# Patient Record
Sex: MALE | Race: WHITE | Employment: UNEMPLOYED | URBAN - METROPOLITAN AREA
[De-identification: names, ages, dates, MRNs, and addresses within clinical notes are randomized per-mention and may not be internally consistent; named-entity substitution may affect disease eponyms.]

---

## 2017-03-16 ENCOUNTER — HOSPITAL ENCOUNTER (EMERGENCY)
Age: 27
Discharge: PSYCHIATRIC OR SUBSTANCE USE FACILITY | DRG: 812 | End: 2017-03-16
Attending: EMERGENCY MEDICINE | Admitting: EMERGENCY MEDICINE
Payer: COMMERCIAL

## 2017-03-16 ENCOUNTER — HOSPITAL ENCOUNTER (INPATIENT)
Age: 27
LOS: 1 days | Discharge: PSYCHIATRIC OR SUBSTANCE USE FACILITY | DRG: 750 | End: 2017-03-17
Attending: INTERNAL MEDICINE | Admitting: INTERNAL MEDICINE
Payer: COMMERCIAL

## 2017-03-16 VITALS
DIASTOLIC BLOOD PRESSURE: 69 MMHG | BODY MASS INDEX: 23.03 KG/M2 | TEMPERATURE: 98.4 F | RESPIRATION RATE: 16 BRPM | HEIGHT: 72 IN | OXYGEN SATURATION: 100 % | WEIGHT: 170 LBS | SYSTOLIC BLOOD PRESSURE: 110 MMHG | HEART RATE: 74 BPM

## 2017-03-16 DIAGNOSIS — T50.902A DRUG OVERDOSE, INTENTIONAL SELF-HARM, INITIAL ENCOUNTER (HCC): Primary | ICD-10-CM

## 2017-03-16 LAB
ABSOLUTE EOS #: 0.2 K/UL (ref 0–0.4)
ABSOLUTE LYMPH #: 2.8 K/UL (ref 1–4.8)
ABSOLUTE MONO #: 0.7 K/UL (ref 0.2–0.8)
ACETAMINOPHEN LEVEL: <10 UG/ML (ref 10–30)
ALBUMIN SERPL-MCNC: 4.2 G/DL (ref 3.5–5.2)
ALBUMIN SERPL-MCNC: 4.7 G/DL (ref 3.5–5.2)
ALBUMIN/GLOBULIN RATIO: ABNORMAL (ref 1–2.5)
ALBUMIN/GLOBULIN RATIO: NORMAL (ref 1–2.5)
ALP BLD-CCNC: 37 U/L (ref 40–129)
ALP BLD-CCNC: 44 U/L (ref 40–129)
ALT SERPL-CCNC: 10 U/L (ref 5–41)
ALT SERPL-CCNC: 12 U/L (ref 5–41)
AMPHETAMINE SCREEN URINE: NEGATIVE
ANION GAP SERPL CALCULATED.3IONS-SCNC: 10 MMOL/L (ref 9–17)
ANION GAP SERPL CALCULATED.3IONS-SCNC: 15 MMOL/L (ref 9–17)
APPEARANCE: CLEAR
AST SERPL-CCNC: 17 U/L
AST SERPL-CCNC: 20 U/L
BARBITURATE SCREEN URINE: NEGATIVE
BASOPHILS # BLD: 1 % (ref 0–2)
BASOPHILS ABSOLUTE: 0.1 K/UL (ref 0–0.2)
BENZODIAZEPINE SCREEN, URINE: NEGATIVE
BILIRUB SERPL-MCNC: 0.62 MG/DL (ref 0.3–1.2)
BILIRUB SERPL-MCNC: 0.67 MG/DL (ref 0.3–1.2)
BILIRUBIN DIRECT: 0.14 MG/DL
BILIRUBIN, INDIRECT: 0.53 MG/DL (ref 0–1)
BILIRUBIN, POC: NORMAL
BLOOD URINE, POC: NORMAL
BUN BLDV-MCNC: 12 MG/DL (ref 6–20)
BUN BLDV-MCNC: 18 MG/DL (ref 6–20)
BUN/CREAT BLD: 22 (ref 9–20)
BUN/CREAT BLD: ABNORMAL (ref 9–20)
BUPRENORPHINE URINE: NORMAL
CALCIUM SERPL-MCNC: 8.5 MG/DL (ref 8.6–10.4)
CALCIUM SERPL-MCNC: 9 MG/DL (ref 8.6–10.4)
CANNABINOID SCREEN URINE: NEGATIVE
CHLORIDE BLD-SCNC: 103 MMOL/L (ref 98–107)
CHLORIDE BLD-SCNC: 97 MMOL/L (ref 98–107)
CHP ED QC CHECK: YES
CLARITY, POC: CLEAR
CO2: 25 MMOL/L (ref 20–31)
CO2: 27 MMOL/L (ref 20–31)
COCAINE METABOLITE, URINE: NEGATIVE
COLOR, POC: YELLOW
CREAT SERPL-MCNC: 0.82 MG/DL (ref 0.7–1.2)
CREAT SERPL-MCNC: 0.83 MG/DL (ref 0.7–1.2)
DIFFERENTIAL TYPE: ABNORMAL
EKG ATRIAL RATE: 73 BPM
EKG P AXIS: 71 DEGREES
EKG P-R INTERVAL: 140 MS
EKG Q-T INTERVAL: 392 MS
EKG QRS DURATION: 92 MS
EKG QTC CALCULATION (BAZETT): 431 MS
EKG R AXIS: 89 DEGREES
EKG T AXIS: 54 DEGREES
EKG VENTRICULAR RATE: 73 BPM
EOSINOPHILS RELATIVE PERCENT: 2 % (ref 1–4)
ETHANOL PERCENT: <0.01 %
ETHANOL: <10 MG/DL
GFR AFRICAN AMERICAN: >60 ML/MIN
GFR AFRICAN AMERICAN: >60 ML/MIN
GFR NON-AFRICAN AMERICAN: >60 ML/MIN
GFR NON-AFRICAN AMERICAN: >60 ML/MIN
GFR SERPL CREATININE-BSD FRML MDRD: ABNORMAL ML/MIN/{1.73_M2}
GLOBULIN: NORMAL G/DL (ref 1.5–3.8)
GLUCOSE BLD-MCNC: 86 MG/DL (ref 70–99)
GLUCOSE BLD-MCNC: 99 MG/DL (ref 70–99)
GLUCOSE URINE, POC: NORMAL
HCT VFR BLD CALC: 43.5 % (ref 41–53)
HEMOGLOBIN: 14.7 G/DL (ref 13.5–17.5)
KETONES, POC: NORMAL
LEUKOCYTE EST, POC: NORMAL
LITHIUM DATE LAST DOSE: ABNORMAL
LITHIUM DOSE AMOUNT: ABNORMAL
LITHIUM DOSE TIME: ABNORMAL
LITHIUM LEVEL: <0.1 MMOL/L (ref 0.6–1.2)
LYMPHOCYTES # BLD: 32 % (ref 24–44)
MCH RBC QN AUTO: 31.7 PG (ref 26–34)
MCHC RBC AUTO-ENTMCNC: 33.7 G/DL (ref 31–37)
MCV RBC AUTO: 94 FL (ref 80–100)
MDMA URINE: NORMAL
METHADONE SCREEN, URINE: NEGATIVE
METHAMPHETAMINE, URINE: NORMAL
MONOCYTES # BLD: 8 % (ref 1–7)
MYOGLOBIN: 39 NG/ML (ref 28–72)
NITRITE, POC: NORMAL
OPIATES, URINE: NEGATIVE
OXYCODONE SCREEN URINE: NEGATIVE
PDW BLD-RTO: 13.5 % (ref 11.5–14.5)
PH, POC: 6
PHENCYCLIDINE, URINE: NEGATIVE
PLATELET # BLD: 242 K/UL (ref 130–400)
PLATELET ESTIMATE: ABNORMAL
PMV BLD AUTO: ABNORMAL FL (ref 6–12)
POTASSIUM SERPL-SCNC: 3.9 MMOL/L (ref 3.7–5.3)
POTASSIUM SERPL-SCNC: 4 MMOL/L (ref 3.7–5.3)
PROPOXYPHENE, URINE: NORMAL
PROTEIN, POC: NORMAL
RBC # BLD: 4.63 M/UL (ref 4.5–5.9)
RBC # BLD: ABNORMAL 10*6/UL
SALICYLATE LEVEL: <1 MG/DL (ref 3–10)
SEG NEUTROPHILS: 57 % (ref 36–66)
SEGMENTED NEUTROPHILS ABSOLUTE COUNT: 5.2 K/UL (ref 1.8–7.7)
SODIUM BLD-SCNC: 137 MMOL/L (ref 135–144)
SODIUM BLD-SCNC: 140 MMOL/L (ref 135–144)
SPECIFIC GRAVITY, POC: 1.02
TEST INFORMATION: NORMAL
TOTAL PROTEIN: 6.3 G/DL (ref 6.4–8.3)
TOTAL PROTEIN: 7.5 G/DL (ref 6.4–8.3)
TOXIC TRICYCLIC SC,BLOOD: NEGATIVE
TRICYCLIC ANTIDEPRESSANTS, UR: NORMAL
TROPONIN INTERP: NORMAL
TROPONIN T: <0.03 NG/ML
UROBILINOGEN, POC: 1
VALPROIC ACID LEVEL: 27 UG/ML (ref 50–100)
VALPROIC ACID LEVEL: 58 UG/ML (ref 50–100)
VALPROIC DATE LAST DOSE: ABNORMAL
VALPROIC DATE LAST DOSE: NORMAL
VALPROIC DOSE AMOUNT: ABNORMAL
VALPROIC DOSE AMOUNT: NORMAL
VALPROIC TIME LAST DOSE: ABNORMAL
VALPROIC TIME LAST DOSE: NORMAL
WBC # BLD: 9 K/UL (ref 3.5–11)
WBC # BLD: ABNORMAL 10*3/UL

## 2017-03-16 PROCEDURE — 80048 BASIC METABOLIC PNL TOTAL CA: CPT

## 2017-03-16 PROCEDURE — 80178 ASSAY OF LITHIUM: CPT

## 2017-03-16 PROCEDURE — 80307 DRUG TEST PRSMV CHEM ANLYZR: CPT

## 2017-03-16 PROCEDURE — 96360 HYDRATION IV INFUSION INIT: CPT

## 2017-03-16 PROCEDURE — 99235 HOSP IP/OBS SAME DATE MOD 70: CPT | Performed by: FAMILY MEDICINE

## 2017-03-16 PROCEDURE — 93005 ELECTROCARDIOGRAM TRACING: CPT

## 2017-03-16 PROCEDURE — 83874 ASSAY OF MYOGLOBIN: CPT

## 2017-03-16 PROCEDURE — G0378 HOSPITAL OBSERVATION PER HR: HCPCS

## 2017-03-16 PROCEDURE — 85025 COMPLETE CBC W/AUTO DIFF WBC: CPT

## 2017-03-16 PROCEDURE — 1200000000 HC SEMI PRIVATE

## 2017-03-16 PROCEDURE — 80053 COMPREHEN METABOLIC PANEL: CPT

## 2017-03-16 PROCEDURE — 6370000000 HC RX 637 (ALT 250 FOR IP): Performed by: EMERGENCY MEDICINE

## 2017-03-16 PROCEDURE — 6360000002 HC RX W HCPCS: Performed by: PREVENTIVE MEDICINE

## 2017-03-16 PROCEDURE — G0480 DRUG TEST DEF 1-7 CLASSES: HCPCS

## 2017-03-16 PROCEDURE — 36415 COLL VENOUS BLD VENIPUNCTURE: CPT

## 2017-03-16 PROCEDURE — 96372 THER/PROPH/DIAG INJ SC/IM: CPT

## 2017-03-16 PROCEDURE — 99285 EMERGENCY DEPT VISIT HI MDM: CPT

## 2017-03-16 PROCEDURE — 80164 ASSAY DIPROPYLACETIC ACD TOT: CPT

## 2017-03-16 PROCEDURE — 80076 HEPATIC FUNCTION PANEL: CPT

## 2017-03-16 PROCEDURE — 84484 ASSAY OF TROPONIN QUANT: CPT

## 2017-03-16 PROCEDURE — 2580000003 HC RX 258: Performed by: PREVENTIVE MEDICINE

## 2017-03-16 PROCEDURE — 2580000003 HC RX 258: Performed by: EMERGENCY MEDICINE

## 2017-03-16 PROCEDURE — 96361 HYDRATE IV INFUSION ADD-ON: CPT

## 2017-03-16 PROCEDURE — 99220 PR INITIAL OBSERVATION CARE/DAY 70 MINUTES: CPT | Performed by: INTERNAL MEDICINE

## 2017-03-16 RX ORDER — FLUOXETINE HYDROCHLORIDE 20 MG/1
20 CAPSULE ORAL DAILY
Status: ON HOLD | COMMUNITY
End: 2017-03-22 | Stop reason: HOSPADM

## 2017-03-16 RX ORDER — DIVALPROEX SODIUM 500 MG/1
400 TABLET, DELAYED RELEASE ORAL NIGHTLY
Status: ON HOLD | COMMUNITY
End: 2017-03-22 | Stop reason: HOSPADM

## 2017-03-16 RX ORDER — SODIUM CHLORIDE 0.9 % (FLUSH) 0.9 %
10 SYRINGE (ML) INJECTION PRN
Status: DISCONTINUED | OUTPATIENT
Start: 2017-03-16 | End: 2017-03-17 | Stop reason: HOSPADM

## 2017-03-16 RX ORDER — ACTIVATED CHARCOAL 208 MG/ML
50 SUSPENSION ORAL ONCE
Status: COMPLETED | OUTPATIENT
Start: 2017-03-16 | End: 2017-03-16

## 2017-03-16 RX ORDER — SODIUM CHLORIDE 9 MG/ML
INJECTION, SOLUTION INTRAVENOUS CONTINUOUS
Status: DISCONTINUED | OUTPATIENT
Start: 2017-03-16 | End: 2017-03-16 | Stop reason: HOSPADM

## 2017-03-16 RX ORDER — SODIUM CHLORIDE 9 MG/ML
INJECTION, SOLUTION INTRAVENOUS CONTINUOUS
Status: CANCELLED | OUTPATIENT
Start: 2017-03-16

## 2017-03-16 RX ORDER — SODIUM CHLORIDE 9 MG/ML
INJECTION, SOLUTION INTRAVENOUS CONTINUOUS
Status: DISCONTINUED | OUTPATIENT
Start: 2017-03-16 | End: 2017-03-17 | Stop reason: HOSPADM

## 2017-03-16 RX ORDER — SODIUM CHLORIDE 0.9 % (FLUSH) 0.9 %
10 SYRINGE (ML) INJECTION PRN
Status: CANCELLED | OUTPATIENT
Start: 2017-03-16

## 2017-03-16 RX ORDER — 0.9 % SODIUM CHLORIDE 0.9 %
1000 INTRAVENOUS SOLUTION INTRAVENOUS ONCE
Status: COMPLETED | OUTPATIENT
Start: 2017-03-16 | End: 2017-03-16

## 2017-03-16 RX ORDER — SODIUM CHLORIDE 0.9 % (FLUSH) 0.9 %
10 SYRINGE (ML) INJECTION EVERY 12 HOURS SCHEDULED
Status: DISCONTINUED | OUTPATIENT
Start: 2017-03-16 | End: 2017-03-17 | Stop reason: HOSPADM

## 2017-03-16 RX ORDER — SODIUM CHLORIDE 0.9 % (FLUSH) 0.9 %
10 SYRINGE (ML) INJECTION EVERY 12 HOURS SCHEDULED
Status: CANCELLED | OUTPATIENT
Start: 2017-03-16

## 2017-03-16 RX ORDER — ONDANSETRON 2 MG/ML
4 INJECTION INTRAMUSCULAR; INTRAVENOUS EVERY 6 HOURS PRN
Status: DISCONTINUED | OUTPATIENT
Start: 2017-03-16 | End: 2017-03-17 | Stop reason: HOSPADM

## 2017-03-16 RX ORDER — ARIPIPRAZOLE 5 MG/1
5 TABLET ORAL DAILY
Status: ON HOLD | COMMUNITY
End: 2017-03-22 | Stop reason: HOSPADM

## 2017-03-16 RX ADMIN — SODIUM CHLORIDE: 9 INJECTION, SOLUTION INTRAVENOUS at 01:00

## 2017-03-16 RX ADMIN — SODIUM CHLORIDE 1000 ML: 9 INJECTION, SOLUTION INTRAVENOUS at 01:25

## 2017-03-16 RX ADMIN — ACTIVATED CHARCOAL 50 G: 208 SUSPENSION ORAL at 01:23

## 2017-03-16 RX ADMIN — SODIUM CHLORIDE: 9 INJECTION, SOLUTION INTRAVENOUS at 14:11

## 2017-03-16 RX ADMIN — ENOXAPARIN SODIUM 40 MG: 40 INJECTION SUBCUTANEOUS at 14:29

## 2017-03-16 ASSESSMENT — ENCOUNTER SYMPTOMS
BLOOD IN STOOL: 0
ABDOMINAL PAIN: 0
CONSTIPATION: 0
SPUTUM PRODUCTION: 0
COUGH: 0
VOMITING: 0
DIARRHEA: 1
SHORTNESS OF BREATH: 0

## 2017-03-16 ASSESSMENT — PAIN SCALES - GENERAL: PAINLEVEL_OUTOF10: 0

## 2017-03-17 ENCOUNTER — HOSPITAL ENCOUNTER (INPATIENT)
Age: 27
LOS: 6 days | Discharge: HOME | DRG: 750 | End: 2017-03-23
Attending: PSYCHIATRY & NEUROLOGY | Admitting: PSYCHIATRY & NEUROLOGY
Payer: COMMERCIAL

## 2017-03-17 VITALS
SYSTOLIC BLOOD PRESSURE: 129 MMHG | BODY MASS INDEX: 21.44 KG/M2 | DIASTOLIC BLOOD PRESSURE: 77 MMHG | HEIGHT: 72 IN | WEIGHT: 158.29 LBS | TEMPERATURE: 97.3 F | RESPIRATION RATE: 16 BRPM | OXYGEN SATURATION: 99 % | HEART RATE: 65 BPM

## 2017-03-17 PROBLEM — F31.9 BIPOLAR 1 DISORDER (HCC): Status: ACTIVE | Noted: 2017-03-17

## 2017-03-17 LAB
ABSOLUTE EOS #: 0.1 K/UL (ref 0–0.4)
ABSOLUTE LYMPH #: 2 K/UL (ref 1–4.8)
ABSOLUTE MONO #: 0.3 K/UL (ref 0.1–1.3)
BASOPHILS # BLD: 1 % (ref 0–2)
BASOPHILS ABSOLUTE: 0 K/UL (ref 0–0.2)
DIFFERENTIAL TYPE: ABNORMAL
EOSINOPHILS RELATIVE PERCENT: 3 % (ref 0–4)
HCT VFR BLD CALC: 40 % (ref 41–53)
HEMOGLOBIN: 12.8 G/DL (ref 13.5–17.5)
LYMPHOCYTES # BLD: 44 % (ref 24–44)
MCH RBC QN AUTO: 30.7 PG (ref 26–34)
MCHC RBC AUTO-ENTMCNC: 32.1 G/DL (ref 31–37)
MCV RBC AUTO: 95.8 FL (ref 80–100)
MONOCYTES # BLD: 7 % (ref 1–7)
PDW BLD-RTO: 13.9 % (ref 11.5–14.9)
PLATELET # BLD: 156 K/UL (ref 150–450)
PLATELET ESTIMATE: ABNORMAL
PMV BLD AUTO: 9.1 FL (ref 6–12)
RBC # BLD: 4.18 M/UL (ref 4.5–5.9)
RBC # BLD: ABNORMAL 10*6/UL
SEG NEUTROPHILS: 45 % (ref 36–66)
SEGMENTED NEUTROPHILS ABSOLUTE COUNT: 2 K/UL (ref 1.3–9.1)
WBC # BLD: 4.5 K/UL (ref 3.5–11)
WBC # BLD: ABNORMAL 10*3/UL

## 2017-03-17 PROCEDURE — 6370000000 HC RX 637 (ALT 250 FOR IP): Performed by: PSYCHIATRY & NEUROLOGY

## 2017-03-17 PROCEDURE — 85025 COMPLETE CBC W/AUTO DIFF WBC: CPT

## 2017-03-17 PROCEDURE — 6360000002 HC RX W HCPCS: Performed by: PREVENTIVE MEDICINE

## 2017-03-17 PROCEDURE — 36415 COLL VENOUS BLD VENIPUNCTURE: CPT

## 2017-03-17 PROCEDURE — 96372 THER/PROPH/DIAG INJ SC/IM: CPT

## 2017-03-17 PROCEDURE — 1240000000 HC EMOTIONAL WELLNESS R&B

## 2017-03-17 PROCEDURE — G0378 HOSPITAL OBSERVATION PER HR: HCPCS

## 2017-03-17 PROCEDURE — 2580000003 HC RX 258: Performed by: PREVENTIVE MEDICINE

## 2017-03-17 PROCEDURE — 99226 PR SBSQ OBSERVATION CARE/DAY 35 MINUTES: CPT | Performed by: INTERNAL MEDICINE

## 2017-03-17 RX ORDER — BENZTROPINE MESYLATE 0.5 MG/1
0.5 TABLET ORAL 2 TIMES DAILY
Status: DISCONTINUED | OUTPATIENT
Start: 2017-03-17 | End: 2017-03-23 | Stop reason: HOSPADM

## 2017-03-17 RX ORDER — MAGNESIUM HYDROXIDE/ALUMINUM HYDROXICE/SIMETHICONE 120; 1200; 1200 MG/30ML; MG/30ML; MG/30ML
20 SUSPENSION ORAL 3 TIMES DAILY PRN
Status: DISCONTINUED | OUTPATIENT
Start: 2017-03-17 | End: 2017-03-23 | Stop reason: HOSPADM

## 2017-03-17 RX ORDER — ARIPIPRAZOLE 10 MG/1
10 TABLET ORAL DAILY
Status: DISCONTINUED | OUTPATIENT
Start: 2017-03-18 | End: 2017-03-23 | Stop reason: HOSPADM

## 2017-03-17 RX ORDER — DIVALPROEX SODIUM 500 MG/1
500 TABLET, DELAYED RELEASE ORAL NIGHTLY
Status: ON HOLD | COMMUNITY
End: 2017-03-22 | Stop reason: HOSPADM

## 2017-03-17 RX ORDER — TRAZODONE HYDROCHLORIDE 50 MG/1
50 TABLET ORAL NIGHTLY PRN
Status: DISCONTINUED | OUTPATIENT
Start: 2017-03-17 | End: 2017-03-23 | Stop reason: HOSPADM

## 2017-03-17 RX ORDER — ACETAMINOPHEN 325 MG/1
650 TABLET ORAL EVERY 4 HOURS PRN
Status: DISCONTINUED | OUTPATIENT
Start: 2017-03-17 | End: 2017-03-23 | Stop reason: HOSPADM

## 2017-03-17 RX ORDER — FLUOXETINE HYDROCHLORIDE 20 MG/1
20 CAPSULE ORAL DAILY
Status: DISCONTINUED | OUTPATIENT
Start: 2017-03-18 | End: 2017-03-23 | Stop reason: HOSPADM

## 2017-03-17 RX ORDER — HYDROXYZINE HYDROCHLORIDE 25 MG/1
25 TABLET, FILM COATED ORAL 3 TIMES DAILY PRN
Status: DISCONTINUED | OUTPATIENT
Start: 2017-03-17 | End: 2017-03-23 | Stop reason: HOSPADM

## 2017-03-17 RX ORDER — BENZTROPINE MESYLATE 1 MG/ML
2 INJECTION INTRAMUSCULAR; INTRAVENOUS DAILY PRN
Status: DISCONTINUED | OUTPATIENT
Start: 2017-03-17 | End: 2017-03-23 | Stop reason: HOSPADM

## 2017-03-17 RX ADMIN — HYDROXYZINE HYDROCHLORIDE 25 MG: 25 TABLET, FILM COATED ORAL at 23:00

## 2017-03-17 RX ADMIN — SODIUM CHLORIDE: 9 INJECTION, SOLUTION INTRAVENOUS at 09:22

## 2017-03-17 RX ADMIN — SODIUM CHLORIDE: 9 INJECTION, SOLUTION INTRAVENOUS at 00:04

## 2017-03-17 RX ADMIN — BENZTROPINE MESYLATE 0.5 MG: 0.5 TABLET ORAL at 23:00

## 2017-03-17 RX ADMIN — ENOXAPARIN SODIUM 40 MG: 40 INJECTION SUBCUTANEOUS at 09:23

## 2017-03-17 ASSESSMENT — SLEEP AND FATIGUE QUESTIONNAIRES
DIFFICULTY STAYING ASLEEP: NO
DIFFICULTY FALLING ASLEEP: NO
AVERAGE NUMBER OF SLEEP HOURS: 7
DO YOU HAVE DIFFICULTY SLEEPING: NO
RESTFUL SLEEP: YES
DIFFICULTY ARISING: NO
DO YOU USE A SLEEP AID: YES
SLEEP PATTERN: NORMAL

## 2017-03-17 ASSESSMENT — ENCOUNTER SYMPTOMS
ABDOMINAL PAIN: 0
CHEST TIGHTNESS: 0
BLOOD IN STOOL: 0
SHORTNESS OF BREATH: 0
COUGH: 0
TROUBLE SWALLOWING: 0

## 2017-03-17 ASSESSMENT — LIFESTYLE VARIABLES: HISTORY_ALCOHOL_USE: NO

## 2017-03-17 ASSESSMENT — PAIN SCALES - GENERAL
PAINLEVEL_OUTOF10: 0
PAINLEVEL_OUTOF10: 0

## 2017-03-18 PROCEDURE — 1240000000 HC EMOTIONAL WELLNESS R&B

## 2017-03-18 PROCEDURE — 6370000000 HC RX 637 (ALT 250 FOR IP): Performed by: PSYCHIATRY & NEUROLOGY

## 2017-03-18 RX ORDER — DIVALPROEX SODIUM 500 MG/1
500 TABLET, EXTENDED RELEASE ORAL 2 TIMES DAILY
Status: DISCONTINUED | OUTPATIENT
Start: 2017-03-18 | End: 2017-03-23 | Stop reason: HOSPADM

## 2017-03-18 RX ORDER — NICOTINE 21 MG/24HR
1 PATCH, TRANSDERMAL 24 HOURS TRANSDERMAL DAILY
Status: DISCONTINUED | OUTPATIENT
Start: 2017-03-18 | End: 2017-03-23 | Stop reason: HOSPADM

## 2017-03-18 RX ADMIN — HYDROXYZINE HYDROCHLORIDE 25 MG: 25 TABLET, FILM COATED ORAL at 21:57

## 2017-03-18 RX ADMIN — ARIPIPRAZOLE 10 MG: 10 TABLET ORAL at 08:47

## 2017-03-18 RX ADMIN — BENZTROPINE MESYLATE 0.5 MG: 0.5 TABLET ORAL at 21:57

## 2017-03-18 RX ADMIN — BENZTROPINE MESYLATE 0.5 MG: 0.5 TABLET ORAL at 08:47

## 2017-03-18 RX ADMIN — DIVALPROEX SODIUM 500 MG: 500 TABLET, EXTENDED RELEASE ORAL at 21:57

## 2017-03-18 RX ADMIN — FLUOXETINE 20 MG: 20 CAPSULE ORAL at 08:47

## 2017-03-18 ASSESSMENT — SLEEP AND FATIGUE QUESTIONNAIRES
DO YOU USE A SLEEP AID: YES
DIFFICULTY FALLING ASLEEP: NO
RESTFUL SLEEP: YES
DIFFICULTY ARISING: NO
AVERAGE NUMBER OF SLEEP HOURS: 7
DIFFICULTY STAYING ASLEEP: NO
DO YOU HAVE DIFFICULTY SLEEPING: NO
SLEEP PATTERN: NORMAL

## 2017-03-18 ASSESSMENT — LIFESTYLE VARIABLES: HISTORY_ALCOHOL_USE: NO

## 2017-03-19 PROCEDURE — 1240000000 HC EMOTIONAL WELLNESS R&B

## 2017-03-19 PROCEDURE — 6370000000 HC RX 637 (ALT 250 FOR IP): Performed by: PSYCHIATRY & NEUROLOGY

## 2017-03-19 RX ADMIN — BENZTROPINE MESYLATE 0.5 MG: 0.5 TABLET ORAL at 08:42

## 2017-03-19 RX ADMIN — DIVALPROEX SODIUM 500 MG: 500 TABLET, EXTENDED RELEASE ORAL at 08:43

## 2017-03-19 RX ADMIN — BENZTROPINE MESYLATE 0.5 MG: 0.5 TABLET ORAL at 21:28

## 2017-03-19 RX ADMIN — ARIPIPRAZOLE 10 MG: 10 TABLET ORAL at 08:42

## 2017-03-19 RX ADMIN — FLUOXETINE 20 MG: 20 CAPSULE ORAL at 08:43

## 2017-03-19 RX ADMIN — HYDROXYZINE HYDROCHLORIDE 25 MG: 25 TABLET, FILM COATED ORAL at 21:28

## 2017-03-19 RX ADMIN — DIVALPROEX SODIUM 500 MG: 500 TABLET, EXTENDED RELEASE ORAL at 21:28

## 2017-03-20 PROCEDURE — 6370000000 HC RX 637 (ALT 250 FOR IP): Performed by: PSYCHIATRY & NEUROLOGY

## 2017-03-20 PROCEDURE — 1240000000 HC EMOTIONAL WELLNESS R&B

## 2017-03-20 RX ADMIN — TRAZODONE HYDROCHLORIDE 50 MG: 50 TABLET ORAL at 21:14

## 2017-03-20 RX ADMIN — DIVALPROEX SODIUM 500 MG: 500 TABLET, EXTENDED RELEASE ORAL at 21:14

## 2017-03-20 RX ADMIN — DIVALPROEX SODIUM 500 MG: 500 TABLET, EXTENDED RELEASE ORAL at 09:29

## 2017-03-20 RX ADMIN — BENZTROPINE MESYLATE 0.5 MG: 0.5 TABLET ORAL at 21:14

## 2017-03-20 RX ADMIN — FLUOXETINE 20 MG: 20 CAPSULE ORAL at 09:29

## 2017-03-20 RX ADMIN — ARIPIPRAZOLE 10 MG: 10 TABLET ORAL at 09:29

## 2017-03-20 RX ADMIN — BENZTROPINE MESYLATE 0.5 MG: 0.5 TABLET ORAL at 09:29

## 2017-03-20 RX ADMIN — HYDROXYZINE HYDROCHLORIDE 25 MG: 25 TABLET, FILM COATED ORAL at 21:14

## 2017-03-21 PROCEDURE — 1240000000 HC EMOTIONAL WELLNESS R&B

## 2017-03-21 PROCEDURE — 6370000000 HC RX 637 (ALT 250 FOR IP): Performed by: PSYCHIATRY & NEUROLOGY

## 2017-03-21 RX ADMIN — FLUOXETINE 20 MG: 20 CAPSULE ORAL at 08:19

## 2017-03-21 RX ADMIN — ARIPIPRAZOLE 10 MG: 10 TABLET ORAL at 08:19

## 2017-03-21 RX ADMIN — BENZTROPINE MESYLATE 0.5 MG: 0.5 TABLET ORAL at 08:19

## 2017-03-21 RX ADMIN — DIVALPROEX SODIUM 500 MG: 500 TABLET, EXTENDED RELEASE ORAL at 21:31

## 2017-03-21 RX ADMIN — DIVALPROEX SODIUM 500 MG: 500 TABLET, EXTENDED RELEASE ORAL at 08:19

## 2017-03-21 RX ADMIN — BENZTROPINE MESYLATE 0.5 MG: 0.5 TABLET ORAL at 21:31

## 2017-03-21 RX ADMIN — TRAZODONE HYDROCHLORIDE 50 MG: 50 TABLET ORAL at 21:31

## 2017-03-22 PROCEDURE — 1240000000 HC EMOTIONAL WELLNESS R&B

## 2017-03-22 PROCEDURE — 6370000000 HC RX 637 (ALT 250 FOR IP): Performed by: PSYCHIATRY & NEUROLOGY

## 2017-03-22 RX ORDER — FLUOXETINE HYDROCHLORIDE 20 MG/1
20 CAPSULE ORAL DAILY
Qty: 30 CAPSULE | Refills: 0 | Status: ON HOLD | OUTPATIENT
Start: 2017-03-22 | End: 2017-03-31 | Stop reason: HOSPADM

## 2017-03-22 RX ORDER — DIVALPROEX SODIUM 500 MG/1
500 TABLET, EXTENDED RELEASE ORAL 2 TIMES DAILY
Qty: 30 TABLET | Refills: 0 | Status: ON HOLD | OUTPATIENT
Start: 2017-03-22 | End: 2017-03-31 | Stop reason: HOSPADM

## 2017-03-22 RX ORDER — BENZTROPINE MESYLATE 0.5 MG/1
0.5 TABLET ORAL 2 TIMES DAILY
Qty: 60 TABLET | Refills: 0 | Status: ON HOLD | OUTPATIENT
Start: 2017-03-22 | End: 2017-03-31 | Stop reason: HOSPADM

## 2017-03-22 RX ORDER — ARIPIPRAZOLE 10 MG/1
10 TABLET ORAL DAILY
Qty: 30 TABLET | Refills: 0 | Status: ON HOLD | OUTPATIENT
Start: 2017-03-22 | End: 2017-03-31 | Stop reason: HOSPADM

## 2017-03-22 RX ORDER — NICOTINE 21 MG/24HR
1 PATCH, TRANSDERMAL 24 HOURS TRANSDERMAL DAILY
Qty: 30 PATCH | Refills: 0 | Status: ON HOLD
Start: 2017-03-22 | End: 2017-03-31 | Stop reason: HOSPADM

## 2017-03-22 RX ORDER — TRAZODONE HYDROCHLORIDE 50 MG/1
50 TABLET ORAL NIGHTLY PRN
Qty: 30 TABLET | Refills: 0 | Status: ON HOLD | OUTPATIENT
Start: 2017-03-22 | End: 2017-03-31 | Stop reason: HOSPADM

## 2017-03-22 RX ADMIN — FLUOXETINE 20 MG: 20 CAPSULE ORAL at 09:16

## 2017-03-22 RX ADMIN — HYDROXYZINE HYDROCHLORIDE 25 MG: 25 TABLET, FILM COATED ORAL at 22:50

## 2017-03-22 RX ADMIN — DIVALPROEX SODIUM 500 MG: 500 TABLET, EXTENDED RELEASE ORAL at 09:16

## 2017-03-22 RX ADMIN — BENZTROPINE MESYLATE 0.5 MG: 0.5 TABLET ORAL at 22:50

## 2017-03-22 RX ADMIN — ARIPIPRAZOLE 10 MG: 10 TABLET ORAL at 09:15

## 2017-03-22 RX ADMIN — BENZTROPINE MESYLATE 0.5 MG: 0.5 TABLET ORAL at 09:16

## 2017-03-22 RX ADMIN — TRAZODONE HYDROCHLORIDE 50 MG: 50 TABLET ORAL at 22:50

## 2017-03-22 RX ADMIN — DIVALPROEX SODIUM 500 MG: 500 TABLET, EXTENDED RELEASE ORAL at 22:49

## 2017-03-23 VITALS
DIASTOLIC BLOOD PRESSURE: 56 MMHG | RESPIRATION RATE: 14 BRPM | TEMPERATURE: 97.3 F | HEIGHT: 72 IN | SYSTOLIC BLOOD PRESSURE: 104 MMHG | WEIGHT: 160 LBS | HEART RATE: 74 BPM | BODY MASS INDEX: 21.67 KG/M2

## 2017-03-23 PROCEDURE — 5130000000 HC BRIDGE APPOINTMENT

## 2017-03-23 PROCEDURE — 6370000000 HC RX 637 (ALT 250 FOR IP): Performed by: PSYCHIATRY & NEUROLOGY

## 2017-03-23 RX ADMIN — DIVALPROEX SODIUM 500 MG: 500 TABLET, EXTENDED RELEASE ORAL at 08:47

## 2017-03-23 RX ADMIN — FLUOXETINE 20 MG: 20 CAPSULE ORAL at 08:47

## 2017-03-23 RX ADMIN — BENZTROPINE MESYLATE 0.5 MG: 0.5 TABLET ORAL at 08:47

## 2017-03-23 RX ADMIN — ARIPIPRAZOLE 10 MG: 10 TABLET ORAL at 08:47

## 2017-03-26 ENCOUNTER — HOSPITAL ENCOUNTER (INPATIENT)
Age: 27
LOS: 5 days | Discharge: HOME | DRG: 750 | End: 2017-03-31
Attending: PSYCHIATRY & NEUROLOGY | Admitting: PSYCHIATRY & NEUROLOGY
Payer: COMMERCIAL

## 2017-03-26 ENCOUNTER — HOSPITAL ENCOUNTER (EMERGENCY)
Age: 27
Discharge: PSYCHIATRIC OR SUBSTANCE USE FACILITY | End: 2017-03-26
Attending: EMERGENCY MEDICINE
Payer: COMMERCIAL

## 2017-03-26 VITALS
HEART RATE: 76 BPM | WEIGHT: 160 LBS | OXYGEN SATURATION: 99 % | TEMPERATURE: 97 F | DIASTOLIC BLOOD PRESSURE: 45 MMHG | HEIGHT: 72 IN | RESPIRATION RATE: 16 BRPM | SYSTOLIC BLOOD PRESSURE: 94 MMHG | BODY MASS INDEX: 21.67 KG/M2

## 2017-03-26 DIAGNOSIS — T50.902A DRUG OVERDOSE, INTENTIONAL SELF-HARM, INITIAL ENCOUNTER (HCC): Primary | ICD-10-CM

## 2017-03-26 DIAGNOSIS — T14.91XA SUICIDE ATTEMPT (HCC): ICD-10-CM

## 2017-03-26 LAB
ACETAMINOPHEN LEVEL: <10 UG/ML (ref 10–30)
ALBUMIN SERPL-MCNC: 4.4 G/DL (ref 3.5–5.2)
ALBUMIN/GLOBULIN RATIO: 1.8 (ref 1–2.5)
ALP BLD-CCNC: 39 U/L (ref 40–129)
ALT SERPL-CCNC: 8 U/L (ref 5–41)
AMMONIA: 53 UMOL/L (ref 16–60)
ANION GAP SERPL CALCULATED.3IONS-SCNC: 11 MMOL/L (ref 9–17)
AST SERPL-CCNC: 15 U/L
BILIRUB SERPL-MCNC: 0.68 MG/DL (ref 0.3–1.2)
BILIRUBIN DIRECT: 0.16 MG/DL
BILIRUBIN, INDIRECT: 0.52 MG/DL (ref 0–1)
BUN BLDV-MCNC: 21 MG/DL (ref 6–20)
BUN/CREAT BLD: ABNORMAL (ref 9–20)
CALCIUM SERPL-MCNC: 8.9 MG/DL (ref 8.6–10.4)
CHLORIDE BLD-SCNC: 102 MMOL/L (ref 98–107)
CO2: 28 MMOL/L (ref 20–31)
CREAT SERPL-MCNC: 0.89 MG/DL (ref 0.7–1.2)
ETHANOL PERCENT: <0.01 %
ETHANOL: <10 MG/DL
GFR AFRICAN AMERICAN: >60 ML/MIN
GFR NON-AFRICAN AMERICAN: >60 ML/MIN
GFR SERPL CREATININE-BSD FRML MDRD: ABNORMAL ML/MIN/{1.73_M2}
GFR SERPL CREATININE-BSD FRML MDRD: ABNORMAL ML/MIN/{1.73_M2}
GLOBULIN: ABNORMAL G/DL (ref 1.5–3.8)
GLUCOSE BLD-MCNC: 92 MG/DL (ref 70–99)
HCT VFR BLD CALC: 37.7 % (ref 41–53)
HEMOGLOBIN: 13 G/DL (ref 13.5–17.5)
MCH RBC QN AUTO: 32.2 PG (ref 26–34)
MCHC RBC AUTO-ENTMCNC: 34.5 G/DL (ref 31–37)
MCV RBC AUTO: 93.4 FL (ref 80–100)
PDW BLD-RTO: 14 % (ref 12.5–15.4)
PLATELET # BLD: 174 K/UL (ref 140–450)
PMV BLD AUTO: 9.1 FL (ref 6–12)
POTASSIUM SERPL-SCNC: 3.6 MMOL/L (ref 3.7–5.3)
RBC # BLD: 4.04 M/UL (ref 4.5–5.9)
SALICYLATE LEVEL: <1 MG/DL (ref 3–10)
SODIUM BLD-SCNC: 141 MMOL/L (ref 135–144)
TOTAL PROTEIN: 6.9 G/DL (ref 6.4–8.3)
TOXIC TRICYCLIC SC,BLOOD: NEGATIVE
VALPROIC ACID LEVEL: 113 UG/ML (ref 50–100)
VALPROIC ACID LEVEL: 118 UG/ML (ref 50–100)
VALPROIC DATE LAST DOSE: ABNORMAL
VALPROIC DATE LAST DOSE: ABNORMAL
VALPROIC DOSE AMOUNT: ABNORMAL
VALPROIC DOSE AMOUNT: ABNORMAL
VALPROIC TIME LAST DOSE: ABNORMAL
VALPROIC TIME LAST DOSE: ABNORMAL
WBC # BLD: 4.9 K/UL (ref 3.5–11)

## 2017-03-26 PROCEDURE — 80164 ASSAY DIPROPYLACETIC ACD TOT: CPT

## 2017-03-26 PROCEDURE — 1240000000 HC EMOTIONAL WELLNESS R&B

## 2017-03-26 PROCEDURE — 80048 BASIC METABOLIC PNL TOTAL CA: CPT

## 2017-03-26 PROCEDURE — 80307 DRUG TEST PRSMV CHEM ANLYZR: CPT

## 2017-03-26 PROCEDURE — G0480 DRUG TEST DEF 1-7 CLASSES: HCPCS

## 2017-03-26 PROCEDURE — 82140 ASSAY OF AMMONIA: CPT

## 2017-03-26 PROCEDURE — 99285 EMERGENCY DEPT VISIT HI MDM: CPT

## 2017-03-26 PROCEDURE — 80076 HEPATIC FUNCTION PANEL: CPT

## 2017-03-26 PROCEDURE — 85027 COMPLETE CBC AUTOMATED: CPT

## 2017-03-26 PROCEDURE — 6370000000 HC RX 637 (ALT 250 FOR IP): Performed by: PSYCHIATRY & NEUROLOGY

## 2017-03-26 PROCEDURE — 93005 ELECTROCARDIOGRAM TRACING: CPT

## 2017-03-26 RX ORDER — FLUOXETINE HYDROCHLORIDE 20 MG/1
40 CAPSULE ORAL DAILY
Status: DISCONTINUED | OUTPATIENT
Start: 2017-03-27 | End: 2017-03-31 | Stop reason: HOSPADM

## 2017-03-26 RX ORDER — FLUOXETINE HYDROCHLORIDE 20 MG/1
20 CAPSULE ORAL DAILY
Status: DISCONTINUED | OUTPATIENT
Start: 2017-03-26 | End: 2017-03-26

## 2017-03-26 RX ORDER — LORAZEPAM 2 MG/ML
2 INJECTION INTRAMUSCULAR
Status: ACTIVE | OUTPATIENT
Start: 2017-03-26 | End: 2017-03-26

## 2017-03-26 RX ORDER — DIVALPROEX SODIUM 500 MG/1
500 TABLET, EXTENDED RELEASE ORAL 2 TIMES DAILY
Status: DISCONTINUED | OUTPATIENT
Start: 2017-03-26 | End: 2017-03-29

## 2017-03-26 RX ORDER — TRAZODONE HYDROCHLORIDE 50 MG/1
50 TABLET ORAL NIGHTLY PRN
Status: DISCONTINUED | OUTPATIENT
Start: 2017-03-26 | End: 2017-03-31 | Stop reason: HOSPADM

## 2017-03-26 RX ORDER — HYDROXYZINE HYDROCHLORIDE 25 MG/1
50 TABLET, FILM COATED ORAL 3 TIMES DAILY PRN
Status: ACTIVE | OUTPATIENT
Start: 2017-03-26 | End: 2017-03-29

## 2017-03-26 RX ORDER — HALOPERIDOL 5 MG/ML
10 INJECTION INTRAMUSCULAR
Status: ACTIVE | OUTPATIENT
Start: 2017-03-26 | End: 2017-03-26

## 2017-03-26 RX ORDER — ACETAMINOPHEN 325 MG/1
650 TABLET ORAL EVERY 6 HOURS PRN
Status: DISCONTINUED | OUTPATIENT
Start: 2017-03-26 | End: 2017-03-31 | Stop reason: HOSPADM

## 2017-03-26 RX ORDER — MAGNESIUM HYDROXIDE/ALUMINUM HYDROXICE/SIMETHICONE 120; 1200; 1200 MG/30ML; MG/30ML; MG/30ML
30 SUSPENSION ORAL 2 TIMES DAILY PRN
Status: DISCONTINUED | OUTPATIENT
Start: 2017-03-26 | End: 2017-03-31 | Stop reason: HOSPADM

## 2017-03-26 RX ORDER — ARIPIPRAZOLE 10 MG/1
10 TABLET ORAL DAILY
Status: DISCONTINUED | OUTPATIENT
Start: 2017-03-26 | End: 2017-03-31 | Stop reason: HOSPADM

## 2017-03-26 RX ORDER — NICOTINE 21 MG/24HR
1 PATCH, TRANSDERMAL 24 HOURS TRANSDERMAL DAILY
Status: DISCONTINUED | OUTPATIENT
Start: 2017-03-26 | End: 2017-03-27

## 2017-03-26 RX ORDER — BENZTROPINE MESYLATE 0.5 MG/1
0.5 TABLET ORAL 2 TIMES DAILY
Status: DISCONTINUED | OUTPATIENT
Start: 2017-03-26 | End: 2017-03-31 | Stop reason: HOSPADM

## 2017-03-26 RX ORDER — BENZTROPINE MESYLATE 1 MG/ML
2 INJECTION INTRAMUSCULAR; INTRAVENOUS DAILY PRN
Status: DISCONTINUED | OUTPATIENT
Start: 2017-03-26 | End: 2017-03-31 | Stop reason: HOSPADM

## 2017-03-26 RX ADMIN — BENZTROPINE MESYLATE 0.5 MG: 0.5 TABLET ORAL at 22:58

## 2017-03-26 RX ADMIN — DIVALPROEX SODIUM 500 MG: 500 TABLET, FILM COATED, EXTENDED RELEASE ORAL at 08:27

## 2017-03-26 RX ADMIN — FLUOXETINE 20 MG: 20 CAPSULE ORAL at 08:27

## 2017-03-26 RX ADMIN — ARIPIPRAZOLE 10 MG: 10 TABLET ORAL at 08:27

## 2017-03-26 RX ADMIN — BENZTROPINE MESYLATE 0.5 MG: 0.5 TABLET ORAL at 08:27

## 2017-03-26 RX ADMIN — DIVALPROEX SODIUM 500 MG: 500 TABLET, FILM COATED, EXTENDED RELEASE ORAL at 22:57

## 2017-03-26 ASSESSMENT — ENCOUNTER SYMPTOMS
NAUSEA: 0
ABDOMINAL PAIN: 0
WHEEZING: 0
SHORTNESS OF BREATH: 0
DIARRHEA: 0
VOMITING: 0
CONSTIPATION: 0

## 2017-03-26 ASSESSMENT — SLEEP AND FATIGUE QUESTIONNAIRES
SLEEP PATTERN: NORMAL
DO YOU USE A SLEEP AID: YES
DO YOU HAVE DIFFICULTY SLEEPING: NO
DIFFICULTY STAYING ASLEEP: NO
RESTFUL SLEEP: YES
DIFFICULTY ARISING: NO
DIFFICULTY ARISING: NO
SLEEP PATTERN: NORMAL
DO YOU USE A SLEEP AID: YES
DIFFICULTY FALLING ASLEEP: NO
RESTFUL SLEEP: YES
AVERAGE NUMBER OF SLEEP HOURS: 9
DIFFICULTY STAYING ASLEEP: NO
AVERAGE NUMBER OF SLEEP HOURS: 9
DIFFICULTY FALLING ASLEEP: NO
DO YOU HAVE DIFFICULTY SLEEPING: NO

## 2017-03-26 ASSESSMENT — LIFESTYLE VARIABLES
HISTORY_ALCOHOL_USE: NO
HISTORY_ALCOHOL_USE: NO

## 2017-03-26 ASSESSMENT — PATIENT HEALTH QUESTIONNAIRE - PHQ9: SUM OF ALL RESPONSES TO PHQ QUESTIONS 1-9: 8

## 2017-03-27 PROCEDURE — 6370000000 HC RX 637 (ALT 250 FOR IP): Performed by: PSYCHIATRY & NEUROLOGY

## 2017-03-27 PROCEDURE — 1240000000 HC EMOTIONAL WELLNESS R&B

## 2017-03-27 RX ORDER — NICOTINE 21 MG/24HR
1 PATCH, TRANSDERMAL 24 HOURS TRANSDERMAL DAILY
Status: DISCONTINUED | OUTPATIENT
Start: 2017-03-27 | End: 2017-03-31 | Stop reason: HOSPADM

## 2017-03-27 RX ADMIN — BENZTROPINE MESYLATE 0.5 MG: 0.5 TABLET ORAL at 08:06

## 2017-03-27 RX ADMIN — DIVALPROEX SODIUM 500 MG: 500 TABLET, FILM COATED, EXTENDED RELEASE ORAL at 08:06

## 2017-03-27 RX ADMIN — BENZTROPINE MESYLATE 0.5 MG: 0.5 TABLET ORAL at 21:04

## 2017-03-27 RX ADMIN — DIVALPROEX SODIUM 500 MG: 500 TABLET, FILM COATED, EXTENDED RELEASE ORAL at 21:05

## 2017-03-27 RX ADMIN — ARIPIPRAZOLE 10 MG: 10 TABLET ORAL at 08:06

## 2017-03-27 RX ADMIN — FLUOXETINE 40 MG: 20 CAPSULE ORAL at 08:06

## 2017-03-27 ASSESSMENT — LIFESTYLE VARIABLES: HISTORY_ALCOHOL_USE: NO

## 2017-03-27 ASSESSMENT — SLEEP AND FATIGUE QUESTIONNAIRES
RESTFUL SLEEP: YES
DIFFICULTY ARISING: NO
DIFFICULTY STAYING ASLEEP: NO
AVERAGE NUMBER OF SLEEP HOURS: 9
DO YOU HAVE DIFFICULTY SLEEPING: NO
SLEEP PATTERN: NORMAL
DIFFICULTY FALLING ASLEEP: NO
DO YOU USE A SLEEP AID: YES

## 2017-03-27 ASSESSMENT — PATIENT HEALTH QUESTIONNAIRE - PHQ9: SUM OF ALL RESPONSES TO PHQ QUESTIONS 1-9: 8

## 2017-03-28 PROCEDURE — 1240000000 HC EMOTIONAL WELLNESS R&B

## 2017-03-28 PROCEDURE — 6370000000 HC RX 637 (ALT 250 FOR IP): Performed by: PSYCHIATRY & NEUROLOGY

## 2017-03-28 RX ADMIN — ARIPIPRAZOLE 10 MG: 10 TABLET ORAL at 08:35

## 2017-03-28 RX ADMIN — FLUOXETINE 40 MG: 20 CAPSULE ORAL at 08:35

## 2017-03-28 RX ADMIN — DIVALPROEX SODIUM 500 MG: 500 TABLET, FILM COATED, EXTENDED RELEASE ORAL at 22:24

## 2017-03-28 RX ADMIN — DIVALPROEX SODIUM 500 MG: 500 TABLET, FILM COATED, EXTENDED RELEASE ORAL at 08:35

## 2017-03-28 RX ADMIN — BENZTROPINE MESYLATE 0.5 MG: 0.5 TABLET ORAL at 22:24

## 2017-03-28 RX ADMIN — BENZTROPINE MESYLATE 0.5 MG: 0.5 TABLET ORAL at 08:35

## 2017-03-29 PROCEDURE — 1240000000 HC EMOTIONAL WELLNESS R&B

## 2017-03-29 PROCEDURE — 6370000000 HC RX 637 (ALT 250 FOR IP): Performed by: PSYCHIATRY & NEUROLOGY

## 2017-03-29 RX ORDER — VALPROIC ACID 250 MG/1
500 CAPSULE, LIQUID FILLED ORAL 2 TIMES DAILY
Status: DISCONTINUED | OUTPATIENT
Start: 2017-03-29 | End: 2017-03-31 | Stop reason: HOSPADM

## 2017-03-29 RX ADMIN — FLUOXETINE 40 MG: 20 CAPSULE ORAL at 08:40

## 2017-03-29 RX ADMIN — VALPROIC ACID 500 MG: 250 CAPSULE, LIQUID FILLED ORAL at 11:48

## 2017-03-29 RX ADMIN — DIVALPROEX SODIUM 500 MG: 500 TABLET, FILM COATED, EXTENDED RELEASE ORAL at 08:40

## 2017-03-29 RX ADMIN — BENZTROPINE MESYLATE 0.5 MG: 0.5 TABLET ORAL at 08:40

## 2017-03-29 RX ADMIN — BENZTROPINE MESYLATE 0.5 MG: 0.5 TABLET ORAL at 22:20

## 2017-03-29 RX ADMIN — VALPROIC ACID 500 MG: 250 CAPSULE, LIQUID FILLED ORAL at 22:20

## 2017-03-29 RX ADMIN — ARIPIPRAZOLE 10 MG: 10 TABLET ORAL at 08:40

## 2017-03-30 LAB
EKG ATRIAL RATE: 76 BPM
EKG P AXIS: 81 DEGREES
EKG P-R INTERVAL: 150 MS
EKG Q-T INTERVAL: 384 MS
EKG QRS DURATION: 86 MS
EKG QTC CALCULATION (BAZETT): 432 MS
EKG R AXIS: 87 DEGREES
EKG T AXIS: 61 DEGREES
EKG VENTRICULAR RATE: 76 BPM
VALPROIC ACID LEVEL: 89 UG/ML (ref 50–100)
VALPROIC DATE LAST DOSE: NORMAL
VALPROIC DOSE AMOUNT: 500
VALPROIC TIME LAST DOSE: 830

## 2017-03-30 PROCEDURE — 6370000000 HC RX 637 (ALT 250 FOR IP): Performed by: PSYCHIATRY & NEUROLOGY

## 2017-03-30 PROCEDURE — 1240000000 HC EMOTIONAL WELLNESS R&B

## 2017-03-30 PROCEDURE — 80164 ASSAY DIPROPYLACETIC ACD TOT: CPT

## 2017-03-30 PROCEDURE — 36415 COLL VENOUS BLD VENIPUNCTURE: CPT

## 2017-03-30 RX ADMIN — VALPROIC ACID 500 MG: 250 CAPSULE, LIQUID FILLED ORAL at 22:07

## 2017-03-30 RX ADMIN — BENZTROPINE MESYLATE 0.5 MG: 0.5 TABLET ORAL at 08:30

## 2017-03-30 RX ADMIN — BENZTROPINE MESYLATE 0.5 MG: 0.5 TABLET ORAL at 22:08

## 2017-03-30 RX ADMIN — VALPROIC ACID 500 MG: 250 CAPSULE, LIQUID FILLED ORAL at 08:30

## 2017-03-30 RX ADMIN — FLUOXETINE 40 MG: 20 CAPSULE ORAL at 08:30

## 2017-03-30 RX ADMIN — ARIPIPRAZOLE 10 MG: 10 TABLET ORAL at 08:30

## 2017-03-31 VITALS
HEIGHT: 72 IN | WEIGHT: 159 LBS | HEART RATE: 75 BPM | SYSTOLIC BLOOD PRESSURE: 104 MMHG | DIASTOLIC BLOOD PRESSURE: 60 MMHG | TEMPERATURE: 97.7 F | OXYGEN SATURATION: 99 % | BODY MASS INDEX: 21.54 KG/M2 | RESPIRATION RATE: 14 BRPM

## 2017-03-31 PROCEDURE — 6370000000 HC RX 637 (ALT 250 FOR IP): Performed by: PSYCHIATRY & NEUROLOGY

## 2017-03-31 RX ORDER — FLUOXETINE HYDROCHLORIDE 40 MG/1
40 CAPSULE ORAL DAILY
Qty: 14 CAPSULE | Refills: 0 | Status: SHIPPED | OUTPATIENT
Start: 2017-03-31 | End: 2017-11-05 | Stop reason: ALTCHOICE

## 2017-03-31 RX ORDER — VALPROIC ACID 250 MG/1
500 CAPSULE, LIQUID FILLED ORAL 2 TIMES DAILY
Qty: 56 CAPSULE | Refills: 0 | Status: ON HOLD | OUTPATIENT
Start: 2017-03-31 | End: 2017-11-11 | Stop reason: HOSPADM

## 2017-03-31 RX ORDER — BENZTROPINE MESYLATE 0.5 MG/1
0.5 TABLET ORAL 2 TIMES DAILY
Qty: 28 TABLET | Refills: 0 | Status: SHIPPED | OUTPATIENT
Start: 2017-03-31 | End: 2017-07-04

## 2017-03-31 RX ORDER — TRAZODONE HYDROCHLORIDE 50 MG/1
50 TABLET ORAL NIGHTLY PRN
Qty: 14 TABLET | Refills: 0 | Status: ON HOLD | OUTPATIENT
Start: 2017-03-31 | End: 2017-11-11 | Stop reason: HOSPADM

## 2017-03-31 RX ORDER — ARIPIPRAZOLE 10 MG/1
10 TABLET ORAL DAILY
Qty: 14 TABLET | Refills: 0 | Status: ON HOLD | OUTPATIENT
Start: 2017-03-31 | End: 2017-11-11 | Stop reason: HOSPADM

## 2017-03-31 RX ADMIN — VALPROIC ACID 500 MG: 250 CAPSULE, LIQUID FILLED ORAL at 08:10

## 2017-03-31 RX ADMIN — ARIPIPRAZOLE 10 MG: 10 TABLET ORAL at 08:10

## 2017-03-31 RX ADMIN — BENZTROPINE MESYLATE 0.5 MG: 0.5 TABLET ORAL at 08:11

## 2017-03-31 RX ADMIN — FLUOXETINE 40 MG: 20 CAPSULE ORAL at 08:10

## 2017-04-04 ENCOUNTER — HOSPITAL ENCOUNTER (OUTPATIENT)
Age: 27
Setting detail: SPECIMEN
Discharge: HOME | End: 2017-04-04
Payer: COMMERCIAL

## 2017-04-04 LAB
ABSOLUTE EOS #: 0.1 K/UL (ref 0–0.4)
ABSOLUTE LYMPH #: 1.8 K/UL (ref 1–4.8)
ABSOLUTE MONO #: 0.4 K/UL (ref 0.1–1.2)
ALBUMIN SERPL-MCNC: 3.9 G/DL (ref 3.5–5.2)
ALBUMIN/GLOBULIN RATIO: 1.7 (ref 1–2.5)
ALP BLD-CCNC: 39 U/L (ref 40–129)
ALT SERPL-CCNC: 9 U/L (ref 5–41)
ANION GAP SERPL CALCULATED.3IONS-SCNC: 10 MMOL/L (ref 9–17)
AST SERPL-CCNC: 12 U/L
BASOPHILS # BLD: 1 % (ref 0–2)
BASOPHILS ABSOLUTE: 0 K/UL (ref 0–0.2)
BILIRUB SERPL-MCNC: 0.22 MG/DL (ref 0.3–1.2)
BUN BLDV-MCNC: 18 MG/DL (ref 6–20)
BUN/CREAT BLD: ABNORMAL (ref 9–20)
CALCIUM SERPL-MCNC: 8.6 MG/DL (ref 8.6–10.4)
CHLORIDE BLD-SCNC: 105 MMOL/L (ref 98–107)
CO2: 29 MMOL/L (ref 20–31)
CREAT SERPL-MCNC: 1 MG/DL (ref 0.7–1.2)
DIFFERENTIAL TYPE: ABNORMAL
EOSINOPHILS RELATIVE PERCENT: 3 % (ref 1–4)
GFR AFRICAN AMERICAN: >60 ML/MIN
GFR NON-AFRICAN AMERICAN: >60 ML/MIN
GFR SERPL CREATININE-BSD FRML MDRD: ABNORMAL ML/MIN/{1.73_M2}
GFR SERPL CREATININE-BSD FRML MDRD: ABNORMAL ML/MIN/{1.73_M2}
GLUCOSE BLD-MCNC: 99 MG/DL (ref 70–99)
HCT VFR BLD CALC: 40 % (ref 41–53)
HEMOGLOBIN: 13.8 G/DL (ref 13.5–17.5)
LYMPHOCYTES # BLD: 36 % (ref 24–44)
MCH RBC QN AUTO: 32 PG (ref 26–34)
MCHC RBC AUTO-ENTMCNC: 34.5 G/DL (ref 31–37)
MCV RBC AUTO: 93 FL (ref 80–100)
MONOCYTES # BLD: 8 % (ref 2–11)
PDW BLD-RTO: 14 % (ref 12.5–15.4)
PLATELET # BLD: 131 K/UL (ref 140–450)
PLATELET ESTIMATE: ABNORMAL
PMV BLD AUTO: 9.5 FL (ref 6–12)
POTASSIUM SERPL-SCNC: 4.7 MMOL/L (ref 3.7–5.3)
RBC # BLD: 4.3 M/UL (ref 4.5–5.9)
RBC # BLD: ABNORMAL 10*6/UL
SEG NEUTROPHILS: 52 % (ref 36–66)
SEGMENTED NEUTROPHILS ABSOLUTE COUNT: 2.6 K/UL (ref 1.8–7.7)
SODIUM BLD-SCNC: 144 MMOL/L (ref 135–144)
THYROXINE UPTAKE: 35.54 % (ref 28–41)
THYROXINE, FREE: 0.89 NG/DL (ref 0.93–1.7)
TOTAL PROTEIN: 6.2 G/DL (ref 6.4–8.3)
TSH SERPL DL<=0.05 MIU/L-ACNC: 1.15 MIU/L (ref 0.3–5)
VALPROIC ACID LEVEL: 88 UG/ML (ref 50–100)
VALPROIC DATE LAST DOSE: NORMAL
VALPROIC DOSE AMOUNT: NORMAL
VALPROIC TIME LAST DOSE: NORMAL
WBC # BLD: 4.9 K/UL (ref 3.5–11)
WBC # BLD: ABNORMAL 10*3/UL

## 2017-04-09 ENCOUNTER — HOSPITAL ENCOUNTER (EMERGENCY)
Age: 27
Discharge: HOME | End: 2017-04-09
Attending: EMERGENCY MEDICINE
Payer: COMMERCIAL

## 2017-04-09 DIAGNOSIS — T65.91XA INGESTION OF NONTOXIC SUBSTANCE: Primary | ICD-10-CM

## 2017-04-09 LAB
ABSOLUTE EOS #: 0.2 K/UL (ref 0–0.4)
ABSOLUTE LYMPH #: 2.1 K/UL (ref 1–4.8)
ABSOLUTE MONO #: 0.4 K/UL (ref 0.2–0.8)
ACETAMINOPHEN LEVEL: <10 UG/ML (ref 10–30)
ANION GAP SERPL CALCULATED.3IONS-SCNC: 15 MMOL/L (ref 9–17)
BASOPHILS # BLD: 1 % (ref 0–2)
BASOPHILS ABSOLUTE: 0 K/UL (ref 0–0.2)
BUN BLDV-MCNC: 17 MG/DL (ref 6–20)
BUN/CREAT BLD: 24 (ref 9–20)
CALCIUM SERPL-MCNC: 8.2 MG/DL (ref 8.6–10.4)
CHLORIDE BLD-SCNC: 104 MMOL/L (ref 98–107)
CO2: 26 MMOL/L (ref 20–31)
CREAT SERPL-MCNC: 0.72 MG/DL (ref 0.7–1.2)
DIFFERENTIAL TYPE: ABNORMAL
EOSINOPHILS RELATIVE PERCENT: 3 % (ref 1–4)
ETHANOL PERCENT: 0.06 %
ETHANOL: 55 MG/DL
GFR AFRICAN AMERICAN: >60 ML/MIN
GFR NON-AFRICAN AMERICAN: >60 ML/MIN
GFR SERPL CREATININE-BSD FRML MDRD: ABNORMAL ML/MIN/{1.73_M2}
GFR SERPL CREATININE-BSD FRML MDRD: ABNORMAL ML/MIN/{1.73_M2}
GLUCOSE BLD-MCNC: 104 MG/DL (ref 70–99)
HCT VFR BLD CALC: 37 % (ref 41–53)
HEMOGLOBIN: 12.7 G/DL (ref 13.5–17.5)
LYMPHOCYTES # BLD: 35 % (ref 24–44)
MCH RBC QN AUTO: 32.3 PG (ref 26–34)
MCHC RBC AUTO-ENTMCNC: 34.4 G/DL (ref 31–37)
MCV RBC AUTO: 94.1 FL (ref 80–100)
MONOCYTES # BLD: 7 % (ref 1–7)
PDW BLD-RTO: 13.6 % (ref 11.5–14.5)
PLATELET # BLD: 159 K/UL (ref 130–400)
PLATELET ESTIMATE: ABNORMAL
PMV BLD AUTO: 8.7 FL (ref 6–12)
POTASSIUM SERPL-SCNC: 3.9 MMOL/L (ref 3.7–5.3)
RBC # BLD: 3.93 M/UL (ref 4.5–5.9)
RBC # BLD: ABNORMAL 10*6/UL
SALICYLATE LEVEL: <1 MG/DL (ref 3–10)
SEG NEUTROPHILS: 54 % (ref 36–66)
SEGMENTED NEUTROPHILS ABSOLUTE COUNT: 3.4 K/UL (ref 1.8–7.7)
SODIUM BLD-SCNC: 145 MMOL/L (ref 135–144)
VALPROIC ACID LEVEL: 6 UG/ML (ref 50–100)
VALPROIC DATE LAST DOSE: ABNORMAL
VALPROIC DOSE AMOUNT: ABNORMAL
VALPROIC TIME LAST DOSE: ABNORMAL
WBC # BLD: 6.2 K/UL (ref 3.5–11)
WBC # BLD: ABNORMAL 10*3/UL

## 2017-04-09 PROCEDURE — 80307 DRUG TEST PRSMV CHEM ANLYZR: CPT

## 2017-04-09 PROCEDURE — 99284 EMERGENCY DEPT VISIT MOD MDM: CPT

## 2017-04-09 PROCEDURE — 93005 ELECTROCARDIOGRAM TRACING: CPT

## 2017-04-09 PROCEDURE — 80164 ASSAY DIPROPYLACETIC ACD TOT: CPT

## 2017-04-09 PROCEDURE — G0480 DRUG TEST DEF 1-7 CLASSES: HCPCS

## 2017-04-09 PROCEDURE — 85025 COMPLETE CBC W/AUTO DIFF WBC: CPT

## 2017-04-09 PROCEDURE — 80048 BASIC METABOLIC PNL TOTAL CA: CPT

## 2017-04-09 ASSESSMENT — ENCOUNTER SYMPTOMS
SHORTNESS OF BREATH: 0
CONSTIPATION: 0
VOMITING: 0
ABDOMINAL PAIN: 0
DIARRHEA: 0
COLOR CHANGE: 0
COUGH: 0
EYE REDNESS: 0
FACIAL SWELLING: 0
EYE DISCHARGE: 0

## 2017-04-10 LAB
EKG ATRIAL RATE: 78 BPM
EKG P AXIS: 81 DEGREES
EKG P-R INTERVAL: 144 MS
EKG Q-T INTERVAL: 378 MS
EKG QRS DURATION: 86 MS
EKG QTC CALCULATION (BAZETT): 430 MS
EKG R AXIS: 86 DEGREES
EKG T AXIS: 69 DEGREES
EKG VENTRICULAR RATE: 78 BPM

## 2017-04-21 ENCOUNTER — HOSPITAL ENCOUNTER (EMERGENCY)
Age: 27
Discharge: HOME | End: 2017-04-22
Attending: EMERGENCY MEDICINE
Payer: COMMERCIAL

## 2017-04-21 VITALS
SYSTOLIC BLOOD PRESSURE: 91 MMHG | WEIGHT: 170 LBS | OXYGEN SATURATION: 98 % | DIASTOLIC BLOOD PRESSURE: 67 MMHG | BODY MASS INDEX: 23.03 KG/M2 | HEART RATE: 80 BPM | RESPIRATION RATE: 16 BRPM | TEMPERATURE: 98.7 F | HEIGHT: 72 IN

## 2017-04-21 DIAGNOSIS — F10.90 ACUTE ALCOHOL USE: Primary | ICD-10-CM

## 2017-04-21 LAB
ETHANOL PERCENT: 0.09 %
ETHANOL: 88 MG/DL

## 2017-04-21 PROCEDURE — G0480 DRUG TEST DEF 1-7 CLASSES: HCPCS

## 2017-04-21 PROCEDURE — 99284 EMERGENCY DEPT VISIT MOD MDM: CPT

## 2017-04-21 ASSESSMENT — ENCOUNTER SYMPTOMS
SHORTNESS OF BREATH: 0
EYE REDNESS: 0
ABDOMINAL PAIN: 0
DIARRHEA: 0
WHEEZING: 0

## 2017-06-24 ENCOUNTER — HOSPITAL ENCOUNTER (EMERGENCY)
Age: 27
Discharge: HOME | End: 2017-06-25
Attending: EMERGENCY MEDICINE
Payer: COMMERCIAL

## 2017-06-24 VITALS
BODY MASS INDEX: 20.53 KG/M2 | RESPIRATION RATE: 16 BRPM | SYSTOLIC BLOOD PRESSURE: 110 MMHG | WEIGHT: 160 LBS | DIASTOLIC BLOOD PRESSURE: 81 MMHG | HEIGHT: 74 IN | OXYGEN SATURATION: 95 % | HEART RATE: 90 BPM | TEMPERATURE: 98.6 F

## 2017-06-24 DIAGNOSIS — F10.920 ACUTE ALCOHOLIC INTOXICATION, UNCOMPLICATED (HCC): Primary | ICD-10-CM

## 2017-06-24 LAB
ETHANOL PERCENT: 0.28 %
ETHANOL: 282 MG/DL

## 2017-06-24 PROCEDURE — G0480 DRUG TEST DEF 1-7 CLASSES: HCPCS

## 2017-06-24 PROCEDURE — 99284 EMERGENCY DEPT VISIT MOD MDM: CPT

## 2017-06-24 ASSESSMENT — ENCOUNTER SYMPTOMS
COUGH: 0
BACK PAIN: 0
SHORTNESS OF BREATH: 0
WHEEZING: 0
STRIDOR: 0

## 2017-07-01 ENCOUNTER — HOSPITAL ENCOUNTER (EMERGENCY)
Age: 27
Discharge: HOME | End: 2017-07-02
Attending: EMERGENCY MEDICINE
Payer: COMMERCIAL

## 2017-07-01 ENCOUNTER — APPOINTMENT (OUTPATIENT)
Dept: CT IMAGING | Age: 27
End: 2017-07-01
Payer: COMMERCIAL

## 2017-07-01 VITALS
HEART RATE: 93 BPM | RESPIRATION RATE: 18 BRPM | BODY MASS INDEX: 20.53 KG/M2 | TEMPERATURE: 98.5 F | HEIGHT: 74 IN | SYSTOLIC BLOOD PRESSURE: 105 MMHG | WEIGHT: 160 LBS | DIASTOLIC BLOOD PRESSURE: 66 MMHG | OXYGEN SATURATION: 99 %

## 2017-07-01 DIAGNOSIS — F10.920 ACUTE ALCOHOLIC INTOXICATION, UNCOMPLICATED (HCC): ICD-10-CM

## 2017-07-01 DIAGNOSIS — S02.40FA: Primary | ICD-10-CM

## 2017-07-01 DIAGNOSIS — S01.112A LACERATION OF LEFT EYEBROW, INITIAL ENCOUNTER: ICD-10-CM

## 2017-07-01 LAB
ETHANOL PERCENT: 0.25 %
ETHANOL: 250 MG/DL

## 2017-07-01 PROCEDURE — 99284 EMERGENCY DEPT VISIT MOD MDM: CPT

## 2017-07-01 PROCEDURE — 90471 IMMUNIZATION ADMIN: CPT | Performed by: EMERGENCY MEDICINE

## 2017-07-01 PROCEDURE — G0480 DRUG TEST DEF 1-7 CLASSES: HCPCS

## 2017-07-01 PROCEDURE — 72125 CT NECK SPINE W/O DYE: CPT

## 2017-07-01 PROCEDURE — 6360000002 HC RX W HCPCS: Performed by: EMERGENCY MEDICINE

## 2017-07-01 PROCEDURE — 12013 RPR F/E/E/N/L/M 2.6-5.0 CM: CPT

## 2017-07-01 PROCEDURE — 2500000003 HC RX 250 WO HCPCS: Performed by: EMERGENCY MEDICINE

## 2017-07-01 PROCEDURE — 70450 CT HEAD/BRAIN W/O DYE: CPT

## 2017-07-01 PROCEDURE — 90715 TDAP VACCINE 7 YRS/> IM: CPT | Performed by: EMERGENCY MEDICINE

## 2017-07-01 PROCEDURE — 2580000003 HC RX 258: Performed by: EMERGENCY MEDICINE

## 2017-07-01 RX ORDER — 0.9 % SODIUM CHLORIDE 0.9 %
1000 INTRAVENOUS SOLUTION INTRAVENOUS ONCE
Status: DISCONTINUED | OUTPATIENT
Start: 2017-07-01 | End: 2017-07-01

## 2017-07-01 RX ORDER — LIDOCAINE HYDROCHLORIDE 10 MG/ML
20 INJECTION, SOLUTION INFILTRATION; PERINEURAL ONCE
Status: COMPLETED | OUTPATIENT
Start: 2017-07-01 | End: 2017-07-01

## 2017-07-01 RX ORDER — 0.9 % SODIUM CHLORIDE 0.9 %
500 INTRAVENOUS SOLUTION INTRAVENOUS ONCE
Status: COMPLETED | OUTPATIENT
Start: 2017-07-01 | End: 2017-07-02

## 2017-07-01 RX ADMIN — Medication 20 ML: at 21:38

## 2017-07-01 RX ADMIN — SODIUM CHLORIDE 1000 ML: 0.9 INJECTION, SOLUTION INTRAVENOUS at 21:41

## 2017-07-01 RX ADMIN — TETANUS TOXOID, REDUCED DIPHTHERIA TOXOID AND ACELLULAR PERTUSSIS VACCINE, ADSORBED 0.5 ML: 5; 2.5; 8; 8; 2.5 SUSPENSION INTRAMUSCULAR at 21:40

## 2017-07-01 ASSESSMENT — ENCOUNTER SYMPTOMS
BACK PAIN: 0
NAUSEA: 0
TROUBLE SWALLOWING: 0
VOMITING: 0
CONSTIPATION: 0
ABDOMINAL PAIN: 0
SHORTNESS OF BREATH: 0
DIARRHEA: 0
RHINORRHEA: 0

## 2017-07-04 ENCOUNTER — HOSPITAL ENCOUNTER (EMERGENCY)
Age: 27
Discharge: HOME | End: 2017-07-04
Attending: EMERGENCY MEDICINE
Payer: COMMERCIAL

## 2017-07-04 VITALS
WEIGHT: 180 LBS | HEIGHT: 72 IN | OXYGEN SATURATION: 98 % | RESPIRATION RATE: 18 BRPM | SYSTOLIC BLOOD PRESSURE: 109 MMHG | HEART RATE: 100 BPM | TEMPERATURE: 98.3 F | DIASTOLIC BLOOD PRESSURE: 84 MMHG | BODY MASS INDEX: 24.38 KG/M2

## 2017-07-04 DIAGNOSIS — F41.1 ANXIETY STATE: Primary | ICD-10-CM

## 2017-07-04 DIAGNOSIS — F10.920 ACUTE ALCOHOLIC INTOXICATION, UNCOMPLICATED (HCC): ICD-10-CM

## 2017-07-04 LAB
ETHANOL PERCENT: 0.21 %
ETHANOL: 208 MG/DL

## 2017-07-04 PROCEDURE — G0480 DRUG TEST DEF 1-7 CLASSES: HCPCS

## 2017-07-04 PROCEDURE — 99285 EMERGENCY DEPT VISIT HI MDM: CPT

## 2017-07-04 PROCEDURE — 36415 COLL VENOUS BLD VENIPUNCTURE: CPT

## 2017-07-04 RX ORDER — QUETIAPINE 400 MG/1
400 TABLET, FILM COATED, EXTENDED RELEASE ORAL NIGHTLY
COMMUNITY
End: 2017-11-05 | Stop reason: ALTCHOICE

## 2017-07-04 RX ORDER — DIPHENHYDRAMINE HCL 25 MG
25 TABLET ORAL 2 TIMES DAILY
COMMUNITY
End: 2017-11-05 | Stop reason: ALTCHOICE

## 2017-07-04 RX ORDER — ACETAMINOPHEN 500 MG
1000 TABLET ORAL ONCE
Status: DISCONTINUED | OUTPATIENT
Start: 2017-07-04 | End: 2017-07-04 | Stop reason: HOSPADM

## 2017-07-04 ASSESSMENT — SLEEP AND FATIGUE QUESTIONNAIRES
SLEEP PATTERN: DIFFICULTY FALLING ASLEEP;DISTURBED/INTERRUPTED SLEEP;INSOMNIA
RESTFUL SLEEP: NO
DIFFICULTY ARISING: NO
DO YOU HAVE DIFFICULTY SLEEPING: YES
DIFFICULTY STAYING ASLEEP: YES
DO YOU USE A SLEEP AID: YES
DIFFICULTY FALLING ASLEEP: YES
AVERAGE NUMBER OF SLEEP HOURS: 5

## 2017-07-04 ASSESSMENT — ENCOUNTER SYMPTOMS
SORE THROAT: 0
RHINORRHEA: 0
EYE REDNESS: 0
COLOR CHANGE: 0
COUGH: 0
VOMITING: 0
EYE DISCHARGE: 0
DIARRHEA: 0
NAUSEA: 0
SHORTNESS OF BREATH: 0

## 2017-07-04 ASSESSMENT — LIFESTYLE VARIABLES: HISTORY_ALCOHOL_USE: YES

## 2017-08-30 ENCOUNTER — OFFICE VISIT (OUTPATIENT)
Dept: DERMATOLOGY | Age: 27
End: 2017-08-30
Payer: COMMERCIAL

## 2017-08-30 ENCOUNTER — HOSPITAL ENCOUNTER (OUTPATIENT)
Age: 27
Setting detail: SPECIMEN
Discharge: HOME | End: 2017-08-30
Payer: COMMERCIAL

## 2017-08-30 VITALS
SYSTOLIC BLOOD PRESSURE: 121 MMHG | HEART RATE: 75 BPM | DIASTOLIC BLOOD PRESSURE: 79 MMHG | BODY MASS INDEX: 25.79 KG/M2 | HEIGHT: 72 IN | WEIGHT: 190.4 LBS | OXYGEN SATURATION: 97 %

## 2017-08-30 DIAGNOSIS — D22.9 IRRITATED NEVUS: Primary | ICD-10-CM

## 2017-08-30 PROCEDURE — 11310 SHAVE SKIN LESION 0.5 CM/<: CPT | Performed by: DERMATOLOGY

## 2017-08-30 RX ORDER — LIDOCAINE HYDROCHLORIDE 10 MG/ML
0.5 INJECTION, SOLUTION INFILTRATION; PERINEURAL ONCE
Status: COMPLETED | OUTPATIENT
Start: 2017-08-30 | End: 2017-08-30

## 2017-08-30 RX ORDER — OLANZAPINE 10 MG/1
10 TABLET ORAL NIGHTLY
COMMUNITY
End: 2017-11-05 | Stop reason: ALTCHOICE

## 2017-08-30 RX ADMIN — LIDOCAINE HYDROCHLORIDE 0.5 ML: 10 INJECTION, SOLUTION INFILTRATION; PERINEURAL at 09:55

## 2017-09-05 LAB — DERMATOLOGY PATHOLOGY REPORT: NORMAL

## 2017-09-21 ENCOUNTER — HOSPITAL ENCOUNTER (OUTPATIENT)
Age: 27
Setting detail: SPECIMEN
Discharge: HOME | End: 2017-09-21
Payer: COMMERCIAL

## 2017-09-21 LAB
VALPROIC ACID LEVEL: 47 UG/ML (ref 50–100)
VALPROIC DATE LAST DOSE: ABNORMAL
VALPROIC DOSE AMOUNT: ABNORMAL
VALPROIC TIME LAST DOSE: ABNORMAL

## 2017-11-05 ENCOUNTER — HOSPITAL ENCOUNTER (EMERGENCY)
Age: 27
Discharge: HOME | End: 2017-11-05
Attending: EMERGENCY MEDICINE
Payer: COMMERCIAL

## 2017-11-05 ENCOUNTER — HOSPITAL ENCOUNTER (INPATIENT)
Age: 27
LOS: 6 days | Discharge: HOME | DRG: 753 | End: 2017-11-11
Attending: PSYCHIATRY & NEUROLOGY | Admitting: PSYCHIATRY & NEUROLOGY
Payer: COMMERCIAL

## 2017-11-05 ENCOUNTER — APPOINTMENT (OUTPATIENT)
Dept: GENERAL RADIOLOGY | Age: 27
End: 2017-11-05
Payer: COMMERCIAL

## 2017-11-05 VITALS
TEMPERATURE: 99.1 F | RESPIRATION RATE: 16 BRPM | HEART RATE: 82 BPM | SYSTOLIC BLOOD PRESSURE: 99 MMHG | OXYGEN SATURATION: 98 % | DIASTOLIC BLOOD PRESSURE: 62 MMHG

## 2017-11-05 DIAGNOSIS — F10.929 ACUTE ALCOHOLIC INTOXICATION WITH COMPLICATION (HCC): Primary | ICD-10-CM

## 2017-11-05 DIAGNOSIS — R07.81 RIB PAIN ON LEFT SIDE: ICD-10-CM

## 2017-11-05 PROBLEM — F31.30 BIPOLAR I DISORDER, MOST RECENT EPISODE DEPRESSED (HCC): Status: ACTIVE | Noted: 2017-11-05

## 2017-11-05 PROBLEM — F31.30 BIPOLAR I DISORDER, MOST RECENT EPISODE DEPRESSED (HCC): Chronic | Status: ACTIVE | Noted: 2017-11-05

## 2017-11-05 PROBLEM — F10.20 UNCOMPLICATED ALCOHOL DEPENDENCE (HCC): Status: ACTIVE | Noted: 2017-11-05

## 2017-11-05 LAB
ABSOLUTE EOS #: 0.1 K/UL (ref 0–0.4)
ABSOLUTE IMMATURE GRANULOCYTE: NORMAL K/UL (ref 0–0.3)
ABSOLUTE LYMPH #: 1.4 K/UL (ref 1–4.8)
ABSOLUTE MONO #: 0.7 K/UL (ref 0.1–1.3)
ALBUMIN SERPL-MCNC: 4.5 G/DL (ref 3.5–5.2)
ALBUMIN/GLOBULIN RATIO: ABNORMAL (ref 1–2.5)
ALP BLD-CCNC: 52 U/L (ref 40–129)
ALT SERPL-CCNC: 17 U/L (ref 5–41)
ANION GAP SERPL CALCULATED.3IONS-SCNC: 15 MMOL/L (ref 9–17)
AST SERPL-CCNC: 29 U/L
BASOPHILS # BLD: 1 %
BASOPHILS ABSOLUTE: 0 K/UL (ref 0–0.2)
BILIRUB SERPL-MCNC: 0.48 MG/DL (ref 0.3–1.2)
BUN BLDV-MCNC: 19 MG/DL (ref 6–20)
BUN/CREAT BLD: ABNORMAL (ref 9–20)
CALCIUM SERPL-MCNC: 9.1 MG/DL (ref 8.6–10.4)
CHLORIDE BLD-SCNC: 102 MMOL/L (ref 98–107)
CHOLESTEROL/HDL RATIO: 2.2
CHOLESTEROL: 145 MG/DL
CO2: 26 MMOL/L (ref 20–31)
CREAT SERPL-MCNC: 1.07 MG/DL (ref 0.7–1.2)
DIFFERENTIAL TYPE: NORMAL
EOSINOPHILS RELATIVE PERCENT: 3 %
ETHANOL PERCENT: 0.16 %
ETHANOL: 156 MG/DL
GFR AFRICAN AMERICAN: >60 ML/MIN
GFR NON-AFRICAN AMERICAN: >60 ML/MIN
GFR SERPL CREATININE-BSD FRML MDRD: ABNORMAL ML/MIN/{1.73_M2}
GFR SERPL CREATININE-BSD FRML MDRD: ABNORMAL ML/MIN/{1.73_M2}
GLUCOSE BLD-MCNC: 140 MG/DL (ref 70–99)
HCT VFR BLD CALC: 44.2 % (ref 41–53)
HDLC SERPL-MCNC: 67 MG/DL
HEMOGLOBIN: 15.5 G/DL (ref 13.5–17.5)
IMMATURE GRANULOCYTES: NORMAL %
LDL CHOLESTEROL: 57 MG/DL (ref 0–130)
LYMPHOCYTES # BLD: 27 %
MCH RBC QN AUTO: 34 PG (ref 26–34)
MCHC RBC AUTO-ENTMCNC: 35.1 G/DL (ref 31–37)
MCV RBC AUTO: 97 FL (ref 80–100)
MONOCYTES # BLD: 14 %
PDW BLD-RTO: 13.3 % (ref 11.5–14.9)
PLATELET # BLD: 269 K/UL (ref 150–450)
PLATELET ESTIMATE: NORMAL
PMV BLD AUTO: 8.4 FL (ref 6–12)
POTASSIUM SERPL-SCNC: 4 MMOL/L (ref 3.7–5.3)
RBC # BLD: 4.56 M/UL (ref 4.5–5.9)
RBC # BLD: NORMAL 10*6/UL
SEG NEUTROPHILS: 55 %
SEGMENTED NEUTROPHILS ABSOLUTE COUNT: 2.9 K/UL (ref 1.3–9.1)
SODIUM BLD-SCNC: 143 MMOL/L (ref 135–144)
T3 FREE: 3.31 PG/ML (ref 2.02–4.43)
T4 TOTAL: 6.2 UG/DL (ref 4.5–12)
TOTAL PROTEIN: 7.7 G/DL (ref 6.4–8.3)
TRIGL SERPL-MCNC: 105 MG/DL
TSH SERPL DL<=0.05 MIU/L-ACNC: 1.13 MIU/L (ref 0.3–5)
VLDLC SERPL CALC-MCNC: NORMAL MG/DL (ref 1–30)
WBC # BLD: 5.2 K/UL (ref 3.5–11)
WBC # BLD: NORMAL 10*3/UL

## 2017-11-05 PROCEDURE — 85025 COMPLETE CBC W/AUTO DIFF WBC: CPT

## 2017-11-05 PROCEDURE — 6370000000 HC RX 637 (ALT 250 FOR IP): Performed by: EMERGENCY MEDICINE

## 2017-11-05 PROCEDURE — 84481 FREE ASSAY (FT-3): CPT

## 2017-11-05 PROCEDURE — 1240000000 HC EMOTIONAL WELLNESS R&B

## 2017-11-05 PROCEDURE — 84436 ASSAY OF TOTAL THYROXINE: CPT

## 2017-11-05 PROCEDURE — 80061 LIPID PANEL: CPT

## 2017-11-05 PROCEDURE — 36415 COLL VENOUS BLD VENIPUNCTURE: CPT

## 2017-11-05 PROCEDURE — 83036 HEMOGLOBIN GLYCOSYLATED A1C: CPT

## 2017-11-05 PROCEDURE — 99284 EMERGENCY DEPT VISIT MOD MDM: CPT

## 2017-11-05 PROCEDURE — 71020 XR CHEST STANDARD TWO VW: CPT

## 2017-11-05 PROCEDURE — 84443 ASSAY THYROID STIM HORMONE: CPT

## 2017-11-05 PROCEDURE — 80053 COMPREHEN METABOLIC PANEL: CPT

## 2017-11-05 PROCEDURE — G0480 DRUG TEST DEF 1-7 CLASSES: HCPCS

## 2017-11-05 RX ORDER — LOPERAMIDE HYDROCHLORIDE 2 MG/1
2 CAPSULE ORAL 2 TIMES DAILY PRN
Status: DISCONTINUED | OUTPATIENT
Start: 2017-11-05 | End: 2017-11-11 | Stop reason: HOSPADM

## 2017-11-05 RX ORDER — MAGNESIUM HYDROXIDE/ALUMINUM HYDROXICE/SIMETHICONE 120; 1200; 1200 MG/30ML; MG/30ML; MG/30ML
30 SUSPENSION ORAL EVERY 6 HOURS PRN
Status: DISCONTINUED | OUTPATIENT
Start: 2017-11-05 | End: 2017-11-11 | Stop reason: HOSPADM

## 2017-11-05 RX ORDER — BENZONATATE 100 MG/1
200 CAPSULE ORAL ONCE
Status: COMPLETED | OUTPATIENT
Start: 2017-11-05 | End: 2017-11-05

## 2017-11-05 RX ORDER — ACETAMINOPHEN 325 MG/1
650 TABLET ORAL EVERY 4 HOURS PRN
Status: DISCONTINUED | OUTPATIENT
Start: 2017-11-05 | End: 2017-11-11 | Stop reason: HOSPADM

## 2017-11-05 RX ORDER — VALPROIC ACID 250 MG/1
500 CAPSULE, LIQUID FILLED ORAL 2 TIMES DAILY
Status: DISCONTINUED | OUTPATIENT
Start: 2017-11-05 | End: 2017-11-06

## 2017-11-05 RX ORDER — ARIPIPRAZOLE 15 MG/1
15 TABLET ORAL DAILY
Status: DISCONTINUED | OUTPATIENT
Start: 2017-11-05 | End: 2017-11-11 | Stop reason: HOSPADM

## 2017-11-05 RX ORDER — TRAZODONE HYDROCHLORIDE 100 MG/1
100 TABLET ORAL NIGHTLY PRN
Status: DISCONTINUED | OUTPATIENT
Start: 2017-11-05 | End: 2017-11-11 | Stop reason: HOSPADM

## 2017-11-05 RX ORDER — GUAIFENESIN DEXTROMETHORPHAN HYDROBROMIDE ORAL SOLUTION 10; 100 MG/5ML; MG/5ML
10 SOLUTION ORAL ONCE
Status: COMPLETED | OUTPATIENT
Start: 2017-11-05 | End: 2017-11-05

## 2017-11-05 RX ORDER — HYDROXYZINE HYDROCHLORIDE 25 MG/1
25 TABLET, FILM COATED ORAL 3 TIMES DAILY PRN
Status: DISCONTINUED | OUTPATIENT
Start: 2017-11-05 | End: 2017-11-06 | Stop reason: SDUPTHER

## 2017-11-05 RX ORDER — BENZONATATE 100 MG/1
CAPSULE ORAL
Status: DISCONTINUED
Start: 2017-11-05 | End: 2017-11-05 | Stop reason: HOSPADM

## 2017-11-05 RX ADMIN — BENZONATATE 200 MG: 100 CAPSULE ORAL at 04:27

## 2017-11-05 RX ADMIN — Medication 10 ML: at 04:35

## 2017-11-05 ASSESSMENT — SLEEP AND FATIGUE QUESTIONNAIRES
AVERAGE NUMBER OF SLEEP HOURS: 7
DIFFICULTY FALLING ASLEEP: YES
DIFFICULTY ARISING: NO
DO YOU HAVE DIFFICULTY SLEEPING: YES
SLEEP PATTERN: DIFFICULTY FALLING ASLEEP;RESTLESSNESS;NIGHTMARES/TERRORS
DIFFICULTY STAYING ASLEEP: YES
RESTFUL SLEEP: NO
DO YOU USE A SLEEP AID: YES

## 2017-11-05 ASSESSMENT — LIFESTYLE VARIABLES: HISTORY_ALCOHOL_USE: YES

## 2017-11-05 ASSESSMENT — ENCOUNTER SYMPTOMS
ABDOMINAL PAIN: 0
RHINORRHEA: 0
SHORTNESS OF BREATH: 0

## 2017-11-05 ASSESSMENT — PATIENT HEALTH QUESTIONNAIRE - PHQ9: SUM OF ALL RESPONSES TO PHQ QUESTIONS 1-9: 15
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
## 2017-11-05 NOTE — ED NOTES
Pt resting in bed. Eyes closed. NAD at this time. Even non labored RR.       Gene Neri RN  11/05/17 0508
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
## 2017-11-05 NOTE — ED NOTES
Pt difficult to arouse. Falling asleep when writer was talking to pt.  [facility] [id number] [facility]  11/05/17 8836
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
## 2017-11-05 NOTE — ED NOTES
Pt resting in cot-respirations even and non-labored. Awaiting re-evaluation at 0800. Will cont to monitor.       Kristina Triana RN  11/05/17 7463

## 2017-11-05 NOTE — ED NOTES
Pt resting in bed. NAD at this time. Even non labored RR. NO additional pt needs at this time. Will continue to monitor.         Rome Harris RN  11/05/17 1793

## 2017-11-05 NOTE — ED NOTES
Received report from [employer]. Pt resting in bed, eyes closed. NAD at this time. Even non labored RR. No additional pt needs at this time.         Mamta Major RN  11/05/17 0134

## 2017-11-06 LAB
EKG ATRIAL RATE: 66 BPM
EKG P AXIS: 77 DEGREES
EKG P-R INTERVAL: 144 MS
EKG Q-T INTERVAL: 384 MS
EKG QRS DURATION: 86 MS
EKG QTC CALCULATION (BAZETT): 402 MS
EKG R AXIS: 83 DEGREES
EKG T AXIS: 40 DEGREES
EKG VENTRICULAR RATE: 66 BPM
ESTIMATED AVERAGE GLUCOSE: 94 MG/DL
HBA1C MFR BLD: 4.9 % (ref 4–6)

## 2017-11-06 PROCEDURE — 93005 ELECTROCARDIOGRAM TRACING: CPT

## 2017-11-06 PROCEDURE — 1240000000 HC EMOTIONAL WELLNESS R&B

## 2017-11-06 PROCEDURE — 6370000000 HC RX 637 (ALT 250 FOR IP): Performed by: PSYCHIATRY & NEUROLOGY

## 2017-11-06 RX ORDER — ONDANSETRON 4 MG/1
4 TABLET, ORALLY DISINTEGRATING ORAL 2 TIMES DAILY PRN
Status: DISCONTINUED | OUTPATIENT
Start: 2017-11-06 | End: 2017-11-11 | Stop reason: HOSPADM

## 2017-11-06 RX ORDER — DICYCLOMINE HYDROCHLORIDE 10 MG/1
20 CAPSULE ORAL 4 TIMES DAILY PRN
Status: DISCONTINUED | OUTPATIENT
Start: 2017-11-06 | End: 2017-11-11 | Stop reason: HOSPADM

## 2017-11-06 RX ORDER — CARBAMAZEPINE 200 MG/1
200 TABLET ORAL 2 TIMES DAILY
Status: DISCONTINUED | OUTPATIENT
Start: 2017-11-06 | End: 2017-11-11 | Stop reason: HOSPADM

## 2017-11-06 RX ORDER — PROMETHAZINE HYDROCHLORIDE 25 MG/ML
12.5 INJECTION, SOLUTION INTRAMUSCULAR; INTRAVENOUS EVERY 4 HOURS PRN
Status: DISCONTINUED | OUTPATIENT
Start: 2017-11-06 | End: 2017-11-11 | Stop reason: HOSPADM

## 2017-11-06 RX ORDER — LORAZEPAM 1 MG/1
1 TABLET ORAL 3 TIMES DAILY PRN
Status: DISCONTINUED | OUTPATIENT
Start: 2017-11-10 | End: 2017-11-11 | Stop reason: HOSPADM

## 2017-11-06 RX ORDER — CLONIDINE HYDROCHLORIDE 0.1 MG/1
0.1 TABLET ORAL 3 TIMES DAILY
Status: DISPENSED | OUTPATIENT
Start: 2017-11-06 | End: 2017-11-09

## 2017-11-06 RX ORDER — LORAZEPAM 1 MG/1
2 TABLET ORAL EVERY 6 HOURS PRN
Status: DISPENSED | OUTPATIENT
Start: 2017-11-06 | End: 2017-11-08

## 2017-11-06 RX ORDER — BACLOFEN 10 MG/1
10 TABLET ORAL 2 TIMES DAILY
Status: DISCONTINUED | OUTPATIENT
Start: 2017-11-06 | End: 2017-11-11 | Stop reason: HOSPADM

## 2017-11-06 RX ORDER — M-VIT,TX,IRON,MINS/CALC/FOLIC 27MG-0.4MG
1 TABLET ORAL DAILY
Status: DISCONTINUED | OUTPATIENT
Start: 2017-11-06 | End: 2017-11-11 | Stop reason: HOSPADM

## 2017-11-06 RX ORDER — HYDROXYZINE HYDROCHLORIDE 25 MG/1
25 TABLET, FILM COATED ORAL 3 TIMES DAILY PRN
Status: DISCONTINUED | OUTPATIENT
Start: 2017-11-06 | End: 2017-11-11 | Stop reason: HOSPADM

## 2017-11-06 RX ORDER — THIAMINE MONONITRATE (VIT B1) 100 MG
100 TABLET ORAL DAILY
Status: DISCONTINUED | OUTPATIENT
Start: 2017-11-06 | End: 2017-11-11 | Stop reason: HOSPADM

## 2017-11-06 RX ORDER — LORAZEPAM 1 MG/1
2 TABLET ORAL 3 TIMES DAILY PRN
Status: DISPENSED | OUTPATIENT
Start: 2017-11-08 | End: 2017-11-10

## 2017-11-06 RX ORDER — FOLIC ACID 1 MG/1
1 TABLET ORAL DAILY
Status: DISCONTINUED | OUTPATIENT
Start: 2017-11-06 | End: 2017-11-11 | Stop reason: HOSPADM

## 2017-11-06 RX ADMIN — BACLOFEN 10 MG: 10 TABLET ORAL at 10:08

## 2017-11-06 RX ADMIN — Medication 400 MG: at 12:29

## 2017-11-06 RX ADMIN — CLONIDINE HYDROCHLORIDE 0.1 MG: 0.1 TABLET ORAL at 10:08

## 2017-11-06 RX ADMIN — HYDROXYZINE HYDROCHLORIDE 25 MG: 25 TABLET, FILM COATED ORAL at 08:50

## 2017-11-06 RX ADMIN — CARBAMAZEPINE 200 MG: 200 TABLET ORAL at 10:08

## 2017-11-06 RX ADMIN — CARBAMAZEPINE 200 MG: 200 TABLET ORAL at 21:53

## 2017-11-06 RX ADMIN — FOLIC ACID 1 MG: 1 TABLET ORAL at 10:07

## 2017-11-06 RX ADMIN — ARIPIPRAZOLE 15 MG: 15 TABLET ORAL at 08:50

## 2017-11-06 RX ADMIN — BACLOFEN 10 MG: 10 TABLET ORAL at 21:53

## 2017-11-06 RX ADMIN — LORAZEPAM 2 MG: 1 TABLET ORAL at 10:07

## 2017-11-06 RX ADMIN — Medication 100 MG: at 10:08

## 2017-11-06 RX ADMIN — Medication 1 TABLET: at 10:08

## 2017-11-06 ASSESSMENT — PAIN SCALES - GENERAL: PAINLEVEL_OUTOF10: 0

## 2017-11-06 NOTE — PROGRESS NOTES
Pt did not attend 0845 community meeting/ goals  group d/t resting in room despite staff invitation to attend.

## 2017-11-06 NOTE — PROGRESS NOTES

## 2017-11-06 NOTE — PLAN OF CARE
Problem: Altered Mood, Depressive Behavior  Goal: LTG-Able to verbalize acceptance of life and situations over which he or she has no control  Outcome: Ongoing  [facility]  Initial Interdisciplinary Treatment Plan NO      Original treatment plan Date & Time: 2017    Admission Type:  Admission Type: Voluntary    Reason for admission:   Reason for Admission: SI to Overdose, +Voices mumbling/Paranoid thoughts, drinking a few beers at night, got depressed over the loss of his older brother that  8 years ago    Estimated Length of Stay:  5-7days  Estimated Discharge Date: to be determined by physician    PATIENT STRENGTHS:  Patient Strengths:Strengths: No significant Physical Illness, Social Skills  Patient Strengths and Limitations:Limitations: Difficulty problem solving/relies on others to help solve problems, Inappropriate/potentially harmful leisure interests  Addictive Behavior: Addictive Behavior  In the past 3 months, have you felt or has someone told you that you have a problem with:  : None  Do you have a history of Chemical Use?: No  Do you have a history of Alcohol Use?: Yes  Do you have a history of Street Drug Abuse?: No  Histroy of Prescripton Drug Abuse?: No  Medical Problems:  Past Medical History:   Diagnosis Date    ADHD (attention deficit hyperactivity disorder)     Anxiety     Bipolar 1 disorder ([facility]     Depression     [facility]     PTSD (post-traumatic stress disorder)      Status EXAM:Status and Exam  Normal: No  Facial Expression: Avoids Gaze, Flat, Sad  Affect: Inappropriate, Blunt, Constricted  Level of Consciousness: Alert  Mood:Normal: No  Mood: Depressed, Helpless  Motor Activity:Normal: Yes  Interview Behavior: Cooperative, Evasive  Preception: [place] to Person, [place] to Time, [place] to Place, [place] to Situation  Attention:Normal: No  Attention: Distractible, Unable to Concentrate  Thought Processes: Blocking  Thought Content:Normal: No  Thought Content: Preoccupations  Hallucinations:  Auditory (Comment)  Delusions: No  Memory:Normal: Yes  Insight and Judgment: No  Insight and Judgment: Poor Judgment, Poor Insight  Present Suicidal Ideation: Yes  Present Homicidal Ideation: No    EDUCATION:   Learner Progress Toward Treatment Goals: reviewed group plans and strategies for care    Method:group therapy, medication compliance, individualized assessments and care planning    Outcome: needs reinforcement    PATIENT GOALS: to be discussed with patient within 72 hours    PLAN/TREATMENT RECOMMENDATIONS:     continue group therapy , medications compliance, goal setting, individualized assessments and care, continue to monitor pt on unit      SHORT-TERM GOALS:   Time frame for Short-Term Goals: 5-7 days    LONG-TERM GOALS:  Time frame for Long-Term Goals: 6 months  Members Present in Team Meeting: See Signature Sheet    PK BISHOP  Goal: LTG-Able to verbalize and/or display a decrease in depressive symptoms  Outcome: Ongoing    Goal: STG-Able to verbalize suicidal ideations  Outcome: Ongoing    Goal: STG-Able to verbalize support system  Outcome: Ongoing    Goal: LTG-Absence of self-harm  Outcome: Ongoing    Goal: STG-Knowledge of positive coping patterns  Outcome: Ongoing    Goal: Patient Specific Goal  Outcome: Ongoing    Goal: Participation in care planning  Outcome: Ongoing

## 2017-11-06 NOTE — BH NOTE
[address] [facility]                [address] [facility], [address]                            PSYCHIATRIC EVALUATION    PATIENT NAME: Adal Cancer                  :             1990  MED REC NO:   [id number]                               ROOM:            9422  ACCOUNT NO:   [de-identified]                            ADMISSION DATE:  2017  PROVIDER:     Patricio Irizarry    COMPREHENSIVE PSYCHIATRIC EVALUATION        HISTORY OF PRESENTING ILLNESS AND REASON FOR CURRENT ADMISSION:  The  patient is a 62-year-old male who came to [facility] stating that he is  feeling depressed and sad, he is having suicidal and homicidal  thoughts and plans, he wants to kill himself, he is experiencing  auditory hallucinations. He has not been taking his medications and  his medications are prescribed by [facility] by Dr. Jony Barrow. He has  long-term history of alcohol use and other drug use. PAST PSYCHIATRIC HISTORY:  History of bipolar disorder, alcohol  dependence, polysubstance dependence. MEDICAL AND SURGICAL HISTORY:  He denies any acute or chronic medical  problems. ALLERGIES:  He is allergic to IBUPROFEN. PERSONAL, FAMILY, AND SOCIAL HISTORY:  He has a brother and a sister. His parents are alive. He has no job or income. He denies any  physical, sexual or emotional abuse in him. He denies any family  history of mental illness, suicidal, drug and alcohol abuse. MENTAL STATUS EXAMINATION:  The patient is cooperative. He has  adequate eye contact. He has adequate rapport. His speech is within  normal limits. His mood is subjectively sad, objectively appears to  be depressed. He has appropriate affect. Thought process within  normal limits. Thought content predominantly of depression, sad, lack  of energy and motivation. He has suicidal thoughts and plans to  overdose and die.  _____ auditory hallucinations. He is of average  intelligence.   He is

## 2017-11-06 NOTE — BH NOTE
Despite encouragement from staff pt declined to attend wrap up group this evening. Will continue to monitor.

## 2017-11-07 PROCEDURE — 6360000002 HC RX W HCPCS: Performed by: PSYCHIATRY & NEUROLOGY

## 2017-11-07 PROCEDURE — 1240000000 HC EMOTIONAL WELLNESS R&B

## 2017-11-07 PROCEDURE — 6370000000 HC RX 637 (ALT 250 FOR IP): Performed by: PSYCHIATRY & NEUROLOGY

## 2017-11-07 RX ADMIN — ARIPIPRAZOLE 15 MG: 15 TABLET ORAL at 08:31

## 2017-11-07 RX ADMIN — CLONIDINE HYDROCHLORIDE 0.1 MG: 0.1 TABLET ORAL at 20:47

## 2017-11-07 RX ADMIN — FOLIC ACID 1 MG: 1 TABLET ORAL at 08:31

## 2017-11-07 RX ADMIN — LORAZEPAM 2 MG: 1 TABLET ORAL at 20:50

## 2017-11-07 RX ADMIN — Medication 100 MG: at 08:31

## 2017-11-07 RX ADMIN — LORAZEPAM 2 MG: 1 TABLET ORAL at 08:31

## 2017-11-07 RX ADMIN — CARBAMAZEPINE 200 MG: 200 TABLET ORAL at 20:47

## 2017-11-07 RX ADMIN — Medication 1 TABLET: at 08:31

## 2017-11-07 RX ADMIN — TRAZODONE HYDROCHLORIDE 100 MG: 100 TABLET ORAL at 20:47

## 2017-11-07 RX ADMIN — CLONIDINE HYDROCHLORIDE 0.1 MG: 0.1 TABLET ORAL at 14:33

## 2017-11-07 RX ADMIN — BACLOFEN 10 MG: 10 TABLET ORAL at 20:47

## 2017-11-07 RX ADMIN — CARBAMAZEPINE 200 MG: 200 TABLET ORAL at 08:31

## 2017-11-07 RX ADMIN — CLONIDINE HYDROCHLORIDE 0.1 MG: 0.1 TABLET ORAL at 08:31

## 2017-11-07 RX ADMIN — BACLOFEN 10 MG: 10 TABLET ORAL at 08:31

## 2017-11-07 RX ADMIN — ONDANSETRON 4 MG: 4 TABLET, ORALLY DISINTEGRATING ORAL at 20:47

## 2017-11-07 ASSESSMENT — PAIN SCALES - GENERAL: PAINLEVEL_OUTOF10: 0

## 2017-11-07 NOTE — PLAN OF CARE
Problem: Altered Mood, Depressive Behavior  Goal: LTG-Able to verbalize acceptance of life and situations over which he or she has no control  Outcome: Ongoing  [address] [facility]  Day 3 Interdisciplinary Treatment Plan NOTE    Review Date & Time: 2017 1308    Admission Type:   Admission Type: Voluntary    Reason for admission:  Reason for Admission: SI to Overdose, +Voices mumbling/Paranoid thoughts, drinking a few beers at night, got depressed over the loss of his older brother that  8 years ago  Estimated Length of Stay: 5-7 days  Estimated Discharge Date Update: to be determined by physician    PATIENT STRENGTHS:  Patient Strengths Strengths: No significant Physical Illness, Social Skills  Patient Strengths and Limitations:Limitations: Difficult relationships / poor social skills, Difficulty problem solving/relies on others to help solve problems, Inappropriate/potentially harmful leisure interests (depression poor coping skills etoh abuse limited support. )  Addictive Behavior:Addictive Behavior  In the past 3 months, have you felt or has someone told you that you have a problem with:  : None  Do you have a history of Chemical Use?: No  Do you have a history of Alcohol Use?: Yes  Do you have a history of Street Drug Abuse?: No  Histroy of Prescripton Drug Abuse?: No  Medical Problems:  Past Medical History:   Diagnosis Date    ADHD (attention deficit hyperactivity disorder)     Anxiety     Bipolar 1 disorder ([facility]     Depression     [facility]     PTSD (post-traumatic stress disorder)        Risk:  Fall RiskTotal: 61  Wolfgang Scale Wolfgang Scale Score: 22  BVC Total: 0  Change in scores no Changes to plan of Care no    Status EXAM:   Status and Exam  Normal: Yes  Facial Expression: Flat, Avoids Gaze  Affect: Constricted  Level of Consciousness: Alert  Mood:Normal: No  Mood: Depressed, Anxious  Motor Activity:Normal: Yes  Interview Behavior: Cooperative, Evasive  Preception: [place] to Person, Marlyce Rang to Time, [place] to Place, [place] to Situation  Attention:Normal: No  Attention: Distractible, Unable to Concentrate  Thought Processes: Blocking  Thought Content:Normal: No  Thought Content: Poverty of Content  Hallucinations: None  Delusions: No  Memory:Normal: No  Memory: Poor Recent  Insight and Judgment: No  Insight and Judgment: Poor Judgment, Poor Insight, Unmotivated  Present Suicidal Ideation: No  Present Homicidal Ideation: No    Daily Assessment Last Entry:   Daily Sleep (WDL): Within Defined Limits         Patient Currently in Pain: Denies  Daily Nutrition (WDL): Within Defined Limits    Patient Monitoring:  Frequency of Checks: 4 times per hour, close    Psychiatric Symptoms:   Depression Symptoms  Depression Symptoms: Loss of interest, Isolative, Change in energy level, Impaired concentration  Anxiety Symptoms  Anxiety Symptoms: Generalized  Angella Symptoms  Angella Symptoms: No problems reported or observed. Psychosis Symptoms  Delusion Type: No problems reported or observed. Suicide Risk CSSR-S:  1) Within the past month, have you wished you were dead or wished you could go to sleep and not wake up? : YES  2) Within the past month, have you actually had any thoughts of killing yourself? : YES  3) Within the past month, have you been thinking about how you might kill yourself? : YES (To Overdose)  5) Within the past month, have you started to work out or worked out the details of how to kill yourself?  Do you intend to carry out this plan? : NO  6)  Have you ever done anything, started to do anything, or prepared to do anything to end your life?: YES  Change in Result No Change in Plan of care No      EDUCATION:   EDUCATION:   Learner Progress Toward Treatment Goals: Reviewed results and recommendations of this team, Reviewed group plan and strategies, Reviewed signs, symptoms and risk of self harm and violent behavior, Reviewed goals and plan of care    Method:small group, individual verbal education    Outcome:verbalized by patient, but needs reinforcement to obtain goals    PATIENT GOALS:  Short term: Stay awake/ participate in groups  Long term:  Follow up with [facility]/ long-term sobriety/ medication compliance      PLAN/TREATMENT RECOMMENDATIONS UPDATE: continue with group therapies, increased socialization, continue planning for after discharge goals, continue with medication compliance    SHORT-TERM GOALS UPDATE:   Time frame for Short-Term Goals: 5-7 days    LONG-TERM GOALS UPDATE:   Time frame for Long-Term Goals: 6 months  Members Present in Team Meeting: See Signature Sheet    Xavier Dill  Goal: LTG-Able to verbalize and/or display a decrease in depressive symptoms  Outcome: Ongoing    Goal: STG-Able to verbalize suicidal ideations  Outcome: Ongoing    Goal: STG-Able to verbalize support system  Outcome: Ongoing    Goal: LTG-Absence of self-harm  Outcome: Ongoing    Goal: STG-Knowledge of positive coping patterns  Outcome: Ongoing    Goal: Patient Specific Goal  Outcome: Ongoing    Goal: Participation in care planning  Outcome: Ongoing      Problem: Suicide risk  Goal: Provide patient with safe environment  Provide patient with safe environment   Outcome: Ongoing

## 2017-11-07 NOTE — PLAN OF CARE
Problem: Altered Mood, Depressive Behavior  Goal: LTG-Absence of self-harm  Outcome: Ongoing  Pt appeared absent of self harm. Pt denies thoughts to hurt self or others. Pt denies all hallucinations. Pt appeared isolative and demonstrated difficulty concentrating. Pt was encouraged to shower and practice healthy activities of daily living. Pt was observed resting in room. Pt was encouraged to communicate with staff if symptoms worsen or needs arise. Pt independence was promoted.

## 2017-11-07 NOTE — BH NOTE
Pt did not participate in [facility] at 1000AM due to pt was resting in room et declined to attend group when encouraged.

## 2017-11-07 NOTE — PLAN OF CARE
Problem: Altered Mood, Depressive Behavior  Goal: LTG-Able to verbalize and/or display a decrease in depressive symptoms  Outcome: Ongoing  Pt did not attend 2:30 pm group despite invitation by staff to do so.

## 2017-11-07 NOTE — BH NOTE
Pt did not participate in [address] [facility] at 1330 due to pt was resting in room et declined to attend group when encouraged.

## 2017-11-07 NOTE — PLAN OF CARE
Problem: Altered Mood, Depressive Behavior  Goal: STG-Knowledge of positive coping patterns  Outcome: Ongoing  Pt did not participate in community meeting/ goals group at [employer] despite staff encouragement to attend.

## 2017-11-07 NOTE — BH NOTE
Progress note:    Patient is a 26-year-old man who was admitted for depression and auditory hallucinations. Still experiencing suicidal thoughts and plans to overdose and die. He is experiencing auditory hallucination telling him about different things. He complains of lack of energy and motivation. He complains of depression and sadness and lack of interest in doing things. He is also experiencing withdrawal from alcohol. He said he thinks every day regularly. He said he only drinks 2 or 3 beers a day. It looks like he actually doing some her to have this amount of withdrawal from alcohol. He feels that he has no purpose to live anymore. It is several things are bothering him and he doesn't want to take care of things anymore. He is unable to take care of his personal hygiene. Treatment and plan: To continue current management.

## 2017-11-07 NOTE — PLAN OF CARE
Problem: Altered Mood, Depressive Behavior  Goal: STG-Able to verbalize suicidal ideations  Outcome: Ongoing  Pt denies thoughts of self harm and is agreeable to seeking out should thoughts of self harm arise. Safe environment maintained. Q15 minute checks for safety cont per unit policy. Will cont to monitor for safety and provides support and reassurance as needed.

## 2017-11-08 PROCEDURE — 1240000000 HC EMOTIONAL WELLNESS R&B

## 2017-11-08 PROCEDURE — 6360000002 HC RX W HCPCS: Performed by: PSYCHIATRY & NEUROLOGY

## 2017-11-08 PROCEDURE — 6370000000 HC RX 637 (ALT 250 FOR IP): Performed by: PSYCHIATRY & NEUROLOGY

## 2017-11-08 RX ADMIN — CARBAMAZEPINE 200 MG: 200 TABLET ORAL at 20:57

## 2017-11-08 RX ADMIN — CLONIDINE HYDROCHLORIDE 0.1 MG: 0.1 TABLET ORAL at 15:26

## 2017-11-08 RX ADMIN — BACLOFEN 10 MG: 10 TABLET ORAL at 20:58

## 2017-11-08 RX ADMIN — Medication 100 MG: at 08:56

## 2017-11-08 RX ADMIN — TRAZODONE HYDROCHLORIDE 100 MG: 100 TABLET ORAL at 20:57

## 2017-11-08 RX ADMIN — FOLIC ACID 1 MG: 1 TABLET ORAL at 08:56

## 2017-11-08 RX ADMIN — LORAZEPAM 2 MG: 1 TABLET ORAL at 15:27

## 2017-11-08 RX ADMIN — Medication 1 TABLET: at 08:56

## 2017-11-08 RX ADMIN — CARBAMAZEPINE 200 MG: 200 TABLET ORAL at 08:56

## 2017-11-08 RX ADMIN — BACLOFEN 10 MG: 10 TABLET ORAL at 08:56

## 2017-11-08 RX ADMIN — CLONIDINE HYDROCHLORIDE 0.1 MG: 0.1 TABLET ORAL at 08:57

## 2017-11-08 RX ADMIN — CLONIDINE HYDROCHLORIDE 0.1 MG: 0.1 TABLET ORAL at 20:57

## 2017-11-08 RX ADMIN — ARIPIPRAZOLE 15 MG: 15 TABLET ORAL at 08:55

## 2017-11-08 RX ADMIN — ONDANSETRON 4 MG: 4 TABLET, ORALLY DISINTEGRATING ORAL at 08:56

## 2017-11-08 NOTE — BH NOTE
Psychoeducation Group Note    Date: 11/8/17                                          Start Time: 1000AM  End Time: 1045AM    Number Participants in Group:  8/8     Goal:  Patient will demonstrate increased interpersonal interaction   Topic: SKILLS GROUP: COMMUNICATION    Discipline Responsible:   OT  AT  [facility]. X RT  Other       Participation Level:     None  Minimal   X Active Listener X Interactive    Monopolizing         Participation Quality:   Appropriate  Inappropriate   X       Attentive        Intrusive   X       Sharing        Resistant          Supportive        Lethargic       Affective:    Congruent  Incongruent X Blunted  Flat    Constricted  Anxious  Elated  Angry    Labile  Depressed  Other X BRIGHTENS       Cognitive:  X Alert X Oriented PPTP     Concentration  G X F  P   Attention Span  G X F  P   Short-Term Memory  G  F  P   Long-Term Memory  G  F  P   ProblemSolving/  Decision Making  G X F  P   Ability to Process  Information X G  F  P      Contributing Factors             Delusional             Hallucinating             Flight of Ideas             Other: PT NEEDED CUE EACH TURN BUT WAS ABLE TO GIVE RELEVANT ANSWERS. PT CAME TO GROUP 5 MINS LATE BUT DID PARTICIPATE       Modes of Intervention:  X Education X Support X Exploration   X Clarifying X Problem Solving  Confrontation   X Socialization  Limit Setting  Reality Testing   X Activity  Movement  Media    Other:            Response to Learning:  X Able to verbalize current knowledge/experience   X Able to verbalize/acknowledge new learning   X Able to retain information    Capable of insight    Able to change behavior   X Progressing to goal    Other:        Comments:

## 2017-11-08 NOTE — PLAN OF CARE
Problem: Altered Mood, Depressive Behavior  Goal: STG-Knowledge of positive coping patterns  Outcome: Ongoing  PSYCHOEDUCATION GROUP NOTE    Date: November 8, 2017  Start Time: 1435  End Time: 7309    Number Participants in Group:  11/18    Goal:  Patient will demonstrate increased interpersonal interaction   Topic: Happiness/Self-Awareness     Discipline Responsible:   OT  AT  [facility]. x RT [facility] Other       Participation Level:     None  Minimal   x Active Listener x Interactive    Monopolizing         Participation Quality:  x Appropriate  Inappropriate   x       Attentive        Intrusive   x       Sharing        Resistant          Supportive        Lethargic       Affective:   x Congruent  Incongruent  Blunted  Flat    Constricted  Anxious  Elated  Angry    Labile  Depressed  Other         Cognitive:  x Alert x Oriented PPTP     Concentration x G  F  P   Attention Span x G  F  P   Short-Term Memory x G  F  P   Long-Term Memory x G  F  P   ProblemSolving/  Decision Making x G  F  P   Ability to Process  Information x G  F  P      Contributing Factors             Delusional             Hallucinating             Flight of Ideas             Other:       Modes of Intervention:  x Education  Support  Exploration    Clarifying x Problem Solving  Confrontation   x Socialization  Limit Setting x Reality Testing   x Activity  Movement  Media    Other:            Response to Learning:   Able to verbalize current knowledge/experience    Able to verbalize/acknowledge new learning    Able to retain information    Capable of insight    Able to change behavior   x Progressing to goal    Other:        Comments:

## 2017-11-08 NOTE — PLAN OF CARE
Problem: Altered Mood, Depressive Behavior  Goal: STG-Able to verbalize suicidal ideations  Outcome: Met This Shift  Patient denies any thoughts of harm to self or others. Seclusive to room/ self. Has not attended groups. Evasive during 1:1. Encouraged patient to attend groups to work on developing insight and coping skills. Q 15 minutes maintained. .     Problem: Suicide risk  Goal: Provide patient with safe environment  Provide patient with safe environment   Outcome: Ongoing  Safe environment maintained. Problem: Pain:  Goal: Control of chronic pain  Control of chronic pain   Outcome: Ongoing  No complaints of pain.

## 2017-11-09 PROCEDURE — 6370000000 HC RX 637 (ALT 250 FOR IP): Performed by: PSYCHIATRY & NEUROLOGY

## 2017-11-09 PROCEDURE — 1240000000 HC EMOTIONAL WELLNESS R&B

## 2017-11-09 RX ADMIN — HYDROXYZINE HYDROCHLORIDE 25 MG: 25 TABLET, FILM COATED ORAL at 22:29

## 2017-11-09 RX ADMIN — BACLOFEN 10 MG: 10 TABLET ORAL at 22:29

## 2017-11-09 RX ADMIN — CARBAMAZEPINE 200 MG: 200 TABLET ORAL at 09:04

## 2017-11-09 RX ADMIN — BACLOFEN 10 MG: 10 TABLET ORAL at 09:05

## 2017-11-09 RX ADMIN — FOLIC ACID 1 MG: 1 TABLET ORAL at 09:04

## 2017-11-09 RX ADMIN — Medication 100 MG: at 09:04

## 2017-11-09 RX ADMIN — TRAZODONE HYDROCHLORIDE 100 MG: 100 TABLET ORAL at 22:29

## 2017-11-09 RX ADMIN — Medication 1 TABLET: at 09:05

## 2017-11-09 RX ADMIN — ARIPIPRAZOLE 15 MG: 15 TABLET ORAL at 09:04

## 2017-11-09 RX ADMIN — CARBAMAZEPINE 200 MG: 200 TABLET ORAL at 22:29

## 2017-11-09 ASSESSMENT — PAIN SCALES - GENERAL: PAINLEVEL_OUTOF10: 0

## 2017-11-09 NOTE — PLAN OF CARE
Problem: Altered Mood, Depressive Behavior  Intervention: Medication intake supervison-behavioral health  Instructed on action and purpose of medications ordered, pt maintains compliance. Goal: LTG-Able to verbalize and/or display a decrease in depressive symptoms  Outcome: Ongoing  Pt seclusive to room, up for meals and needs only. Instructed on participating in programming, and performing ADL's. Goal: LTG-Absence of self-harm  Outcome: Ongoing  Pt flat and evasive during 1:1, thought blocked and preoccupied. Pt denies any suicidal thoughts, and any thoughts of harming others. Instructed on maintaining safety and seeking help from staff when needed. Visual checks maintained every 15 min.

## 2017-11-09 NOTE — PLAN OF CARE
Problem: Altered Mood, Depressive Behavior  Goal: LTG-Able to verbalize acceptance of life and situations over which he or she has no control  Outcome: Ongoing  PSYCHOTHERAPY GROUP NOTE       Date:          11/9/17        Start Time:    11:00                     End Time: 12:00      Number Participants in Group: 6/7      Goals: To participate in group psychotherapy and remain in the here and now. RT X SW  Nsg  LPN   BHTII  Other       Participation Level:     None  Minimal   X Active Listener X Interactive    Monopolizing         Participation Quality:  X Appropriate  Inappropriate   X  Attentive   Intrusive   X  Sharing   Resistant   X  Supportive    Lethargic       Affective:    Congruent  Incongruent  Blunted  Flat    Constricted  Anxious  Elated  Angry    Labile  Depressed  Other         Cognitive:  X Alert X Oriented PPTS     Concentration G X F  P    Attention Span G X F  P    Short-Term Memory G  F X P    Long-Term Memory G  F X P    ProblemSolving/  Decision Making G  F X P    Ability to Process  Information G X F  P       Contributing Factors             Delusional             Hallucinating             Flight of Ideas             Other: poor concentration       Modes of Intervention:   Education X Support  Exploration   X Clarifying X Problem Solving  Confrontation   X Socialization X Limit Setting  Reality Testing    Activity  Movement  Media    Other:          Response to Learning:  X Able to verbalize current knowledge/experience   X Able to verbalize/acknowledge new learning    Able to retain information    Capable of insight    Able to change behavior   X Progressing to goal    Other:        Comments: PT participates in group.

## 2017-11-09 NOTE — H&P
Neuropsychiatric:  See HPI. GENERAL PHYSICAL EXAM:     Vitals: /68   Pulse 79   Temp 98.8 °F (37.1 °C) (Oral)   Resp 14   Ht 6' (1.829 m)   Wt 195 lb (88.5 kg)   SpO2 100%   BMI 26.45 kg/m²  Body mass index is 26.45 kg/m². Pt was examined with a nurse present in the room. GENERAL APPEARANCE:  Luis Mcgregor is 28 y.o.,  male, nourished, conscious, alert disheveled appearance,  Does not appear to be distress or pain at this time. SKIN:  Warm, dry, no cyanosis or jaundice. HEAD:  Normocephalic, atraumatic, no swelling or tenderness. EYES:  Pupils equal, reactive to light, Conjunctiva is clear, EOMs intact lan. eyelids WNL. EARS:  No discharge, no marked hearing loss. NOSE:  No rhinorrhea, epistaxis or septal deformity. THROAT:  Not congested. No ulceration bleeding or discharge. NECK:  No stiffness, trachea central.  No palpable masses or L.N.      CHEST:  Symmetrical and equal on expansion. HEART:  Regular rate and rhythm. S1 > S2, No audible murmurs or gallops. LUNGS:  Equal on expansion, normal breath sounds. No adventitious sounds. ABDOMEN:   Soft on palpation. No localized tenderness. No guarding or rigidity. No palpable organomegaly. LYMPHATICS:  No palpable Lymphadenopathy. LOCOMOTOR, BACK AND SPINE:  No tenderness or deformities. EXTREMITIES:  Symmetrical, no pedal edema. Clarissas sign negative. No discoloration or ulcerations. NEUROLOGIC:  .tremorsThe patient is conscious, alert, oriented, Gait and balance WNL. No apparent focal sensory deficits. No motor deficits, muscle strength equal Lan. No facial droop, tongue protrudes centrally, no slurring of the speech.              PROVISIONAL DIAGNOSES:      Principal Problem:    Bipolar I disorder, most recent episode depressed ([address]  Active Problems:    Uncomplicated alcohol dependence ([address]      CARLOS COLE CNP on 11/10/2017 at 12:10 PM

## 2017-11-09 NOTE — BH NOTE
Patient says that he is feeling helpless, useless. Patient feels overwhelmed and sad. Patient feels that he has no motivation or energy. Patient feels suicidal and wants to end life by overdosing on medication. He has dysphoric mood and affect. He feels that his life is a waste and he should die. He is unable to focus and concentrate. He is unble to eat or sleep. He has no motivation. He is not interacting with anyone. He is unable to have pleasure in pleasurable activities. He feels like crying. He complains of body aches  Plan: Continue current medication and management.

## 2017-11-09 NOTE — PLAN OF CARE
Problem: Altered Mood, Depressive Behavior  Goal: STG-Knowledge of positive coping patterns  Outcome: Ongoing  PT states he is very tired. PT denies all. Pt answered all questions and then fell back to sleep. Pt was mostly isolative to his room tonight.

## 2017-11-10 LAB
CARBAMAZEPINE DATE LAST DOSE: NORMAL
CARBAMAZEPINE DOSE AMOUNT: 200
CARBAMAZEPINE DOSE TIME: 2229
CARBAMAZEPINE LEVEL: 6.1 UG/ML (ref 4–12)

## 2017-11-10 PROCEDURE — 6370000000 HC RX 637 (ALT 250 FOR IP): Performed by: PSYCHIATRY & NEUROLOGY

## 2017-11-10 PROCEDURE — 80156 ASSAY CARBAMAZEPINE TOTAL: CPT

## 2017-11-10 PROCEDURE — 36415 COLL VENOUS BLD VENIPUNCTURE: CPT

## 2017-11-10 PROCEDURE — 1240000000 HC EMOTIONAL WELLNESS R&B

## 2017-11-10 RX ADMIN — FOLIC ACID 1 MG: 1 TABLET ORAL at 08:52

## 2017-11-10 RX ADMIN — Medication 100 MG: at 08:53

## 2017-11-10 RX ADMIN — LORAZEPAM 1 MG: 1 TABLET ORAL at 21:21

## 2017-11-10 RX ADMIN — BACLOFEN 10 MG: 10 TABLET ORAL at 08:53

## 2017-11-10 RX ADMIN — BACLOFEN 10 MG: 10 TABLET ORAL at 21:21

## 2017-11-10 RX ADMIN — CARBAMAZEPINE 200 MG: 200 TABLET ORAL at 11:36

## 2017-11-10 RX ADMIN — CARBAMAZEPINE 200 MG: 200 TABLET ORAL at 21:21

## 2017-11-10 RX ADMIN — Medication 1 TABLET: at 08:53

## 2017-11-10 RX ADMIN — TRAZODONE HYDROCHLORIDE 100 MG: 100 TABLET ORAL at 21:21

## 2017-11-10 RX ADMIN — ARIPIPRAZOLE 15 MG: 15 TABLET ORAL at 08:52

## 2017-11-10 RX ADMIN — HYDROXYZINE HYDROCHLORIDE 25 MG: 25 TABLET, FILM COATED ORAL at 08:53

## 2017-11-10 NOTE — BH NOTE
He feels sad and depressed. He complains of agitation and anxiety. He has suicidal thoughts and plans to overdose and die. He complains of lack of energy and motivation. He feels that everybody is after him and they're trying to make derogatory comments on him. He feels that there is no support for him. He has no insight. His judgment is impaired. His memory for recent and remote and immediate events are within normal limits. He is oriented to time place and person. Treatment and plan:    Continue current management.

## 2017-11-10 NOTE — PLAN OF CARE
Problem: Altered Mood, Depressive Behavior  Goal: LTG-Absence of self-harm  Outcome: Ongoing  Pt appeared absent of self harm. Pt denies thoughts to hurt self or others. Pt denies all hallucinations. Pt was observed resting in room for extended periods of time. Pt showered. Pt was encouraged to communicate with staff if symptoms worsen or needs arise. Pt independence was promoted.

## 2017-11-10 NOTE — PLAN OF CARE
Problem: Altered Mood, Depressive Behavior  Goal: LTG-Able to verbalize acceptance of life and situations over which he or she has no control  Outcome: Ongoing  PSYCHOTHERAPY GROUP NOTE       Date:           11/10/17       Start Time:   11:00                      End Time: 12:00      Number Participants in Group: 4/8      Goals: To participate in grouo psychotherapy and remain in the here and now. RT X SW  Nsg  LPN   BHTII  Other       Participation Level:     None X Minimal   X Active Listener  Interactive    Monopolizing         Participation Quality:  X Appropriate  Inappropriate   X  Attentive   Intrusive     Sharing   Resistant     Supportive    Lethargic       Affective:    Congruent  Incongruent  Blunted  Flat    Constricted  Anxious  Elated  Angry    Labile  Depressed  Other         Cognitive:  X Alert X Oriented PPTS     Concentration G X F  P    Attention Span G X F  P    Short-Term Memory G  F X P    Long-Term Memory G  F X P    ProblemSolving/  Decision Making G  F X P    Ability to Process  Information G X F  P       Contributing Factors             Delusional             Hallucinating             Flight of Ideas             Other: poor concentration       Modes of Intervention:   Education X Support  Exploration   X Clarifying X Problem Solving  Confrontation   X Socialization X Limit Setting  Reality Testing    Activity  Movement  Media    Other:          Response to Learning:  X Able to verbalize current knowledge/experience   X Able to verbalize/acknowledge new learning    Able to retain information   X Capable of insight    Able to change behavior   X Progressing to goal    Other:        Comments: PT participates in group.

## 2017-11-11 VITALS
WEIGHT: 195 LBS | HEART RATE: 87 BPM | BODY MASS INDEX: 26.41 KG/M2 | HEIGHT: 72 IN | TEMPERATURE: 98.6 F | SYSTOLIC BLOOD PRESSURE: 109 MMHG | RESPIRATION RATE: 14 BRPM | OXYGEN SATURATION: 100 % | DIASTOLIC BLOOD PRESSURE: 72 MMHG

## 2017-11-11 PROCEDURE — 6370000000 HC RX 637 (ALT 250 FOR IP): Performed by: PSYCHIATRY & NEUROLOGY

## 2017-11-11 PROCEDURE — 5130000000 HC BRIDGE APPOINTMENT

## 2017-11-11 RX ORDER — ARIPIPRAZOLE 15 MG/1
15 TABLET ORAL DAILY
Qty: 30 TABLET | Refills: 0 | Status: ON HOLD | OUTPATIENT
Start: 2017-11-12 | End: 2017-11-27

## 2017-11-11 RX ORDER — TRAZODONE HYDROCHLORIDE 100 MG/1
100 TABLET ORAL NIGHTLY PRN
Qty: 30 TABLET | Refills: 0 | Status: ON HOLD | OUTPATIENT
Start: 2017-11-11 | End: 2017-11-27

## 2017-11-11 RX ORDER — CARBAMAZEPINE 200 MG/1
200 TABLET ORAL 2 TIMES DAILY
Qty: 60 TABLET | Refills: 0 | Status: ON HOLD | OUTPATIENT
Start: 2017-11-11 | End: 2017-11-27

## 2017-11-11 RX ADMIN — BACLOFEN 10 MG: 10 TABLET ORAL at 08:50

## 2017-11-11 RX ADMIN — ARIPIPRAZOLE 15 MG: 15 TABLET ORAL at 08:49

## 2017-11-11 RX ADMIN — CARBAMAZEPINE 200 MG: 200 TABLET ORAL at 08:50

## 2017-11-11 RX ADMIN — FOLIC ACID 1 MG: 1 TABLET ORAL at 08:50

## 2017-11-11 RX ADMIN — Medication 100 MG: at 08:50

## 2017-11-11 RX ADMIN — HYDROXYZINE HYDROCHLORIDE 25 MG: 25 TABLET, FILM COATED ORAL at 08:50

## 2017-11-11 RX ADMIN — Medication 1 TABLET: at 08:50

## 2017-11-11 NOTE — PLAN OF CARE
Problem: Altered Mood, Depressive Behavior  Goal: LTG-Able to verbalize and/or display a decrease in depressive symptoms  Outcome: Ongoing  Pt. denies any feelings of anxiety or depression and has been isolative to his room for long periods. Pt. Is calm and cooperative upon assessment and states that he is planning on spending time with his family upon release. Q15min checks continued for safety. Problem: Depressive Behavior with or without Suicide precautions  Goal: STG-Absence of Self Harm  Outcome: Ongoing  Pt. Remains free from self harm and is safe on the unit. Pt. Denies any suicidal or homicidal ideations and reports no auditory/visual hallucinations. Pt. Identifies his family and the [facility] as support upon release. Pt. remains isolative to himself. Q15min checks continued for safety.

## 2017-11-11 NOTE — BH NOTE
Pt did not participate in COMMUNITY MEETING/GOALS GROUP at 845AM due to pt was resting in room et declined to attend group when encouraged.

## 2017-11-11 NOTE — CARE COORDINATION
- Writer attempts again to complete assessment and collect more information regarding PT and discharge planning. PT declines.
Bridge Appointment completed: Reviewed Discharge Instructions with patient. Patient verbalizes understanding and agreement with the discharge plan using the teachback method. Discharge Arrangements: [facility], [facility], [facility].   Monday, Nov. 13th at 8:45am    Guardian notified: PT is own guardian  Discharge destination/address: home address at [address] [place] [address]  Transported by:  [employer]
Pt declined to attend psychotherapy at 1100 am despite encouragement.  PT was offered 1:1.
Pt declined to attend psychotherapy at 1100 am despite encouragement.  PT was offered a 1:1.
Writer meets with PT regarding discharge planning and to check on suicidal ideation as stated during bio-psychosocial assessment at admission. PT is future focused and is planning to get home and relax. PT states looking forward to \"trying to stay clean and go to Zepf appointment. \"  PT states has set short and long term goals and making steps while here at the hospital.  PT denies current thoughts or plans of suicide, no feelings of hopelessness, and plans to attend follow-up appointment and stay on medications.
group/individual therapies, family meetings, psycho -education, treatment team meetings to assist with stabilization    Initial Discharge Plan:  Writer unclear where PT will live; will be linked with [address] [place] [address] Summary:  PT diagnosed with Bipolar I Disorder, most recent episode depressed;  Alcohol Use Disorder; history of Opioid and Benzo abuse

## 2017-11-11 NOTE — BH NOTE
PSYCHOEDUCATION GROUP NOTE    Date: 11/10/17       Start Time: 1600      End Time: 9629    Number Participants in Group: 8     Name of group: Wellness Group     Discipline Responsible:   OT  AT  [facility].  x [facility] Other       Participation Level:     None x Minimal    Active Listener  Interactive    Monopolizing         Participation Quality:  x Appropriate  Inappropriate          Attentive        Intrusive          Sharing        Resistant          Supportive x       Lethargic       Affective:   x Congruent  Incongruent  Blunted  Flat    Constricted  Anxious  Elated  Angry    Labile  Depressed  Other         Cognitive:  x Alert x Oriented PPTP     Concentration  G  F x P   Attention Span  G  F x P   Short-Term Memory  G  F x P   Long-Term Memory  G  F x P   ProblemSolving/  Decision Making  G  F x P   Ability to Process  Information  G  F x P      Contributing Factors             Delusional             Hallucinating             Flight of Ideas             Other:        Modes of Intervention:  x Education  Support x Exploration   x Clarifying x Problem Solving  Confrontation   x Socialization  Limit Setting  Reality Testing    Activity  Movement x Media    Other:            Response to Learning:   Able to verbalize current knowledge/experience    Able to verbalize/acknowledge new learning    Able to retain information    Capable of insight    Able to change behavior    Progressing to goal    Other:        Comments: left early

## 2017-11-19 ENCOUNTER — HOSPITAL ENCOUNTER (INPATIENT)
Age: 27
LOS: 8 days | Discharge: HOME | DRG: 751 | End: 2017-11-27
Attending: PSYCHIATRY & NEUROLOGY | Admitting: PSYCHIATRY & NEUROLOGY
Payer: COMMERCIAL

## 2017-11-19 ENCOUNTER — HOSPITAL ENCOUNTER (EMERGENCY)
Age: 27
Discharge: PSYCHIATRIC OR SUBSTANCE USE FACILITY | End: 2017-11-19
Attending: EMERGENCY MEDICINE
Payer: COMMERCIAL

## 2017-11-19 VITALS
TEMPERATURE: 98.1 F | HEIGHT: 72 IN | WEIGHT: 195 LBS | RESPIRATION RATE: 18 BRPM | OXYGEN SATURATION: 98 % | BODY MASS INDEX: 26.41 KG/M2 | DIASTOLIC BLOOD PRESSURE: 65 MMHG | HEART RATE: 85 BPM | SYSTOLIC BLOOD PRESSURE: 103 MMHG

## 2017-11-19 DIAGNOSIS — T14.91XA SUICIDE ATTEMPT (HCC): Primary | ICD-10-CM

## 2017-11-19 PROBLEM — F33.9 RECURRENT DEPRESSION (HCC): Status: ACTIVE | Noted: 2017-11-19

## 2017-11-19 LAB
ABSOLUTE EOS #: 0.08 K/UL (ref 0–0.44)
ABSOLUTE IMMATURE GRANULOCYTE: <0.03 K/UL (ref 0–0.3)
ABSOLUTE LYMPH #: 1.33 K/UL (ref 1.1–3.7)
ABSOLUTE MONO #: 0.54 K/UL (ref 0.1–1.2)
ACETAMINOPHEN LEVEL: <10 UG/ML (ref 10–30)
ALBUMIN SERPL-MCNC: 4.7 G/DL (ref 3.5–5.2)
ALBUMIN/GLOBULIN RATIO: 1.6 (ref 1–2.5)
ALLEN TEST: ABNORMAL
ALP BLD-CCNC: 44 U/L (ref 40–129)
ALT SERPL-CCNC: 17 U/L (ref 5–41)
AMPHETAMINE SCREEN URINE: NEGATIVE
ANION GAP SERPL CALCULATED.3IONS-SCNC: 13 MMOL/L (ref 9–17)
ANION GAP: 10 MMOL/L (ref 7–16)
AST SERPL-CCNC: 22 U/L
BARBITURATE SCREEN URINE: NEGATIVE
BASOPHILS # BLD: 1 % (ref 0–2)
BASOPHILS ABSOLUTE: 0.04 K/UL (ref 0–0.2)
BENZODIAZEPINE SCREEN, URINE: NEGATIVE
BILIRUB SERPL-MCNC: 0.35 MG/DL (ref 0.3–1.2)
BUN BLDV-MCNC: 15 MG/DL (ref 6–20)
BUN/CREAT BLD: NORMAL (ref 9–20)
BUPRENORPHINE URINE: NORMAL
CALCIUM SERPL-MCNC: 8.9 MG/DL (ref 8.6–10.4)
CANNABINOID SCREEN URINE: NEGATIVE
CHLORIDE BLD-SCNC: 106 MMOL/L (ref 98–107)
CO2: 25 MMOL/L (ref 20–31)
COCAINE METABOLITE, URINE: NEGATIVE
CREAT SERPL-MCNC: 0.88 MG/DL (ref 0.7–1.2)
DIFFERENTIAL TYPE: ABNORMAL
EKG ATRIAL RATE: 86 BPM
EKG P AXIS: 77 DEGREES
EKG P-R INTERVAL: 144 MS
EKG Q-T INTERVAL: 356 MS
EKG QRS DURATION: 84 MS
EKG QTC CALCULATION (BAZETT): 426 MS
EKG R AXIS: 85 DEGREES
EKG T AXIS: 52 DEGREES
EKG VENTRICULAR RATE: 86 BPM
EOSINOPHILS RELATIVE PERCENT: 1 % (ref 1–4)
ETHANOL PERCENT: <0.01 %
ETHANOL: <10 MG/DL
FIO2: ABNORMAL
GFR AFRICAN AMERICAN: >60 ML/MIN
GFR NON-AFRICAN AMERICAN: >60 ML/MIN
GFR NON-AFRICAN AMERICAN: >60 ML/MIN
GFR SERPL CREATININE-BSD FRML MDRD: >60 ML/MIN
GFR SERPL CREATININE-BSD FRML MDRD: NORMAL ML/MIN/{1.73_M2}
GLUCOSE BLD-MCNC: 89 MG/DL (ref 70–99)
GLUCOSE BLD-MCNC: 89 MG/DL (ref 74–100)
HCO3 VENOUS: 24.6 MMOL/L (ref 22–29)
HCT VFR BLD CALC: 45.2 % (ref 40.7–50.3)
HEMOGLOBIN: 14.7 G/DL (ref 13–17)
IMMATURE GRANULOCYTES: 0 %
LYMPHOCYTES # BLD: 23 % (ref 24–43)
MCH RBC QN AUTO: 31.5 PG (ref 25.2–33.5)
MCHC RBC AUTO-ENTMCNC: 32.5 G/DL (ref 28.4–34.8)
MCV RBC AUTO: 96.8 FL (ref 82.6–102.9)
MDMA URINE: NORMAL
METHADONE SCREEN, URINE: NEGATIVE
METHAMPHETAMINE, URINE: NORMAL
MODE: ABNORMAL
MONOCYTES # BLD: 9 % (ref 3–12)
NEGATIVE BASE EXCESS, VEN: ABNORMAL (ref 0–2)
O2 DEVICE/FLOW/%: ABNORMAL
O2 SAT, VEN: 93 % (ref 60–85)
OPIATES, URINE: NEGATIVE
OXYCODONE SCREEN URINE: NEGATIVE
PATIENT TEMP: ABNORMAL
PCO2, VEN: 38.5 MM HG (ref 41–51)
PDW BLD-RTO: 12 % (ref 11.8–14.4)
PH VENOUS: 7.41 (ref 7.32–7.43)
PHENCYCLIDINE, URINE: NEGATIVE
PLATELET # BLD: 264 K/UL (ref 138–453)
PLATELET ESTIMATE: ABNORMAL
PMV BLD AUTO: 10.5 FL (ref 8.1–13.5)
PO2, VEN: 66.5 MM HG (ref 30–50)
POC CHLORIDE: 108 MMOL/L (ref 98–107)
POC CREATININE: 0.95 MG/DL (ref 0.51–1.19)
POC HEMATOCRIT: 43 % (ref 41–53)
POC HEMOGLOBIN: 14.5 G/DL (ref 13.5–17.5)
POC IONIZED CALCIUM: 1.12 MMOL/L (ref 1.15–1.33)
POC LACTIC ACID: 1.49 MMOL/L (ref 0.56–1.39)
POC PCO2 TEMP: ABNORMAL MM HG
POC PH TEMP: ABNORMAL
POC PO2 TEMP: ABNORMAL MM HG
POC POTASSIUM: 4.1 MMOL/L (ref 3.5–4.5)
POC SODIUM: 143 MMOL/L (ref 138–146)
POC TROPONIN I: 0 NG/ML (ref 0–0.1)
POC TROPONIN INTERP: NORMAL
POSITIVE BASE EXCESS, VEN: 0 (ref 0–3)
POTASSIUM SERPL-SCNC: 4.5 MMOL/L (ref 3.7–5.3)
PROPOXYPHENE, URINE: NORMAL
RBC # BLD: 4.67 M/UL (ref 4.21–5.77)
RBC # BLD: ABNORMAL 10*6/UL
SALICYLATE LEVEL: <1 MG/DL (ref 3–10)
SAMPLE SITE: ABNORMAL
SEG NEUTROPHILS: 66 % (ref 36–65)
SEGMENTED NEUTROPHILS ABSOLUTE COUNT: 3.88 K/UL (ref 1.5–8.1)
SODIUM BLD-SCNC: 144 MMOL/L (ref 135–144)
TEST INFORMATION: NORMAL
TOTAL CO2, VENOUS: 26 MMOL/L (ref 23–30)
TOTAL PROTEIN: 7.6 G/DL (ref 6.4–8.3)
TOXIC TRICYCLIC SC,BLOOD: NEGATIVE
TRICYCLIC ANTIDEPRESSANTS, UR: NORMAL
WBC # BLD: 5.9 K/UL (ref 3.5–11.3)
WBC # BLD: ABNORMAL 10*3/UL

## 2017-11-19 PROCEDURE — 82330 ASSAY OF CALCIUM: CPT

## 2017-11-19 PROCEDURE — 84132 ASSAY OF SERUM POTASSIUM: CPT

## 2017-11-19 PROCEDURE — 82435 ASSAY OF BLOOD CHLORIDE: CPT

## 2017-11-19 PROCEDURE — 82803 BLOOD GASES ANY COMBINATION: CPT

## 2017-11-19 PROCEDURE — 83605 ASSAY OF LACTIC ACID: CPT

## 2017-11-19 PROCEDURE — 85014 HEMATOCRIT: CPT

## 2017-11-19 PROCEDURE — G0480 DRUG TEST DEF 1-7 CLASSES: HCPCS

## 2017-11-19 PROCEDURE — 84295 ASSAY OF SERUM SODIUM: CPT

## 2017-11-19 PROCEDURE — 82947 ASSAY GLUCOSE BLOOD QUANT: CPT

## 2017-11-19 PROCEDURE — 80053 COMPREHEN METABOLIC PANEL: CPT

## 2017-11-19 PROCEDURE — 82565 ASSAY OF CREATININE: CPT

## 2017-11-19 PROCEDURE — 99285 EMERGENCY DEPT VISIT HI MDM: CPT

## 2017-11-19 PROCEDURE — 1240000000 HC EMOTIONAL WELLNESS R&B

## 2017-11-19 PROCEDURE — 84484 ASSAY OF TROPONIN QUANT: CPT

## 2017-11-19 PROCEDURE — 80307 DRUG TEST PRSMV CHEM ANLYZR: CPT

## 2017-11-19 PROCEDURE — 85025 COMPLETE CBC W/AUTO DIFF WBC: CPT

## 2017-11-19 PROCEDURE — 93005 ELECTROCARDIOGRAM TRACING: CPT

## 2017-11-19 RX ORDER — CARBAMAZEPINE 200 MG/1
200 TABLET ORAL 2 TIMES DAILY
Status: DISCONTINUED | OUTPATIENT
Start: 2017-11-20 | End: 2017-11-20

## 2017-11-19 RX ORDER — ARIPIPRAZOLE 15 MG/1
15 TABLET ORAL DAILY
Status: DISCONTINUED | OUTPATIENT
Start: 2017-11-20 | End: 2017-11-27 | Stop reason: HOSPADM

## 2017-11-19 RX ORDER — TRAZODONE HYDROCHLORIDE 100 MG/1
100 TABLET ORAL NIGHTLY PRN
Status: DISCONTINUED | OUTPATIENT
Start: 2017-11-19 | End: 2017-11-27 | Stop reason: HOSPADM

## 2017-11-19 RX ORDER — BENZTROPINE MESYLATE 1 MG/ML
2 INJECTION INTRAMUSCULAR; INTRAVENOUS 2 TIMES DAILY PRN
Status: DISCONTINUED | OUTPATIENT
Start: 2017-11-19 | End: 2017-11-27 | Stop reason: HOSPADM

## 2017-11-19 RX ORDER — ACETAMINOPHEN 325 MG/1
650 TABLET ORAL EVERY 4 HOURS PRN
Status: DISCONTINUED | OUTPATIENT
Start: 2017-11-19 | End: 2017-11-27 | Stop reason: HOSPADM

## 2017-11-19 ASSESSMENT — ENCOUNTER SYMPTOMS
WHEEZING: 0
VOMITING: 0
COUGH: 0
COLOR CHANGE: 0
NAUSEA: 0

## 2017-11-19 ASSESSMENT — SLEEP AND FATIGUE QUESTIONNAIRES
AVERAGE NUMBER OF SLEEP HOURS: 8
DIFFICULTY STAYING ASLEEP: NO
SLEEP PATTERN: DIFFICULTY FALLING ASLEEP
DIFFICULTY ARISING: NO
DIFFICULTY FALLING ASLEEP: YES
DO YOU HAVE DIFFICULTY SLEEPING: YES
RESTFUL SLEEP: NO
DO YOU USE A SLEEP AID: YES

## 2017-11-19 ASSESSMENT — PATIENT HEALTH QUESTIONNAIRE - PHQ9: SUM OF ALL RESPONSES TO PHQ QUESTIONS 1-9: 8

## 2017-11-19 ASSESSMENT — LIFESTYLE VARIABLES: HISTORY_ALCOHOL_USE: NO

## 2017-11-19 NOTE — ED NOTES
Pt finished eating warm meal tray. Awaiting transport to [facility]. Per Antolin Morfin, [place] [id number] around 2130.       Lynne Munguia RN  11/19/17 6514

## 2017-11-19 NOTE — ED PROVIDER NOTES
[facility] ED  Emergency Department Encounter  Emergency Medicine Resident     Pt Name: Carissa Gomez  MRN: [id number]  [place] 1990  Date of evaluation: 11/19/17  PCP:  No primary care provider on file. CHIEF COMPLAINT       Chief Complaint   Patient presents with    Suicide Attempt     Pt arrives per EMS with suicide attempt by taking 12-Unisom sleeping pills approx 20 minutes pta. HISTORY OF PRESENT ILLNESS  (Location/Symptom, Timing/Onset, Context/Setting, Quality, Duration, Modifying Factors, Severity.)      Carissa Gomez is a 28 y.o. male who presents With suicide attempts. States he took a handful of Unisom tablets at unsure of the exact amount. Occurred about 20 minutes prior to arrival.  States he was frustrated. Reports been off of his psychiatric medications for about a week because he thinks he needs medication adjustments by his psychiatrist.  [facility]. He denies any symptoms at this time including any chest pain, shortness of breath, nausea or abdominal pain. PAST MEDICAL / SURGICAL / SOCIAL / FAMILY HISTORY      has a past medical history of ADHD (attention deficit hyperactivity disorder); Anxiety; Bipolar 1 disorder ([facility] [address] and PTSD (post-traumatic stress disorder). No pertinent past surgical history    Social History     Social History    Marital status: Single     Spouse name: N/A    Number of children: N/A    Years of education: N/A     Occupational History    Not on file.      Social History Main Topics    Smoking status: Former Smoker     Packs/day: 0.50     Types: Cigarettes    Smokeless tobacco: Never Used    Alcohol use 4.2 oz/week     7 Cans of beer per week      Comment: Daily; bottle of volka daily    Drug use: No      Comment: Heroin in past     Sexual activity: No     Other Topics Concern    Not on file     Social History Narrative    No narrative on file         Family History   Problem Relation Age of Onset    Liver Cancer Brother     Alcohol Abuse Brother     No Known Problems Mother     No Known Problems Father          Allergies:  Advil [ibuprofen]    Home Medications:  Prior to Admission medications    Medication Sig Start Date End Date Taking? Authorizing Provider   carBAMazepine (TEGRETOL) 200 MG tablet Take 1 tablet by mouth 2 times daily 11/11/17   Chase Noriega MD   traZODone (DESYREL) 100 MG tablet Take 1 tablet by mouth nightly as needed for Sleep 11/11/17   Chase Noriega MD   ARIPiprazole (ABILIFY) 15 MG tablet Take 1 tablet by mouth daily 11/12/17   Chase Noriega MD       REVIEW OF SYSTEMS    (2-9 systems for level 4, 10 or more for level 5)      Review of Systems   Constitutional: Negative for chills and fever. Respiratory: Negative for cough and wheezing. Cardiovascular: Negative for chest pain and leg swelling. Gastrointestinal: Negative for nausea and vomiting. Endocrine: Negative for polydipsia and polyuria. Genitourinary: Negative for difficulty urinating and dysuria. Musculoskeletal: Negative for arthralgias and joint swelling. Skin: Negative for color change and rash. Neurological: Negative for weakness and numbness. Psychiatric/Behavioral: Positive for self-injury and suicidal ideas. PHYSICAL EXAM   (up to 7 for level 4, 8 or more for level 5)      INITIAL VITALS:   /65   Pulse 85   Temp 98.1 °F (36.7 °C) (Oral)   Resp 18   Ht 6' (1.829 m)   Wt 195 lb (88.5 kg)   SpO2 98%   BMI 26.45 kg/m²     Physical Exam   Constitutional: He is oriented to person, place, and time. He appears well-developed and well-nourished. HENT:   Head: Normocephalic and atraumatic. Eyes: Conjunctivae are normal. Right eye exhibits no discharge. Left eye exhibits no discharge. Neck: No tracheal deviation present. Cardiovascular: Normal rate and regular rhythm. Pulmonary/Chest: Effort normal and breath sounds normal. No stridor.  No hours.  States that patient is not tachycardic and/or hypertensive after that amount of time then he did not truly ingest a handful of Unisom. [DC]   8747 Vital signs reassessed, still with normal blood pressure and heart rate. [facility]ically [facility]  [DC]   8076 Of note, when  evaluated the patient he claimed he did not ingest anything and was not suicidal.  Patient pink slipped  [DC]      ED Course User Index  [DC] Pdama MorenoDO     59-year-old male presents after suicide attempt. Discussion with poison control as above. Ultimately patient was medically cleared and required pink slip as decided to deny his suicide attempt. He'll go to the [id number] [facility]. PROCEDURES:  None     CONSULTS:  IP CONSULT TO SOCIAL WORK  If the patient was admitted, some of the above consults may have been placed by the admitting team(s) and were auto populated above when I refreshed my note prior to signing it. If there is any question, please check for the responsible provider for individual orders in the EHR system. CRITICAL CARE:  None     FINAL IMPRESSION      1. Suicide attempt             DISPOSITION / PLAN     DISPOSITION Decision to Transfer     If discharged, the patient was instructed to return to the emergency department with any worsening symptoms, if new symptoms arise, or if they have any other concerns. Patient was instructed not to drive home if discharged today and received pain medications or other mind-altering medications while here. Pre-hypertension/Hypertension: In the case that there was an elevated blood pressure reading for this patient today in the emergency department, the patient was informed that he or she may have pre-hypertension or hypertension.  It was recommended to the patient to call the primary care provider listed in the discharge instructions or a physician of the patient's choice this week to arrange

## 2017-11-19 NOTE — ED NOTES
Pt continues resting in cot-NAD. Pt denies any needs at this time. Awaiting transport to [facility]. Will cont to monitor.       Benny Oh RN  11/19/17 9331

## 2017-11-19 NOTE — ED NOTES
Pt resting comfortably in cot. Warm meal tray ordered. Awaiting urine sample. Will cont to monitor.       Mulugeta Olmos RN  11/19/17 0272

## 2017-11-19 NOTE — ED NOTES
[] [place]    [] [facility]    [x]  One Stefani McDowell ASSESSMENT      Y  N     [] [x] In the past two weeks have you had thoughts of hurting yourself in any way? [] [x] In the past two weeks have you had thoughts that you would be better off dead? [x] [] Have you made a suicide attempt in the past two months? [x] [] Do you have a plan for hurting yourself or suicide? [] [x] Presence of hallucinations/voices related to hurting himself or herself or someone else. SUICIDE/SECURITY WATCH PRECAUTION CHECKLIST     Orders    [x]  Suicide/Security Watch Precautions initiated as checked below:   11/19/17 8:33 AM 26/26    [x] Notified physician:  Joseluis Garcia MD  11/19/17 8:33 AM    [x] Orders obtained as appropriate:     [x] 1:1 Observer     [] Psych Consult     [] Psych Consult    Name:  Date:  Time:    [x] 1:1 Observer, Notified by:  Leigh Ann Santo Nurse Supervisor    [x] Remove all personal clothes from room and place in snap/paper gown/pants. Slipper only    [x] Remove all personal belongings from room and secured away from patient. Documentation    [x] Initiate Suicide/Security Watch Precaution Flow Sheet    [x] Initiate individualized Care Plan/Problem    [x] Document why precautions initiated on flow sheet (Initiate Nursing Care Plan/Problem)    [x] 1:1 Observer in place; instructions provided. Suicide precautions require observer be within arms length. [x] Nurse-Observer Communication Hand-off initiated by RN, reviewed with Observer. Subsequently used as Hand Off between Observers. [x] Initiate every 15 minute observations per observer as delegated by the RN.     [x] Initiate RN assessment and documentation    Environmental Scan  Search Criteria and Process: OPTIONAL, see Search Policy    [x] Reason for search: SI with plan    [x] Nursing in presence of second person to search patient    [x] Patient notified of reason for body assessment and belongings search:     Persons present during search:   Results of search and disposition:       Searchers Name: security    These items or items similar should be removed from the room:   [] Chairs   [] Telephone   [] Trash cans and liners   [] Plastic utensils (order Patient Safety tray)   [] Empty or remove Sharps containers   [] All personal clothing/belongings removed   [] All unnecessary lead wires, electrical cords, draw cords, etc.   [] Flowers and plants   [] Double check for lighters, matches, razors, any glass items etc that can be used as weapons. Person completing Checklist: Tammie Calderon       GENERAL INFORMATION     Y  N     [x] [] Has the patient been informed that they are on a watch and what that means? [x] [] Can the patient get out of Bed without nursing assistance? [x] [] Can the patient use the restroom without nursing assistance? [x] [] Can the patient walk the halls to [place] their legs? \"   [] [x] Does the patient have metal utensils? [x] [] Have the patient's belongings been placed out of control of the patient? [x] [] Have the patient and his/her belongings been checked for contraband? [] [x] Is the patient under any visitor restrictions? If Yes, explain:   [] [x] Is the patient under an alias? [facility] Name:   Authorized visitors (no more than two are to be on the list)   Name/Relationship:   Name/Relationship:    Name of Staff member that you  Received this information from?: writer    General Description:    Shiva Hunger 28/32 male 28 y.o. Admission weight: 195 lb (88.5 kg) Height: 6' (182.9 cm)  Race: [x]  [] Black  []   []   [] [place] [] Other  Facial Hair:  [x] Yes  [] No  If yes, please describe: Identifying Marks (i.e. Visible tattoos, scars, etc... ):     NURSING CARE PLAN    Nursing Diagnosis: Risk of Self Directed Harm  [x] Actual  [] Potential  Date Started: 11/19/17      Etiological Factors: (related to)  [] Expressed or implied suicidal ideation/behavior  [x] Depression  [x] Suicide attempt      [] Low self-esteem  [] Hallucinations      [] Feeling of Hopelessness  [] Substance abuse or withdrawal    [] Dysfunctional family  [] Major traumatic event, eg., divorce, etc   [] Excessive stress/anxiety    11/19/17    Expected Outcomes    Patient will:   [x] Patient will remain safe for the duration of their stay   [x] Patient's environment will be safe, eg. Free of potential suicide weapons   [] Verbalize Recovery from suicidal episode and improvement in self-worth   [x] Discuss feeling that precipitated suicide attempt/thoughts/behavior   [] Will describe available resources for crisis prevention and management   [] Will verbalize positive coping skills     Nursing Intervention   [x] Assessment and Observations hourly   [x] Suicide Precautions implemented with patient, should be 1:1 observation   [x] Document observation [id number] and RN assessment hourly   [] Consult physician for:    [] Psychiatric consult    [] Pharmacological therapy    [] Other:    [x] Patient search completed by security   [x] Initiated appropriate safety protocols by removing from the patient's environment anything that could be used to inflict self injury, eg. Order safe tray, snap gown, etc   [x] Maintain open, warm, caring, non-judgmental attitude/manner towards patient   [] Discuss advantages and disadvantages of existing coping methods/skills   [x] Assist and educate patient with identifying present strengths and coping skills   [x] Keep patient informed regarding plan of care and provide clear concise explanations. Provide the patient/family education information as well as telephone numbers and other information about crisis centers, hot lines, and counselors.     Discharge Planning:   [] Referral  [] Groups [] Health agencies  [] Other:          Derrick Davenport RN  11/19/17 [address], RN  11/19/17 0956

## 2017-11-19 NOTE — ED PROVIDER NOTES
Care assumed from Dr. Ghislaine Murrieta    Pt presented after suicide attempt with unisom - took ten pills    Pt now medically clear, awaiting [facility] transfer       Feliciano Jauregui MD  11/19/17 4040

## 2017-11-19 NOTE — ED NOTES
Writer coordinated with staff at [facility], patient will be going to room 227, bed 2.  Transport arranged, ETA 9:30pm.     SRIDEVI Da Silva/MSW intern     DEBORA Sepulveda, WILLIAMW  11/19/17 8199

## 2017-11-19 NOTE — ED NOTES
Writer contacted psychiatrist regarding patient's symptoms. Psychiatrist is agreeable to admission. Will notify physician to facilitate transfer. Note, pink slip, and facesheet faxed to [facility].      SRIDEVI Michele/DEBORA intern     DEBORA Sierra, [facility]  11/19/17 2521

## 2017-11-19 NOTE — ED NOTES
Pt continues resting in cot-respirations even and non-labored. Continue awaiting urine specimen. Will cont to monitor.      Derrick Davenport RN  11/19/17 9091

## 2017-11-19 NOTE — ED NOTES
Pt resting comfortably in cot-reading book. Pt given soda. Pt aware of need for urine sample. Will cont to monitor.       Chaz Diaz RN  11/19/17 2251

## 2017-11-20 LAB
ABSOLUTE EOS #: 0.1 K/UL (ref 0–0.4)
ABSOLUTE IMMATURE GRANULOCYTE: NORMAL K/UL (ref 0–0.3)
ABSOLUTE LYMPH #: 1.5 K/UL (ref 1–4.8)
ABSOLUTE MONO #: 0.5 K/UL (ref 0.1–1.3)
ALBUMIN SERPL-MCNC: 4.4 G/DL (ref 3.5–5.2)
ALBUMIN/GLOBULIN RATIO: NORMAL (ref 1–2.5)
ALP BLD-CCNC: 41 U/L (ref 40–129)
ALT SERPL-CCNC: 14 U/L (ref 5–41)
ANION GAP SERPL CALCULATED.3IONS-SCNC: 10 MMOL/L (ref 9–17)
AST SERPL-CCNC: 16 U/L
BASOPHILS # BLD: 1 %
BASOPHILS ABSOLUTE: 0 K/UL (ref 0–0.2)
BILIRUB SERPL-MCNC: 0.63 MG/DL (ref 0.3–1.2)
BUN BLDV-MCNC: 19 MG/DL (ref 6–20)
BUN/CREAT BLD: NORMAL (ref 9–20)
CALCIUM SERPL-MCNC: 9 MG/DL (ref 8.6–10.4)
CARBAMAZEPINE DATE LAST DOSE: ABNORMAL
CARBAMAZEPINE DOSE AMOUNT: ABNORMAL
CARBAMAZEPINE DOSE TIME: ABNORMAL
CARBAMAZEPINE LEVEL: <2.5 UG/ML (ref 4–12)
CHLORIDE BLD-SCNC: 105 MMOL/L (ref 98–107)
CO2: 29 MMOL/L (ref 20–31)
CREAT SERPL-MCNC: 0.9 MG/DL (ref 0.7–1.2)
DIFFERENTIAL TYPE: NORMAL
EOSINOPHILS RELATIVE PERCENT: 3 %
GFR AFRICAN AMERICAN: >60 ML/MIN
GFR NON-AFRICAN AMERICAN: >60 ML/MIN
GFR SERPL CREATININE-BSD FRML MDRD: NORMAL ML/MIN/{1.73_M2}
GFR SERPL CREATININE-BSD FRML MDRD: NORMAL ML/MIN/{1.73_M2}
GLUCOSE BLD-MCNC: 99 MG/DL (ref 70–99)
HCT VFR BLD CALC: 44.4 % (ref 41–53)
HEMOGLOBIN: 15.6 G/DL (ref 13.5–17.5)
IMMATURE GRANULOCYTES: NORMAL %
LYMPHOCYTES # BLD: 31 %
MCH RBC QN AUTO: 33.4 PG (ref 26–34)
MCHC RBC AUTO-ENTMCNC: 35.1 G/DL (ref 31–37)
MCV RBC AUTO: 95.4 FL (ref 80–100)
MONOCYTES # BLD: 10 %
PDW BLD-RTO: 12.5 % (ref 11.5–14.9)
PLATELET # BLD: 242 K/UL (ref 150–450)
PLATELET ESTIMATE: NORMAL
PMV BLD AUTO: 8.3 FL (ref 6–12)
POTASSIUM SERPL-SCNC: 4.3 MMOL/L (ref 3.7–5.3)
RBC # BLD: 4.65 M/UL (ref 4.5–5.9)
RBC # BLD: NORMAL 10*6/UL
SEG NEUTROPHILS: 55 %
SEGMENTED NEUTROPHILS ABSOLUTE COUNT: 2.7 K/UL (ref 1.3–9.1)
SODIUM BLD-SCNC: 144 MMOL/L (ref 135–144)
THYROXINE, FREE: 1.05 NG/DL (ref 0.93–1.7)
TOTAL PROTEIN: 6.9 G/DL (ref 6.4–8.3)
TSH SERPL DL<=0.05 MIU/L-ACNC: 0.78 MIU/L (ref 0.3–5)
WBC # BLD: 4.8 K/UL (ref 3.5–11)
WBC # BLD: NORMAL 10*3/UL

## 2017-11-20 PROCEDURE — 80156 ASSAY CARBAMAZEPINE TOTAL: CPT

## 2017-11-20 PROCEDURE — 80053 COMPREHEN METABOLIC PANEL: CPT

## 2017-11-20 PROCEDURE — 85025 COMPLETE CBC W/AUTO DIFF WBC: CPT

## 2017-11-20 PROCEDURE — 84439 ASSAY OF FREE THYROXINE: CPT

## 2017-11-20 PROCEDURE — 84443 ASSAY THYROID STIM HORMONE: CPT

## 2017-11-20 PROCEDURE — 1240000000 HC EMOTIONAL WELLNESS R&B

## 2017-11-20 PROCEDURE — 36415 COLL VENOUS BLD VENIPUNCTURE: CPT

## 2017-11-20 RX ORDER — CARBAMAZEPINE 200 MG/1
200 TABLET ORAL 2 TIMES DAILY
Status: DISCONTINUED | OUTPATIENT
Start: 2017-11-21 | End: 2017-11-27 | Stop reason: HOSPADM

## 2017-11-20 ASSESSMENT — LIFESTYLE VARIABLES: HISTORY_ALCOHOL_USE: YES

## 2017-11-20 NOTE — PROGRESS NOTES
Pt did not attend 1430 creative expression/ leisure skills group d/t resting in room despite staff invitation to attend.

## 2017-11-20 NOTE — BH NOTE
Pt did not participate in Skills Group at 1100am due to pt was resting in room et declined to attend group when encouraged.

## 2017-11-20 NOTE — ED NOTES
Pt in [facility], no distress noted, one on one watch in effect, rr even and unlabored.  Awaiting transport to [facility]>.     Lloyd Montgomery RN  11/19/17 2050

## 2017-11-20 NOTE — PLAN OF CARE
Problem: Altered Mood, Depressive Behavior  Goal: LTG-Absence of self-harm  Outcome: Ongoing      Problem: Depressive Behavior with or without Suicide precautions  Goal: LTG-Able to verbalize and/or display a decrease in depressive symptoms  [facility]  Initial Interdisciplinary Treatment Plan NO      Original treatment plan Date & Time: 11/20/2017 0727    Admission Type:  Admission Type: Involuntary    Reason for admission:   Reason for Admission:  (Pt OD on OTC sleeping meds. Pt states this was not an attempt to hurt himself.  PT denies SI/HI/A/V.)    Estimated Length of Stay:  5-7days  Estimated Discharge Date: to be determined by physician    PATIENT STRENGTHS:  Patient Strengths:Strengths: No significant Physical Illness, Social Skills  Patient Strengths and Limitations:Limitations: Hopeless about future, External locus of control, Tendency to isolate self  Addictive Behavior: Addictive Behavior  In the past 3 months, have you felt or has someone told you that you have a problem with:  : None  Do you have a history of Chemical Use?: No  Do you have a history of Alcohol Use?: No  Do you have a history of Street Drug Abuse?: No  Histroy of Prescripton Drug Abuse?: No  Medical Problems:  Past Medical History:   Diagnosis Date    ADHD (attention deficit hyperactivity disorder)     Anxiety     Bipolar 1 disorder ([facility] [address]     PTSD (post-traumatic stress disorder)      Status EXAM:Status and Exam  Normal: No  Facial Expression: Avoids Gaze, Flat, Sad  Affect: Appropriate  Level of Consciousness: Alert  Mood:Normal: No  Mood: Depressed, Sad  Motor Activity:Normal: No  Motor Activity: Decreased  Interview Behavior: Cooperative  Preception: [place] to Person, [place] to Time, [place] to Place, [place] to Situation  Attention:Normal: No  Attention: Distractible  Thought Processes: Circumstantial, Blocking  Thought Content:Normal: No  Thought Content: Preoccupations, Poverty of Content  Hallucinations: None  Delusions: No  Memory:Normal: No  Memory: Poor Recent  Insight and Judgment: No  Insight and Judgment: Unmotivated, Poor Insight  Present Suicidal Ideation: No  Present Homicidal Ideation: No    EDUCATION:   Learner Progress Toward Treatment Goals: reviewed group plans and strategies for care    Method:group therapy, medication compliance, individualized assessments and care planning    Outcome: needs reinforcement    PATIENT GOALS: to be discussed with patient within 72 hours    PLAN/TREATMENT RECOMMENDATIONS:     continue group therapy , medications compliance, goal setting, individualized assessments and care, continue to monitor pt on unit      SHORT-TERM GOALS:   Time frame for Short-Term Goals: 5-7 days    LONG-TERM GOALS:  Time frame for Long-Term Goals: 6 months  Members Present in Team Meeting: See Signature Sheet    PK BISHOP  Goal: STG-Able to verbalize support system  Outcome: Ongoing

## 2017-11-20 NOTE — ED NOTES
Rec'd report from [address] [facility] [address]. Pt resting on cot, no acute distress noted, awaiting transfer to [facility] around 8pm. Will cont to monitor.       Seth Tomas RN  11/19/17 1410

## 2017-11-21 PROCEDURE — 6370000000 HC RX 637 (ALT 250 FOR IP): Performed by: PSYCHIATRY & NEUROLOGY

## 2017-11-21 PROCEDURE — 1240000000 HC EMOTIONAL WELLNESS R&B

## 2017-11-21 RX ORDER — QUETIAPINE FUMARATE 300 MG/1
300 TABLET, FILM COATED ORAL NIGHTLY
Status: DISCONTINUED | OUTPATIENT
Start: 2017-11-21 | End: 2017-11-27 | Stop reason: HOSPADM

## 2017-11-21 RX ADMIN — CARBAMAZEPINE 200 MG: 200 TABLET ORAL at 09:28

## 2017-11-21 RX ADMIN — CARBAMAZEPINE 200 MG: 200 TABLET ORAL at 22:00

## 2017-11-21 RX ADMIN — QUETIAPINE FUMARATE 300 MG: 300 TABLET ORAL at 22:00

## 2017-11-21 RX ADMIN — TRAZODONE HYDROCHLORIDE 100 MG: 100 TABLET ORAL at 22:00

## 2017-11-21 ASSESSMENT — PAIN SCALES - GENERAL: PAINLEVEL_OUTOF10: 0

## 2017-11-21 NOTE — PLAN OF CARE
Problem: Altered Mood, Depressive Behavior  Goal: LTG-Able to verbalize acceptance of life and situations over which he or she has no control  Outcome: Ongoing  [facility]  Day 3 Interdisciplinary Treatment Plan NOTE    Review Date & Time: 11/21/2017 1325    Admission Type:   Admission Type: Involuntary    Reason for admission:  Reason for Admission:  (Pt OD on OTC sleeping meds. Pt states this was not an attempt to hurt himself.  PT denies SI/HI/A/V.)  Estimated Length of Stay: 5-7 days  Estimated Discharge Date Update: to be determined by physician    PATIENT STRENGTHS:  Patient Strengths Strengths: No significant Physical Illness  Patient Strengths and Limitations:Limitations: Difficult relationships / poor social skills, Tendency to isolate self, Inappropriate/potentially harmful leisure interests, Apathetic / unmotivated, Hopeless about future  Addictive Behavior:Addictive Behavior  In the past 3 months, have you felt or has someone told you that you have a problem with:  : None  Do you have a history of Chemical Use?: No  Do you have a history of Alcohol Use?: Yes  Do you have a history of Street Drug Abuse?: No  Histroy of Prescripton Drug Abuse?: No  Medical Problems:  Past Medical History:   Diagnosis Date    ADHD (attention deficit hyperactivity disorder)     Anxiety     Bipolar 1 disorder ([facility] [address]     PTSD (post-traumatic stress disorder)        Risk:  Fall RiskTotal: 65  Wolfgang Scale Wolfgang Scale Score: 22  BVC Total: 0  Change in scores no Changes to plan of Care no    Status EXAM:   Status and Exam  Normal: No  Facial Expression: Flat  Affect: Blunt  Level of Consciousness: Alert  Mood:Normal: No  Mood: Depressed, Anxious  Motor Activity:Normal: No  Motor Activity: Decreased  Interview Behavior: Cooperative, Evasive  Preception: [place] to Person, [place] to Time, [place] to Place, [place] to Situation  Attention:Normal: No  Attention: Distractible  Thought Processes: Blocking  Thought Content:Normal: No  Thought Content: Preoccupations, Poverty of Content  Hallucinations: None  Delusions: No  Memory:Normal: No  Memory: Poor Recent  Insight and Judgment: No  Insight and Judgment: Poor Judgment, Poor Insight, Unmotivated  Present Suicidal Ideation: No  Present Homicidal Ideation: No    Daily Assessment Last Entry:   Daily Sleep (WDL): Within Defined Limits         Patient Currently in Pain: Denies  Daily Nutrition (WDL): Within Defined Limits    Patient Monitoring:  Frequency of Checks: 4 times per hour, close    Psychiatric Symptoms:   Depression Symptoms  Depression Symptoms: Feelings of helplessness, Feelings of hopelessess, Isolative, Loss of interest, Change in energy level  Anxiety Symptoms  Anxiety Symptoms: Generalized  Angella Symptoms  Angella Symptoms: Poor judgment     Psychosis Symptoms  Delusion Type: No problems reported or observed. Suicide Risk CSSR-S:  1) Within the past month, have you wished you were dead or wished you could go to sleep and not wake up? : NO (PT denied.)  2) Within the past month, have you actually had any thoughts of killing yourself? : NO  3) Within the past month, have you been thinking about how you might kill yourself? : NO  5) Within the past month, have you started to work out or worked out the details of how to kill yourself?  Do you intend to carry out this plan? : NO  6)  Have you ever done anything, started to do anything, or prepared to do anything to end your life?: YES  Change in Result no Change in Plan of care no      EDUCATION:   EDUCATION:   Learner Progress Toward Treatment Goals: Reviewed results and recommendations of this team, Reviewed group plan and strategies, Reviewed signs, symptoms and risk of self harm and violent behavior, Reviewed goals and plan of care    Method:small group, individual verbal education    Outcome:verbalized by patient, but needs reinforcement to obtain goals    PATIENT GOALS:  Short term: talk to dr about meds / changing them   Long term: stay linked with [facility]/ go to apts    PLAN/TREATMENT RECOMMENDATIONS UPDATE: continue with group therapies, increased socialization, continue planning for after discharge goals, continue with medication compliance    SHORT-TERM GOALS UPDATE:   Time frame for Short-Term Goals: 5-7 days    LONG-TERM GOALS UPDATE:   Time frame for Long-Term Goals: 6 months  Members Present in Team Meeting: See Signature Sheet    PK BISHOP  Goal: STG-Able to verbalize suicidal ideations  Outcome: Ongoing    Goal: STG-Knowledge of positive coping patterns  Outcome: Ongoing    Goal: Patient Specific Goal  Outcome: Ongoing    Goal: Participation in care planning  Outcome: Ongoing    Goal: LTG-Able to verbalize and/or display a decrease in depressive symptoms  Outcome: Ongoing    Goal: STG-Able to verbalize support system  Outcome: Ongoing      Problem: Depressive Behavior with or without Suicide precautions  Goal: LTG-Able to verbalize and/or display a decrease in depressive symptoms  Outcome: Ongoing    Goal: STG-Able to verbalize support system  Outcome: Ongoing

## 2017-11-21 NOTE — PROGRESS NOTES
Psychiatric Admission and evaluation Note      Patient was admitted through the ER with complaints of increasing depression and suicidal ideation. Chief Complaints: Depressed, suicidal and overdosed on sleep medication  HPI: Patient is a 28 y.o. male who complains of excessive worry, agitation, fearfulness, labile mood, insomnia and depression. He also complains of anhedonia, difficulty concentrating, fatigue, feelings of worthlessness/guilt, hopelessness, insomnia, psychomotor agitation and suicidal attempt. Onset was approximately a few weeks ago, rapidly worsening since that time. Intensity of depression rated at a 7/10 and denies any mitigating factors. Review of Systems   Constitutional: Negative for chills, fever and weight loss. Neurological: Negative for focal weakness, seizures and loss of consciousness. Psychiatric/Behavioral: Positive for depression, hallucinations, substance abuse and suicidal ideas. All other systems reviewed and are negative. Past Medical and Psychiatric History:        Diagnosis Date    ADHD (attention deficit hyperactivity disorder)     Anxiety     Bipolar 1 disorder (HCC)     PTSD (post-traumatic stress disorder)       Psychiatric hospitalizations, Depression, Anhedonia and Psychosis, Voices, Thought D/O    Social History:    Education Status: high school diploma/ged    Employment History: Unemployed, not seeking work    Relationships: Single Children: No     Substance use: Alcohol:  Desire to cut down or control use    Family History:    No Family History  Family is not active in patient's care.         Vitals:    11/20/17 2004   BP: (!) 95/52   Pulse: 64   Resp: 14   Temp: 97.6 °F (36.4 °C)     Results for orders placed or performed during the hospital encounter of 11/19/17   Comprehensive metabolic panel   Result Value Ref Range    Glucose 99 70 - 99 mg/dL    BUN 19 6 - 20 mg/dL    CREATININE 0.90 0.70 - 1.20 mg/dL carBAMazepine (TEGRETOL) tablet 200 mg  200 mg Oral BID MD Su Burleson [address] [START ON 11/21/2017] influenza quadrivalent split vaccine (FLUZONE;FLUARIX;FLULAVAL;AFLURIA) injection 0.5 mL  0.5 mL Intramuscular Once Berta Young MD        ARIPiprazole (ABILIFY) tablet 15 mg  15 mg Oral Daily Berta Young MD        acetaminophen (TYLENOL) tablet 650 mg  650 mg Oral Q4H PRN Berta Young MD        traZODone (DESYREL) tablet 100 mg  100 mg Oral Nightly PRN Berta Young MD        benztropine mesylate (COGENTIN) injection 2 mg  2 mg Intramuscular BID PRN Berta Young MD        magnesium hydroxide (MILK OF MAGNESIA) 400 MG/5ML suspension 30 mL  30 mL Oral Daily PRN Berta Young MD         Mental Status Examination:    Gait and station: normal  Appearance: grooming: dishevelled  appropriately dressed  Eye contact good  Motor Activity:psychomotor activity: normal  Muscle tone and strength normal  Speech:rate/volume: normal  Attitude: cooperative with interview  Affect: labile  Mood: dysphoric  Thought Processes: linear goal directed  Associations: Logical  Thought Content: ideas of helplessness and hopelessness  Suicidal Ideation: Ambivalent  Homicidal Ideation: patient denies  Perceptions: Has auditory hallucinations  Insight/Judgment: impaired  Cognition: Alert, oriented to person, place, time, and situation; immediate recall 3/3 and delayed recall 2/3; attention, concentration mildly impaired; it is evident during interview that patient's fund of knowledge is average; no language deficit noted.  Of average intelligence  Diagnosis: Recurrent depression  Tx plan: Safe therapeutic milieu  Medications as listed  H&P  Labs  Encourage to attend groups  Supportive therapy  ELOS: 5-7 days

## 2017-11-21 NOTE — BH NOTE
Pt did not participate in Skills Group at 1100 due to pt was resting in room et declined to attend group when encouraged.

## 2017-11-21 NOTE — PLAN OF CARE
Problem: Altered Mood, Depressive Behavior  Goal: LTG-Able to verbalize acceptance of life and situations over which he or she has no control  Outcome: Ongoing  Patient observed with sad/depressed mood and flat affect. Withdrawn and isolates to self in room. 1:1 interaction provided. Patient hesitant and guarded. Reassurance provided. Denies any SI, HI, and hallucinations. Patient disheveled and unkempt. ADL's encouraged. Discussed the benefits of inpatient treatment and programming expectations. Encouraged to attend groups and socialize with peers, but patient declines at this time. Patient is med focused. Notified that Dr. Loyda Jarvis is going to start Seroquel therapy. Patient agreed and did SIGN IN. Hub and charge nurse notified. Writer educated patient on positive coping skills. Patient is compliant with select meds. Poor appetite. Offered snacks, favorable foods, and fluids. Remains in behavior control. Remains free from harm and safety maintained. Will continue to monitor and intervene as needed.      Goal: LTG-Able to verbalize and/or display a decrease in depressive symptoms  Outcome: Ongoing    Goal: STG-Absence of Self Harm  Outcome: Ongoing

## 2017-11-21 NOTE — PLAN OF CARE
Problem: Altered Mood, Depressive Behavior  Goal: STG-Able to verbalize suicidal ideations  Outcome: Ongoing  Denies SI/HI at this time, agrees to notify staff with thoughts of harm.

## 2017-11-21 NOTE — PROGRESS NOTES
Pt did not participate in problem solving Skills Group at 1430pm due to pt was resting in room et declined to attend group when encouraged.

## 2017-11-22 LAB
EKG ATRIAL RATE: 71 BPM
EKG P AXIS: 72 DEGREES
EKG P-R INTERVAL: 160 MS
EKG Q-T INTERVAL: 378 MS
EKG QRS DURATION: 86 MS
EKG QTC CALCULATION (BAZETT): 410 MS
EKG R AXIS: 85 DEGREES
EKG T AXIS: 71 DEGREES
EKG VENTRICULAR RATE: 71 BPM

## 2017-11-22 PROCEDURE — 93005 ELECTROCARDIOGRAM TRACING: CPT

## 2017-11-22 PROCEDURE — 1240000000 HC EMOTIONAL WELLNESS R&B

## 2017-11-22 PROCEDURE — 6370000000 HC RX 637 (ALT 250 FOR IP): Performed by: PSYCHIATRY & NEUROLOGY

## 2017-11-22 RX ADMIN — QUETIAPINE FUMARATE 300 MG: 300 TABLET ORAL at 20:36

## 2017-11-22 RX ADMIN — CARBAMAZEPINE 200 MG: 200 TABLET ORAL at 20:37

## 2017-11-22 RX ADMIN — TRAZODONE HYDROCHLORIDE 100 MG: 100 TABLET ORAL at 20:36

## 2017-11-22 NOTE — PROGRESS NOTES
CC: Depressed and suicidal  Interval history: Patient continues to be symptomatic. Sleeping OK and appetite good. No side effects from medications reported. Patient complains of depressed mood. He complains that it is associated with anhedonia, difficulty concentrating, fatigue, feelings of worthlessness/guilt, hopelessness and insomnia. Intensity unchanged since admission. Staff say that patient remains in bed mostly and is seclusive. Patient has been taking care of AsDL. Occasional spells of behavioral dyscontrol reported. ROS: no dizziness, muscle spasms or fever  Psychological ROS: positive for - anxiety, concentration difficulties and depression  negative for - disorientation  Vitals:    11/22/17 0754   BP: (!) 93/59   Pulse: 59   Resp: 14   Temp: 97.9 °F (36.6 °C)     MSE shows that patient's psychomotor activity is normal. Gait and station are normal. Dressed appropriately. Occasional spells of behavioral dyscontrol seen. Good eye contact. Mood is dysphoric and affect is labile. Speech is normal.  Thought process is linear but contents continue to reflect some paranoia and ideas of helplessness and hopelessness. Associations are mostly logical. Continues to experience auditory hallucinations. Oriented X 3. Insight and judgement remain limited  Scheduled Meds:   influenza quadrivalent split vaccine  0.5 mL Intramuscular Once    QUEtiapine  300 mg Oral Nightly    carBAMazepine  200 mg Oral BID    ARIPiprazole  15 mg Oral Daily     Continuous Infusions:   PRN Meds:.acetaminophen, traZODone, benztropine mesylate, magnesium hydroxide  Patient is making slow but steady progress, psychotic features persist, tolerating medications. Given that the patient is unable to contract for safety and having some spells of behavioral dyscontrol continued hospitalization is recommended.   Patient was provided with supportive and insight oriented therapy

## 2017-11-22 NOTE — PROGRESS NOTES
CC: Depressed, suicidal and paranoid  Interval History: Chart reviewed Patient seen. Remains symptomatic. Patient complains of depressed mood. He complains that it is accompanied by anhedonia, difficulty concentrating, fatigue, feelings of worthlessness/guilt, hopelessness and insomnia. he describes the depression as a sense of sadness. unchanged since admission. Attending groups. Denies any side effects from medications. Still unable to contract for safety  Patient has been taking care of  AsDL and socializing adequately according to staff  ROS: no dizziness, muscle spasms or fever  No nausea or vomiting  Psychological ROS: positive for - anxiety, concentration difficulties and depression  negative for - disorientation  Vitals:    11/21/17 2001   BP: 109/60   Pulse: 58   Resp: 14   Temp: 98.2 °F (36.8 °C)     Gait and station normal. Appearance shows that patient looks his age and is dressed appropriately. Patient makes reasonable eye contact. Psychomotor activity somewhat decreased. Speech low toned and shows reduced spontaneity. Mood is dysphoric and affect labile. Thought process linear albeit with decreased production. Associations are logical. Contents reflect ideas of helplessness and hopelessness. Denies hallucinations. Oriented X 3. Insight and judgement remain limited. Scheduled Meds:   carBAMazepine  200 mg Oral BID    influenza virus vaccine  0.5 mL Intramuscular Once    ARIPiprazole  15 mg Oral Daily     Continuous Infusions:   PRN Meds:.acetaminophen, traZODone, benztropine mesylate, magnesium hydroxide  He is making slow but steady progress, tolerating medications. Given that he is unable to contract for safety and having some spells of behavioral dyscontrol patient needs continued hospitalization.   He was provided with supportive and insight oriented therapy

## 2017-11-22 NOTE — PLAN OF CARE
Problem: Altered Mood, Depressive Behavior  Goal: LTG-Able to verbalize acceptance of life and situations over which he or she has no control  Outcome: Ongoing  Pt reports lack of acceptance of life situations and encouraged to attend unit programming to help with acceptance. Goal: LTG-Able to verbalize and/or display a decrease in depressive symptoms  Outcome: Ongoing  Pt reports that his dep continues to be high (10/10). Goal: STG-Absence of Self Harm  Outcome: Ongoing  Safe environment maintained & pt remains free from self-harm. Agreeable to seeking staff should thoughts to harm self or others arise. Q15min checks for safety. Problem: Suicide risk  Goal: Provide patient with safe environment  Provide patient with safe environment   Outcome: Ongoing  Remains free from self-harm and is safe on the unit. Q15min checks for safety.

## 2017-11-22 NOTE — PLAN OF CARE
Problem: Altered Mood, Depressive Behavior  Goal: LTG-Able to verbalize acceptance of life and situations over which he or she has no control  Outcome: Ongoing  Patient observed with sad/depressed mood and flat affect. Withdrawn and isolates to self in room. 1:1 interaction provided. Denies any SI, HI, and hallucinations. ADL's encouraged. Discussed the benefits of inpatient treatment and programming expectations. Encouraged to attend groups and socialize with peers, but patient declines at this time. Writer educated patient on positive coping skills and healthy lifestyle choices. Med ed provided. Patient is med and meal compliant. Remains in behavior control. Remains free from harm and safety maintained. Will continue to monitor and intervene as needed.      Goal: LTG-Able to verbalize and/or display a decrease in depressive symptoms  Outcome: Ongoing    Goal: STG-Absence of Self Harm  Outcome: Ongoing

## 2017-11-23 PROCEDURE — 1240000000 HC EMOTIONAL WELLNESS R&B

## 2017-11-23 PROCEDURE — 6370000000 HC RX 637 (ALT 250 FOR IP): Performed by: PSYCHIATRY & NEUROLOGY

## 2017-11-23 RX ADMIN — QUETIAPINE FUMARATE 300 MG: 300 TABLET ORAL at 21:03

## 2017-11-23 RX ADMIN — CARBAMAZEPINE 200 MG: 200 TABLET ORAL at 08:47

## 2017-11-23 RX ADMIN — CARBAMAZEPINE 200 MG: 200 TABLET ORAL at 21:03

## 2017-11-23 RX ADMIN — TRAZODONE HYDROCHLORIDE 100 MG: 100 TABLET ORAL at 21:03

## 2017-11-23 ASSESSMENT — PAIN SCALES - GENERAL: PAINLEVEL_OUTOF10: 0

## 2017-11-23 NOTE — PLAN OF CARE
Problem: Altered Mood, Depressive Behavior  Goal: LTG-Able to verbalize acceptance of life and situations over which he or she has no control  Outcome: Ongoing  Pt denies thoughts of self harm and is agreeable to seeking out should thoughts of self harm arise. Safe environment maintained. Q15 minute checks for safety cont per unit policy. Will cont to monitor for safety and provides support and reassurance as needed. Goal: LTG-Able to verbalize and/or display a decrease in depressive symptoms  Outcome: Ongoing  Pt reports feeling depressed and anxious. Goal: STG-Absence of Self Harm  Outcome: Ongoing  Pt denied thoughts of SI/HI/self-harm and agreed to seek out staff should thoughts of SI/HI/self-harm arise. Safe environment maintained. Q15 minute checks for safety continued per unit policy. Will continue to monitor for safety and provide support and reassurance as needed.

## 2017-11-23 NOTE — PLAN OF CARE
Problem: Altered Mood, Depressive Behavior  Goal: LTG-Able to verbalize and/or display a decrease in depressive symptoms  Outcome: Ongoing  Pt did not participate in community meeting/ goals group at [address] [place] [address] despite staff encouragement to attend.

## 2017-11-23 NOTE — PLAN OF CARE
Problem: Altered Mood, Depressive Behavior  Goal: LTG-Able to verbalize acceptance of life and situations over which he or she has no control  Outcome: Ongoing  Pt able to verbalize acceptance over life situations which he has no control. Continue to monitor and encourage patient to seek out staff if necessary.

## 2017-11-24 PROCEDURE — 1240000000 HC EMOTIONAL WELLNESS R&B

## 2017-11-24 PROCEDURE — 6370000000 HC RX 637 (ALT 250 FOR IP): Performed by: PSYCHIATRY & NEUROLOGY

## 2017-11-24 RX ADMIN — TRAZODONE HYDROCHLORIDE 100 MG: 100 TABLET ORAL at 21:21

## 2017-11-24 RX ADMIN — QUETIAPINE FUMARATE 300 MG: 300 TABLET ORAL at 21:21

## 2017-11-24 RX ADMIN — CARBAMAZEPINE 200 MG: 200 TABLET ORAL at 21:21

## 2017-11-24 RX ADMIN — CARBAMAZEPINE 200 MG: 200 TABLET ORAL at 09:04

## 2017-11-24 NOTE — H&P
HISTORY and [address]       NAME:  Kamilah Connor  MRN: [id number]   YOB: 1990   Date: 11/24/2017   Age: 28 y.o. Gender: male     COMPLAINT AND PRESENT HISTORY:      Kamilah Connor is 28 y.o.,  male, admitted because of depression/ Bipolar Disorder. Pt has suicidal ideation, Pt overdosedOn Unisom tablets. Patient states it is trying to go to sleep and was working. Patient was in [employer] 2 years ago which worked for him. Patient is disheveled, poor general hygiene's with strong body odor. Pt denies any homicidal ideation. Pt has a history of previous suicide attempts by OD. Pt feels hopeless, helpless, worthless, lack of interest, loss of energy. Poor insight. Pt has poor sleep, fair appetite, poor concentration and memory. No current auditory, visual or tactile hallucinations. Patient denies any current alcohol or substance abuse. Patient lives alone, is on disability. Pt has been non compliant with the psychiatric medications 1 week.       DIAGNOSTIC RESULTS   Labs:  U/A:  Lab Results   Component Value Date    NITRITE neg 03/16/2017    COLORU yellow 03/16/2017    COLORU YELLOW 06/13/2015    WBCUA 0 TO 2 06/12/2015    RBCUA None 06/12/2015    MUCUS NOT REPORTED 06/12/2015    BACTERIA NOT REPORTED 06/12/2015    CLARITYU clear 03/16/2017    SPECGRAV 1.025 03/16/2017    SPECGRAV 1.013 06/13/2015    LEUKOCYTESUR neg 03/16/2017    LEUKOCYTESUR NEGATIVE 06/13/2015    BLOODU neg 03/16/2017    GLUCOSEU neg 03/16/2017    GLUCOSEU NEGATIVE 06/13/2015    AMORPHOUS NOT REPORTED 06/12/2015       PAST MEDICAL HISTORY     Past Medical History:   Diagnosis Date    ADHD (attention deficit hyperactivity disorder)     Anxiety     Bipolar 1 disorder (HCC)     PTSD (post-traumatic stress disorder)        Pt denies any history of Diabetes mellitus type 2, hypertension, stroke, heart disease, COPD, Asthma, GERD, HLD, Cancer, Seizures,Thyroid disease, Kidney of urine. Locomotor:  No bone or joint pains. No swelling or deformities. Neuropsychiatric:  See HPI. GENERAL PHYSICAL EXAM:     Vitals: BP (!) 94/54   Pulse 67   Temp 98.4 °F (36.9 °C) (Oral)   Resp 14   Ht 6' (1.829 m)   Wt 195 lb (88.5 kg)   SpO2 98%   BMI 26.45 kg/m²  Body mass index is 26.45 kg/m². Pt was examined with a nurse present in the room. GENERAL APPEARANCE:  Almita  is 28 y.o.,  male, not obese, nourished, conscious, alert. Patient is disheveled, poor general hygiene's with strong body odor. Does not appear to be distress or pain at this time. SKIN:  Warm, dry, no cyanosis or jaundice. HEAD:  Normocephalic, atraumatic, no swelling or tenderness. EYES:  Pupils equal, reactive to light, Conjunctiva is clear, EOMs intact lan. eyelids WNL. EARS:  No discharge, no marked hearing loss. NOSE:  No rhinorrhea, epistaxis or septal deformity. THROAT:  Not congested. No ulceration bleeding or discharge. NECK:  No stiffness, trachea central.  No palpable masses or L.N.      CHEST:  Symmetrical and equal on expansion. HEART:  Regular rate and rhythm. S1 > S2, No audible murmurs or gallops. LUNGS:  Equal on expansion, normal breath sounds. No adventitious sounds. ABDOMEN: Soft on palpation. No localized tenderness. No guarding or rigidity. No palpable organomegaly. LYMPHATICS:  No palpable Lymphadenopathy. LOCOMOTOR, BACK AND SPINE:  No tenderness or deformities. EXTREMITIES:  Symmetrical, no pedal edema. Clarissas sign negative. No discoloration or ulcerations. NEUROLOGIC:  The patient is conscious, alert, oriented, Gait and balance WNL. No apparent focal sensory deficits. No motor deficits, muscle strength equal Lan. No facial droop, tongue protrudes centrally, no slurring of the speech.             PROVISIONAL DIAGNOSES:      Active Problems:    Recurrent depression ([address]      MAGDI HOWELL PA-C on 11/24/2017 at 5:24 PM

## 2017-11-24 NOTE — BH NOTE
Psychoeducation Group Note    Date: 11/23  Start Time: 8:15  End Time: 9:00    Number Participants in Group:  11    Goal:  HS group  Topic:     Discipline Responsible:   OT  AT  [facility].  RT BHT2 Other       Participation Level:     None  Minimal   x Active Listener x Interactive    Monopolizing         Participation Quality:  x Appropriate  Inappropriate   x       Attentive        Intrusive   x       Sharing        Resistant   x       Supportive        Lethargic       Affective:   x Congruent  Incongruent  Blunted  Flat    Constricted  Anxious  Elated  Angry    Labile  Depressed  Other         Cognitive:  x Alert  Oriented PPTP     Concentration x G  F  P   Attention Span x G  F  P   Short-Term Memory x G  F  P   Long-Term Memory x G  F  P   ProblemSolving/  Decision Making x G  F  P   Ability to Process  Information x G  F  P      Contributing Factors             Delusional             Hallucinating             Flight of Ideas             Other:       Modes of Intervention:   Education  Support  Exploration    Clarifying  Problem Solving  Confrontation   x Socialization  Limit Setting  Reality Testing   x Activity  Movement  Media    Other:            Response to Learning:  x Able to verbalize current knowledge/experience   x Able to verbalize/acknowledge new learning   x Able to retain information   x Capable of insight   x Able to change behavior   x Progressing to goal    Other:        Comments:   PT states his meds were changed yesterday and is waiting for them to kick in.

## 2017-11-25 PROCEDURE — 1240000000 HC EMOTIONAL WELLNESS R&B

## 2017-11-25 PROCEDURE — 6370000000 HC RX 637 (ALT 250 FOR IP): Performed by: PSYCHIATRY & NEUROLOGY

## 2017-11-25 RX ADMIN — CARBAMAZEPINE 200 MG: 200 TABLET ORAL at 20:33

## 2017-11-25 RX ADMIN — TRAZODONE HYDROCHLORIDE 100 MG: 100 TABLET ORAL at 20:32

## 2017-11-25 RX ADMIN — QUETIAPINE FUMARATE 300 MG: 300 TABLET ORAL at 20:33

## 2017-11-25 NOTE — PLAN OF CARE
Problem: Altered Mood, Depressive Behavior  Goal: LTG-Able to verbalize and/or display a decrease in depressive symptoms  Outcome: Ongoing  Pt participated in leisure group at 1330.

## 2017-11-25 NOTE — BH NOTE
Psychoeducation Group Note    Date: 11/25/17 Start Time: 2 p.m. End Time: 3 p.m. Number Participants in Group:  10    Goal:  Patient will demonstrate increased interpersonal interaction   Topic: Leisure and relaxation.     Discipline Responsible:   OT  AT  [facility].  RT x Other       Participation Level:     None  Minimal    Active Listener x Interactive    Monopolizing         Participation Quality:  x Appropriate  Inappropriate          Attentive        Intrusive          Sharing        Resistant          Supportive        Lethargic       Affective:    Congruent  Incongruent  Blunted  Flat    Constricted  Anxious  Elated  Angry    Labile  Depressed  Other         Cognitive:  x Alert  Oriented PPTP     Concentration x G  F  P   Attention Span x G  F  P   Short-Term Memory x G  F  P   Long-Term Memory x G  F  P   ProblemSolving/  Decision Making x G  F  P   Ability to Process  Information x G  F  P      Contributing Factors             Delusional             Hallucinating             Flight of Ideas             Other:       Modes of Intervention:   Education  Support  Exploration    Clarifying  Problem Solving  Confrontation   x Socialization  Limit Setting  Reality Testing   x Activity  Movement  Media    Other:            Response to Learning:  x Able to verbalize current knowledge/experience    Able to verbalize/acknowledge new learning    Able to retain information    Capable of insight    Able to change behavior    Progressing to goal    Other:        Comments: Pt interacted well and socialized appropriately,

## 2017-11-25 NOTE — PLAN OF CARE
Problem: Altered Mood, Depressive Behavior  Goal: LTG-Able to verbalize acceptance of life and situations over which he or she has no control  Outcome: Ongoing  Pt encouraged to attend groups to learn coping skills

## 2017-11-25 NOTE — PLAN OF CARE
Problem: Altered Mood, Depressive Behavior  Goal: STG-Absence of Self Harm  Outcome: Ongoing  Pt remains free from self harm

## 2017-11-26 PROCEDURE — 1240000000 HC EMOTIONAL WELLNESS R&B

## 2017-11-26 PROCEDURE — 6370000000 HC RX 637 (ALT 250 FOR IP): Performed by: PSYCHIATRY & NEUROLOGY

## 2017-11-26 RX ADMIN — CARBAMAZEPINE 200 MG: 200 TABLET ORAL at 21:09

## 2017-11-26 RX ADMIN — CARBAMAZEPINE 200 MG: 200 TABLET ORAL at 08:52

## 2017-11-26 RX ADMIN — QUETIAPINE FUMARATE 300 MG: 300 TABLET ORAL at 21:09

## 2017-11-26 RX ADMIN — TRAZODONE HYDROCHLORIDE 100 MG: 100 TABLET ORAL at 21:09

## 2017-11-26 NOTE — PLAN OF CARE
Problem: Altered Mood, Depressive Behavior  Goal: LTG-Able to verbalize and/or display a decrease in depressive symptoms  Outcome: Ongoing  Pt did not participate in cognitive skills/ leisure group at 1330 despite staff encouragement to attend.

## 2017-11-26 NOTE — PLAN OF CARE
Problem: Altered Mood, Depressive Behavior  Goal: LTG-Able to verbalize and/or display a decrease in depressive symptoms  Outcome: Ongoing  Pt did not participate in community meeting/ goals group at [address] [place] [address] despite staff encouragement to attend.

## 2017-11-26 NOTE — PROGRESS NOTES
CC: Depressed, suicidal  Interval history: Patient continues to be symptomatic. Sleeping OK and appetite good. No side effects from medications reported. Patient complains of depressed mood. He complains that it is associated with anhedonia, difficulty concentrating, fatigue, feelings of worthlessness/guilt, hopelessness and insomnia. Intensity unchanged since admission. Staff say that patient remains in bed mostly and is seclusive. Patient has been taking care of AsDL. Occasional spells of behavioral dyscontrol reported. ROS: no dizziness, muscle spasms or fever  Psychological ROS: positive for - anxiety, concentration difficulties and depression  negative for - disorientation  Vitals:    11/25/17 2004   BP: 124/77   Pulse: 74   Resp: 14   Temp:    SpO2:      MSE shows that patient's psychomotor activity is normal. Gait and station are normal. Dressed appropriately. Occasional spells of behavioral dyscontrol seen. Good eye contact. Mood is dysphoric and affect is labile. Speech is normal.  Thought process is linear but contents continue to reflect some paranoia and ideas of helplessness and hopelessness. Associations are mostly logical. Continues to experience auditory hallucinations. Oriented X 3. Insight and judgement remain limited  Scheduled Meds:   QUEtiapine  300 mg Oral Nightly    carBAMazepine  200 mg Oral BID    ARIPiprazole  15 mg Oral Daily     Continuous Infusions:   PRN Meds:.acetaminophen, traZODone, benztropine mesylate, magnesium hydroxide  Patient is making slow but steady progress, psychotic features persist, tolerating medications. Given that the patient is unable to contract for safety and having some spells of behavioral dyscontrol continued hospitalization is recommended.   Patient was provided with supportive and insight oriented therapy

## 2017-11-26 NOTE — BH NOTE
Despite encouragement from staff pt declined to attend evening wrap up group. Will continue to encourage attendance of groups.

## 2017-11-27 VITALS
SYSTOLIC BLOOD PRESSURE: 112 MMHG | HEIGHT: 72 IN | WEIGHT: 195 LBS | OXYGEN SATURATION: 98 % | DIASTOLIC BLOOD PRESSURE: 63 MMHG | BODY MASS INDEX: 26.41 KG/M2 | RESPIRATION RATE: 14 BRPM | TEMPERATURE: 98.1 F | HEART RATE: 83 BPM

## 2017-11-27 PROCEDURE — 5130000000 HC BRIDGE APPOINTMENT

## 2017-11-27 PROCEDURE — 6370000000 HC RX 637 (ALT 250 FOR IP): Performed by: PSYCHIATRY & NEUROLOGY

## 2017-11-27 RX ORDER — QUETIAPINE FUMARATE 300 MG/1
300 TABLET, FILM COATED ORAL NIGHTLY
Qty: 30 TABLET | Refills: 0 | Status: ON HOLD | OUTPATIENT
Start: 2017-11-27 | End: 2018-02-26 | Stop reason: HOSPADM

## 2017-11-27 RX ORDER — CARBAMAZEPINE 200 MG/1
200 TABLET ORAL 2 TIMES DAILY
Qty: 60 TABLET | Refills: 0 | Status: ON HOLD | OUTPATIENT
Start: 2017-11-27 | End: 2018-02-26 | Stop reason: HOSPADM

## 2017-11-27 RX ORDER — ARIPIPRAZOLE 15 MG/1
15 TABLET ORAL DAILY
Qty: 30 TABLET | Refills: 0 | Status: ON HOLD | OUTPATIENT
Start: 2017-11-27 | End: 2018-02-26 | Stop reason: HOSPADM

## 2017-11-27 RX ORDER — TRAZODONE HYDROCHLORIDE 100 MG/1
100 TABLET ORAL NIGHTLY PRN
Qty: 30 TABLET | Refills: 0 | Status: ON HOLD | OUTPATIENT
Start: 2017-11-27 | End: 2018-02-26 | Stop reason: HOSPADM

## 2017-11-27 RX ADMIN — CARBAMAZEPINE 200 MG: 200 TABLET ORAL at 09:18

## 2017-11-27 NOTE — PLAN OF CARE
Problem: Altered Mood, Depressive Behavior  Goal: STG-Absence of Self Harm  Outcome: Ongoing  Pt appeared absent of self harm. Pt denies thoughts to hurt self or others. Pt denies all hallucinations. Pt continues to demonstrate a poverty of content and appears evasive. Pt was observed resting in room and out of room for needs only. Pt was encouraged to communicate with staff if symptoms worsen or needs arise. Pt independence was promoted.

## 2017-11-27 NOTE — BH NOTE
[facility]  Discharge Note    Pt discharged with followings belongings: all belongings in safe and locker     All Valuables sent home with patient. Patient went home by cab  Medications: sent to pharmacy   Denied suicidal thoughts or thoughts of harming others at time of D/C. Discharged to home    Patient education on aftercare instructions, states understanding and signed discharge AVS, copy given to patient.          Status EXAM upon discharge:  Status and Exam  Normal: No  Facial Expression: Expressionless  Affect: Stable  Level of Consciousness: Alert  Mood:Normal: No  Mood: Empty  Motor Activity:Normal: No  Motor Activity: Decreased  Interview Behavior: Cooperative, Evasive  Preception: Wayside to Person, Roberto Needles to Time, Wayside to Place, Wayside to Situation  Attention:Normal: No  Attention: Distractible  Thought Processes: Blocking  Thought Content:Normal: No  Thought Content: Poverty of Content  Hallucinations: None  Delusions: No  Delusions: Persecution  Memory:Normal: Yes  Memory: Poor Recent  Insight and Judgment: No  Insight and Judgment: Poor Judgment, Poor Insight, Unmotivated  Present Suicidal Ideation: No  Present Homicidal Ideation: No    Bo Mathews RN

## 2017-11-27 NOTE — PROGRESS NOTES
CC: Depressed, suicidal  Interval history: Patient continues to be symptomatic. Sleeping OK and appetite good. No side effects from medications reported. Patient complains of depressed mood. He complains that it is associated with anhedonia, difficulty concentrating, fatigue, feelings of worthlessness/guilt, hopelessness and insomnia. Intensity unchanged since admission. Staff say that patient remains in bed mostly and is seclusive. Patient has been taking care of AsDL. Occasional spells of behavioral dyscontrol reported. ROS: no dizziness, muscle spasms or fever  Psychological ROS: positive for - anxiety, concentration difficulties and depression  negative for - disorientation  Vitals:    11/26/17 1925   BP: 110/62   Pulse: 71   Resp: 14   Temp:    SpO2:      MSE shows that patient's psychomotor activity is normal. Gait and station are normal. Dressed appropriately. Occasional spells of behavioral dyscontrol seen. Good eye contact. Mood is dysphoric and affect is labile. Speech is normal.  Thought process is linear but contents continue to reflect some paranoia and ideas of helplessness and hopelessness. Associations are mostly logical. Continues to experience auditory hallucinations. Oriented X 3. Insight and judgement remain limited  Scheduled Meds:   QUEtiapine  300 mg Oral Nightly    carBAMazepine  200 mg Oral BID    ARIPiprazole  15 mg Oral Daily     Continuous Infusions:   PRN Meds:.acetaminophen, traZODone, benztropine mesylate, magnesium hydroxide  Patient is making slow but steady progress, psychotic features persist, tolerating medications. Given that the patient is unable to contract for safety and having some spells of behavioral dyscontrol continued hospitalization is recommended.   Patient was provided with supportive and insight oriented therapy

## 2017-11-28 NOTE — CARE COORDINATION
Bridge Appointment completed: Reviewed Discharge Instructions with patient. Patient verbalizes understanding and agreement with the discharge plan using the teachback method. Discharge Arrangements:  [place] [address] [address]. [place], [place]. [address] [facility]  fax [phone]  you have a walk-in appointment at [facility] on Monday, December 4th at 11:15 am with Dr. Deo Gann. Guardian notified: Pt is his own guardian  Discharge destination/address: [address] [place] [address].   [place] [address]  Transported by:[employer]
Name: Luis Mcgregor    : 1990    Discharge Date: 2017    Primary Auth/Cert #: [id number]    Discharge Medications:      Medication List      START taking these medications    QUEtiapine 300 MG tablet  Commonly known as:  SEROQUEL  Take 1 tablet by mouth nightly  Notes to patient: To help clear thoughts/stabilize mood         CONTINUE taking these medications    ARIPiprazole 15 MG tablet  Commonly known as:  ABILIFY  Take 1 tablet by mouth daily  Notes to patient: To help clear thoughts/stabilize mood      carBAMazepine 200 MG tablet  Commonly known as:  TEGRETOL  Take 1 tablet by mouth 2 times daily  Notes to patient:  Mood stabilizer      traZODone 100 MG tablet  Commonly known as:  DESYREL  Take 1 tablet by mouth nightly as needed for Sleep  Notes to patient:  For sleep            Where to Get Your Medications      These medications were sent to [facility] [address] [place] [address]    Phone:  [phone]   · ARIPiprazole 15 MG tablet  · carBAMazepine 200 MG tablet  · QUEtiapine 300 MG tablet  · traZODone 100 MG tablet         Follow Up Appointment: [address]. [place], [place]. [address]  fax [phone]  On 2017  Aniyah Appiah, you have a walk-in appointment at [facility] on  at 11:15 am with Dr. Saúl Paz. Discharge to home address:  [address] [place] [address]. [place] [address]  Go on 2017  Please send home by Medicaid taxi.
Pt declined to attend psychotherapy at 1000 am despite encouragement.   Pt offered 1:1.
Pt declined to attend psychotherapy at 1000 am despite encouragement. Pt offered 1:1 and PT declined.
Pt declined to attend psychotherapy at 1000 am despite encouragement. Pt offered 1:1 and states \"I'm ready to go home and don't want to hurt myself. \"
Pt declined to attend psychotherapy at 1000 am despite encouragement. Pt offered 1:1 to discuss discharge planning but he declined.
day.

## 2018-02-16 ENCOUNTER — HOSPITAL ENCOUNTER (EMERGENCY)
Age: 28
Discharge: PSYCHIATRIC OR SUBSTANCE USE FACILITY | End: 2018-02-17
Attending: EMERGENCY MEDICINE
Payer: COMMERCIAL

## 2018-02-16 DIAGNOSIS — T14.91XA SUICIDE ATTEMPT (HCC): Primary | ICD-10-CM

## 2018-02-16 DIAGNOSIS — T50.902A INTENTIONAL DRUG OVERDOSE, INITIAL ENCOUNTER (HCC): ICD-10-CM

## 2018-02-16 LAB
ABSOLUTE EOS #: 0.03 K/UL (ref 0–0.44)
ABSOLUTE IMMATURE GRANULOCYTE: <0.03 K/UL (ref 0–0.3)
ABSOLUTE LYMPH #: 1.43 K/UL (ref 1.1–3.7)
ABSOLUTE MONO #: 0.52 K/UL (ref 0.1–1.2)
ALBUMIN SERPL-MCNC: 4.4 G/DL (ref 3.5–5.2)
ALBUMIN/GLOBULIN RATIO: 1.8 (ref 1–2.5)
ALLEN TEST: ABNORMAL
ALP BLD-CCNC: 44 U/L (ref 40–129)
ALT SERPL-CCNC: 18 U/L (ref 5–41)
ANION GAP SERPL CALCULATED.3IONS-SCNC: 15 MMOL/L (ref 9–17)
ANION GAP: 11 MMOL/L (ref 7–16)
AST SERPL-CCNC: 37 U/L
BASOPHILS # BLD: 1 % (ref 0–2)
BASOPHILS ABSOLUTE: 0.04 K/UL (ref 0–0.2)
BILIRUB SERPL-MCNC: 1.14 MG/DL (ref 0.3–1.2)
BILIRUBIN DIRECT: 0.26 MG/DL
BILIRUBIN, INDIRECT: 0.88 MG/DL (ref 0–1)
BUN BLDV-MCNC: 16 MG/DL (ref 6–20)
BUN/CREAT BLD: ABNORMAL (ref 9–20)
CALCIUM SERPL-MCNC: 8.8 MG/DL (ref 8.6–10.4)
CHLORIDE BLD-SCNC: 95 MMOL/L (ref 98–107)
CO2: 25 MMOL/L (ref 20–31)
CREAT SERPL-MCNC: 0.81 MG/DL (ref 0.7–1.2)
DIFFERENTIAL TYPE: NORMAL
EOSINOPHILS RELATIVE PERCENT: 1 % (ref 1–4)
FIO2: ABNORMAL
GFR AFRICAN AMERICAN: >60 ML/MIN
GFR NON-AFRICAN AMERICAN: >60 ML/MIN
GFR NON-AFRICAN AMERICAN: >60 ML/MIN
GFR SERPL CREATININE-BSD FRML MDRD: >60 ML/MIN
GFR SERPL CREATININE-BSD FRML MDRD: ABNORMAL ML/MIN/{1.73_M2}
GFR SERPL CREATININE-BSD FRML MDRD: ABNORMAL ML/MIN/{1.73_M2}
GFR SERPL CREATININE-BSD FRML MDRD: NORMAL ML/MIN/{1.73_M2}
GLOBULIN: NORMAL G/DL (ref 1.5–3.8)
GLUCOSE BLD-MCNC: 244 MG/DL (ref 70–99)
GLUCOSE BLD-MCNC: 266 MG/DL (ref 74–100)
HCO3 VENOUS: 25.3 MMOL/L (ref 22–29)
HCT VFR BLD CALC: 40.8 % (ref 40.7–50.3)
HEMOGLOBIN: 13.9 G/DL (ref 13–17)
IMMATURE GRANULOCYTES: 0 %
LYMPHOCYTES # BLD: 25 % (ref 24–43)
MCH RBC QN AUTO: 31.2 PG (ref 25.2–33.5)
MCHC RBC AUTO-ENTMCNC: 34.1 G/DL (ref 28.4–34.8)
MCV RBC AUTO: 91.7 FL (ref 82.6–102.9)
MODE: ABNORMAL
MONOCYTES # BLD: 9 % (ref 3–12)
NEGATIVE BASE EXCESS, VEN: ABNORMAL (ref 0–2)
NRBC AUTOMATED: 0 PER 100 WBC
O2 DEVICE/FLOW/%: ABNORMAL
O2 SAT, VEN: 97 % (ref 60–85)
PATIENT TEMP: ABNORMAL
PCO2, VEN: 33.5 MM HG (ref 41–51)
PDW BLD-RTO: 13.6 % (ref 11.8–14.4)
PH VENOUS: 7.49 (ref 7.32–7.43)
PLATELET # BLD: 185 K/UL (ref 138–453)
PLATELET ESTIMATE: NORMAL
PMV BLD AUTO: 10 FL (ref 8.1–13.5)
PO2, VEN: 82.9 MM HG (ref 30–50)
POC CHLORIDE: 102 MMOL/L (ref 98–107)
POC CREATININE: 0.87 MG/DL (ref 0.51–1.19)
POC HEMATOCRIT: 43 % (ref 41–53)
POC HEMOGLOBIN: 14.8 G/DL (ref 13.5–17.5)
POC IONIZED CALCIUM: 1.04 MMOL/L (ref 1.15–1.33)
POC LACTIC ACID: 1.99 MMOL/L (ref 0.56–1.39)
POC PCO2 TEMP: ABNORMAL MM HG
POC PH TEMP: ABNORMAL
POC PO2 TEMP: ABNORMAL MM HG
POC POTASSIUM: 3.1 MMOL/L (ref 3.5–4.5)
POC SODIUM: 138 MMOL/L (ref 138–146)
POSITIVE BASE EXCESS, VEN: 2 (ref 0–3)
POTASSIUM SERPL-SCNC: 3.3 MMOL/L (ref 3.7–5.3)
RBC # BLD: 4.45 M/UL (ref 4.21–5.77)
RBC # BLD: NORMAL 10*6/UL
SAMPLE SITE: ABNORMAL
SEG NEUTROPHILS: 64 % (ref 36–65)
SEGMENTED NEUTROPHILS ABSOLUTE COUNT: 3.64 K/UL (ref 1.5–8.1)
SODIUM BLD-SCNC: 135 MMOL/L (ref 135–144)
TOTAL CO2, VENOUS: 26 MMOL/L (ref 23–30)
TOTAL PROTEIN: 6.9 G/DL (ref 6.4–8.3)
WBC # BLD: 5.7 K/UL (ref 3.5–11.3)
WBC # BLD: NORMAL 10*3/UL

## 2018-02-16 PROCEDURE — 84295 ASSAY OF SERUM SODIUM: CPT

## 2018-02-16 PROCEDURE — 2580000003 HC RX 258: Performed by: STUDENT IN AN ORGANIZED HEALTH CARE EDUCATION/TRAINING PROGRAM

## 2018-02-16 PROCEDURE — 93005 ELECTROCARDIOGRAM TRACING: CPT

## 2018-02-16 PROCEDURE — 85025 COMPLETE CBC W/AUTO DIFF WBC: CPT

## 2018-02-16 PROCEDURE — 99285 EMERGENCY DEPT VISIT HI MDM: CPT

## 2018-02-16 PROCEDURE — 83605 ASSAY OF LACTIC ACID: CPT

## 2018-02-16 PROCEDURE — 80156 ASSAY CARBAMAZEPINE TOTAL: CPT

## 2018-02-16 PROCEDURE — G0480 DRUG TEST DEF 1-7 CLASSES: HCPCS

## 2018-02-16 PROCEDURE — 80307 DRUG TEST PRSMV CHEM ANLYZR: CPT

## 2018-02-16 PROCEDURE — 83735 ASSAY OF MAGNESIUM: CPT

## 2018-02-16 PROCEDURE — 85014 HEMATOCRIT: CPT

## 2018-02-16 PROCEDURE — 80048 BASIC METABOLIC PNL TOTAL CA: CPT

## 2018-02-16 PROCEDURE — 82947 ASSAY GLUCOSE BLOOD QUANT: CPT

## 2018-02-16 PROCEDURE — 82330 ASSAY OF CALCIUM: CPT

## 2018-02-16 PROCEDURE — 82435 ASSAY OF BLOOD CHLORIDE: CPT

## 2018-02-16 PROCEDURE — 84132 ASSAY OF SERUM POTASSIUM: CPT

## 2018-02-16 PROCEDURE — 82803 BLOOD GASES ANY COMBINATION: CPT

## 2018-02-16 PROCEDURE — 80076 HEPATIC FUNCTION PANEL: CPT

## 2018-02-16 PROCEDURE — 82565 ASSAY OF CREATININE: CPT

## 2018-02-16 RX ORDER — 0.9 % SODIUM CHLORIDE 0.9 %
1000 INTRAVENOUS SOLUTION INTRAVENOUS ONCE
Status: COMPLETED | OUTPATIENT
Start: 2018-02-16 | End: 2018-02-17

## 2018-02-16 RX ADMIN — SODIUM CHLORIDE 1000 ML: 9 INJECTION, SOLUTION INTRAVENOUS at 23:18

## 2018-02-16 ASSESSMENT — ENCOUNTER SYMPTOMS
WHEEZING: 0
SHORTNESS OF BREATH: 0
PHOTOPHOBIA: 0
NAUSEA: 0
RHINORRHEA: 0
BLOOD IN STOOL: 0
COUGH: 0
VOMITING: 0
BACK PAIN: 0
SORE THROAT: 0
ABDOMINAL PAIN: 0
ABDOMINAL DISTENTION: 0

## 2018-02-17 ENCOUNTER — HOSPITAL ENCOUNTER (INPATIENT)
Age: 28
LOS: 10 days | Discharge: HOME | DRG: 750 | End: 2018-02-27
Attending: PSYCHIATRY & NEUROLOGY | Admitting: PSYCHIATRY & NEUROLOGY
Payer: COMMERCIAL

## 2018-02-17 VITALS
TEMPERATURE: 98.8 F | SYSTOLIC BLOOD PRESSURE: 105 MMHG | RESPIRATION RATE: 16 BRPM | OXYGEN SATURATION: 100 % | DIASTOLIC BLOOD PRESSURE: 63 MMHG | HEART RATE: 100 BPM

## 2018-02-17 LAB
ACETAMINOPHEN LEVEL: <10 UG/ML (ref 10–30)
CARBAMAZEPINE DATE LAST DOSE: ABNORMAL
CARBAMAZEPINE DOSE AMOUNT: ABNORMAL
CARBAMAZEPINE DOSE TIME: ABNORMAL
CARBAMAZEPINE LEVEL: <2.5 UG/ML (ref 4–12)
EKG ATRIAL RATE: 108 BPM
EKG ATRIAL RATE: 70 BPM
EKG P AXIS: 69 DEGREES
EKG P AXIS: 70 DEGREES
EKG P-R INTERVAL: 140 MS
EKG P-R INTERVAL: 146 MS
EKG Q-T INTERVAL: 346 MS
EKG Q-T INTERVAL: 410 MS
EKG QRS DURATION: 84 MS
EKG QRS DURATION: 94 MS
EKG QTC CALCULATION (BAZETT): 442 MS
EKG QTC CALCULATION (BAZETT): 463 MS
EKG R AXIS: 79 DEGREES
EKG R AXIS: 83 DEGREES
EKG T AXIS: 56 DEGREES
EKG T AXIS: 57 DEGREES
EKG VENTRICULAR RATE: 108 BPM
EKG VENTRICULAR RATE: 70 BPM
ETHANOL PERCENT: <0.01 %
ETHANOL: <10 MG/DL
LACTIC ACID, WHOLE BLOOD: 0.8 MMOL/L (ref 0.7–2.1)
MAGNESIUM: 1.6 MG/DL (ref 1.6–2.6)
SALICYLATE LEVEL: <1 MG/DL (ref 3–10)
TOXIC TRICYCLIC SC,BLOOD: NEGATIVE

## 2018-02-17 PROCEDURE — 83605 ASSAY OF LACTIC ACID: CPT

## 2018-02-17 PROCEDURE — 93005 ELECTROCARDIOGRAM TRACING: CPT

## 2018-02-17 PROCEDURE — 6370000000 HC RX 637 (ALT 250 FOR IP): Performed by: STUDENT IN AN ORGANIZED HEALTH CARE EDUCATION/TRAINING PROGRAM

## 2018-02-17 PROCEDURE — 1240000000 HC EMOTIONAL WELLNESS R&B

## 2018-02-17 RX ORDER — POTASSIUM CHLORIDE 1.5 G/1.77G
40 POWDER, FOR SOLUTION ORAL ONCE
Status: DISCONTINUED | OUTPATIENT
Start: 2018-02-17 | End: 2018-02-17

## 2018-02-17 RX ORDER — LORAZEPAM 0.5 MG/1
1 TABLET ORAL ONCE
Status: COMPLETED | OUTPATIENT
Start: 2018-02-17 | End: 2018-02-17

## 2018-02-17 RX ORDER — POTASSIUM CHLORIDE 20 MEQ/1
20 TABLET, EXTENDED RELEASE ORAL ONCE
Status: COMPLETED | OUTPATIENT
Start: 2018-02-17 | End: 2018-02-17

## 2018-02-17 RX ADMIN — POTASSIUM CHLORIDE 20 MEQ: 1500 TABLET, EXTENDED RELEASE ORAL at 00:52

## 2018-02-17 RX ADMIN — LORAZEPAM 1 MG: 0.5 TABLET ORAL at 00:52

## 2018-02-17 ASSESSMENT — SLEEP AND FATIGUE QUESTIONNAIRES
DO YOU USE A SLEEP AID: YES
RESTFUL SLEEP: NO
DIFFICULTY FALLING ASLEEP: YES
DIFFICULTY ARISING: NO
DO YOU HAVE DIFFICULTY SLEEPING: YES
SLEEP PATTERN: RESTLESSNESS
AVERAGE NUMBER OF SLEEP HOURS: 6
DIFFICULTY STAYING ASLEEP: YES

## 2018-02-17 ASSESSMENT — LIFESTYLE VARIABLES: HISTORY_ALCOHOL_USE: NO

## 2018-02-17 ASSESSMENT — PATIENT HEALTH QUESTIONNAIRE - PHQ9: SUM OF ALL RESPONSES TO PHQ QUESTIONS 1-9: 13

## 2018-02-17 NOTE — ED PROVIDER NOTES
 No narrative on file       Family History   Problem Relation Age of Onset    Liver Cancer Brother     Alcohol Abuse Brother     No Known Problems Mother     No Known Problems Father        Allergies:  Advil [ibuprofen]    Home Medications:  Prior to Admission medications    Medication Sig Start Date End Date Taking? Authorizing Provider   QUEtiapine (SEROQUEL) 300 MG tablet Take 1 tablet by mouth nightly 11/27/17  Yes Amalia Eddy MD   carBAMazepine (TEGRETOL) 200 MG tablet Take 1 tablet by mouth 2 times daily 11/27/17   Amalia Eddy MD   traZODone (DESYREL) 100 MG tablet Take 1 tablet by mouth nightly as needed for Sleep 11/27/17   Amalia Eddy MD   ARIPiprazole (ABILIFY) 15 MG tablet Take 1 tablet by mouth daily 11/27/17   Amalia Eddy MD       REVIEW OF SYSTEMS    (2-9 systems for level 4, 10 or more for level 5)      Review of Systems   Constitutional: Negative for appetite change, chills, diaphoresis, fatigue, fever and unexpected weight change. HENT: Negative for congestion, rhinorrhea and sore throat. Eyes: Negative for photophobia. Respiratory: Negative for cough, shortness of breath and wheezing. Cardiovascular: Negative for chest pain, palpitations and leg swelling. Gastrointestinal: Negative for abdominal distention, abdominal pain, blood in stool, nausea and vomiting. Genitourinary: Negative for difficulty urinating, dysuria, flank pain and hematuria. Musculoskeletal: Negative for arthralgias, back pain and myalgias. Neurological: Negative for dizziness, syncope, weakness, numbness and headaches. Psychiatric/Behavioral: Positive for self-injury and suicidal ideas. Negative for confusion. PHYSICAL EXAM   (up to 7 for level 4, 8 or more for level 5)      INITIAL VITALS:   /63   Pulse 100   Temp 98.8 °F (37.1 °C) (Oral)   Resp 16   SpO2 100%     Physical Exam   Constitutional: He is oriented to person, place, and time.  He appears well-developed and packet 40 mEq    potassium chloride (KLOR-CON M) extended release tablet 20 mEq       DDX: Overdose, suicide attempt, schizophrenia, auditory hallucinations, visual hallucinations    DIAGNOSTIC RESULTS / EMERGENCY DEPARTMENT COURSE / MDM     LABS:  Results for orders placed or performed during the hospital encounter of 02/16/18   CBC WITH AUTO DIFFERENTIAL   Result Value Ref Range    WBC 5.7 3.5 - 11.3 k/uL    RBC 4.45 4.21 - 5.77 m/uL    Hemoglobin 13.9 13.0 - 17.0 g/dL    Hematocrit 40.8 40.7 - 50.3 %    MCV 91.7 82.6 - 102.9 fL    MCH 31.2 25.2 - 33.5 pg    MCHC 34.1 28.4 - 34.8 g/dL    RDW 13.6 11.8 - 14.4 %    Platelets 137 125 - 251 k/uL    MPV 10.0 8.1 - 13.5 fL    NRBC Automated 0.0 0.0 per 100 WBC    Differential Type NOT REPORTED     Seg Neutrophils 64 36 - 65 %    Lymphocytes 25 24 - 43 %    Monocytes 9 3 - 12 %    Eosinophils % 1 1 - 4 %    Basophils 1 0 - 2 %    Immature Granulocytes 0 0 %    Segs Absolute 3.64 1.50 - 8.10 k/uL    Absolute Lymph # 1.43 1.10 - 3.70 k/uL    Absolute Mono # 0.52 0.10 - 1.20 k/uL    Absolute Eos # 0.03 0.00 - 0.44 k/uL    Basophils # 0.04 0.00 - 0.20 k/uL    Absolute Immature Granulocyte <0.03 0.00 - 0.30 k/uL    WBC Morphology NOT REPORTED     RBC Morphology NOT REPORTED     Platelet Estimate NOT REPORTED    Basic Metabolic Panel   Result Value Ref Range    Glucose 244 (H) 70 - 99 mg/dL    BUN 16 6 - 20 mg/dL    CREATININE 0.81 0.70 - 1.20 mg/dL    Bun/Cre Ratio NOT REPORTED 9 - 20    Calcium 8.8 8.6 - 10.4 mg/dL    Sodium 135 135 - 144 mmol/L    Potassium 3.3 (L) 3.7 - 5.3 mmol/L    Chloride 95 (L) 98 - 107 mmol/L    CO2 25 20 - 31 mmol/L    Anion Gap 15 9 - 17 mmol/L    GFR Non-African American >60 >60 mL/min    GFR African American >60 >60 mL/min    GFR Comment          GFR Staging NOT REPORTED    HEPATIC FUNCTION PANEL   Result Value Ref Range    Alb 4.4 3.5 - 5.2 g/dL    Alkaline Phosphatase 44 40 - 129 U/L    ALT 18 5 - 41 U/L    AST 37 <40 U/L    Total Bilirubin Creatinine W/GFR Point of Care   Result Value Ref Range    POC Creatinine 0.87 0.51 - 1.19 mg/dL    GFR Comment >60 >60 mL/min    GFR Non-African American >60 >60 mL/min    GFR Comment         Lactic Acid, POC   Result Value Ref Range    POC Lactic Acid 1.99 (H) 0.56 - 1.39 mmol/L   POCT Glucose   Result Value Ref Range    POC Glucose 266 (H) 74 - 100 mg/dL   Anion Gap (Calc) POC   Result Value Ref Range    Anion Gap 11 7 - 16 mmol/L       IMPRESSION: Suicide attempt, overdose      EKG  EKG Interpretation    Interpreted by emergency department physician    Rhythm: sinus tachycardia  Rate: tachycardia  Axis: normal  Ectopy: none  Conduction: normal  ST Segments: no acute change  T Waves: no acute change  Q Waves: none    Clinical Impression: sinus tachycardia    Magaly Jordan      EMERGENCY DEPARTMENT COURSE:  74-year-old male presents with suicide attempt by overdosing on 12 Unisom tabs. He is alert and oriented and in no acute distress. He does not currently have any symptoms. We'll do toxicology workup and observe her period of 6 hours and notify poison control. 12:17 AM discussed with poison control, watch out for urianry retention and anticholinergic symptoms. If he is asymptomatic he can be discharged after 6 hours of observation in the ER. They recommend replacing K    4:54 AM repeat EKG QTc is 442, he is alert and oriented and has been medically cleared for psychiatric evaluation. Patient has been accepted at the [facility]. CONSULTS:  IP CONSULT TO SOCIAL WORK      FINAL IMPRESSION      1. Suicide attempt    2. Intentional drug overdose, initial encounter [facility])          DISPOSITION / PLAN     DISPOSITION Decision To Transfer 02/17/2018 04:53:41 AM      PATIENT REFERRED TO:  No follow-up provider specified.     DISCHARGE MEDICATIONS:  New Prescriptions    No medications on file       Magaly Jordan DO  Emergency Medicine Resident    (Please note that portions of this note were completed with a voice

## 2018-02-17 NOTE — ED NOTES
Pt given a boxed lunch. NAD noted. Awaiting transport to [facility] approx time 0730.      Jaison Berger RN  02/17/18 [id number]

## 2018-02-17 NOTE — FLOWSHEET NOTE
Visit:  Patient sitting up in bed and observing others in unit. Assessment:  Patient welcomed 's visit, but distracted throughout visit. Patient stated \"I'm glad I'm here because I feel better already. \" During visit patient shared, \"my meds are all off, they've got to get that right. \" Patient stated that he had called for the ambulance and that he had no family that cared about him anymore. Intervention:   provided a listening presence.  explored possible support for patient and found he has a sister and a mother he no longer sees. Discussion of tatoos showed that Patient lost his brother, Billy Beck, whom he was close to, to cancer when he was 14. Patient stated they were very close, his brother (who was 25) did everything for him, \"he watched out for me. \"  Elvie Arias discussed with patient what that meant. Patient went to [place] [id number] where Anxa.  knew some of his former teachers, patient only completed 10th grade before dropping out.  offered prayer and patient was grateful. Plan:  Chaplains will remain available to offer spiritual and emotional support as needed. 02/17/18 0206   Encounter Summary   Services provided to: Patient   Referral/Consult From: [facility]   Support System Unknown   Continue Visiting (2/16/18)   Complexity of Encounter High   Length of Encounter 15 minutes   Spiritual Assessment Completed Yes   Routine   Type Initial   Spiritual/Latter day   Type Spiritual support   Assessment Calm; Approachable   Intervention Active listening;Explored feelings, thoughts, concerns;Explored coping resources;Prayer;Sustaining presence/ Ministry of presence; Discussed relationship with God   Outcome Expressed gratitude;Engaged in conversation;Receptive

## 2018-02-17 NOTE — ED NOTES
Psychiatry Returned page. The patient will be accepted to the [facility] under the care of Dr Madina Hinson.

## 2018-02-17 NOTE — ED NOTES
Report taken from [facility]. Pt currently resting on cot with eyes closed in [facility]. NAD noted. Awaiting re eval time.       Jere Maurer RN  02/17/18 [id number]

## 2018-02-18 PROBLEM — F33.9 RECURRENT DEPRESSION (HCC): Status: RESOLVED | Noted: 2017-11-19 | Resolved: 2018-02-18

## 2018-02-18 PROBLEM — F31.30 BIPOLAR I DISORDER, MOST RECENT EPISODE DEPRESSED (HCC): Chronic | Status: RESOLVED | Noted: 2017-11-05 | Resolved: 2018-02-18

## 2018-02-18 PROBLEM — F31.9 BIPOLAR 1 DISORDER (HCC): Status: RESOLVED | Noted: 2017-03-17 | Resolved: 2018-02-18

## 2018-02-18 LAB
ABSOLUTE EOS #: 0 K/UL (ref 0–0.4)
ABSOLUTE IMMATURE GRANULOCYTE: ABNORMAL K/UL (ref 0–0.3)
ABSOLUTE LYMPH #: 1.7 K/UL (ref 1–4.8)
ABSOLUTE MONO #: 0.7 K/UL (ref 0.1–1.3)
BASOPHILS # BLD: 1 % (ref 0–2)
BASOPHILS ABSOLUTE: 0 K/UL (ref 0–0.2)
DIFFERENTIAL TYPE: ABNORMAL
EOSINOPHILS RELATIVE PERCENT: 1 % (ref 0–4)
HCT VFR BLD CALC: 43.6 % (ref 41–53)
HEMOGLOBIN: 15.2 G/DL (ref 13.5–17.5)
IMMATURE GRANULOCYTES: ABNORMAL %
LYMPHOCYTES # BLD: 30 % (ref 24–44)
MCH RBC QN AUTO: 32.7 PG (ref 26–34)
MCHC RBC AUTO-ENTMCNC: 34.9 G/DL (ref 31–37)
MCV RBC AUTO: 93.7 FL (ref 80–100)
MONOCYTES # BLD: 12 % (ref 1–7)
NRBC AUTOMATED: ABNORMAL PER 100 WBC
PDW BLD-RTO: 14.2 % (ref 11.5–14.9)
PLATELET # BLD: 235 K/UL (ref 150–450)
PLATELET ESTIMATE: ABNORMAL
PMV BLD AUTO: 8.5 FL (ref 6–12)
RBC # BLD: 4.65 M/UL (ref 4.5–5.9)
RBC # BLD: ABNORMAL 10*6/UL
SEG NEUTROPHILS: 56 % (ref 36–66)
SEGMENTED NEUTROPHILS ABSOLUTE COUNT: 3.3 K/UL (ref 1.3–9.1)
WBC # BLD: 5.7 K/UL (ref 3.5–11)
WBC # BLD: ABNORMAL 10*3/UL

## 2018-02-18 PROCEDURE — 36415 COLL VENOUS BLD VENIPUNCTURE: CPT

## 2018-02-18 PROCEDURE — 85025 COMPLETE CBC W/AUTO DIFF WBC: CPT

## 2018-02-18 PROCEDURE — 6370000000 HC RX 637 (ALT 250 FOR IP): Performed by: PSYCHIATRY & NEUROLOGY

## 2018-02-18 PROCEDURE — 1240000000 HC EMOTIONAL WELLNESS R&B

## 2018-02-18 RX ORDER — MAGNESIUM HYDROXIDE/ALUMINUM HYDROXICE/SIMETHICONE 120; 1200; 1200 MG/30ML; MG/30ML; MG/30ML
20 SUSPENSION ORAL EVERY 8 HOURS PRN
Status: DISCONTINUED | OUTPATIENT
Start: 2018-02-18 | End: 2018-02-27 | Stop reason: HOSPADM

## 2018-02-18 RX ORDER — TRAZODONE HYDROCHLORIDE 100 MG/1
100 TABLET ORAL NIGHTLY PRN
Status: DISCONTINUED | OUTPATIENT
Start: 2018-02-18 | End: 2018-02-27 | Stop reason: HOSPADM

## 2018-02-18 RX ORDER — BENZTROPINE MESYLATE 1 MG/ML
2 INJECTION INTRAMUSCULAR; INTRAVENOUS DAILY PRN
Status: DISCONTINUED | OUTPATIENT
Start: 2018-02-18 | End: 2018-02-27 | Stop reason: HOSPADM

## 2018-02-18 RX ORDER — MIRTAZAPINE 15 MG/1
15 TABLET, FILM COATED ORAL NIGHTLY
Status: DISCONTINUED | OUTPATIENT
Start: 2018-02-18 | End: 2018-02-27 | Stop reason: HOSPADM

## 2018-02-18 RX ORDER — ACETAMINOPHEN 325 MG/1
650 TABLET ORAL EVERY 4 HOURS PRN
Status: DISCONTINUED | OUTPATIENT
Start: 2018-02-18 | End: 2018-02-27 | Stop reason: HOSPADM

## 2018-02-18 RX ORDER — HYDROXYZINE HYDROCHLORIDE 25 MG/1
25 TABLET, FILM COATED ORAL 3 TIMES DAILY PRN
Status: DISCONTINUED | OUTPATIENT
Start: 2018-02-18 | End: 2018-02-27 | Stop reason: HOSPADM

## 2018-02-18 RX ORDER — QUETIAPINE FUMARATE 300 MG/1
300 TABLET, FILM COATED ORAL NIGHTLY
Status: DISCONTINUED | OUTPATIENT
Start: 2018-02-18 | End: 2018-02-27 | Stop reason: HOSPADM

## 2018-02-18 RX ADMIN — QUETIAPINE FUMARATE 300 MG: 300 TABLET ORAL at 20:50

## 2018-02-18 RX ADMIN — TRAZODONE HYDROCHLORIDE 100 MG: 100 TABLET ORAL at 22:11

## 2018-02-18 NOTE — PROGRESS NOTES
Seclusion       No contraindication     Restraints      No contraindications    Psychiatric evaluation: This is a 62-year-old white male patient who was admitted on 17 February 2018 from emergency room where he was brought as he has been extremely explosive agitated and expressing suicidal intentions as he states that he has been feeling very hopeless and  depressed. He is not able to provide any reliable information as he Is very agitated, demanding and threatening when approached. He has history of multiple psychiatric hospitalizations and poor compliance with treatment. He has history of polysubstance dependence also. He is not able to provide any medical or personal history at this time. On mental status examination He is of average height and built, uncooperative, agitated and threatening when approached. His thought processes disorganized showing looseness of association and tangentiality. He is quite paranoid, guarded and psychotic as well as explosive. He is alert but his orientation memory or intellectual functions cannot be evaluated because of severity of psychosis and agitation. He has poor judgment and no insight. Diagnostic impression  : Schizoaffective disorder    Treatment plan: Medication management, supportive therapy and hospital milieu. Side effects of medications reviewed and medication education provided.     ELOS:5-7 days

## 2018-02-18 NOTE — PROGRESS NOTES

## 2018-02-19 LAB
EKG ATRIAL RATE: 75 BPM
EKG P AXIS: 60 DEGREES
EKG P-R INTERVAL: 146 MS
EKG Q-T INTERVAL: 396 MS
EKG QRS DURATION: 88 MS
EKG QTC CALCULATION (BAZETT): 442 MS
EKG R AXIS: 57 DEGREES
EKG T AXIS: 36 DEGREES
EKG VENTRICULAR RATE: 75 BPM

## 2018-02-19 PROCEDURE — 1240000000 HC EMOTIONAL WELLNESS R&B

## 2018-02-19 PROCEDURE — 6370000000 HC RX 637 (ALT 250 FOR IP): Performed by: PSYCHIATRY & NEUROLOGY

## 2018-02-19 RX ADMIN — QUETIAPINE FUMARATE 300 MG: 300 TABLET ORAL at 20:59

## 2018-02-19 NOTE — CARE COORDINATION
Pt declined to attend psychotherapy at 1000 am despite encouragement. PT was offered 1:1 and declined on this date.

## 2018-02-19 NOTE — PLAN OF CARE
Problem: Altered Mood, Depressive Behavior:  Goal: Able to verbalize and/or display a decrease in depressive symptoms  Able to verbalize and/or display a decrease in depressive symptoms   Outcome: Ongoing  Pt did not attend mental and physical health education group at 1100 despite staff invitation to attend.

## 2018-02-19 NOTE — PROGRESS NOTES
He continues to be quite labile, kendrick, overwhelmed,responding to internal stimuli and agitated. Hard to redirect and has poor impulse control. Affect -  Superficial  Mood -    Labile  Thought process -  Disorganized,flight of ideas,looseness of association  Cognition -  Impaired  Memory- Recent-Adequate                 Remote-OK  Insight-Poor  Judgement-Poor                                                    Attempted to develop insight. Medications reviewed. .Side effects of medications reviewed and medication education provided    No side effects including akathisia,tremers,dystonia or orthostasis reported or observed. Plan: Stabilization with antipsychotic and mood stabilization medications,group therapy and develop coping skills,discharge to community. Encouraged to interact with peers  and participate in activities. Jammie Riedel

## 2018-02-19 NOTE — PLAN OF CARE
Problem: Altered Mood, Depressive Behavior:  Goal: Able to verbalize acceptance of life and situations over which he or she has no control  Able to verbalize acceptance of life and situations over which he or she has no control   Outcome: Ongoing  5 [facility]  Day 3 Interdisciplinary Treatment Plan NOTE    Review Date & Time: 2/19/18 1:25pm    Admission Type:   Admission Type: Voluntary    Reason for admission:  Reason for Admission: Patient took overdose of Unisom,he was out of [facility] audio/visual hallucinations,currently living at [facility] [employer] of Stay:  5-7 days  Estimated Discharge Date Update:   to be determined by physician    PATIENT STRENGTHS:  Patient Strengths:Strengths: Communication, Connection to output provider, Positive Support  Patient Strengths and Limitations:Limitations: Apathetic / unmotivated, Inappropriate/potentially harmful leisure interests, Difficulty problem solving/relies on others to help solve problems  Addictive Behavior:Addictive Behavior  In the past 3 months, have you felt or has someone told you that you have a problem with:  : None  Do you have a history of Chemical Use?: No  Do you have a history of Alcohol Use?: No  Do you have a history of Street Drug Abuse?: No  Histroy of Prescripton Drug Abuse?: No  Medical Problems:  Past Medical History:   Diagnosis Date    ADHD (attention deficit hyperactivity disorder)     Anxiety     Bipolar 1 disorder ([facility] [address]     PTSD (post-traumatic stress disorder)        Risk:  Fall RiskTotal: 55  Wolfgang Scale Wolfgang Scale Score: 22  BVC Total: 0  Change in scores:  No Changes to plan of Care:  No    Status EXAM:   Status and Exam  Normal: No  Facial Expression: Avoids Gaze, Flat, Sad, Worried  Affect: Blunt  Level of Consciousness: Alert  Mood:Normal: No  Mood: Anxious, Depressed  Motor Activity:Normal: Yes  Interview Behavior: Cooperative, Evasive  Preception: [place] to Person, [place] to Time, [place] to Place, [place] to Situation  Attention:Normal: No  Attention: Distractible  Thought Processes: Blocking  Thought Content:Normal: No  Thought Content: Preoccupations  Hallucinations: Auditory (Comment)  Delusions: No  Memory:Normal: No  Memory: Poor Recent  Insight and Judgment: No  Insight and Judgment: Poor Judgment, Poor Insight  Present Suicidal Ideation: No  Present Homicidal Ideation: No    Daily Assessment Last Entry:   Daily Sleep (WDL): Within Defined Limits         Patient Currently in Pain: No  Daily Nutrition (WDL): Within Defined Limits    Patient Monitoring:  Frequency of Checks: 4 times per hour, close    Psychiatric Symptoms:   Depression Symptoms  Depression Symptoms: Isolative, Loss of interest  Anxiety Symptoms  Anxiety Symptoms: Generalized  Angella Symptoms  Angella Symptoms: No problems reported or observed. Psychosis Symptoms  Delusion Type: No problems reported or observed. Suicide Risk CSSR-S:  1) Within the past month, have you wished you were dead or wished you could go to sleep and not wake up? : YES  2) Within the past month, have you actually had any thoughts of killing yourself? : YES  3) Within the past month, have you been thinking about how you might kill yourself? : YES  5) Within the past month, have you started to work out or worked out the details of how to kill yourself?  Do you intend to carry out this plan? : YES  6)  Have you ever done anything, started to do anything, or prepared to do anything to end your life?: YES  Change in Result:  none Change in Plan of care:  none      EDUCATION:   Learner Progress Toward Treatment Goals:   Reviewed results and recommendations of this team, Reviewed group plan and strategies, Reviewed signs, symptoms and risk of self harm and violent behavior, Reviewed goals and plan of care    Method:  small group, individual verbal education    Outcome:   Verbalized by patient but needs reinforcement to obtain goals    PATIENT GOALS:  Short

## 2018-02-20 PROCEDURE — 6370000000 HC RX 637 (ALT 250 FOR IP): Performed by: PSYCHIATRY & NEUROLOGY

## 2018-02-20 PROCEDURE — 1240000000 HC EMOTIONAL WELLNESS R&B

## 2018-02-20 RX ADMIN — QUETIAPINE FUMARATE 300 MG: 300 TABLET ORAL at 21:29

## 2018-02-20 NOTE — H&P
Clarissas sign negative. Good range of motion upper and lower extremities. NEUROLOGIC:  The patient is very sleepy but awakens for H&P and is a vague historian, He is oriented x3 with clear speech and no focal sensory or motor deficits. Cranial nerves 2-12 intact.     PROVISIONAL DIAGNOSES:      Principal Problem:    Schizoaffective disorder ([facility] [address]  Resolved Problems:    Depression    Recurrent Depression,  Homeless  LIBBY Purdy on 2/20/2018 at 11:12 AM

## 2018-02-20 NOTE — PLAN OF CARE
Problem: Altered Mood, Depressive Behavior:  Goal: Able to verbalize and/or display a decrease in depressive symptoms  Able to verbalize and/or display a decrease in depressive symptoms   Outcome: Ongoing  Pt did not attend communication skills group at 1100 despite staff invitation to attend.

## 2018-02-21 PROCEDURE — 1240000000 HC EMOTIONAL WELLNESS R&B

## 2018-02-21 PROCEDURE — 6370000000 HC RX 637 (ALT 250 FOR IP): Performed by: PSYCHIATRY & NEUROLOGY

## 2018-02-21 RX ADMIN — QUETIAPINE FUMARATE 300 MG: 300 TABLET ORAL at 21:01

## 2018-02-21 NOTE — PLAN OF CARE
Problem: Altered Mood, Depressive Behavior:  Goal: Able to verbalize acceptance of life and situations over which he or she has no control  Able to verbalize acceptance of life and situations over which he or she has no control   Outcome: Ongoing  Patient denies suicidal ideation. Patient has remained free from harm this shift. Goal: Able to verbalize and/or display a decrease in depressive symptoms  Able to verbalize and/or display a decrease in depressive symptoms   Outcome: Ongoing  Patient has been seclusive to room and does not eat meals despite encouragement from staff. Goal: Absence of self-harm  Absence of self-harm   Outcome: Ongoing  Patient has remained free from harm this shift.

## 2018-02-21 NOTE — PLAN OF CARE
Problem: Altered Mood, Depressive Behavior:  Goal: Able to verbalize acceptance of life and situations over which he or she has no control  Able to verbalize acceptance of life and situations over which he or she has no control   Outcome: Ongoing  Pt declined to attend psychotherapy at 1000 am despite encouragement. Pt offered 1:1 and refused.

## 2018-02-22 PROCEDURE — 1240000000 HC EMOTIONAL WELLNESS R&B

## 2018-02-22 PROCEDURE — 6370000000 HC RX 637 (ALT 250 FOR IP): Performed by: PSYCHIATRY & NEUROLOGY

## 2018-02-22 RX ADMIN — QUETIAPINE FUMARATE 300 MG: 300 TABLET ORAL at 21:35

## 2018-02-22 RX ADMIN — MIRTAZAPINE 15 MG: 15 TABLET, FILM COATED ORAL at 21:35

## 2018-02-22 NOTE — PLAN OF CARE
Problem: Altered Mood, Depressive Behavior:  Goal: Able to verbalize and/or display a decrease in depressive symptoms  Able to verbalize and/or display a decrease in depressive symptoms   Outcome: Ongoing  Pt did not attend socialization and coping skills group at 1000 despite staff invitation to attend.

## 2018-02-22 NOTE — PLAN OF CARE
Problem: Altered Mood, Depressive Behavior:  Goal: Absence of self-harm  Absence of self-harm   Outcome: Ongoing  Patient remains free from self harm.

## 2018-02-22 NOTE — PROGRESS NOTES
He is gradually and slowly improving, less hostile and belligerent. He is still quite withdrawn, seclusive, and reality testing is impaired. Still delusional and hallucinating. Affect-Superficial  Mood -  labile  Thought process -  Disorganized showing looseness of association and circumstantiality. Reality testing-Impaired  Cognition -  Improving  Memory- Recent-Intact                Remote-Intact  Orientation-OK                                            Insight-Poor  Judgement-Poor   Attempted to challenge his delusions. Attempted to develop insight. Medications reviewed. Side effects of medications reviewed and medication education provided  . No side effects including akathisia,tremers,dystonia or orthostasis reported or observed. Plan: Stabilization with antipsychotic and mood stabilization medications,group therapy and develop coping skills,discharge to community. Encouraged to interact with peers  and participate in activities.

## 2018-02-22 NOTE — PLAN OF CARE
Problem: Altered Mood, Depressive Behavior:  Goal: Able to verbalize and/or display a decrease in depressive symptoms  Able to verbalize and/or display a decrease in depressive symptoms   Outcome: Ongoing  Pt did not attend decision making and support group at 1435 despite staff invitation to attend.

## 2018-02-23 PROCEDURE — 1240000000 HC EMOTIONAL WELLNESS R&B

## 2018-02-23 PROCEDURE — 6370000000 HC RX 637 (ALT 250 FOR IP): Performed by: PSYCHIATRY & NEUROLOGY

## 2018-02-23 RX ADMIN — QUETIAPINE FUMARATE 300 MG: 300 TABLET ORAL at 22:36

## 2018-02-23 NOTE — PLAN OF CARE
Problem: Altered Mood, Depressive Behavior:  Goal: Able to verbalize acceptance of life and situations over which he or she has no control  Able to verbalize acceptance of life and situations over which he or she has no control   Patient did not attend skills group which focused on decreasing symptoms developed by seasonal depression from [id number] despite staff encouragement and prompts.

## 2018-02-23 NOTE — PLAN OF CARE
Problem: Altered Mood, Depressive Behavior:  Goal: Able to verbalize acceptance of life and situations over which he or she has no control  Able to verbalize acceptance of life and situations over which he or she has no control   Patient did not attend communication skills group from [id number] despite staff encouragement and prompts.

## 2018-02-24 PROCEDURE — 6370000000 HC RX 637 (ALT 250 FOR IP): Performed by: PSYCHIATRY & NEUROLOGY

## 2018-02-24 PROCEDURE — 1240000000 HC EMOTIONAL WELLNESS R&B

## 2018-02-24 RX ADMIN — QUETIAPINE FUMARATE 300 MG: 300 TABLET ORAL at 21:15

## 2018-02-24 RX ADMIN — MIRTAZAPINE 15 MG: 15 TABLET, FILM COATED ORAL at 21:15

## 2018-02-24 NOTE — PLAN OF CARE
Problem: Altered Mood, Depressive Behavior:  Goal: Able to verbalize acceptance of life and situations over which he or she has no control  Able to verbalize acceptance of life and situations over which he or she has no control   Outcome: Ongoing  Pt refuses to elaborate on mood. Pt is flat, remains non-interactive and isolative to self. Goal: Able to verbalize and/or display a decrease in depressive symptoms  Able to verbalize and/or display a decrease in depressive symptoms   Outcome: Ongoing  Pt did not attend any groups. Pt appears dissheveled with poor ADL's.  Pt has been refusing meals, encouraged to eat. Goal: Absence of self-harm  Absence of self-harm   Outcome: Ongoing  Pt has remained free from self harm. Pt denies thoughts of self harm and is agreeable to seeking out should thoughts of self harm arise. Safe environment maintained. Q15 minute checks for safety cont per unit policy. Will cont to monitor for safety and provide support and reassurance as needed.

## 2018-02-24 NOTE — PROGRESS NOTES
Pt did not attend [phone] communication skills group d/t resting in room despite staff invitation to attend.

## 2018-02-25 PROCEDURE — 6370000000 HC RX 637 (ALT 250 FOR IP): Performed by: PSYCHIATRY & NEUROLOGY

## 2018-02-25 PROCEDURE — 1240000000 HC EMOTIONAL WELLNESS R&B

## 2018-02-25 RX ADMIN — QUETIAPINE FUMARATE 300 MG: 300 TABLET ORAL at 21:12

## 2018-02-25 NOTE — PLAN OF CARE
Problem: Altered Mood, Depressive Behavior:  Goal: Able to verbalize acceptance of life and situations over which he or she has no control  Able to verbalize acceptance of life and situations over which he or she has no control   Outcome: Ongoing  Pt reports lack of acceptance of life situations and encouraged to attend unit programming to help with acceptance. Goal: Able to verbalize and/or display a decrease in depressive symptoms  Able to verbalize and/or display a decrease in depressive symptoms   Outcome: Ongoing  Pt admits to dep (2/10). Goal: Absence of self-harm  Absence of self-harm   Outcome: Ongoing  Safe environment maintained & pt remains free from self-harm. Agreeable to seeking staff should thoughts to harm self or others arise. Q15min checks for safety.

## 2018-02-25 NOTE — PLAN OF CARE
Problem: Altered Mood, Depressive Behavior:  Goal: Able to verbalize acceptance of life and situations over which he or she has no control  Able to verbalize acceptance of life and situations over which he or she has no control   Outcome: Ongoing  [facility] Interdisciplinary Treatment Plan Note     Review Date & Time: 2/25/2018 1145    Admission Type:   Admission Type: Voluntary    Reason for admission:  Reason for Admission: Patient took overdose of Unisom,he was out of [facility] audio/visual hallucinations,currently living at [facility] Group of Stay Update:  Est 3-7 days, to be determined by physician  Estimated Discharge Date Update: to be determined by physician    PATIENT STRENGTHS:  Patient Strengths:Strengths: Communication, Connection to output provider, Positive Support  Patient Strengths and Limitations:Limitations: Apathetic / unmotivated, Inappropriate/potentially harmful leisure interests, Difficulty problem solving/relies on others to help solve problems  Addictive Behavior:Addictive Behavior  In the past 3 months, have you felt or has someone told you that you have a problem with:  : None  Do you have a history of Chemical Use?: No  Do you have a history of Alcohol Use?: No  Do you have a history of Street Drug Abuse?: No  Histroy of Prescripton Drug Abuse?: No  Medical Problems:   Past Medical History:   Diagnosis Date    ADHD (attention deficit hyperactivity disorder)     Anxiety     Bipolar 1 disorder ([facility] [address]     PTSD (post-traumatic stress disorder)        Risk:  Fall RiskTotal: 55  Wolfgang Scale Wolfgang Scale Score: 22  BVC Total: 0  Change in scoresNO.  Changes to plan of Care NO    Status EXAM:   Status and Exam  Normal: No  Facial Expression: Avoids Gaze, Flat  Affect: Blunt  Level of Consciousness: Alert  Mood:Normal: No  Mood: Depressed, Anxious, Helpless  Motor Activity:Normal: Yes  Motor Activity: Increased  Interview Behavior: Cooperative, understanding    PATIENT GOALS: pt stated his short term goal is to stay on his medication. Pt stated his long term goal is to stay clean and sober.     PLAN/TREATMENT RECOMMENDATIONS UPDATE:   COPING SKILLS CONTINUE WITH GROUP THERAPIES POSITIVE INTERACTIONS, GOAL SETTING    SHORT-TERM GOALS UPDATE:  Time frame for Short-Term Goals: 1-2 WEEKS     LONG-TERM GOALS UPDATE:  Time frame for Long-Term Goals: 6 MONTHS  Members Present in Team Meeting: See Signature Sheet    Aliya Merritt

## 2018-02-26 PROCEDURE — 1240000000 HC EMOTIONAL WELLNESS R&B

## 2018-02-26 PROCEDURE — 6370000000 HC RX 637 (ALT 250 FOR IP): Performed by: PSYCHIATRY & NEUROLOGY

## 2018-02-26 RX ORDER — TRAZODONE HYDROCHLORIDE 100 MG/1
100 TABLET ORAL NIGHTLY PRN
Qty: 30 TABLET | Refills: 0 | Status: ON HOLD | OUTPATIENT
Start: 2018-02-26 | End: 2018-03-11 | Stop reason: HOSPADM

## 2018-02-26 RX ORDER — MIRTAZAPINE 15 MG/1
15 TABLET, FILM COATED ORAL NIGHTLY
Qty: 30 TABLET | Refills: 0 | Status: ON HOLD | OUTPATIENT
Start: 2018-02-26 | End: 2018-03-11 | Stop reason: HOSPADM

## 2018-02-26 RX ORDER — QUETIAPINE FUMARATE 300 MG/1
300 TABLET, FILM COATED ORAL NIGHTLY
Qty: 30 TABLET | Refills: 0 | Status: ON HOLD | OUTPATIENT
Start: 2018-02-26 | End: 2018-03-02

## 2018-02-26 RX ADMIN — MIRTAZAPINE 15 MG: 15 TABLET, FILM COATED ORAL at 20:42

## 2018-02-26 RX ADMIN — QUETIAPINE FUMARATE 300 MG: 300 TABLET ORAL at 20:41

## 2018-02-26 NOTE — PLAN OF CARE
Problem: Altered Mood, Depressive Behavior:  Goal: Able to verbalize acceptance of life and situations over which he or she has no control  Able to verbalize acceptance of life and situations over which he or she has no control   Patient did not attend community meeting and goal setting from [id number] despite staff encouragement and prompts.

## 2018-02-27 VITALS
HEIGHT: 72 IN | WEIGHT: 190 LBS | SYSTOLIC BLOOD PRESSURE: 94 MMHG | RESPIRATION RATE: 12 BRPM | DIASTOLIC BLOOD PRESSURE: 67 MMHG | BODY MASS INDEX: 25.73 KG/M2 | TEMPERATURE: 98.4 F | HEART RATE: 92 BPM

## 2018-02-27 PROCEDURE — 5130000000 HC BRIDGE APPOINTMENT

## 2018-02-27 NOTE — BH NOTE
[facility]  Discharge Note    Pt discharged with followings belongings:   Dentures: None  Vision - Corrective Lenses: Contacts  Hearing Aid: None (in 1 eye)  Jewelry: Necklace  Body Piercings Removed: N/A  Clothing: Footwear  Were All Patient Medications Collected?: Yes  Other Valuables: Cell phone, Money (Comment)   Valuables sent home with patient. Valuables retrieved from safe, Security envelope number:  [id number],  and returned to patient. Patient left department with Departure Mode: By self, In cab via Mobility at Departure: Ambulatory, discharged to Discharged to: Private Residence. Patient education on aftercare instructions: Given. Information faxed to [facility]  by staff. Patient verbalize understanding of AVS:  Yes.     Status EXAM upon discharge:  Status and Exam  Normal: No  Facial Expression: Flat  Affect: Blunt  Level of Consciousness: Alert  Mood:Normal: No  Mood: Depressed, Suspicious  Motor Activity:Normal: Yes  Motor Activity: Increased  Interview Behavior: Cooperative  Preception: Hospers to Person, Prentiss Ruff to Time, Hospers to Place, Hospers to Situation  Attention:Normal: No  Attention: Distractible, Unable to Concentrate  Thought Processes: Blocking  Thought Content:Normal: No  Thought Content: Poverty of Content, Paranoia  Hallucinations: None  Delusions: No  Memory:Normal:  (unable to assess)  Memory: Confabulation  Insight and Judgment: No  Insight and Judgment: Poor Judgment, Poor Insight, Unmotivated, Unrealistic  Present Suicidal Ideation: No  Present Homicidal Ideation: No    Jayla Hamm RN

## 2018-02-27 NOTE — PLAN OF CARE
Problem: Altered Mood, Depressive Behavior:  Goal: Able to verbalize and/or display a decrease in depressive symptoms  Able to verbalize and/or display a decrease in depressive symptoms   Patient did not attend skills group which focused on learning how to budget from [id number] despite staff encouragement and prompts.

## 2018-02-28 ENCOUNTER — HOSPITAL ENCOUNTER (INPATIENT)
Age: 28
LOS: 2 days | Discharge: PSYCHIATRIC OR SUBSTANCE USE FACILITY | DRG: 817 | End: 2018-03-02
Attending: EMERGENCY MEDICINE | Admitting: INTERNAL MEDICINE
Payer: COMMERCIAL

## 2018-02-28 DIAGNOSIS — R44.3 HALLUCINATION: Primary | ICD-10-CM

## 2018-02-28 DIAGNOSIS — T50.902A INTENTIONAL DRUG OVERDOSE, INITIAL ENCOUNTER (HCC): ICD-10-CM

## 2018-02-28 DIAGNOSIS — F10.920 ACUTE ALCOHOLIC INTOXICATION WITHOUT COMPLICATION (HCC): ICD-10-CM

## 2018-02-28 PROBLEM — F10.10 ALCOHOL ABUSE: Status: ACTIVE | Noted: 2018-02-28

## 2018-02-28 PROBLEM — T50.901A OVERDOSE DRUG, INITIAL ENCOUNTER: Status: ACTIVE | Noted: 2018-02-28

## 2018-02-28 LAB
ACETAMINOPHEN LEVEL: <10 UG/ML (ref 10–30)
AMPHETAMINE SCREEN URINE: NEGATIVE
AMPHETAMINE SCREEN URINE: NEGATIVE
ANION GAP SERPL CALCULATED.3IONS-SCNC: 19 MMOL/L (ref 9–17)
BARBITURATE SCREEN URINE: NEGATIVE
BARBITURATE SCREEN URINE: NEGATIVE
BENZODIAZEPINE SCREEN, URINE: NEGATIVE
BENZODIAZEPINE SCREEN, URINE: NEGATIVE
BUN BLDV-MCNC: 19 MG/DL (ref 6–20)
BUN/CREAT BLD: ABNORMAL (ref 9–20)
BUPRENORPHINE URINE: NORMAL
BUPRENORPHINE URINE: NORMAL
CALCIUM SERPL-MCNC: 8.4 MG/DL (ref 8.6–10.4)
CANNABINOID SCREEN URINE: NEGATIVE
CANNABINOID SCREEN URINE: NEGATIVE
CHLORIDE BLD-SCNC: 99 MMOL/L (ref 98–107)
CO2: 19 MMOL/L (ref 20–31)
COCAINE METABOLITE, URINE: NEGATIVE
COCAINE METABOLITE, URINE: NEGATIVE
CREAT SERPL-MCNC: 0.87 MG/DL (ref 0.7–1.2)
ETHANOL PERCENT: 0.24 %
ETHANOL: 243 MG/DL
GFR AFRICAN AMERICAN: >60 ML/MIN
GFR NON-AFRICAN AMERICAN: >60 ML/MIN
GFR SERPL CREATININE-BSD FRML MDRD: ABNORMAL ML/MIN/{1.73_M2}
GFR SERPL CREATININE-BSD FRML MDRD: ABNORMAL ML/MIN/{1.73_M2}
GLUCOSE BLD-MCNC: 87 MG/DL (ref 70–99)
HCT VFR BLD CALC: 41.9 % (ref 40.7–50.3)
HEMOGLOBIN: 14.2 G/DL (ref 13–17)
MCH RBC QN AUTO: 32.4 PG (ref 25.2–33.5)
MCHC RBC AUTO-ENTMCNC: 33.9 G/DL (ref 28.4–34.8)
MCV RBC AUTO: 95.7 FL (ref 82.6–102.9)
MDMA URINE: NORMAL
MDMA URINE: NORMAL
METHADONE SCREEN, URINE: NEGATIVE
METHADONE SCREEN, URINE: NEGATIVE
METHAMPHETAMINE, URINE: NORMAL
METHAMPHETAMINE, URINE: NORMAL
NRBC AUTOMATED: 0 PER 100 WBC
OPIATES, URINE: NEGATIVE
OPIATES, URINE: NEGATIVE
OXYCODONE SCREEN URINE: NEGATIVE
OXYCODONE SCREEN URINE: NEGATIVE
PDW BLD-RTO: 13.2 % (ref 11.8–14.4)
PHENCYCLIDINE, URINE: NEGATIVE
PHENCYCLIDINE, URINE: NEGATIVE
PLATELET # BLD: 240 K/UL (ref 138–453)
PMV BLD AUTO: 11.1 FL (ref 8.1–13.5)
POTASSIUM SERPL-SCNC: 3.3 MMOL/L (ref 3.7–5.3)
PROPOXYPHENE, URINE: NORMAL
PROPOXYPHENE, URINE: NORMAL
RBC # BLD: 4.38 M/UL (ref 4.21–5.77)
SALICYLATE LEVEL: <1 MG/DL (ref 3–10)
SODIUM BLD-SCNC: 137 MMOL/L (ref 135–144)
TEST INFORMATION: NORMAL
TEST INFORMATION: NORMAL
TOXIC TRICYCLIC SC,BLOOD: NEGATIVE
TRICYCLIC ANTIDEPRESSANTS, UR: NORMAL
TRICYCLIC ANTIDEPRESSANTS, UR: NORMAL
WBC # BLD: 4.5 K/UL (ref 3.5–11.3)

## 2018-02-28 PROCEDURE — 2060000000 HC ICU INTERMEDIATE R&B

## 2018-02-28 PROCEDURE — 2580000003 HC RX 258: Performed by: INTERNAL MEDICINE

## 2018-02-28 PROCEDURE — 80307 DRUG TEST PRSMV CHEM ANLYZR: CPT

## 2018-02-28 PROCEDURE — 6370000000 HC RX 637 (ALT 250 FOR IP): Performed by: INTERNAL MEDICINE

## 2018-02-28 PROCEDURE — 99285 EMERGENCY DEPT VISIT HI MDM: CPT

## 2018-02-28 PROCEDURE — 99222 1ST HOSP IP/OBS MODERATE 55: CPT | Performed by: INTERNAL MEDICINE

## 2018-02-28 PROCEDURE — 85027 COMPLETE CBC AUTOMATED: CPT

## 2018-02-28 PROCEDURE — 80048 BASIC METABOLIC PNL TOTAL CA: CPT

## 2018-02-28 PROCEDURE — 93005 ELECTROCARDIOGRAM TRACING: CPT

## 2018-02-28 PROCEDURE — G0480 DRUG TEST DEF 1-7 CLASSES: HCPCS

## 2018-02-28 PROCEDURE — 6370000000 HC RX 637 (ALT 250 FOR IP): Performed by: NURSE PRACTITIONER

## 2018-02-28 PROCEDURE — 2580000003 HC RX 258: Performed by: EMERGENCY MEDICINE

## 2018-02-28 RX ORDER — 0.9 % SODIUM CHLORIDE 0.9 %
1000 INTRAVENOUS SOLUTION INTRAVENOUS ONCE
Status: COMPLETED | OUTPATIENT
Start: 2018-02-28 | End: 2018-02-28

## 2018-02-28 RX ORDER — SODIUM CHLORIDE 0.9 % (FLUSH) 0.9 %
10 SYRINGE (ML) INJECTION PRN
Status: DISCONTINUED | OUTPATIENT
Start: 2018-02-28 | End: 2018-03-02 | Stop reason: HOSPADM

## 2018-02-28 RX ORDER — THIAMINE MONONITRATE (VIT B1) 100 MG
100 TABLET ORAL DAILY
Status: DISCONTINUED | OUTPATIENT
Start: 2018-02-28 | End: 2018-03-02 | Stop reason: HOSPADM

## 2018-02-28 RX ORDER — POTASSIUM CHLORIDE 20MEQ/15ML
40 LIQUID (ML) ORAL PRN
Status: DISCONTINUED | OUTPATIENT
Start: 2018-02-28 | End: 2018-03-02 | Stop reason: HOSPADM

## 2018-02-28 RX ORDER — POTASSIUM CHLORIDE 7.45 MG/ML
10 INJECTION INTRAVENOUS PRN
Status: DISCONTINUED | OUTPATIENT
Start: 2018-02-28 | End: 2018-03-02 | Stop reason: HOSPADM

## 2018-02-28 RX ORDER — SODIUM CHLORIDE 0.9 % (FLUSH) 0.9 %
10 SYRINGE (ML) INJECTION EVERY 12 HOURS SCHEDULED
Status: DISCONTINUED | OUTPATIENT
Start: 2018-02-28 | End: 2018-03-02 | Stop reason: HOSPADM

## 2018-02-28 RX ORDER — NICOTINE 21 MG/24HR
1 PATCH, TRANSDERMAL 24 HOURS TRANSDERMAL DAILY PRN
Status: DISCONTINUED | OUTPATIENT
Start: 2018-02-28 | End: 2018-03-02 | Stop reason: HOSPADM

## 2018-02-28 RX ORDER — SODIUM CHLORIDE 9 MG/ML
INJECTION, SOLUTION INTRAVENOUS CONTINUOUS
Status: DISCONTINUED | OUTPATIENT
Start: 2018-02-28 | End: 2018-03-02 | Stop reason: HOSPADM

## 2018-02-28 RX ORDER — POTASSIUM CHLORIDE 20 MEQ/1
40 TABLET, EXTENDED RELEASE ORAL PRN
Status: DISCONTINUED | OUTPATIENT
Start: 2018-02-28 | End: 2018-03-02 | Stop reason: HOSPADM

## 2018-02-28 RX ORDER — ONDANSETRON 2 MG/ML
4 INJECTION INTRAMUSCULAR; INTRAVENOUS EVERY 6 HOURS PRN
Status: DISCONTINUED | OUTPATIENT
Start: 2018-02-28 | End: 2018-03-02 | Stop reason: HOSPADM

## 2018-02-28 RX ORDER — LORAZEPAM 2 MG/ML
1 INJECTION INTRAMUSCULAR EVERY 4 HOURS PRN
Status: DISCONTINUED | OUTPATIENT
Start: 2018-02-28 | End: 2018-03-02 | Stop reason: HOSPADM

## 2018-02-28 RX ADMIN — POTASSIUM CHLORIDE 40 MEQ: 20 TABLET, EXTENDED RELEASE ORAL at 20:05

## 2018-02-28 RX ADMIN — Medication 100 MG: at 20:04

## 2018-02-28 RX ADMIN — SODIUM CHLORIDE 1000 ML: 9 INJECTION, SOLUTION INTRAVENOUS at 15:08

## 2018-02-28 RX ADMIN — SODIUM CHLORIDE: 9 INJECTION, SOLUTION INTRAVENOUS at 20:02

## 2018-02-28 ASSESSMENT — ENCOUNTER SYMPTOMS
SHORTNESS OF BREATH: 0
CONSTIPATION: 0
NAUSEA: 0
WHEEZING: 0
BLURRED VISION: 0
ABDOMINAL PAIN: 0
VOMITING: 0
RHINORRHEA: 0
DOUBLE VISION: 0
COUGH: 0
SORE THROAT: 0
HEMOPTYSIS: 0
DIARRHEA: 0
ABDOMINAL DISTENTION: 0

## 2018-02-28 NOTE — ED PROVIDER NOTES
[facility] ED  Emergency Department        Pt Name: Chrissy Rivera  MRN: [id number]  [place] 1990  Date of evaluation: 2/28/18    CHIEF COMPLAINT       Chief Complaint   Patient presents with    Hallucinations     auditory    Suicidal    Homicidal       HISTORY OF PRESENT ILLNESS  (Location/Symptom, Timing/Onset, Context/Setting, Quality, Duration, Modifying Factors, Severity.)      Chrissy Rivera is a 28 y.o. male who presents with Hallucinations, telling him to slit his wrist.  Patient states that he bot and night. Patient recently inpatient at [facility], was discharged yesterday. Patient does state that he had some changes made to his medication. He says that he is supposed to be on Tegretol but that the physician took him off of it. Denies any drug use, no alcohol use. PAST MEDICAL / SURGICAL / SOCIAL / FAMILY HISTORY      has a past medical history of ADHD (attention deficit hyperactivity disorder); Anxiety; Bipolar 1 disorder ([address] and PTSD (post-traumatic stress disorder). has no past surgical history on file. Social History     Social History    Marital status: Single     Spouse name: N/A    Number of children: N/A    Years of education: N/A     Occupational History    Not on file.      Social History Main Topics    Smoking status: Former Smoker     Packs/day: 0.50     Types: Cigarettes    Smokeless tobacco: Never Used    Alcohol use 4.2 oz/week     7 Cans of beer per week      Comment: Daily; bottle of volka daily    Drug use: No      Comment: Heroin in past     Sexual activity: No     Other Topics Concern    Not on file     Social History Narrative    No narrative on file       Family History   Problem Relation Age of Onset    Liver Cancer Brother     Alcohol Abuse Brother     No Known Problems Mother     No Known Problems Father        Allergies:  Advil [ibuprofen]    Home Medications:  Prior to Admission medications    Medication Sig Start Date End Date Taking? Authorizing Provider   traZODone (DESYREL) 100 MG tablet Take 1 tablet by mouth nightly as needed for Sleep 2/26/18   Naveed Brown MD   QUEtiapine (SEROQUEL) 300 MG tablet Take 1 tablet by mouth nightly 2/26/18   Naveed Brown MD   mirtazapine (REMERON) 15 MG tablet Take 1 tablet by mouth nightly 2/26/18   Naveed Brown MD       REVIEW OF SYSTEMS    (2-9 systems for level 4, 10 or more for level 5)      Review of Systems   Constitutional: Negative for activity change, appetite change, fatigue and fever. HENT: Negative for congestion, rhinorrhea and sore throat. Respiratory: Negative for cough, shortness of breath and wheezing. Cardiovascular: Negative for chest pain, palpitations and leg swelling. Gastrointestinal: Negative for abdominal distention, constipation, diarrhea, nausea and vomiting. Genitourinary: Negative for decreased urine volume and dysuria. Skin: Negative for rash. Neurological: Negative for dizziness, weakness, light-headedness, numbness and headaches. Psychiatric/Behavioral: Positive for hallucinations and suicidal ideas. PHYSICAL EXAM   (up to 7 for level 4, 8 or more for level 5)      INITIAL VITALS:   /76   Pulse 89   Temp 96.8 °F (36 °C) (Oral)   Resp (S) 10   SpO2 98%     Physical Exam   Constitutional: He is oriented to person, place, and time. Patient is awake, speaking in full sentences in no distress, planes of hallucinations. But is oriented to person place and time   HENT:   Head: Normocephalic and atraumatic. Eyes: Conjunctivae are normal. Right eye exhibits no discharge. Left eye exhibits no discharge. Cardiovascular: Normal rate, regular rhythm and normal heart sounds. Exam reveals no gallop and no friction rub. No murmur heard. Pulmonary/Chest: Effort normal and breath sounds normal. No respiratory distress. He has no wheezes. He has no rales. He exhibits no tenderness. Abdominal: Soft.  He exhibits no

## 2018-02-28 NOTE — ED PROVIDER NOTES
[facility]  ED  Emergency Department  Emergency Medicine Resident Sign-out     Care of Jony Argueta was assumed from Dr. Katie Cr and is being seen for Hallucinations (auditory); Suicidal; and Homicidal  .  The patient's initial evaluation and plan have been discussed with the prior provider who initially evaluated the patient. EMERGENCY DEPARTMENT COURSE / MEDICAL DECISION MAKING:       MEDICATIONS GIVEN:  Orders Placed This Encounter   Medications    0.9 % sodium chloride bolus       LABS / RADIOLOGY:     Labs Reviewed   BASIC METABOLIC PANEL - Abnormal; Notable for the following:        Result Value    Calcium 8.4 (*)     Potassium 3.3 (*)     CO2 19 (*)     Anion Gap 19 (*)     All other components within normal limits   TOX SCR, BLD, ED - Abnormal; Notable for the following:     Ethanol 774 (*)     Salicylate Lvl <1 (*)     Acetaminophen Level <10 (*)     All other components within normal limits   CBC   URINE DRUG SCREEN       No results found. RECENT VITALS:     Temp: 96.8 °F (36 °C),  Pulse: 89, Resp: (S) 10, BP: 101/76, SpO2: 98 %    This patient is a 28 y.o. Male with initially presented with hallucinations, telling him to slit his wrists. Patient took handful of 15 Seroquel tablets, pleasant control called. Patient more somnolent and has had hypotension, has received 2 L of fluids. OUTSTANDING TASKS / RECOMMENDATIONS:    1. Awaiting admission per [facility]     FINAL IMPRESSION:     1. Hallucination    2. Intentional drug overdose, initial encounter ([facility] [address]    3. Acute alcoholic intoxication without complication ([facility] [address]        DISPOSITION:         DISPOSITION:  []  Discharge   []  Transfer -    [x]  Admission -     []  Against Medical Advice   []  Eloped   FOLLOW-UP: No follow-up provider specified.    DISCHARGE MEDICATIONS: New Prescriptions    No medications on file          Alfred Frost DO  Emergency Medicine Resident  [facility]

## 2018-02-28 NOTE — ED NOTES
First contact with pt on arrival pt admits to having a knife he states it to hurt staff in ED and then kill himself. Pt pulled knife from pocket. Writer able to talk pt down and pt hands knife over to writer. Writer left SULEIMAN to remove knife from situation. Security called. After writer left room, pt reported to walk into restroom and now admits to ingesting unknown number of Seroquel pills. Pt has become slower to arouse however still answering questions at this time. Pt placed on cardiac monitor, ekg performed and labs obtained.        Frankey Splinter, RN  02/28/18 4225
Pt moved from [facility] to room 25, reports received from [facility]. Pt remains on full cardiac monitor.      Delfino Allen RN  02/28/18 8902
and belongings search:     Persons present during search:   Results of search and disposition:       Searchers Name: security     These items or items similar should be removed from the room:   [] Chairs   [] Telephone   [] Trash cans and liners   [x] Plastic utensils (order Patient Safety tray)   [x] Empty or remove Sharps containers   [x] All personal clothing/belongings removed   [x] All unnecessary lead wires, electrical cords, draw cords, etc.   [x] Flowers and plants   [x] Double check for lighters, matches, razors, any glass items etc that can be used as weapons. Person completing Checklist: Ryland Astudillo       GENERAL INFORMATION     Y  N     [x] [] Has the patient been informed that they are on a watch and what that means? [x] [] Can the patient get out of Bed without nursing assistance? [x] [] Can the patient use the restroom without nursing assistance? [x] [] Can the patient walk the halls to [place] their legs? \"   [] [x] Does the patient have metal utensils? [x] [] Have the patient's belongings been placed out of control of the patient? [x] [] Have the patient and his/her belongings been checked for contraband? [] [x] Is the patient under any visitor restrictions? If Yes, explain:   [] [x] Is the patient under an alias? [place] [id number] Name:   Authorized visitors (no more than two are to be on the list)   Name/Relationship:   Name/Relationship:    Name of Staff member that you  Received this information from?: security    General Description:    Karlo Marie BH31/[id number] male 28 y.o. Admission weight:      Race: []  [] Black  []   []   [] [place] [] Other  Facial Hair:  [] Yes  [] No  If yes, please describe: Identifying Marks (i.e. Visible tattoos, scars, etc... ):     NURSING CARE PLAN    Nursing Diagnosis: Risk of Self Directed Harm  [x] Actual  [] Potential  Date Started: 2/28/18      Etiological Factors: (related to)  [x] Expressed or implied

## 2018-03-01 LAB
ALBUMIN SERPL-MCNC: 3.9 G/DL (ref 3.5–5.2)
ALBUMIN/GLOBULIN RATIO: 1.8 (ref 1–2.5)
ALP BLD-CCNC: 36 U/L (ref 40–129)
ALT SERPL-CCNC: 9 U/L (ref 5–41)
ANION GAP SERPL CALCULATED.3IONS-SCNC: 10 MMOL/L (ref 9–17)
AST SERPL-CCNC: 15 U/L
BILIRUB SERPL-MCNC: 0.48 MG/DL (ref 0.3–1.2)
BUN BLDV-MCNC: 16 MG/DL (ref 6–20)
BUN/CREAT BLD: ABNORMAL (ref 9–20)
CALCIUM SERPL-MCNC: 8.8 MG/DL (ref 8.6–10.4)
CHLORIDE BLD-SCNC: 107 MMOL/L (ref 98–107)
CO2: 25 MMOL/L (ref 20–31)
CREAT SERPL-MCNC: 0.96 MG/DL (ref 0.7–1.2)
EKG ATRIAL RATE: 83 BPM
EKG P AXIS: 80 DEGREES
EKG P-R INTERVAL: 154 MS
EKG Q-T INTERVAL: 384 MS
EKG QRS DURATION: 90 MS
EKG QTC CALCULATION (BAZETT): 451 MS
EKG R AXIS: 79 DEGREES
EKG T AXIS: 57 DEGREES
EKG VENTRICULAR RATE: 83 BPM
GFR AFRICAN AMERICAN: >60 ML/MIN
GFR NON-AFRICAN AMERICAN: >60 ML/MIN
GFR SERPL CREATININE-BSD FRML MDRD: ABNORMAL ML/MIN/{1.73_M2}
GFR SERPL CREATININE-BSD FRML MDRD: ABNORMAL ML/MIN/{1.73_M2}
GLUCOSE BLD-MCNC: 85 MG/DL (ref 70–99)
HCT VFR BLD CALC: 38.9 % (ref 40.7–50.3)
HEMOGLOBIN: 12.8 G/DL (ref 13–17)
MAGNESIUM: 2 MG/DL (ref 1.6–2.6)
MCH RBC QN AUTO: 31.7 PG (ref 25.2–33.5)
MCHC RBC AUTO-ENTMCNC: 32.9 G/DL (ref 28.4–34.8)
MCV RBC AUTO: 96.3 FL (ref 82.6–102.9)
NRBC AUTOMATED: 0 PER 100 WBC
PDW BLD-RTO: 13.7 % (ref 11.8–14.4)
PLATELET # BLD: 213 K/UL (ref 138–453)
PMV BLD AUTO: 11.1 FL (ref 8.1–13.5)
POTASSIUM SERPL-SCNC: 4.5 MMOL/L (ref 3.7–5.3)
RBC # BLD: 4.04 M/UL (ref 4.21–5.77)
SODIUM BLD-SCNC: 142 MMOL/L (ref 135–144)
TOTAL PROTEIN: 6.1 G/DL (ref 6.4–8.3)
WBC # BLD: 3.9 K/UL (ref 3.5–11.3)

## 2018-03-01 PROCEDURE — 83735 ASSAY OF MAGNESIUM: CPT

## 2018-03-01 PROCEDURE — 80053 COMPREHEN METABOLIC PANEL: CPT

## 2018-03-01 PROCEDURE — 94762 N-INVAS EAR/PLS OXIMTRY CONT: CPT

## 2018-03-01 PROCEDURE — 99232 SBSQ HOSP IP/OBS MODERATE 35: CPT | Performed by: INTERNAL MEDICINE

## 2018-03-01 PROCEDURE — 2060000000 HC ICU INTERMEDIATE R&B

## 2018-03-01 PROCEDURE — 36415 COLL VENOUS BLD VENIPUNCTURE: CPT

## 2018-03-01 PROCEDURE — 85027 COMPLETE CBC AUTOMATED: CPT

## 2018-03-01 ASSESSMENT — ENCOUNTER SYMPTOMS
NAUSEA: 0
VOMITING: 0
SHORTNESS OF BREATH: 0
COUGH: 0
SPUTUM PRODUCTION: 0
HEMOPTYSIS: 0

## 2018-03-01 ASSESSMENT — PAIN SCALES - GENERAL
PAINLEVEL_OUTOF10: 0

## 2018-03-01 NOTE — CARE COORDINATION
Case Management Initial Discharge Plan  Nasima Hernandez,         Readmission Risk              Readmission Risk:        15.5       Age 72 or Greater:  0    Admitted from SNF or Requires Paid or Family Care:  0    Currently has CHF,COPD,ARF,CRI,or is on dialysis:  0    Takes more than 5 Prescription Medications:  0    Takes Digoxin,Insulin,Anticoagulants,Narcotics or ASA/Plavix:  [facility] in Past 12 Months:  10    On Disability:  3    Patient Considers own Health:  2.5            Met with:patient to discuss discharge plans. Information verified: address, contacts, phone number, , insurance Yes, lives with mom, not sure of address  PCP: No primary care provider on file. Date of last visit: doesn't want me to make appt. Insurance Provider: [facility]    Discharge Planning  Current Residence:  Private Residence  Living Arrangements:  Family Members   Home has one stories/no stairs to climb  Support Systems:  Family Members  Current Services PTA:  None Supplier: n/a  Patient able to perform ADL's:Independent  DME used to aid ambulation prior to admission: none/during admission,none    Potential Assistance Needed:  N/A    [facility]   Potential Assistance Purchasing Medications:  No  Does patient want to participate in local refill/ meds to beds program?  No    Patient agreeable to home care: No  [place] of choice provided:  n/a      Type of Home Care Services:  None  Patient expects to be discharged to:  probable psych admit     Prior SNF/Rehab Placement and Facility: n/a  Agreeable to SNF/Rehab: No  [place] of choice provided: n/a   Evaluation: no    Expected Discharge date:  18  Follow Up Appointment: Best Day/ Time:      Transportation provider: family   Transportation arrangements needed for discharge: No    Discharge Plan: Pt currently guarded with suicide attempt, await psych eval, just left [facility] few days ago, probable re admit to them.   Pt states his meds need to be adjusted.          Electronically signed by Randell Patricia RN on 3/1/18 at 12:10 PM

## 2018-03-01 NOTE — PLAN OF CARE
Problem: Falls - Risk of:  Goal: Will remain free from falls  Will remain free from falls  Outcome: Ongoing  Pt remained free of falls this shift.

## 2018-03-01 NOTE — PROGRESS NOTES
Poison control called to receive an update on patient and to see if tachycardia and hypotension resolved.

## 2018-03-01 NOTE — PROGRESS NOTES
Blaise Young [facility]    Progress Note    3/1/2018    1:36 PM    Name:   Hilario Mcelroy  MRN:     [id number]     Acct:      [de-identified]   Room:   [address] [place] [address] Day:  1  Admit Date:  2/28/2018 11:23 AM    PCP:     Code Status:  Full Code    Subjective:     C/C:   Chief Complaint   Patient presents with    Hallucinations     auditory    Suicidal    Homicidal     Interval History Status:  improved    Looks more comfortable  No distress  Denies hallucinations  Slept fairly well    Brief History:     The patient is a 28 y.o. male who presents with:   Hallucinations (auditory); Suicidal; and Homicidal      Patient was admitted thru ER with:  \"Girish Watson is a 28 y. o. male who presents with Hallucinations, telling him to slit his wrist.  Patient states that he bot and night.  Patient recently inpatient at [facility], was discharged yesterday. Poppy Ba does state that he had some changes made to his medication. Dennys Griggs says that he is supposed to be on Tegretol but that the physician took him off of it.  Denies any drug use, no alcohol use. \"     48-year-old male readmitted to the hospital with suicidal ideation  He was recently discharged from the [facility] for the same  He acknowledges that he has been hearing voices  They are telling him to kill himself  He felt like he might slit his wrists  He has been drinking alcohol   While in the emergency room he apparently took 50 Seroquels  In addition to cutting his wrists he has also had intentional overdoses    Past Medical History:   has a past medical history of ADHD (attention deficit hyperactivity disorder); Anxiety; Bipolar 1 disorder ([address] and PTSD (post-traumatic stress disorder). Social History:   reports that he has quit smoking. His smoking use included Cigarettes. He smoked 0.50 packs per day. He has never used smokeless tobacco. He reports that he drinks about 4.2 oz of alcohol per week .  He Abdominal: Soft. Bowel sounds are normal. He exhibits no distension. There is no tenderness. Neurological: He is alert. He exhibits normal muscle tone. Skin: Skin is warm and dry. He is not diaphoretic. Tattoos noted   Psychiatric:   Affect is still flat  Having some difficulty with historical data         Data:     I/O (24Hr): Intake/Output Summary (Last 24 hours) at 03/01/18 1336  Last data filed at 03/01/18 0612   Gross per 24 hour   Intake             1300 ml   Output              625 ml   Net              675 ml       Labs:    Hematology:  Recent Labs      02/28/18   1308  03/01/18   0644   WBC  4.5  3.9   HGB  14.2  12.8*   HCT  41.9  38.9*   PLT  240  213     Chemistry:  Recent Labs      02/28/18   1308  03/01/18   0644   NA  137  142   K  3.3*  4.5   CL  99  107   CO2  19*  25   GLUCOSE  87  85   BUN  19  16   CREATININE  0.87  0.96   MG   --   2.0   CALCIUM  8.4*  8.8     Recent Labs      03/01/18   0644   PROT  6.1*   LABALBU  3.9   AST  15   ALT  9   ALKPHOS  36*   BILITOT  0.48       Lab Results   Component Value Date/Time    SPECIAL NOT REPORTED 03/15/2013 11:45 AM     Lab Results   Component Value Date/Time    CULTURE (A) 03/15/2013 11:45 AM     Several types of bacteria were identified in this specimen. Further ID and    CULTURE (A) 03/15/2013 11:45 AM      susceptibility testing is generally not helpful in this circumstance and has    CULTURE (A) 03/15/2013 11:45 AM      not been performed. Consider recollection if clinically indicated. Please    CULTURE (A) 03/15/2013 11:45 AM      contact Dr. Gracie Stone ([phone]) or the Micro lab ([phone]) if you feel    CULTURE (A) 03/15/2013 11:45 AM      the management of this particular situation would be helped by further testing.     CULTURE  03/15/2013 11:45 AM     Performed at [address] [place] [address] 3 [phone]       Lab Results   Component Value Date    FIO2 NOT REPORTED 02/16/2018 Radiology:      Assessment:        Primary Problem  Principal Problem:    Drug overdose, intentional ([facility] [address]  Active Problems:    Drug overdose    Schizoaffective disorder ([facility] [address]    Uncomplicated alcohol dependence ([facility] [address]    PTSD (post-traumatic stress disorder)    Alcohol abuse    Hallucination    Acute alcoholic intoxication without complication ([facility] [address]  Resolved Problems:    * No resolved hospital problems.  *      Plan:        Detox  Psych reevaluation  Thiamine  Ativan  DVT prophylaxis  [facility] for further evaluation and treatment    IP CONSULT TO SOCIAL WORK  IP CONSULT TO CRITICAL CARE  IP CONSULT TO HOSPITALIST  IP CONSULT TO [address] [place] [address]  3/1/2018  1:36 PM

## 2018-03-01 NOTE — PROGRESS NOTES
Smoking Cessation - topics covered   []  Health Risks  []  Benefits of Quitting   []  Smoking Cessation  []  Patient has no history of tobacco use  [x]  Patient is former smoker. [x]  No need for tobacco cessation education. []  Booklet given  []  Patient verbalizes understanding. []  Patient denies need for tobacco cessation education.   Federico Camara  7:19 AM

## 2018-03-01 NOTE — H&P
severity of signs and symptoms as outlined, requirement for current medical therapies and most importantly because of direct risk to patient if care not provided in a hospital setting. Jose A Cazares DO  2/28/2018  7:17 PM    Copy sent to Dr. Erick Linn primary care provider on file.

## 2018-03-02 ENCOUNTER — HOSPITAL ENCOUNTER (INPATIENT)
Age: 28
LOS: 10 days | Discharge: HOME | DRG: 753 | End: 2018-03-12
Attending: PSYCHIATRY & NEUROLOGY | Admitting: PSYCHIATRY & NEUROLOGY
Payer: COMMERCIAL

## 2018-03-02 VITALS
RESPIRATION RATE: 16 BRPM | HEART RATE: 54 BPM | OXYGEN SATURATION: 98 % | TEMPERATURE: 98 F | SYSTOLIC BLOOD PRESSURE: 106 MMHG | DIASTOLIC BLOOD PRESSURE: 77 MMHG | BODY MASS INDEX: 25.74 KG/M2 | WEIGHT: 190.04 LBS | HEIGHT: 72 IN

## 2018-03-02 PROBLEM — F31.9 BIPOLAR 1 DISORDER (HCC): Status: ACTIVE | Noted: 2018-03-02

## 2018-03-02 PROBLEM — F31.32 BIPOLAR AFFECTIVE DISORDER, CURRENTLY DEPRESSED, MODERATE (HCC): Status: ACTIVE | Noted: 2018-03-02

## 2018-03-02 PROCEDURE — 99238 HOSP IP/OBS DSCHRG MGMT 30/<: CPT | Performed by: INTERNAL MEDICINE

## 2018-03-02 PROCEDURE — 94762 N-INVAS EAR/PLS OXIMTRY CONT: CPT

## 2018-03-02 PROCEDURE — 2580000003 HC RX 258: Performed by: INTERNAL MEDICINE

## 2018-03-02 PROCEDURE — 6370000000 HC RX 637 (ALT 250 FOR IP): Performed by: PSYCHIATRY & NEUROLOGY

## 2018-03-02 PROCEDURE — 6370000000 HC RX 637 (ALT 250 FOR IP): Performed by: INTERNAL MEDICINE

## 2018-03-02 PROCEDURE — 1240000000 HC EMOTIONAL WELLNESS R&B

## 2018-03-02 RX ORDER — HALOPERIDOL 5 MG/ML
5 INJECTION INTRAMUSCULAR 3 TIMES DAILY PRN
Status: ACTIVE | OUTPATIENT
Start: 2018-03-02 | End: 2018-03-05

## 2018-03-02 RX ORDER — LORAZEPAM 1 MG/1
1 TABLET ORAL 2 TIMES DAILY PRN
Status: ACTIVE | OUTPATIENT
Start: 2018-03-02 | End: 2018-03-04

## 2018-03-02 RX ORDER — ACETAMINOPHEN 325 MG/1
650 TABLET ORAL EVERY 6 HOURS PRN
Status: DISCONTINUED | OUTPATIENT
Start: 2018-03-02 | End: 2018-03-12 | Stop reason: HOSPADM

## 2018-03-02 RX ORDER — MIRTAZAPINE 15 MG/1
15 TABLET, FILM COATED ORAL NIGHTLY
Status: DISCONTINUED | OUTPATIENT
Start: 2018-03-02 | End: 2018-03-12 | Stop reason: HOSPADM

## 2018-03-02 RX ORDER — TRAZODONE HYDROCHLORIDE 100 MG/1
100 TABLET ORAL NIGHTLY PRN
Status: DISCONTINUED | OUTPATIENT
Start: 2018-03-02 | End: 2018-03-12 | Stop reason: HOSPADM

## 2018-03-02 RX ORDER — MAGNESIUM HYDROXIDE/ALUMINUM HYDROXICE/SIMETHICONE 120; 1200; 1200 MG/30ML; MG/30ML; MG/30ML
30 SUSPENSION ORAL 2 TIMES DAILY PRN
Status: DISCONTINUED | OUTPATIENT
Start: 2018-03-02 | End: 2018-03-12 | Stop reason: HOSPADM

## 2018-03-02 RX ORDER — BENZTROPINE MESYLATE 1 MG/ML
2 INJECTION INTRAMUSCULAR; INTRAVENOUS DAILY PRN
Status: DISCONTINUED | OUTPATIENT
Start: 2018-03-02 | End: 2018-03-12 | Stop reason: HOSPADM

## 2018-03-02 RX ADMIN — Medication 100 MG: at 08:15

## 2018-03-02 RX ADMIN — SODIUM CHLORIDE: 9 INJECTION, SOLUTION INTRAVENOUS at 11:29

## 2018-03-02 RX ADMIN — MIRTAZAPINE 15 MG: 15 TABLET, FILM COATED ORAL at 21:40

## 2018-03-02 RX ADMIN — TRAZODONE HYDROCHLORIDE 100 MG: 100 TABLET ORAL at 21:40

## 2018-03-02 ASSESSMENT — ENCOUNTER SYMPTOMS
SPUTUM PRODUCTION: 0
VOMITING: 0
SHORTNESS OF BREATH: 0
NAUSEA: 0
COUGH: 0
HEMOPTYSIS: 0

## 2018-03-02 ASSESSMENT — SLEEP AND FATIGUE QUESTIONNAIRES
DO YOU HAVE DIFFICULTY SLEEPING: NO
DIFFICULTY ARISING: NO
RESTFUL SLEEP: NO
DO YOU USE A SLEEP AID: YES
DIFFICULTY STAYING ASLEEP: YES
DIFFICULTY FALLING ASLEEP: YES
SLEEP PATTERN: RESTLESSNESS
AVERAGE NUMBER OF SLEEP HOURS: 6

## 2018-03-02 ASSESSMENT — PAIN SCALES - GENERAL: PAINLEVEL_OUTOF10: 0

## 2018-03-02 ASSESSMENT — PATIENT HEALTH QUESTIONNAIRE - PHQ9: SUM OF ALL RESPONSES TO PHQ QUESTIONS 1-9: 13

## 2018-03-02 ASSESSMENT — LIFESTYLE VARIABLES: HISTORY_ALCOHOL_USE: NO

## 2018-03-02 NOTE — PROGRESS NOTES
Pt had no belongings brought with him from [facility]. Izaiah's. Phone called placed to [address] [facility] [address]. They will call back shortly.

## 2018-03-02 NOTE — DISCHARGE SUMMARY
[facility]  Discharge Summary     Patient ID: Onur Dominique  :  1990   MRN: [id number]     ACCOUNT:  [de-identified]   Patient's PCP: No primary care provider on file. Admit Date: 2018   Discharge Date: 3/2/2018    Discharge Physician: Martin Zimmerman DO     The patient was seen and examined on day of discharge and this discharge summary is in conjunction with any daily progress note from day of discharge. Active Discharge Diagnoses:     Primary Problem  Drug overdose, intentional [facility]      [place] Problems    Diagnosis Date Noted    Bipolar affective disorder, currently depressed, moderate ([facility] [address] [F31.32] 2018    Alcohol abuse [F10.10] 2018    PTSD (post-traumatic stress disorder) [F43.10]     Hallucination [R44.3]     Acute alcoholic intoxication without complication ([address] [J04.673]     Uncomplicated alcohol dependence ([address] [F10.20] 2017    Schizoaffective disorder ([facility] [address] [F25.9]     Drug overdose [T50.901A]     Drug overdose, intentional ([address] Lisa Manish 2015         Hospital Course:     Brief History:  The patient is a 28 y. o. male who presents with:   Hallucinations (auditory); Suicidal     Patient was admitted thru ER with:  \"Girish Watson is a 28 y. o. male who presents with Hallucinations, telling him to slit his wrist.  Patient states that he bot and night.  Patient recently inpatient at [facility], was discharged yesterday. Fernando Ayala does state that he had some changes made to his medication. Dallas Johnson says that he is supposed to be on Tegretol but that the physician took him off of it.  Denies any drug use, no alcohol use. \"     23-year-old male readmitted to the hospital with suicidal ideation  He was recently discharged from the [facility] for the same  He acknowledges that he has been hearing voices  They are telling him to kill himself  He felt like he might slit his wrists  He has been drinking alcohol   While in the

## 2018-03-02 NOTE — PROGRESS NOTES
He reports that he drinks about 4.2 oz of alcohol per week . He reports that he does not use drugs. Family History:   Family History   Problem Relation Age of Onset    Liver Cancer Brother     Alcohol Abuse Brother     No Known Problems Mother     No Known Problems Father        Medications: Allergies: Allergies   Allergen Reactions    Advil [Ibuprofen] Hives       Current Meds:   Scheduled Meds:    sodium chloride flush  10 mL Intravenous 2 times per day    enoxaparin  40 mg Subcutaneous Daily    thiamine  100 mg Oral Daily     Continuous Infusions:    sodium chloride 100 mL/hr at 18 1129     PRN Meds: sodium chloride flush, magnesium hydroxide, ondansetron, nicotine, LORazepam, potassium chloride **OR** potassium chloride **OR** potassium chloride, potassium chloride      Review of Systems:     Review of Systems   Constitutional: Negative for chills and fever. Respiratory: Negative for cough, hemoptysis, sputum production and shortness of breath. Cardiovascular: Negative for chest pain and palpitations. Gastrointestinal: Negative for nausea and vomiting. Genitourinary: Negative for flank pain and hematuria. Musculoskeletal: Negative for joint pain and myalgias. Psychiatric/Behavioral:        Denies hallucinations  Reports that he still feeling somewhat depressed         Physical Examination:        Vitals:  /77   Pulse 54   Temp 98 °F (36.7 °C) (Oral)   Resp 16   Ht 6' 0.05\" (1.83 m)   Wt 190 lb 0.6 oz (86.2 kg)   SpO2 98%   BMI 25.74 kg/m²   Temp (24hrs), Av.1 °F (36.7 °C), Min:97.9 °F (36.6 °C), Max:98.3 °F (36.8 °C)    No results for input(s): POCGLU in the last 72 hours. Physical Exam   Constitutional: He appears distressed. HENT:   Head: Normocephalic and atraumatic. Eyes: Conjunctivae are normal. No scleral icterus. Neck: Neck supple. No JVD present. Cardiovascular: Normal rate and regular rhythm.     Pulmonary/Chest: Effort normal and breath

## 2018-03-02 NOTE — CARE COORDINATION
Received word that pt is medically cleared for transfer to [facility], they have bed available. Pt signed voluntary admission form, faxed to [address] [place] [address] to transport.

## 2018-03-02 NOTE — CONSULTS
Psychiatry Consult  Note          Reason for consult/chief complaint:   Suicidal attempt by overdose of seroquel with BAL [de-identified]24    68-year-old male readmitted to the hospital with suicidal ideation  He was recently discharged from the [facility] for the same  He acknowledges that he has been hearing voices  They are telling him to kill himself  He felt like he might slit his wrists  He has been drinking alcohol   While in the emergency room he apparently took 50 Seroquels  In addition to cutting his wrists he has also had intentional overdoses      Dominga Garcia is a 28 y.o. male. Current symptoms include depressed mood, hopelessness and suicidal attempt,increased use of alcohol or drugs racing thoughts, no hallucinations and no evidence of delusions    Past Medical History:   Diagnosis Date    ADHD (attention deficit hyperactivity disorder)     Anxiety     Bipolar 1 disorder ([facility]     PTSD (post-traumatic stress disorder)           History of Substance Abuse     Patient did  present with substance use, being positive for alcohol and cocaine . Family History of Psychiatric Illness No    History of Psychiatric Symptoms/Review of Systems:   Angella: YES   Panic attacks:  No   Phobias: No   Obsessions and/or Compulsions: No   Involuntary body movements/tics: No   Hallucinations: YES   Delusions: No   Trauma/PTSD:  No        MENTAL STATUS EXAMINATION  Behavior and Cooperation: Cooperative  Appearance: stated age     Eye Contact: good  Motor Signs: Agitation: no,  Speech:  fluent and spontaneous without dysarthric features  Mood:  anxious depressed, insomnia  Thought process Organized  Suicide Ideations: admits   to  Homicide Ideations:denies  Orientation: person place time   Intelligence:  Memory: recent and remote memory intact,       DIAGNOSTIC IMPRESSION      Bipolar I disorder; depressed episode and severe  Substance disorders:  Polysubstance abuse        Treatment Plan    1.  Admit to inpatient psychiatric

## 2018-03-02 NOTE — FLOWSHEET NOTE
Stopped to see pt while rounding on CAR 3.  also noted that pt has previously been hospitalized in the last 30 days. Pt was laying in bed, w/ HOB up when  entered room. A sitter sat in a back corner of the room. Pt was open to 's visit. He said that he is here because of an overdose on drugs.  tried to learn if this were intentional or not w/out getting a definite answer. Pt talked positively about going to psych unit after he is medically stable. He thanked  for stopping & there offer of support.  made sure to mention that @ [facility], chaplains are also available. Chaplains will remain available to offer spiritual and emotional support as needed. Rev.  Heidi Maldonado, [address] [place] [address] Road

## 2018-03-03 PROCEDURE — 6370000000 HC RX 637 (ALT 250 FOR IP): Performed by: PSYCHIATRY & NEUROLOGY

## 2018-03-03 PROCEDURE — 1240000000 HC EMOTIONAL WELLNESS R&B

## 2018-03-03 RX ORDER — QUETIAPINE FUMARATE 200 MG/1
200 TABLET, FILM COATED ORAL NIGHTLY
Status: DISCONTINUED | OUTPATIENT
Start: 2018-03-03 | End: 2018-03-12 | Stop reason: HOSPADM

## 2018-03-03 RX ADMIN — MIRTAZAPINE 15 MG: 15 TABLET, FILM COATED ORAL at 20:33

## 2018-03-03 RX ADMIN — QUETIAPINE FUMARATE 200 MG: 200 TABLET ORAL at 20:33

## 2018-03-03 ASSESSMENT — LIFESTYLE VARIABLES: HISTORY_ALCOHOL_USE: NO

## 2018-03-03 NOTE — PROGRESS NOTES
Psychiatric Admission Note            Referred by ED       Chief Complaint; Hearing voices to kill himself by cutting his wrists    HX of present illness[de-identified]      50-year-old male readmitted to the hospital with suicidal ideation  He was recently discharged from the [facility]   He acknowledges that he has been hearing voices  They are telling him to kill himself  He felt like he might slit his wrists  He has been drinking alcohol   While in the emergency room he apparently took 50 Seroquels  In addition to cutting his wrists he has also had intentional overdoses      Alejandro Zheng is a 28 y.o. male who presented with specific plan to harm self: as above . [place] The patient also presents with feelings of being down, depressed or hopelessness. , loss of interest in usual activities. , sleep disturbance difficulty getting to sleep and psychomotor changes  agitation. racing thoughts and mood swings, and auditory hallucinations were present. Allergies:  Advil [ibuprofen]       History of Substance Abuse:    Patient did  present with substance use, being positive for  alcohol . Past Psychiatric History:     Patient admits to past psychiatric treatment. Family History of psychiatric disorders:    Family history: negative mood disorders. Medical History:     Past Medical History:   Diagnosis Date    ADHD (attention deficit hyperactivity disorder)     Anxiety     Bipolar 1 disorder (HCC)     PTSD (post-traumatic stress disorder)         History of Psychiatric Symptoms/Review of Systems:   Angella: yes   Panic attacks:  No   Phobias: No   Obsessions and/or Compulsions: No   Involuntary body movements/tics: No   Hallucinations: No   Suicidal admits to  Homicidal denies  Delusions: No   Trauma/PTSD:  No       Mental Status  Pt. Is alert, cooperative, guarded and withdrawn, fair cooperation,Appropriate dress, affect is Appropriate, mood is anxious and depressed . Thought process is goal directed.

## 2018-03-04 PROCEDURE — 6370000000 HC RX 637 (ALT 250 FOR IP): Performed by: PSYCHIATRY & NEUROLOGY

## 2018-03-04 PROCEDURE — 1240000000 HC EMOTIONAL WELLNESS R&B

## 2018-03-04 RX ADMIN — MIRTAZAPINE 15 MG: 15 TABLET, FILM COATED ORAL at 21:06

## 2018-03-04 RX ADMIN — QUETIAPINE FUMARATE 200 MG: 200 TABLET ORAL at 21:06

## 2018-03-05 PROCEDURE — 1240000000 HC EMOTIONAL WELLNESS R&B

## 2018-03-05 PROCEDURE — 6370000000 HC RX 637 (ALT 250 FOR IP): Performed by: PSYCHIATRY & NEUROLOGY

## 2018-03-05 RX ORDER — ARIPIPRAZOLE 5 MG/1
5 TABLET ORAL DAILY
Status: DISCONTINUED | OUTPATIENT
Start: 2018-03-05 | End: 2018-03-12 | Stop reason: HOSPADM

## 2018-03-05 RX ORDER — CARBAMAZEPINE 200 MG/1
200 TABLET ORAL 2 TIMES DAILY
Status: DISCONTINUED | OUTPATIENT
Start: 2018-03-05 | End: 2018-03-12 | Stop reason: HOSPADM

## 2018-03-05 RX ADMIN — CARBAMAZEPINE 200 MG: 200 TABLET ORAL at 10:43

## 2018-03-05 RX ADMIN — QUETIAPINE FUMARATE 200 MG: 200 TABLET ORAL at 21:06

## 2018-03-05 RX ADMIN — CARBAMAZEPINE 200 MG: 200 TABLET ORAL at 21:06

## 2018-03-05 RX ADMIN — ARIPIPRAZOLE 5 MG: 5 TABLET ORAL at 10:43

## 2018-03-05 RX ADMIN — MIRTAZAPINE 15 MG: 15 TABLET, FILM COATED ORAL at 21:06

## 2018-03-06 PROCEDURE — 1240000000 HC EMOTIONAL WELLNESS R&B

## 2018-03-06 PROCEDURE — 6370000000 HC RX 637 (ALT 250 FOR IP): Performed by: PSYCHIATRY & NEUROLOGY

## 2018-03-06 RX ADMIN — CARBAMAZEPINE 200 MG: 200 TABLET ORAL at 21:47

## 2018-03-06 RX ADMIN — MIRTAZAPINE 15 MG: 15 TABLET, FILM COATED ORAL at 21:47

## 2018-03-06 RX ADMIN — ARIPIPRAZOLE 5 MG: 5 TABLET ORAL at 08:31

## 2018-03-06 RX ADMIN — QUETIAPINE FUMARATE 200 MG: 200 TABLET ORAL at 21:47

## 2018-03-06 RX ADMIN — CARBAMAZEPINE 200 MG: 200 TABLET ORAL at 08:31

## 2018-03-07 PROCEDURE — 1240000000 HC EMOTIONAL WELLNESS R&B

## 2018-03-07 PROCEDURE — 6370000000 HC RX 637 (ALT 250 FOR IP): Performed by: PSYCHIATRY & NEUROLOGY

## 2018-03-07 RX ADMIN — MIRTAZAPINE 15 MG: 15 TABLET, FILM COATED ORAL at 21:40

## 2018-03-07 RX ADMIN — CARBAMAZEPINE 200 MG: 200 TABLET ORAL at 21:40

## 2018-03-07 RX ADMIN — CARBAMAZEPINE 200 MG: 200 TABLET ORAL at 08:15

## 2018-03-07 RX ADMIN — QUETIAPINE FUMARATE 200 MG: 200 TABLET ORAL at 21:40

## 2018-03-07 ASSESSMENT — PAIN SCALES - GENERAL: PAINLEVEL_OUTOF10: 0

## 2018-03-08 PROCEDURE — 6370000000 HC RX 637 (ALT 250 FOR IP): Performed by: PSYCHIATRY & NEUROLOGY

## 2018-03-08 PROCEDURE — 1240000000 HC EMOTIONAL WELLNESS R&B

## 2018-03-08 RX ADMIN — ARIPIPRAZOLE 5 MG: 5 TABLET ORAL at 08:43

## 2018-03-08 RX ADMIN — CARBAMAZEPINE 200 MG: 200 TABLET ORAL at 08:44

## 2018-03-08 RX ADMIN — CARBAMAZEPINE 200 MG: 200 TABLET ORAL at 20:57

## 2018-03-08 RX ADMIN — MIRTAZAPINE 15 MG: 15 TABLET, FILM COATED ORAL at 20:57

## 2018-03-08 RX ADMIN — QUETIAPINE FUMARATE 200 MG: 200 TABLET ORAL at 20:57

## 2018-03-08 ASSESSMENT — PAIN SCALES - GENERAL: PAINLEVEL_OUTOF10: 0

## 2018-03-08 NOTE — PROGRESS NOTES
Psychiatry Progress  Note     CHIEF  COMPLAINT     [facility]        SUBJECT ALEJA AND OBJECT ALEJA:    Lu Chang is presenting with persistant depressed moods and limited participation in groups moods fluctuating. with no hallucinations and thoughts are goal directed. The symptoms still   present are marked in severity    . ASSESSMENT AND PLAN    Symptoms of illness and medications  discussed with pt and support provided. Progress of treatment discussed with staff     Encouraged to participate in activies and groups. His   Bipolar 1 disorder ([facility]  is gradually improving.        ARIPiprazole  5 mg Oral Daily    carBAMazepine  200 mg Oral BID    QUEtiapine  200 mg Oral Nightly    mirtazapine  15 mg Oral Nightly       traZODone, acetaminophen, benztropine mesylate, magnesium hydroxide, aluminum & magnesium hydroxide-simethicone       Savannah Asencio MD  Psychiatry

## 2018-03-09 PROCEDURE — 1240000000 HC EMOTIONAL WELLNESS R&B

## 2018-03-09 PROCEDURE — 6370000000 HC RX 637 (ALT 250 FOR IP): Performed by: PSYCHIATRY & NEUROLOGY

## 2018-03-09 RX ADMIN — MIRTAZAPINE 15 MG: 15 TABLET, FILM COATED ORAL at 21:15

## 2018-03-09 RX ADMIN — QUETIAPINE FUMARATE 200 MG: 200 TABLET ORAL at 21:15

## 2018-03-09 RX ADMIN — CARBAMAZEPINE 200 MG: 200 TABLET ORAL at 08:54

## 2018-03-09 RX ADMIN — CARBAMAZEPINE 200 MG: 200 TABLET ORAL at 21:15

## 2018-03-10 PROCEDURE — 1240000000 HC EMOTIONAL WELLNESS R&B

## 2018-03-10 PROCEDURE — 6370000000 HC RX 637 (ALT 250 FOR IP): Performed by: PSYCHIATRY & NEUROLOGY

## 2018-03-10 RX ADMIN — QUETIAPINE FUMARATE 200 MG: 200 TABLET ORAL at 20:56

## 2018-03-10 RX ADMIN — CARBAMAZEPINE 200 MG: 200 TABLET ORAL at 20:56

## 2018-03-10 RX ADMIN — CARBAMAZEPINE 200 MG: 200 TABLET ORAL at 08:23

## 2018-03-10 NOTE — BH NOTE
Refusal of Medications      Pt. Refused Abilify 5mg, Pt. Accepted other AM medications, Pt.  Education/teaching provided

## 2018-03-11 PROCEDURE — 1240000000 HC EMOTIONAL WELLNESS R&B

## 2018-03-11 PROCEDURE — 6370000000 HC RX 637 (ALT 250 FOR IP): Performed by: PSYCHIATRY & NEUROLOGY

## 2018-03-11 RX ORDER — MIRTAZAPINE 15 MG/1
15 TABLET, FILM COATED ORAL NIGHTLY
Qty: 14 TABLET | Refills: 0 | Status: ON HOLD | OUTPATIENT
Start: 2018-03-11 | End: 2018-07-14 | Stop reason: HOSPADM

## 2018-03-11 RX ORDER — ARIPIPRAZOLE 5 MG/1
5 TABLET ORAL DAILY
Qty: 14 TABLET | Refills: 0 | Status: ON HOLD | OUTPATIENT
Start: 2018-03-12 | End: 2018-07-14 | Stop reason: HOSPADM

## 2018-03-11 RX ORDER — QUETIAPINE FUMARATE 200 MG/1
200 TABLET, FILM COATED ORAL NIGHTLY
Qty: 14 TABLET | Refills: 0 | Status: ON HOLD | OUTPATIENT
Start: 2018-03-11 | End: 2018-07-14 | Stop reason: HOSPADM

## 2018-03-11 RX ORDER — CARBAMAZEPINE 200 MG/1
200 TABLET ORAL 2 TIMES DAILY
Qty: 28 TABLET | Refills: 0 | Status: ON HOLD | OUTPATIENT
Start: 2018-03-11 | End: 2018-07-14 | Stop reason: HOSPADM

## 2018-03-11 RX ORDER — TRAZODONE HYDROCHLORIDE 100 MG/1
100 TABLET ORAL NIGHTLY PRN
Qty: 14 TABLET | Refills: 0 | Status: ON HOLD | OUTPATIENT
Start: 2018-03-11 | End: 2018-07-25 | Stop reason: HOSPADM

## 2018-03-11 RX ADMIN — QUETIAPINE FUMARATE 200 MG: 200 TABLET ORAL at 21:04

## 2018-03-11 RX ADMIN — CARBAMAZEPINE 200 MG: 200 TABLET ORAL at 21:04

## 2018-03-11 RX ADMIN — CARBAMAZEPINE 200 MG: 200 TABLET ORAL at 08:39

## 2018-03-11 NOTE — CARE COORDINATION
Pt declined to attend psycho education at 1100 am despite encouragement. Pt offered 1:1 and refused.
Pt declined to attend psychotherapy at 1:30pm despite encouragement. Pt offered 1:1 and refused.
and attempts, denied all together S/H ideations. When question by writer about report stating pt ingested 50 Seroquel pt states \"I was trying to sleep, but I didn't really care what happened. \" Pt denied it as suicide attempt. Pt endorsed hx of attempts \"years ago\" as OD and cutting wrist; both requiring medical attention per his report (Intubation from OD and 12 stitches from cutting wrist). Pt reports stable income and housing. Pt denied legal. Pt denied AOD. When writer questioned pt of alcohol reports in previous assessments pt stated he \"never drank a 12 pk every day, maybe a few beers\". Pt denied concerns. Pt denied current use. Pt denied natural supports.

## 2018-03-12 VITALS
HEART RATE: 80 BPM | TEMPERATURE: 97.7 F | RESPIRATION RATE: 14 BRPM | BODY MASS INDEX: 23.54 KG/M2 | WEIGHT: 173.8 LBS | OXYGEN SATURATION: 98 % | HEIGHT: 72 IN | DIASTOLIC BLOOD PRESSURE: 65 MMHG | SYSTOLIC BLOOD PRESSURE: 98 MMHG

## 2018-03-12 PROCEDURE — 5130000000 HC BRIDGE APPOINTMENT

## 2018-03-12 PROCEDURE — 6370000000 HC RX 637 (ALT 250 FOR IP): Performed by: PSYCHIATRY & NEUROLOGY

## 2018-03-12 RX ADMIN — CARBAMAZEPINE 200 MG: 200 TABLET ORAL at 08:25

## 2018-03-12 NOTE — PLAN OF CARE
Problem: Altered Mood, Depressive Behavior:  Goal: Able to verbalize acceptance of life and situations over which he or she has no control  Able to verbalize acceptance of life and situations over which he or she has no control   Outcome: Ongoing  1:1 with pt x ten minutes. Pt encouraged to attend unit programming and interact with peers and staff. Pt also encouraged to tend to hygiene and ADLs. Pt encouraged to discuss feelings with staff and feedback and reassurance provided. Pt denies thoughts of self harm and is agreeable to seeking out should thoughts of self harm arise. Safe environment maintained. Q15 minute checks for safety cont per unit policy. Will cont to monitor for safety and provides support and reassurance as needed. Pt is seclusive to room. Pt refuses meals but then asked for snacks constantly. Little insight into illness or need for medications. Refuses groups. Aloof et asocial of peers.
Problem: Altered Mood, Depressive Behavior:  Goal: Able to verbalize acceptance of life and situations over which he or she has no control  Able to verbalize acceptance of life and situations over which he or she has no control   Outcome: Ongoing  Pt declined to attend psychotherapy at 10:00am despite encouragement from staff. Pt offered 1:1. Pt refused 1:1 at this time.
Problem: Altered Mood, Depressive Behavior:  Goal: Able to verbalize acceptance of life and situations over which he or she has no control  Able to verbalize acceptance of life and situations over which he or she has no control   Outcome: Ongoing  Pt declined to attend psychotherapy at 11:00am despite encouragement from staff. Pt offered 1:1. Pt refused 1:1 at this time.
Problem: Altered Mood, Depressive Behavior:  Goal: Able to verbalize acceptance of life and situations over which he or she has no control  Able to verbalize acceptance of life and situations over which he or she has no control   Outcome: Ongoing  Pt did not attend Community Meeting at 0900 d/t resting in room despite staff invitation to attend.
Problem: Altered Mood, Depressive Behavior:  Goal: Able to verbalize acceptance of life and situations over which he or she has no control  Able to verbalize acceptance of life and situations over which he or she has no control   Outcome: Ongoing  Pt did not attend Community Meeting at [address] [place] [address] resting in room despite staff invitation to attend.
Problem: Altered Mood, Depressive Behavior:  Goal: Able to verbalize acceptance of life and situations over which he or she has no control  Able to verbalize acceptance of life and situations over which he or she has no control   Outcome: Ongoing  Pt did not participate in Stress Management and Coping Skills Group at 1330. Pt was meeting with .
Problem: Altered Mood, Depressive Behavior:  Goal: Able to verbalize acceptance of life and situations over which he or she has no control  Able to verbalize acceptance of life and situations over which he or she has no control   Outcome: Ongoing  Pt remains flat and guarded non relating into issues. Pt is isolative to self on the unit. Pt remains disheveled is encouraged to shower pt politely declines. Pt rates depression a 5 out of 10. Pt states he is still having a hard time focusing and racing thoughts make it difficult to sleep. Pt is taking ordered medications. PT maintained on 15 min safety checks and rounds at irregular intervals.
Problem: Altered Mood, Depressive Behavior:  Goal: Able to verbalize acceptance of life and situations over which he or she has no control  Able to verbalize acceptance of life and situations over which he or she has no control   Outcome: Ongoing  Pt states he feels the medications are helping he is feeling an improvement in mood and anxiety. PT remains disheveled but states he showered this morning. PT did shave this evening. PT declines unit programming and is aloof to self on unit. Pt states he is thinking clearer and the racing thoughts aren't as bad. PT maintained on 15 min safety checks and rounds at irregular intervals.
Problem: Altered Mood, Depressive Behavior:  Goal: Able to verbalize acceptance of life and situations over which he or she has no control  Able to verbalize acceptance of life and situations over which he or she has no control   Pt. Denies SI, continues to refuses Alibly, pt.  Flat, evasive with 1:1, withdrawn to self   Goal: Able to verbalize and/or display a decrease in depressive symptoms  Able to verbalize and/or display a decrease in depressive symptoms   Outcome: Ongoing
Problem: Altered Mood, Depressive Behavior:  Goal: Able to verbalize and/or display a decrease in depressive symptoms  Able to verbalize and/or display a decrease in depressive symptoms   Outcome: Ongoing  Patient currently denies all, flat, evasive did not attend wrap up group, isolates in room, group rooms watchingTV at times. Comments: Patient currently denies all, flat, evasive did not attend wrap up group, isolates in room, group rooms watchingTV at times.
Problem: Altered Mood, Depressive Behavior:  Goal: Able to verbalize and/or display a decrease in depressive symptoms  Able to verbalize and/or display a decrease in depressive symptoms   Outcome: Ongoing  Patient denies SI is flat and isolative to self and room. Patient is calm and cooperative with staff. Patient is out for needs only this shift. Patient not invested in his treatment program takes selected medications.
Problem: Altered Mood, Depressive Behavior:  Goal: Able to verbalize and/or display a decrease in depressive symptoms  Able to verbalize and/or display a decrease in depressive symptoms   Outcome: Ongoing  Psychoeducation Group Note    Date: 3/3/2018  Start Time: 1330  End Time: 1415    Number Participants in Group:  8    Goal:  Patient will demonstrate increased interpersonal interaction. Topic:  Positive Coping Skill / Leisure Awareness / Creative Expression    Discipline Responsible:   OT  AT  [facility]. X RT  Other       Participation Level:    x None  Minimal   x Active Listener  Interactive    Monopolizing         Participation Quality:  x Appropriate  Inappropriate   x       Attentive        Intrusive          Sharing        Resistant          Supportive        Lethargic       Affective:    Congruent  Incongruent  Blunted x Flat    Constricted  Anxious  Elated  Angry    Labile  Depressed  Other  Bright       Cognitive:  x Alert x Oriented PPTP     Concentration  G x F  P   Attention Span  G x F  P   Short-Term Memory  G  F  P   Long-Term Memory  G  F  P   ProblemSolving/  Decision Making  G  F  P   Ability to Process  Information  G  F  P      Contributing Factors             Delusional             Hallucinating             Flight of Ideas             Other:       Modes of Intervention:   Education  Support x Exploration   x Clarifying x Problem Solving x Confrontation   x Socialization x Limit Setting x Reality Testing   x Activity  Movement  Media    Other:            Response to Learning:   Able to verbalize current knowledge/experience    Able to verbalize/acknowledge new learning    Able to retain information    Capable of insight    Able to change behavior   x Progressing to goal    Other:        Comments: Patient was present in group room but did not actively participate in task at hand. Patient sat and observed.
Problem: Altered Mood, Depressive Behavior:  Goal: Able to verbalize and/or display a decrease in depressive symptoms  Able to verbalize and/or display a decrease in depressive symptoms   Outcome: Ongoing  Pt denies SI/HI at this time. Pt waved his hand in a \"sort of\" motion when asked if he hears voices. Pt did not elaborate further. Pt is med compliant. Pt eating appropriately. Pt sleeping appropriately. Pt appears disheveled. Encouraged ADLs. q15 minute safety checks maintained.
Problem: Altered Mood, Depressive Behavior:  Goal: Able to verbalize and/or display a decrease in depressive symptoms  Able to verbalize and/or display a decrease in depressive symptoms   Outcome: Ongoing  Pt did not attend Therapeutic Recreation at 1440 d/t resting in room despite staff invitation to attend.
Problem: Altered Mood, Depressive Behavior:  Goal: Able to verbalize and/or display a decrease in depressive symptoms  Able to verbalize and/or display a decrease in depressive symptoms   Outcome: Ongoing  Pt did not attend music therapy group at 1330 despite staff invitation to attend.
Problem: Altered Mood, Depressive Behavior:  Goal: Able to verbalize and/or display a decrease in depressive symptoms  Able to verbalize and/or display a decrease in depressive symptoms   Outcome: Ongoing  Pt did not attend social and communication skills group at  despite staff invitation to attend.
Problem: Altered Mood, Depressive Behavior:  Goal: Able to verbalize and/or display a decrease in depressive symptoms  Able to verbalize and/or display a decrease in depressive symptoms   Outcome: Ongoing  Pt did not participate in Goal Setting and Community Meeting at [employer] despite staff encouragement.
Problem: Altered Mood, Depressive Behavior:  Goal: Able to verbalize and/or display a decrease in depressive symptoms  Able to verbalize and/or display a decrease in depressive symptoms   Outcome: Ongoing  Pt did not participate in Leisure Skills and Awareness / Critical Thinking / Social Skills group at 1000 despite staff encouragement.
Problem: Altered Mood, Depressive Behavior:  Goal: Able to verbalize and/or display a decrease in depressive symptoms  Able to verbalize and/or display a decrease in depressive symptoms   Outcome: Ongoing  Pt remains flat and guarded on unit. PT states he is not really feeling better and that he talked with his doctor about trying new medications. PT rates his depression a 5 and anxiety a 6. PT denies hallucinations but appears preoccupied. PT remains disheveled refusing staff encouragement to take a shower or wash his clothing. Pt does take ordered medications is aloof to self on unit. PT maintained on 15 min safety checks and rounds at irregular intervals.
and plan of care    Method:  small group, individual verbal education    Outcome:   Verbalized by patient but needs reinforcement to obtain goals    PATIENT GOALS:  Short term:  Medication stabilization. Long term: Follow up with [facility].     PLAN/TREATMENT RECOMMENDATIONS UPDATE:  continue with group therapies, increased socialization, continue planning for after discharge goals, continue with medication compliance    SHORT-TERM GOALS UPDATE:   Time frame for Short-Term Goals:  5-7 days    LONG-TERM GOALS UPDATE:   Time frame for Long-Term Goals:  6 months    Members Present in Team Meeting:   See signature sheet  PK Pate  Goal: Able to verbalize and/or display a decrease in depressive symptoms  Able to verbalize and/or display a decrease in depressive symptoms   Outcome: Ongoing

## 2018-03-12 NOTE — BH NOTE
[facility]  Discharge Note    Pt discharged with followings belongings:   Dentures: None  Vision - Corrective Lenses: None  Hearing Aid: None  Jewelry: None  Body Piercings Removed: N/A  Clothing: None  Were All Patient Medications Collected?: Not Applicable   Valuables sent home with patient. Valuables retrieved from safe, Security envelope number:  [id number] and returned to patient. Patient left department with   via  , discharged to  . Patient education on aftercare instructions: completed. Information faxed to [facility] by RN Patient verbalize understanding of AVS:  yes. Status EXAM upon discharge: The patient stated he has a safe environment to discharge to. The patient stated he feels he will abstain from self harm upon discharge.        Status and Exam  Normal: No  Facial Expression: Flat, Brightened (brightens during 1:1 )  Affect: Appropriate  Level of Consciousness: Alert  Mood:Normal: Yes  Mood: Anxious  Motor Activity:Normal: Yes  Motor Activity: Decreased  Interview Behavior: Cooperative  Preception: Bed[place] to Person, Leane Orlando to Time, Bedias to Place, Bedias to Situation  Attention:Normal: Yes  Attention: Unable to Concentrate  Thought Processes: Circumstantial  Thought Content:Normal: Yes  Thought Content: Poverty of Content, Preoccupations  Hallucinations: None  Delusions: No  Memory:Normal: Yes  Memory: Poor Recent  Insight and Judgment: Yes (voices he needs to stay on meds to maintain mental health )  Insight and Judgment: Unmotivated, Poor Insight  Present Suicidal Ideation: No  Present Homicidal Ideation: No    Paty Huerta RN

## 2018-03-12 NOTE — BH NOTE
Pt did not participate  In pm wellness group at 20:30 due to resting in room after much encouragement from staff.

## 2018-03-15 NOTE — DISCHARGE SUMMARY
This is a 26-year-old white male patient who was admitted on 17 February 2018 from emergency room where he was brought as he has been extremely explosive agitated and expressing suicidal intentions as he states that he has been feeling very hopeless and  depressed. He is not able to provide any reliable information as he Is very agitated, demanding and threatening when approached. He has history of multiple psychiatric hospitalizations and poor compliance with treatment. He has history of polysubstance dependence also. He is not able to provide any medical or personal history at this time. On mental status examination He is of average height and built, uncooperative, agitated and threatening when approached. His thought processes disorganized showing looseness of association and tangentiality. He is quite paranoid, guarded and psychotic as well as explosive. He is alert but his orientation memory or intellectual functions cannot be evaluated because of severity of psychosis and agitation. He has poor judgment and no insight.       Course of hospitalization:His lab workup including CBC, differential, urinalysis and blood chemistry were mostly within normal limits. He was started on antipsychotic and mood stabilizing medications and was involved in individual supportive therapy and hospital milieu. He responded well to this treatment regimen and psychomotor activity improved significantly. Thought process was better organized and developed insight into compliance with treatment. Side effects of medications reviewed and medication education provided. Smoking cessation information was provided. He was making rational and realistic plans, not in any distress so discharged as improved. Final Diagnosis : Schizoaffective disorder    Discharge medications: Please see discharge medication list.      Follow up was scheduled  at [facility].
nightly  Notes to patient:  Sleep and depression        CONTINUE taking these medications    traZODone 100 MG tablet  Commonly known as:  DESYREL  Take 1 tablet by mouth nightly as needed for Sleep  Notes to patient:  sleep        STOP taking these medications    ARIPiprazole 15 MG tablet  Commonly known as:  ABILIFY     carBAMazepine 200 MG tablet  Commonly known as:  TEGRETOL     QUEtiapine 300 MG tablet  Commonly known as:  SEROQUEL           Where to Get Your Medications      You can get these medications from any pharmacy    Bring a paper prescription for each of these medications  · mirtazapine 15 MG tablet  · traZODone 100 MG tablet           [place] [facility]     Disposition:Home
nightly  Notes to patient:  Sleep and depression          CONTINUE taking these medications    traZODone 100 MG tablet  Commonly known as:  DESYREL  Take 1 tablet by mouth nightly as needed for Sleep  Notes to patient:  sleep          STOP taking these medications    ARIPiprazole 15 MG tablet  Commonly known as:  ABILIFY      carBAMazepine 200 MG tablet  Commonly known as:  TEGRETOL      QUEtiapine 300 MG tablet  Commonly known as:  SEROQUEL              Where to Get Your Medications       You can get these medications from any pharmacy    Bring a paper prescription for each of these medications  · mirtazapine 15 MG tablet  · traZODone 100 MG tablet              Follow up was scheduled  at [facility].     Disposition:Home

## 2018-07-04 ENCOUNTER — HOSPITAL ENCOUNTER (EMERGENCY)
Age: 28
Discharge: PSYCHIATRIC OR SUBSTANCE USE FACILITY | End: 2018-07-04
Attending: EMERGENCY MEDICINE
Payer: COMMERCIAL

## 2018-07-04 ENCOUNTER — HOSPITAL ENCOUNTER (INPATIENT)
Age: 28
LOS: 10 days | Discharge: HOME | DRG: 751 | End: 2018-07-14
Attending: PSYCHIATRY & NEUROLOGY | Admitting: PSYCHIATRY & NEUROLOGY
Payer: COMMERCIAL

## 2018-07-04 ENCOUNTER — APPOINTMENT (OUTPATIENT)
Dept: GENERAL RADIOLOGY | Age: 28
End: 2018-07-04
Payer: COMMERCIAL

## 2018-07-04 VITALS
WEIGHT: 180 LBS | HEART RATE: 91 BPM | OXYGEN SATURATION: 100 % | DIASTOLIC BLOOD PRESSURE: 62 MMHG | TEMPERATURE: 98.8 F | HEIGHT: 72 IN | BODY MASS INDEX: 24.38 KG/M2 | SYSTOLIC BLOOD PRESSURE: 104 MMHG | RESPIRATION RATE: 18 BRPM

## 2018-07-04 DIAGNOSIS — R45.851 SUICIDAL IDEATION: Primary | ICD-10-CM

## 2018-07-04 DIAGNOSIS — Z76.0 ENCOUNTER FOR MEDICATION REFILL: ICD-10-CM

## 2018-07-04 PROBLEM — F32.2 SEVERE MAJOR DEPRESSION (HCC): Status: ACTIVE | Noted: 2018-07-04

## 2018-07-04 LAB
ETHANOL PERCENT: 0.11 %
ETHANOL: 114 MG/DL

## 2018-07-04 PROCEDURE — 71046 X-RAY EXAM CHEST 2 VIEWS: CPT

## 2018-07-04 PROCEDURE — 1240000000 HC EMOTIONAL WELLNESS R&B

## 2018-07-04 PROCEDURE — 6370000000 HC RX 637 (ALT 250 FOR IP): Performed by: PHYSICIAN ASSISTANT

## 2018-07-04 PROCEDURE — 6370000000 HC RX 637 (ALT 250 FOR IP): Performed by: REGISTERED NURSE

## 2018-07-04 PROCEDURE — 99285 EMERGENCY DEPT VISIT HI MDM: CPT

## 2018-07-04 PROCEDURE — G0480 DRUG TEST DEF 1-7 CLASSES: HCPCS

## 2018-07-04 RX ORDER — MAGNESIUM HYDROXIDE/ALUMINUM HYDROXICE/SIMETHICONE 120; 1200; 1200 MG/30ML; MG/30ML; MG/30ML
30 SUSPENSION ORAL EVERY 6 HOURS PRN
Status: DISCONTINUED | OUTPATIENT
Start: 2018-07-04 | End: 2018-07-14 | Stop reason: HOSPADM

## 2018-07-04 RX ORDER — HALOPERIDOL 5 MG/ML
5 INJECTION INTRAMUSCULAR EVERY 4 HOURS PRN
Status: DISCONTINUED | OUTPATIENT
Start: 2018-07-04 | End: 2018-07-14 | Stop reason: HOSPADM

## 2018-07-04 RX ORDER — TRAZODONE HYDROCHLORIDE 50 MG/1
50 TABLET ORAL NIGHTLY PRN
Status: DISCONTINUED | OUTPATIENT
Start: 2018-07-04 | End: 2018-07-14 | Stop reason: HOSPADM

## 2018-07-04 RX ORDER — ACETAMINOPHEN 500 MG
1000 TABLET ORAL ONCE
Status: COMPLETED | OUTPATIENT
Start: 2018-07-04 | End: 2018-07-04

## 2018-07-04 RX ORDER — HYDROXYZINE HYDROCHLORIDE 25 MG/1
50 TABLET, FILM COATED ORAL 3 TIMES DAILY PRN
Status: DISCONTINUED | OUTPATIENT
Start: 2018-07-04 | End: 2018-07-12

## 2018-07-04 RX ORDER — THIAMINE MONONITRATE (VIT B1) 100 MG
100 TABLET ORAL DAILY
Status: DISCONTINUED | OUTPATIENT
Start: 2018-07-04 | End: 2018-07-14 | Stop reason: HOSPADM

## 2018-07-04 RX ORDER — QUETIAPINE FUMARATE 100 MG/1
100 TABLET, FILM COATED ORAL ONCE
Status: COMPLETED | OUTPATIENT
Start: 2018-07-04 | End: 2018-07-04

## 2018-07-04 RX ORDER — FOLIC ACID 1 MG/1
1 TABLET ORAL DAILY
Status: DISCONTINUED | OUTPATIENT
Start: 2018-07-04 | End: 2018-07-14 | Stop reason: HOSPADM

## 2018-07-04 RX ORDER — M-VIT,TX,IRON,MINS/CALC/FOLIC 27MG-0.4MG
1 TABLET ORAL DAILY
Status: DISCONTINUED | OUTPATIENT
Start: 2018-07-04 | End: 2018-07-14 | Stop reason: HOSPADM

## 2018-07-04 RX ORDER — CHLORDIAZEPOXIDE HYDROCHLORIDE 25 MG/1
25 CAPSULE, GELATIN COATED ORAL 4 TIMES DAILY PRN
Status: DISCONTINUED | OUTPATIENT
Start: 2018-07-04 | End: 2018-07-14 | Stop reason: HOSPADM

## 2018-07-04 RX ORDER — ACETAMINOPHEN 325 MG/1
650 TABLET ORAL EVERY 4 HOURS PRN
Status: DISCONTINUED | OUTPATIENT
Start: 2018-07-04 | End: 2018-07-14 | Stop reason: HOSPADM

## 2018-07-04 RX ORDER — BENZTROPINE MESYLATE 1 MG/ML
2 INJECTION INTRAMUSCULAR; INTRAVENOUS 2 TIMES DAILY PRN
Status: DISCONTINUED | OUTPATIENT
Start: 2018-07-04 | End: 2018-07-14 | Stop reason: HOSPADM

## 2018-07-04 RX ORDER — QUETIAPINE FUMARATE 25 MG/1
50 TABLET, FILM COATED ORAL DAILY
Status: ON HOLD | COMMUNITY
End: 2018-07-14 | Stop reason: HOSPADM

## 2018-07-04 RX ORDER — MULTIVIT-MIN/FERROUS GLUCONATE 9 MG/15 ML
15 LIQUID (ML) ORAL DAILY
Status: DISCONTINUED | OUTPATIENT
Start: 2018-07-04 | End: 2018-07-04 | Stop reason: CLARIF

## 2018-07-04 RX ADMIN — CHLORDIAZEPOXIDE HYDROCHLORIDE 25 MG: 25 CAPSULE ORAL at 20:54

## 2018-07-04 RX ADMIN — QUETIAPINE 100 MG: 100 TABLET, FILM COATED ORAL at 11:15

## 2018-07-04 RX ADMIN — MULTIPLE VITAMINS W/ MINERALS TAB 1 TABLET: TAB at 20:54

## 2018-07-04 RX ADMIN — ACETAMINOPHEN 1000 MG: 500 TABLET, FILM COATED ORAL at 12:07

## 2018-07-04 RX ADMIN — FOLIC ACID 1 MG: 1 TABLET ORAL at 20:54

## 2018-07-04 RX ADMIN — TRAZODONE HYDROCHLORIDE 50 MG: 50 TABLET ORAL at 20:54

## 2018-07-04 RX ADMIN — HYDROXYZINE HYDROCHLORIDE 50 MG: 25 TABLET, FILM COATED ORAL at 20:54

## 2018-07-04 RX ADMIN — THIAMINE HCL TAB 100 MG 100 MG: 100 TAB at 20:54

## 2018-07-04 ASSESSMENT — ENCOUNTER SYMPTOMS
WHEEZING: 0
EYE PAIN: 0
BACK PAIN: 0
SORE THROAT: 0
NAUSEA: 0
SHORTNESS OF BREATH: 0
EYE ITCHING: 0
VOMITING: 0
COUGH: 0
EYE DISCHARGE: 0
RHINORRHEA: 0

## 2018-07-04 ASSESSMENT — PAIN SCALES - GENERAL: PAINLEVEL_OUTOF10: 6

## 2018-07-04 NOTE — ED NOTES
[] Fidel Martinez    [] [facility]    [x]  [address] PRECAUTION CHECKLIST    Orders    [x]  Suicide/Security Watch Precautions initiated as checked below:   7/4/18 8:44 AM 27/27    [x] Notified physician:  Olayinka Camara DO  7/4/18 8:44 AM    [x] Orders obtained as appropriate:     [x] 1:1 Observer     [] Psych Consult     [] Psych Consult    Name:  Date:  Time:    [x] 1:1 Observer, Notifed by:  Estefani Radford Nurse Supervisor    [x] Remove all personal clothes from room and place in snap/paper gown/pants. Slipper only    [x] Remove all personal belongings from room and secured away from patient. Documentation    [x] Initiate Suicide/Security Watch Precaution Flow Sheet    [x] Initiate individualized Care Plan/Problem    [x] Document why precautions initiated on flow sheet (Initiate Nursing Care Plan/Problem)    [x] 1:1 Observer in place; instructions provided. Suicide precautions require observer be within arms length. [x] Nurse-Observer Communication Hand-off initiated by RN, reviewed with Observer. Subsequently used as Hand Off between Observers. [x] Initiate every 15 minute observations per observer as delegated by the RN.     [x] Initiate RN assessment and documentation    Environmental Scan  Search Criteria and Process: OPTIONAL, see Search Policy    [x] Reason for search:suicidal    [x] Nursing in presence of second person to search patient    [x] Patient notified of reason for body assessment and belongings search:     Persons present during search:   Results of search and disposition:       Searchers Name: security    These items or items similar should be removed from the room:   [x] Chairs   [x] Telephone   [x] Trash cans and liners   [x] Plastic utensils (order Patient Safety tray)   [x] Empty or remove Sharps containers   [x] All personal clothing/belongings removed   [x] All unnecessary lead wires, electrical cords, draw cords, etc.   [x] Flowers and plants   [x] Double check for lighters, matches, razors, any glass items etc that can be used as weapons. Person completing Checklist: Macarena Weathers II       GENERAL INFORMATION     Y  N     [x] [] Has the patient been informed that they are on a watch and what that means? [x] [] Can the patient get out of Bed without nursing assistance? [x] [] Can the patient use the restroom without nursing assistance? [x] [] Can the patient walk the halls to [place] their legs? \"   [] [x] Does the patient have metal utensils? [x] [] Have the patient's belongings been placed out of control of the patient? [x] [] Have the patient and his/her belongings been checked for contraband? [] [x] Is the patient under any visitor restrictions? If Yes, explain:   [] [x] Is the patient under an alias? [facility] Name:   Authorized visitors (no more than two are to be on the list)   Name/Relationship:   Name/Relationship:    Name of Staff member that you  Received this information from?: security    General Description:    Neha Walsh 27/27 male 33 y.o. Admission weight: 180 lb (81.6 kg) Height: 6' (182.9 cm)  Race: []  [] Black  []   []   [] [place] [] Other  Facial Hair:  [] Yes  [] No  If yes, please describe:   Identifying Marks (i.e. Visible tattoos, scars, etc...):      KATY Saldivar RN  07/04/18 3168

## 2018-07-04 NOTE — ED TRIAGE NOTES
Patient c/o thoughts of suicide and hearing voices along with being out of his Seroquel. . Patient also c/o of alcohol problem wants to detox. Patient denies any pain.

## 2018-07-04 NOTE — ED PROVIDER NOTES
status: Single     Spouse name: N/A    Number of children: N/A    Years of education: N/A     Occupational History    Not on file. Social History Main Topics    Smoking status: Former Smoker     Packs/day: 0.50     Types: Cigarettes    Smokeless tobacco: Never Used    Alcohol use 4.2 oz/week     7 Cans of beer per week      Comment: Daily; bottle of volka daily    Drug use: No      Comment: Heroin in past     Sexual activity: No     Other Topics Concern    Not on file     Social History Narrative    No narrative on file       Family History   Problem Relation Age of Onset    Liver Cancer Brother     Alcohol Abuse Brother     No Known Problems Mother     No Known Problems Father        Allergies:  Advil [ibuprofen]    Home Medications:  Prior to Admission medications    Medication Sig Start Date End Date Taking? Authorizing Provider   QUEtiapine (SEROQUEL) 25 MG tablet Take 50 mg by mouth daily   Yes Historical Provider, MD   traZODone (DESYREL) 100 MG tablet Take 1 tablet by mouth nightly as needed for Sleep 3/11/18  Yes Abbie Akins MD   QUEtiapine (SEROQUEL) 200 MG tablet Take 1 tablet by mouth nightly for 14 days  Patient taking differently: Take 400 mg by mouth nightly  3/11/18 7/4/18 Yes Abbie Akins MD   mirtazapine (REMERON) 15 MG tablet Take 1 tablet by mouth nightly for 14 days 3/11/18 3/25/18  Abbie Akins MD   ARIPiprazole (ABILIFY) 5 MG tablet Take 1 tablet by mouth daily for 14 days 3/12/18 3/26/18  Abbie Akins MD   carBAMazepine (TEGRETOL) 200 MG tablet Take 1 tablet by mouth 2 times daily for 14 days 3/11/18 3/25/18  Abbie Akins MD       patient's medication list has been reviewed as entered by the nursing staff. REVIEW OF SYSTEMS    (2-9 systems for level 4, 10 or more for level 5)      Review of Systems   Constitutional: Negative for chills and fever. HENT: Negative for ear pain, rhinorrhea and sore throat.     Eyes: Negative for pain, suicidal plans. DIFFERENTIAL  DIAGNOSIS       Medication refill, alcohol use, mental health issue    PLAN (LABS / IMAGING / EKG):  Orders Placed This Encounter   Procedures    ETHANOL    Vital signs    Suicide precautions       MEDICATIONS ORDERED:  Orders Placed This Encounter   Medications    QUEtiapine (SEROQUEL) tablet 100 mg       Controlled Substances Monitoring:      DIAGNOSTIC RESULTS / [facility]   Patient reports suicidal ideation with plan of overdosing. Patient initially with an elevated temperature. Patient willing to sign himself in to a mental health facility. He is medically cleared for psychiatric care      RADIOLOGY:   I directly visualized (with the attending physician) the following  images and reviewed the radiologist interpretations:  No results found. No orders to display       LABS:  Results for orders placed or performed during the hospital encounter of 07/04/18   ETHANOL   Result Value Ref Range    Ethanol 114 (H) <10 mg/dL    Ethanol percent 0.114 %           FINAL IMPRESSION      1. Suicidal ideation    2. Encounter for medication refill          DISPOSITION / PLAN     DISPOSITION Decision To Transfer    PATIENT REFERRED TO:  No follow-up provider specified.     DISCHARGE MEDICATIONS:  New Prescriptions    No medications on file       Josephine Easton PA-C   Emergency Medicine Physician Assistant    (Please note that portions of this note were completed with a voice recognition program.  Efforts were made to edit the dictations but occasionally words are mis-transcribed.)       Josephine Easton PA-C  07/04/18 2123

## 2018-07-04 NOTE — ED NOTES
Nessa received return call from Dr Kim Adler reporting that patient will be admitted to the [facility] when @ 0.8 ETOH. Dr Kim Adler asked for redraw ETOH and then return call when @ 0.8. NESSA made Stewart CARIAS aware.

## 2018-07-04 NOTE — ED NOTES
Nessa received call from Sanjay Reyes @ [facility],  Patient going to bed 134. [facility] called ETA is 15 min.

## 2018-07-04 NOTE — ED PROVIDER NOTES
[facility] [facility]     Emergency Department     Faculty Attestation    I performed a history and physical examination of the patient and discussed management with the resident. I have reviewed and agree with the residents findings including all diagnostic interpretations, and treatment plans as written. Any areas of disagreement are noted on the chart. I was personally present for the key portions of any procedures. I have documented in the chart those procedures where I was not present during the key portions. I have reviewed the emergency nurses triage note. I agree with the chief complaint, past medical history, past surgical history, allergies, medications, social and family history as documented unless otherwise noted below. Documentation of the HPI, Physical Exam and Medical Decision Making performed by jenniffer is based on my personal performance of the HPI, PE and MDM. For Physician Assistant/ Nurse Practitioner cases/documentation I have personally evaluated this patient and have completed at least one if not all key elements of the E/M (history, physical exam, and MDM). Additional findings are as noted. Primary Care Physician: No primary care provider on file. History: This is a 33 y.o. male who presents to the Emergency Department with complaint of feeling like he is detoxing. He also report hearing voices, and suicidal ideation. Last etoh was yesterday, he is a daily drinker. Has history of psychiatric illnesses, off his seroquel for many days. No clear plan but thinks maybe will will overdose. No homicidal ideation or plan. He denies cough, congestion, runny nose, no abdominal pain,no CP  Or sob, denies any other drug use    Physical:   height is 6' (1.829 m) and weight is 180 lb (81.6 kg). His oral temperature is 100.2 °F (37.9 °C). His blood pressure is 115/67 and his pulse is 104. His respiration is 18 and oxygen saturation is 96%.     Patient is

## 2018-07-05 PROBLEM — F32.3 SEVERE MAJOR DEPRESSION WITH PSYCHOTIC FEATURES (HCC): Status: ACTIVE | Noted: 2018-07-04

## 2018-07-05 LAB
ABSOLUTE EOS #: 0.6 K/UL (ref 0–0.4)
ABSOLUTE IMMATURE GRANULOCYTE: ABNORMAL K/UL (ref 0–0.3)
ABSOLUTE LYMPH #: 1 K/UL (ref 1–4.8)
ABSOLUTE MONO #: 1 K/UL (ref 0.1–1.3)
ALBUMIN SERPL-MCNC: 3.8 G/DL (ref 3.5–5.2)
ALBUMIN/GLOBULIN RATIO: ABNORMAL (ref 1–2.5)
ALP BLD-CCNC: 50 U/L (ref 40–129)
ALT SERPL-CCNC: 15 U/L (ref 5–41)
ANION GAP SERPL CALCULATED.3IONS-SCNC: 12 MMOL/L (ref 9–17)
AST SERPL-CCNC: 21 U/L
BASOPHILS # BLD: 0 % (ref 0–2)
BASOPHILS ABSOLUTE: 0 K/UL (ref 0–0.2)
BILIRUB SERPL-MCNC: 0.56 MG/DL (ref 0.3–1.2)
BUN BLDV-MCNC: 12 MG/DL (ref 6–20)
BUN/CREAT BLD: ABNORMAL (ref 9–20)
CALCIUM SERPL-MCNC: 9 MG/DL (ref 8.6–10.4)
CHLORIDE BLD-SCNC: 100 MMOL/L (ref 98–107)
CHOLESTEROL/HDL RATIO: 1.8
CHOLESTEROL: 129 MG/DL
CO2: 27 MMOL/L (ref 20–31)
CREAT SERPL-MCNC: 0.82 MG/DL (ref 0.7–1.2)
DIFFERENTIAL TYPE: ABNORMAL
EOSINOPHILS RELATIVE PERCENT: 6 % (ref 0–4)
ESTIMATED AVERAGE GLUCOSE: 94 MG/DL
GFR AFRICAN AMERICAN: >60 ML/MIN
GFR NON-AFRICAN AMERICAN: >60 ML/MIN
GFR SERPL CREATININE-BSD FRML MDRD: ABNORMAL ML/MIN/{1.73_M2}
GFR SERPL CREATININE-BSD FRML MDRD: ABNORMAL ML/MIN/{1.73_M2}
GLUCOSE BLD-MCNC: 112 MG/DL (ref 70–99)
HBA1C MFR BLD: 4.9 % (ref 4–6)
HCT VFR BLD CALC: 43.6 % (ref 41–53)
HDLC SERPL-MCNC: 73 MG/DL
HEMOGLOBIN: 14.7 G/DL (ref 13.5–17.5)
IMMATURE GRANULOCYTES: ABNORMAL %
LDL CHOLESTEROL: 35 MG/DL (ref 0–130)
LYMPHOCYTES # BLD: 11 % (ref 24–44)
MCH RBC QN AUTO: 31.5 PG (ref 26–34)
MCHC RBC AUTO-ENTMCNC: 33.8 G/DL (ref 31–37)
MCV RBC AUTO: 93.2 FL (ref 80–100)
MONOCYTES # BLD: 11 % (ref 1–7)
NRBC AUTOMATED: ABNORMAL PER 100 WBC
PDW BLD-RTO: 13 % (ref 11.5–14.9)
PLATELET # BLD: 167 K/UL (ref 150–450)
PLATELET ESTIMATE: ABNORMAL
PMV BLD AUTO: 8.5 FL (ref 6–12)
POTASSIUM SERPL-SCNC: 4.3 MMOL/L (ref 3.7–5.3)
RBC # BLD: 4.67 M/UL (ref 4.5–5.9)
RBC # BLD: ABNORMAL 10*6/UL
SEG NEUTROPHILS: 72 % (ref 36–66)
SEGMENTED NEUTROPHILS ABSOLUTE COUNT: 6.6 K/UL (ref 1.3–9.1)
SODIUM BLD-SCNC: 139 MMOL/L (ref 135–144)
THYROXINE, FREE: 0.81 NG/DL (ref 0.93–1.7)
TOTAL PROTEIN: 7.1 G/DL (ref 6.4–8.3)
TRIGL SERPL-MCNC: 104 MG/DL
TSH SERPL DL<=0.05 MIU/L-ACNC: 0.97 MIU/L (ref 0.3–5)
VLDLC SERPL CALC-MCNC: NORMAL MG/DL (ref 1–30)
WBC # BLD: 9.2 K/UL (ref 3.5–11)
WBC # BLD: ABNORMAL 10*3/UL

## 2018-07-05 PROCEDURE — 90792 PSYCH DIAG EVAL W/MED SRVCS: CPT | Performed by: REGISTERED NURSE

## 2018-07-05 PROCEDURE — 80061 LIPID PANEL: CPT

## 2018-07-05 PROCEDURE — 1240000000 HC EMOTIONAL WELLNESS R&B

## 2018-07-05 PROCEDURE — 84439 ASSAY OF FREE THYROXINE: CPT

## 2018-07-05 PROCEDURE — 36415 COLL VENOUS BLD VENIPUNCTURE: CPT

## 2018-07-05 PROCEDURE — 85025 COMPLETE CBC W/AUTO DIFF WBC: CPT

## 2018-07-05 PROCEDURE — 80053 COMPREHEN METABOLIC PANEL: CPT

## 2018-07-05 PROCEDURE — 6370000000 HC RX 637 (ALT 250 FOR IP): Performed by: REGISTERED NURSE

## 2018-07-05 PROCEDURE — 83036 HEMOGLOBIN GLYCOSYLATED A1C: CPT

## 2018-07-05 PROCEDURE — 84443 ASSAY THYROID STIM HORMONE: CPT

## 2018-07-05 RX ORDER — MIRTAZAPINE 15 MG/1
15 TABLET, FILM COATED ORAL NIGHTLY
Status: DISCONTINUED | OUTPATIENT
Start: 2018-07-05 | End: 2018-07-14 | Stop reason: HOSPADM

## 2018-07-05 RX ORDER — QUETIAPINE FUMARATE 200 MG/1
200 TABLET, FILM COATED ORAL NIGHTLY
Status: DISCONTINUED | OUTPATIENT
Start: 2018-07-05 | End: 2018-07-14 | Stop reason: HOSPADM

## 2018-07-05 RX ORDER — QUETIAPINE FUMARATE 50 MG/1
50 TABLET, FILM COATED ORAL DAILY
Status: DISCONTINUED | OUTPATIENT
Start: 2018-07-05 | End: 2018-07-12

## 2018-07-05 RX ORDER — CARBAMAZEPINE 200 MG/1
200 TABLET ORAL 2 TIMES DAILY
Status: DISCONTINUED | OUTPATIENT
Start: 2018-07-05 | End: 2018-07-12

## 2018-07-05 RX ADMIN — HYDROXYZINE HYDROCHLORIDE 50 MG: 25 TABLET, FILM COATED ORAL at 15:59

## 2018-07-05 RX ADMIN — CHLORDIAZEPOXIDE HYDROCHLORIDE 25 MG: 25 CAPSULE ORAL at 15:59

## 2018-07-05 RX ADMIN — FOLIC ACID 1 MG: 1 TABLET ORAL at 08:51

## 2018-07-05 RX ADMIN — HYDROXYZINE HYDROCHLORIDE 50 MG: 25 TABLET, FILM COATED ORAL at 08:52

## 2018-07-05 RX ADMIN — QUETIAPINE FUMARATE 50 MG: 50 TABLET ORAL at 15:59

## 2018-07-05 RX ADMIN — CARBAMAZEPINE 200 MG: 200 TABLET ORAL at 20:58

## 2018-07-05 RX ADMIN — QUETIAPINE FUMARATE 200 MG: 200 TABLET ORAL at 20:58

## 2018-07-05 RX ADMIN — MULTIPLE VITAMINS W/ MINERALS TAB 1 TABLET: TAB at 08:51

## 2018-07-05 RX ADMIN — MIRTAZAPINE 15 MG: 15 TABLET, FILM COATED ORAL at 20:58

## 2018-07-05 RX ADMIN — TRAZODONE HYDROCHLORIDE 50 MG: 50 TABLET ORAL at 20:58

## 2018-07-05 RX ADMIN — CHLORDIAZEPOXIDE HYDROCHLORIDE 25 MG: 25 CAPSULE ORAL at 08:51

## 2018-07-05 RX ADMIN — CHLORDIAZEPOXIDE HYDROCHLORIDE 25 MG: 25 CAPSULE ORAL at 20:58

## 2018-07-05 RX ADMIN — HYDROXYZINE HYDROCHLORIDE 50 MG: 25 TABLET, FILM COATED ORAL at 20:58

## 2018-07-05 RX ADMIN — THIAMINE HCL TAB 100 MG 100 MG: 100 TAB at 08:51

## 2018-07-05 ASSESSMENT — SLEEP AND FATIGUE QUESTIONNAIRES
DIFFICULTY FALLING ASLEEP: YES
DIFFICULTY STAYING ASLEEP: YES
SLEEP PATTERN: DIFFICULTY FALLING ASLEEP;DIFFICULTY ARISING;DISTURBED/INTERRUPTED SLEEP;INSOMNIA;RESTLESSNESS
AVERAGE NUMBER OF SLEEP HOURS: 4
DO YOU USE A SLEEP AID: NO
DIFFICULTY ARISING: YES
RESTFUL SLEEP: NO
DO YOU HAVE DIFFICULTY SLEEPING: YES

## 2018-07-05 ASSESSMENT — LIFESTYLE VARIABLES: HISTORY_ALCOHOL_USE: YES

## 2018-07-05 ASSESSMENT — PATIENT HEALTH QUESTIONNAIRE - PHQ9: SUM OF ALL RESPONSES TO PHQ QUESTIONS 1-9: 18

## 2018-07-06 PROCEDURE — 1240000000 HC EMOTIONAL WELLNESS R&B

## 2018-07-06 PROCEDURE — 6370000000 HC RX 637 (ALT 250 FOR IP): Performed by: REGISTERED NURSE

## 2018-07-06 PROCEDURE — 99232 SBSQ HOSP IP/OBS MODERATE 35: CPT | Performed by: NURSE PRACTITIONER

## 2018-07-06 RX ADMIN — CARBAMAZEPINE 200 MG: 200 TABLET ORAL at 21:09

## 2018-07-06 RX ADMIN — TRAZODONE HYDROCHLORIDE 50 MG: 50 TABLET ORAL at 21:09

## 2018-07-06 RX ADMIN — CARBAMAZEPINE 200 MG: 200 TABLET ORAL at 08:31

## 2018-07-06 RX ADMIN — QUETIAPINE FUMARATE 200 MG: 200 TABLET ORAL at 21:09

## 2018-07-06 RX ADMIN — HYDROXYZINE HYDROCHLORIDE 50 MG: 25 TABLET, FILM COATED ORAL at 21:09

## 2018-07-06 RX ADMIN — CHLORDIAZEPOXIDE HYDROCHLORIDE 25 MG: 25 CAPSULE ORAL at 21:09

## 2018-07-06 RX ADMIN — HYDROXYZINE HYDROCHLORIDE 50 MG: 25 TABLET, FILM COATED ORAL at 08:31

## 2018-07-06 RX ADMIN — THIAMINE HCL TAB 100 MG 100 MG: 100 TAB at 08:31

## 2018-07-06 RX ADMIN — FOLIC ACID 1 MG: 1 TABLET ORAL at 08:31

## 2018-07-06 RX ADMIN — QUETIAPINE FUMARATE 50 MG: 50 TABLET ORAL at 08:31

## 2018-07-06 RX ADMIN — CHLORDIAZEPOXIDE HYDROCHLORIDE 25 MG: 25 CAPSULE ORAL at 08:31

## 2018-07-06 RX ADMIN — MIRTAZAPINE 15 MG: 15 TABLET, FILM COATED ORAL at 21:09

## 2018-07-06 RX ADMIN — MULTIPLE VITAMINS W/ MINERALS TAB 1 TABLET: TAB at 08:31

## 2018-07-07 PROCEDURE — 99232 SBSQ HOSP IP/OBS MODERATE 35: CPT | Performed by: PSYCHIATRY & NEUROLOGY

## 2018-07-07 PROCEDURE — 1240000000 HC EMOTIONAL WELLNESS R&B

## 2018-07-07 PROCEDURE — 6370000000 HC RX 637 (ALT 250 FOR IP): Performed by: REGISTERED NURSE

## 2018-07-07 RX ADMIN — HYDROXYZINE HYDROCHLORIDE 50 MG: 25 TABLET, FILM COATED ORAL at 08:41

## 2018-07-07 RX ADMIN — CHLORDIAZEPOXIDE HYDROCHLORIDE 25 MG: 25 CAPSULE ORAL at 08:41

## 2018-07-07 RX ADMIN — CARBAMAZEPINE 200 MG: 200 TABLET ORAL at 08:41

## 2018-07-07 RX ADMIN — QUETIAPINE FUMARATE 50 MG: 50 TABLET ORAL at 08:41

## 2018-07-07 RX ADMIN — MIRTAZAPINE 15 MG: 15 TABLET, FILM COATED ORAL at 21:02

## 2018-07-07 RX ADMIN — CARBAMAZEPINE 200 MG: 200 TABLET ORAL at 21:02

## 2018-07-07 RX ADMIN — THIAMINE HCL TAB 100 MG 100 MG: 100 TAB at 08:41

## 2018-07-07 RX ADMIN — QUETIAPINE FUMARATE 200 MG: 200 TABLET ORAL at 21:02

## 2018-07-07 RX ADMIN — MULTIPLE VITAMINS W/ MINERALS TAB 1 TABLET: TAB at 08:41

## 2018-07-07 RX ADMIN — FOLIC ACID 1 MG: 1 TABLET ORAL at 08:41

## 2018-07-07 RX ADMIN — HYDROXYZINE HYDROCHLORIDE 50 MG: 25 TABLET, FILM COATED ORAL at 21:02

## 2018-07-08 PROCEDURE — 6370000000 HC RX 637 (ALT 250 FOR IP): Performed by: REGISTERED NURSE

## 2018-07-08 PROCEDURE — 1240000000 HC EMOTIONAL WELLNESS R&B

## 2018-07-08 RX ADMIN — QUETIAPINE FUMARATE 50 MG: 50 TABLET ORAL at 09:02

## 2018-07-08 RX ADMIN — FOLIC ACID 1 MG: 1 TABLET ORAL at 09:01

## 2018-07-08 RX ADMIN — CARBAMAZEPINE 200 MG: 200 TABLET ORAL at 09:02

## 2018-07-08 RX ADMIN — CARBAMAZEPINE 200 MG: 200 TABLET ORAL at 21:33

## 2018-07-08 RX ADMIN — MIRTAZAPINE 15 MG: 15 TABLET, FILM COATED ORAL at 21:33

## 2018-07-08 RX ADMIN — TRAZODONE HYDROCHLORIDE 50 MG: 50 TABLET ORAL at 21:33

## 2018-07-08 RX ADMIN — HYDROXYZINE HYDROCHLORIDE 50 MG: 25 TABLET, FILM COATED ORAL at 21:33

## 2018-07-08 RX ADMIN — MULTIPLE VITAMINS W/ MINERALS TAB 1 TABLET: TAB at 09:02

## 2018-07-08 RX ADMIN — THIAMINE HCL TAB 100 MG 100 MG: 100 TAB at 09:02

## 2018-07-08 RX ADMIN — QUETIAPINE FUMARATE 200 MG: 200 TABLET ORAL at 21:33

## 2018-07-09 PROCEDURE — 6370000000 HC RX 637 (ALT 250 FOR IP): Performed by: REGISTERED NURSE

## 2018-07-09 PROCEDURE — 1240000000 HC EMOTIONAL WELLNESS R&B

## 2018-07-09 RX ADMIN — QUETIAPINE FUMARATE 200 MG: 200 TABLET ORAL at 21:35

## 2018-07-09 RX ADMIN — TRAZODONE HYDROCHLORIDE 50 MG: 50 TABLET ORAL at 21:35

## 2018-07-09 RX ADMIN — QUETIAPINE FUMARATE 50 MG: 50 TABLET ORAL at 08:46

## 2018-07-09 RX ADMIN — CHLORDIAZEPOXIDE HYDROCHLORIDE 25 MG: 25 CAPSULE ORAL at 08:50

## 2018-07-09 RX ADMIN — CARBAMAZEPINE 200 MG: 200 TABLET ORAL at 21:35

## 2018-07-09 RX ADMIN — THIAMINE HCL TAB 100 MG 100 MG: 100 TAB at 08:46

## 2018-07-09 RX ADMIN — MIRTAZAPINE 15 MG: 15 TABLET, FILM COATED ORAL at 21:35

## 2018-07-09 RX ADMIN — MULTIPLE VITAMINS W/ MINERALS TAB 1 TABLET: TAB at 08:46

## 2018-07-09 RX ADMIN — FOLIC ACID 1 MG: 1 TABLET ORAL at 08:46

## 2018-07-09 RX ADMIN — CARBAMAZEPINE 200 MG: 200 TABLET ORAL at 08:46

## 2018-07-10 PROCEDURE — 99232 SBSQ HOSP IP/OBS MODERATE 35: CPT | Performed by: PSYCHIATRY & NEUROLOGY

## 2018-07-10 PROCEDURE — 6370000000 HC RX 637 (ALT 250 FOR IP): Performed by: REGISTERED NURSE

## 2018-07-10 PROCEDURE — 1240000000 HC EMOTIONAL WELLNESS R&B

## 2018-07-10 RX ORDER — NALTREXONE HYDROCHLORIDE 50 MG/1
25 TABLET, FILM COATED ORAL
Status: DISCONTINUED | OUTPATIENT
Start: 2018-07-11 | End: 2018-07-12

## 2018-07-10 RX ADMIN — MULTIPLE VITAMINS W/ MINERALS TAB 1 TABLET: TAB at 09:07

## 2018-07-10 RX ADMIN — FOLIC ACID 1 MG: 1 TABLET ORAL at 09:07

## 2018-07-10 RX ADMIN — QUETIAPINE FUMARATE 200 MG: 200 TABLET ORAL at 21:40

## 2018-07-10 RX ADMIN — MIRTAZAPINE 15 MG: 15 TABLET, FILM COATED ORAL at 21:39

## 2018-07-10 RX ADMIN — TRAZODONE HYDROCHLORIDE 50 MG: 50 TABLET ORAL at 21:39

## 2018-07-10 RX ADMIN — THIAMINE HCL TAB 100 MG 100 MG: 100 TAB at 09:07

## 2018-07-10 RX ADMIN — QUETIAPINE FUMARATE 50 MG: 50 TABLET ORAL at 09:08

## 2018-07-10 RX ADMIN — CARBAMAZEPINE 200 MG: 200 TABLET ORAL at 21:40

## 2018-07-10 RX ADMIN — CARBAMAZEPINE 200 MG: 200 TABLET ORAL at 09:07

## 2018-07-11 PROCEDURE — 6370000000 HC RX 637 (ALT 250 FOR IP): Performed by: REGISTERED NURSE

## 2018-07-11 PROCEDURE — 6370000000 HC RX 637 (ALT 250 FOR IP): Performed by: PSYCHIATRY & NEUROLOGY

## 2018-07-11 PROCEDURE — 1240000000 HC EMOTIONAL WELLNESS R&B

## 2018-07-11 PROCEDURE — 99232 SBSQ HOSP IP/OBS MODERATE 35: CPT | Performed by: PSYCHIATRY & NEUROLOGY

## 2018-07-11 RX ADMIN — QUETIAPINE FUMARATE 50 MG: 50 TABLET ORAL at 08:32

## 2018-07-11 RX ADMIN — HYDROXYZINE HYDROCHLORIDE 50 MG: 25 TABLET, FILM COATED ORAL at 21:37

## 2018-07-11 RX ADMIN — MULTIPLE VITAMINS W/ MINERALS TAB 1 TABLET: TAB at 08:32

## 2018-07-11 RX ADMIN — QUETIAPINE FUMARATE 200 MG: 200 TABLET ORAL at 21:38

## 2018-07-11 RX ADMIN — CARBAMAZEPINE 200 MG: 200 TABLET ORAL at 08:32

## 2018-07-11 RX ADMIN — MIRTAZAPINE 15 MG: 15 TABLET, FILM COATED ORAL at 21:37

## 2018-07-11 RX ADMIN — CARBAMAZEPINE 200 MG: 200 TABLET ORAL at 21:37

## 2018-07-11 RX ADMIN — TRAZODONE HYDROCHLORIDE 50 MG: 50 TABLET ORAL at 21:37

## 2018-07-11 RX ADMIN — THIAMINE HCL TAB 100 MG 100 MG: 100 TAB at 08:32

## 2018-07-11 RX ADMIN — NALTREXONE HYDROCHLORIDE 25 MG: 50 TABLET, FILM COATED ORAL at 08:31

## 2018-07-11 RX ADMIN — FOLIC ACID 1 MG: 1 TABLET ORAL at 08:31

## 2018-07-12 LAB
ABSOLUTE EOS #: 0.6 K/UL (ref 0–0.4)
ABSOLUTE IMMATURE GRANULOCYTE: ABNORMAL K/UL (ref 0–0.3)
ABSOLUTE LYMPH #: 1.9 K/UL (ref 1–4.8)
ABSOLUTE MONO #: 0.9 K/UL (ref 0.1–1.3)
ALBUMIN SERPL-MCNC: 3.9 G/DL (ref 3.5–5.2)
ALBUMIN/GLOBULIN RATIO: ABNORMAL (ref 1–2.5)
ALP BLD-CCNC: 55 U/L (ref 40–129)
ALT SERPL-CCNC: 38 U/L (ref 5–41)
ANION GAP SERPL CALCULATED.3IONS-SCNC: 10 MMOL/L (ref 9–17)
AST SERPL-CCNC: 25 U/L
BASOPHILS # BLD: 1 % (ref 0–2)
BASOPHILS ABSOLUTE: 0.1 K/UL (ref 0–0.2)
BILIRUB SERPL-MCNC: 0.21 MG/DL (ref 0.3–1.2)
BUN BLDV-MCNC: 17 MG/DL (ref 6–20)
BUN/CREAT BLD: ABNORMAL (ref 9–20)
CALCIUM SERPL-MCNC: 9.5 MG/DL (ref 8.6–10.4)
CARBAMAZEPINE DATE LAST DOSE: NORMAL
CARBAMAZEPINE DOSE AMOUNT: NORMAL
CARBAMAZEPINE DOSE TIME: 2137
CARBAMAZEPINE LEVEL: 4.7 UG/ML (ref 4–12)
CHLORIDE BLD-SCNC: 103 MMOL/L (ref 98–107)
CO2: 29 MMOL/L (ref 20–31)
CREAT SERPL-MCNC: 0.94 MG/DL (ref 0.7–1.2)
DIFFERENTIAL TYPE: ABNORMAL
EOSINOPHILS RELATIVE PERCENT: 8 % (ref 0–4)
GFR AFRICAN AMERICAN: >60 ML/MIN
GFR NON-AFRICAN AMERICAN: >60 ML/MIN
GFR SERPL CREATININE-BSD FRML MDRD: ABNORMAL ML/MIN/{1.73_M2}
GFR SERPL CREATININE-BSD FRML MDRD: ABNORMAL ML/MIN/{1.73_M2}
GLUCOSE BLD-MCNC: 99 MG/DL (ref 70–99)
HCT VFR BLD CALC: 45.4 % (ref 41–53)
HEMOGLOBIN: 15.4 G/DL (ref 13.5–17.5)
IMMATURE GRANULOCYTES: ABNORMAL %
LYMPHOCYTES # BLD: 24 % (ref 24–44)
MCH RBC QN AUTO: 31.6 PG (ref 26–34)
MCHC RBC AUTO-ENTMCNC: 33.9 G/DL (ref 31–37)
MCV RBC AUTO: 93.4 FL (ref 80–100)
MONOCYTES # BLD: 11 % (ref 1–7)
NRBC AUTOMATED: ABNORMAL PER 100 WBC
PDW BLD-RTO: 12.9 % (ref 11.5–14.9)
PLATELET # BLD: 322 K/UL (ref 150–450)
PLATELET ESTIMATE: ABNORMAL
PMV BLD AUTO: 7.8 FL (ref 6–12)
POTASSIUM SERPL-SCNC: 4.3 MMOL/L (ref 3.7–5.3)
RBC # BLD: 4.86 M/UL (ref 4.5–5.9)
RBC # BLD: ABNORMAL 10*6/UL
SEG NEUTROPHILS: 56 % (ref 36–66)
SEGMENTED NEUTROPHILS ABSOLUTE COUNT: 4.3 K/UL (ref 1.3–9.1)
SODIUM BLD-SCNC: 142 MMOL/L (ref 135–144)
TOTAL PROTEIN: 8 G/DL (ref 6.4–8.3)
WBC # BLD: 7.8 K/UL (ref 3.5–11)
WBC # BLD: ABNORMAL 10*3/UL

## 2018-07-12 PROCEDURE — 99232 SBSQ HOSP IP/OBS MODERATE 35: CPT | Performed by: PSYCHIATRY & NEUROLOGY

## 2018-07-12 PROCEDURE — 6370000000 HC RX 637 (ALT 250 FOR IP): Performed by: REGISTERED NURSE

## 2018-07-12 PROCEDURE — 80053 COMPREHEN METABOLIC PANEL: CPT

## 2018-07-12 PROCEDURE — 85025 COMPLETE CBC W/AUTO DIFF WBC: CPT

## 2018-07-12 PROCEDURE — 1240000000 HC EMOTIONAL WELLNESS R&B

## 2018-07-12 PROCEDURE — 36415 COLL VENOUS BLD VENIPUNCTURE: CPT

## 2018-07-12 PROCEDURE — 80156 ASSAY CARBAMAZEPINE TOTAL: CPT

## 2018-07-12 PROCEDURE — 6370000000 HC RX 637 (ALT 250 FOR IP): Performed by: PSYCHIATRY & NEUROLOGY

## 2018-07-12 RX ORDER — CARBAMAZEPINE 200 MG/1
300 TABLET ORAL 2 TIMES DAILY
Status: DISCONTINUED | OUTPATIENT
Start: 2018-07-12 | End: 2018-07-14 | Stop reason: HOSPADM

## 2018-07-12 RX ORDER — QUETIAPINE FUMARATE 50 MG/1
50 TABLET, FILM COATED ORAL 2 TIMES DAILY
Status: DISCONTINUED | OUTPATIENT
Start: 2018-07-12 | End: 2018-07-13

## 2018-07-12 RX ORDER — HYDROXYZINE HYDROCHLORIDE 25 MG/1
50 TABLET, FILM COATED ORAL 3 TIMES DAILY PRN
Status: DISCONTINUED | OUTPATIENT
Start: 2018-07-12 | End: 2018-07-14 | Stop reason: HOSPADM

## 2018-07-12 RX ORDER — NALTREXONE HYDROCHLORIDE 50 MG/1
50 TABLET, FILM COATED ORAL
Status: DISCONTINUED | OUTPATIENT
Start: 2018-07-13 | End: 2018-07-14 | Stop reason: HOSPADM

## 2018-07-12 RX ADMIN — THIAMINE HCL TAB 100 MG 100 MG: 100 TAB at 08:37

## 2018-07-12 RX ADMIN — CARBAMAZEPINE 300 MG: 200 TABLET ORAL at 20:28

## 2018-07-12 RX ADMIN — NALTREXONE HYDROCHLORIDE 25 MG: 50 TABLET, FILM COATED ORAL at 08:37

## 2018-07-12 RX ADMIN — CHLORDIAZEPOXIDE HYDROCHLORIDE 25 MG: 25 CAPSULE ORAL at 08:37

## 2018-07-12 RX ADMIN — MIRTAZAPINE 15 MG: 15 TABLET, FILM COATED ORAL at 20:28

## 2018-07-12 RX ADMIN — MULTIPLE VITAMINS W/ MINERALS TAB 1 TABLET: TAB at 08:37

## 2018-07-12 RX ADMIN — FOLIC ACID 1 MG: 1 TABLET ORAL at 08:37

## 2018-07-12 RX ADMIN — TRAZODONE HYDROCHLORIDE 50 MG: 50 TABLET ORAL at 20:29

## 2018-07-12 RX ADMIN — QUETIAPINE FUMARATE 50 MG: 50 TABLET ORAL at 08:37

## 2018-07-12 RX ADMIN — QUETIAPINE FUMARATE 200 MG: 200 TABLET ORAL at 22:03

## 2018-07-12 RX ADMIN — QUETIAPINE FUMARATE 50 MG: 50 TABLET ORAL at 13:30

## 2018-07-12 RX ADMIN — HYDROXYZINE HYDROCHLORIDE 50 MG: 25 TABLET, FILM COATED ORAL at 20:28

## 2018-07-12 RX ADMIN — CARBAMAZEPINE 200 MG: 200 TABLET ORAL at 08:37

## 2018-07-12 RX ADMIN — CHLORDIAZEPOXIDE HYDROCHLORIDE 25 MG: 25 CAPSULE ORAL at 20:29

## 2018-07-13 PROCEDURE — 99232 SBSQ HOSP IP/OBS MODERATE 35: CPT | Performed by: PSYCHIATRY & NEUROLOGY

## 2018-07-13 PROCEDURE — 1240000000 HC EMOTIONAL WELLNESS R&B

## 2018-07-13 PROCEDURE — 6370000000 HC RX 637 (ALT 250 FOR IP): Performed by: PSYCHIATRY & NEUROLOGY

## 2018-07-13 PROCEDURE — 6370000000 HC RX 637 (ALT 250 FOR IP): Performed by: REGISTERED NURSE

## 2018-07-13 RX ORDER — QUETIAPINE FUMARATE 100 MG/1
100 TABLET, FILM COATED ORAL 2 TIMES DAILY
Status: DISCONTINUED | OUTPATIENT
Start: 2018-07-14 | End: 2018-07-14 | Stop reason: HOSPADM

## 2018-07-13 RX ADMIN — QUETIAPINE FUMARATE 200 MG: 200 TABLET ORAL at 21:08

## 2018-07-13 RX ADMIN — QUETIAPINE FUMARATE 50 MG: 50 TABLET ORAL at 14:52

## 2018-07-13 RX ADMIN — CARBAMAZEPINE 300 MG: 200 TABLET ORAL at 09:06

## 2018-07-13 RX ADMIN — MULTIPLE VITAMINS W/ MINERALS TAB 1 TABLET: TAB at 09:05

## 2018-07-13 RX ADMIN — QUETIAPINE FUMARATE 50 MG: 50 TABLET ORAL at 09:05

## 2018-07-13 RX ADMIN — TRAZODONE HYDROCHLORIDE 50 MG: 50 TABLET ORAL at 21:08

## 2018-07-13 RX ADMIN — FOLIC ACID 1 MG: 1 TABLET ORAL at 09:05

## 2018-07-13 RX ADMIN — HYDROXYZINE HYDROCHLORIDE 50 MG: 25 TABLET, FILM COATED ORAL at 21:08

## 2018-07-13 RX ADMIN — CHLORDIAZEPOXIDE HYDROCHLORIDE 25 MG: 25 CAPSULE ORAL at 21:08

## 2018-07-13 RX ADMIN — CHLORDIAZEPOXIDE HYDROCHLORIDE 25 MG: 25 CAPSULE ORAL at 09:06

## 2018-07-13 RX ADMIN — THIAMINE HCL TAB 100 MG 100 MG: 100 TAB at 09:05

## 2018-07-13 RX ADMIN — MIRTAZAPINE 15 MG: 15 TABLET, FILM COATED ORAL at 21:08

## 2018-07-13 RX ADMIN — HYDROXYZINE HYDROCHLORIDE 50 MG: 25 TABLET, FILM COATED ORAL at 13:02

## 2018-07-13 RX ADMIN — NALTREXONE HYDROCHLORIDE 50 MG: 50 TABLET, FILM COATED ORAL at 09:05

## 2018-07-14 VITALS
WEIGHT: 179.9 LBS | HEART RATE: 82 BPM | SYSTOLIC BLOOD PRESSURE: 104 MMHG | RESPIRATION RATE: 14 BRPM | BODY MASS INDEX: 24.37 KG/M2 | DIASTOLIC BLOOD PRESSURE: 71 MMHG | HEIGHT: 72 IN | TEMPERATURE: 98.4 F

## 2018-07-14 PROBLEM — T50.901A OVERDOSE DRUG, INITIAL ENCOUNTER: Status: RESOLVED | Noted: 2018-02-28 | Resolved: 2018-07-14

## 2018-07-14 PROBLEM — F31.9 BIPOLAR 1 DISORDER (HCC): Status: RESOLVED | Noted: 2018-03-02 | Resolved: 2018-07-14

## 2018-07-14 PROBLEM — F31.32 BIPOLAR AFFECTIVE DISORDER, CURRENTLY DEPRESSED, MODERATE (HCC): Status: RESOLVED | Noted: 2018-03-02 | Resolved: 2018-07-14

## 2018-07-14 PROBLEM — F10.20 UNCOMPLICATED ALCOHOL DEPENDENCE (HCC): Status: RESOLVED | Noted: 2017-11-05 | Resolved: 2018-07-14

## 2018-07-14 PROBLEM — F33.40 RECURRENT MAJOR DEPRESSION IN REMISSION (HCC): Status: ACTIVE | Noted: 2018-07-04

## 2018-07-14 PROCEDURE — 5130000000 HC BRIDGE APPOINTMENT: Performed by: COUNSELOR

## 2018-07-14 PROCEDURE — 99239 HOSP IP/OBS DSCHRG MGMT >30: CPT | Performed by: PSYCHIATRY & NEUROLOGY

## 2018-07-14 PROCEDURE — 1800000000 HC LEAVE OF ABSENCE

## 2018-07-14 PROCEDURE — 6370000000 HC RX 637 (ALT 250 FOR IP): Performed by: PSYCHIATRY & NEUROLOGY

## 2018-07-14 PROCEDURE — 6370000000 HC RX 637 (ALT 250 FOR IP): Performed by: REGISTERED NURSE

## 2018-07-14 RX ORDER — CARBAMAZEPINE 200 MG/1
300 TABLET ORAL 2 TIMES DAILY
Qty: 90 TABLET | Refills: 0 | Status: ON HOLD | OUTPATIENT
Start: 2018-07-14 | End: 2018-07-25 | Stop reason: HOSPADM

## 2018-07-14 RX ORDER — QUETIAPINE FUMARATE 200 MG/1
200 TABLET, FILM COATED ORAL NIGHTLY
Qty: 30 TABLET | Refills: 0 | Status: ON HOLD | OUTPATIENT
Start: 2018-07-14 | End: 2018-07-25 | Stop reason: HOSPADM

## 2018-07-14 RX ORDER — NALTREXONE HYDROCHLORIDE 50 MG/1
50 TABLET, FILM COATED ORAL
Qty: 15 TABLET | Refills: 0 | Status: ON HOLD | OUTPATIENT
Start: 2018-07-15 | End: 2018-07-25 | Stop reason: HOSPADM

## 2018-07-14 RX ORDER — FOLIC ACID 1 MG/1
1 TABLET ORAL DAILY
Qty: 30 TABLET | Refills: 3 | Status: ON HOLD | OUTPATIENT
Start: 2018-07-15 | End: 2018-07-25 | Stop reason: HOSPADM

## 2018-07-14 RX ORDER — QUETIAPINE FUMARATE 100 MG/1
100 TABLET, FILM COATED ORAL 2 TIMES DAILY
Qty: 60 TABLET | Refills: 0 | Status: ON HOLD | OUTPATIENT
Start: 2018-07-14 | End: 2018-07-25 | Stop reason: HOSPADM

## 2018-07-14 RX ORDER — MIRTAZAPINE 15 MG/1
15 TABLET, FILM COATED ORAL NIGHTLY
Qty: 30 TABLET | Refills: 0 | Status: ON HOLD | OUTPATIENT
Start: 2018-07-14 | End: 2018-07-25 | Stop reason: HOSPADM

## 2018-07-14 RX ORDER — LANOLIN ALCOHOL/MO/W.PET/CERES
100 CREAM (GRAM) TOPICAL DAILY
Qty: 30 TABLET | Refills: 3 | Status: ON HOLD | OUTPATIENT
Start: 2018-07-15 | End: 2018-07-25 | Stop reason: HOSPADM

## 2018-07-14 RX ADMIN — HYDROXYZINE HYDROCHLORIDE 50 MG: 25 TABLET, FILM COATED ORAL at 13:22

## 2018-07-14 RX ADMIN — CARBAMAZEPINE 300 MG: 200 TABLET ORAL at 09:06

## 2018-07-14 RX ADMIN — THIAMINE HCL TAB 100 MG 100 MG: 100 TAB at 09:06

## 2018-07-14 RX ADMIN — FOLIC ACID 1 MG: 1 TABLET ORAL at 09:07

## 2018-07-14 RX ADMIN — MULTIPLE VITAMINS W/ MINERALS TAB 1 TABLET: TAB at 09:06

## 2018-07-14 RX ADMIN — NALTREXONE HYDROCHLORIDE 50 MG: 50 TABLET, FILM COATED ORAL at 09:06

## 2018-07-14 RX ADMIN — QUETIAPINE FUMARATE 100 MG: 100 TABLET ORAL at 09:06

## 2018-07-15 ENCOUNTER — HOSPITAL ENCOUNTER (INPATIENT)
Age: 28
LOS: 11 days | Discharge: HOME | DRG: 751 | End: 2018-07-26
Attending: PSYCHIATRY & NEUROLOGY | Admitting: PSYCHIATRY & NEUROLOGY
Payer: COMMERCIAL

## 2018-07-15 ENCOUNTER — HOSPITAL ENCOUNTER (EMERGENCY)
Age: 28
Discharge: PSYCHIATRIC OR SUBSTANCE USE FACILITY | End: 2018-07-15
Attending: EMERGENCY MEDICINE
Payer: COMMERCIAL

## 2018-07-15 VITALS
RESPIRATION RATE: 14 BRPM | DIASTOLIC BLOOD PRESSURE: 72 MMHG | HEIGHT: 72 IN | SYSTOLIC BLOOD PRESSURE: 110 MMHG | HEART RATE: 79 BPM | TEMPERATURE: 97.7 F | OXYGEN SATURATION: 96 % | BODY MASS INDEX: 23.03 KG/M2 | WEIGHT: 170 LBS

## 2018-07-15 DIAGNOSIS — R45.851 SUICIDAL IDEATION: Primary | ICD-10-CM

## 2018-07-15 PROCEDURE — 99285 EMERGENCY DEPT VISIT HI MDM: CPT

## 2018-07-15 PROCEDURE — 6370000000 HC RX 637 (ALT 250 FOR IP): Performed by: EMERGENCY MEDICINE

## 2018-07-15 PROCEDURE — 1240000000 HC EMOTIONAL WELLNESS R&B

## 2018-07-15 RX ORDER — QUETIAPINE FUMARATE 200 MG/1
200 TABLET, FILM COATED ORAL ONCE
Status: COMPLETED | OUTPATIENT
Start: 2018-07-15 | End: 2018-07-15

## 2018-07-15 RX ORDER — QUETIAPINE FUMARATE 100 MG/1
100 TABLET, FILM COATED ORAL 2 TIMES DAILY
Status: DISCONTINUED | OUTPATIENT
Start: 2018-07-15 | End: 2018-07-15

## 2018-07-15 RX ADMIN — QUETIAPINE FUMARATE 100 MG: 100 TABLET ORAL at 13:43

## 2018-07-15 RX ADMIN — QUETIAPINE 200 MG: 200 TABLET, FILM COATED ORAL at 22:09

## 2018-07-15 ASSESSMENT — ENCOUNTER SYMPTOMS
SHORTNESS OF BREATH: 0
VOMITING: 0
ABDOMINAL PAIN: 0
NAUSEA: 0

## 2018-07-15 NOTE — ED PROVIDER NOTES
[facility]  Emergency Department  Emergency Medicine Resident Sign-out     Care of Lisa Escobar was assumed from Dr. Loida Shaw and is being seen for Withdrawal and Depression  . The patient's initial evaluation and plan have been discussed with the prior provider who initially evaluated the patient. EMERGENCY DEPARTMENT COURSE / MEDICAL DECISION MAKING:       MEDICATIONS GIVEN:  Orders Placed This Encounter   Medications    QUEtiapine (SEROQUEL) tablet 100 mg       LABS / RADIOLOGY:     Labs Reviewed - No data to display    Xr Chest Standard (2 Vw)    Result Date: 7/4/2018  EXAMINATION: TWO VIEWS OF THE CHEST 7/4/2018 11:21 am COMPARISON: November 5, 2017 HISTORY: ORDERING SYSTEM PROVIDED HISTORY: elevated temp, no cough TECHNOLOGIST PROVIDED HISTORY: Reason for exam:->elevated temp, no cough FINDINGS: The cardiomediastinal silhouette is unchanged in appearance. There is no consolidation, pneumothorax, or evidence of edema. No effusion is appreciated. The osseous structures are unchanged in appearance. Unchanged appearance of the chest without acute airspace disease identified. RECENT VITALS:     Temp: 98.1 °F (36.7 °C),  Pulse: 91, Resp: 12, BP: 115/68, SpO2: 97 %    This patient is a 33 y.o. Male with SI and wants to go through detox. Has been w/o ETOH for several days besides yesterday. Has been accepted to [facility]. OUTSTANDING TASKS / RECOMMENDATIONS:    1. Awaiting bed     FINAL IMPRESSION:     1. Suicidal ideation        DISPOSITION:         DISPOSITION:  []  Discharge   [x]  Transfer - [facility]   []  Admission -     []  Against Medical Advice   []  Eloped   FOLLOW-UP: No follow-up provider specified.    DISCHARGE MEDICATIONS: New Prescriptions    No medications on file          Linda Naylor MD  Emergency Medicine Resident, PGY-2  Padma Villanueva MD  Resident  07/15/18 0119

## 2018-07-15 NOTE — ED PROVIDER NOTES
[facility]  Emergency Department Encounter  Emergency Medicine Resident     Pt Name: Ev Boucher  MRN: [id number]  Denzelgfnavid 1990   Date of evaluation: 7/15/18  PCP:  No primary care provider on file. CHIEF COMPLAINT       Chief Complaint   Patient presents with    Withdrawal    Depression       HISTORY OF PRESENT ILLNESS  (Location/Symptom, Timing/Onset, Context/Setting, Quality, Duration, Modifying Factors, Severity.)      Ev Boucher is a 33 y.o. male who presents with Complaints of alcohol withdrawal and suicidal ideation. Patient says he was recently discharged from [facility] yesterday and when out binge drinking and currently feels depressed. He says that he has no plan. He says that he would like to be readmitted for rehab and increase in his Seroquel. He says that he has not had a chance to follow up to receive his Seroquel dose. He denies any chest pain, shortness of breath, nausea, vomiting, or abdominal pain. He denies any hallucinations or homicidal ideation. PAST MEDICAL / SURGICAL / SOCIAL / FAMILY HISTORY      has a past medical history of ADHD (attention deficit hyperactivity disorder); Anxiety; Bipolar 1 disorder ([facility] [address] and PTSD (post-traumatic stress disorder). has no past surgical history on file. Social History     Social History    Marital status: Single     Spouse name: N/A    Number of children: N/A    Years of education: N/A     Occupational History    Not on file.      Social History Main Topics    Smoking status: Former Smoker     Packs/day: 0.50     Types: Cigarettes    Smokeless tobacco: Never Used    Alcohol use 4.2 oz/week     7 Cans of beer per week      Comment: Daily; bottle of volka daily    Drug use: Yes     Types: Cocaine, Opiates       Comment: Heroin in past     Sexual activity: No     Other Topics Concern    Not on file     Social History Narrative    No narrative on file       Family History Problem Relation Age of Onset    Liver Cancer Brother     Alcohol Abuse Brother     No Known Problems Mother     No Known Problems Father        Allergies:  Advil [ibuprofen]    Home Medications:  Prior to Admission medications    Medication Sig Start Date End Date Taking? Authorizing Provider   QUEtiapine (SEROQUEL) 200 MG tablet Take 1 tablet by mouth nightly 7/14/18  Yes Kat Cheung MD   QUEtiapine (SEROQUEL) 100 MG tablet Take 1 tablet by mouth 2 times daily at 0800 and 1400 7/14/18  Yes Kat Cheung MD   traZODone (DESYREL) 100 MG tablet Take 1 tablet by mouth nightly as needed for Sleep 3/11/18  Yes Luis F Centeno MD   carBAMazepine (TEGRETOL) 200 MG tablet Take 1.5 tablets by mouth 2 times daily 7/14/18   Kat Cheung MD   mirtazapine (REMERON) 15 MG tablet Take 1 tablet by mouth nightly 7/14/18   Kat Cheung MD   naltrexone (DEPADE) 50 MG tablet Take 1 tablet by mouth daily (with breakfast) 7/15/18   Kat Cheung MD   folic acid (FOLVITE) 1 MG tablet Take 1 tablet by mouth daily 7/15/18   Kat Cheung MD   vitamin B-1 100 MG tablet Take 1 tablet by mouth daily 7/15/18   Kat Cheung MD       REVIEW OF SYSTEMS    (2-9 systems for level 4, 10 or more for level 5)      Review of Systems   Constitutional: Negative for fever. Respiratory: Negative for shortness of breath. Cardiovascular: Negative for chest pain. Gastrointestinal: Negative for abdominal pain, nausea and vomiting. Psychiatric/Behavioral: Positive for dysphoric mood and suicidal ideas. Negative for confusion. PHYSICAL EXAM   (up to 7 for level 4, 8 or more for level 5)      INITIAL VITALS:   /79   Pulse 95   Temp 97.5 °F (36.4 °C) (Oral)   Ht 6' (1.829 m)   Wt 170 lb (77.1 kg)   SpO2 95%   BMI 23.06 kg/m²     Physical Exam   Constitutional: He is oriented to person, place, and time. He appears well-developed and well-nourished. No distress. Patient is nontoxic appearing.    HENT:   Head: Normocephalic edit the dictations but occasionally words are mis-transcribed.)       Evan Asencio MD  Resident  07/15/18 4653

## 2018-07-15 NOTE — ED NOTES
Pt to ED c/o feeling anxious and depressed. Pt also states an interest in detoxing from alcohol and cocaine. Pt states that he drank [place] pint of liquor and 2 beers & used cocaine this morning\" Pt states that he has been using since he was 11years old. Pt states that he became very depressed last night and did have thoughts of hurting himself but had no plan or action. Pt states that he does not feel like hurting himself or anyone else at this time. Pt denies any hallucinations/delusions. Pt states that he is out of Seroquel & that is what usually keeps him calm. Pt is A&Ox3, will continue to monitor.       Charlie Salcedo RN  07/15/18 8941

## 2018-07-15 NOTE — ED NOTES
.Provisional Diagnosis:    Major Depression    Psychosocial and Contextual Factors:   Problems with addiction. Problems with social environment. Problems with finances. C-SSRS Summary:     Patient: *X  Family:   Agency: [employer]    Substance Abuse Cocaine and Alcohol    Present Suicidal Behavior:      Verbal: Yes     Attempt: No    Past Suicidal Behavior:     Verbal: Yes    Attempt: Yes patient reported past overdose and stomach had to be pumped. Self-Injurious/Self-Mutilation: None    Trauma Identified:   None      Protective Factors:    Patient has Social Security benefits. Patient linked to [facility]. Risk Factors:    Patient has past and present wishes to be dead. Patient has past inpatient admission as he was discharged from the [facility] on July 14, 2018. Patient used cocaine and alcohol yesterday. Patient need addiction treatment. Patient has past attempts at taking his life. Clinical Summary:    Patient is a 33 Y. O.,  male that presented to the Ed with suicidal ideation and no plan. Patient denied any AH/VH. Patient reported he is feeling down and depressed. Patient reported he should have went to treatment upon discharge from [facility] but instead decided to use and began ''freaking out.'' Patient reported his appetite is normal and he  sleeps about 7 hours at night. Patient denies being a victim of physical or sexual abuse. Patient reported minimal supports and stated she should have stayed longer in the [facility] to work on stabilization. Patient is alert and oriented to person, place and time. Patient presents with depressed and sad mood. Patient wiling to sign in voluntary if admitted. Level of Care Disposition:    11:41 SW paged on call psych doctor, patient presented for LOC. Per Dr. Silvia Mills patient will be admitted.

## 2018-07-15 NOTE — ED NOTES
[] [place]    [] [facility]    [x]  [facility] ASSESSMENT      Y  N     [x] [] In the past two weeks have you had thoughts of hurting yourself in any way? [x] [] In the past two weeks have you had thoughts that you would be better off dead? [] [x] Have you made a suicide attempt in the past two months? [] [x] Do you have a plan for hurting yourself or suicide? [] [x] Presence of hallucinations/voices related to hurting himself or herself or someone else. SUICIDE/SECURITY WATCH PRECAUTION CHECKLIST     Orders    [x]  Suicide/Security Watch Precautions initiated as checked below:   7/15/18 12:25 PM BH31/BH31A    [x] Notified physician:  Stiven Glynn MD  7/15/18 12:25 PM    [x] Orders obtained as appropriate:     [x] 1:1 Observer     [] Psych Consult     [] Psych Consult    Name:  Date:  Time:    [x] 1:1 Observer, Notified by:  Trisha Dorantes Nurse Supervisor    [x] Remove all personal clothes from room and place in snap/paper gown/pants. Slipper only    [x] Remove all personal belongings from room and secured away from patient. Documentation    [x] Initiate Suicide/Security Watch Precaution Flow Sheet    [x] Initiate individualized Care Plan/Problem    [x] Document why precautions initiated on flow sheet (Initiate Nursing Care Plan/Problem)    [x] 1:1 Observer in place; instructions provided. Suicide precautions require observer be within arms length. [x] Nurse-Observer Communication Hand-off initiated by RN, reviewed with Observer. Subsequently used as Hand Off between Observers. [x] Initiate every 15 minute observations per observer as delegated by the RN.     [x] Initiate RN assessment and documentation    Environmental Scan  Search Criteria and Process: OPTIONAL, see Search Policy    [] Reason for search:    [] Nursing in presence of second person to search patient    [] Patient notified of reason for body assessment and belongings

## 2018-07-15 NOTE — ED PROVIDER NOTES
[facility]     Emergency Department     Faculty Attestation    I performed a history and physical examination of the patient and discussed management with the resident. I reviewed the residents note and agree with the documented findings and plan of care. Any areas of disagreement are noted on the chart. I was personally present for the key portions of any procedures. I have documented in the chart those procedures where I was not present during the key portions. I have reviewed the emergency nurses triage note. I agree with the chief complaint, past medical history, past surgical history, allergies, medications, social and family history as documented unless otherwise noted below. For Physician Assistant/ Nurse Practitioner cases/documentation I have personally evaluated this patient and have completed at least one if not all key elements of the E/M (history, physical exam, and MDM). Additional findings are as noted. Awake alert and oriented, not clinically intoxicated, skin warm and dry, no ataxia or nystagmus, no muscle rigidity, cranial nerves intact, cerebellar testing normal, good airway, no meningeal signs.       Yonis Ha MD  Attending Emergency  Physician              Gerber Dhaliwal MD  07/15/18 4278

## 2018-07-16 PROBLEM — F25.0 SCHIZOAFFECTIVE DISORDER, BIPOLAR TYPE (HCC): Status: ACTIVE | Noted: 2018-07-16

## 2018-07-16 PROCEDURE — 90792 PSYCH DIAG EVAL W/MED SRVCS: CPT | Performed by: REGISTERED NURSE

## 2018-07-16 PROCEDURE — 1240000000 HC EMOTIONAL WELLNESS R&B

## 2018-07-16 PROCEDURE — 6370000000 HC RX 637 (ALT 250 FOR IP): Performed by: REGISTERED NURSE

## 2018-07-16 RX ORDER — THIAMINE MONONITRATE (VIT B1) 100 MG
100 TABLET ORAL DAILY
Status: DISCONTINUED | OUTPATIENT
Start: 2018-07-16 | End: 2018-07-26 | Stop reason: HOSPADM

## 2018-07-16 RX ORDER — MIRTAZAPINE 15 MG/1
15 TABLET, FILM COATED ORAL NIGHTLY
Status: DISCONTINUED | OUTPATIENT
Start: 2018-07-16 | End: 2018-07-25

## 2018-07-16 RX ORDER — FOLIC ACID 1 MG/1
1 TABLET ORAL DAILY
Status: DISCONTINUED | OUTPATIENT
Start: 2018-07-16 | End: 2018-07-26 | Stop reason: HOSPADM

## 2018-07-16 RX ORDER — NICOTINE 21 MG/24HR
1 PATCH, TRANSDERMAL 24 HOURS TRANSDERMAL DAILY
Status: DISCONTINUED | OUTPATIENT
Start: 2018-07-16 | End: 2018-07-16

## 2018-07-16 RX ORDER — TRAZODONE HYDROCHLORIDE 50 MG/1
50 TABLET ORAL NIGHTLY PRN
Status: DISCONTINUED | OUTPATIENT
Start: 2018-07-16 | End: 2018-07-26 | Stop reason: HOSPADM

## 2018-07-16 RX ORDER — OLANZAPINE 5 MG/1
5 TABLET ORAL
Status: ACTIVE | OUTPATIENT
Start: 2018-07-16 | End: 2018-07-16

## 2018-07-16 RX ORDER — CARBAMAZEPINE 200 MG/1
300 TABLET ORAL 2 TIMES DAILY
Status: DISCONTINUED | OUTPATIENT
Start: 2018-07-16 | End: 2018-07-26 | Stop reason: HOSPADM

## 2018-07-16 RX ORDER — QUETIAPINE FUMARATE 100 MG/1
100 TABLET, FILM COATED ORAL 2 TIMES DAILY
Status: DISCONTINUED | OUTPATIENT
Start: 2018-07-16 | End: 2018-07-21

## 2018-07-16 RX ORDER — BENZTROPINE MESYLATE 1 MG/ML
2 INJECTION INTRAMUSCULAR; INTRAVENOUS 2 TIMES DAILY PRN
Status: DISCONTINUED | OUTPATIENT
Start: 2018-07-16 | End: 2018-07-26 | Stop reason: HOSPADM

## 2018-07-16 RX ORDER — ACETAMINOPHEN 325 MG/1
650 TABLET ORAL EVERY 4 HOURS PRN
Status: DISCONTINUED | OUTPATIENT
Start: 2018-07-16 | End: 2018-07-26 | Stop reason: HOSPADM

## 2018-07-16 RX ORDER — QUETIAPINE FUMARATE 200 MG/1
200 TABLET, FILM COATED ORAL NIGHTLY
Status: DISCONTINUED | OUTPATIENT
Start: 2018-07-16 | End: 2018-07-21

## 2018-07-16 RX ORDER — MAGNESIUM HYDROXIDE/ALUMINUM HYDROXICE/SIMETHICONE 120; 1200; 1200 MG/30ML; MG/30ML; MG/30ML
30 SUSPENSION ORAL EVERY 6 HOURS PRN
Status: DISCONTINUED | OUTPATIENT
Start: 2018-07-16 | End: 2018-07-26 | Stop reason: HOSPADM

## 2018-07-16 RX ORDER — HYDROXYZINE HYDROCHLORIDE 25 MG/1
50 TABLET, FILM COATED ORAL 3 TIMES DAILY PRN
Status: DISCONTINUED | OUTPATIENT
Start: 2018-07-16 | End: 2018-07-26 | Stop reason: HOSPADM

## 2018-07-16 RX ADMIN — QUETIAPINE FUMARATE 200 MG: 200 TABLET ORAL at 20:54

## 2018-07-16 RX ADMIN — CARBAMAZEPINE 300 MG: 200 TABLET ORAL at 13:47

## 2018-07-16 RX ADMIN — MIRTAZAPINE 15 MG: 15 TABLET, FILM COATED ORAL at 20:54

## 2018-07-16 RX ADMIN — CARBAMAZEPINE 300 MG: 200 TABLET ORAL at 20:53

## 2018-07-16 RX ADMIN — FOLIC ACID 1 MG: 1 TABLET ORAL at 13:47

## 2018-07-16 RX ADMIN — THIAMINE HCL TAB 100 MG 100 MG: 100 TAB at 13:47

## 2018-07-16 ASSESSMENT — LIFESTYLE VARIABLES
HISTORY_ALCOHOL_USE: YES
HISTORY_ALCOHOL_USE: YES

## 2018-07-16 ASSESSMENT — SLEEP AND FATIGUE QUESTIONNAIRES
DIFFICULTY FALLING ASLEEP: YES
RESTFUL SLEEP: NO
DO YOU HAVE DIFFICULTY SLEEPING: YES
DO YOU USE A SLEEP AID: YES
DO YOU HAVE DIFFICULTY SLEEPING: YES
RESTFUL SLEEP: NO
DIFFICULTY ARISING: NO
AVERAGE NUMBER OF SLEEP HOURS: 6
DIFFICULTY STAYING ASLEEP: NO
DIFFICULTY ARISING: NO
SLEEP PATTERN: INSOMNIA
DIFFICULTY FALLING ASLEEP: YES
DIFFICULTY STAYING ASLEEP: NO
DO YOU USE A SLEEP AID: NO
SLEEP PATTERN: INSOMNIA

## 2018-07-16 ASSESSMENT — PAIN SCALES - GENERAL
PAINLEVEL_OUTOF10: 0
PAINLEVEL_OUTOF10: 0

## 2018-07-16 ASSESSMENT — PATIENT HEALTH QUESTIONNAIRE - PHQ9
SUM OF ALL RESPONSES TO PHQ QUESTIONS 1-9: 10
SUM OF ALL RESPONSES TO PHQ QUESTIONS 1-9: 0

## 2018-07-16 NOTE — ED NOTES
SW spoke with Syeda Ortez at [facility] they will accept [facility] in the [facility] tonight SW given rm #[id number]  SW phoned access center to set up transportation      [facility]  07/15/18 1895

## 2018-07-16 NOTE — ED NOTES
Pt resting on stretcher. Guard at bedside. No distress noted. RR even and non labored.      Anish Trivedi RN  07/15/18 1182

## 2018-07-17 PROCEDURE — 99232 SBSQ HOSP IP/OBS MODERATE 35: CPT | Performed by: PSYCHIATRY & NEUROLOGY

## 2018-07-17 PROCEDURE — 6370000000 HC RX 637 (ALT 250 FOR IP): Performed by: NURSE PRACTITIONER

## 2018-07-17 PROCEDURE — 1240000000 HC EMOTIONAL WELLNESS R&B

## 2018-07-17 PROCEDURE — 6370000000 HC RX 637 (ALT 250 FOR IP): Performed by: REGISTERED NURSE

## 2018-07-17 RX ADMIN — FOLIC ACID 1 MG: 1 TABLET ORAL at 08:03

## 2018-07-17 RX ADMIN — THIAMINE HCL TAB 100 MG 100 MG: 100 TAB at 08:03

## 2018-07-17 RX ADMIN — MIRTAZAPINE 15 MG: 15 TABLET, FILM COATED ORAL at 20:58

## 2018-07-17 RX ADMIN — CARBAMAZEPINE 300 MG: 200 TABLET ORAL at 08:02

## 2018-07-17 RX ADMIN — QUETIAPINE FUMARATE 200 MG: 200 TABLET ORAL at 20:58

## 2018-07-17 RX ADMIN — HYDROXYZINE HYDROCHLORIDE 50 MG: 25 TABLET, FILM COATED ORAL at 08:43

## 2018-07-17 RX ADMIN — HYDROXYZINE HYDROCHLORIDE 50 MG: 25 TABLET, FILM COATED ORAL at 20:58

## 2018-07-17 RX ADMIN — QUETIAPINE FUMARATE 100 MG: 100 TABLET ORAL at 08:02

## 2018-07-17 RX ADMIN — CARBAMAZEPINE 300 MG: 200 TABLET ORAL at 20:58

## 2018-07-18 PROCEDURE — 1240000000 HC EMOTIONAL WELLNESS R&B

## 2018-07-18 PROCEDURE — 99232 SBSQ HOSP IP/OBS MODERATE 35: CPT | Performed by: PSYCHIATRY & NEUROLOGY

## 2018-07-18 PROCEDURE — 6370000000 HC RX 637 (ALT 250 FOR IP): Performed by: REGISTERED NURSE

## 2018-07-18 RX ADMIN — CARBAMAZEPINE 300 MG: 200 TABLET ORAL at 10:27

## 2018-07-18 RX ADMIN — MIRTAZAPINE 15 MG: 15 TABLET, FILM COATED ORAL at 21:23

## 2018-07-18 RX ADMIN — CARBAMAZEPINE 300 MG: 200 TABLET ORAL at 21:23

## 2018-07-18 RX ADMIN — FOLIC ACID 1 MG: 1 TABLET ORAL at 10:27

## 2018-07-18 RX ADMIN — THIAMINE HCL TAB 100 MG 100 MG: 100 TAB at 10:27

## 2018-07-19 PROCEDURE — 6370000000 HC RX 637 (ALT 250 FOR IP): Performed by: REGISTERED NURSE

## 2018-07-19 PROCEDURE — 1240000000 HC EMOTIONAL WELLNESS R&B

## 2018-07-19 PROCEDURE — 99232 SBSQ HOSP IP/OBS MODERATE 35: CPT | Performed by: PSYCHIATRY & NEUROLOGY

## 2018-07-19 RX ADMIN — THIAMINE HCL TAB 100 MG 100 MG: 100 TAB at 08:45

## 2018-07-19 RX ADMIN — FOLIC ACID 1 MG: 1 TABLET ORAL at 08:45

## 2018-07-19 RX ADMIN — CARBAMAZEPINE 300 MG: 200 TABLET ORAL at 08:45

## 2018-07-20 PROCEDURE — 6370000000 HC RX 637 (ALT 250 FOR IP): Performed by: NURSE PRACTITIONER

## 2018-07-20 PROCEDURE — 6370000000 HC RX 637 (ALT 250 FOR IP): Performed by: REGISTERED NURSE

## 2018-07-20 PROCEDURE — 99232 SBSQ HOSP IP/OBS MODERATE 35: CPT | Performed by: NURSE PRACTITIONER

## 2018-07-20 PROCEDURE — 1240000000 HC EMOTIONAL WELLNESS R&B

## 2018-07-20 RX ADMIN — THIAMINE HCL TAB 100 MG 100 MG: 100 TAB at 08:59

## 2018-07-20 RX ADMIN — HYDROXYZINE HYDROCHLORIDE 50 MG: 25 TABLET, FILM COATED ORAL at 21:22

## 2018-07-20 RX ADMIN — MIRTAZAPINE 15 MG: 15 TABLET, FILM COATED ORAL at 21:22

## 2018-07-20 RX ADMIN — FOLIC ACID 1 MG: 1 TABLET ORAL at 08:58

## 2018-07-20 RX ADMIN — CARBAMAZEPINE 300 MG: 200 TABLET ORAL at 08:58

## 2018-07-20 RX ADMIN — CARBAMAZEPINE 300 MG: 200 TABLET ORAL at 21:21

## 2018-07-21 PROCEDURE — 99232 SBSQ HOSP IP/OBS MODERATE 35: CPT | Performed by: PSYCHIATRY & NEUROLOGY

## 2018-07-21 PROCEDURE — 6370000000 HC RX 637 (ALT 250 FOR IP): Performed by: REGISTERED NURSE

## 2018-07-21 PROCEDURE — 1240000000 HC EMOTIONAL WELLNESS R&B

## 2018-07-21 PROCEDURE — 6370000000 HC RX 637 (ALT 250 FOR IP): Performed by: NURSE PRACTITIONER

## 2018-07-21 RX ORDER — NALTREXONE HYDROCHLORIDE 50 MG/1
50 TABLET, FILM COATED ORAL
Status: DISCONTINUED | OUTPATIENT
Start: 2018-07-22 | End: 2018-07-26 | Stop reason: HOSPADM

## 2018-07-21 RX ADMIN — HYDROXYZINE HYDROCHLORIDE 50 MG: 25 TABLET, FILM COATED ORAL at 12:24

## 2018-07-21 RX ADMIN — THIAMINE HCL TAB 100 MG 100 MG: 100 TAB at 09:12

## 2018-07-21 RX ADMIN — MIRTAZAPINE 15 MG: 15 TABLET, FILM COATED ORAL at 21:03

## 2018-07-21 RX ADMIN — FOLIC ACID 1 MG: 1 TABLET ORAL at 09:12

## 2018-07-21 RX ADMIN — CARBAMAZEPINE 300 MG: 200 TABLET ORAL at 21:03

## 2018-07-21 RX ADMIN — CARBAMAZEPINE 300 MG: 200 TABLET ORAL at 09:10

## 2018-07-21 RX ADMIN — HYDROXYZINE HYDROCHLORIDE 50 MG: 25 TABLET, FILM COATED ORAL at 21:03

## 2018-07-22 PROCEDURE — 6370000000 HC RX 637 (ALT 250 FOR IP): Performed by: REGISTERED NURSE

## 2018-07-22 PROCEDURE — 1240000000 HC EMOTIONAL WELLNESS R&B

## 2018-07-22 PROCEDURE — 6370000000 HC RX 637 (ALT 250 FOR IP): Performed by: NURSE PRACTITIONER

## 2018-07-22 PROCEDURE — 6370000000 HC RX 637 (ALT 250 FOR IP): Performed by: PSYCHIATRY & NEUROLOGY

## 2018-07-22 RX ADMIN — CARBAMAZEPINE 300 MG: 200 TABLET ORAL at 20:15

## 2018-07-22 RX ADMIN — NALTREXONE HYDROCHLORIDE 50 MG: 50 TABLET, FILM COATED ORAL at 08:41

## 2018-07-22 RX ADMIN — THIAMINE HCL TAB 100 MG 100 MG: 100 TAB at 08:41

## 2018-07-22 RX ADMIN — CARBAMAZEPINE 300 MG: 200 TABLET ORAL at 08:41

## 2018-07-22 RX ADMIN — FOLIC ACID 1 MG: 1 TABLET ORAL at 08:41

## 2018-07-22 RX ADMIN — HYDROXYZINE HYDROCHLORIDE 50 MG: 25 TABLET, FILM COATED ORAL at 14:20

## 2018-07-22 RX ADMIN — MIRTAZAPINE 15 MG: 15 TABLET, FILM COATED ORAL at 20:15

## 2018-07-23 PROCEDURE — 6370000000 HC RX 637 (ALT 250 FOR IP): Performed by: PSYCHIATRY & NEUROLOGY

## 2018-07-23 PROCEDURE — 99232 SBSQ HOSP IP/OBS MODERATE 35: CPT | Performed by: PSYCHIATRY & NEUROLOGY

## 2018-07-23 PROCEDURE — 6370000000 HC RX 637 (ALT 250 FOR IP): Performed by: NURSE PRACTITIONER

## 2018-07-23 PROCEDURE — 1240000000 HC EMOTIONAL WELLNESS R&B

## 2018-07-23 PROCEDURE — 6370000000 HC RX 637 (ALT 250 FOR IP): Performed by: REGISTERED NURSE

## 2018-07-23 RX ORDER — QUETIAPINE FUMARATE 100 MG/1
100 TABLET, FILM COATED ORAL NIGHTLY
Status: DISCONTINUED | OUTPATIENT
Start: 2018-07-23 | End: 2018-07-26 | Stop reason: HOSPADM

## 2018-07-23 RX ADMIN — THIAMINE HCL TAB 100 MG 100 MG: 100 TAB at 08:43

## 2018-07-23 RX ADMIN — HYDROXYZINE HYDROCHLORIDE 50 MG: 25 TABLET, FILM COATED ORAL at 21:17

## 2018-07-23 RX ADMIN — FOLIC ACID 1 MG: 1 TABLET ORAL at 08:43

## 2018-07-23 RX ADMIN — CARBAMAZEPINE 300 MG: 200 TABLET ORAL at 21:16

## 2018-07-23 RX ADMIN — NALTREXONE HYDROCHLORIDE 50 MG: 50 TABLET, FILM COATED ORAL at 08:43

## 2018-07-23 RX ADMIN — CARBAMAZEPINE 300 MG: 200 TABLET ORAL at 08:43

## 2018-07-23 RX ADMIN — MIRTAZAPINE 15 MG: 15 TABLET, FILM COATED ORAL at 21:17

## 2018-07-24 PROCEDURE — 99232 SBSQ HOSP IP/OBS MODERATE 35: CPT | Performed by: PSYCHIATRY & NEUROLOGY

## 2018-07-24 PROCEDURE — 6370000000 HC RX 637 (ALT 250 FOR IP): Performed by: REGISTERED NURSE

## 2018-07-24 PROCEDURE — 6370000000 HC RX 637 (ALT 250 FOR IP): Performed by: PSYCHIATRY & NEUROLOGY

## 2018-07-24 PROCEDURE — 1240000000 HC EMOTIONAL WELLNESS R&B

## 2018-07-24 PROCEDURE — 6370000000 HC RX 637 (ALT 250 FOR IP): Performed by: NURSE PRACTITIONER

## 2018-07-24 RX ADMIN — NALTREXONE HYDROCHLORIDE 50 MG: 50 TABLET, FILM COATED ORAL at 08:48

## 2018-07-24 RX ADMIN — CARBAMAZEPINE 300 MG: 200 TABLET ORAL at 08:48

## 2018-07-24 RX ADMIN — HYDROXYZINE HYDROCHLORIDE 50 MG: 25 TABLET, FILM COATED ORAL at 22:48

## 2018-07-24 RX ADMIN — TRAZODONE HYDROCHLORIDE 50 MG: 50 TABLET ORAL at 22:48

## 2018-07-24 RX ADMIN — MIRTAZAPINE 15 MG: 15 TABLET, FILM COATED ORAL at 22:48

## 2018-07-24 RX ADMIN — THIAMINE HCL TAB 100 MG 100 MG: 100 TAB at 08:48

## 2018-07-24 RX ADMIN — CARBAMAZEPINE 300 MG: 200 TABLET ORAL at 22:48

## 2018-07-24 RX ADMIN — FOLIC ACID 1 MG: 1 TABLET ORAL at 08:48

## 2018-07-24 RX ADMIN — HYDROXYZINE HYDROCHLORIDE 50 MG: 25 TABLET, FILM COATED ORAL at 10:36

## 2018-07-25 PROCEDURE — 1240000000 HC EMOTIONAL WELLNESS R&B

## 2018-07-25 PROCEDURE — 99239 HOSP IP/OBS DSCHRG MGMT >30: CPT | Performed by: PSYCHIATRY & NEUROLOGY

## 2018-07-25 PROCEDURE — 6370000000 HC RX 637 (ALT 250 FOR IP): Performed by: REGISTERED NURSE

## 2018-07-25 PROCEDURE — 6370000000 HC RX 637 (ALT 250 FOR IP): Performed by: PSYCHIATRY & NEUROLOGY

## 2018-07-25 RX ORDER — MIRTAZAPINE 30 MG/1
30 TABLET, FILM COATED ORAL NIGHTLY
Qty: 30 TABLET | Refills: 0 | Status: SHIPPED | OUTPATIENT
Start: 2018-07-25 | End: 2018-07-26

## 2018-07-25 RX ORDER — TRAZODONE HYDROCHLORIDE 50 MG/1
50 TABLET ORAL NIGHTLY PRN
Qty: 30 TABLET | Refills: 0 | Status: SHIPPED | OUTPATIENT
Start: 2018-07-25 | End: 2018-07-26

## 2018-07-25 RX ORDER — NALTREXONE HYDROCHLORIDE 50 MG/1
50 TABLET, FILM COATED ORAL
Qty: 30 TABLET | Refills: 0 | Status: SHIPPED | OUTPATIENT
Start: 2018-07-26 | End: 2018-07-26

## 2018-07-25 RX ORDER — CARBAMAZEPINE 200 MG/1
300 TABLET ORAL 2 TIMES DAILY
Qty: 90 TABLET | Refills: 0 | Status: SHIPPED | OUTPATIENT
Start: 2018-07-25 | End: 2018-07-26

## 2018-07-25 RX ORDER — HYDROXYZINE 50 MG/1
50 TABLET, FILM COATED ORAL 3 TIMES DAILY PRN
Qty: 30 TABLET | Refills: 0 | Status: SHIPPED | OUTPATIENT
Start: 2018-07-25 | End: 2018-07-26

## 2018-07-25 RX ORDER — MIRTAZAPINE 30 MG/1
30 TABLET, FILM COATED ORAL NIGHTLY
Status: DISCONTINUED | OUTPATIENT
Start: 2018-07-25 | End: 2018-07-26 | Stop reason: HOSPADM

## 2018-07-25 RX ADMIN — MIRTAZAPINE 30 MG: 30 TABLET, FILM COATED ORAL at 20:49

## 2018-07-25 RX ADMIN — FOLIC ACID 1 MG: 1 TABLET ORAL at 08:42

## 2018-07-25 RX ADMIN — THIAMINE HCL TAB 100 MG 100 MG: 100 TAB at 08:42

## 2018-07-25 RX ADMIN — CARBAMAZEPINE 300 MG: 200 TABLET ORAL at 20:49

## 2018-07-25 RX ADMIN — NALTREXONE HYDROCHLORIDE 50 MG: 50 TABLET, FILM COATED ORAL at 08:42

## 2018-07-25 RX ADMIN — CARBAMAZEPINE 300 MG: 200 TABLET ORAL at 08:42

## 2018-07-26 VITALS
WEIGHT: 170 LBS | RESPIRATION RATE: 14 BRPM | DIASTOLIC BLOOD PRESSURE: 60 MMHG | HEART RATE: 61 BPM | BODY MASS INDEX: 23.03 KG/M2 | OXYGEN SATURATION: 97 % | SYSTOLIC BLOOD PRESSURE: 98 MMHG | TEMPERATURE: 98.5 F | HEIGHT: 72 IN

## 2018-07-26 PROCEDURE — 6370000000 HC RX 637 (ALT 250 FOR IP): Performed by: PSYCHIATRY & NEUROLOGY

## 2018-07-26 PROCEDURE — 6370000000 HC RX 637 (ALT 250 FOR IP): Performed by: REGISTERED NURSE

## 2018-07-26 PROCEDURE — 5130000000 HC BRIDGE APPOINTMENT: Performed by: COUNSELOR

## 2018-07-26 RX ORDER — CARBAMAZEPINE 200 MG/1
300 TABLET ORAL 2 TIMES DAILY
Qty: 90 TABLET | Refills: 0 | Status: ON HOLD | OUTPATIENT
Start: 2018-07-26 | End: 2018-09-06 | Stop reason: HOSPADM

## 2018-07-26 RX ORDER — HYDROXYZINE 50 MG/1
50 TABLET, FILM COATED ORAL 3 TIMES DAILY PRN
Qty: 30 TABLET | Refills: 0 | Status: SHIPPED | OUTPATIENT
Start: 2018-07-26 | End: 2018-08-05

## 2018-07-26 RX ORDER — NALTREXONE HYDROCHLORIDE 50 MG/1
50 TABLET, FILM COATED ORAL
Qty: 30 TABLET | Refills: 0 | Status: ON HOLD | OUTPATIENT
Start: 2018-07-26 | End: 2018-09-06 | Stop reason: HOSPADM

## 2018-07-26 RX ORDER — TRAZODONE HYDROCHLORIDE 50 MG/1
50 TABLET ORAL NIGHTLY PRN
Qty: 30 TABLET | Refills: 0 | Status: ON HOLD | OUTPATIENT
Start: 2018-07-26 | End: 2018-09-06 | Stop reason: HOSPADM

## 2018-07-26 RX ORDER — MIRTAZAPINE 30 MG/1
30 TABLET, FILM COATED ORAL NIGHTLY
Qty: 30 TABLET | Refills: 0 | Status: ON HOLD | OUTPATIENT
Start: 2018-07-26 | End: 2018-09-06 | Stop reason: HOSPADM

## 2018-07-26 RX ADMIN — FOLIC ACID 1 MG: 1 TABLET ORAL at 09:02

## 2018-07-26 RX ADMIN — THIAMINE HCL TAB 100 MG 100 MG: 100 TAB at 09:02

## 2018-07-26 RX ADMIN — CARBAMAZEPINE 300 MG: 200 TABLET ORAL at 09:02

## 2018-07-26 RX ADMIN — NALTREXONE HYDROCHLORIDE 50 MG: 50 TABLET, FILM COATED ORAL at 09:02

## 2018-08-18 ENCOUNTER — HOSPITAL ENCOUNTER (EMERGENCY)
Age: 28
Discharge: HOME | End: 2018-08-18
Attending: EMERGENCY MEDICINE
Payer: COMMERCIAL

## 2018-08-18 ENCOUNTER — HOSPITAL ENCOUNTER (EMERGENCY)
Age: 28
Discharge: HOME | End: 2018-08-18
Payer: COMMERCIAL

## 2018-08-18 VITALS
OXYGEN SATURATION: 99 % | TEMPERATURE: 98.4 F | HEART RATE: 60 BPM | HEIGHT: 72 IN | DIASTOLIC BLOOD PRESSURE: 77 MMHG | BODY MASS INDEX: 24.38 KG/M2 | RESPIRATION RATE: 16 BRPM | SYSTOLIC BLOOD PRESSURE: 132 MMHG | WEIGHT: 180 LBS

## 2018-08-18 VITALS
SYSTOLIC BLOOD PRESSURE: 119 MMHG | OXYGEN SATURATION: 96 % | BODY MASS INDEX: 25.09 KG/M2 | DIASTOLIC BLOOD PRESSURE: 76 MMHG | TEMPERATURE: 98.6 F | HEART RATE: 101 BPM | WEIGHT: 185 LBS | RESPIRATION RATE: 16 BRPM

## 2018-08-18 DIAGNOSIS — F32.A DEPRESSION, UNSPECIFIED DEPRESSION TYPE: Primary | ICD-10-CM

## 2018-08-18 DIAGNOSIS — F41.0 PANIC ATTACK: Primary | ICD-10-CM

## 2018-08-18 PROCEDURE — 99283 EMERGENCY DEPT VISIT LOW MDM: CPT

## 2018-08-18 RX ORDER — QUETIAPINE 150 MG/1
150 TABLET, FILM COATED, EXTENDED RELEASE ORAL DAILY
Status: ON HOLD | COMMUNITY
End: 2018-09-06 | Stop reason: HOSPADM

## 2018-08-18 RX ORDER — HYDROXYZINE PAMOATE 50 MG/1
50 CAPSULE ORAL 3 TIMES DAILY PRN
Status: ON HOLD | COMMUNITY
End: 2018-09-06 | Stop reason: HOSPADM

## 2018-08-18 RX ORDER — LORAZEPAM 0.5 MG/1
0.5 TABLET ORAL 2 TIMES DAILY PRN
Qty: 5 TABLET | Refills: 0 | Status: ON HOLD | OUTPATIENT
Start: 2018-08-18 | End: 2018-09-06 | Stop reason: HOSPADM

## 2018-08-18 RX ORDER — DIPHENHYDRAMINE HCL 50 MG
50 CAPSULE ORAL EVERY 6 HOURS PRN
Status: ON HOLD | COMMUNITY
End: 2018-09-06 | Stop reason: HOSPADM

## 2018-08-18 ASSESSMENT — ENCOUNTER SYMPTOMS
VOMITING: 0
SHORTNESS OF BREATH: 0
WHEEZING: 0
RHINORRHEA: 0
COLOR CHANGE: 0
COUGH: 0
DIARRHEA: 0
SHORTNESS OF BREATH: 0
CONSTIPATION: 0
CONSTIPATION: 0
DIARRHEA: 0
NAUSEA: 0
COUGH: 0
ABDOMINAL PAIN: 0
SORE THROAT: 0
ABDOMINAL PAIN: 0
WHEEZING: 0
SINUS PRESSURE: 0
RHINORRHEA: 0
NAUSEA: 0
VOMITING: 0
SORE THROAT: 0

## 2018-08-18 ASSESSMENT — PAIN SCALES - GENERAL: PAINLEVEL_OUTOF10: 0

## 2018-08-18 NOTE — ED TRIAGE NOTES
Pt to ED c/o panic attack. Pt AOx3, unable to convey the h/o of this panic attack. Pt seen at [facility] this morning, d/c to [facility] but they don't intake on the weekend. Pt denies SI/HI. States he's actually feeling less anxious. Poor eye contact and flight of ideas. Patent airway, no dyspnea or cyanosis noted.

## 2018-08-19 ENCOUNTER — HOSPITAL ENCOUNTER (EMERGENCY)
Age: 28
Discharge: HOME | End: 2018-08-19
Attending: EMERGENCY MEDICINE
Payer: COMMERCIAL

## 2018-08-19 VITALS
RESPIRATION RATE: 18 BRPM | OXYGEN SATURATION: 100 % | DIASTOLIC BLOOD PRESSURE: 77 MMHG | HEART RATE: 74 BPM | TEMPERATURE: 98.4 F | SYSTOLIC BLOOD PRESSURE: 103 MMHG

## 2018-08-19 VITALS
SYSTOLIC BLOOD PRESSURE: 121 MMHG | HEART RATE: 79 BPM | TEMPERATURE: 97.5 F | OXYGEN SATURATION: 97 % | RESPIRATION RATE: 18 BRPM | DIASTOLIC BLOOD PRESSURE: 87 MMHG

## 2018-08-19 DIAGNOSIS — F32.A DEPRESSION, UNSPECIFIED DEPRESSION TYPE: Primary | ICD-10-CM

## 2018-08-19 DIAGNOSIS — F25.9 SCHIZOAFFECTIVE DISORDER, UNSPECIFIED TYPE (HCC): Primary | ICD-10-CM

## 2018-08-19 LAB
ABSOLUTE EOS #: 0.4 K/UL (ref 0–0.4)
ABSOLUTE IMMATURE GRANULOCYTE: ABNORMAL K/UL (ref 0–0.3)
ABSOLUTE LYMPH #: 2.2 K/UL (ref 1–4.8)
ABSOLUTE MONO #: 0.5 K/UL (ref 0.1–1.3)
ACETAMINOPHEN LEVEL: <5 UG/ML (ref 10–30)
ALBUMIN SERPL-MCNC: 4.7 G/DL (ref 3.5–5.2)
ALBUMIN/GLOBULIN RATIO: ABNORMAL (ref 1–2.5)
ALP BLD-CCNC: 43 U/L (ref 40–129)
ALT SERPL-CCNC: 12 U/L (ref 5–41)
ANION GAP SERPL CALCULATED.3IONS-SCNC: 16 MMOL/L (ref 9–17)
AST SERPL-CCNC: 14 U/L
BASOPHILS # BLD: 1 % (ref 0–2)
BASOPHILS ABSOLUTE: 0.1 K/UL (ref 0–0.2)
BILIRUB SERPL-MCNC: 0.66 MG/DL (ref 0.3–1.2)
BUN BLDV-MCNC: 23 MG/DL (ref 6–20)
BUN/CREAT BLD: ABNORMAL (ref 9–20)
CALCIUM SERPL-MCNC: 9.3 MG/DL (ref 8.6–10.4)
CHLORIDE BLD-SCNC: 98 MMOL/L (ref 98–107)
CO2: 24 MMOL/L (ref 20–31)
CREAT SERPL-MCNC: 1.02 MG/DL (ref 0.7–1.2)
DIFFERENTIAL TYPE: ABNORMAL
EKG ATRIAL RATE: 51 BPM
EKG P AXIS: 70 DEGREES
EKG P-R INTERVAL: 160 MS
EKG Q-T INTERVAL: 472 MS
EKG QRS DURATION: 90 MS
EKG QTC CALCULATION (BAZETT): 435 MS
EKG R AXIS: 85 DEGREES
EKG T AXIS: 56 DEGREES
EKG VENTRICULAR RATE: 51 BPM
EOSINOPHILS RELATIVE PERCENT: 6 % (ref 0–4)
ETHANOL PERCENT: <0.01 %
ETHANOL: <10 MG/DL
GFR AFRICAN AMERICAN: >60 ML/MIN
GFR NON-AFRICAN AMERICAN: >60 ML/MIN
GFR SERPL CREATININE-BSD FRML MDRD: ABNORMAL ML/MIN/{1.73_M2}
GFR SERPL CREATININE-BSD FRML MDRD: ABNORMAL ML/MIN/{1.73_M2}
GLUCOSE BLD-MCNC: 73 MG/DL (ref 70–99)
HCT VFR BLD CALC: 39.8 % (ref 41–53)
HEMOGLOBIN: 13.8 G/DL (ref 13.5–17.5)
IMMATURE GRANULOCYTES: ABNORMAL %
LYMPHOCYTES # BLD: 36 % (ref 24–44)
MCH RBC QN AUTO: 31.1 PG (ref 26–34)
MCHC RBC AUTO-ENTMCNC: 34.7 G/DL (ref 31–37)
MCV RBC AUTO: 89.6 FL (ref 80–100)
MONOCYTES # BLD: 8 % (ref 1–7)
NRBC AUTOMATED: ABNORMAL PER 100 WBC
PDW BLD-RTO: 12.8 % (ref 11.5–14.9)
PLATELET # BLD: 225 K/UL (ref 150–450)
PLATELET ESTIMATE: ABNORMAL
PMV BLD AUTO: 8.4 FL (ref 6–12)
POTASSIUM SERPL-SCNC: 3.7 MMOL/L (ref 3.7–5.3)
RBC # BLD: 4.44 M/UL (ref 4.5–5.9)
RBC # BLD: ABNORMAL 10*6/UL
SALICYLATE LEVEL: <1 MG/DL (ref 3–10)
SEG NEUTROPHILS: 49 % (ref 36–66)
SEGMENTED NEUTROPHILS ABSOLUTE COUNT: 3.1 K/UL (ref 1.3–9.1)
SODIUM BLD-SCNC: 138 MMOL/L (ref 135–144)
TOTAL PROTEIN: 7.8 G/DL (ref 6.4–8.3)
WBC # BLD: 6.2 K/UL (ref 3.5–11)
WBC # BLD: ABNORMAL 10*3/UL

## 2018-08-19 PROCEDURE — 99283 EMERGENCY DEPT VISIT LOW MDM: CPT

## 2018-08-19 PROCEDURE — 80307 DRUG TEST PRSMV CHEM ANLYZR: CPT

## 2018-08-19 PROCEDURE — 80053 COMPREHEN METABOLIC PANEL: CPT

## 2018-08-19 PROCEDURE — 99285 EMERGENCY DEPT VISIT HI MDM: CPT

## 2018-08-19 PROCEDURE — 36415 COLL VENOUS BLD VENIPUNCTURE: CPT

## 2018-08-19 PROCEDURE — 93005 ELECTROCARDIOGRAM TRACING: CPT

## 2018-08-19 PROCEDURE — 85025 COMPLETE CBC W/AUTO DIFF WBC: CPT

## 2018-08-19 PROCEDURE — G0480 DRUG TEST DEF 1-7 CLASSES: HCPCS

## 2018-08-19 ASSESSMENT — ENCOUNTER SYMPTOMS
VOMITING: 0
ABDOMINAL PAIN: 0
NAUSEA: 0
SHORTNESS OF BREATH: 0

## 2018-08-19 NOTE — ED PROVIDER NOTES
[facility] ED     Emergency Department     Faculty Attestation        I performed a history and physical examination of the patient and discussed management with the resident. I reviewed the residents note and agree with the documented findings and plan of care. Any areas of disagreement are noted on the chart. I was personally present for the key portions of any procedures. I have documented in the chart those procedures where I was not present during the key portions. I have reviewed the emergency nurses triage note. I agree with the chief complaint, past medical history, past surgical history, allergies, medications, social and family history as documented unless otherwise noted below. For Physician Assistant/ Nurse Practitioner cases/documentation I have personally evaluated this patient and have completed at least one if not all key elements of the E/M (history, physical exam, and MDM). Additional findings are as noted. Vital Signs: BP: 121/87  Pulse: 79  Resp: 18  Temp: 97.5 °F (36.4 °C) SpO2: 97 %  PCP:  No primary care provider on file. Pertinent Comments:         Critical Care  None      (Please note that portions of this note were completed with a voice recognition program. Efforts were made to edit the dictations but occasionally words are mis-transcribed.  Whenever words are used in this note in any gender, they shall be construed as though they were used in the gender appropriate to the circumstances; and whenever words are used in this note in the singular or plural form, they shall be construed as though they were used in the form appropriate to the circumstances.)    MD Gabino Hidalgo  Attending Emergency Medicine Physician              Berta Yu MD  08/19/18 9136

## 2018-08-19 NOTE — ED TRIAGE NOTES
Pt reports that he took a hand full of an over the counter sleep medication. Pt reports that this was an attempt to harm himself.

## 2018-08-19 NOTE — ED PROVIDER NOTES
[facility]  ED  Emergency Department Encounter  Emergency Medicine Resident     Pt Name: Rupal Grider  MRN: [id number]  [place] 1990  Date of evaluation: 8/19/18  PCP:  No primary care provider on file. CHIEF COMPLAINT       Chief Complaint   Patient presents with    Depression       HISTORY OF PRESENT ILLNESS  (Location/Symptom, Timing/Onset, Context/Setting, Quality, Duration, Modifying Factors, Severity.)      Rupal Grider is a 33 y.o. male who     Patient reports to be having acute depression and was brought into [address] [place] [address] for further monitoring. Patient denies any homicidal ideations or wanting to hurt pets. Patient was seen at multiple facilities and does to want inpatient help. Patient denies any auditory or visual hallucinations. Patient is vague about  prior attempts in the past but chart review has him overdosing and have hospitalized for psychiatric problems. Tegretol Trazdone Seroquel as needed add Remereron For on psychiatric medications. No for family history of suicide attempts. No for access firearms, weapons. Patient denies any traumas or falls and denies any somatic complaints such as headaches, neck stiffness, nuchal rigidity, chest pain, shortness of breath, abdominal pain, change in stools or urine, rashes. . Denied alcohol abuse and drug abuse. [facility] sees him. PAST MEDICAL / SURGICAL / SOCIAL / FAMILY HISTORY      has a past medical history of ADHD (attention deficit hyperactivity disorder); Anxiety; Bipolar 1 disorder ([address] and PTSD (post-traumatic stress disorder). has no past surgical history on file. Social History     Social History    Marital status: Single     Spouse name: N/A    Number of children: N/A    Years of education: N/A     Occupational History    Not on file.      Social History Main Topics    Smoking status: Former Smoker     Packs/day: 0.50     Types: Cigarettes    Smokeless tobacco: Never Used    Alcohol use No      Comment: Daily; bottle of volka daily- pt reports sober for a couple months    Drug use: Yes     Types: Cocaine, Opiates       Comment: Heroin in past- pt reports that he has been clean for awhile    Sexual activity: No     Other Topics Concern    Not on file     Social History Narrative    No narrative on file       Family History   Problem Relation Age of Onset    Liver Cancer Brother     Alcohol Abuse Brother     No Known Problems Mother     No Known Problems Father        Allergies:  Advil [ibuprofen]    Home Medications:  Prior to Admission medications    Medication Sig Start Date End Date Taking? Authorizing Provider   QUEtiapine (SEROQUEL XR) 150 MG TB24 extended release tablet Take 150 mg by mouth daily    Historical Provider, MD   hydrOXYzine (VISTARIL) 50 MG capsule Take 50 mg by mouth 3 times daily as needed for Itching    Historical Provider, MD   diphenhydrAMINE (BENADRYL) 50 MG capsule Take 50 mg by mouth every 6 hours as needed for Itching    Historical Provider, MD   LORazepam (ATIVAN) 0.5 MG tablet Take 1 tablet by mouth 2 times daily as needed for Anxiety for up to 30 days. . 8/18/18 9/17/18  LIBBY Marie - CNP   carBAMazepine (TEGRETOL) 200 MG tablet Take 1.5 tablets by mouth 2 times daily 7/26/18   Chepe Fuller MD   mirtazapine (REMERON) 30 MG tablet Take 1 tablet by mouth nightly 7/26/18   Chepe Fuller MD   traZODone (DESYREL) 50 MG tablet Take 1 tablet by mouth nightly as needed for Sleep 7/26/18   Chepe Fuller MD   naltrexone (DEPADE) 50 MG tablet Take 1 tablet by mouth daily (with breakfast) 7/26/18   Chepe Fuller MD       REVIEW OF SYSTEMS    (2-9 systems for level 4, 10 or more for level 5)      ROS:  Constitutional: Denied fever, weight loss  Eyes: Denied change in vision, eye pain  ENT: Denied sinus problems, congestion/obstruction  Respiratory: Denies shortness of breath, chest pain upon inspiration  CV: Denied edema, 12:21 PM          Edna Mederos MD  Emergency Medicine Resident    (Please note that portions of this note were completed with a voice recognition program.  Efforts were made to edit the dictations but occasionally words are mis-transcribed.)       Edna Mederos MD  Resident  08/19/18 7778

## 2018-08-19 NOTE — ED PROVIDER NOTES
[address] [place] [address] ED  eMERGENCY dEPARTMENT eNCOUnter      Pt Name: Jason Vallejo  MRN: [id number]  [place] 1990  Date of evaluation: 8/18/2018  Provider: [address] [place] [address] NP, LIBBY Borges [id number]       Chief Complaint   Patient presents with    Panic Attack     pt seen at [facility] ED today / [facility] to [facility]. .pt states \"I left there\"(pt states has been non compliant taking prescribed medication)         HISTORY OF PRESENT ILLNESS  (Location/Symptom, Timing/Onset, Context/Setting, Quality, Duration, Modifying Factors, Severity.)   Jason Vallejo is a 33 y.o. male who presents to the emergency department Today by private vehicle for evaluation of a panic attack and the patient states that he has a history of anxiety and depression with schizophrenia. He takes medications. He states that he felt a little is having a panic attack. He feels like he was short of breath and his heart was racing. He has had a panic attack in the past.  He was seen at [facility] and they [facility] to [facility]. He states that rescue did not have outpatient services today so he left and came here. Chest pain seems to have slightly improved. He is going to call in the mid to see Ronit on Monday      Nursing Notes were reviewed.     ALLERGIES     Advil [ibuprofen]    CURRENT MEDICATIONS       Discharge Medication List as of 8/18/2018  5:30 PM      CONTINUE these medications which have NOT CHANGED    Details   QUEtiapine (SEROQUEL XR) 150 MG TB24 extended release tablet Take 150 mg by mouth dailyHistorical Med      hydrOXYzine (VISTARIL) 50 MG capsule Take 50 mg by mouth 3 times daily as needed for ItchingHistorical Med      diphenhydrAMINE (BENADRYL) 50 MG capsule Take 50 mg by mouth every 6 hours as needed for ItchingHistorical Med      carBAMazepine (TEGRETOL) 200 MG tablet Take 1.5 tablets by mouth 2 times daily, Disp-90 tablet, R-0Print      mirtazapine (REMERON) 30 MG tablet Take 1 tablet by mouth nightly, Disp-30 tablet, R-0Print      traZODone (DESYREL) 50 MG tablet Take 1 tablet by mouth nightly as needed for Sleep, Disp-30 tablet, R-0Print      naltrexone (DEPADE) 50 MG tablet Take 1 tablet by mouth daily (with breakfast), Disp-30 tablet, R-0Print             PAST MEDICAL HISTORY         Diagnosis Date    ADHD (attention deficit hyperactivity disorder)     Anxiety     Bipolar 1 disorder (HCC)     PTSD (post-traumatic stress disorder)        SURGICAL HISTORY     History reviewed. No pertinent surgical history. FAMILY HISTORY       Family History   Problem Relation Age of Onset    Liver Cancer Brother     Alcohol Abuse Brother     No Known Problems Mother     No Known Problems Father      Family Status   Relation Status    Brother (Not Specified)    Mother (Not Specified)    Father (Not Specified)        SOCIAL HISTORY      reports that he has quit smoking. His smoking use included Cigarettes. He smoked 0.50 packs per day. He has never used smokeless tobacco. He reports that he drinks about 4.2 oz of alcohol per week . He reports that he uses drugs, including Cocaine and Opiates . REVIEW OF SYSTEMS    (2-9 systems for level 4, 10 or more for level 5)     Review of Systems   Constitutional: Negative for chills, fever and unexpected weight change. HENT: Negative for congestion, rhinorrhea, sinus pressure and sore throat. Respiratory: Negative for cough, shortness of breath and wheezing. Cardiovascular: Negative for chest pain and palpitations. Gastrointestinal: Negative for abdominal pain, constipation, diarrhea, nausea and vomiting. Genitourinary: Negative for dysuria and hematuria. Musculoskeletal: Negative for arthralgias and myalgias. Skin: Negative for color change and rash. Neurological: Negative for dizziness, weakness and headaches. Hematological: Negative for adenopathy. Psychiatric/Behavioral: The patient is nervous/anxious.          Except as noted above the remainder of the [employer] [address] [facility] [address] [phone]  Call in 1 day      [facility] ED  [address] [facility]  [phone]    If symptoms worsen      DISCHARGE MEDICATIONS:     Discharge Medication List as of 8/18/2018  5:30 PM      START taking these medications    Details   LORazepam (ATIVAN) 0.5 MG tablet Take 1 tablet by mouth 2 times daily as needed for Anxiety for up to 30 days. ., Disp-5 tablet, R-0Print                 (Please note that portions of this note were completed with a voice recognition program.  Efforts were made to edit the dictations but occasionally words are mis-transcribed.)    [facility] JOHN, LIBBY - CNP  Certified Nurse Practitioner            LIBBY Benton CNP  08/18/18 3849

## 2018-08-24 ENCOUNTER — HOSPITAL ENCOUNTER (EMERGENCY)
Age: 28
Discharge: HOME | End: 2018-08-25
Attending: EMERGENCY MEDICINE
Payer: COMMERCIAL

## 2018-08-24 VITALS
HEIGHT: 72 IN | DIASTOLIC BLOOD PRESSURE: 55 MMHG | RESPIRATION RATE: 16 BRPM | SYSTOLIC BLOOD PRESSURE: 124 MMHG | HEART RATE: 73 BPM | BODY MASS INDEX: 24.38 KG/M2 | WEIGHT: 180 LBS | OXYGEN SATURATION: 99 %

## 2018-08-24 DIAGNOSIS — F10.10 ALCOHOL ABUSE: Primary | ICD-10-CM

## 2018-08-24 LAB
ABSOLUTE EOS #: 0.2 K/UL (ref 0–0.4)
ABSOLUTE IMMATURE GRANULOCYTE: ABNORMAL K/UL (ref 0–0.3)
ABSOLUTE LYMPH #: 1.8 K/UL (ref 1–4.8)
ABSOLUTE MONO #: 0.7 K/UL (ref 0.1–1.3)
ANION GAP SERPL CALCULATED.3IONS-SCNC: 12 MMOL/L (ref 9–17)
BASOPHILS # BLD: 1 % (ref 0–2)
BASOPHILS ABSOLUTE: 0 K/UL (ref 0–0.2)
BUN BLDV-MCNC: 16 MG/DL (ref 6–20)
BUN/CREAT BLD: NORMAL (ref 9–20)
CALCIUM SERPL-MCNC: 9.5 MG/DL (ref 8.6–10.4)
CHLORIDE BLD-SCNC: 102 MMOL/L (ref 98–107)
CO2: 27 MMOL/L (ref 20–31)
CREAT SERPL-MCNC: 1.1 MG/DL (ref 0.7–1.2)
DIFFERENTIAL TYPE: ABNORMAL
EOSINOPHILS RELATIVE PERCENT: 4 % (ref 0–4)
GFR AFRICAN AMERICAN: >60 ML/MIN
GFR NON-AFRICAN AMERICAN: >60 ML/MIN
GFR SERPL CREATININE-BSD FRML MDRD: NORMAL ML/MIN/{1.73_M2}
GFR SERPL CREATININE-BSD FRML MDRD: NORMAL ML/MIN/{1.73_M2}
GLUCOSE BLD-MCNC: 99 MG/DL (ref 70–99)
HCT VFR BLD CALC: 40.2 % (ref 41–53)
HEMOGLOBIN: 14.1 G/DL (ref 13.5–17.5)
IMMATURE GRANULOCYTES: ABNORMAL %
LYMPHOCYTES # BLD: 33 % (ref 24–44)
MCH RBC QN AUTO: 31.5 PG (ref 26–34)
MCHC RBC AUTO-ENTMCNC: 35.1 G/DL (ref 31–37)
MCV RBC AUTO: 89.8 FL (ref 80–100)
MONOCYTES # BLD: 12 % (ref 1–7)
NRBC AUTOMATED: ABNORMAL PER 100 WBC
PDW BLD-RTO: 12.8 % (ref 11.5–14.9)
PLATELET # BLD: 215 K/UL (ref 150–450)
PLATELET ESTIMATE: ABNORMAL
PMV BLD AUTO: 9.8 FL (ref 6–12)
POTASSIUM SERPL-SCNC: 3.9 MMOL/L (ref 3.7–5.3)
RBC # BLD: 4.47 M/UL (ref 4.5–5.9)
RBC # BLD: ABNORMAL 10*6/UL
SEG NEUTROPHILS: 50 % (ref 36–66)
SEGMENTED NEUTROPHILS ABSOLUTE COUNT: 2.7 K/UL (ref 1.3–9.1)
SODIUM BLD-SCNC: 141 MMOL/L (ref 135–144)
WBC # BLD: 5.5 K/UL (ref 3.5–11)
WBC # BLD: ABNORMAL 10*3/UL

## 2018-08-24 PROCEDURE — 85025 COMPLETE CBC W/AUTO DIFF WBC: CPT

## 2018-08-24 PROCEDURE — 36415 COLL VENOUS BLD VENIPUNCTURE: CPT

## 2018-08-24 PROCEDURE — 80048 BASIC METABOLIC PNL TOTAL CA: CPT

## 2018-08-24 PROCEDURE — 99285 EMERGENCY DEPT VISIT HI MDM: CPT

## 2018-08-24 ASSESSMENT — ENCOUNTER SYMPTOMS
ABDOMINAL PAIN: 0
SHORTNESS OF BREATH: 0
COUGH: 0

## 2018-08-25 NOTE — ED NOTES
[facility] contacted  for more information on pt's drinking habits. Pt states he drinks about '5 tall boys' a day, and will go through about '3 bottles of vodka' a week. Pt states he does cocaine about '2 times a week'. Pt states he took 'a couple sips of beer' this morning around 8am, when he woke up, to stop himself from being sick. Pt states he has been trying to 'ween himself off' of alcohol. [facility] contacted with this information.

## 2018-08-25 NOTE — ED PROVIDER NOTES
[address] ED  eMERGENCY dEPARTMENT eNCOUnter    Pt Name: Lloyd Guillen  MRN: [id number]  [place] 1990  Date of evaluation: 8/24/18  CHIEF COMPLAINT       Chief Complaint   Patient presents with    Suicidal     HISTORY OF PRESENT ILLNESS   HPI  65-year-old male presenting with the desire to be well. He states that he has recently been abusing his medications which include Tegretol and Seroquel, as well as drinking alcohol (last drink this morning) and states that he wants to get things started with his life again. He denies being suicidal or homicidal. He denies any hallucinations. States that he has gone through alcohol withdrawal in the past, and drinks daily at this time. He denies any fevers, chills, shakes, seizures, or syncope. REVIEW OF SYSTEMS     Review of Systems   Constitutional: Negative for chills and fever. Respiratory: Negative for cough and shortness of breath. Cardiovascular: Negative for chest pain. Gastrointestinal: Negative for abdominal pain. Genitourinary: Negative for difficulty urinating. Skin: Negative for wound. Neurological: Negative for dizziness and syncope. PAST MEDICAL HISTORY     Past Medical History:   Diagnosis Date    ADHD (attention deficit hyperactivity disorder)     Anxiety     Bipolar 1 disorder (HCC)     PTSD (post-traumatic stress disorder)      SURGICAL HISTORY     History reviewed. No pertinent surgical history. CURRENT MEDICATIONS       Previous Medications    CARBAMAZEPINE (TEGRETOL) 200 MG TABLET    Take 1.5 tablets by mouth 2 times daily    DIPHENHYDRAMINE (BENADRYL) 50 MG CAPSULE    Take 50 mg by mouth every 6 hours as needed for Itching    HYDROXYZINE (VISTARIL) 50 MG CAPSULE    Take 50 mg by mouth 3 times daily as needed for Itching    LORAZEPAM (ATIVAN) 0.5 MG TABLET    Take 1 tablet by mouth 2 times daily as needed for Anxiety for up to 30 days. Bianca Martin     MIRTAZAPINE (REMERON) 30 MG TABLET    Take 1 tablet by mouth nightly

## 2018-08-31 ENCOUNTER — HOSPITAL ENCOUNTER (INPATIENT)
Age: 28
LOS: 6 days | Discharge: HOME | DRG: 753 | End: 2018-09-06
Attending: EMERGENCY MEDICINE | Admitting: PSYCHIATRY & NEUROLOGY
Payer: COMMERCIAL

## 2018-08-31 DIAGNOSIS — R45.851 SUICIDAL IDEATION: Primary | ICD-10-CM

## 2018-08-31 PROBLEM — F31.9 BIPOLAR 1 DISORDER (HCC): Status: ACTIVE | Noted: 2018-08-31

## 2018-08-31 PROCEDURE — 1240000000 HC EMOTIONAL WELLNESS R&B

## 2018-08-31 PROCEDURE — 80053 COMPREHEN METABOLIC PANEL: CPT

## 2018-08-31 PROCEDURE — 85025 COMPLETE CBC W/AUTO DIFF WBC: CPT

## 2018-08-31 PROCEDURE — 99285 EMERGENCY DEPT VISIT HI MDM: CPT

## 2018-08-31 RX ORDER — MAGNESIUM HYDROXIDE/ALUMINUM HYDROXICE/SIMETHICONE 120; 1200; 1200 MG/30ML; MG/30ML; MG/30ML
20 SUSPENSION ORAL 3 TIMES DAILY PRN
Status: DISCONTINUED | OUTPATIENT
Start: 2018-08-31 | End: 2018-09-06 | Stop reason: HOSPADM

## 2018-08-31 RX ORDER — ACETAMINOPHEN 160 MG/5ML
650 SOLUTION ORAL EVERY 4 HOURS PRN
Status: DISCONTINUED | OUTPATIENT
Start: 2018-08-31 | End: 2018-08-31

## 2018-08-31 RX ORDER — TRAZODONE HYDROCHLORIDE 50 MG/1
50 TABLET ORAL NIGHTLY PRN
Status: DISCONTINUED | OUTPATIENT
Start: 2018-09-01 | End: 2018-09-06 | Stop reason: HOSPADM

## 2018-08-31 RX ORDER — BENZTROPINE MESYLATE 1 MG/ML
2 INJECTION INTRAMUSCULAR; INTRAVENOUS DAILY PRN
Status: DISCONTINUED | OUTPATIENT
Start: 2018-08-31 | End: 2018-09-06 | Stop reason: HOSPADM

## 2018-08-31 RX ORDER — ACETAMINOPHEN 325 MG/1
650 TABLET ORAL EVERY 4 HOURS PRN
Status: DISCONTINUED | OUTPATIENT
Start: 2018-08-31 | End: 2018-09-06 | Stop reason: HOSPADM

## 2018-08-31 RX ORDER — HYDROXYZINE HYDROCHLORIDE 25 MG/1
25 TABLET, FILM COATED ORAL 3 TIMES DAILY PRN
Status: DISCONTINUED | OUTPATIENT
Start: 2018-08-31 | End: 2018-09-06 | Stop reason: HOSPADM

## 2018-08-31 ASSESSMENT — SLEEP AND FATIGUE QUESTIONNAIRES
DO YOU HAVE DIFFICULTY SLEEPING: NO
AVERAGE NUMBER OF SLEEP HOURS: 7
AVERAGE NUMBER OF SLEEP HOURS: 7
DO YOU USE A SLEEP AID: NO
DO YOU HAVE DIFFICULTY SLEEPING: NO
DO YOU USE A SLEEP AID: NO

## 2018-08-31 ASSESSMENT — LIFESTYLE VARIABLES: HISTORY_ALCOHOL_USE: YES

## 2018-08-31 ASSESSMENT — ENCOUNTER SYMPTOMS
SHORTNESS OF BREATH: 0
COUGH: 0
ABDOMINAL PAIN: 0

## 2018-08-31 ASSESSMENT — PAIN SCALES - GENERAL: PAINLEVEL_OUTOF10: 0

## 2018-08-31 ASSESSMENT — PATIENT HEALTH QUESTIONNAIRE - PHQ9: SUM OF ALL RESPONSES TO PHQ QUESTIONS 1-9: 13

## 2018-08-31 NOTE — ED PROVIDER NOTES
(ATIVAN) 0.5 MG tablet Take 1 tablet by mouth 2 times daily as needed for Anxiety for up to 30 days. Nikita Sy: 5 tablet, Refills: 0    Associated Diagnoses: Panic attack      carBAMazepine (TEGRETOL) 200 MG tablet Take 1.5 tablets by mouth 2 times daily  Qty: 90 tablet, Refills: 0      mirtazapine (REMERON) 30 MG tablet Take 1 tablet by mouth nightly  Qty: 30 tablet, Refills: 0      traZODone (DESYREL) 50 MG tablet Take 1 tablet by mouth nightly as needed for Sleep  Qty: 30 tablet, Refills: 0      naltrexone (DEPADE) 50 MG tablet Take 1 tablet by mouth daily (with breakfast)  Qty: 30 tablet, Refills: 0           ALLERGIES     is allergic to advil [ibuprofen]. FAMILY HISTORY     indicated that the status of his mother is unknown. He indicated that the status of his father is unknown. He indicated that the status of his brother is unknown. SOCIAL HISTORY      reports that he has quit smoking. His smoking use included Cigarettes. He smoked 0.50 packs per day. He has never used smokeless tobacco. He reports that he drinks about 4.2 oz of alcohol per week . He reports that he does not use drugs. PHYSICAL EXAM     INITIAL VITALS: BP (!) 88/54   Pulse 56   Temp 97.4 °F (36.3 °C) (Oral)   Resp 14   Ht 6' (1.829 m)   Wt 166 lb (75.3 kg)   SpO2 98%   BMI 22.51 kg/m²    Physical Exam   Constitutional: He appears well-developed and well-nourished. No distress. HENT:   Head: Normocephalic and atraumatic. Eyes: Pupils are equal, round, and reactive to light. Cardiovascular: Normal rate and regular rhythm. Pulmonary/Chest: Effort normal and breath sounds normal.   Neurological: He is alert. Skin: Skin is warm and dry. No rash noted. He is not diaphoretic. Nursing note and vitals reviewed. MEDICAL DECISION MAKIN y.o. male with a history of substance abuse presenting with suicidal thoughts. Patient denies any active plan of suicide.  Patient medically cleared, will disposition in collaboration with document.                    Mikael Pickett MD  09/01/18 8772

## 2018-09-01 PROCEDURE — 6370000000 HC RX 637 (ALT 250 FOR IP): Performed by: PSYCHIATRY & NEUROLOGY

## 2018-09-01 PROCEDURE — 1240000000 HC EMOTIONAL WELLNESS R&B

## 2018-09-01 RX ORDER — SERTRALINE HYDROCHLORIDE 100 MG/1
100 TABLET, FILM COATED ORAL DAILY
Status: DISCONTINUED | OUTPATIENT
Start: 2018-09-01 | End: 2018-09-06 | Stop reason: HOSPADM

## 2018-09-01 RX ORDER — RISPERIDONE 2 MG/1
2 TABLET, FILM COATED ORAL 2 TIMES DAILY
Status: DISCONTINUED | OUTPATIENT
Start: 2018-09-01 | End: 2018-09-06 | Stop reason: HOSPADM

## 2018-09-01 RX ADMIN — RISPERIDONE 2 MG: 2 TABLET ORAL at 10:34

## 2018-09-01 RX ADMIN — SERTRALINE HYDROCHLORIDE 100 MG: 100 TABLET ORAL at 10:34

## 2018-09-01 ASSESSMENT — LIFESTYLE VARIABLES: HISTORY_ALCOHOL_USE: YES

## 2018-09-01 NOTE — BH NOTE
Psychoeducation Group Note    Date: 9/1/18                          Start Time: 830am  End Time: 900am    Number Participants in Group: 6  (Patients did not come to group room but RT approached each patient individually and asked for a goal and explored patient's concerns/questions with them)    Goal:  Patient will demonstrate increased interpersonal interaction   Topic: community meeting/goal group    Discipline Responsible:   OT  AT  [facility] [employer] [facility] [employer]. x RT  Other       Participation Level:     None x Minimal   x Active Listener  Interactive    Monopolizing         Participation Quality:   Appropriate  Inappropriate   x       Attentive        Intrusive   x       Sharing        Resistant          Supportive x       Lethargic       Affective:    Congruent  Incongruent x Blunted  Flat    Constricted  Anxious  Elated  Angry    Labile  Depressed  Other x disheveled       Cognitive:  x Alert x Oriented PPTP     Concentration  G x F  P   Attention Span  G x F  P   Short-Term Memory  G  F  P   Long-Term Memory  G  F  P   ProblemSolving/  Decision Making  G x F  P   Ability to Process  Information x G  F  P      Contributing Factors             Delusional             Hallucinating             Flight of Ideas             Other: pt was in bed,polite on approach but did not get up to go to group. RT talked with pt 1:1 .Pt did identify goal to talk with his Doctor today. RT oriented pt to his Doctor's name and usual rounding time.  Pt encouraged to let staff know of needs and try to attend group today       Modes of Intervention:  x Education x Support x Exploration   x Clarifying x Problem Solving  Confrontation    Socialization  Limit Setting  Reality Testing    Activity  Movement  Media    Other:            Response to Learning:  x Able to verbalize current knowledge/experience    Able to verbalize/acknowledge new learning    Able to retain information    Capable of insight    Able to change behavior   x Progressing to goal    Other: Comments:

## 2018-09-01 NOTE — BH NOTE
Pt did not partcicipate in Cognitive Skills Group at 1330 due to pt was resting in room et declined to attend group when encouraged.

## 2018-09-01 NOTE — BH NOTE

## 2018-09-01 NOTE — ED NOTES
Writer contacted [facility] to see if patient could be admitted. Patient will need to be call [facility] directly for intake appointment and they will schedule him a time to come in.

## 2018-09-02 PROCEDURE — 1240000000 HC EMOTIONAL WELLNESS R&B

## 2018-09-02 ASSESSMENT — PAIN SCALES - GENERAL: PAINLEVEL_OUTOF10: 0

## 2018-09-02 NOTE — BH NOTE
Pt refused lunch at this time despite multiple attempts to encourage pt to eat and pt given education on importance of nutrition and hydration. Continues to sit in bed and stare off into space talking to self.

## 2018-09-02 NOTE — BH NOTE
Pt did not participate in Cognitive Skills Group at 1100am due to resting in room et declined to attend group when encouraged

## 2018-09-02 NOTE — BH NOTE
PSYCHOEDUCATION GROUP NOTE       Date:       9/2/18          Start Time:          1600               End Time: 9060      Number Participants in Group: 18/18      Name of group: Safety checks        RT  SW  Nsg  x LPN   x BHTII  Other       Participation Level:     None  Minimal    Active Listener  Interactive    Monopolizing         Participation Quality:   x Appropriate  Inappropriate     Attentive   Intrusive     Sharing   Resistant     Supportive    Lethargic       Affective:    x Congruent  Incongruent  Blunted  Flat    Constricted  Anxious  Elated  Angry    Labile  Depressed  Other         Cognitive:   x Alert  x Oriented PPTS     Concentration G  F  x P    Attention Span G  F  x P    Short-Term Memory G  F  x P    Long-Term Memory G  F  x P    ProblemSolving/  Decision Making G  F  x P    Ability to Process  Information G  F  x P       Contributing Factors             Delusional             Hallucinating             Flight of Ideas             Other: poor concentration       Modes of Intervention:   x Education  Support  Exploration    Clarifying  Problem Solving  Confrontation    Socialization  Limit Setting  Reality Testing    Activity  Movement  Media     Other: Safety         Response to Learning:   x Able to verbalize current knowledge/experience    x Able to verbalize/acknowledge new learning    Able to retain information    Capable of insight    Able to change behavior    Progressing to goal    Other:        Comments:

## 2018-09-03 PROCEDURE — 6370000000 HC RX 637 (ALT 250 FOR IP): Performed by: PSYCHIATRY & NEUROLOGY

## 2018-09-03 PROCEDURE — 1240000000 HC EMOTIONAL WELLNESS R&B

## 2018-09-03 RX ADMIN — SERTRALINE HYDROCHLORIDE 100 MG: 100 TABLET ORAL at 08:48

## 2018-09-03 RX ADMIN — RISPERIDONE 2 MG: 2 TABLET ORAL at 08:48

## 2018-09-04 PROCEDURE — 1240000000 HC EMOTIONAL WELLNESS R&B

## 2018-09-04 NOTE — H&P
urgency, frequency or discoloration of urine. Locomotor:  No bone or joint pains. No swelling or deformities. Neuropsychiatric:  See HPI. GENERAL PHYSICAL EXAM:     Vitals: BP (!) 100/55   Pulse 67   Temp 97.8 °F (36.6 °C) (Oral)   Resp 14   Ht 6' (1.829 m)   Wt 166 lb (75.3 kg)   SpO2 98%   BMI 22.51 kg/m²  Body mass index is 22.51 kg/m². Pt was examined with a nurse present in the room. GENERAL APPEARANCE:  Arlin Severs is 33 y.o.,  male, not obese, nourished, conscious, alert. Does not appear to be distress or pain at this time. SKIN:  Warm, dry, no cyanosis or jaundice. HEAD:  Normocephalic, atraumatic, no swelling or tenderness. EYES:  Pupils equal, reactive to light, Conjunctiva is clear, EOMs intact velasquez. eyelids WNL. EARS:  No discharge, no marked hearing loss. NOSE:  No rhinorrhea, epistaxis or septal deformity. THROAT:  Not congested. No ulceration bleeding or discharge. NECK:  No stiffness, trachea central.  No palpable masses or L.N.      CHEST:  Symmetrical and equal on expansion. HEART:  Regular rate and rhythm. S1 > S2, No audible murmurs or gallops. LUNGS:  Equal on expansion, normal breath sounds. No adventitious sounds. ABDOMEN:   Soft on palpation. No localized tenderness. No guarding or rigidity. No palpable organomegaly. LYMPHATICS:  No palpable cervical Lymphadenopathy. LOCOMOTOR, BACK AND SPINE:  No tenderness or deformities. EXTREMITIES:  Symmetrical, no pretibial edema. Clarissas sign negative. No discoloration or ulcerations. NEUROLOGIC:  The patient is conscious, alert, oriented, Gait and balance WNL. No apparent focal sensory deficits. No motor deficits, muscle strength equal Velasquez. No facial droop, tongue protrudes centrally, no slurring of the speech.             PROVISIONAL DIAGNOSES:      Active Problems:    Bipolar 1 disorder (HCC)  Resolved Problems:    * No resolved hospital

## 2018-09-05 PROCEDURE — 1240000000 HC EMOTIONAL WELLNESS R&B

## 2018-09-06 VITALS
BODY MASS INDEX: 22.48 KG/M2 | TEMPERATURE: 97.5 F | RESPIRATION RATE: 20 BRPM | HEIGHT: 72 IN | SYSTOLIC BLOOD PRESSURE: 99 MMHG | WEIGHT: 166 LBS | OXYGEN SATURATION: 98 % | HEART RATE: 73 BPM | DIASTOLIC BLOOD PRESSURE: 61 MMHG

## 2018-09-06 PROCEDURE — 5130000000 HC BRIDGE APPOINTMENT

## 2018-09-06 NOTE — BH NOTE
Pt did not participate in [address] [place] [address] at 1430 due to pt was resting in room et declined to attend group when encouraged.

## 2018-09-06 NOTE — BH NOTE
Patient given tobacco quitline number 40400553586 at this time, refusing to call at this time, states \" I just dont want to quit now\"- patient given information as to the dangers of long term tobacco use. Continue to reinforce the importance of tobacco cessation.

## 2018-09-06 NOTE — BH NOTE
Pt did not participate in Community Meeting/Goals Group at 845am due to pt was resting in room et declined to attend groups when encouraged.

## 2018-09-06 NOTE — PLAN OF CARE
Problem: Altered Mood, Depressive Behavior:  Goal: Ability to disclose and discuss suicidal ideas will improve  Ability to disclose and discuss suicidal ideas will improve   Outcome: Ongoing  Patient isolative to room, denied all, flat, constricted, evasive.
Problem: Altered Mood, Depressive Behavior:  Goal: Ability to disclose and discuss suicidal ideas will improve  Ability to disclose and discuss suicidal ideas will improve   Outcome: Ongoing  Pt denies any current suicidal ideations upon request this morning but appears very flay and bizarre. Spends long intervals sitting in bed and staring into space . Needs much encouragement to interact with writer and then when he does, speech is very mumbled. Refused breakfast and medications this morning. Agrees to seek out staff as needed and before harming self if negative self harm thoughts arise. Q15 minute checks for safety cont.
Problem: Altered Mood, Depressive Behavior:  Goal: Ability to disclose and discuss suicidal ideas will improve  Ability to disclose and discuss suicidal ideas will improve   Outcome: Ongoing  Pt did not demonstrate an improvement in communicating suicidal ideations. Pt did not verbalize thoughts to hurt self or others. Pt did not verbalize hallucinations but continues to appear to be responding to internal stimuli. Pt engages in self talk and appears impaired in concentration. Pt was encouraged to communicate with staff if symptoms worsen or needs arise. Pt independence was promoted.
Problem: Altered Mood, Depressive Behavior:  Goal: Able to verbalize and/or display a decrease in depressive symptoms  Able to verbalize and/or display a decrease in depressive symptoms   Outcome: Ongoing  Patient denies suicidal ideation at this time. Evasive and withdrawn. Refused to take prescribed medications. Flat affect. Isolative. Safety checks maintained q15min and irregular rounding. Goal: Ability to disclose and discuss suicidal ideas will improve  Ability to disclose and discuss suicidal ideas will improve   Outcome: Ongoing  Pt denies thoughts of self harm and is agreeable to seeking out should thoughts of self harm arise. Safe environment maintained. Q15 minute checks for safety cont per unit policy. Will cont to monitor for safety and provides support and reassurance as needed.
Problem: Altered Mood, Depressive Behavior:  Goal: Able to verbalize and/or display a decrease in depressive symptoms  Able to verbalize and/or display a decrease in depressive symptoms   Outcome: Ongoing  Pt denies having any thoughts of wanting to harm self or others, safety checks are being continued and maintained throughout writers shift.
Problem: Altered Mood, Depressive Behavior:  Goal: Able to verbalize and/or display a decrease in depressive symptoms  Able to verbalize and/or display a decrease in depressive symptoms   Outcome: Ongoing  Pt denies having any thoughts of wanting to harm self or others, safety checks are being continued and maintained throughout writers shift.
Problem: Altered Mood, Depressive Behavior:  Goal: Able to verbalize and/or display a decrease in depressive symptoms  Able to verbalize and/or display a decrease in depressive symptoms   Outcome: Ongoing  Pt did not participate in leisure skills group at 1100 despite staff encouragement to attend.
Problem: Altered Mood, Depressive Behavior:  Goal: Able to verbalize and/or display a decrease in depressive symptoms  Able to verbalize and/or display a decrease in depressive symptoms   Outcome: Ongoing  Pt did not participate in recovery/ self-disclosure group at 1430 despite staff encouragement to attend.
Problem: Altered Mood, Depressive Behavior:  Goal: Able to verbalize and/or display a decrease in depressive symptoms  Able to verbalize and/or display a decrease in depressive symptoms   Outcome: Ongoing  Pt in room looking at picture books. Pt denies thoughts of SI or HI. Pt is agreeable to seeking out should thoughts of self harm arise. Safe environment maintained. Q15 minute checks for safety cont per unit policy. Will cont to monitor for safety and provides support and reassurance as needed. Goal: Ability to disclose and discuss suicidal ideas will improve  Ability to disclose and discuss suicidal ideas will improve   Outcome: Ongoing  Patient denies suicidal and homicidal thoughts. Patient denies auditory and visual hallucinations. Pt is withdrawn to room and looking at picture books. Pt appears like he does not want to talk to staff. Pt safety checks performed Q15min and as needed.
Problem: Altered Mood, Depressive Behavior:  Goal: Able to verbalize and/or display a decrease in depressive symptoms  Able to verbalize and/or display a decrease in depressive symptoms   Outcome: Ongoing  Remains depressed. Is accepting of the medications he is on. Is cooperative, remains isolative to room. Goal: Able to verbalize support systems  Able to verbalize support systems   Outcome: Ongoing   Patient is free of self harm at this time. Patient agrees to seek out staff if thoughts to harm self arise. Staff will provide support and reassurance as needed. Safety checks maintained every 15 minutes.
Pt did not attend psychoeducational group at 10am. 1:1 offered, pt refused.
UPDATE:   Time frame for Long-Term Goals: 6 months  Members Present in Team Meeting: See Signature Sheet    PK Dixon  Goal: Ability to disclose and discuss suicidal ideas will improve  Ability to disclose and discuss suicidal ideas will improve   Outcome: Ongoing    Goal: Able to verbalize support systems  Able to verbalize support systems   Outcome: Ongoing

## 2018-09-06 NOTE — PROGRESS NOTES
[facility] Biopsychosocial Assessment    Current Level of Psychosocial Functioning     Independent X  Dependent    Minimal Assist     Comments:    Psychosocial High Risk Factors (check all that apply)    Unable to obtain meds   Chronic illness/pain    Substance abuse X  Lack of Family Support X  Financial stress   Isolation X  Inadequate Community Resources  Suicide attempt(s)   Not taking medications X  Victim of crime   Developmental Delay  Unable to manage personal needs    Age 72 or older   Homeless  No transportation   Readmission within 30 days  Unemployment  Traumatic Event    Comments:   Psychiatric Advanced Directives:  N/A  Family to Involve in Treatment:   Pt declined family involvement in his tx. Sexual Orientation:    Heterosexual  Patient Strengths:  Pt is independent in self-care activities. Patient Barriers:   Pt has AOD concerns. Opiate Education Provided:  Yes  CMHC/mental health history:  Caroline Luz Maria [phone] (PREMA signed)  Plan of Care   medication management, group/individual therapies, family meetings, psycho -education, treatment team meetings to assist with stabilization    Initial Discharge Plan:    Pt plans to be discharged to inpatient tx facility. Clinical Summary:    Pt is a 33 y.o.  male who presented to the ED with fleeting suicidal thoughts. Pt reported prior to this he recently left [place]. Pt reports he has been using cocaine and heroin recently. Pt denies current suicidal ideation, homicidal ideation, and hallucinations. Pt was cooperative throughout assessment. Pt was oriented to time place person and situation. Pt is estranged from his parents and siblings. Pt denies hx of trauma. Pt reports problematic heroin and cocaine use. Pt plans to be discharged to an inpatient AOD facilty.
H&P not done. Patient refuses H&P, states \"I'm a little busy right now,\" then points to himself and says \"see. \" When asked if examiner can perform physical exam pt states \"no. \"     Fritz Velazquez, LIBBY - CNP on 9/3/2018 at 9:58 AM
He is still quite depressed, despondent, overwhelmed, and having frequent suicidal thoughts to overdose. Affect remains flat and poorly reactive. Patient has been withdrawn and isolative. Patient complains of high level of racing thoughts driving feelings of anxiety. Feeling hopeless and helpless. Denies any side effects to medications. He said he is looking into getting into residential drug treatment out of town. Explored his  concerns and support provided. There is no identifiable safe alternative other than continued hospitalization. Charting and medications reviewed. Will continue Risperdal, Zoloft.
Patient has been laying in bed, not attending groups are working in therapy. He has been refusing to take all psychiatric medication. He verbally states he will take medication but for the past 2 days has been refusing doses. He shows poor motivation on helping himself. Affect remains flat and poorly reactive. He states he wants residential treatment but is shown little motivation to work towards this.
Pt did not attend 0845 community meeting/ goals  group d/t resting in room despite staff invitation to attend.
Pt did not attend 1330 skills group d/t resting in room despite staff invitation to attend.
Pt did not attend 1330 skills group d/t resting in room despite staff invitation to attend.
psychiatric treatment  2. Supportive therapy with medication management. Reviewed risks and benefits as well as potential side effects with patient. Will restart Risperdal, Zoloft. 3. Therapeutic activities and groups  4.  Follow up at [facility] [place] after symptoms stabilized    Estimated length of stay: 5-7 days    Iliana Thakur MD  Psychiatrist.

## 2018-09-06 NOTE — CARE COORDINATION
Bridge Appointment completed: Reviewed Discharge Instructions with patient. Patient verbalizes understanding and agreement with the discharge plan using the teachback method.        Discharge Arrangements:  [address], [address] [place] [address]  [phone]  fax [phone]   Please attend a walk-in assessment at [facility] on Monday -Friday 8:00-4:30pm   Guardian notified: PT is his own guardian   Discharge [address]   [address]    Transported [place] Medicaid cab

## 2018-09-07 NOTE — CARE COORDINATION
Name: Kyree Anders    : 1990    Discharge Date: 2018    Primary Auth/Cert #: [id number]    Discharge Medications:      Medication List      STOP taking these medications    carBAMazepine 200 MG tablet  Commonly known as:  TEGRETOL     diphenhydrAMINE 50 MG capsule  Commonly known as:  BENADRYL     hydrOXYzine 50 MG capsule  Commonly known as:  VISTARIL     LORazepam 0.5 MG tablet  Commonly known as:  ATIVAN     mirtazapine 30 MG tablet  Commonly known as:  REMERON     naltrexone 50 MG tablet  Commonly known as:  DEPADE     QUEtiapine 150 MG Tb24 extended release tablet  Commonly known as:  SEROQUEL XR     RISPERDAL PO     traZODone 50 MG tablet  Commonly known as:  DESYREL     ZOLOFT PO            Follow Up Appointment: [place] [phone]  fax [phone]  On 2018  Isai Connell, Please attend a walk-in assessment at [facility] on Monday -Friday 8:00-4:30pm     discharge to:   [address]    Please send by [facility] [employer] [facility]

## 2018-09-16 ENCOUNTER — HOSPITAL ENCOUNTER (EMERGENCY)
Age: 28
Discharge: PSYCHIATRIC OR SUBSTANCE USE FACILITY | End: 2018-09-16
Attending: EMERGENCY MEDICINE
Payer: COMMERCIAL

## 2018-09-16 ENCOUNTER — HOSPITAL ENCOUNTER (INPATIENT)
Age: 28
LOS: 9 days | Discharge: HOME | DRG: 750 | End: 2018-09-25
Attending: PSYCHIATRY & NEUROLOGY | Admitting: PSYCHIATRY & NEUROLOGY
Payer: COMMERCIAL

## 2018-09-16 VITALS
HEART RATE: 68 BPM | WEIGHT: 170 LBS | BODY MASS INDEX: 23.03 KG/M2 | RESPIRATION RATE: 14 BRPM | OXYGEN SATURATION: 100 % | SYSTOLIC BLOOD PRESSURE: 101 MMHG | HEIGHT: 72 IN | DIASTOLIC BLOOD PRESSURE: 62 MMHG | TEMPERATURE: 97.2 F

## 2018-09-16 DIAGNOSIS — T14.91XA SUICIDE ATTEMPT (HCC): Primary | ICD-10-CM

## 2018-09-16 PROBLEM — F31.9 BIPOLAR 1 DISORDER (HCC): Status: RESOLVED | Noted: 2018-08-31 | Resolved: 2018-09-16

## 2018-09-16 PROBLEM — F33.40 RECURRENT MAJOR DEPRESSION IN REMISSION (HCC): Status: RESOLVED | Noted: 2018-07-04 | Resolved: 2018-09-16

## 2018-09-16 LAB
-: ABNORMAL
ABSOLUTE EOS #: 0.22 K/UL (ref 0–0.44)
ABSOLUTE IMMATURE GRANULOCYTE: <0.03 K/UL (ref 0–0.3)
ABSOLUTE LYMPH #: 1.71 K/UL (ref 1.1–3.7)
ABSOLUTE MONO #: 0.53 K/UL (ref 0.1–1.2)
ACETAMINOPHEN LEVEL: <5 UG/ML (ref 10–30)
ALBUMIN SERPL-MCNC: 4.7 G/DL (ref 3.5–5.2)
ALBUMIN/GLOBULIN RATIO: 1.5 (ref 1–2.5)
ALP BLD-CCNC: 40 U/L (ref 40–129)
ALT SERPL-CCNC: 9 U/L (ref 5–41)
AMORPHOUS: ABNORMAL
AMPHETAMINE SCREEN URINE: NEGATIVE
ANION GAP SERPL CALCULATED.3IONS-SCNC: 20 MMOL/L (ref 9–17)
AST SERPL-CCNC: 15 U/L
BACTERIA: ABNORMAL
BARBITURATE SCREEN URINE: NEGATIVE
BASOPHILS # BLD: 1 % (ref 0–2)
BASOPHILS ABSOLUTE: 0.05 K/UL (ref 0–0.2)
BENZODIAZEPINE SCREEN, URINE: NEGATIVE
BILIRUB SERPL-MCNC: 1.25 MG/DL (ref 0.3–1.2)
BILIRUBIN URINE: NEGATIVE
BUN BLDV-MCNC: 17 MG/DL (ref 6–20)
BUN/CREAT BLD: ABNORMAL (ref 9–20)
BUPRENORPHINE URINE: NORMAL
CALCIUM SERPL-MCNC: 9.4 MG/DL (ref 8.6–10.4)
CANNABINOID SCREEN URINE: NEGATIVE
CARBAMAZEPINE DATE LAST DOSE: ABNORMAL
CARBAMAZEPINE DOSE AMOUNT: ABNORMAL
CARBAMAZEPINE DOSE TIME: ABNORMAL
CARBAMAZEPINE LEVEL: <2.5 UG/ML (ref 4–12)
CASTS UA: ABNORMAL /LPF (ref 0–8)
CHLORIDE BLD-SCNC: 94 MMOL/L (ref 98–107)
CO2: 21 MMOL/L (ref 20–31)
COCAINE METABOLITE, URINE: NEGATIVE
COLOR: YELLOW
CREAT SERPL-MCNC: 1.01 MG/DL (ref 0.7–1.2)
CRYSTALS, UA: ABNORMAL /HPF
DIFFERENTIAL TYPE: ABNORMAL
EKG ATRIAL RATE: 53 BPM
EKG P AXIS: 74 DEGREES
EKG P-R INTERVAL: 154 MS
EKG Q-T INTERVAL: 468 MS
EKG QRS DURATION: 86 MS
EKG QTC CALCULATION (BAZETT): 439 MS
EKG R AXIS: 87 DEGREES
EKG T AXIS: 81 DEGREES
EKG VENTRICULAR RATE: 53 BPM
EOSINOPHILS RELATIVE PERCENT: 4 % (ref 1–4)
EPITHELIAL CELLS UA: ABNORMAL /HPF (ref 0–5)
ETHANOL PERCENT: <0.01 %
ETHANOL: <10 MG/DL
GFR AFRICAN AMERICAN: >60 ML/MIN
GFR NON-AFRICAN AMERICAN: >60 ML/MIN
GFR SERPL CREATININE-BSD FRML MDRD: ABNORMAL ML/MIN/{1.73_M2}
GFR SERPL CREATININE-BSD FRML MDRD: ABNORMAL ML/MIN/{1.73_M2}
GLUCOSE BLD-MCNC: 71 MG/DL (ref 70–99)
GLUCOSE URINE: NEGATIVE
HCT VFR BLD CALC: 40.6 % (ref 40.7–50.3)
HEMOGLOBIN: 13.9 G/DL (ref 13–17)
IMMATURE GRANULOCYTES: 0 %
KETONES, URINE: ABNORMAL
LEUKOCYTE ESTERASE, URINE: NEGATIVE
LYMPHOCYTES # BLD: 31 % (ref 24–43)
MCH RBC QN AUTO: 30.4 PG (ref 25.2–33.5)
MCHC RBC AUTO-ENTMCNC: 34.2 G/DL (ref 28.4–34.8)
MCV RBC AUTO: 88.8 FL (ref 82.6–102.9)
MDMA URINE: NORMAL
METHADONE SCREEN, URINE: NEGATIVE
METHAMPHETAMINE, URINE: NORMAL
MONOCYTES # BLD: 9 % (ref 3–12)
MUCUS: ABNORMAL
NITRITE, URINE: NEGATIVE
NRBC AUTOMATED: 0 PER 100 WBC
OPIATES, URINE: NEGATIVE
OTHER OBSERVATIONS UA: ABNORMAL
OXYCODONE SCREEN URINE: NEGATIVE
PDW BLD-RTO: 12.3 % (ref 11.8–14.4)
PH UA: 5.5 (ref 5–8)
PHENCYCLIDINE, URINE: NEGATIVE
PLATELET # BLD: 252 K/UL (ref 138–453)
PLATELET ESTIMATE: ABNORMAL
PMV BLD AUTO: 11.5 FL (ref 8.1–13.5)
POTASSIUM SERPL-SCNC: 4.2 MMOL/L (ref 3.7–5.3)
PROPOXYPHENE, URINE: NORMAL
PROTEIN UA: NEGATIVE
RBC # BLD: 4.57 M/UL (ref 4.21–5.77)
RBC # BLD: ABNORMAL 10*6/UL
RBC UA: ABNORMAL /HPF (ref 0–4)
RENAL EPITHELIAL, UA: ABNORMAL /HPF
SALICYLATE LEVEL: <1 MG/DL (ref 3–10)
SEG NEUTROPHILS: 55 % (ref 36–65)
SEGMENTED NEUTROPHILS ABSOLUTE COUNT: 3.09 K/UL (ref 1.5–8.1)
SODIUM BLD-SCNC: 135 MMOL/L (ref 135–144)
SPECIFIC GRAVITY UA: 1.01 (ref 1–1.03)
TEST INFORMATION: NORMAL
TOTAL PROTEIN: 7.8 G/DL (ref 6.4–8.3)
TOXIC TRICYCLIC SC,BLOOD: NEGATIVE
TRICHOMONAS: ABNORMAL
TRICYCLIC ANTIDEPRESSANTS, UR: NORMAL
TURBIDITY: CLEAR
URINE HGB: NEGATIVE
UROBILINOGEN, URINE: NORMAL
WBC # BLD: 5.6 K/UL (ref 3.5–11.3)
WBC # BLD: ABNORMAL 10*3/UL
WBC UA: ABNORMAL /HPF (ref 0–5)
YEAST: ABNORMAL

## 2018-09-16 PROCEDURE — 2580000003 HC RX 258: Performed by: STUDENT IN AN ORGANIZED HEALTH CARE EDUCATION/TRAINING PROGRAM

## 2018-09-16 PROCEDURE — 93005 ELECTROCARDIOGRAM TRACING: CPT

## 2018-09-16 PROCEDURE — 1240000000 HC EMOTIONAL WELLNESS R&B

## 2018-09-16 PROCEDURE — 80053 COMPREHEN METABOLIC PANEL: CPT

## 2018-09-16 PROCEDURE — 85025 COMPLETE CBC W/AUTO DIFF WBC: CPT

## 2018-09-16 PROCEDURE — G0480 DRUG TEST DEF 1-7 CLASSES: HCPCS

## 2018-09-16 PROCEDURE — 80307 DRUG TEST PRSMV CHEM ANLYZR: CPT

## 2018-09-16 PROCEDURE — 81001 URINALYSIS AUTO W/SCOPE: CPT

## 2018-09-16 PROCEDURE — 99285 EMERGENCY DEPT VISIT HI MDM: CPT

## 2018-09-16 PROCEDURE — 80156 ASSAY CARBAMAZEPINE TOTAL: CPT

## 2018-09-16 RX ORDER — HYDROXYZINE HYDROCHLORIDE 50 MG/ML
50 INJECTION, SOLUTION INTRAMUSCULAR EVERY 6 HOURS PRN
Status: DISCONTINUED | OUTPATIENT
Start: 2018-09-16 | End: 2018-09-16

## 2018-09-16 RX ORDER — FOLIC ACID 1 MG/1
1 TABLET ORAL DAILY
Status: DISCONTINUED | OUTPATIENT
Start: 2018-09-16 | End: 2018-09-25 | Stop reason: HOSPADM

## 2018-09-16 RX ORDER — HYDROXYZINE HYDROCHLORIDE 50 MG/ML
50 INJECTION, SOLUTION INTRAMUSCULAR EVERY 6 HOURS PRN
Status: DISCONTINUED | OUTPATIENT
Start: 2018-09-16 | End: 2018-09-25 | Stop reason: HOSPADM

## 2018-09-16 RX ORDER — CHLORDIAZEPOXIDE HYDROCHLORIDE 25 MG/1
25 CAPSULE, GELATIN COATED ORAL 4 TIMES DAILY PRN
Status: ACTIVE | OUTPATIENT
Start: 2018-09-16 | End: 2018-09-21

## 2018-09-16 RX ORDER — BENZTROPINE MESYLATE 1 MG/ML
2 INJECTION INTRAMUSCULAR; INTRAVENOUS 2 TIMES DAILY PRN
Status: DISCONTINUED | OUTPATIENT
Start: 2018-09-16 | End: 2018-09-25 | Stop reason: HOSPADM

## 2018-09-16 RX ORDER — MAGNESIUM HYDROXIDE/ALUMINUM HYDROXICE/SIMETHICONE 120; 1200; 1200 MG/30ML; MG/30ML; MG/30ML
30 SUSPENSION ORAL EVERY 6 HOURS PRN
Status: DISCONTINUED | OUTPATIENT
Start: 2018-09-16 | End: 2018-09-25 | Stop reason: HOSPADM

## 2018-09-16 RX ORDER — HYDROXYZINE HYDROCHLORIDE 25 MG/1
50 TABLET, FILM COATED ORAL 3 TIMES DAILY PRN
Status: DISCONTINUED | OUTPATIENT
Start: 2018-09-16 | End: 2018-09-25 | Stop reason: HOSPADM

## 2018-09-16 RX ORDER — THIAMINE MONONITRATE (VIT B1) 100 MG
100 TABLET ORAL DAILY
Status: DISCONTINUED | OUTPATIENT
Start: 2018-09-16 | End: 2018-09-25 | Stop reason: HOSPADM

## 2018-09-16 RX ORDER — 0.9 % SODIUM CHLORIDE 0.9 %
1000 INTRAVENOUS SOLUTION INTRAVENOUS ONCE
Status: COMPLETED | OUTPATIENT
Start: 2018-09-16 | End: 2018-09-16

## 2018-09-16 RX ORDER — HALOPERIDOL 5 MG/ML
5 INJECTION INTRAMUSCULAR EVERY 6 HOURS PRN
Status: DISCONTINUED | OUTPATIENT
Start: 2018-09-16 | End: 2018-09-25 | Stop reason: HOSPADM

## 2018-09-16 RX ORDER — NICOTINE 21 MG/24HR
1 PATCH, TRANSDERMAL 24 HOURS TRANSDERMAL DAILY
Status: DISCONTINUED | OUTPATIENT
Start: 2018-09-16 | End: 2018-09-16

## 2018-09-16 RX ORDER — TRAZODONE HYDROCHLORIDE 50 MG/1
50 TABLET ORAL NIGHTLY PRN
Status: DISCONTINUED | OUTPATIENT
Start: 2018-09-16 | End: 2018-09-25 | Stop reason: HOSPADM

## 2018-09-16 RX ORDER — ACETAMINOPHEN 325 MG/1
650 TABLET ORAL EVERY 4 HOURS PRN
Status: DISCONTINUED | OUTPATIENT
Start: 2018-09-16 | End: 2018-09-25 | Stop reason: HOSPADM

## 2018-09-16 RX ADMIN — SODIUM CHLORIDE 1000 ML: 9 INJECTION, SOLUTION INTRAVENOUS at 09:11

## 2018-09-16 ASSESSMENT — PATIENT HEALTH QUESTIONNAIRE - PHQ9: SUM OF ALL RESPONSES TO PHQ QUESTIONS 1-9: 15

## 2018-09-16 ASSESSMENT — ENCOUNTER SYMPTOMS
SHORTNESS OF BREATH: 0
VOMITING: 0
NAUSEA: 1
SORE THROAT: 0
SINUS PRESSURE: 0
ABDOMINAL PAIN: 0
SINUS PAIN: 0
COUGH: 0

## 2018-09-16 ASSESSMENT — SLEEP AND FATIGUE QUESTIONNAIRES
DO YOU HAVE DIFFICULTY SLEEPING: NO
AVERAGE NUMBER OF SLEEP HOURS: 7
DO YOU USE A SLEEP AID: NO

## 2018-09-16 ASSESSMENT — LIFESTYLE VARIABLES: HISTORY_ALCOHOL_USE: YES

## 2018-09-16 ASSESSMENT — PAIN SCALES - GENERAL: PAINLEVEL_OUTOF10: 0

## 2018-09-16 NOTE — ED NOTES
Spoke with poison control stated he is medical clear pending carbamazepine level      Lise Lim RN  09/16/18 9302

## 2018-09-16 NOTE — ED PROVIDER NOTES
kg/m²     Physical Exam   Constitutional: He is oriented to person, place, and time. He appears well-developed and well-nourished. No distress. HENT:   Head: Normocephalic and atraumatic. Mouth/Throat: Oropharynx is clear and moist.   Eyes: Pupils are equal, round, and reactive to light. EOM are normal.   Neck: Normal range of motion. Neck supple. Cardiovascular: Regular rhythm, normal heart sounds and intact distal pulses. No murmur heard. Pulmonary/Chest: Effort normal and breath sounds normal. No respiratory distress. He has no wheezes. Abdominal: Soft. He exhibits no distension. There is no tenderness. Musculoskeletal: Normal range of motion. He exhibits no edema or tenderness. Neurological: He is alert and oriented to person, place, and time. No cranial nerve deficit. Coordination normal.   Skin: Skin is warm and dry. He is not diaphoretic. No erythema. Psychiatric:   Patient initially reported suicidal ideation, at time of exam is now denying suicidal ideation. He reports that yesterday he used heroin and subsequently experienced auditory hallucinations, took a handful of carbamazepine and hydroxyzine. Currently denying any auditory or visual hallucinations. Patient has flat affect. Not currently displaying any anticholinergic symptoms. No signs of acute psychiatric disturbance, does not appear to be responding to any internal stimuli.         DIFFERENTIAL  DIAGNOSIS     PLAN (LABS / IMAGING / EKG):  Orders Placed This Encounter   Procedures    COMPREHENSIVE METABOLIC PANEL    CBC WITH AUTO DIFFERENTIAL    Urinalysis with microscopic    DRUG SCREEN MULTI URINE    TOX SCR, BLD, ED    CARBAMAZEPINE LEVEL, TOTAL    EKG 12 Lead       MEDICATIONS ORDERED:  Orders Placed This Encounter   Medications    0.9 % sodium chloride bolus       DDX: Polysubstance overdose, suicide attempt, suicidal ideation, schizoaffective disorder      DIAGNOSTIC RESULTS / [facility] MODERATE (A) NEG    Specific Gravity, UA 1.007 1.005 - 1.030    Urine Hgb NEGATIVE NEG    pH, UA 5.5 5.0 - 8.0    Protein, UA NEGATIVE NEG    Urobilinogen, Urine Normal NORM    Nitrite, Urine NEGATIVE NEG    Leukocyte Esterase, Urine NEGATIVE NEG    -          WBC, UA None 0 - 5 /HPF    RBC, UA None 0 - 4 /HPF    Casts UA 0 TO 2 HYALINE 0 - 8 /LPF    Crystals UA NOT REPORTED NONE /HPF    Epithelial Cells UA None 0 - 5 /HPF    Renal Epithelial, Urine NOT REPORTED 0 /HPF    Bacteria, UA NOT REPORTED NONE    Mucus, UA NOT REPORTED NONE    Trichomonas, UA NOT REPORTED NONE    Amorphous, UA NOT REPORTED NONE    Other Observations UA NOT REPORTED NREQ    Yeast, UA NOT REPORTED NONE   DRUG SCREEN MULTI URINE   Result Value Ref Range    Amphetamine Screen, Ur NEGATIVE NEG    Barbiturate Screen, Ur NEGATIVE NEG    Benzodiazepine Screen, Urine NEGATIVE NEG    Cocaine Metabolite, Urine NEGATIVE NEG    Methadone Screen, Urine NEGATIVE NEG    Opiates, Urine NEGATIVE NEG    Phencyclidine, Urine NEGATIVE NEG    Propoxyphene, Urine NOT REPORTED NEG    Cannabinoid Scrn, Ur NEGATIVE NEG    Oxycodone Screen, Ur NEGATIVE NEG    Methamphetamine, Urine NOT REPORTED NEG    Tricyclic Antidepressants, Urine NOT REPORTED NEG    MDMA, Urine NOT REPORTED NEG    Buprenorphine Urine NOT REPORTED NEG    Test Information       Assay provides medical screening only. The absence of expected drug(s) and/or   TOX SCR, BLD, ED   Result Value Ref Range    Ethanol <10 <10 mg/dL    Ethanol percent <8.879 %    Salicylate Lvl <1 (L) 3 - 10 mg/dL    Acetaminophen Level <5 (L) 10 - 30 ug/mL    Toxic Tricyclic Sc,Blood NEGATIVE NEG   CARBAMAZEPINE LEVEL, TOTAL   Result Value Ref Range    Carbamazepine Lvl <2.5 (L) 4.0 - 12.0 ug/mL    Carbamazepine Dose Amount NOT REPORTED     Carbamazepine Date Last Dose NOT REPORTED     Carbamazepine Dose Time NOT REPORTED        IMPRESSION: Polysubstance overdose      RADIOLOGY:  No results found.       EKG  Sinus bradycardia, regular rhythm, no prolonged QT    All EKG's are interpreted by the Emergency Department Physician who either signs or Co-signs this chart in the absence of a cardiologist.    EMERGENCY DEPARTMENT COURSE:  Patient seen and evaluated by myself and attending. Patient with past medical history of schizoaffective disorder, bipolar, previous suicidal ideation and attempts, polysubstance abuse who presents to the ED with complaints of suicidal ideation, polypharmacy overdose, heroin detox. Patient states that yesterday he was using heroin at which time his auditory hallucinations became worse. He states that he subsequently took a handful of carbamazepine and hydroxyzine for bringing himself to the hospital.  He initially reported that he was having suicidal ideation, but at time of exam the patient denies. He denies having a plan and denies taking these medications in an attempt to hurt himself. He is currently denying any auditory or visual hallucinations. On physical exam the patient is alert and oriented ×4, found to have a flat affect. He does not appear to have any acute psychiatric disturbance, not responding to internal stimuli, not agitated. He is currently 88/56, but not experiencing any symptoms of hypotension. Denies lightheadedness, dizziness, weakness. Ambulating appropriately. Plan to obtain CBC, CMP, UA, urine drug screen, blood tox screen, carbamazepine level, EKG and fluid bolus. Poison control center contacted and they agree with this plan. We'll plan for reevaluation and contact inpatient psych. ED Course as of Sep 16 1720   Ravi Bocanegra Sep 16, 2018   1226 Spoke with poison control again regarding patient's lab results. Based on his negative tox screen and carbamazepine level less than 2.5, increased the patient's blood pressure with fluid bolus, agree that patient can be cleared at this time to be evaluated by Psychiatry for possible inpatient hospitalization.   [JW]   [id number]

## 2018-09-16 NOTE — ED NOTES
Pt resting comfortable on cot, pt going to [facility] is picking up at 1815.       Patti Bill RN  09/16/18 2749

## 2018-09-16 NOTE — ED PROVIDER NOTES
[address] [facility]     Emergency Department     Faculty Note/ Attestation      Pt Name: Pia Zaman                                       MRN: [id number]  Denzelgfnavid 1990  Date of evaluation: 9/16/2018    Patients PCP:    No primary care provider on file. Attestation  I performed a history and physical examination of the patient and discussed management with the resident. I reviewed the residents note and agree with the documented findings and plan of care. Any areas of disagreement are noted on the chart. I was personally present for the key portions of any procedures. I have documented in the chart those procedures where I was not present during the key portions. I have reviewed the emergency nurses triage note. I agree with the chief complaint, past medical history, past surgical history, allergies, medications, social and family history as documented unless otherwise noted below. For Physician Assistant/ Nurse Practitioner cases/documentation I have personally evaluated this patient and have completed at least one if not all key elements of the E/M (history, physical exam, and MDM). Additional findings are as noted. Initial Screens:             Vitals:    Vitals:    09/16/18 0831   BP: (!) 86/59   Pulse: 81   Resp: 14   Temp: 97.2 °F (36.2 °C)   TempSrc: Oral   SpO2: 100%   Weight: 170 lb (77.1 kg)   Height: 6' (1.829 m)       CHIEF COMPLAINT       Chief Complaint   Patient presents with    Suicide Attempt     tretgatol, and a bunch of others       The pt notes he is suicidal to RN. The pt tried to kill himself. The pt has been using heroin and was hearing increased voices. The pt unsure how many he took. The pt arriving and having no current hallucination. The pt has no chest pain no weakness. No lightheaded.        DIAGNOSTIC RESULTS     RADIOLOGY:   No orders to display       LABS:  Labs Reviewed   COMPREHENSIVE METABOLIC PANEL   CBC WITH AUTO DIFFERENTIAL URINALYSIS WITH MICROSCOPIC   URINE DRUG SCREEN   TOX SCR, BLD, ED       EMERGENCY DEPARTMENT COURSE:     -------------------------  BP: (!) 86/59, Temp: 97.2 °F (36.2 °C), Pulse: 81, Resp: 14  Physical Exam __  Constitutional:  oriented to person, place, and time. appears well-developed and well-nourished. HENT: no facial swelling or edema  Head: Normocephalic and atraumatic. Eyes: Right eye exhibits no discharge. Left eye exhibits no discharge. No scleral icterus. Neck: No JVD present. No tracheal deviation present. Cardiovascular: pulses present in extremities  Pulmonary/Chest: Effort normal. No respiratory distress. Musculoskeletal: Normal range of motion. exhibits no edema. Neurological: they are alert and oriented to person, place, and time. Skin: Skin is warm and dry. Psychiatric: has a normal mood and affect. behavior is normal.      Comments  The pt has a low BP and unknown amounts. The pt will be discussed with posion center and will need observation. Though the pt has a lower than expected BP the pt has no signs of hypoperfusion good cap refil and good pulses able to walk and talk and normal Mentation Aand Ox4 no distress the pt has ability to walk there is no distress and no signs of cholenergic toxicity. 9/16/18  10:16 AM  The pt has typical BP around this on previous episodes. The patient labs are back except for carbamazapine  9/16/18  11:10 AM  The pt blood pressure is again low but the pt has no symptoms of hypotension at this time the pt has no symptoms or difficluty walking to bathroom    Ana Goss,, DO, RDMS.   Attending Emergency Physician          Ana Goss,   09/16/18 701 Petey Carrero DO  09/16/18 Muriel,   09/16/18 1111

## 2018-09-16 NOTE — ED PROVIDER NOTES
FACULTY SIGN-OUT  ADDENDUM     Care of this patient was assumed from Dr Wilmer Vaughan. The patient was seen for Suicide Attempt (tretgatol, and a bunch of others)  . The patient's initial evaluation and plan have been discussed with the prior provider who initially evaluated the patient. Nursing Notes, Past Medical Hx, Past Surgical Hx, Social Hx, Allergies, and Family Hx were all reviewed. ED COURSE      The patient was given the following medications:  Orders Placed This Encounter   Medications    0.9 % sodium chloride bolus       RECENT VITALS:   Temp: 97.2 °F (36.2 °C), Pulse: 68, Resp: 14, BP: 101/62    MEDICAL DECISION MAKING       Pt with OD on tegretol. Await tegretol level and speak with Posion Control. Plan tx to psych once medically clear.       Kavita Gao MD  Emergency Medicine Attending        Flakito Reno MD  09/16/18 7448

## 2018-09-16 NOTE — ED NOTES
[] [place]    [] [address]    [x]  One [facility] ASSESSMENT      Y  N     [x] [] In the past two weeks have you had thoughts of hurting yourself in any way? [x] [] In the past two weeks have you had thoughts that you would be better off dead? [x] [] Have you made a suicide attempt in the past two months? [x] [] Do you have a plan for hurting yourself or suicide? [x] [] Presence of hallucinations/voices related to hurting himself or herself or someone else. SUICIDE/SECURITY WATCH PRECAUTION CHECKLIST     Orders    [x]  Suicide/Security Watch Precautions initiated as checked below:   9/16/18 10:00 AM BH31/BH31E    [x] Notified physician:  Jenniffer Prather DO  9/16/18 10:00 AM    [x] Orders obtained as appropriate:     [x] 1:1 Observer     [x] Psych Consult     [] Psych Consult    Name:  Date:  Time:    [x] 1:1 Observer, Notified by:  Rocío Cerda    Contact Nurse Supervisor    [x] Remove all personal clothes from room and place in snap/paper gown/pants. Slipper only    [x] Remove all personal belongings from room and secured away from patient. Documentation    [x] Initiate Suicide/Security Watch Precaution Flow Sheet    [x] Initiate individualized Care Plan/Problem    [x] Document why precautions initiated on flow sheet (Initiate Nursing Care Plan/Problem)    [x] 1:1 Observer in place; instructions provided. Suicide precautions require observer be within arms length. [x] Nurse-Observer Communication Hand-off initiated by RN, reviewed with Observer. Subsequently used as Hand Off between Observers. [x] Initiate every 15 minute observations per observer as delegated by the RN.     [x] Initiate RN assessment and documentation    Environmental Scan  Search Criteria and Process: OPTIONAL, see Search Policy    [] Reason for search:    [] Nursing in presence of second person to search patient    [] Patient notified of reason for body assessment and belongings search:     Persons present during search:   Results of search and disposition:       Searchers Name: security    These items or items similar should be removed from the room:   [] Chairs   [] Telephone   [] Trash cans and liners   [] Plastic utensils (order Patient Safety tray)   [] Empty or remove Sharps containers   [] All personal clothing/belongings removed   [] All unnecessary lead wires, electrical cords, draw cords, etc.   [] Flowers and plants   [] Double check for lighters, matches, razors, any glass items etc that can be used as weapons. Person completing Checklist: Alicia Lee       GENERAL INFORMATION     Y  N     [x] [] Has the patient been informed that they are on a watch and what that means? [x] [] Can the patient get out of Bed without nursing assistance? [x] [] Can the patient use the restroom without nursing assistance? [x] [] Can the patient walk the halls to [place] their legs? \"   [] [x] Does the patient have metal utensils? [x] [] Have the patient's belongings been placed out of control of the patient? [x] [] Have the patient and his/her belongings been checked for contraband? [x] [] Is the patient under any visitor restrictions? If Yes, explain:   [] [x] Is the patient under an alias? [facility] Name:   Authorized visitors (no more than two are to be on the list)   Name/Relationship:   Name/Relationship:    Name of Staff member that you  Received this information from?:     General Description:    Raúl Mazariegos BH31/BH31E male 33 y.o. Admission weight: 170 lb (77.1 kg) Height: 6' (182.9 cm)  Race: [x]  [] Black  []   []   [] [place] [] Other  Facial Hair:  [] Yes  [x] No  If yes, please describe:   Identifying Marks (i.e. Visible tattoos, scars, etc...):  Long shoulder length hair, blonde    NURSING CARE PLAN    Nursing Diagnosis: Risk of Self Directed Harm  [x] Actual  [] Potential  Date Started: 9/16/18      Etiological Factors:

## 2018-09-17 PROBLEM — F11.23 OPIOID DEPENDENCE WITH WITHDRAWAL (HCC): Chronic | Status: ACTIVE | Noted: 2018-09-17

## 2018-09-17 PROCEDURE — 6360000002 HC RX W HCPCS: Performed by: PSYCHIATRY & NEUROLOGY

## 2018-09-17 PROCEDURE — 1240000000 HC EMOTIONAL WELLNESS R&B

## 2018-09-17 PROCEDURE — 90686 IIV4 VACC NO PRSV 0.5 ML IM: CPT | Performed by: PSYCHIATRY & NEUROLOGY

## 2018-09-17 PROCEDURE — 90792 PSYCH DIAG EVAL W/MED SRVCS: CPT | Performed by: PSYCHIATRY & NEUROLOGY

## 2018-09-17 PROCEDURE — G0008 ADMIN INFLUENZA VIRUS VAC: HCPCS | Performed by: PSYCHIATRY & NEUROLOGY

## 2018-09-17 PROCEDURE — 6370000000 HC RX 637 (ALT 250 FOR IP): Performed by: PSYCHIATRY & NEUROLOGY

## 2018-09-17 RX ORDER — CLONIDINE HYDROCHLORIDE 0.1 MG/1
0.1 TABLET ORAL 3 TIMES DAILY PRN
Status: ACTIVE | OUTPATIENT
Start: 2018-09-17 | End: 2018-09-22

## 2018-09-17 RX ORDER — CARBAMAZEPINE 200 MG/1
300 TABLET ORAL 2 TIMES DAILY
Status: ON HOLD | COMMUNITY
End: 2018-09-25 | Stop reason: HOSPADM

## 2018-09-17 RX ORDER — FOLIC ACID 1 MG/1
1 TABLET ORAL DAILY
Status: ON HOLD | COMMUNITY
End: 2018-10-25 | Stop reason: HOSPADM

## 2018-09-17 RX ORDER — THIAMINE MONONITRATE (VIT B1) 100 MG
100 TABLET ORAL DAILY
Status: ON HOLD | COMMUNITY
End: 2018-10-25 | Stop reason: HOSPADM

## 2018-09-17 RX ORDER — QUETIAPINE FUMARATE 200 MG/1
200 TABLET, FILM COATED ORAL NIGHTLY
Status: ON HOLD | COMMUNITY
End: 2018-09-25 | Stop reason: HOSPADM

## 2018-09-17 RX ORDER — MIRTAZAPINE 15 MG/1
15 TABLET, FILM COATED ORAL NIGHTLY
Status: ON HOLD | COMMUNITY
End: 2018-09-25 | Stop reason: HOSPADM

## 2018-09-17 RX ORDER — TRAZODONE HYDROCHLORIDE 100 MG/1
100 TABLET ORAL NIGHTLY PRN
Status: ON HOLD | COMMUNITY
End: 2018-09-25 | Stop reason: HOSPADM

## 2018-09-17 RX ORDER — NALTREXONE HYDROCHLORIDE 50 MG/1
50 TABLET, FILM COATED ORAL DAILY
Status: ON HOLD | COMMUNITY
End: 2018-09-25 | Stop reason: HOSPADM

## 2018-09-17 RX ORDER — QUETIAPINE FUMARATE 100 MG/1
100 TABLET, FILM COATED ORAL 2 TIMES DAILY
Status: ON HOLD | COMMUNITY
End: 2018-09-25 | Stop reason: HOSPADM

## 2018-09-17 RX ORDER — ARIPIPRAZOLE 5 MG/1
5 TABLET ORAL DAILY
Status: DISCONTINUED | OUTPATIENT
Start: 2018-09-17 | End: 2018-09-18

## 2018-09-17 RX ADMIN — INFLUENZA A VIRUS A/MICHIGAN/45/2015 X-275 (H1N1) ANTIGEN (FORMALDEHYDE INACTIVATED), INFLUENZA A VIRUS A/SINGAPORE/INFIMH-16-0019/2016 IVR-186 (H3N2) ANTIGEN (FORMALDEHYDE INACTIVATED), INFLUENZA B VIRUS B/PHUKET/3073/2013 ANTIGEN (FORMALDEHYDE INACTIVATED), AND INFLUENZA B VIRUS B/MARYLAND/15/2016 BX-69A ANTIGEN (FORMALDEHYDE INACTIVATED) 0.5 ML: 15; 15; 15; 15 INJECTION, SUSPENSION INTRAMUSCULAR at 09:32

## 2018-09-17 RX ADMIN — FOLIC ACID 1 MG: 1 TABLET ORAL at 09:29

## 2018-09-17 RX ADMIN — THIAMINE HCL TAB 100 MG 100 MG: 100 TAB at 09:29

## 2018-09-17 ASSESSMENT — SLEEP AND FATIGUE QUESTIONNAIRES
DO YOU HAVE DIFFICULTY SLEEPING: NO
DO YOU USE A SLEEP AID: NO
AVERAGE NUMBER OF SLEEP HOURS: 7

## 2018-09-17 ASSESSMENT — LIFESTYLE VARIABLES: HISTORY_ALCOHOL_USE: NO

## 2018-09-17 NOTE — BH NOTE
Pt did not participate in [phone] wellness group due to resting in room and choosing not to attend.

## 2018-09-17 NOTE — CARE COORDINATION
Writer was informed pt would like inpatient information. Writer attempted to address AOD concerns, noting pt urine screens negative. Pt states he \"tried heroin years ago\" yet continued to request inpatient referral. Pt had mumbled speech and avoided all eye contact with writer, difficult to understand. Pt states he will contact writer for information when necessary.      Writer will discuss outpatient options and DUAL Programming when patient requests information

## 2018-09-17 NOTE — BH NOTE
`[facility]  Admission Note     Admission Type:   Admission Type: Voluntary    Reason for admission:  Reason for Admission: Pt complians of suicidal thoughts and states took overdose on medications prior to going to ed pt mariah wants treatment for drug abuse issues    PATIENT STRENGTHS:  Strengths: No significant Physical Illness, Motivated    Patient Strengths and Limitations:  Limitations: Difficulty problem solving/relies on others to help solve problems, Hopeless about future, Inappropriate/potentially harmful leisure interests    Addictive Behavior:   Addictive Behavior  In the past 3 months, have you felt or has someone told you that you have a problem with:  : None  Do you have a history of Chemical Use?: No  Do you have a history of Alcohol Use?: Yes  Do you have a history of Street Drug Abuse?: Yes  Histroy of Prescripton Drug Abuse?: No    Medical Problems:   Past Medical History:   Diagnosis Date    ADHD (attention deficit hyperactivity disorder)     Anxiety     Bipolar 1 disorder (HCC)     PTSD (post-traumatic stress disorder)        Status EXAM:  Status and Exam  Normal: No  Facial Expression: Avoids Gaze, Flat  Affect: Blunt  Level of Consciousness: Alert  Mood:Normal: No  Mood: Anxious, Depressed  Motor Activity:Normal: Yes  Interview Behavior: Cooperative, Evasive  Preception: [place] to Person, [place] to Time, [place] to Place, [place] to Situation  Attention:Normal: No  Attention: Distractible  Thought Processes: Circumstantial  Thought Content:Normal: No  Thought Content: Preoccupations  Hallucinations: None  Delusions: No  Memory:Normal: No  Memory: Confabulation  Insight and Judgment: No  Insight and Judgment: Poor Judgment, Poor Insight, Unmotivated  Present Suicidal Ideation: Yes (denies current plan)  Present Homicidal Ideation: No    Tobacco Screening:  Practical Counseling, on admission, pteey X, if applicable and completed (first 3 are required if patient doesn't refuse):

## 2018-09-17 NOTE — PLAN OF CARE
Problem: Depressive Behavior With or Without Suicide Precautions:  Goal: Absence of self-harm  Absence of self-harm   Outcome: Ongoing  Pt did not participate in Stress Management and Coping Skills Group at 1330 despite staff encouragement.

## 2018-09-17 NOTE — ED PROVIDER NOTES
BHI.      OUTSTANDING TASKS / RECOMMENDATIONS:    1. Awaiting transfer to [facility]     FINAL IMPRESSION:     1. Suicide attempt ([facility] [address]        DISPOSITION:         DISPOSITION:  []  Discharge   [x]  Transfer - [facility]   []  Admission -     []  Against Medical Advice   []  Eloped   FOLLOW-UP: No follow-up provider specified. DISCHARGE MEDICATIONS: There are no discharge medications for this patient.           Antonio Contreras MD  Emergency Medicine Resident  [facility]       Antonio Contreras MD  Resident  09/16/18 [id number]

## 2018-09-17 NOTE — H&P
Skin:  No itching, redness or rash.       Head, eyes, ears, nose, throat:  No headache, epistaxis, rhinorrhea hearing loss or sore throat.        Neck:  No pain, stiffness or masses.      Cardiovascular/Respiratory system:  No chest pain, palpitation, shortness of breath, coughing or expectoration.                Gastrointestinal tract: No abdominal pain, nausea, vomiting, diarrhea or constipation.     Genitourinary:  No burning on micturition. No hesitancy, urgency, frequency or discoloration of urine.      Locomotor:  No bone or joint pains. No swelling or deformities.       Neuropsychiatric:  See HPI.      GENERAL PHYSICAL EXAM:      Vitals: BP (!) 100/55   Pulse 67   Temp 97.8 °F (36.6 °C) (Oral)   Resp 14   Ht 6' (1.829 m)   Wt 166 lb (75.3 kg)   SpO2 98%   BMI 22.51 kg/m²  Body mass index is 22.51 kg/m².      Pt was examined with a nurse present in the room. GENERAL APPEARANCE:  Ziyad Maurice is 33 y.o.,  male, not obese, nourished, conscious, alert. Does not appear to be distress or pain at this time.           SKIN:  Warm, dry, no cyanosis or jaundice.     HEAD:  Normocephalic, atraumatic, no swelling or tenderness.      EYES:  Pupils equal, reactive to light, Conjunctiva is clear, EOMs intact lan. eyelids WNL.    EARS:  No discharge, no marked hearing loss.     NOSE:  No rhinorrhea, epistaxis or septal deformity.     THROAT:  Not congested. No ulceration bleeding or discharge.      NECK:  No stiffness, trachea central.  No palpable masses or L. N.       CHEST:  Symmetrical and equal on expansion.       HEART:  Regular rate and rhythm. S1 > S2, No audible murmurs or gallops.      LUNGS:  Equal on expansion, normal breath sounds. No adventitious sounds.       ABDOMEN:   Soft on palpation. No localized tenderness. No guarding or rigidity.  No palpable organomegaly.     LYMPHATICS:  No palpable cervical Lymphadenopathy.      LOCOMOTOR, BACK AND SPINE:  No tenderness or

## 2018-09-17 NOTE — CARE COORDINATION
BHI Biopsychosocial Assessment    Current Level of Psychosocial Functioning     Independent:   Dependent:   Minimal Assist: xx    Comments: Pt presented to the ED with SI and took a handful of Tegretol and hydroxyzine. Psychosocial High Risk Factors (check all that apply)    Unable to obtain meds:   Chronic illness/pain:    Substance abuse: xx  Lack of Family Support: xx  Financial stress: xx  Isolation: xx  Inadequate Community Resources: xx  Suicide attempt(s): denied  Not taking medications: xx   Victim of crime:   Developmental Delay:   Unable to manage personal needs:   Age 72 or older:   Homeless:   No transportation: xx  Readmission within 30 days: xx  Unemployment:  Traumatic Event:     Comments:     Psychiatric Advanced Directives: none reported    Family to Involve in Treatment: denied    Sexual Orientation: GRANT    Patient Strengths: no physical limitations noted    Patient Barriers: AOD abuse, no follow through with [facility]    Opiate Education: Pt requested information on treatment providers and was given information on opiate education. CMHC/mental health history: [facility], denied to be linked at [place]. Pt [facility]. Plan of Care   medication management, group/individual therapies, family meetings, psycho -education, treatment team meetings to assist with stabilization    Initial Discharge Plan: Link with [facility] and AOD treatment provider. Clinical Summary:      Pt is a 69-year-old  male who presented to the ED with SI, however pt took took a handful of Tegretol and hydroxyzine and denied intent to harm self. PT denied any SI during assessment and stated he was never SI and that he just wants help with sober living. Pt states that he is not medication compliant at this time. Pt is was linked to [place], denied to sign [facility],  stated he wanted to go somewhere different.  Pt stated he wants an inpatient treatment

## 2018-09-18 PROCEDURE — 99232 SBSQ HOSP IP/OBS MODERATE 35: CPT | Performed by: PSYCHIATRY & NEUROLOGY

## 2018-09-18 PROCEDURE — 6370000000 HC RX 637 (ALT 250 FOR IP): Performed by: PSYCHIATRY & NEUROLOGY

## 2018-09-18 PROCEDURE — 1240000000 HC EMOTIONAL WELLNESS R&B

## 2018-09-18 RX ORDER — ARIPIPRAZOLE 10 MG/1
10 TABLET ORAL DAILY
Status: DISCONTINUED | OUTPATIENT
Start: 2018-09-19 | End: 2018-09-23

## 2018-09-18 RX ADMIN — THIAMINE HCL TAB 100 MG 100 MG: 100 TAB at 08:58

## 2018-09-18 RX ADMIN — FOLIC ACID 1 MG: 1 TABLET ORAL at 08:57

## 2018-09-18 NOTE — PLAN OF CARE
Problem: Substance Abuse:  Goal: Absence of drug withdrawal signs and symptoms  Absence of drug withdrawal signs and symptoms   Outcome: Ongoing  Pt did not participate in Therapeutic Group at 1330 despite staff encouragement.

## 2018-09-18 NOTE — PROGRESS NOTES
Celia Morgan MD        cloNIDine (CATAPRES) tablet 0.1 mg  0.1 mg Oral TID PRN Cinthia Pearson MD        acetaminophen (TYLENOL) tablet 650 mg  650 mg Oral Q4H PRN Cinthia Pearson MD        hydrOXYzine (ATARAX) tablet 50 mg  50 mg Oral TID PRN Cinthia Pearson MD        traZODone (DESYREL) tablet 50 mg  50 mg Oral Nightly PRN Cinthia Pearson MD        benztropine mesylate (COGENTIN) injection 2 mg  2 mg Intramuscular BID PRN Cinthia Pearson MD        magnesium hydroxide (MILK OF MAGNESIA) 400 MG/5ML suspension 30 mL  30 mL Oral Daily PRN Cinthia Pearson MD        aluminum & magnesium hydroxide-simethicone (MAALOX) 200-200-20 MG/5ML suspension 30 mL  30 mL Oral Q6H PRN Cinthia Pearson MD        haloperidol lactate (HALDOL) injection 5 mg  5 mg Intramuscular Q6H PRN Cinthia Pearson MD        hydrOXYzine (VISTARIL) injection 50 mg  50 mg Intramuscular Q6H PRN Cinthia Pearson MD        chlordiazePOXIDE (LIBRIUM) capsule 25 mg  25 mg Oral 4x Daily PRN Cinthia Pearson MD        folic acid (FOLVITE) tablet 1 mg  1 mg Oral Daily Cinthia Pearson MD   1 mg at 09/18/18 0857    vitamin B-1 (THIAMINE) tablet 100 mg  100 mg Oral Daily Cinthia Pearson MD   100 mg at 09/18/18 0858         [START ON 9/19/2018] ARIPiprazole  10 mg Oral Daily    ARIPiprazole lauroxil  441 mg Intramuscular I23 Days    folic acid  1 mg Oral Daily    thiamine  100 mg Oral Daily       ASSESSMENT  [facility], [facility]     Patient's Response to Treatment: positive    PLAN  Educated the patient about the pros and cons and side effects of the long-acting injectable forms of Abilify  And mutually agreed to start him on aristada 441 mg im q 30 days due to history of noncompliance with medications and follow-ups. Unit milieu group psychotherapy and discharge planning. Dragon voice recognition software used in portions of this document.

## 2018-09-18 NOTE — PLAN OF CARE
Problem: Depressive Behavior With or Without Suicide Precautions:  Goal: Absence of self-harm  Absence of self-harm   Outcome: Ongoing  Denies SI and remains free from harm att.

## 2018-09-18 NOTE — PLAN OF CARE
Problem: Depressive Behavior With or Without Suicide Precautions:  Goal: Absence of self-harm  Absence of self-harm   Outcome: Ongoing  Pt did not participate in Leisure Skills + Awareness / Social Skills / Critical Thinking group at 1100 despite staff encouragement.

## 2018-09-18 NOTE — PLAN OF CARE
Problem: Depressive Behavior With or Without Suicide Precautions:  Goal: Absence of self-harm  Absence of self-harm   Outcome: Ongoing  [facility]  Day 3 Interdisciplinary Treatment Plan NOTE    Review Date & Time: 9/18/18 0918    Admission Type:   Admission Type: Voluntary    Reason for admission:  Reason for Admission: Pt complians of suicidal thoughts and states took overdose on medications prior to going to ed pt mariah wants treatment for drug abuse issues  Estimated Length of Stay:  5-7 days  Estimated Discharge Date Update:  9/24/18 to be determined by physician    PATIENT STRENGTHS:  Patient Strengths:Strengths: No significant Physical Illness, Motivated  Patient Strengths and Limitations:Limitations: Tendency to isolate self, Lacks leisure interests, Difficulty problem solving/relies on others to help solve problems, Inappropriate/potentially harmful leisure interests, Hopeless about future, Apathetic / unmotivated, Difficult relationships / poor social skills, Multiple barriers to leisure interests  Addictive Behavior:Addictive Behavior  In the past 3 months, have you felt or has someone told you that you have a problem with:  : Other(Comments) (GRANT)  Do you have a history of Chemical Use?: No  Do you have a history of Alcohol Use?: No (denied)  Do you have a history of Street Drug Abuse?: Yes  Histroy of Prescripton Drug Abuse?: No (denied)  Medical Problems:  Past Medical History:   Diagnosis Date    ADHD (attention deficit hyperactivity disorder)     Anxiety     [facility]     PTSD (post-traumatic stress disorder)        Risk:  Fall RiskTotal: 53  Wolfgang Scale Wolfgang Scale Score: 22  BVC Total: 0  Change in scores:  No Changes to plan of Care:  No    Status EXAM:   Status and Exam  Normal: No  Facial Expression: Avoids Gaze, Expressionless, Flat  Affect: Blunt  Level of Consciousness: Alert  Mood:Normal: No  Mood: Empty, Irritable  Motor Activity:Normal: No  Motor

## 2018-09-19 PROCEDURE — 99232 SBSQ HOSP IP/OBS MODERATE 35: CPT | Performed by: PSYCHIATRY & NEUROLOGY

## 2018-09-19 PROCEDURE — 1240000000 HC EMOTIONAL WELLNESS R&B

## 2018-09-19 NOTE — PROGRESS NOTES
Intramuscular Q30 Days Philippe Aly MD        cloNIDine (CATAPRES) tablet 0.1 mg  0.1 mg Oral TID PRN Philippe Aly MD        acetaminophen (TYLENOL) tablet 650 mg  650 mg Oral Q4H PRN Philippe Aly MD        hydrOXYzine (ATARAX) tablet 50 mg  50 mg Oral TID PRN Philippe Aly MD        traZODone (DESYREL) tablet 50 mg  50 mg Oral Nightly PRN Philippe Aly MD        benztropine mesylate (COGENTIN) injection 2 mg  2 mg Intramuscular BID PRN Philippe Aly MD        magnesium hydroxide (MILK OF MAGNESIA) 400 MG/5ML suspension 30 mL  30 mL Oral Daily PRN Philippe Aly MD        aluminum & magnesium hydroxide-simethicone (MAALOX) 200-200-20 MG/5ML suspension 30 mL  30 mL Oral Q6H PRN Philippe Aly MD        haloperidol lactate (HALDOL) injection 5 mg  5 mg Intramuscular Q6H PRN Philippe Aly MD        hydrOXYzine (VISTARIL) injection 50 mg  50 mg Intramuscular Q6H PRN Philippe Aly MD        chlordiazePOXIDE (LIBRIUM) capsule 25 mg  25 mg Oral 4x Daily PRN Philippe Aly MD        folic acid (FOLVITE) tablet 1 mg  1 mg Oral Daily Philippe Aly MD   1 mg at 09/18/18 0857    vitamin B-1 (THIAMINE) tablet 100 mg  100 mg Oral Daily Philippe Aly MD   100 mg at 09/18/18 0858         ARIPiprazole  10 mg Oral Daily    ARIPiprazole lauroxil  441 mg Intramuscular A87 Days    folic acid  1 mg Oral Daily    thiamine  100 mg Oral Daily       ASSESSMENT  Schizoaffective disorder, bipolar type ([facility] [address]     Patient's Response to Treatment: positive    PLAN  Continue to Educate the patient about the pros and cons and side effects of the long-acting injectable forms of Abilify, Unit milieu, group psychotherapy and discharge planning. Dragon voice recognition software used in portions of this document.

## 2018-09-19 NOTE — PROGRESS NOTES
Charting done this shift reviewed by Electronically signed by Avi Hernandez RN on 9/18/2018 at 10:52 PM

## 2018-09-19 NOTE — PLAN OF CARE
Problem: Depressive Behavior With or Without Suicide Precautions:  Goal: Absence of self-harm  Absence of self-harm   Outcome: Ongoing  Denies SI and remains free from harm att. Pt isolates to room t/o most of the evening.

## 2018-09-19 NOTE — PLAN OF CARE
Problem: Depressive Behavior With or Without Suicide Precautions:  Goal: Absence of self-harm  Absence of self-harm   Outcome: Ongoing  Pt did not participate in Therapeutic Recovery Group at 1440 despite staff encouragement.

## 2018-09-20 PROCEDURE — 6370000000 HC RX 637 (ALT 250 FOR IP): Performed by: PSYCHIATRY & NEUROLOGY

## 2018-09-20 PROCEDURE — 99232 SBSQ HOSP IP/OBS MODERATE 35: CPT | Performed by: PSYCHIATRY & NEUROLOGY

## 2018-09-20 PROCEDURE — 1240000000 HC EMOTIONAL WELLNESS R&B

## 2018-09-20 RX ADMIN — ARIPIPRAZOLE 10 MG: 10 TABLET ORAL at 10:04

## 2018-09-20 RX ADMIN — FOLIC ACID 1 MG: 1 TABLET ORAL at 10:04

## 2018-09-20 RX ADMIN — THIAMINE HCL TAB 100 MG 100 MG: 100 TAB at 10:04

## 2018-09-20 NOTE — PLAN OF CARE
Problem: Substance Abuse:  Goal: Absence of drug withdrawal signs and symptoms  Absence of drug withdrawal signs and symptoms   Outcome: Ongoing  Pt did not participate in Leisure Skills Therapeutic Group at 1100 despite staff encouragement.

## 2018-09-20 NOTE — PLAN OF CARE
Problem: Depressive Behavior With or Without Suicide Precautions:  Goal: Absence of self-harm  Absence of self-harm   Outcome: Ongoing  1:1 WITH PT  SUPPORT AND REASSURANCE GIVEN REDIRECTED AS NEEDED PT ADMITS TO HAVING FLEETING SI WITH NO PLAN AND HAS AGREED TO SEEK OUT STAFF AS NEEDED AND BEFORE HARMING SELF IF NEGATIVE SELF HARM THOUGHTS ARISE.  [address]

## 2018-09-20 NOTE — PLAN OF CARE
Problem: Depressive Behavior With or Without Suicide Precautions:  Goal: Absence of self-harm  Absence of self-harm   Outcome: Ongoing  Pt did not participate in cognitive skills/ impulse control group at 1330 despite staff encouragement to attend.

## 2018-09-20 NOTE — PROGRESS NOTES
Department of Psychiatry  Attending Progress Note  Chief Complaint: Schizoaffective disorder, bipolar type ([address]     SUBJECTIVE:  Was seen in day area today and does exhibit some fluctuation in intensity of psychotic symptoms. Patient is less often observed responding to internal stimuli. There continues to be thought blocking but there is mild improvement in spontaneity of thought process. He continues to report experiencing auditory hallucinations. Denies side effects to medication regimen. Affect remains flat and poorly reactive. Charting and medications reviewed. Therapeutic support provided. There is still no ability to formulate safe plan other than continued hospitalization. continues to exhibit negative symptoms like anhedonia, generalized slowing down. He took his medications today as well as a consented to take the long-acting injectable.         OBJECTIVE    Physical  BP 96/61   Pulse 52   Temp 97.9 °F (36.6 °C) (Oral)   Resp 16   Ht 6' (1.829 m)   Wt 170 lb (77.1 kg)   SpO2 99%   BMI 23.06 kg/m²      Mental Status Evaluation:  Orientation: alertness: lethargic   Mood:. constricted and decreased range      Affect:  blunted and mood-incongruent      Appearance:  age appropriate   Activity:  Psychomotor Retardation   Gait/Posture: Normal   Speech:  soft   Thought Process:  blocked and loose associations   Thought Content:  hallucinations and suicidal   Sensorium:  person, place, time/date and situation   Cognition:  grossly intact   Memory: intact   Insight:  limited   Judgment: limited   Suicidal Ideations: active   Homicidal Ideations: Negative for homicidal ideation      Medication Side Effects: absent       Attention Span attention span appeared shorter than expected for age     Medications  Current Facility-Administered Medications   Medication Dose Route Frequency Provider Last Rate Last Dose    ARIPiprazole (ABILIFY) tablet 10 mg  10 mg Oral Daily Leonardo Aschoff, MD   10 mg at 09/20/18 9783   

## 2018-09-21 PROCEDURE — 99232 SBSQ HOSP IP/OBS MODERATE 35: CPT | Performed by: PSYCHIATRY & NEUROLOGY

## 2018-09-21 PROCEDURE — 6370000000 HC RX 637 (ALT 250 FOR IP): Performed by: PSYCHIATRY & NEUROLOGY

## 2018-09-21 PROCEDURE — 1240000000 HC EMOTIONAL WELLNESS R&B

## 2018-09-21 RX ADMIN — FOLIC ACID 1 MG: 1 TABLET ORAL at 08:47

## 2018-09-21 RX ADMIN — ARIPIPRAZOLE 10 MG: 10 TABLET ORAL at 08:47

## 2018-09-21 RX ADMIN — THIAMINE HCL TAB 100 MG 100 MG: 100 TAB at 08:47

## 2018-09-21 RX ADMIN — ACETAMINOPHEN 650 MG: 325 TABLET, FILM COATED ORAL at 09:27

## 2018-09-21 ASSESSMENT — PAIN SCALES - GENERAL
PAINLEVEL_OUTOF10: 3
PAINLEVEL_OUTOF10: 2

## 2018-09-21 NOTE — PLAN OF CARE
Problem: Depressive Behavior With or Without Suicide Precautions:  Goal: Absence of self-harm  Absence of self-harm   Outcome: Ongoing  Pt did not participate in community meeting/ goals group at 0900 despite staff encouragement to attend.

## 2018-09-21 NOTE — PLAN OF CARE
Problem: Depressive Behavior With or Without Suicide Precautions:  Goal: Absence of self-harm  Absence of self-harm   Outcome: Ongoing  Pt did not attend RT group 1430 d/t resting in room despite staff invitation to attend.

## 2018-09-21 NOTE — PROGRESS NOTES
Department of Psychiatry  Attending Progress Note  Chief Complaint: Schizoaffective disorder, bipolar type ([address]     SUBJECTIVE:  Was seen in his room today and does exhibit some fluctuation in intensity of psychotic symptoms. Patient is less often observed responding to internal stimuli. There continues to be thought blocking but there is mild improvement in spontaneity of thought process. He continues to report experiencing auditory hallucinations. Denies side effects to medication regimen. Affect remains flat and poorly reactive. Charting and medications reviewed. Therapeutic support provided. There is still no ability to formulate safe plan other than continued hospitalization. continues to exhibit negative symptoms like anhedonia, generalized slowing down. He took his medications today as well as a consented to take the long-acting injectable yesterday. OBJECTIVE    Physical  BP (!) 86/54   Pulse 61   Temp 97.6 °F (36.4 °C) (Oral)   Resp 14   Ht 6' (1.829 m)   Wt 170 lb (77.1 kg)   SpO2 99%   BMI 23.06 kg/m²      Mental Status Evaluation:  Orientation: alertness: lethargic   Mood:. constricted and decreased range      Affect:  blunted and mood-incongruent      Appearance:  age appropriate   Activity:  Psychomotor Retardation   Gait/Posture: Normal   Speech:  soft   Thought Process:  blocked and loose associations   Thought Content:  hallucinations and suicidal   Sensorium:  person, place, time/date and situation   Cognition:  grossly intact   Memory: intact   Insight:  limited   Judgment: limited   Suicidal Ideations:  Active /infrequent   Homicidal Ideations: Negative for homicidal ideation      Medication Side Effects: absent       Attention Span attention span appeared shorter than expected for age     Medications  Current Facility-Administered Medications   Medication Dose Route Frequency Provider Last Rate Last Dose    ARIPiprazole (ABILIFY) tablet 10 mg  10 mg Oral Daily Graciela Vitale MD   10

## 2018-09-22 PROCEDURE — 1240000000 HC EMOTIONAL WELLNESS R&B

## 2018-09-22 PROCEDURE — 6370000000 HC RX 637 (ALT 250 FOR IP): Performed by: PSYCHIATRY & NEUROLOGY

## 2018-09-22 RX ADMIN — THIAMINE HCL TAB 100 MG 100 MG: 100 TAB at 08:43

## 2018-09-22 RX ADMIN — ACETAMINOPHEN 650 MG: 325 TABLET, FILM COATED ORAL at 08:43

## 2018-09-22 RX ADMIN — FOLIC ACID 1 MG: 1 TABLET ORAL at 08:43

## 2018-09-22 RX ADMIN — ARIPIPRAZOLE 10 MG: 10 TABLET ORAL at 08:43

## 2018-09-22 ASSESSMENT — PAIN SCALES - GENERAL
PAINLEVEL_OUTOF10: 3
PAINLEVEL_OUTOF10: 1

## 2018-09-22 ASSESSMENT — PAIN DESCRIPTION - LOCATION: LOCATION: GENERALIZED

## 2018-09-22 ASSESSMENT — PAIN DESCRIPTION - FREQUENCY: FREQUENCY: INTERMITTENT

## 2018-09-22 ASSESSMENT — PAIN DESCRIPTION - PAIN TYPE: TYPE: ACUTE PAIN

## 2018-09-22 ASSESSMENT — PAIN DESCRIPTION - DESCRIPTORS: DESCRIPTORS: ACHING;DISCOMFORT

## 2018-09-22 NOTE — PLAN OF CARE
Problem: Substance Abuse:  Goal: Absence of drug withdrawal signs and symptoms  Absence of drug withdrawal signs and symptoms   Outcome: Ongoing  Pt did not participate in Goal Setting and Community Meeting at 0900 despite staff encouragement.

## 2018-09-22 NOTE — PLAN OF CARE
Pt did not attend psychoeducation group today at 10am despite encouragement. Pt offered 1:1 and declined.

## 2018-09-22 NOTE — PLAN OF CARE
Problem: Substance Abuse:  Goal: Absence of drug withdrawal signs and symptoms  Absence of drug withdrawal signs and symptoms   Outcome: Ongoing  Pt did not participate in Cognitive Skills Therapeutic Group at 1330 despite staff encouragement.

## 2018-09-22 NOTE — PLAN OF CARE
Problem: Depressive Behavior With or Without Suicide Precautions:  Goal: Absence of self-harm  Absence of self-harm   Outcome: Ongoing  Remains free of self harm. Quiet guarded aloof et asocial, secludes self to room for dong intervals, out to are for nourishment taken well. Accepting of all medications provided. Problem: Substance Abuse:  Goal: Participates in care planning  Participates in care planning   Outcome: Ongoing  Refuses offer of all unit groups et unit activities.

## 2018-09-23 PROCEDURE — 6370000000 HC RX 637 (ALT 250 FOR IP): Performed by: PSYCHIATRY & NEUROLOGY

## 2018-09-23 PROCEDURE — 99232 SBSQ HOSP IP/OBS MODERATE 35: CPT | Performed by: PSYCHIATRY & NEUROLOGY

## 2018-09-23 PROCEDURE — 1240000000 HC EMOTIONAL WELLNESS R&B

## 2018-09-23 RX ORDER — BUSPIRONE HYDROCHLORIDE 10 MG/1
5 TABLET ORAL 3 TIMES DAILY
Status: DISCONTINUED | OUTPATIENT
Start: 2018-09-23 | End: 2018-09-25 | Stop reason: HOSPADM

## 2018-09-23 RX ORDER — ARIPIPRAZOLE 15 MG/1
15 TABLET ORAL DAILY
Status: DISCONTINUED | OUTPATIENT
Start: 2018-09-24 | End: 2018-09-25 | Stop reason: HOSPADM

## 2018-09-23 RX ADMIN — THIAMINE HCL TAB 100 MG 100 MG: 100 TAB at 08:56

## 2018-09-23 RX ADMIN — BUSPIRONE HYDROCHLORIDE 5 MG: 10 TABLET ORAL at 21:13

## 2018-09-23 RX ADMIN — FOLIC ACID 1 MG: 1 TABLET ORAL at 08:56

## 2018-09-23 RX ADMIN — BUSPIRONE HYDROCHLORIDE 5 MG: 10 TABLET ORAL at 16:31

## 2018-09-23 RX ADMIN — ACETAMINOPHEN 650 MG: 325 TABLET, FILM COATED ORAL at 09:00

## 2018-09-23 RX ADMIN — ARIPIPRAZOLE 10 MG: 10 TABLET ORAL at 08:56

## 2018-09-23 ASSESSMENT — PAIN SCALES - GENERAL
PAINLEVEL_OUTOF10: 0
PAINLEVEL_OUTOF10: 8

## 2018-09-23 ASSESSMENT — PAIN DESCRIPTION - PAIN TYPE: TYPE: ACUTE PAIN

## 2018-09-23 ASSESSMENT — PAIN DESCRIPTION - LOCATION: LOCATION: HEAD

## 2018-09-23 NOTE — PROGRESS NOTES
Department of Psychiatry  Attending Progress Note  Chief Complaint: Schizoaffective disorder, bipolar type ([address]     SUBJECTIVE:  Was seen in his room today and does exhibit some fluctuation in intensity of psychotic symptoms. Patient is less often observed responding to internal stimuli. There continues to be thought blocking but there is mild improvement in spontaneity of thought process. He continues to report experiencing auditory hallucinations. Denies side effects to medication regimen. Affect remains flat and poorly reactive. Charting and medications reviewed. Therapeutic support provided. There is still no ability to formulate safe plan other than continued hospitalization. continues to exhibit negative symptoms like anhedonia, generalized slowing down. He took his medications today, and is reporting some mild to moderate anxiety. We discussed pros, cons, Side effects and interactions of buspar and decided to start at 5 mg 3 times a day and titrate to effect        OBJECTIVE    Physical  BP (!) 110/53   Pulse 61   Temp 98 °F (36.7 °C) (Oral)   Resp 16   Ht 6' (1.829 m)   Wt 170 lb (77.1 kg)   SpO2 99%   BMI 23.06 kg/m²      Mental Status Evaluation:  Orientation: alertness: lethargic   Mood:. constricted and decreased range      Affect:  blunted and mood-incongruent      Appearance:  age appropriate   Activity:  Psychomotor Retardation   Gait/Posture: Normal   Speech:  soft   Thought Process:  blocked and loose associations   Thought Content:  hallucinations and suicidal   Sensorium:  person, place, time/date and situation   Cognition:  grossly intact   Memory: intact   Insight:  limited   Judgment: limited   Suicidal Ideations:  Active /infrequent   Homicidal Ideations: Negative for homicidal ideation      Medication Side Effects: absent       Attention Span attention span appeared shorter than expected for age     Medications  Current Facility-Administered Medications   Medication Dose Route Frequency Provider Last Rate Last Dose    busPIRone (BUSPAR) tablet 5 mg  5 mg Oral TID Daphney Hugo MD       [employer] [START ON 9/24/2018] ARIPiprazole (ABILIFY) tablet 15 mg  15 mg Oral Daily Alex Mcgraw MD        ARIPiprazole lauroxil (ARISTADA) injection 441 mg  441 mg Intramuscular Q30 Days Daphney Hugo MD   441 mg at 09/20/18 1030    acetaminophen (TYLENOL) tablet 650 mg  650 mg Oral Q4H PRN Daphney Hugo MD   650 mg at 09/23/18 0900    hydrOXYzine (ATARAX) tablet 50 mg  50 mg Oral TID PRN Daphney Hugo MD        traZODone (DESYREL) tablet 50 mg  50 mg Oral Nightly PRN Daphney Hugo MD        benztropine mesylate (COGENTIN) injection 2 mg  2 mg Intramuscular BID PRN Daphney Hugo MD        magnesium hydroxide (MILK OF MAGNESIA) 400 MG/5ML suspension 30 mL  30 mL Oral Daily PRN Daphney Hugo MD        aluminum & magnesium hydroxide-simethicone (MAALOX) 200-200-20 MG/5ML suspension 30 mL  30 mL Oral Q6H PRN Daphney Hugo MD        haloperidol lactate (HALDOL) injection 5 mg  5 mg Intramuscular Q6H PRN Daphney Hugo MD        hydrOXYzine (VISTARIL) injection 50 mg  50 mg Intramuscular Q6H PRN Daphney Hugo MD        folic acid (FOLVITE) tablet 1 mg  1 mg Oral Daily Daphney Hugo MD   1 mg at 09/23/18 0856    vitamin B-1 (THIAMINE) tablet 100 mg  100 mg Oral Daily Daphney Hugo MD   100 mg at 09/23/18 0856         busPIRone  5 mg Oral TID    [START ON 9/24/2018] ARIPiprazole  15 mg Oral Daily    ARIPiprazole lauroxil  441 mg Intramuscular P34 Days    folic acid  1 mg Oral Daily    thiamine  100 mg Oral Daily       ASSESSMENT  Schizoaffective disorder, bipolar type ([facility]     Patient's Response to Treatment: positive    PLAN  Continue Medication management, Unit milieu, group psychotherapy and discharge planning. aristada 441mg IM 9/20/2018. Titrating oral Abilify to 15 mg daily, and added buspirone 5 mg 3 times a day. Dragon voice recognition software used in portions of this document.

## 2018-09-24 PROCEDURE — 6370000000 HC RX 637 (ALT 250 FOR IP): Performed by: PSYCHIATRY & NEUROLOGY

## 2018-09-24 PROCEDURE — 1240000000 HC EMOTIONAL WELLNESS R&B

## 2018-09-24 PROCEDURE — 99232 SBSQ HOSP IP/OBS MODERATE 35: CPT | Performed by: PSYCHIATRY & NEUROLOGY

## 2018-09-24 PROCEDURE — 5130000000 HC BRIDGE APPOINTMENT

## 2018-09-24 RX ADMIN — THIAMINE HCL TAB 100 MG 100 MG: 100 TAB at 09:26

## 2018-09-24 RX ADMIN — BUSPIRONE HYDROCHLORIDE 5 MG: 10 TABLET ORAL at 14:57

## 2018-09-24 RX ADMIN — BUSPIRONE HYDROCHLORIDE 5 MG: 10 TABLET ORAL at 22:05

## 2018-09-24 RX ADMIN — FOLIC ACID 1 MG: 1 TABLET ORAL at 09:25

## 2018-09-24 RX ADMIN — BUSPIRONE HYDROCHLORIDE 5 MG: 10 TABLET ORAL at 09:25

## 2018-09-24 NOTE — BH NOTE
Patient observed with poor appetite. Educated on proper diet, offered liked foods/snacks, and encouraged to place meal orders. Patient not receptive and continues to be selective. Will continue to monitor and intervene as needed.

## 2018-09-25 VITALS
TEMPERATURE: 97.8 F | BODY MASS INDEX: 23.03 KG/M2 | SYSTOLIC BLOOD PRESSURE: 98 MMHG | HEIGHT: 72 IN | OXYGEN SATURATION: 99 % | HEART RATE: 61 BPM | WEIGHT: 170 LBS | RESPIRATION RATE: 14 BRPM | DIASTOLIC BLOOD PRESSURE: 61 MMHG

## 2018-09-25 PROCEDURE — 5130000000 HC BRIDGE APPOINTMENT

## 2018-09-25 PROCEDURE — 6370000000 HC RX 637 (ALT 250 FOR IP): Performed by: PSYCHIATRY & NEUROLOGY

## 2018-09-25 PROCEDURE — 99238 HOSP IP/OBS DSCHRG MGMT 30/<: CPT | Performed by: PSYCHIATRY & NEUROLOGY

## 2018-09-25 RX ORDER — TRAZODONE HYDROCHLORIDE 50 MG/1
50 TABLET ORAL NIGHTLY PRN
Qty: 30 TABLET | Refills: 0 | Status: ON HOLD | OUTPATIENT
Start: 2018-09-25 | End: 2018-10-25 | Stop reason: HOSPADM

## 2018-09-25 RX ORDER — BUSPIRONE HYDROCHLORIDE 5 MG/1
5 TABLET ORAL 3 TIMES DAILY
Qty: 90 TABLET | Refills: 0 | Status: SHIPPED | OUTPATIENT
Start: 2018-09-25 | End: 2018-10-20

## 2018-09-25 RX ORDER — ARIPIPRAZOLE 15 MG/1
15 TABLET ORAL DAILY
Qty: 30 TABLET | Refills: 0 | Status: ON HOLD | OUTPATIENT
Start: 2018-09-26 | End: 2018-10-25 | Stop reason: HOSPADM

## 2018-09-25 RX ADMIN — BUSPIRONE HYDROCHLORIDE 5 MG: 10 TABLET ORAL at 14:41

## 2018-09-25 RX ADMIN — FOLIC ACID 1 MG: 1 TABLET ORAL at 08:39

## 2018-09-25 RX ADMIN — THIAMINE HCL TAB 100 MG 100 MG: 100 TAB at 08:39

## 2018-09-25 RX ADMIN — BUSPIRONE HYDROCHLORIDE 5 MG: 10 TABLET ORAL at 08:39

## 2018-09-25 NOTE — DISCHARGE SUMMARY
Physician Discharge Summary     Patient ID:  Kyree Anders  [id number]  3 y.o.  1990    Admit date: 9/16/2018    Discharge date and time: No discharge date for patient encounter. Admitting Physician: Eulalio Mustafa MD     Discharge Physician: Ruthy Blevins MD    Admission Diagnoses: Schizoaffective disorder ([facility] [F25.9]  Schizoaffective disorder, bipolar type ([facility] [F25.0]    Discharge Diagnoses: same    Discharge Diagnoses: same     Admission Condition: serious     Discharged Condition: fair     Indication for Admission: suicidal ideation     [facility] Resumed and adjusted medications.  His mood improved and later denied any suicidal ideation     Consults: none     Significant Diagnostic Studies: labs:      Treatments: Mood stabilizers     Discharge Mental Status Evaluation:  Orientation: alertness: alert   Mood:. euthymic      Affect:   mood-incongruent inappropriate      Appearance:  age appropriate   Activity:  Within Normal Limits   Gait/Posture: Normal   Speech:  normal pitch and normal volume   Thought Process:  within normal limits   Thought Content:  normal   Sensorium:  person, place, time/date and situation   Cognition:  grossly intact   Memory: intact   Insight:  fair   Judgment: fair   Suicidal Ideations: denies   Homicidal Ideations: Negative for homicidal ideation      Medication Side Effects: absent       Attention Span attention span and concentration were age appropriate         Disposition: home     Patient Instructions:   [unfilled]  Activity: activity as tolerated  Diet: regular diet        Follow-up with psychiatrist in 4 weeks.     Time spent 27 minutes    Signed:  Ruthy Blevins  9/25/2018  10:21 AM

## 2018-09-25 NOTE — BH NOTE
Patient given tobacco quitline number of [id number] at this time, but refuses to call. Stated \"I'm cool\". Patient given information as to the dangers of long term tobacco use. Educated and encouraged tobacco cessation.

## 2018-09-25 NOTE — PLAN OF CARE
Problem: Depressive Behavior With or Without Suicide Precautions:  Goal: Absence of self-harm  Absence of self-harm   Outcome: Ongoing  [facility]  Week Interdisciplinary Treatment Plan Note     Review Date & Time: 9/25/18 0916    Admission Type:   Admission Type: Voluntary    Reason for admission:  Reason for Admission: Pt complians of suicidal thoughts and states took overdose on medications prior to going to ed pt mariah wants treatment for drug abuse issues    Estimated Length of Stay:  8-14 days  Estimated Discharge Date Update:  9/30/18 to be determined by physician    PATIENT STRENGTHS:  Patient Strengths:Strengths: No significant Physical Illness, Motivated  Patient Strengths and Limitations:Limitations: Tendency to isolate self, Lacks leisure interests, Difficulty problem solving/relies on others to help solve problems, Inappropriate/potentially harmful leisure interests, Hopeless about future, Apathetic / unmotivated, Difficult relationships / poor social skills, Multiple barriers to leisure interests  Addictive Behavior:Addictive Behavior  In the past 3 months, have you felt or has someone told you that you have a problem with:  : Other(Comments) (GRANT)  Do you have a history of Chemical Use?: No  Do you have a history of Alcohol Use?: No (denied)  Do you have a history of Street Drug Abuse?: Yes  Histroy of Prescripton Drug Abuse?: No (denied)  Medical Problems:   Past Medical History:   Diagnosis Date    ADHD (attention deficit hyperactivity disorder)     Anxiety     Bipolar 1 disorder ([facility]     PTSD (post-traumatic stress disorder)        Risk:  Fall RiskTotal: 77  Wolfgang Scale Wolfgang Scale Score: 22  BVC Total: 0    Change in scores:  No. Changes to plan of Care:  No    Status EXAM:   Status and Exam  Normal: No  Facial Expression: Flat  Affect: Blunt  Level of Consciousness: Alert  Mood:Normal: No  Mood: Depressed, Anxious  Motor Activity:Normal: Yes  Motor Activity: Increased  Interview Behavior: Cooperative, Evasive  Preception: [place] to Person, Santi Bottoms to Time, [place] to Place, [place] to Situation  Attention:Normal: No  Attention: Distractible  Thought Processes: Blocking  Thought Content:Normal: No  Thought Content: Preoccupations  Hallucinations: None  Delusions: No  Delusions: Persecution  Memory:Normal: Yes  Memory: Poor Recent  Insight and Judgment: No  Insight and Judgment: Poor Judgment, Poor Insight, Unmotivated  Present Suicidal Ideation: No  Present Homicidal Ideation: No    Daily Assessment Last Entry:   Daily Sleep (WDL): Within Defined Limits         Patient Currently in Pain: No  Daily Nutrition (WDL): Exceptions to WDL  Appetite Change: Decreased  Barriers to Nutrition: None  Level of Assistance: Independent/Self    Patient Monitoring:  Frequency of Checks: 4 times per hour, close    Psychiatric Symptoms:   Depression Symptoms  Depression Symptoms: Impaired concentration, Isolative  Anxiety Symptoms  Anxiety Symptoms: Generalized  Angella Symptoms  Angella Symptoms: No problems reported or observed. Psychosis Symptoms  Delusion Type: No problems reported or observed. Suicide Risk CSSR-S:  1) Within the past month, have you wished you were dead or wished you could go to sleep and not wake up? : YES  2) Within the past month, have you actually had any thoughts of killing yourself? : YES  3) Within the past month, have you been thinking about how you might kill yourself? : YES  5) Within the past month, have you started to work out or worked out the details of how to kill yourself?  Do you intend to carry out this plan? : YES  6)  Have you ever done anything, started to do anything, or prepared to do anything to end your life?: YES  Change in result:  no  Change in plan of care:  no    EDUCATION:   Learner Progress Toward Treatment Goals:   Reviewed results and recommendations of this team, reviewed group plan and strategies, reviewed signs, symptoms and risk of

## 2018-09-25 NOTE — PLAN OF CARE
Problem: Depressive Behavior With or Without Suicide Precautions:  Goal: Absence of self-harm  Absence of self-harm   Outcome: Ongoing  Pt did not participate in community meeting/ goals group at [facility] despite staff encouragement to attend.

## 2018-09-26 ENCOUNTER — APPOINTMENT (OUTPATIENT)
Dept: CT IMAGING | Age: 28
End: 2018-09-26
Payer: COMMERCIAL

## 2018-09-26 ENCOUNTER — HOSPITAL ENCOUNTER (EMERGENCY)
Age: 28
Discharge: HOME | End: 2018-09-26
Attending: EMERGENCY MEDICINE
Payer: COMMERCIAL

## 2018-09-26 VITALS
WEIGHT: 170 LBS | RESPIRATION RATE: 17 BRPM | HEIGHT: 72 IN | SYSTOLIC BLOOD PRESSURE: 111 MMHG | TEMPERATURE: 97.2 F | DIASTOLIC BLOOD PRESSURE: 83 MMHG | HEART RATE: 58 BPM | OXYGEN SATURATION: 100 % | BODY MASS INDEX: 23.03 KG/M2

## 2018-09-26 DIAGNOSIS — R55 SYNCOPE AND COLLAPSE: Primary | ICD-10-CM

## 2018-09-26 DIAGNOSIS — R42 DIZZINESS: ICD-10-CM

## 2018-09-26 LAB
ABSOLUTE EOS #: 0.32 K/UL (ref 0–0.44)
ABSOLUTE IMMATURE GRANULOCYTE: <0.03 K/UL (ref 0–0.3)
ABSOLUTE LYMPH #: 2.51 K/UL (ref 1.1–3.7)
ABSOLUTE MONO #: 0.35 K/UL (ref 0.1–1.2)
ACETAMINOPHEN LEVEL: <5 UG/ML (ref 10–30)
ALBUMIN SERPL-MCNC: 4.4 G/DL (ref 3.5–5.2)
ALBUMIN/GLOBULIN RATIO: 1.6 (ref 1–2.5)
ALP BLD-CCNC: 36 U/L (ref 40–129)
ALT SERPL-CCNC: 13 U/L (ref 5–41)
AMPHETAMINE SCREEN URINE: NEGATIVE
ANION GAP SERPL CALCULATED.3IONS-SCNC: 12 MMOL/L (ref 9–17)
AST SERPL-CCNC: 16 U/L
BARBITURATE SCREEN URINE: NEGATIVE
BASOPHILS # BLD: 1 % (ref 0–2)
BASOPHILS ABSOLUTE: 0.05 K/UL (ref 0–0.2)
BENZODIAZEPINE SCREEN, URINE: NEGATIVE
BILIRUB SERPL-MCNC: 0.38 MG/DL (ref 0.3–1.2)
BUN BLDV-MCNC: 18 MG/DL (ref 6–20)
BUN/CREAT BLD: ABNORMAL (ref 9–20)
BUPRENORPHINE URINE: NORMAL
CALCIUM SERPL-MCNC: 8.9 MG/DL (ref 8.6–10.4)
CANNABINOID SCREEN URINE: NEGATIVE
CHLORIDE BLD-SCNC: 104 MMOL/L (ref 98–107)
CO2: 27 MMOL/L (ref 20–31)
COCAINE METABOLITE, URINE: NEGATIVE
CREAT SERPL-MCNC: 0.87 MG/DL (ref 0.7–1.2)
DIFFERENTIAL TYPE: ABNORMAL
EKG ATRIAL RATE: 69 BPM
EKG P AXIS: 82 DEGREES
EKG P-R INTERVAL: 166 MS
EKG Q-T INTERVAL: 408 MS
EKG QRS DURATION: 86 MS
EKG QTC CALCULATION (BAZETT): 437 MS
EKG R AXIS: 84 DEGREES
EKG T AXIS: 68 DEGREES
EKG VENTRICULAR RATE: 69 BPM
EOSINOPHILS RELATIVE PERCENT: 5 % (ref 1–4)
ETHANOL PERCENT: <0.01 %
ETHANOL: <10 MG/DL
GFR AFRICAN AMERICAN: >60 ML/MIN
GFR NON-AFRICAN AMERICAN: >60 ML/MIN
GFR SERPL CREATININE-BSD FRML MDRD: ABNORMAL ML/MIN/{1.73_M2}
GFR SERPL CREATININE-BSD FRML MDRD: ABNORMAL ML/MIN/{1.73_M2}
GLUCOSE BLD-MCNC: 81 MG/DL (ref 70–99)
HCT VFR BLD CALC: 35.6 % (ref 40.7–50.3)
HEMOGLOBIN: 12.2 G/DL (ref 13–17)
IMMATURE GRANULOCYTES: 0 %
LYMPHOCYTES # BLD: 39 % (ref 24–43)
MCH RBC QN AUTO: 30.7 PG (ref 25.2–33.5)
MCHC RBC AUTO-ENTMCNC: 34.3 G/DL (ref 28.4–34.8)
MCV RBC AUTO: 89.7 FL (ref 82.6–102.9)
MDMA URINE: NORMAL
METHADONE SCREEN, URINE: NEGATIVE
METHAMPHETAMINE, URINE: NORMAL
MONOCYTES # BLD: 6 % (ref 3–12)
NRBC AUTOMATED: 0 PER 100 WBC
OPIATES, URINE: NEGATIVE
OXYCODONE SCREEN URINE: NEGATIVE
PDW BLD-RTO: 12.6 % (ref 11.8–14.4)
PHENCYCLIDINE, URINE: NEGATIVE
PLATELET # BLD: 228 K/UL (ref 138–453)
PLATELET ESTIMATE: ABNORMAL
PMV BLD AUTO: 11.1 FL (ref 8.1–13.5)
POTASSIUM SERPL-SCNC: 3.6 MMOL/L (ref 3.7–5.3)
PROLACTIN: 0.87 UG/L (ref 4.04–15.2)
PROPOXYPHENE, URINE: NORMAL
RBC # BLD: 3.97 M/UL (ref 4.21–5.77)
RBC # BLD: ABNORMAL 10*6/UL
SALICYLATE LEVEL: <1 MG/DL (ref 3–10)
SEG NEUTROPHILS: 49 % (ref 36–65)
SEGMENTED NEUTROPHILS ABSOLUTE COUNT: 3.17 K/UL (ref 1.5–8.1)
SODIUM BLD-SCNC: 143 MMOL/L (ref 135–144)
TEST INFORMATION: NORMAL
TOTAL PROTEIN: 7.1 G/DL (ref 6.4–8.3)
TOXIC TRICYCLIC SC,BLOOD: NEGATIVE
TRICYCLIC ANTIDEPRESSANTS, UR: NORMAL
TROPONIN INTERP: NORMAL
TROPONIN INTERP: NORMAL
TROPONIN T: <0.03 NG/ML
TROPONIN T: <0.03 NG/ML
WBC # BLD: 6.4 K/UL (ref 3.5–11.3)
WBC # BLD: ABNORMAL 10*3/UL

## 2018-09-26 PROCEDURE — 84146 ASSAY OF PROLACTIN: CPT

## 2018-09-26 PROCEDURE — 70450 CT HEAD/BRAIN W/O DYE: CPT

## 2018-09-26 PROCEDURE — 80053 COMPREHEN METABOLIC PANEL: CPT

## 2018-09-26 PROCEDURE — 99284 EMERGENCY DEPT VISIT MOD MDM: CPT

## 2018-09-26 PROCEDURE — 84484 ASSAY OF TROPONIN QUANT: CPT

## 2018-09-26 PROCEDURE — 2580000003 HC RX 258: Performed by: STUDENT IN AN ORGANIZED HEALTH CARE EDUCATION/TRAINING PROGRAM

## 2018-09-26 PROCEDURE — 80307 DRUG TEST PRSMV CHEM ANLYZR: CPT

## 2018-09-26 PROCEDURE — 85025 COMPLETE CBC W/AUTO DIFF WBC: CPT

## 2018-09-26 PROCEDURE — 93005 ELECTROCARDIOGRAM TRACING: CPT

## 2018-09-26 PROCEDURE — G0480 DRUG TEST DEF 1-7 CLASSES: HCPCS

## 2018-09-26 RX ORDER — SODIUM CHLORIDE, SODIUM LACTATE, POTASSIUM CHLORIDE, AND CALCIUM CHLORIDE .6; .31; .03; .02 G/100ML; G/100ML; G/100ML; G/100ML
1000 INJECTION, SOLUTION INTRAVENOUS ONCE
Status: COMPLETED | OUTPATIENT
Start: 2018-09-26 | End: 2018-09-26

## 2018-09-26 RX ADMIN — SODIUM CHLORIDE, POTASSIUM CHLORIDE, SODIUM LACTATE AND CALCIUM CHLORIDE 1000 ML: 600; 310; 30; 20 INJECTION, SOLUTION INTRAVENOUS at 01:07

## 2018-09-26 ASSESSMENT — ENCOUNTER SYMPTOMS
SHORTNESS OF BREATH: 0
SORE THROAT: 0
RHINORRHEA: 0
VOMITING: 0
NAUSEA: 0
ABDOMINAL PAIN: 0

## 2018-09-26 ASSESSMENT — PAIN DESCRIPTION - ONSET: ONSET: ON-GOING

## 2018-09-26 ASSESSMENT — PAIN DESCRIPTION - ORIENTATION: ORIENTATION: POSTERIOR

## 2018-09-26 ASSESSMENT — PAIN SCALES - GENERAL: PAINLEVEL_OUTOF10: 8

## 2018-09-26 ASSESSMENT — PAIN DESCRIPTION - PROGRESSION: CLINICAL_PROGRESSION: GRADUALLY WORSENING

## 2018-09-26 ASSESSMENT — PAIN DESCRIPTION - LOCATION: LOCATION: HEAD

## 2018-09-26 ASSESSMENT — PAIN DESCRIPTION - PAIN TYPE: TYPE: ACUTE PAIN

## 2018-09-26 ASSESSMENT — PAIN DESCRIPTION - FREQUENCY: FREQUENCY: CONTINUOUS

## 2018-09-26 ASSESSMENT — PAIN DESCRIPTION - DESCRIPTORS: DESCRIPTORS: ACHING

## 2018-09-26 NOTE — ED NOTES
Pt here with c/o dizziness and posterior head pain after he says he had a seizure earlier and hit his head. Pt reports he was epileptic when he was younger and no longer treats for it. Pt states he is also having an anxiety attack. Pt is alert and oriented, RR is even and unlabored, in NAD, placed on cardiac monitor, awaiting physician evaluation.       Sandeep Crum RN  09/26/18 0020

## 2018-09-26 NOTE — ED PROVIDER NOTES
[facility] ED  Emergency Department Encounter  Emergency Medicine Resident     Pt Name: Lisa Escobar  MRN: [id number]  [place] 1990  Date of evaluation: 9/26/18  PCP:  No primary care provider on file. CHIEF COMPLAINT       Chief Complaint   Patient presents with    Dizziness     HISTORY OF PRESENT ILLNESS  (Location/Symptom, Timing/Onset, Context/Setting, Quality, Duration, Modifying Factors, Severity.)      Lisa Escobar is a 33 y.o. male with a history of alcohol abuse, schizoaffective disorder, opiate dependence and epilepsy (per pt), who presents following a possible seizure episode that occurred tonight. He reports that he was feeling his normal self earlier today, but lost consciousness while he was cleaning his house. He is unsure how long he lost consciousness for, but believes it was for perhaps a few minutes. He states that he was confused afterward for \"about half a minute\". He states that he has a history of epilepsy as a child and took medication for it, but that he \"grew out of it\" and does not recall the name of the medication he used to take for it. This episode was unwitnessed. Currently he reports a mild headache, and improving vertigo. He denies any recent fevers, chills, chest pain, shortness of breath, palpitations, recent medication changes, abdominal pain, nausea, vomiting, headache, leg pain or leg swelling. He denies taking anything in an attempt to hurt himself. He denies any other injuries or concerns. PAST MEDICAL / SURGICAL / SOCIAL / FAMILY HISTORY      has a past medical history of ADHD (attention deficit hyperactivity disorder); Anxiety; Bipolar 1 disorder ([facility] and PTSD (post-traumatic stress disorder). has no past surgical history on file. Social History     Social History    Marital status: Single     Spouse name: N/A    Number of children: N/A    Years of education: N/A     Occupational History    Not on file.      Social History Main Topics    Smoking status: Former Smoker     Packs/day: 0.50     Types: Cigarettes    Smokeless tobacco: Never Used    Alcohol use 4.2 oz/week     7 Cans of beer per week      Comment: daily, last drink this am 8/24/18    Drug use: No    Sexual activity: No     Other Topics Concern    Not on file     Social History Narrative    No narrative on file     Family History   Problem Relation Age of Onset    Liver Cancer Brother     Alcohol Abuse Brother     No Known Problems Mother     No Known Problems Father       Allergies:  Advil [ibuprofen]    Home Medications:  Prior to Admission medications    Medication Sig Start Date End Date Taking? Authorizing Provider   busPIRone (BUSPAR) 5 MG tablet Take 1 tablet by mouth 3 times daily 9/25/18   Bridgette Freitas MD   traZODone (DESYREL) 50 MG tablet Take 1 tablet by mouth nightly as needed for Sleep 9/25/18   Bridgette Freitas MD   ARIPiprazole (ABILIFY) 15 MG tablet Take 1 tablet by mouth daily 9/26/18   Bridgette Freitas MD   ARIPiprazole lauroxil (ARISTADA) 441 MG/1.6ML PRSY injection Inject 1.6 mLs into the muscle every 30 days 10/18/18   Bridgette Freitas MD   folic acid (FOLVITE) 1 MG tablet Take 1 mg by mouth daily    Historical Provider, MD   vitamin B-1 (THIAMINE) 100 MG tablet Take 100 mg by mouth daily    Historical Provider, MD     REVIEW OF SYSTEMS    (2-9 systems for level 4, 10 or more for level 5)      Review of Systems   Constitutional: Negative for chills and fever. HENT: Negative for rhinorrhea and sore throat. Eyes:        Negative for blurred or double vision. Respiratory: Negative for shortness of breath. Cardiovascular: Negative for chest pain, palpitations and leg swelling. Gastrointestinal: Negative for abdominal pain, nausea and vomiting. Genitourinary: Negative for difficulty urinating and frequency. Musculoskeletal: Negative for neck pain. Neurological: Positive for dizziness, seizures, light-headedness and headaches.  Negative for weakness and numbness. Psychiatric/Behavioral: Negative for hallucinations and suicidal ideas. The patient is nervous/anxious. Negative for homicidal ideation     PHYSICAL EXAM   (up to 7 for level 4, 8 or more for level 5)      INITIAL VITALS:   ED Triage Vitals   BP Temp Temp Source Pulse Resp SpO2 Height Weight   09/26/18 0017 09/26/18 0015 09/26/18 0015 09/26/18 0017 09/26/18 0017 09/26/18 0017 09/26/18 0015 09/26/18 0015   85/64 97.2 °F (36.2 °C) Oral 76 16 100 % 6' (1.829 m) 170 lb (77.1 kg)     Physical Exam   Constitutional: He is oriented to person, place, and time. He appears well-developed and well-nourished. No distress. HENT:   Head: Normocephalic and atraumatic. Mouth/Throat: Oropharynx is clear and moist. No oropharyngeal exudate. No nystagmus   Eyes: Pupils are equal, round, and reactive to light. Conjunctivae and EOM are normal. Right eye exhibits no discharge. Left eye exhibits no discharge. No scleral icterus. Neck: Normal range of motion. Neck supple. No midline cervical spine tenderness to palpation   Cardiovascular: Normal rate, regular rhythm and normal heart sounds. Exam reveals no gallop and no friction rub. No murmur heard. Pulmonary/Chest: Effort normal and breath sounds normal. No stridor. No respiratory distress. He has no wheezes. He has no rales. Abdominal: Soft. Bowel sounds are normal. There is no tenderness. There is no rebound and no guarding. Musculoskeletal: He exhibits no edema. Neurological: He is alert and oriented to person, place, and time. No cranial nerve deficit or sensory deficit. Coordination normal.   Normal finger-nose-finger. Normal heel-to-shin. Cranial nerves II through XII intact. 5/5 strength with elbow flexion and extension bilaterally. 5/5 strength with hip flexion bilaterally. 5/5 strength with dorsi and plantar flexion bilaterally. Sensation intact in upper and lower extremities. Normal gait. Normal tandem gait. Negative Romberg. Skin: Skin is warm and dry. No rash (over exposed skin) noted. Psychiatric: His speech is not rapid and/or pressured. He expresses no homicidal and no suicidal ideation. He expresses no suicidal plans and no homicidal plans. Flat affect. DIFFERENTIAL  DIAGNOSIS     PLAN (LABS / IMAGING / EKG):  Orders Placed This Encounter   Procedures    CT HEAD WO CONTRAST    CBC WITH AUTO DIFFERENTIAL    Comprehensive Metabolic Panel    Troponin    TOX SCR, BLD, ED    Urine Drug Screen    Prolactin    Troponin    EKG 12 Lead     MEDICATIONS ORDERED:  Orders Placed This Encounter   Medications    lactated ringers bolus     DDX: Epilepsy, medication noncompliance, drug use, medication misuse/change, meningitis, syncope, heart arrhythmia, hypotension, vasovagal syncope., Brain tumor,    Initial MDM/Plan: 33 y.o. male who presents with acute onset of vertigo and possible seizure episode. Patient not entirely truthful with me on exam, as he denies any history of drug use, but this is documented elsewhere in his chart. Patient was recently seen here in the emergency department for a suicidal attempt. Patient denies any suicidal/homicidal ideation or ingestion of substances on my examination. Neuro exam without any focal deficits or concerning findings. We'll obtain basic labs, troponin, EKG, head CT. IV fluid hydration.   DIAGNOSTIC RESULTS / EMERGENCY DEPARTMENT COURSE / MDM     LABS:  Labs Reviewed   CBC WITH AUTO DIFFERENTIAL - Abnormal; Notable for the following:        Result Value    RBC 3.97 (*)     Hemoglobin 12.2 (*)     Hematocrit 35.6 (*)     Eosinophils % 5 (*)     All other components within normal limits   COMPREHENSIVE METABOLIC PANEL - Abnormal; Notable for the following:     Potassium 3.6 (*)     Alkaline Phosphatase 36 (*)     All other components within normal limits   TOX SCR, BLD, ED - Abnormal; Notable for the following:     Salicylate Lvl <1 (*)     Acetaminophen Level <5 (*)     All other components within normal limits   PROLACTIN - Abnormal; Notable for the following:     Prolactin 0.87 (*)     All other components within normal limits   TROPONIN   URINE DRUG SCREEN   TROPONIN     RADIOLOGY:  Ct Head Wo Contrast    Result Date: 9/26/2018  EXAMINATION: CT OF THE HEAD WITHOUT CONTRAST  9/26/2018 1:58 am TECHNIQUE: CT of the head was performed without the administration of intravenous contrast. Dose modulation, iterative reconstruction, and/or weight based adjustment of the mA/kV was utilized to reduce the radiation dose to as low as reasonably achievable. COMPARISON: 07/01/2017 HISTORY: ORDERING SYSTEM PROVIDED HISTORY: Fall TECHNOLOGIST PROVIDED HISTORY: FINDINGS: BRAIN/VENTRICLES: There is no acute intracranial hemorrhage, mass effect or midline shift. No abnormal extra-axial fluid collection. The gray-white differentiation is maintained without evidence of an acute infarct. There is no evidence of hydrocephalus. ORBITS: The visualized portion of the orbits demonstrate no acute abnormality. SINUSES: The visualized paranasal sinuses and mastoid air cells demonstrate no acute abnormality. SOFT TISSUES/SKULL:  No acute abnormality of the visualized skull or soft tissues. No acute intracranial abnormality. EKG Interpretation    Interpreted by me    Rhythm: normal sinus   Rate: normal  Axis: normal  Ectopy: none  Conduction: normal (QTc 437 ms)  ST Segments: no acute change  T Waves: no acute change  Q Waves: none    Clinical Impression: no acute changes compared to EKG dated 9/16/18. Normal EKG. No evidence for Brugada syndrome, prolonged QT interval, heart block, HOCM, or ST-T wave changes concerning for ischemia. All EKG's are interpreted by the Emergency Department Physician who either signs or Co-signs this chart in the absence of a cardiologist.    EMERGENCY DEPARTMENT COURSE:   patient evaluated by myself and attending physician.   We'll obtain EKG, CBC, CMP,

## 2018-09-28 ENCOUNTER — HOSPITAL ENCOUNTER (EMERGENCY)
Age: 28
Discharge: HOME | End: 2018-09-28
Attending: EMERGENCY MEDICINE
Payer: COMMERCIAL

## 2018-09-28 VITALS
DIASTOLIC BLOOD PRESSURE: 73 MMHG | TEMPERATURE: 98.1 F | RESPIRATION RATE: 16 BRPM | WEIGHT: 165 LBS | BODY MASS INDEX: 22.38 KG/M2 | HEART RATE: 69 BPM | OXYGEN SATURATION: 98 % | SYSTOLIC BLOOD PRESSURE: 115 MMHG

## 2018-09-28 DIAGNOSIS — F41.9 ANXIETY: Primary | ICD-10-CM

## 2018-09-28 PROCEDURE — 6370000000 HC RX 637 (ALT 250 FOR IP): Performed by: STUDENT IN AN ORGANIZED HEALTH CARE EDUCATION/TRAINING PROGRAM

## 2018-09-28 PROCEDURE — 99283 EMERGENCY DEPT VISIT LOW MDM: CPT

## 2018-09-28 RX ORDER — HYDROXYZINE HYDROCHLORIDE 25 MG/1
50 TABLET, FILM COATED ORAL ONCE
Status: COMPLETED | OUTPATIENT
Start: 2018-09-28 | End: 2018-09-28

## 2018-09-28 RX ADMIN — HYDROXYZINE HYDROCHLORIDE 50 MG: 25 TABLET ORAL at 01:28

## 2018-09-28 ASSESSMENT — ENCOUNTER SYMPTOMS
SHORTNESS OF BREATH: 0
ABDOMINAL DISTENTION: 0
VOMITING: 0
NAUSEA: 0

## 2018-09-28 NOTE — ED PROVIDER NOTES
pre-hypertension or Hypertension. EKG Interpretation    Interpreted by me      CRITICAL CARE: There was a high probability of clinically significant/life threatening deterioration in this patient's condition which required my urgent intervention. Total critical care time was  0   minutes. This excludes any time for separately reportable procedures.        [address] Gamino, DO  09/28/18 R Nos Negritotami Ortiz 75, DO  09/28/18 R [facility] 75, DO  09/28/18 8057

## 2018-09-28 NOTE — ED PROVIDER NOTES
Medications:  Prior to Admission medications    Medication Sig Start Date End Date Taking? Authorizing Provider   busPIRone (BUSPAR) 5 MG tablet Take 1 tablet by mouth 3 times daily 9/25/18   Uma Jay MD   traZODone (DESYREL) 50 MG tablet Take 1 tablet by mouth nightly as needed for Sleep 9/25/18   Uma Jay MD   ARIPiprazole (ABILIFY) 15 MG tablet Take 1 tablet by mouth daily 9/26/18   Uma Jay MD   ARIPiprazole lauroxil (ARISTADA) 441 MG/1.6ML PRSY injection Inject 1.6 mLs into the muscle every 30 days 10/18/18   Uma Jay MD   folic acid (FOLVITE) 1 MG tablet Take 1 mg by mouth daily    Historical Provider, MD   vitamin B-1 (THIAMINE) 100 MG tablet Take 100 mg by mouth daily    Historical Provider, MD       REVIEW OF SYSTEMS    (2-9 systems for level 4, 10 or more for level 5)      Review of Systems   Constitutional: Negative for chills and fever. Respiratory: Negative for shortness of breath. Cardiovascular: Negative for chest pain. Gastrointestinal: Negative for abdominal distention, nausea and vomiting. Psychiatric/Behavioral: Positive for behavioral problems and sleep disturbance. Negative for suicidal ideas. The patient is nervous/anxious. PHYSICAL EXAM   (up to 7 for level 4, 8 or more for level 5)      INITIAL VITALS:   /73   Pulse 69   Temp 98.1 °F (36.7 °C)   Resp 16   Wt 165 lb (74.8 kg)   SpO2 98%   BMI 22.38 kg/m²     Physical Exam   Cardiovascular: Normal rate. No murmur heard. Pulmonary/Chest: Effort normal. No respiratory distress. Abdominal: Soft. There is no tenderness. Psychiatric: His mood appears anxious.        DIFFERENTIAL  DIAGNOSIS     PLAN (LABS / IMAGING / EKG):  Orders Placed This Encounter   Procedures    Inpatient consult to Social Work       MEDICATIONS ORDERED:  Orders Placed This Encounter   Medications    hydrOXYzine (ATARAX) tablet 50 mg       DDX: Anxiety    DIAGNOSTIC RESULTS / EMERGENCY DEPARTMENT COURSE / MDM     LABS:  No results found for this visit on 09/28/18. RADIOLOGY:  None    EKG  None    All EKG's are interpreted by the Emergency Department Physician who either signs or Co-signs this chart in the absence of a cardiologist.    EMERGENCY DEPARTMENT COURSE:  30-year-old male history of anxiety presents anxious. Has been off medications for over a month. Has not yet followed up with [facility]. Patient vital Stable on Arrival.  Lungs clear, abdomen soft nontender. Patient appears fidgety and room. No suicidal or homicidal ideation. We'll treat patient with Atarax for anxiety, will discuss with social work. 2:32 AM social work met with patient, has scheduled appointment on October 3 at [facility]. Patient has appointment in hand. 2:43 AM I discussed with patient that I was printing his discharge paperwork which included contacted follow-up information. Patient left prior to receiving paperwork. PROCEDURES:  None    CONSULTS:  IP CONSULT TO SOCIAL WORK    CRITICAL CARE:  None    FINAL IMPRESSION      1. Anxiety          DISPOSITION / PLAN     DISPOSITION  Discharge home      PATIENT REFERRED TO:  Sri  Please go to your scheduled appointment at [facility] on October 3.   Go to       [facility]  [address] [place] [address]  [phone]    If symptoms worsen      DISCHARGE MEDICATIONS:  Discharge Medication List as of 9/28/2018  2:42 AM          Oralia Chong DO  Emergency Medicine Resident    (Please note that portions of this note were completed with a voice recognition program.  Efforts were made to edit the dictations but occasionally words are mis-transcribed.)        Oralia Chong DO  09/28/18 0292

## 2018-10-09 ENCOUNTER — HOSPITAL ENCOUNTER (EMERGENCY)
Age: 28
Discharge: HOME | End: 2018-10-10
Attending: EMERGENCY MEDICINE
Payer: COMMERCIAL

## 2018-10-09 VITALS
TEMPERATURE: 97.7 F | HEART RATE: 76 BPM | RESPIRATION RATE: 19 BRPM | SYSTOLIC BLOOD PRESSURE: 97 MMHG | BODY MASS INDEX: 23.06 KG/M2 | WEIGHT: 170 LBS | OXYGEN SATURATION: 99 % | DIASTOLIC BLOOD PRESSURE: 64 MMHG

## 2018-10-09 DIAGNOSIS — R55 SYNCOPE AND COLLAPSE: Primary | ICD-10-CM

## 2018-10-09 LAB
ABSOLUTE EOS #: 0.12 K/UL (ref 0–0.44)
ABSOLUTE IMMATURE GRANULOCYTE: <0.03 K/UL (ref 0–0.3)
ABSOLUTE LYMPH #: 2.39 K/UL (ref 1.1–3.7)
ABSOLUTE MONO #: 0.64 K/UL (ref 0.1–1.2)
ANION GAP SERPL CALCULATED.3IONS-SCNC: 8 MMOL/L (ref 9–17)
BASOPHILS # BLD: 1 % (ref 0–2)
BASOPHILS ABSOLUTE: 0.05 K/UL (ref 0–0.2)
BUN BLDV-MCNC: 22 MG/DL (ref 6–20)
BUN/CREAT BLD: ABNORMAL (ref 9–20)
CALCIUM SERPL-MCNC: 8.7 MG/DL (ref 8.6–10.4)
CHLORIDE BLD-SCNC: 101 MMOL/L (ref 98–107)
CO2: 27 MMOL/L (ref 20–31)
CREAT SERPL-MCNC: 0.79 MG/DL (ref 0.7–1.2)
DIFFERENTIAL TYPE: ABNORMAL
EKG ATRIAL RATE: 73 BPM
EKG P AXIS: 76 DEGREES
EKG P-R INTERVAL: 164 MS
EKG Q-T INTERVAL: 398 MS
EKG QRS DURATION: 90 MS
EKG QTC CALCULATION (BAZETT): 438 MS
EKG R AXIS: 78 DEGREES
EKG T AXIS: 51 DEGREES
EKG VENTRICULAR RATE: 73 BPM
EOSINOPHILS RELATIVE PERCENT: 2 % (ref 1–4)
GFR AFRICAN AMERICAN: >60 ML/MIN
GFR NON-AFRICAN AMERICAN: >60 ML/MIN
GFR SERPL CREATININE-BSD FRML MDRD: ABNORMAL ML/MIN/{1.73_M2}
GFR SERPL CREATININE-BSD FRML MDRD: ABNORMAL ML/MIN/{1.73_M2}
GLUCOSE BLD-MCNC: 78 MG/DL (ref 70–99)
HCT VFR BLD CALC: 35 % (ref 40.7–50.3)
HEMOGLOBIN: 11.5 G/DL (ref 13–17)
IMMATURE GRANULOCYTES: 0 %
LYMPHOCYTES # BLD: 39 % (ref 24–43)
MCH RBC QN AUTO: 30.2 PG (ref 25.2–33.5)
MCHC RBC AUTO-ENTMCNC: 32.9 G/DL (ref 28.4–34.8)
MCV RBC AUTO: 91.9 FL (ref 82.6–102.9)
MONOCYTES # BLD: 10 % (ref 3–12)
NRBC AUTOMATED: 0 PER 100 WBC
PDW BLD-RTO: 13.4 % (ref 11.8–14.4)
PLATELET # BLD: 219 K/UL (ref 138–453)
PLATELET ESTIMATE: ABNORMAL
PMV BLD AUTO: 10.6 FL (ref 8.1–13.5)
POC TROPONIN I: 0 NG/ML (ref 0–0.1)
POC TROPONIN I: 0 NG/ML (ref 0–0.1)
POC TROPONIN INTERP: NORMAL
POC TROPONIN INTERP: NORMAL
POTASSIUM SERPL-SCNC: 3.9 MMOL/L (ref 3.7–5.3)
RBC # BLD: 3.81 M/UL (ref 4.21–5.77)
RBC # BLD: ABNORMAL 10*6/UL
SEG NEUTROPHILS: 48 % (ref 36–65)
SEGMENTED NEUTROPHILS ABSOLUTE COUNT: 2.96 K/UL (ref 1.5–8.1)
SODIUM BLD-SCNC: 136 MMOL/L (ref 135–144)
WBC # BLD: 6.2 K/UL (ref 3.5–11.3)
WBC # BLD: ABNORMAL 10*3/UL

## 2018-10-09 PROCEDURE — 80048 BASIC METABOLIC PNL TOTAL CA: CPT

## 2018-10-09 PROCEDURE — 93005 ELECTROCARDIOGRAM TRACING: CPT

## 2018-10-09 PROCEDURE — 85025 COMPLETE CBC W/AUTO DIFF WBC: CPT

## 2018-10-09 PROCEDURE — 96360 HYDRATION IV INFUSION INIT: CPT

## 2018-10-09 PROCEDURE — 2580000003 HC RX 258: Performed by: STUDENT IN AN ORGANIZED HEALTH CARE EDUCATION/TRAINING PROGRAM

## 2018-10-09 PROCEDURE — 84484 ASSAY OF TROPONIN QUANT: CPT

## 2018-10-09 PROCEDURE — 99284 EMERGENCY DEPT VISIT MOD MDM: CPT

## 2018-10-09 RX ORDER — 0.9 % SODIUM CHLORIDE 0.9 %
1000 INTRAVENOUS SOLUTION INTRAVENOUS ONCE
Status: COMPLETED | OUTPATIENT
Start: 2018-10-09 | End: 2018-10-09

## 2018-10-09 RX ADMIN — SODIUM CHLORIDE 1000 ML: 9 INJECTION, SOLUTION INTRAVENOUS at 21:49

## 2018-10-10 NOTE — ED NOTES
Pt resting in bed, no needs at this time. Will continue to monitor.       Julissa Araujo RN  10/09/18 2915

## 2018-10-11 ENCOUNTER — HOSPITAL ENCOUNTER (EMERGENCY)
Age: 28
Discharge: HOME | End: 2018-10-11
Attending: EMERGENCY MEDICINE
Payer: COMMERCIAL

## 2018-10-11 VITALS
SYSTOLIC BLOOD PRESSURE: 112 MMHG | HEART RATE: 72 BPM | DIASTOLIC BLOOD PRESSURE: 67 MMHG | TEMPERATURE: 97.5 F | RESPIRATION RATE: 17 BRPM | OXYGEN SATURATION: 99 %

## 2018-10-11 DIAGNOSIS — F41.0 PANIC ATTACK: ICD-10-CM

## 2018-10-11 DIAGNOSIS — F41.1 ANXIETY STATE: Primary | ICD-10-CM

## 2018-10-11 LAB
EKG ATRIAL RATE: 68 BPM
EKG P AXIS: 82 DEGREES
EKG P-R INTERVAL: 170 MS
EKG Q-T INTERVAL: 414 MS
EKG QRS DURATION: 84 MS
EKG QTC CALCULATION (BAZETT): 440 MS
EKG R AXIS: 80 DEGREES
EKG T AXIS: 60 DEGREES
EKG VENTRICULAR RATE: 68 BPM

## 2018-10-11 PROCEDURE — 6370000000 HC RX 637 (ALT 250 FOR IP): Performed by: STUDENT IN AN ORGANIZED HEALTH CARE EDUCATION/TRAINING PROGRAM

## 2018-10-11 PROCEDURE — G0382 LEV 3 HOSP TYPE B ED VISIT: HCPCS

## 2018-10-11 PROCEDURE — 93005 ELECTROCARDIOGRAM TRACING: CPT

## 2018-10-11 RX ORDER — LORAZEPAM 0.5 MG/1
0.5 TABLET ORAL ONCE
Status: COMPLETED | OUTPATIENT
Start: 2018-10-11 | End: 2018-10-11

## 2018-10-11 RX ADMIN — LORAZEPAM 0.5 MG: 0.5 TABLET ORAL at 03:10

## 2018-10-11 ASSESSMENT — ENCOUNTER SYMPTOMS
EYE REDNESS: 0
EYE DISCHARGE: 0
NAUSEA: 0
WHEEZING: 0
ABDOMINAL PAIN: 0
NAUSEA: 0
SHORTNESS OF BREATH: 0
WHEEZING: 0
COLOR CHANGE: 0
VOMITING: 0
SHORTNESS OF BREATH: 1
RHINORRHEA: 0
VOMITING: 0
CHEST TIGHTNESS: 1

## 2018-10-11 NOTE — ED PROVIDER NOTES
[facility] ED  Emergency Department Encounter  EmergencyMedicine Resident     Pt Name:Girish Hilton  MRN: [id number]  [place] 1990  Date of evaluation: 10/11/18  PCP:  No primary care provider on file. CHIEF COMPLAINT       Chief Complaint   Patient presents with    Dizziness     pt states he has been having episodes of dizziness, has syncopal episodes, thinks its from drinking alcohol on his medicaitons       HISTORY OF PRESENT ILLNESS  (Location/Symptom, Timing/Onset, Context/Setting, Quality, Duration, Modifying Factors, Severity.)      Ford Balderas is a 33 y.o. male who presents with syncopal episode. Patient states he passed out at work today after working outside. States he has been feeling lightheaded since getting new medications. Denies fevers, chills, nausea, vomiting. Reports some palpitations. Reports he has been drinking alcohol with some of his new medications and thinks this is maybe causing his symptoms. Denies any drug use. No alcohol today. Denies chest pain or shortness of breath. No family history of sudden cardiac death. No recent illnesses. Also with hx of anxiety and states he has been feeling anxious lately. PAST MEDICAL / SURGICAL / SOCIAL / FAMILY HISTORY      has a past medical history of ADHD (attention deficit hyperactivity disorder); Anxiety; Bipolar 1 disorder ([address] and PTSD (post-traumatic stress disorder). has no past surgical history     Social History     Social History    Marital status: Single     Spouse name: N/A    Number of children: N/A    Years of education: N/A     Occupational History    Not on file.      Social History Main Topics    Smoking status: Former Smoker     Packs/day: 0.50     Types: Cigarettes    Smokeless tobacco: Never Used    Alcohol use 4.2 oz/week     7 Cans of beer per week      Comment: daily, last drink this am 8/24/18    Drug use: No    Sexual activity: No     Other Topics Concern    Not on file Eosinophils % 2 1 - 4 %    Basophils 1 0 - 2 %    Immature Granulocytes 0 0 %    Segs Absolute 2.96 1.50 - 8.10 k/uL    Absolute Lymph # 2.39 1.10 - 3.70 k/uL    Absolute Mono # 0.64 0.10 - 1.20 k/uL    Absolute Eos # 0.12 0.00 - 0.44 k/uL    Basophils # 0.05 0.00 - 0.20 k/uL    Absolute Immature Granulocyte <0.03 0.00 - 0.30 k/uL    WBC Morphology NOT REPORTED     RBC Morphology NOT REPORTED     Platelet Estimate NOT REPORTED    Basic Metabolic Panel   Result Value Ref Range    Glucose 78 70 - 99 mg/dL    BUN 22 (H) 6 - 20 mg/dL    CREATININE 0.79 0.70 - 1.20 mg/dL    Bun/Cre Ratio NOT REPORTED 9 - 20    Calcium 8.7 8.6 - 10.4 mg/dL    Sodium 136 135 - 144 mmol/L    Potassium 3.9 3.7 - 5.3 mmol/L    Chloride 101 98 - 107 mmol/L    CO2 27 20 - 31 mmol/L    Anion Gap 8 (L) 9 - 17 mmol/L    GFR Non-African American >60 >60 mL/min    GFR African American >60 >60 mL/min    GFR Comment          GFR Staging NOT REPORTED    POCT troponin   Result Value Ref Range    POC Troponin I 0.00 0.00 - 0.10 ng/mL    POC Troponin Interp       The Troponin-I (POC) results cannot be compared to the Troponin-T results. POCT troponin   Result Value Ref Range    POC Troponin I 0.00 0.00 - 0.10 ng/mL    POC Troponin Interp       The Troponin-I (POC) results cannot be compared to the Troponin-T results.    EKG 12 Lead   Result Value Ref Range    Ventricular Rate 73 BPM    Atrial Rate 73 BPM    P-R Interval 164 ms    QRS Duration 90 ms    Q-T Interval 398 ms    QTc Calculation (Bazett) 438 ms    P Axis 76 degrees    R Axis 78 degrees    T Axis 51 degrees       RADIOLOGY:  CXR without significant findings        EKG    EKG: normal sinus rhythm, normal rate, no acute ST/T wave changes  Imp: normal EKG      All EKG's are interpreted by theEmergency Department Physician who either signs or Co-signs this chart in the absence of a cardiologist.    MDM/EMERGENCY DEPARTMENT COURSE:  3:46 AM: 32 yo male with hx of anxiety presenting with syncopal episode at work. No cardiac risk factors, will obtain screening EKG, screening labs, CXR, IV fluids, monitor in ED. Reassess. Patient reported feeling better after fluids. Workup WNL. Patient requesting DC. Discussed return precautions and need for close follow up. He is agreeable. PROCEDURES:  None    CONSULTS:  None    CRITICALCARE:  None    FINAL IMPRESSION      1.  Syncope and collapse          DISPOSITION / PLAN     DISPOSITION Decision To Discharge    PATIENTREFERRED TO:  [facility]  [address] [facility] [address]  [phone]  Go to   If symptoms worsen      DISCHARGE MEDICATIONS:  Discharge Medication List as of 10/9/2018 11:54 PM          Felipa Priest MD  EmergencyMedicine Resident    (Please note that portions of this note were completed with a voice recognition program.  Efforts were made to edit the dictations but occasionally words are mis-transcribed.)       Felipa Priest MD  10/11/18 8042

## 2018-10-11 NOTE — ED PROVIDER NOTES
time. He appears well-developed and well-nourished. HENT:   Head: Normocephalic and atraumatic. Eyes: Pupils are equal, round, and reactive to light. Conjunctivae are normal. No scleral icterus. Cardiovascular: Normal rate, regular rhythm and normal heart sounds. Exam reveals no gallop and no friction rub. No murmur heard. Pulmonary/Chest: Effort normal and breath sounds normal. No respiratory distress. He has no wheezes. He has no rales. Abdominal: Soft. Bowel sounds are normal. He exhibits no distension and no mass. There is no tenderness. There is no rebound and no guarding. Musculoskeletal: Normal range of motion. Neurological: He is alert and oriented to person, place, and time. Skin: Skin is warm and dry. No rash noted. No erythema. Psychiatric: He has a normal mood and affect. His behavior is normal.   Nursing note and vitals reviewed. DIFFERENTIAL  DIAGNOSIS     PLAN (LABS / IMAGING / EKG):  Orders Placed This Encounter   Procedures    EKG 12 Lead       MEDICATIONS ORDERED:  Orders Placed This Encounter   Medications    LORazepam (ATIVAN) tablet 0.5 mg       DDX: Panic attack versus ACS versus PE    Initial MDM/Plan: 33 y.o. male who presents with shortness of breath and anxiety. We will treat with 0.5 mg Ativan by mouth and EKG. Reassess after Ativan. DIAGNOSTIC RESULTS / EMERGENCY DEPARTMENT COURSE / MDM     LABS:  Labs Reviewed - No data to display      RADIOLOGY:  No results found. EKG  EKG Interpretation    Interpreted by me    Rhythm: normal sinus   Rate: normal  Axis: normal  Ectopy: none  Conduction: normal  ST Segments: no acute change  T Waves: no acute change  Q Waves: none    Clinical Impression: no acute changes and normal EKG    All EKG's are interpreted by the Emergency Department Physician who either signs or Co-signs this chart in the absence of a cardiologist.      EMERGENCY DEPARTMENT COURSE:  Patient states he is feeling much better after the Ativan. EKG was negative as detailed above. Patient has an appointment at the [facility] on Friday. Instructed patient to keep this appointment as the medicine that we gave him today is not something that we will provide a prescription for. He is understanding and states he will follow up with his psychiatrist.    PROCEDURES:  None    CONSULTS:  None    CRITICAL CARE:  Please seeattending note    FINAL IMPRESSION      1. Anxiety state    2. Panic attack          DISPOSITION / PLAN     DISPOSITION Decision To Discharge 10/11/2018 03:56:24 AM    Discharge home    PATIENTREFERRED TO:  No follow-up provider specified. DISCHARGE MEDICATIONS:  Discharge Medication List as of 10/11/2018  3:59 AM          Uri Pérez DO  EmergencyMedicine Resident    (Please note that portions of this note were completed with a voice recognition program.  Efforts were made to edit the dictations but occasionally words are mis-transcribed. )nolan Pérez DO  Resident  10/11/18 3421

## 2018-10-13 ENCOUNTER — HOSPITAL ENCOUNTER (EMERGENCY)
Age: 28
Discharge: HOME | End: 2018-10-13
Attending: EMERGENCY MEDICINE
Payer: COMMERCIAL

## 2018-10-13 VITALS
TEMPERATURE: 97.9 F | HEART RATE: 68 BPM | BODY MASS INDEX: 26.68 KG/M2 | DIASTOLIC BLOOD PRESSURE: 64 MMHG | SYSTOLIC BLOOD PRESSURE: 98 MMHG | WEIGHT: 170 LBS | OXYGEN SATURATION: 97 % | HEIGHT: 67 IN | RESPIRATION RATE: 16 BRPM

## 2018-10-13 VITALS
OXYGEN SATURATION: 97 % | RESPIRATION RATE: 16 BRPM | SYSTOLIC BLOOD PRESSURE: 99 MMHG | HEART RATE: 67 BPM | DIASTOLIC BLOOD PRESSURE: 58 MMHG | TEMPERATURE: 97.9 F

## 2018-10-13 VITALS
RESPIRATION RATE: 16 BRPM | OXYGEN SATURATION: 100 % | DIASTOLIC BLOOD PRESSURE: 54 MMHG | BODY MASS INDEX: 27.09 KG/M2 | SYSTOLIC BLOOD PRESSURE: 99 MMHG | TEMPERATURE: 97.9 F | HEIGHT: 72 IN | WEIGHT: 200 LBS | HEART RATE: 82 BPM

## 2018-10-13 DIAGNOSIS — R45.851 SUICIDAL IDEATIONS: Primary | ICD-10-CM

## 2018-10-13 DIAGNOSIS — F32.A DEPRESSION, UNSPECIFIED DEPRESSION TYPE: ICD-10-CM

## 2018-10-13 DIAGNOSIS — T14.91XA SUICIDAL BEHAVIOR WITH ATTEMPTED SELF-INJURY (HCC): ICD-10-CM

## 2018-10-13 DIAGNOSIS — F41.1 ANXIETY STATE: ICD-10-CM

## 2018-10-13 DIAGNOSIS — F41.1 ANXIETY STATE: Primary | ICD-10-CM

## 2018-10-13 LAB
-: ABNORMAL
ABSOLUTE EOS #: 0.17 K/UL (ref 0–0.44)
ABSOLUTE IMMATURE GRANULOCYTE: <0.03 K/UL (ref 0–0.3)
ABSOLUTE LYMPH #: 1.24 K/UL (ref 1.1–3.7)
ABSOLUTE MONO #: 0.39 K/UL (ref 0.1–1.2)
ACETAMINOPHEN LEVEL: <5 UG/ML (ref 10–30)
ALBUMIN SERPL-MCNC: 4 G/DL (ref 3.5–5.2)
ALBUMIN/GLOBULIN RATIO: 1.6 (ref 1–2.5)
ALP BLD-CCNC: 35 U/L (ref 40–129)
ALT SERPL-CCNC: 19 U/L (ref 5–41)
AMORPHOUS: ABNORMAL
AMPHETAMINE SCREEN URINE: NEGATIVE
ANION GAP SERPL CALCULATED.3IONS-SCNC: 12 MMOL/L (ref 9–17)
AST SERPL-CCNC: 17 U/L
BACTERIA: ABNORMAL
BARBITURATE SCREEN URINE: NEGATIVE
BASOPHILS # BLD: 1 % (ref 0–2)
BASOPHILS ABSOLUTE: 0.03 K/UL (ref 0–0.2)
BENZODIAZEPINE SCREEN, URINE: NEGATIVE
BILIRUB SERPL-MCNC: 0.42 MG/DL (ref 0.3–1.2)
BILIRUBIN URINE: NEGATIVE
BUN BLDV-MCNC: 16 MG/DL (ref 6–20)
BUN/CREAT BLD: ABNORMAL (ref 9–20)
BUPRENORPHINE URINE: NORMAL
CALCIUM SERPL-MCNC: 8.7 MG/DL (ref 8.6–10.4)
CANNABINOID SCREEN URINE: NEGATIVE
CASTS UA: ABNORMAL /LPF (ref 0–8)
CHLORIDE BLD-SCNC: 108 MMOL/L (ref 98–107)
CO2: 25 MMOL/L (ref 20–31)
COCAINE METABOLITE, URINE: NEGATIVE
COLOR: YELLOW
CREAT SERPL-MCNC: 0.83 MG/DL (ref 0.7–1.2)
CRYSTALS, UA: ABNORMAL /HPF
DIFFERENTIAL TYPE: ABNORMAL
EKG ATRIAL RATE: 61 BPM
EKG P AXIS: 103 DEGREES
EKG P-R INTERVAL: 170 MS
EKG Q-T INTERVAL: 406 MS
EKG QRS DURATION: 88 MS
EKG QTC CALCULATION (BAZETT): 408 MS
EKG R AXIS: 85 DEGREES
EKG T AXIS: 89 DEGREES
EKG VENTRICULAR RATE: 61 BPM
EOSINOPHILS RELATIVE PERCENT: 4 % (ref 1–4)
EPITHELIAL CELLS UA: ABNORMAL /HPF (ref 0–5)
ETHANOL PERCENT: <0.01 %
ETHANOL: <10 MG/DL
GFR AFRICAN AMERICAN: >60 ML/MIN
GFR NON-AFRICAN AMERICAN: >60 ML/MIN
GFR SERPL CREATININE-BSD FRML MDRD: ABNORMAL ML/MIN/{1.73_M2}
GFR SERPL CREATININE-BSD FRML MDRD: ABNORMAL ML/MIN/{1.73_M2}
GLUCOSE BLD-MCNC: 77 MG/DL (ref 70–99)
GLUCOSE URINE: NEGATIVE
HCT VFR BLD CALC: 33.3 % (ref 40.7–50.3)
HEMOGLOBIN: 11.1 G/DL (ref 13–17)
IMMATURE GRANULOCYTES: 0 %
KETONES, URINE: ABNORMAL
LEUKOCYTE ESTERASE, URINE: NEGATIVE
LYMPHOCYTES # BLD: 30 % (ref 24–43)
MCH RBC QN AUTO: 30.7 PG (ref 25.2–33.5)
MCHC RBC AUTO-ENTMCNC: 33.3 G/DL (ref 28.4–34.8)
MCV RBC AUTO: 92 FL (ref 82.6–102.9)
MDMA URINE: NORMAL
METHADONE SCREEN, URINE: NEGATIVE
METHAMPHETAMINE, URINE: NORMAL
MONOCYTES # BLD: 9 % (ref 3–12)
MUCUS: ABNORMAL
NITRITE, URINE: NEGATIVE
NRBC AUTOMATED: 0 PER 100 WBC
OPIATES, URINE: NEGATIVE
OTHER OBSERVATIONS UA: ABNORMAL
OXYCODONE SCREEN URINE: NEGATIVE
PDW BLD-RTO: 13.7 % (ref 11.8–14.4)
PH UA: 6 (ref 5–8)
PHENCYCLIDINE, URINE: NEGATIVE
PLATELET # BLD: 188 K/UL (ref 138–453)
PLATELET ESTIMATE: ABNORMAL
PMV BLD AUTO: 10.7 FL (ref 8.1–13.5)
POTASSIUM SERPL-SCNC: 3.5 MMOL/L (ref 3.7–5.3)
PROPOXYPHENE, URINE: NORMAL
PROTEIN UA: NEGATIVE
RBC # BLD: 3.62 M/UL (ref 4.21–5.77)
RBC # BLD: ABNORMAL 10*6/UL
RBC UA: ABNORMAL /HPF (ref 0–4)
RENAL EPITHELIAL, UA: ABNORMAL /HPF
SALICYLATE LEVEL: <1 MG/DL (ref 3–10)
SEG NEUTROPHILS: 56 % (ref 36–65)
SEGMENTED NEUTROPHILS ABSOLUTE COUNT: 2.32 K/UL (ref 1.5–8.1)
SODIUM BLD-SCNC: 145 MMOL/L (ref 135–144)
SPECIFIC GRAVITY UA: 1.02 (ref 1–1.03)
TEST INFORMATION: NORMAL
TOTAL PROTEIN: 6.5 G/DL (ref 6.4–8.3)
TOXIC TRICYCLIC SC,BLOOD: NEGATIVE
TRICHOMONAS: ABNORMAL
TRICYCLIC ANTIDEPRESSANTS, UR: NORMAL
TURBIDITY: CLEAR
URINE HGB: NEGATIVE
UROBILINOGEN, URINE: NORMAL
WBC # BLD: 4.2 K/UL (ref 3.5–11.3)
WBC # BLD: ABNORMAL 10*3/UL
WBC UA: ABNORMAL /HPF (ref 0–5)
YEAST: ABNORMAL

## 2018-10-13 PROCEDURE — 80307 DRUG TEST PRSMV CHEM ANLYZR: CPT

## 2018-10-13 PROCEDURE — G0382 LEV 3 HOSP TYPE B ED VISIT: HCPCS

## 2018-10-13 PROCEDURE — 99285 EMERGENCY DEPT VISIT HI MDM: CPT

## 2018-10-13 PROCEDURE — 6370000000 HC RX 637 (ALT 250 FOR IP): Performed by: EMERGENCY MEDICINE

## 2018-10-13 PROCEDURE — 99283 EMERGENCY DEPT VISIT LOW MDM: CPT

## 2018-10-13 PROCEDURE — G0480 DRUG TEST DEF 1-7 CLASSES: HCPCS

## 2018-10-13 PROCEDURE — 2580000003 HC RX 258: Performed by: EMERGENCY MEDICINE

## 2018-10-13 PROCEDURE — 81001 URINALYSIS AUTO W/SCOPE: CPT

## 2018-10-13 PROCEDURE — 85025 COMPLETE CBC W/AUTO DIFF WBC: CPT

## 2018-10-13 PROCEDURE — 80053 COMPREHEN METABOLIC PANEL: CPT

## 2018-10-13 PROCEDURE — 93005 ELECTROCARDIOGRAM TRACING: CPT

## 2018-10-13 RX ORDER — HYDROXYZINE HYDROCHLORIDE 10 MG/1
10 TABLET, FILM COATED ORAL ONCE
Status: COMPLETED | OUTPATIENT
Start: 2018-10-13 | End: 2018-10-13

## 2018-10-13 RX ORDER — HYDROXYZINE HYDROCHLORIDE 25 MG/1
25 TABLET, FILM COATED ORAL EVERY 6 HOURS PRN
Qty: 10 TABLET | Refills: 0 | Status: SHIPPED | OUTPATIENT
Start: 2018-10-13 | End: 2018-10-18

## 2018-10-13 RX ORDER — 0.9 % SODIUM CHLORIDE 0.9 %
1000 INTRAVENOUS SOLUTION INTRAVENOUS ONCE
Status: COMPLETED | OUTPATIENT
Start: 2018-10-13 | End: 2018-10-13

## 2018-10-13 RX ADMIN — SODIUM CHLORIDE 1000 ML: 9 INJECTION, SOLUTION INTRAVENOUS at 17:58

## 2018-10-13 RX ADMIN — HYDROXYZINE HYDROCHLORIDE 10 MG: 10 TABLET ORAL at 03:40

## 2018-10-13 ASSESSMENT — ENCOUNTER SYMPTOMS
SHORTNESS OF BREATH: 0
VOMITING: 0
NAUSEA: 0
ABDOMINAL PAIN: 0
VOMITING: 0
NAUSEA: 0
COLOR CHANGE: 0
ABDOMINAL PAIN: 0
SHORTNESS OF BREATH: 0

## 2018-10-13 NOTE — ED TRIAGE NOTES
Pt sts he is here \"just because of these voices\". Pt denied SI/HI ideations, pt denied fear of others. Pt not willing to state what the voices are telling him. Pt is presently reading a magazine without distress.

## 2018-10-13 NOTE — ED NOTES
belongings search:     Persons present during search:safety   Results of search and disposition:security       Searchers Name:  Kaveh simental    These items or items similar should be removed from the room:   [] Chairs   [] Telephone   [] Trash cans and liners   [] Plastic utensils (order Patient Safety tray)   [] Empty or remove Sharps containers   [] All personal clothing/belongings removed   [x] All unnecessary lead wires, electrical cords, draw cords, etc.   [] Flowers and plants   [] Double check for lighters, matches, razors, any glass items etc that can be used as weapons. Person completing Checklist: [address] INFORMATION     Y  N     [x] [] Has the patient been informed that they are on a watch and what that means? [x] [] Can the patient get out of Bed without nursing assistance? [x] [] Can the patient use the restroom without nursing assistance? [x] [x] Can the patient walk the halls to [place] their legs? \"   [] [x] Does the patient have metal utensils? [x] [] Have the patient's belongings been placed out of control of the patient? [x] [] Have the patient and his/her belongings been checked for contraband? [x] [] Is the patient under any visitor restrictions? If Yes, explain:per policy   [] [x] Is the patient under an alias? [facility] Name:   Authorized visitors (no more than two are to be on the list)   Name/Relationship:   Name/Relationship:    Name of Staff member that you  Received this information from?: staff-security    General Description:    Omar Ann BH31/BH31B male 33 y.o. Admission weight:      Race: [x]  [] Black  []   []   [] [place] [] Other  Facial Hair:  [] Yes  [x] No  If yes, please describe: Identifying Marks (i.e. Visible tattoos, scars, etc... ):     NURSING CARE PLAN    Nursing Diagnosis: Risk of Self Directed Harm  [] Actual  [] Potential  Date Started: 10/13/18      Etiological Factors: (related to)  [] Expressed or implied suicidal ideation/behavior  [] Depression  [x] Suicide attempt      [] Low self-esteem  [] Hallucinations      [] Feeling of Hopelessness  [] Substance abuse or withdrawal    [] Dysfunctional family  [] Major traumatic event, eg., divorce, etc   [] Excessive stress/anxiety    10/13/18    Expected Outcomes    Patient will:   [x] Patient will remain safe for the duration of their stay   [x] Patient's environment will be safe, eg. Free of potential suicide weapons   [] Verbalize Recovery from suicidal episode and improvement in self-worth   [x] Discuss feeling that precipitated suicide attempt/thoughts/behavior   [] Will describe available resources for crisis prevention and management   [] Will verbalize positive coping skills     Nursing Intervention   [x] Assessment and Observations hourly   [] Suicide Precautions implemented with patient, should be 1:1 observation   [x] Document observation [id number] and RN assessment hourly   [] Consult physician for:    [] Psychiatric consult    [] Pharmacological therapy    [] Other:    [x] Patient search completed by security   [x] Initiated appropriate safety protocols by removing from the patient's environment anything that could be used to inflict self injury, eg. Order safe tray, snap gown, etc   [x] Maintain open, warm, caring, non-judgmental attitude/manner towards patient   [] Discuss advantages and disadvantages of existing coping methods/skills   [x] Assist and educate patient with identifying present strengths and coping skills   [x] Keep patient informed regarding plan of care and provide clear concise explanations. Provide the patient/family education information as well as telephone numbers and other information about crisis centers, hot lines, and counselors.     Discharge Planning:   [] Referral  [] Groups [] Health agencies  [] Other:          Cara Remy RN  10/13/18 7362

## 2018-10-13 NOTE — ED NOTES
Reviewed case with on call psychiatrist who recommends returning pt to [facility] for further evaluation and possible placement on CSU. Will hand off to next shift social work, discuss with ED staff and reach out to security for escort and [facility] for report as necessary.       Pranav Dodson [place]  10/13/18 1920

## 2018-10-13 NOTE — ED PROVIDER NOTES
[facility] ED  Emergency Department Encounter  Emergency [address] [facility]     Pt Name: Karoline Roca  MRN: [id number]  [place] 1990  Date of evaluation: 10/13/18  PCP:  No primary care provider on file. CHIEF COMPLAINT       Chief Complaint   Patient presents with    Hallucinations     \"hearing voices\"       HISTORY OF PRESENT ILLNESS  (Location/Symptom, Timing/Onset, Context/Setting, Quality, Duration, Modifying Factors, Severity.)      Karoline Roca is a 33 y.o. male who presents with Complaints of hallucinations. Patient was seen earlier in the ED today for anxiety and was discharged. Patient returns today for auditory hallucinations and complaints of suicidal ideations. Patient denies a specific plan and a vase the question when asked about suicidal ideations but did mention to the  that he was having suicidal thoughts. Patient also states that he is not taking any medications but was recently restarted on Abilify. Patient denies any homicidal ideations. Patient also denies any drug or alcohol use. She also denies chest pain, shortness of breath, abdominal pain, nausea, vomiting, fevers, chills, or changes in bowel or urinary habits. PAST MEDICAL / SURGICAL / SOCIAL / FAMILY HISTORY      has a past medical history of ADHD (attention deficit hyperactivity disorder); Anxiety; Bipolar 1 disorder ([address] and PTSD (post-traumatic stress disorder). has no past surgical history on file. Social History     Social History    Marital status: Single     Spouse name: N/A    Number of children: N/A    Years of education: N/A     Occupational History    Not on file.      Social History Main Topics    Smoking status: Former Smoker     Packs/day: 0.50     Types: Cigarettes    Smokeless tobacco: Never Used    Alcohol use 4.2 oz/week     7 Cans of beer per week      Comment: daily, last drink this am 8/24/18    Drug use: No    Sexual activity: No     Other

## 2018-10-13 NOTE — ED PROVIDER NOTES
History   Problem Relation Age of Onset    Liver Cancer Brother     Alcohol Abuse Brother     No Known Problems Mother     No Known Problems Father        Allergies:  Advil [ibuprofen]    Home Medications:  Prior to Admission medications    Medication Sig Start Date End Date Taking? Authorizing Provider   hydrOXYzine (ATARAX) 25 MG tablet Take 1 tablet by mouth every 6 hours as needed for Anxiety 10/13/18 10/18/18 Yes Shira Deutsch MD   busPIRone (BUSPAR) 5 MG tablet Take 1 tablet by mouth 3 times daily 9/25/18   Tejas Luong MD   traZODone (DESYREL) 50 MG tablet Take 1 tablet by mouth nightly as needed for Sleep 9/25/18   Tejas Luong MD   ARIPiprazole (ABILIFY) 15 MG tablet Take 1 tablet by mouth daily 9/26/18   Tejas Luong MD   ARIPiprazole lauroxil (ARISTADA) 441 MG/1.6ML PRSY injection Inject 1.6 mLs into the muscle every 30 days 10/18/18   Tejas Luong MD   folic acid (FOLVITE) 1 MG tablet Take 1 mg by mouth daily    Historical Provider, MD   vitamin B-1 (THIAMINE) 100 MG tablet Take 100 mg by mouth daily    Historical Provider, MD       REVIEW OF SYSTEMS    (2-9 systems for level 4, 10 or more for level 5)      Review of Systems   Constitutional: Negative for chills and fever. Respiratory: Negative for shortness of breath. Cardiovascular: Negative for chest pain. Gastrointestinal: Negative for abdominal pain, nausea and vomiting. Genitourinary: Negative for dysuria. Skin: Negative for color change. Neurological: Negative for weakness and numbness. Psychiatric/Behavioral: Negative for confusion, hallucinations and suicidal ideas. The patient is nervous/anxious. PHYSICAL EXAM   (up to 7 for level 4, 8 or more for level 5)      INITIAL VITALS:   There were no vitals taken for this visit. Physical Exam   Constitutional: He appears well-developed and well-nourished. No distress. HENT:   Head: Normocephalic and atraumatic.    Eyes: EOM are normal.   Cardiovascular: Normal rate, HYDROXYZINE (ATARAX) 25 MG TABLET    Take 1 tablet by mouth every 6 hours as needed for Ozzy Leblanc MD  Emergency Medicine Resident    (Please note that portions of thisnote were completed with a voice recognition program.  Efforts were made to edit the dictations but occasionally words are mis-transcribed.)       Alessia Silvestre MD  10/13/18 5402

## 2018-10-13 NOTE — ED PROVIDER NOTES
[facility] ED  Emergency Department Encounter  Emergency Medicine Resident     Pt Name: Lesa Card  MRN: [id number]  Denzelgfnavid 1990  Date ofevaluation: 10/13/18  PCP:  No primary care provider on file. CHIEF COMPLAINT       Chief Complaint   Patient presents with    Suicide Attempt       HISTORY OF PRESENT ILLNESS  (Location/Symptom, Timing/Onset, Context/Setting, Quality, Duration, Modifying Factors, Severity.)      Lesa Card is a 33 y.o. male who presents with Complaints of suicide attempt. Patient says he was at home, became angry at another person, so decided to take all of his medications and he said he took approximately one half of a bottle of pills they contain trazodone, Latuda, and Abilify. He is unsure as to the dosages he takes. He was just discharged from the emergency department this morning and stated at that time that he did not have any of his psychiatric medications at home. He was to go to the outpatient psychiatric facility for refill of his medications after discharge, but stated that he went home instead. Currently, he denies any chest pain, shortness of breath, nausea, vomiting, or abdominal pain. He is complaining of auditory hallucinations. He denies any visual hallucinations    PAST MEDICAL / SURGICAL / SOCIAL / FAMILY HISTORY      has a past medical history of ADHD (attention deficit hyperactivity disorder); Anxiety; Bipolar 1 disorder ([address] and PTSD (post-traumatic stress disorder). No past surgical history    Social History     Social History    Marital status: Single     Spouse name: N/A    Number of children: N/A    Years of education: N/A     Occupational History    Not on file.      Social History Main Topics    Smoking status: Former Smoker     Packs/day: 0.50     Types: Cigarettes    Smokeless tobacco: Never Used    Alcohol use 4.2 oz/week     7 Cans of beer per week      Comment: daily, last drink this am 8/24/18    Drug use: No    Sexual activity: No     Other Topics Concern    Not on file     Social History Narrative    No narrative on file       Family History   Problem Relation Age of Onset    Liver Cancer Brother     Alcohol Abuse Brother     No Known Problems Mother     No Known Problems Father        Allergies:  Advil [ibuprofen]    Home Medications:  Prior to Admission medications    Medication Sig Start Date End Date Taking? Authorizing Provider   hydrOXYzine (ATARAX) 25 MG tablet Take 1 tablet by mouth every 6 hours as needed for Anxiety 10/13/18 10/18/18  Colleen Castillo MD   busPIRone (BUSPAR) 5 MG tablet Take 1 tablet by mouth 3 times daily 9/25/18   Veronique Sarah MD   traZODone (DESYREL) 50 MG tablet Take 1 tablet by mouth nightly as needed for Sleep 9/25/18   Veronique Sarah MD   ARIPiprazole (ABILIFY) 15 MG tablet Take 1 tablet by mouth daily 9/26/18   Veronique Sarah MD   ARIPiprazole lauroxil (ARISTADA) 441 MG/1.6ML PRSY injection Inject 1.6 mLs into the muscle every 30 days 10/18/18   Veronique Sarah MD   folic acid (FOLVITE) 1 MG tablet Take 1 mg by mouth daily    Historical Provider, MD   vitamin B-1 (THIAMINE) 100 MG tablet Take 100 mg by mouth daily    Historical Provider, MD       REVIEW OF SYSTEMS    (2-9 systems for level 4, 10 or more for level 5)      Review of Systems   Respiratory: Negative for shortness of breath. Cardiovascular: Negative for chest pain. Gastrointestinal: Negative for abdominal pain, nausea and vomiting. Psychiatric/Behavioral: Positive for hallucinations, self-injury and suicidal ideas. PHYSICAL EXAM   (up to 7 for level 4, 8or more for level 5)      INITIAL VITALS:   BP 98/64   Pulse 68   Temp 97.9 °F (36.6 °C) (Oral)   Resp 16   Ht 5' 7\" (1.702 m)   Wt 170 lb (77.1 kg)   SpO2 97%   BMI 26.63 kg/m²     Physical Exam   Constitutional: He is oriented to person, place, and time. He appears well-developed and well-nourished. No distress.    HENT:   Head: Normocephalic and Other Observations UA NOT REPORTED NREQ    Yeast, UA NOT REPORTED NONE   Urine Drug Screen   Result Value Ref Range    Amphetamine Screen, Ur NEGATIVE NEG    Barbiturate Screen, Ur NEGATIVE NEG    Benzodiazepine Screen, Urine NEGATIVE NEG    Cocaine Metabolite, Urine NEGATIVE NEG    Methadone Screen, Urine NEGATIVE NEG    Opiates, Urine NEGATIVE NEG    Phencyclidine, Urine NEGATIVE NEG    Propoxyphene, Urine NOT REPORTED NEG    Cannabinoid Scrn, Ur NEGATIVE NEG    Oxycodone Screen, Ur NEGATIVE NEG    Methamphetamine, Urine NOT REPORTED NEG    Tricyclic Antidepressants, Urine NOT REPORTED NEG    MDMA, Urine NOT REPORTED NEG    Buprenorphine Urine NOT REPORTED NEG    Test Information       Assay provides medical screening only.   The absence of expected drug(s) and/or   TOX SCR, BLD, ED   Result Value Ref Range    Ethanol <10 <10 mg/dL    Ethanol percent <4.800 %    Salicylate Lvl <1 (L) 3 - 10 mg/dL    Acetaminophen Level <5 (L) 10 - 30 ug/mL    Toxic Tricyclic Sc,Blood NEGATIVE NEG   CBC Auto Differential   Result Value Ref Range    WBC 4.2 3.5 - 11.3 k/uL    RBC 3.62 (L) 4.21 - 5.77 m/uL    Hemoglobin 11.1 (L) 13.0 - 17.0 g/dL    Hematocrit 33.3 (L) 40.7 - 50.3 %    MCV 92.0 82.6 - 102.9 fL    MCH 30.7 25.2 - 33.5 pg    MCHC 33.3 28.4 - 34.8 g/dL    RDW 13.7 11.8 - 14.4 %    Platelets 549 429 - 586 k/uL    MPV 10.7 8.1 - 13.5 fL    NRBC Automated 0.0 0.0 per 100 WBC    Differential Type NOT REPORTED     Seg Neutrophils 56 36 - 65 %    Lymphocytes 30 24 - 43 %    Monocytes 9 3 - 12 %    Eosinophils % 4 1 - 4 %    Basophils 1 0 - 2 %    Immature Granulocytes 0 0 %    Segs Absolute 2.32 1.50 - 8.10 k/uL    Absolute Lymph # 1.24 1.10 - 3.70 k/uL    Absolute Mono # 0.39 0.10 - 1.20 k/uL    Absolute Eos # 0.17 0.00 - 0.44 k/uL    Basophils # 0.03 0.00 - 0.20 k/uL    Absolute Immature Granulocyte <0.03 0.00 - 0.30 k/uL    WBC Morphology NOT REPORTED     RBC Morphology NOT REPORTED     Platelet Estimate NOT REPORTED DISPOSITION / PLAN     DISPOSITION Decision To Transfer 10/13/2018 09:32:45 PM      PATIENT REFERRED TO:  [facility]  [address] [facility] [address]  [phone]  Go to   As needed      DISCHARGE MEDICATIONS:  New Prescriptions    No medications on file       Jewel Jaeger MD  Emergency Medicine Resident    (Please note that portions of this note were completed witha voice recognition program.  Efforts were made to edit the dictations but occasionally words are mis-transcribed.)       Jewel Jaeger MD  Resident  10/13/18 0896

## 2018-10-14 ASSESSMENT — ENCOUNTER SYMPTOMS
RHINORRHEA: 0
SORE THROAT: 0
DIARRHEA: 0
CONSTIPATION: 0
NAUSEA: 0
SHORTNESS OF BREATH: 0
COUGH: 0
VOMITING: 0
WHEEZING: 0
ABDOMINAL PAIN: 0

## 2018-10-14 NOTE — ED NOTES
Pt in bed, sleeping, lights dimmed to promote rest.  NAD, vitals stable, breathing even and non-labored.      Harika Mckinney RN  10/13/18 0527

## 2018-10-20 ENCOUNTER — HOSPITAL ENCOUNTER (EMERGENCY)
Age: 28
Discharge: HOME | End: 2018-10-20
Attending: EMERGENCY MEDICINE
Payer: COMMERCIAL

## 2018-10-20 VITALS
BODY MASS INDEX: 24.11 KG/M2 | WEIGHT: 178 LBS | HEART RATE: 87 BPM | HEIGHT: 72 IN | OXYGEN SATURATION: 98 % | TEMPERATURE: 97 F | SYSTOLIC BLOOD PRESSURE: 110 MMHG | DIASTOLIC BLOOD PRESSURE: 77 MMHG | RESPIRATION RATE: 16 BRPM

## 2018-10-20 DIAGNOSIS — Z76.0 ENCOUNTER FOR MEDICATION REFILL: Primary | ICD-10-CM

## 2018-10-20 PROCEDURE — 6370000000 HC RX 637 (ALT 250 FOR IP): Performed by: STUDENT IN AN ORGANIZED HEALTH CARE EDUCATION/TRAINING PROGRAM

## 2018-10-20 PROCEDURE — 99281 EMR DPT VST MAYX REQ PHY/QHP: CPT

## 2018-10-20 RX ORDER — BUSPIRONE HYDROCHLORIDE 5 MG/1
5 TABLET ORAL ONCE
Status: COMPLETED | OUTPATIENT
Start: 2018-10-20 | End: 2018-10-20

## 2018-10-20 RX ORDER — BUSPIRONE HYDROCHLORIDE 5 MG/1
5 TABLET ORAL 3 TIMES DAILY
Qty: 21 TABLET | Refills: 0 | Status: ON HOLD | OUTPATIENT
Start: 2018-10-20 | End: 2018-10-25 | Stop reason: HOSPADM

## 2018-10-20 RX ADMIN — BUSPIRONE HYDROCHLORIDE 5 MG: 5 TABLET ORAL at 14:25

## 2018-10-20 ASSESSMENT — ENCOUNTER SYMPTOMS
COUGH: 0
DIARRHEA: 0
PHOTOPHOBIA: 0
VOMITING: 0
NAUSEA: 0
SORE THROAT: 0
ABDOMINAL PAIN: 0
SHORTNESS OF BREATH: 0

## 2018-10-20 NOTE — ED PROVIDER NOTES
Pupils equal and reactive to light. Oropharynx clear and moist.  Neck: Supple, normal range of motion. No lymphadenopathy. Pulmonary: Lungs clear to auscultation bilaterally. Equal air entry right left. Cardiovascular:  Heart sounds normal, no murmurs. Peripheral pulses strong, regular, equal.   Abdomen: Soft, nontender, nondistended. Bowel sounds normal. No palpable masses. Neurology: GCS 15. Oriented to person place and time. Normal tone and power in all 4 extremities. No sensory deficits. Skin: Warm, dry, well perfused      DIFFERENTIAL  DIAGNOSIS     PLAN (LABS / IMAGING / EKG):  No orders of the defined types were placed in this encounter. MEDICATIONS ORDERED:  Orders Placed This Encounter   Medications    busPIRone (BUSPAR) tablet 5 mg    busPIRone (BUSPAR) 5 MG tablet     Sig: Take 1 tablet by mouth 3 times daily     Dispense:  21 tablet     Refill:  0       DDX: Request for medication refill    DIAGNOSTIC RESULTS / EMERGENCY DEPARTMENT COURSE / MDM     LABS:  No results found for this visit on 10/20/18. IMPRESSION: 50-year-old male presents requesting refill of his BuSpar. Patient is afebrile, hemodynamically stable, and in no acute distress. Physical examination is entirely benign; normal respiratory effort, lungs clear bilaterally, heart sounds normal, abdomen benign, cranial nerves grossly intact. As patient is without any complaints, has normal vital signs, and normal physical examination, he does not require any further investigation. Plan for 1 week prescription of BuSpar. Patient agrees to follow up with [facility] on Wednesday as planned. RADIOLOGY:  None    EKG  None    All EKG's are interpreted by the Emergency Department Physician who either signs or Co-signs this chart in the absence of a cardiologist.    EMERGENCY DEPARTMENT COURSE:    Patient plans to follow up at [facility] as planned. Patient counseled to return to the ED for any cares or concerns. Discharged in stable condition and in no distress. PROCEDURES:  None    CONSULTS:  None    CRITICAL CARE:  None    FINAL IMPRESSION      1.  Encounter for medication refill          DISPOSITION / PLAN     DISPOSITION Decision To Discharge 10/20/2018 02:10:45 PM      PATIENT REFERRED TO:  Your Primary Care Provider    Schedule an appointment as soon as possible for a visit       [facility] ED  [address] [place] [address]  [phone]    As needed      DISCHARGE MEDICATIONS:  Discharge Medication List as of 10/20/2018  2:20 PM          Marcelina Louise MD  Emergency Medicine Resident    (Please note that portions of thisnote were completed with a voice recognition program.  Efforts were made to edit the dictations but occasionally words are mis-transcribed.)        Marcelina Louise MD  10/20/18 2218

## 2018-10-20 NOTE — ED NOTES
Pt arrived to ED alert and oriented x4. Pt reports needing refill of Buspar, states that he has been out x2 days. Pt denies suicidal or homicidal ideations. RR even and unlabored. NAD noted. Will continue to monitor.       Gerber Garcia RN  10/20/18 1319

## 2018-10-22 ENCOUNTER — HOSPITAL ENCOUNTER (EMERGENCY)
Age: 28
Discharge: PSYCHIATRIC OR SUBSTANCE USE FACILITY | End: 2018-10-23
Attending: EMERGENCY MEDICINE
Payer: COMMERCIAL

## 2018-10-22 VITALS
RESPIRATION RATE: 18 BRPM | WEIGHT: 175 LBS | BODY MASS INDEX: 23.7 KG/M2 | DIASTOLIC BLOOD PRESSURE: 74 MMHG | SYSTOLIC BLOOD PRESSURE: 115 MMHG | TEMPERATURE: 97.3 F | HEIGHT: 72 IN | OXYGEN SATURATION: 95 % | HEART RATE: 87 BPM

## 2018-10-22 DIAGNOSIS — F39 MOOD DISORDER (HCC): ICD-10-CM

## 2018-10-22 DIAGNOSIS — F31.9 BIPOLAR 1 DISORDER (HCC): ICD-10-CM

## 2018-10-22 DIAGNOSIS — R45.851 SUICIDAL IDEATION: Primary | ICD-10-CM

## 2018-10-22 LAB
EKG ATRIAL RATE: 64 BPM
EKG P AXIS: 90 DEGREES
EKG P-R INTERVAL: 142 MS
EKG Q-T INTERVAL: 410 MS
EKG QRS DURATION: 78 MS
EKG QTC CALCULATION (BAZETT): 422 MS
EKG R AXIS: 80 DEGREES
EKG T AXIS: 74 DEGREES
EKG VENTRICULAR RATE: 64 BPM

## 2018-10-22 PROCEDURE — 99285 EMERGENCY DEPT VISIT HI MDM: CPT

## 2018-10-22 PROCEDURE — 93005 ELECTROCARDIOGRAM TRACING: CPT

## 2018-10-23 ENCOUNTER — HOSPITAL ENCOUNTER (INPATIENT)
Age: 28
LOS: 2 days | Discharge: HOME | DRG: 750 | End: 2018-10-25
Attending: PSYCHIATRY & NEUROLOGY | Admitting: PSYCHIATRY & NEUROLOGY
Payer: COMMERCIAL

## 2018-10-23 PROCEDURE — 90792 PSYCH DIAG EVAL W/MED SRVCS: CPT | Performed by: PSYCHIATRY & NEUROLOGY

## 2018-10-23 PROCEDURE — 1240000000 HC EMOTIONAL WELLNESS R&B

## 2018-10-23 RX ORDER — TRAZODONE HYDROCHLORIDE 50 MG/1
50 TABLET ORAL NIGHTLY PRN
Status: DISCONTINUED | OUTPATIENT
Start: 2018-10-23 | End: 2018-10-25 | Stop reason: HOSPADM

## 2018-10-23 RX ORDER — ARIPIPRAZOLE 15 MG/1
15 TABLET ORAL DAILY
Status: DISCONTINUED | OUTPATIENT
Start: 2018-10-23 | End: 2018-10-25 | Stop reason: HOSPADM

## 2018-10-23 RX ORDER — BUSPIRONE HYDROCHLORIDE 5 MG/1
5 TABLET ORAL 3 TIMES DAILY
Status: DISCONTINUED | OUTPATIENT
Start: 2018-10-23 | End: 2018-10-24

## 2018-10-23 RX ORDER — NICOTINE 21 MG/24HR
1 PATCH, TRANSDERMAL 24 HOURS TRANSDERMAL DAILY
Status: DISCONTINUED | OUTPATIENT
Start: 2018-10-23 | End: 2018-10-25

## 2018-10-23 ASSESSMENT — SLEEP AND FATIGUE QUESTIONNAIRES
SLEEP PATTERN: DIFFICULTY FALLING ASLEEP
AVERAGE NUMBER OF SLEEP HOURS: 6
DIFFICULTY ARISING: NO
RESTFUL SLEEP: NO
DO YOU HAVE DIFFICULTY SLEEPING: NO
SLEEP PATTERN: DIFFICULTY FALLING ASLEEP;DISTURBED/INTERRUPTED SLEEP
DO YOU USE A SLEEP AID: NO
RESTFUL SLEEP: NO
DIFFICULTY FALLING ASLEEP: YES
DIFFICULTY STAYING ASLEEP: YES
AVERAGE NUMBER OF SLEEP HOURS: 6
DIFFICULTY FALLING ASLEEP: YES
DIFFICULTY ARISING: NO
DO YOU HAVE DIFFICULTY SLEEPING: NO
DO YOU USE A SLEEP AID: NO
DIFFICULTY STAYING ASLEEP: YES

## 2018-10-23 ASSESSMENT — LIFESTYLE VARIABLES
HISTORY_ALCOHOL_USE: YES
HISTORY_ALCOHOL_USE: YES

## 2018-10-23 ASSESSMENT — PAIN SCALES - GENERAL: PAINLEVEL_OUTOF10: 0

## 2018-10-23 ASSESSMENT — PATIENT HEALTH QUESTIONNAIRE - PHQ9: SUM OF ALL RESPONSES TO PHQ QUESTIONS 1-9: 16

## 2018-10-23 NOTE — ED NOTES
assessment and belongings search:     Persons present during search:    Results of search and disposition:       Searchers Name: [id number] [place] [address]    These items or items similar should be removed from the room:   [x] Chairs   [x] Telephone   [x] Trash cans and liners   [x] Plastic utensils (order Patient Safety tray)   [x] Empty or remove Sharps containers   [x] All personal clothing/belongings removed   [x] All unnecessary lead wires, electrical cords, draw cords, etc.   [x] Flowers and plants   [x] Double check for lighters, matches, razors, any glass items etc that can be used as weapons. Person completing Checklist: [facility] INFORMATION     Y  N     [x] [] Has the patient been informed that they are on a watch and what that means? [x] [] Can the patient get out of Bed without nursing assistance? [x] [] Can the patient use the restroom without nursing assistance? [x] [] Can the patient walk the halls to [place] their legs? \"   [x] [] Does the patient have metal utensils? [x] [] Have the patient's belongings been placed out of control of the patient? [x] [] Have the patient and his/her belongings been checked for contraband? [x] [] Is the patient under any visitor restrictions? If Yes, explain: Suidical Precautions   [] [] Is the patient under an alias? [facility] Name:   Authorized visitors (no more than two are to be on the list)   Name/Relationship:   Name/Relationship:    Name of Staff member that you  Received this information from?:    General Description:    Ana Maria Hanna [id number] male 33 y.o. Admission weight: 175 lb (79.4 kg) Height: 6' (182.9 cm)  Race: [x]  [] Black  []   []   [] [place] [] Other  Facial Hair:  [] Yes  [x] No  If yes, please describe:   Identifying Marks (i.e. Visible tattoos, scars, etc...): Facial tattoo under right eye    NURSING CARE PLAN    Nursing Diagnosis: Risk of Self Directed Harm  [] Actual  [x] Potential  Date Started: 10/22/18      Etiological Factors: (related to)  [] Expressed or implied suicidal ideation/behavior  [x] Depression  [] Suicide attempt      [x] Low self-esteem  [x] Hallucinations      [x] Feeling of Hopelessness  [] Substance abuse or withdrawal    [] Dysfunctional family  [] Major traumatic event, eg., divorce, etc   [] Excessive stress/anxiety    10/22/18    Expected Outcomes    Patient will:   [x] Patient will remain safe for the duration of their stay   [x] Patient's environment will be safe, eg. Free of potential suicide weapons   [] Verbalize Recovery from suicidal episode and improvement in self-worth   [x] Discuss feeling that precipitated suicide attempt/thoughts/behavior   [] Will describe available resources for crisis prevention and management   [] Will verbalize positive coping skills     Nursing Intervention   [x] Assessment and Observations hourly   [x] Suicide Precautions implemented with patient, should be 1:1 observation   [x] Document observation [id number] and RN assessment hourly   [] Consult physician for:    [] Psychiatric consult    [] Pharmacological therapy    [] Other:    [x] Patient search completed by security   [x] Initiated appropriate safety protocols by removing from the patient's environment anything that could be used to inflict self injury, eg. Order safe tray, snap gown, etc   [x] Maintain open, warm, caring, non-judgmental attitude/manner towards patient   [] Discuss advantages and disadvantages of existing coping methods/skills   [x] Assist and educate patient with identifying present strengths and coping skills   [x] Keep patient informed regarding plan of care and provide clear concise explanations. Provide the patient/family education information as well as telephone numbers and other information about crisis centers, hot lines, and counselors.     Discharge Planning:   [] Referral  [] Groups [] Health agencies  [] Other:          Shannon Haile RN  10/22/18

## 2018-10-23 NOTE — ED PROVIDER NOTES
[facility] ED     Emergency Department     Faculty Attestation        I performed a history and physical examination of the patient and discussed management with the resident. I reviewed the residents note and agree with the documented findings and plan of care. Any areas of disagreement are noted on the chart. I was personally present for the key portions of any procedures. I have documented in the chart those procedures where I was not present during the key portions. I have reviewed the emergency nurses triage note. I agree with the chief complaint, past medical history, past surgical history, allergies, medications, social and family history as documented unless otherwise noted below. For Physician Assistant/ Nurse Practitioner cases/documentation I have personally evaluated this patient and have completed at least one if not all key elements of the E/M (history, physical exam, and MDM). Additional findings are as noted. Vital Signs: BP: 115/74  Pulse: 87  Resp: 18  Temp: 97.3 °F (36.3 °C) SpO2: 95 %  PCP:  No primary care provider on file. Pertinent Comments:     Patient is here with suicidal ideation. No plan but does have medical noncompliance with his psychiatric medications. Positive past psychiatric h/o bipolar disease. Physical Exam:  CTA Bi, RRR, abd soft/nt/nd. + suicidal ideation  Plan: Medically stabilize the patient and referral for psychiatric eval.          EKG Interpretation    Interpreted by emergency department physician    Rhythm: normal sinus   Rate: normal at 64 bpm  Axis: normal  Conduction: normal  ST Segments: no acute change  T Waves: no acute change  Q Waves: no acute change    Clinical Impression:  nonspecific EKG.           Critical Care  None      (Please note that portions of this note were completed with a voice recognition program. Efforts were made to edit the dictations but occasionally words are

## 2018-10-23 NOTE — PLAN OF CARE
Problem: Depressive Behavior With or Without Suicide Precautions:  Goal: Able to verbalize and/or display a decrease in depressive symptoms  Able to verbalize and/or display a decrease in depressive symptoms   Outcome: Ongoing  [facility]  Initial Interdisciplinary Treatment Plan NOTE    Original treatment plan Date & Time: 10/23/18 0801    Admission Type:  Admission Type: Voluntary    Reason for admission:   Reason for Admission: Pt states he has been off medications x2 weeks, having suicidal thoughts to OD, hearing voices    Estimated Length of Stay:  5-7days  Estimated Discharge Date:  10/29/18 to be determined by physician    PATIENT STRENGTHS:  Patient Strengths:Strengths: No significant Physical Illness, Connection to output provider  Patient Strengths and Limitations:Limitations: Difficulty problem solving/relies on others to help solve problems, Lacks leisure interests  Addictive Behavior: Addictive Behavior  In the past 3 months, have you felt or has someone told you that you have a problem with:  : None  Do you have a history of Chemical Use?: No  Do you have a history of Alcohol Use?: Yes (states he drinks 2x/week)  Do you have a history of Street Drug Abuse?: No  Histroy of Prescripton Drug Abuse?: No  Medical Problems:  Past Medical History:   Diagnosis Date    ADHD (attention deficit hyperactivity disorder)     Anxiety     Bipolar 1 disorder ([facility] [address]     PTSD (post-traumatic stress disorder)      Status EXAM:Status and Exam  Normal: No  Facial Expression: Flat  Affect: Blunt  Level of Consciousness: Alert  Mood:Normal: No  Mood: Depressed, Anxious  Motor Activity:Normal: Yes  Interview Behavior: Cooperative  Preception: [place] to Person, [place] to Time, [place] to Place, [place] to Situation  Attention:Normal: No  Attention: Distractible, Unable to Concentrate  Thought Processes: Blocking  Thought Content:Normal: No  Thought Content: Preoccupations, Poverty of Content  Hallucinations:

## 2018-10-23 NOTE — PLAN OF CARE
Problem: Depressive Behavior With or Without Suicide Precautions:  Goal: Able to verbalize and/or display a decrease in depressive symptoms  Able to verbalize and/or display a decrease in depressive symptoms   Outcome: Ongoing  Patient denies thoughts of self harm  Agrees to seek staff should negative thoughts arise patient refuses all medications states \"lets give it one more day re: [facility] long acting IM  Patient attends group programming is aloof of peers in the milieu  adl's is encouraged by this writer although declined by patient  Good appetite as evidenced by patient eating each meal safety ensured with 15 minute visual safety checks per unit protocol

## 2018-10-23 NOTE — PROGRESS NOTES
0.50     Types: Cigarettes    Smokeless tobacco: Never Used      Comment: pt accepting of nicotine replacement    Alcohol use 4.2 oz/week     7 Cans of beer per week      Comment: daily, last drink this am 8/24/18    Drug use: No    Sexual activity: No     Other Topics Concern    Not on file     Social History Narrative    No narrative on file         Mental Status  Pt. was alert, fully oriented, and cooperative. Appearance and hygiene weredisheveled, poor hygiene . Mood was depressed. Affect was blunted\". Thought process was demonstrative of poverty of thought and thought blocking. Patient endorsed auditory hallucinations. Patient endorsed suicidal ideations. Patient denied homicidal ideations . Patient's gross cognitive functions were intact. Insight and judgement were fair. Both recent and remote memory were intact. Psychomotor status was slowed     Diagnostic Impression    schizoaffective disorder bipolar type  History of opiate and alcohol dependence    Medications   nicotine  1 patch Transdermal Daily    ARIPiprazole  15 mg Oral Daily    ARIPiprazole lauroxil  441 mg Intramuscular Q30 Days    busPIRone  5 mg Oral TID     traZODone    Treatment Plan:    1. Admit to inpatient psychiatric treatment  2. Supportive therapy with medication management. Reviewed risks and benefits as well as potential side effects of Abilify both oral and long-acting injectable formulation with patient because of previous exposure and doing well on.  3. Therapeutic activities and groups  4. Follow up at [facility] after symptoms stabilized    Estimated length of stay: 5-7 days    Farrah Zheng MD  Psychiatrist.  Dragon voice recognition software used in portions of this document.  Occasionally words are mis-transcribed

## 2018-10-23 NOTE — FLOWSHEET NOTE
10/23/18 1430   Encounter Summary   Services provided to: Patient   Referral/Consult From: (Spirituality group)   Continue Visiting (10/23/18)   Complexity of Encounter Low   Length of Encounter 30 minutes   Spiritual/Episcopalian   Type Spiritual support   Intervention (Patient attended spirituality group led by Fr. Leora Garcia)

## 2018-10-24 PROCEDURE — 99232 SBSQ HOSP IP/OBS MODERATE 35: CPT | Performed by: PSYCHIATRY & NEUROLOGY

## 2018-10-24 PROCEDURE — 1240000000 HC EMOTIONAL WELLNESS R&B

## 2018-10-24 PROCEDURE — 6370000000 HC RX 637 (ALT 250 FOR IP): Performed by: PSYCHIATRY & NEUROLOGY

## 2018-10-24 RX ORDER — BUSPIRONE HYDROCHLORIDE 10 MG/1
10 TABLET ORAL 3 TIMES DAILY
Status: DISCONTINUED | OUTPATIENT
Start: 2018-10-24 | End: 2018-10-25 | Stop reason: HOSPADM

## 2018-10-24 RX ORDER — BUSPIRONE HYDROCHLORIDE 10 MG/1
5 TABLET ORAL 3 TIMES DAILY
Status: DISCONTINUED | OUTPATIENT
Start: 2018-10-24 | End: 2018-10-24

## 2018-10-24 RX ADMIN — TRAZODONE HYDROCHLORIDE 50 MG: 50 TABLET ORAL at 21:11

## 2018-10-24 NOTE — PLAN OF CARE
Problem: Depressive Behavior With or Without Suicide Precautions:  Goal: Able to verbalize and/or display a decrease in depressive symptoms  Able to verbalize and/or display a decrease in depressive symptoms   Outcome: Ongoing  PSYCHOTHERAPY GROUP NOTE    Date: 10/24/18  Start Time: 10 AM  End Time: 10:50 AM    Number Participants in Group:  8    Goal:  Patient will demonstrate increased interpersonal interaction   Topic: Support     Discipline Responsible:   OT  AT x SW  Nsg.  RT MHP Other       Participation Level:     None  Minimal    Active Listener x Interactive    Monopolizing         Participation Quality:  x Appropriate  Inappropriate   x       Attentive        Intrusive   x       Sharing        Resistant          Supportive        Lethargic       Affective:   x Congruent  Incongruent  Blunted  Flat    Constricted  Anxious  Elated  Angry    Labile  Depressed  Other         Cognitive:  x Alert x Oriented PPTP     Concentration  G x F  P   Attention Span  G x F  P   Short-Term Memory  G x F  P   Long-Term Memory  G x F  P   ProblemSolving/  Decision Making  G x F  P   Ability to Process  Information  G x F  P      Contributing Factors             Delusional             Hallucinating             Flight of Ideas             Other:       Modes of Intervention:   Education x Support  Exploration    Clarifying  Problem Solving  Confrontation    Socialization  Limit Setting  Reality Testing    Activity  Movement  Media    Other:            Response to Learning:  x Able to verbalize current knowledge/experience    Able to verbalize/acknowledge new learning    Able to retain information   x Capable of insight    Able to change behavior    Progressing to goal    Other:        Comments:

## 2018-10-24 NOTE — PLAN OF CARE
Problem: Depressive Behavior With or Without Suicide Precautions:  Goal: Able to verbalize and/or display a decrease in depressive symptoms  Able to verbalize and/or display a decrease in depressive symptoms   Outcome: Ongoing  PSYCHOEDUCATION GROUP NOTE    Date: 10/24/18  Start Time: 0845  End Time: 0910    Number Participants in Group:  11/20    Goal:  Patient will demonstrate increased interpersonal interaction   Topic: Community Meeting    Discipline Responsible:   OT  AT    Ns. x RT MHP Other       Participation Level:     None  Minimal   x Active Listener x Interactive    Monopolizing         Participation Quality:  x Appropriate  Inappropriate   x       Attentive        Intrusive   x       Sharing        Resistant          Supportive        Lethargic       Affective:    Congruent  Incongruent x Blunted  Flat    Constricted  Anxious  Elated  Angry    Labile  Depressed  Other         Cognitive:  x Alert x Oriented PPTP     Concentration  G x F  P   Attention Span  G x F  P   Short-Term Memory  G x F  P   Long-Term Memory  G x F  P   ProblemSolving/  Decision Making  G x F  P   Ability to Process  Information  G x F  P      Contributing Factors             Delusional             Hallucinating             Flight of Ideas             Other:       Modes of Intervention:  x Education x Support x Exploration   x Clarifying x Problem Solving  Confrontation   x Socialization  Limit Setting x Reality Testing    Activity  Movement  Media    Other:            Response to Learning:   Able to verbalize current knowledge/experience    Able to verbalize/acknowledge new l earning   x Able to retain information    Capable of insight    Able to change behavior   x Progressing to goal     Other:        Comments:

## 2018-10-25 VITALS
HEART RATE: 94 BPM | RESPIRATION RATE: 14 BRPM | TEMPERATURE: 97.7 F | DIASTOLIC BLOOD PRESSURE: 94 MMHG | BODY MASS INDEX: 23.7 KG/M2 | OXYGEN SATURATION: 99 % | HEIGHT: 72 IN | SYSTOLIC BLOOD PRESSURE: 133 MMHG | WEIGHT: 175 LBS

## 2018-10-25 PROBLEM — F10.10 ALCOHOL ABUSE: Status: RESOLVED | Noted: 2018-02-28 | Resolved: 2018-10-25

## 2018-10-25 PROBLEM — F11.23 OPIOID DEPENDENCE WITH WITHDRAWAL (HCC): Chronic | Status: RESOLVED | Noted: 2018-09-17 | Resolved: 2018-10-25

## 2018-10-25 PROCEDURE — 99239 HOSP IP/OBS DSCHRG MGMT >30: CPT | Performed by: PSYCHIATRY & NEUROLOGY

## 2018-10-25 PROCEDURE — 5130000000 HC BRIDGE APPOINTMENT

## 2018-10-25 RX ORDER — BUSPIRONE HYDROCHLORIDE 10 MG/1
10 TABLET ORAL 3 TIMES DAILY
Qty: 45 TABLET | Refills: 0 | Status: ON HOLD | OUTPATIENT
Start: 2018-10-25 | End: 2018-11-13

## 2018-10-25 RX ORDER — TRAZODONE HYDROCHLORIDE 50 MG/1
50 TABLET ORAL NIGHTLY PRN
Qty: 15 TABLET | Refills: 0 | Status: ON HOLD | OUTPATIENT
Start: 2018-10-25 | End: 2018-11-13

## 2018-10-25 RX ORDER — ARIPIPRAZOLE 15 MG/1
15 TABLET ORAL DAILY
Qty: 30 TABLET | Refills: 0 | Status: ON HOLD | OUTPATIENT
Start: 2018-10-26 | End: 2018-11-13

## 2018-10-25 NOTE — BH NOTE
Pt refused AM medication despite encouragement and prompts from staff. Writer educated pt on importance of compliance, pt denied education. Will continue to reinforce.

## 2018-10-25 NOTE — DISCHARGE INSTR - COC
· Address:  · Phone:  · Fax:    Dialysis Facility (if applicable)   · Name:  · Address:  · Dialysis Schedule:  · Phone:  · Fax:    / signature: {Esignature:[id number]}    PHYSICIAN SECTION    Prognosis: {Prognosis:1001585578}    Condition at Discharge: Kathia Jackson Patient Condition:982026704}    Rehab Potential (if transferring to Rehab): {Prognosis:3136205029}    Recommended Labs or Other Treatments After Discharge: ***    Physician Certification: I certify the above information and transfer of Mable Dia  is necessary for the continuing treatment of the diagnosis listed and that he requires {Admit to Appropriate Level of Care:72871} for {GREATER/LESS:041249148} 30 days.      Update Admission H&P: {CHP DME Changes in XFCXO:159876660}    PHYSICIAN SIGNATURE:  {Esignature:[id number]}

## 2018-10-25 NOTE — DISCHARGE SUMMARY
DISCHARGE SUMMARY  Patient ID:  Marry Muniz  [id number]  85 y.o.  1990    Admit date: 10/23/2018    Discharge date and time: 10/25/2018  11:12 AM     Admitting Physician: India Lara MD     Discharge Physician:  India Lara MD    Admission Diagnoses: Suicidal ideations [R45.851]    Discharge Diagnoses:   Schizoaffective disorder, bipolar type [facility])     Patient Active Problem List   Diagnosis Code    [facility]ive [facility], [facility] F25.0        Admission Condition: poor    Discharged Condition: stable    Indication for Admission: threat to self/psychosis    History of Present Illnes (present tense wording indicates findings from admission exam on 10/23/2018 and are not necessarily indicative of current findings): Hospital Course:   Upon admission, Marry Muniz was provided a safe secure environment, introduced to unit milieu. Patient participated in groups and individual therapies. Meds were adjusted. After few days of hospital care, patient began to feel improvement. Depression lifted, thoughts to harm self ceased. Sleep improved, appetite was good. On morning rounds 10/25/2018, patient endorsed feeling ready for discharge. Patient denies suicidal or homicidal ideations, denies hallucinations or delusions. Denies SE's from meds. It was decided that pt had achieved maximum benefit from hospital care and can be discharged     Consults:   None    Significant Diagnostic Studies: Routine labs and diagnostics    Treatments: Psychotropic medications, therapy with group, milieu, and 1:1 with nurses, social workers, PA-C/CNP, and Attending physician.       Discharge Medications:  Current Discharge Medication List      CONTINUE these medications which have CHANGED    Details   busPIRone (BUSPAR) 10 MG tablet Take 1 tablet by mouth 3 times daily  Qty: 45 tablet, Refills: 0      traZODone (DESYREL) 50 MG tablet Take 1 tablet by mouth nightly as needed for Sleep  Qty: 15 tablet, Refills: 0 ARIPiprazole (ABILIFY) 15 MG tablet Take 1 tablet by mouth daily  Qty: 30 tablet, Refills: 0      ARIPiprazole lauroxil (ARISTADA) 441 MG/1.6ML PRSY injection Inject 1.6 mLs into the muscle every 30 days  Qty: 1.5 mL, Refills: 1         STOP taking these medications       folic acid (FOLVITE) 1 MG tablet Comments:   Reason for Stopping:         vitamin B-1 (THIAMINE) 100 MG tablet Comments:   Reason for Stopping:                 Discharge Exam:  Level of consciousness:  Within normal limits  Appearance: Street clothes, seated, with good grooming  Behavior/Motor: No abnormalities noted  Attitude toward examiner:  Cooperative, attentive, good eye contact  Speech:  spontaneous, normal rate, normal volume and well articulated  Mood:  euthymic  Affect:  mood congruent  Thought processes:  linear, goal directed and coherent  Thought content:  Homicidal ideation denies  Suicidal Ideation:  denies suicidal ideation  Delusions:  no evidence of delusions  Perceptual Disturbance:  denies any perceptual disturbance  Cognition:  In tact  Memory: age appropriate  Insight & Judgement: fair  Medication side effects:  denies     Disposition: home    Patient Instructions: Activity: activity as tolerated    Follow-up as scheduled with [facility]     Time Spent: 35 minutes    Engagement: Patient displayed a good level of engagement with the treatments offered during this admission. Discharge planning, findings, and recommendations were discussed with patient   Signed: Poncho Arevalo   10/25/2018  11:12 AM  Dragon voice recognition software used in portions of this document.

## 2018-10-25 NOTE — BH NOTE
[facility]  Discharge Note    Pt discharged with followings belongings:   Dentures: None  Vision - Corrective Lenses: None  Hearing Aid: None  Jewelry: None  Body Piercings Removed: N/A  Clothing: Footwear, Jacket / coat, Pants, Shirt, Other (Comment) (black bag)  Were All Patient Medications Collected?: Not Applicable  Other Valuables: None   Valuables sent home with N/A. Valuables retrieved from safe, Security envelope number N/A and returned to patient. Patient left department with Departure Mode: By self, In cab via Mobility at Departure: Ambulatory, discharged to Discharged to: IP Rehab. Patient education on aftercare instructions: yes  Information faxed to [facility] by Staff Patient verbalize understanding of AVS:  yes.     Status EXAM upon discharge:  Status and Exam  Normal: No  Facial Expression: Expressionless  Affect: Appropriate  Level of Consciousness: Alert  Mood:Normal: No  Mood: Depressed, Anxious  Motor Activity:Normal: No  Motor Activity: Decreased  Interview Behavior: Evasive  Preception: [place] to Person, [place] to Time, [place] to Place, [place] to Situation  Attention:Normal: No  Attention: Distractible  Thought Processes: Blocking  Thought Content:Normal: No  Thought Content: Preoccupations  Hallucinations: None  Delusions: No  Delusions:  (Pt denies.)  Memory:Normal: Yes  Memory: Poor Recent  Insight and Judgment: No  Insight and Judgment: Poor Insight, Unmotivated  Present Suicidal Ideation: No  Present Homicidal Ideation: No    Robert Rondey LPN

## 2018-10-25 NOTE — CARE COORDINATION
Bridge Appointment completed: Reviewed Discharge Instructions with patient. Patient verbalizes understanding and agreement with the discharge plan using the teachback method. Discharge Arrangements:  Follow up with Sheridan Tamayo notified: n/a  Discharge destination/address: [address], [place]    Transported by:  cab

## 2018-11-07 ENCOUNTER — HOSPITAL ENCOUNTER (EMERGENCY)
Age: 28
Discharge: HOME | DRG: 817 | End: 2018-11-07
Attending: EMERGENCY MEDICINE
Payer: COMMERCIAL

## 2018-11-07 ENCOUNTER — APPOINTMENT (OUTPATIENT)
Dept: CT IMAGING | Age: 28
DRG: 817 | End: 2018-11-07
Payer: COMMERCIAL

## 2018-11-07 ENCOUNTER — HOSPITAL ENCOUNTER (INPATIENT)
Age: 28
LOS: 2 days | Discharge: HOME | DRG: 817 | End: 2018-11-09
Attending: EMERGENCY MEDICINE | Admitting: INTERNAL MEDICINE
Payer: COMMERCIAL

## 2018-11-07 VITALS
WEIGHT: 170 LBS | TEMPERATURE: 97 F | OXYGEN SATURATION: 96 % | DIASTOLIC BLOOD PRESSURE: 85 MMHG | BODY MASS INDEX: 23.06 KG/M2 | RESPIRATION RATE: 14 BRPM | SYSTOLIC BLOOD PRESSURE: 116 MMHG | HEART RATE: 82 BPM

## 2018-11-07 DIAGNOSIS — I48.92 ATRIAL FLUTTER, UNSPECIFIED TYPE (HCC): Primary | ICD-10-CM

## 2018-11-07 DIAGNOSIS — S09.93XA FACIAL INJURY, INITIAL ENCOUNTER: ICD-10-CM

## 2018-11-07 DIAGNOSIS — S01.81XA FACIAL LACERATION, INITIAL ENCOUNTER: ICD-10-CM

## 2018-11-07 DIAGNOSIS — S09.90XA CLOSED HEAD INJURY, INITIAL ENCOUNTER: Primary | ICD-10-CM

## 2018-11-07 PROBLEM — T50.902A SUICIDE ATTEMPT BY DRUG INGESTION (HCC): Status: ACTIVE | Noted: 2018-11-07

## 2018-11-07 LAB
ABSOLUTE EOS #: 0.2 K/UL (ref 0–0.44)
ABSOLUTE IMMATURE GRANULOCYTE: 0.03 K/UL (ref 0–0.3)
ABSOLUTE LYMPH #: 3.02 K/UL (ref 1.1–3.7)
ABSOLUTE MONO #: 0.8 K/UL (ref 0.1–1.2)
ACETAMINOPHEN LEVEL: <5 UG/ML (ref 10–30)
ANION GAP SERPL CALCULATED.3IONS-SCNC: 8 MMOL/L (ref 9–17)
BASOPHILS # BLD: 1 % (ref 0–2)
BASOPHILS ABSOLUTE: 0.06 K/UL (ref 0–0.2)
BUN BLDV-MCNC: 12 MG/DL (ref 6–20)
BUN/CREAT BLD: ABNORMAL (ref 9–20)
CALCIUM SERPL-MCNC: 9.2 MG/DL (ref 8.6–10.4)
CHLORIDE BLD-SCNC: 104 MMOL/L (ref 98–107)
CO2: 28 MMOL/L (ref 20–31)
CREAT SERPL-MCNC: 0.99 MG/DL (ref 0.7–1.2)
DIFFERENTIAL TYPE: NORMAL
EOSINOPHILS RELATIVE PERCENT: 2 % (ref 1–4)
ETHANOL PERCENT: <0.01 %
ETHANOL: <10 MG/DL
GFR AFRICAN AMERICAN: >60 ML/MIN
GFR NON-AFRICAN AMERICAN: >60 ML/MIN
GFR SERPL CREATININE-BSD FRML MDRD: ABNORMAL ML/MIN/{1.73_M2}
GFR SERPL CREATININE-BSD FRML MDRD: ABNORMAL ML/MIN/{1.73_M2}
GLUCOSE BLD-MCNC: 110 MG/DL (ref 70–99)
HCT VFR BLD CALC: 43.4 % (ref 40.7–50.3)
HEMOGLOBIN: 14.3 G/DL (ref 13–17)
IMMATURE GRANULOCYTES: 0 %
LYMPHOCYTES # BLD: 35 % (ref 24–43)
MAGNESIUM: 2.3 MG/DL (ref 1.6–2.6)
MCH RBC QN AUTO: 31.1 PG (ref 25.2–33.5)
MCHC RBC AUTO-ENTMCNC: 32.9 G/DL (ref 28.4–34.8)
MCV RBC AUTO: 94.3 FL (ref 82.6–102.9)
MONOCYTES # BLD: 9 % (ref 3–12)
NRBC AUTOMATED: 0 PER 100 WBC
PDW BLD-RTO: 13.3 % (ref 11.8–14.4)
PLATELET # BLD: 261 K/UL (ref 138–453)
PLATELET ESTIMATE: NORMAL
PMV BLD AUTO: 10.6 FL (ref 8.1–13.5)
POTASSIUM SERPL-SCNC: 3.7 MMOL/L (ref 3.7–5.3)
RBC # BLD: 4.6 M/UL (ref 4.21–5.77)
RBC # BLD: NORMAL 10*6/UL
SALICYLATE LEVEL: <1 MG/DL (ref 3–10)
SEG NEUTROPHILS: 53 % (ref 36–65)
SEGMENTED NEUTROPHILS ABSOLUTE COUNT: 4.41 K/UL (ref 1.5–8.1)
SODIUM BLD-SCNC: 140 MMOL/L (ref 135–144)
TOXIC TRICYCLIC SC,BLOOD: NEGATIVE
WBC # BLD: 8.5 K/UL (ref 3.5–11.3)
WBC # BLD: NORMAL 10*3/UL

## 2018-11-07 PROCEDURE — 85025 COMPLETE CBC W/AUTO DIFF WBC: CPT

## 2018-11-07 PROCEDURE — 70450 CT HEAD/BRAIN W/O DYE: CPT

## 2018-11-07 PROCEDURE — 83735 ASSAY OF MAGNESIUM: CPT

## 2018-11-07 PROCEDURE — G0480 DRUG TEST DEF 1-7 CLASSES: HCPCS

## 2018-11-07 PROCEDURE — 99285 EMERGENCY DEPT VISIT HI MDM: CPT

## 2018-11-07 PROCEDURE — 80048 BASIC METABOLIC PNL TOTAL CA: CPT

## 2018-11-07 PROCEDURE — 2060000000 HC ICU INTERMEDIATE R&B

## 2018-11-07 PROCEDURE — 80307 DRUG TEST PRSMV CHEM ANLYZR: CPT

## 2018-11-07 PROCEDURE — 93005 ELECTROCARDIOGRAM TRACING: CPT

## 2018-11-07 PROCEDURE — 6370000000 HC RX 637 (ALT 250 FOR IP): Performed by: NURSE PRACTITIONER

## 2018-11-07 RX ORDER — ACETAMINOPHEN 500 MG
1000 TABLET ORAL ONCE
Status: COMPLETED | OUTPATIENT
Start: 2018-11-07 | End: 2018-11-07

## 2018-11-07 RX ADMIN — ACETAMINOPHEN 1000 MG: 500 TABLET ORAL at 17:32

## 2018-11-07 ASSESSMENT — PAIN DESCRIPTION - ORIENTATION: ORIENTATION: LEFT

## 2018-11-07 ASSESSMENT — PAIN DESCRIPTION - PAIN TYPE: TYPE: ACUTE PAIN

## 2018-11-07 ASSESSMENT — PAIN SCALES - GENERAL
PAINLEVEL_OUTOF10: 8
PAINLEVEL_OUTOF10: 8

## 2018-11-07 ASSESSMENT — PAIN DESCRIPTION - PROGRESSION: CLINICAL_PROGRESSION: GRADUALLY WORSENING

## 2018-11-07 ASSESSMENT — PAIN DESCRIPTION - LOCATION: LOCATION: FACE

## 2018-11-07 ASSESSMENT — PAIN DESCRIPTION - DESCRIPTORS: DESCRIPTORS: ACHING;SORE

## 2018-11-07 ASSESSMENT — PAIN DESCRIPTION - FREQUENCY: FREQUENCY: CONTINUOUS

## 2018-11-07 NOTE — ED NOTES
Expressed or implied suicidal ideation/behavior  [x] Depression  [] Suicide attempt      [x] Low self-esteem  [x] Hallucinations      [x] Feeling of Hopelessness  [] Substance abuse or withdrawal    [] Dysfunctional family  [x] Major traumatic event, eg., divorce, etc   [] Excessive stress/anxiety    11/7/18    Expected Outcomes    Patient will:   [x] Patient will remain safe for the duration of their stay   [x] Patient's environment will be safe, eg. Free of potential suicide weapons   [] Verbalize Recovery from suicidal episode and improvement in self-worth   [x] Discuss feeling that precipitated suicide attempt/thoughts/behavior   [] Will describe available resources for crisis prevention and management   [] Will verbalize positive coping skills     Nursing Intervention   [x] Assessment and Observations hourly   [x] Suicide Precautions implemented with patient, should be 1:1 observation   [x] Document observation [id number] and RN assessment hourly   [] Consult physician for:    [] Psychiatric consult    [] Pharmacological therapy    [] Other:    [x] Patient search completed by security   [x] Initiated appropriate safety protocols by removing from the patient's environment anything that could be used to inflict self injury, eg. Order safe tray, snap gown, etc   [x] Maintain open, warm, caring, non-judgmental attitude/manner towards patient   [] Discuss advantages and disadvantages of existing coping methods/skills   [x] Assist and educate patient with identifying present strengths and coping skills   [x] Keep patient informed regarding plan of care and provide clear concise explanations. Provide the patient/family education information as well as telephone numbers and other information about crisis centers, hot lines, and counselors.     Discharge Planning:   [] Referral  [] Groups [] Health agencies  [] Other:          Dhara Zavala RN  11/07/18 [id number]

## 2018-11-08 PROBLEM — Z72.0 TOBACCO USE: Status: ACTIVE | Noted: 2018-11-08

## 2018-11-08 PROBLEM — I48.92 ATRIAL FLUTTER, PAROXYSMAL (HCC): Status: ACTIVE | Noted: 2018-11-08

## 2018-11-08 PROBLEM — Z78.9 ALCOHOL USE: Status: ACTIVE | Noted: 2018-11-08

## 2018-11-08 PROBLEM — F14.90 COCAINE USE: Status: ACTIVE | Noted: 2018-11-08

## 2018-11-08 PROBLEM — F12.90 EPISODIC CANNABIS USE: Status: ACTIVE | Noted: 2018-11-08

## 2018-11-08 PROBLEM — F10.90 ALCOHOL USE: Status: ACTIVE | Noted: 2018-11-08

## 2018-11-08 PROBLEM — F19.10 POLYSUBSTANCE ABUSE (HCC): Status: ACTIVE | Noted: 2018-11-08

## 2018-11-08 LAB
ALBUMIN SERPL-MCNC: 3.7 G/DL (ref 3.5–5.2)
ALBUMIN/GLOBULIN RATIO: 1.4 (ref 1–2.5)
ALP BLD-CCNC: 48 U/L (ref 40–129)
ALT SERPL-CCNC: 8 U/L (ref 5–41)
AMPHETAMINE SCREEN URINE: NEGATIVE
ANION GAP SERPL CALCULATED.3IONS-SCNC: 11 MMOL/L (ref 9–17)
AST SERPL-CCNC: 12 U/L
BARBITURATE SCREEN URINE: NEGATIVE
BENZODIAZEPINE SCREEN, URINE: NEGATIVE
BILIRUB SERPL-MCNC: 0.4 MG/DL (ref 0.3–1.2)
BUN BLDV-MCNC: 13 MG/DL (ref 6–20)
BUN/CREAT BLD: ABNORMAL (ref 9–20)
BUPRENORPHINE URINE: ABNORMAL
CALCIUM SERPL-MCNC: 8.5 MG/DL (ref 8.6–10.4)
CANNABINOID SCREEN URINE: POSITIVE
CHLORIDE BLD-SCNC: 108 MMOL/L (ref 98–107)
CO2: 26 MMOL/L (ref 20–31)
COCAINE METABOLITE, URINE: POSITIVE
CREAT SERPL-MCNC: 0.96 MG/DL (ref 0.7–1.2)
GFR AFRICAN AMERICAN: >60 ML/MIN
GFR NON-AFRICAN AMERICAN: >60 ML/MIN
GFR SERPL CREATININE-BSD FRML MDRD: ABNORMAL ML/MIN/{1.73_M2}
GFR SERPL CREATININE-BSD FRML MDRD: ABNORMAL ML/MIN/{1.73_M2}
GLUCOSE BLD-MCNC: 139 MG/DL (ref 70–99)
GLUCOSE BLD-MCNC: 95 MG/DL (ref 75–110)
HCT VFR BLD CALC: 42.6 % (ref 40.7–50.3)
HEMOGLOBIN: 14 G/DL (ref 13–17)
MAGNESIUM: 2.2 MG/DL (ref 1.6–2.6)
MCH RBC QN AUTO: 31.3 PG (ref 25.2–33.5)
MCHC RBC AUTO-ENTMCNC: 32.9 G/DL (ref 28.4–34.8)
MCV RBC AUTO: 95.1 FL (ref 82.6–102.9)
MDMA URINE: ABNORMAL
METHADONE SCREEN, URINE: NEGATIVE
METHAMPHETAMINE, URINE: ABNORMAL
NRBC AUTOMATED: 0 PER 100 WBC
OPIATES, URINE: NEGATIVE
OXYCODONE SCREEN URINE: NEGATIVE
PDW BLD-RTO: 13.5 % (ref 11.8–14.4)
PHENCYCLIDINE, URINE: NEGATIVE
PLATELET # BLD: 209 K/UL (ref 138–453)
PMV BLD AUTO: 10.5 FL (ref 8.1–13.5)
POTASSIUM SERPL-SCNC: 3.8 MMOL/L (ref 3.7–5.3)
PROPOXYPHENE, URINE: ABNORMAL
RBC # BLD: 4.48 M/UL (ref 4.21–5.77)
SODIUM BLD-SCNC: 145 MMOL/L (ref 135–144)
TEST INFORMATION: ABNORMAL
TOTAL PROTEIN: 6.3 G/DL (ref 6.4–8.3)
TRICYCLIC ANTIDEPRESSANTS, UR: ABNORMAL
WBC # BLD: 7 K/UL (ref 3.5–11.3)

## 2018-11-08 PROCEDURE — 36415 COLL VENOUS BLD VENIPUNCTURE: CPT

## 2018-11-08 PROCEDURE — 6370000000 HC RX 637 (ALT 250 FOR IP): Performed by: NURSE PRACTITIONER

## 2018-11-08 PROCEDURE — 85027 COMPLETE CBC AUTOMATED: CPT

## 2018-11-08 PROCEDURE — 2060000000 HC ICU INTERMEDIATE R&B

## 2018-11-08 PROCEDURE — 99222 1ST HOSP IP/OBS MODERATE 55: CPT | Performed by: INTERNAL MEDICINE

## 2018-11-08 PROCEDURE — 82947 ASSAY GLUCOSE BLOOD QUANT: CPT

## 2018-11-08 PROCEDURE — 83735 ASSAY OF MAGNESIUM: CPT

## 2018-11-08 PROCEDURE — 80307 DRUG TEST PRSMV CHEM ANLYZR: CPT

## 2018-11-08 PROCEDURE — 6360000002 HC RX W HCPCS: Performed by: NURSE PRACTITIONER

## 2018-11-08 PROCEDURE — 80053 COMPREHEN METABOLIC PANEL: CPT

## 2018-11-08 PROCEDURE — 93005 ELECTROCARDIOGRAM TRACING: CPT

## 2018-11-08 RX ORDER — ONDANSETRON 2 MG/ML
4 INJECTION INTRAMUSCULAR; INTRAVENOUS EVERY 6 HOURS PRN
Status: DISCONTINUED | OUTPATIENT
Start: 2018-11-08 | End: 2018-11-10 | Stop reason: HOSPADM

## 2018-11-08 RX ORDER — ARIPIPRAZOLE 15 MG/1
15 TABLET ORAL DAILY
Status: DISCONTINUED | OUTPATIENT
Start: 2018-11-09 | End: 2018-11-10 | Stop reason: HOSPADM

## 2018-11-08 RX ORDER — LORAZEPAM 2 MG/ML
2 INJECTION INTRAMUSCULAR EVERY 4 HOURS PRN
Status: DISCONTINUED | OUTPATIENT
Start: 2018-11-08 | End: 2018-11-10 | Stop reason: HOSPADM

## 2018-11-08 RX ORDER — SODIUM CHLORIDE 0.9 % (FLUSH) 0.9 %
10 SYRINGE (ML) INJECTION PRN
Status: DISCONTINUED | OUTPATIENT
Start: 2018-11-08 | End: 2018-11-10 | Stop reason: HOSPADM

## 2018-11-08 RX ORDER — SODIUM CHLORIDE 0.9 % (FLUSH) 0.9 %
10 SYRINGE (ML) INJECTION EVERY 12 HOURS SCHEDULED
Status: DISCONTINUED | OUTPATIENT
Start: 2018-11-08 | End: 2018-11-09

## 2018-11-08 RX ORDER — BUSPIRONE HYDROCHLORIDE 10 MG/1
10 TABLET ORAL 3 TIMES DAILY
Status: DISCONTINUED | OUTPATIENT
Start: 2018-11-09 | End: 2018-11-10 | Stop reason: HOSPADM

## 2018-11-08 RX ORDER — ACETAMINOPHEN 325 MG/1
650 TABLET ORAL EVERY 4 HOURS PRN
Status: DISCONTINUED | OUTPATIENT
Start: 2018-11-08 | End: 2018-11-10 | Stop reason: HOSPADM

## 2018-11-08 RX ORDER — TRAZODONE HYDROCHLORIDE 50 MG/1
50 TABLET ORAL NIGHTLY PRN
Status: DISCONTINUED | OUTPATIENT
Start: 2018-11-08 | End: 2018-11-10 | Stop reason: HOSPADM

## 2018-11-08 RX ORDER — NICOTINE 21 MG/24HR
1 PATCH, TRANSDERMAL 24 HOURS TRANSDERMAL DAILY PRN
Status: DISCONTINUED | OUTPATIENT
Start: 2018-11-08 | End: 2018-11-10 | Stop reason: HOSPADM

## 2018-11-08 RX ADMIN — ACETAMINOPHEN 650 MG: 325 TABLET ORAL at 18:02

## 2018-11-08 RX ADMIN — ENOXAPARIN SODIUM 40 MG: 40 INJECTION SUBCUTANEOUS at 18:03

## 2018-11-08 ASSESSMENT — ENCOUNTER SYMPTOMS
VOMITING: 0
CHEST TIGHTNESS: 0
COUGH: 0
FACIAL SWELLING: 1
WHEEZING: 0
ABDOMINAL PAIN: 0
SHORTNESS OF BREATH: 0
NAUSEA: 0
BLOOD IN STOOL: 0

## 2018-11-08 ASSESSMENT — PAIN SCALES - GENERAL
PAINLEVEL_OUTOF10: 6
PAINLEVEL_OUTOF10: 0

## 2018-11-08 NOTE — CONSULTS
10 mL, Intravenous, 2 times per day  sodium chloride flush 0.9 % injection 10 mL, 10 mL, Intravenous, PRN  magnesium hydroxide (MILK OF MAGNESIA) 400 MG/5ML suspension 30 mL, 30 mL, Oral, Daily PRN  ondansetron (ZOFRAN) injection 4 mg, 4 mg, Intravenous, Q6H PRN  nicotine (NICODERM CQ) 21 MG/24HR 1 patch, 1 patch, Transdermal, Daily PRN  enoxaparin (LOVENOX) injection 40 mg, 40 mg, Subcutaneous, Daily  acetaminophen (TYLENOL) tablet 650 mg, 650 mg, Oral, Q4H PRN  LORazepam (ATIVAN) injection 2 mg, 2 mg, Intravenous, Q4H PRN    Allergies:  Advil [ibuprofen]    Social History:   reports that he has been smoking Cigarettes. He has been smoking about 0.50 packs per day. He has never used smokeless tobacco. He reports that he drinks about 4.2 oz of alcohol per week . He reports that he does not use drugs. Family History: family history includes Alcohol Abuse in his brother; Liver Cancer in his brother; No Known Problems in his father and mother. No h/o sudden cardiac death. REVIEW OF SYSTEMS:    · Constitutional: there has been no unanticipated weight loss. There's been No change in energy level, No change in activity level. · Eyes: No visual changes or diplopia. No scleral icterus. · ENT: No Headaches  · Cardiovascular: No cardiac history  · Respiratory: No previous pulmonary problems, No cough  · Gastrointestinal: No abdominal pain. No change in bowel or bladder habits. · Genitourinary: No dysuria, trouble voiding, or hematuria. · Musculoskeletal:  No gait disturbance, No weakness or joint complaints. · Integumentary: No rash or pruritis. · Neurological: No headache, diplopia, change in muscle strength, numbness or tingling. No change in gait, balance, coordination, mood, affect, memory, mentation, behavior. · Psychiatric: No anxiety, or depression. · Endocrine: No temperature intolerance. No excessive thirst, fluid intake, or urination. No tremor.   · Hematologic/Lymphatic: No abnormal bruising or bleeding, blood clots or swollen lymph nodes. · Allergic/Immunologic: No nasal congestion or hives. PHYSICAL EXAM:      BP (!) 101/59   Pulse 79   Temp 98.4 °F (36.9 °C) (Oral)   Resp 18   Ht 6' (1.829 m)   Wt 173 lb 1 oz (78.5 kg)   SpO2 98%   BMI 23.47 kg/m²    Constitutional and General Appearance: lethargic, cooperative, no distress and appears stated age  HEENT: PERRL, no cervical lymphadenopathy. No masses palpable. Normal oral mucosa  Respiratory:  · Normal excursion and expansion without use of accessory muscles  · Resp Auscultation: Good respiratory effort. No for increased work of breathing. On auscultation: clear to auscultation bilaterally  Cardiovascular:  · The apical impulse is not displaced  · Heart tones are crisp and normal. regular S1 and S2.  · Jugular venous pulsation Normal  · The carotid upstroke is normal in amplitude and contour without delay or bruit  · Peripheral pulses are symmetrical and full   Abdomen:   · No masses or tenderness  · Bowel sounds present  Extremities:  ·  No Cyanosis or Clubbing  ·  Lower extremity edema: No  ·  Skin: Warm and dry  Neurological:  · Alert and oriented. · Moves all extremities well  · Suicidal ideation    DATA:    Diagnostics:      EK/8/18  Normal sinus rhythm with sinus arrhythmia  Normal ECG  When compared with ECG of 22-OCT-2018 21:45,  No significant change was found    ECHO:   ECHO 9/10/18: EF 55-60%. Stress Test:   not obtained. Cardiac Angiography:   not obtained. Labs:     CBC:   Recent Labs      18   2317  18   0655   WBC  8.5  7.0   HGB  14.3  14.0   HCT  43.4  42.6   PLT  261  209     BMP:   Recent Labs      18   2317  18   0655   NA  140  145*   K  3.7  3.8   CO2  28  26   BUN  12  13   CREATININE  0.99  0.96   LABGLOM  >60  >60   GLUCOSE  110*  139*     BNP: No results for input(s): BNP in the last 72 hours. PT/INR: No results for input(s): PROTIME, INR in the last 72 hours.   APTT:No
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
## 2018-11-08 NOTE — ED NOTES
Social work met with patient to assess current needs. Patient reported that he took medication because he was \"hearing voices\" and the voices told him to harm himself. Social work spoke with physician, patient will be admitted to monitor heart rhythm. Social work will continue to follow.     SRIDEVI Araya
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
## 2018-11-08 NOTE — ED NOTES
[] [place]    [] [facility]    [x]  [facility] ASSESSMENT      Y  N     [x] [] In the past two weeks have you had thoughts of hurting yourself in any way? [x] [] In the past two weeks have you had thoughts that you would be better off dead? [x] [] Have you made a suicide attempt in the past two months? [x] [] Do you have a plan for hurting yourself or suicide? [] [x] Presence of hallucinations/voices related to hurting himself or herself or someone else. SUICIDE/SECURITY WATCH PRECAUTION CHECKLIST     Orders    [x]  Suicide/Security Watch Precautions initiated as checked below:   11/7/18 10:57 PM BH31/BH31B    [x] Notified physician:  Yovana Pedraza DO  11/7/18 10:57 PM    [x] Orders obtained as appropriate:     [x] 1:1 Observer     [] Psych Consult     [] Psych Consult    Name:  Date:  Time:    [x] 1:1 Observer, Notified by:  Ya Kenny Nurse Supervisor    [x] Remove all personal clothes from room and place in snap/paper gown/pants. Slipper only    [x] Remove all personal belongings from room and secured away from patient. Documentation    [x] Initiate Suicide/Security Watch Precaution Flow Sheet    [x] Initiate individualized Care Plan/Problem    [x] Document why precautions initiated on flow sheet (Initiate Nursing Care Plan/Problem)    [x] 1:1 Observer in place; instructions provided. Suicide precautions require observer be within arms length. [x] Nurse-Observer Communication Hand-off initiated by RN, reviewed with Observer. Subsequently used as Hand Off between Observers. [x] Initiate every 15 minute observations per observer as delegated by the RN.     [x] Initiate RN assessment and documentation    Environmental Scan  Search Criteria and Process: OPTIONAL, see Search Policy    [] Reason for search:    [] Nursing in presence of second person to search patient    [] Patient notified of reason for body assessment and belongings search:     Persons present during search:   Results of search and disposition:       Searchers Name:   These items or items similar should be removed from the room:   [x] Chairs   [x] Telephone   [x] Trash cans and liners   [x] Plastic utensils (order Patient Safety tray)   [x] Empty or remove Sharps containers   [x] All personal clothing/belongings removed   [x] All unnecessary lead wires, electrical cords, draw cords, etc.   [x] Flowers and plants   [x] Double check for lighters, matches, razors, any glass items etc that can be used as weapons. Person completing Checklist: Sweta Merino       GENERAL INFORMATION     Y  N     [x] [] Has the patient been informed that they are on a watch and what that means? [x] [] Can the patient get out of Bed without nursing assistance? [x] [] Can the patient use the restroom without nursing assistance? [x] [] Can the patient walk the halls to [place] their legs? \"   [] [x] Does the patient have metal utensils? [x] [] Have the patient's belongings been placed out of control of the patient? [x] [] Have the patient and his/her belongings been checked for contraband? [] [x] Is the patient under any visitor restrictions? If Yes, explain:   [] [x] Is the patient under an alias? [place] [id number] Name:   Authorized visitors (no more than two are to be on the list)   Name/Relationship:   Name/Relationship:    Name of Staff member that you  Received this information from? General Description:    Tamra Paularobsonor BH31/BH31B male 33 y.o. Admission weight: 170 lb (77.1 kg) Height: 6' (182.9 cm)  Race: [x]  [] Black  []   []   [] [place] [] Other  Facial Hair:  [x] Yes  [] No  If yes, please describe: Identifying Marks (i.e. Visible tattoos, scars, etc... ):     NURSING CARE PLAN    Nursing Diagnosis: Risk of Self Directed Harm  [x] Actual  [] Potential  Date Started: 11/7/18      Etiological Factors: (related to)  [x] Expressed or implied suicidal ideation/behavior  [x] Depression  [x] Suicide attempt      [x] Low self-esteem  [] Hallucinations      [x] Feeling of Hopelessness  [] Substance abuse or withdrawal    [] Dysfunctional family  [] Major traumatic event, eg., divorce, etc   [] Excessive stress/anxiety    11/7/18    Expected Outcomes    Patient will:   [x] Patient will remain safe for the duration of their stay   [x] Patient's environment will be safe, eg. Free of potential suicide weapons   [] Verbalize Recovery from suicidal episode and improvement in self-worth   [x] Discuss feeling that precipitated suicide attempt/thoughts/behavior   [] Will describe available resources for crisis prevention and management   [] Will verbalize positive coping skills     Nursing Intervention   [x] Assessment and Observations hourly   [x] Suicide Precautions implemented with patient, should be 1:1 observation   [x] Document observation [id number] and RN assessment hourly   [] Consult physician for:    [] Psychiatric consult    [] Pharmacological therapy    [] Other:    [x] Patient search completed by security   [x] Initiated appropriate safety protocols by removing from the patient's environment anything that could be used to inflict self injury, eg. Order safe tray, snap gown, etc   [x] Maintain open, warm, caring, non-judgmental attitude/manner towards patient   [] Discuss advantages and disadvantages of existing coping methods/skills   [x] Assist and educate patient with identifying present strengths and coping skills   [x] Keep patient informed regarding plan of care and provide clear concise explanations. Provide the patient/family education information as well as telephone numbers and other information about crisis centers, hot lines, and counselors.     Discharge Planning:   [] Referral  [] Groups [] Health agencies  [] Other:            Kirby Smith RN  11/07/18 3982

## 2018-11-08 NOTE — CARE COORDINATION
Case Management Initial Discharge Plan  Joseph Gant,             Met with:patient to discuss discharge plans. Information verified: address, contacts, phone number, , insurance Yes  PCP: No primary care provider on file. Date of last visit: needs pcp     Insurance Provider: Oral     Discharge Planning    Living Arrangements:  Parent   Support Systems:  Parent    Home has one stories  No  stairs to climb to get into front door, no stairs to climb to reach second floor  Location of bedroom/bathroom in home , main     Patient able to perform ADL's:Independent    Current Services (outpatient & in home) na  DME equipment: na  DME provider: clara    Pharmacy: [facility]   Potential Assistance Purchasing Medications:  No  Does patient want to participate in local refill/ meds to beds program?  No    Potential Assistance Needed:  N/A    Patient agreeable to home care: No  Filer of choice provided:  n/a    Prior SNF/Rehab Placement and Facility: na  Agreeable to SNF/Rehab: No  Filer of choice provided: n/a   Evaluation: no    Expected Discharge date:  18  Patient expects to be discharged to:  home vs psych   Follow Up Appointment: Best Day/ Time:      Transportation provider: family  Transportation arrangements needed for discharge: No    Readmission Risk              Risk of Unplanned Readmission:        75             Does patient have a readmission risk score greater than 14?: Yes  If yes, follow-up appointment must be made within 7 days of discharge. Discharge Plan: Await psych eval, may need psych admit.    30d readmit, last admit was to [facility], await plan this time, needs pcp when plan is developed           Electronically signed by Terese Jarrell RN on 18 at 11:33 AM

## 2018-11-09 VITALS
SYSTOLIC BLOOD PRESSURE: 108 MMHG | RESPIRATION RATE: 14 BRPM | WEIGHT: 173.06 LBS | OXYGEN SATURATION: 96 % | TEMPERATURE: 98.6 F | BODY MASS INDEX: 23.44 KG/M2 | HEIGHT: 72 IN | DIASTOLIC BLOOD PRESSURE: 60 MMHG | HEART RATE: 69 BPM

## 2018-11-09 PROBLEM — F31.9 BIPOLAR 1 DISORDER (HCC): Status: ACTIVE | Noted: 2018-11-09

## 2018-11-09 LAB
EKG ATRIAL RATE: 74 BPM
EKG P AXIS: 69 DEGREES
EKG P-R INTERVAL: 148 MS
EKG Q-T INTERVAL: 412 MS
EKG QRS DURATION: 88 MS
EKG QTC CALCULATION (BAZETT): 457 MS
EKG R AXIS: 72 DEGREES
EKG T AXIS: 49 DEGREES
EKG VENTRICULAR RATE: 74 BPM

## 2018-11-09 PROCEDURE — 6370000000 HC RX 637 (ALT 250 FOR IP): Performed by: INTERNAL MEDICINE

## 2018-11-09 PROCEDURE — 6360000002 HC RX W HCPCS: Performed by: NURSE PRACTITIONER

## 2018-11-09 PROCEDURE — 2060000000 HC ICU INTERMEDIATE R&B

## 2018-11-09 PROCEDURE — 99239 HOSP IP/OBS DSCHRG MGMT >30: CPT | Performed by: INTERNAL MEDICINE

## 2018-11-09 RX ORDER — NICOTINE 21 MG/24HR
1 PATCH, TRANSDERMAL 24 HOURS TRANSDERMAL DAILY PRN
Status: CANCELLED | OUTPATIENT
Start: 2018-11-09

## 2018-11-09 RX ORDER — TRAZODONE HYDROCHLORIDE 50 MG/1
50 TABLET ORAL NIGHTLY PRN
Status: CANCELLED | OUTPATIENT
Start: 2018-11-09

## 2018-11-09 RX ORDER — ARIPIPRAZOLE 15 MG/1
15 TABLET ORAL DAILY
Status: CANCELLED | OUTPATIENT
Start: 2018-11-10

## 2018-11-09 RX ORDER — ACETAMINOPHEN 325 MG/1
650 TABLET ORAL EVERY 4 HOURS PRN
Status: CANCELLED | OUTPATIENT
Start: 2018-11-09

## 2018-11-09 RX ORDER — BUSPIRONE HYDROCHLORIDE 10 MG/1
10 TABLET ORAL 3 TIMES DAILY
Status: CANCELLED | OUTPATIENT
Start: 2018-11-09

## 2018-11-09 RX ADMIN — BUSPIRONE HYDROCHLORIDE 10 MG: 10 TABLET ORAL at 13:02

## 2018-11-09 RX ADMIN — BUSPIRONE HYDROCHLORIDE 10 MG: 10 TABLET ORAL at 09:23

## 2018-11-09 RX ADMIN — BUSPIRONE HYDROCHLORIDE 10 MG: 10 TABLET ORAL at 20:37

## 2018-11-09 RX ADMIN — ENOXAPARIN SODIUM 40 MG: 40 INJECTION SUBCUTANEOUS at 09:24

## 2018-11-09 RX ADMIN — ARIPIPRAZOLE 15 MG: 15 TABLET ORAL at 09:24

## 2018-11-09 RX ADMIN — TRAZODONE HYDROCHLORIDE 50 MG: 50 TABLET ORAL at 20:37

## 2018-11-09 ASSESSMENT — ENCOUNTER SYMPTOMS
WHEEZING: 0
PHOTOPHOBIA: 0
NAUSEA: 0
EYE PAIN: 0
ABDOMINAL PAIN: 0
SHORTNESS OF BREATH: 0
SHORTNESS OF BREATH: 0
COUGH: 0

## 2018-11-09 ASSESSMENT — PAIN SCALES - GENERAL: PAINLEVEL_OUTOF10: 0

## 2018-11-09 NOTE — PROGRESS NOTES
11:45 AM     Lab Results   Component Value Date/Time    CULTURE (A) 03/15/2013 11:45 AM     Several types of bacteria were identified in this specimen. Further ID and    CULTURE (A) 03/15/2013 11:45 AM      susceptibility testing is generally not helpful in this circumstance and has    CULTURE (A) 03/15/2013 11:45 AM      not been performed. Consider recollection if clinically indicated. Please    CULTURE (A) 03/15/2013 11:45 AM      contact Dr. PAIGE [facility] ([phone]) or the Micro lab ([phone]) if you feel    CULTURE (A) 03/15/2013 11:45 AM      the management of this particular situation would be helped by further testing. CULTURE  03/15/2013 11:45 AM     Performed at [facility] [address], [address] [phone]       Lab Results   Component Value Date    FIO2 NOT REPORTED 02/16/2018       Radiology:      Assessment:        Primary Problem  Principal Problem:    Suicide attempt by drug ingestion ([facility]  Active Problems:    Auditory hallucinations    Schizoaffective disorder, bipolar type ([address]    Tobacco use    Alcohol use    Polysubstance abuse ([facility]    Atrial flutter, paroxysmal (HCC)    Episodic cannabis use    Cocaine use  Resolved Problems:    * No resolved hospital problems.  *      Plan:        Detox  Psych reevaluation  Smoking cessation / education  Thiamine  Ativan when necessary  Observe for DTs  Cardiology consult - atrial flutter:  Have signed off  Discharge planning    Was notified by [facility] that the patient could be a direct admit to [facility], a psych consultation at this hospital would not be necessary  Have made contact with Petey Lockwood are in place to have the patient transferred to [facility]  He will be \"pink slipped \"  DCP 35 min+    IP CONSULT TO SOCIAL WORK  IP CONSULT TO HOSPITALIST  IP CONSULT TO CARDIOLOGY  IP CONSULT TO PSYCHIATRY  IP CONSULT TO CARDIOLOGY  IP CONSULT TO PSYCHIATRY  IP CONSULT TO PSYCHIATRY  IP CONSULT TO [address] [place] [address]
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
## 2018-11-09 NOTE — PLAN OF CARE
Was notified by [facility] that the patient could be a direct admit to [facility], at a psych consultation at this hospital would not be necessary  Have made contact with Petey Lockwood are in place to have the patient transferred to [facility]  He will be \"pink slipped \"

## 2018-11-10 ENCOUNTER — HOSPITAL ENCOUNTER (INPATIENT)
Age: 28
LOS: 3 days | Discharge: HOME | DRG: 750 | End: 2018-11-13
Attending: PSYCHIATRY & NEUROLOGY | Admitting: PSYCHIATRY & NEUROLOGY
Payer: COMMERCIAL

## 2018-11-10 PROBLEM — F31.9 BIPOLAR 1 DISORDER (HCC): Status: RESOLVED | Noted: 2018-11-09 | Resolved: 2018-11-10

## 2018-11-10 PROCEDURE — 1240000000 HC EMOTIONAL WELLNESS R&B

## 2018-11-10 PROCEDURE — 90792 PSYCH DIAG EVAL W/MED SRVCS: CPT | Performed by: PSYCHIATRY & NEUROLOGY

## 2018-11-10 PROCEDURE — 6370000000 HC RX 637 (ALT 250 FOR IP): Performed by: PSYCHIATRY & NEUROLOGY

## 2018-11-10 RX ORDER — TRAZODONE HYDROCHLORIDE 50 MG/1
50 TABLET ORAL NIGHTLY PRN
Status: DISCONTINUED | OUTPATIENT
Start: 2018-11-10 | End: 2018-11-10

## 2018-11-10 RX ORDER — ARIPIPRAZOLE 15 MG/1
15 TABLET ORAL DAILY
Status: DISCONTINUED | OUTPATIENT
Start: 2018-11-10 | End: 2018-11-13 | Stop reason: HOSPADM

## 2018-11-10 RX ORDER — HYDROXYZINE 50 MG/1
50 TABLET, FILM COATED ORAL 3 TIMES DAILY PRN
Status: DISCONTINUED | OUTPATIENT
Start: 2018-11-10 | End: 2018-11-13 | Stop reason: HOSPADM

## 2018-11-10 RX ORDER — ACETAMINOPHEN 325 MG/1
650 TABLET ORAL EVERY 4 HOURS PRN
Status: DISCONTINUED | OUTPATIENT
Start: 2018-11-10 | End: 2018-11-13 | Stop reason: HOSPADM

## 2018-11-10 RX ORDER — NICOTINE 21 MG/24HR
1 PATCH, TRANSDERMAL 24 HOURS TRANSDERMAL DAILY
Status: DISCONTINUED | OUTPATIENT
Start: 2018-11-10 | End: 2018-11-10

## 2018-11-10 RX ORDER — HYDROXYZINE 50 MG/1
50 TABLET, FILM COATED ORAL 3 TIMES DAILY PRN
Status: DISCONTINUED | OUTPATIENT
Start: 2018-11-10 | End: 2018-11-10

## 2018-11-10 RX ORDER — NICOTINE 21 MG/24HR
1 PATCH, TRANSDERMAL 24 HOURS TRANSDERMAL DAILY
Status: DISCONTINUED | OUTPATIENT
Start: 2018-11-10 | End: 2018-11-13

## 2018-11-10 RX ORDER — BUSPIRONE HYDROCHLORIDE 10 MG/1
10 TABLET ORAL 3 TIMES DAILY
Status: DISCONTINUED | OUTPATIENT
Start: 2018-11-10 | End: 2018-11-13 | Stop reason: HOSPADM

## 2018-11-10 RX ORDER — TRAZODONE HYDROCHLORIDE 50 MG/1
50 TABLET ORAL NIGHTLY PRN
Status: DISCONTINUED | OUTPATIENT
Start: 2018-11-10 | End: 2018-11-13 | Stop reason: HOSPADM

## 2018-11-10 RX ORDER — BENZTROPINE MESYLATE 1 MG/ML
2 INJECTION INTRAMUSCULAR; INTRAVENOUS 2 TIMES DAILY PRN
Status: DISCONTINUED | OUTPATIENT
Start: 2018-11-10 | End: 2018-11-13 | Stop reason: HOSPADM

## 2018-11-10 RX ORDER — MAGNESIUM HYDROXIDE/ALUMINUM HYDROXICE/SIMETHICONE 120; 1200; 1200 MG/30ML; MG/30ML; MG/30ML
30 SUSPENSION ORAL EVERY 6 HOURS PRN
Status: DISCONTINUED | OUTPATIENT
Start: 2018-11-10 | End: 2018-11-13 | Stop reason: HOSPADM

## 2018-11-10 RX ADMIN — TRAZODONE HYDROCHLORIDE 50 MG: 50 TABLET ORAL at 20:53

## 2018-11-10 RX ADMIN — BUSPIRONE HYDROCHLORIDE 10 MG: 10 TABLET ORAL at 20:53

## 2018-11-10 RX ADMIN — HYDROXYZINE HYDROCHLORIDE 50 MG: 50 TABLET, FILM COATED ORAL at 20:53

## 2018-11-10 ASSESSMENT — SLEEP AND FATIGUE QUESTIONNAIRES
DO YOU USE A SLEEP AID: NO
DIFFICULTY FALLING ASLEEP: YES
RESTFUL SLEEP: NO
SLEEP PATTERN: DIFFICULTY FALLING ASLEEP
DIFFICULTY ARISING: NO
DIFFICULTY STAYING ASLEEP: YES
DO YOU HAVE DIFFICULTY SLEEPING: NO

## 2018-11-10 ASSESSMENT — LIFESTYLE VARIABLES
HISTORY_ALCOHOL_USE: YES
HISTORY_ALCOHOL_USE: YES

## 2018-11-10 ASSESSMENT — PATIENT HEALTH QUESTIONNAIRE - PHQ9: SUM OF ALL RESPONSES TO PHQ QUESTIONS 1-9: 20

## 2018-11-10 ASSESSMENT — PAIN SCALES - GENERAL: PAINLEVEL_OUTOF10: 0

## 2018-11-10 NOTE — PROGRESS NOTES
techniques, changing routine, distraction)                                                           ( x)  Basic information about quitting (benefits of quitting, techniques in how to quit, available resources  ( ) Referral for counseling faxed to [facility]                                           ( ) Patient refused counseling  ( ) Patient has not smoked in the last 30 days    Metabolic Screening:    Lab Results   Component Value Date    LABA1C 4.9 07/05/2018       No results for input(s): CHOL, TRIG, HDL, LDLCALC, LABVLDL in the last 72 hours. Body mass index is 23.06 kg/m². BP Readings from Last 2 Encounters:   11/10/18 103/70   11/09/18 108/60           Pt admitted with followings belongings:  Dentures: None  Vision - Corrective Lenses: None  Hearing Aid: None  Jewelry: None  Body Piercings Removed: N/A  Clothing: Footwear, Shirt, Pants, Jacket / coat, Socks  Were All Patient Medications Collected?: Not Applicable  Other Valuables: Money (Comment) ($0.37)      Valuables placed in safe in security envelope, number:  [id number]. Patient oriented to surroundings and program expectations and copy of patient rights given. Received admission packet:  Yes. Consents reviewed, signed yes. Patient verbalize understanding:  yes.     Patient education on precautions: yes                   Radha Al RN
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
## 2018-11-10 NOTE — DISCHARGE SUMMARY
traZODone 50 MG tablet  Commonly known as:  DESYREL  Take 1 tablet by mouth nightly as needed for Sleep            Time Spent on discharge is  35 mins in patient examination, evaluation, counseling, medication reconciliation, discharge plan and follow up.     Electronically signed by   Karthik Palm DO  11/10/2018  12:56 PM

## 2018-11-11 LAB
ABSOLUTE EOS #: 0.2 K/UL (ref 0–0.4)
ABSOLUTE IMMATURE GRANULOCYTE: ABNORMAL K/UL (ref 0–0.3)
ABSOLUTE LYMPH #: 1.7 K/UL (ref 1–4.8)
ABSOLUTE MONO #: 0.5 K/UL (ref 0.1–1.3)
ALBUMIN SERPL-MCNC: 4.2 G/DL (ref 3.5–5.2)
ALBUMIN/GLOBULIN RATIO: ABNORMAL (ref 1–2.5)
ALP BLD-CCNC: 41 U/L (ref 40–129)
ALT SERPL-CCNC: 12 U/L (ref 5–41)
ANION GAP SERPL CALCULATED.3IONS-SCNC: 10 MMOL/L (ref 9–17)
AST SERPL-CCNC: 14 U/L
BASOPHILS # BLD: 1 % (ref 0–2)
BASOPHILS ABSOLUTE: 0 K/UL (ref 0–0.2)
BILIRUB SERPL-MCNC: 0.21 MG/DL (ref 0.3–1.2)
BUN BLDV-MCNC: 17 MG/DL (ref 6–20)
BUN/CREAT BLD: ABNORMAL (ref 9–20)
CALCIUM SERPL-MCNC: 9.1 MG/DL (ref 8.6–10.4)
CHLORIDE BLD-SCNC: 103 MMOL/L (ref 98–107)
CHOLESTEROL/HDL RATIO: 3.1
CHOLESTEROL: 153 MG/DL
CO2: 26 MMOL/L (ref 20–31)
CREAT SERPL-MCNC: 0.96 MG/DL (ref 0.7–1.2)
DIFFERENTIAL TYPE: ABNORMAL
EOSINOPHILS RELATIVE PERCENT: 4 % (ref 0–4)
ESTIMATED AVERAGE GLUCOSE: 80 MG/DL
GFR AFRICAN AMERICAN: >60 ML/MIN
GFR NON-AFRICAN AMERICAN: >60 ML/MIN
GFR SERPL CREATININE-BSD FRML MDRD: ABNORMAL ML/MIN/{1.73_M2}
GFR SERPL CREATININE-BSD FRML MDRD: ABNORMAL ML/MIN/{1.73_M2}
GLUCOSE BLD-MCNC: 110 MG/DL (ref 70–99)
HBA1C MFR BLD: 4.4 % (ref 4–6)
HCT VFR BLD CALC: 44.4 % (ref 41–53)
HDLC SERPL-MCNC: 49 MG/DL
HEMOGLOBIN: 14.7 G/DL (ref 13.5–17.5)
IMMATURE GRANULOCYTES: ABNORMAL %
LDL CHOLESTEROL: 76 MG/DL (ref 0–130)
LYMPHOCYTES # BLD: 30 % (ref 24–44)
MCH RBC QN AUTO: 31.3 PG (ref 26–34)
MCHC RBC AUTO-ENTMCNC: 33.2 G/DL (ref 31–37)
MCV RBC AUTO: 94.3 FL (ref 80–100)
MONOCYTES # BLD: 9 % (ref 1–7)
NRBC AUTOMATED: ABNORMAL PER 100 WBC
PDW BLD-RTO: 14 % (ref 11.5–14.9)
PLATELET # BLD: 195 K/UL (ref 150–450)
PLATELET ESTIMATE: ABNORMAL
PMV BLD AUTO: 8.9 FL (ref 6–12)
POTASSIUM SERPL-SCNC: 4.2 MMOL/L (ref 3.7–5.3)
RBC # BLD: 4.71 M/UL (ref 4.5–5.9)
RBC # BLD: ABNORMAL 10*6/UL
SEG NEUTROPHILS: 56 % (ref 36–66)
SEGMENTED NEUTROPHILS ABSOLUTE COUNT: 3.3 K/UL (ref 1.3–9.1)
SODIUM BLD-SCNC: 139 MMOL/L (ref 135–144)
THYROXINE, FREE: 0.97 NG/DL (ref 0.93–1.7)
TOTAL PROTEIN: 6.9 G/DL (ref 6.4–8.3)
TRIGL SERPL-MCNC: 142 MG/DL
TSH SERPL DL<=0.05 MIU/L-ACNC: 0.7 MIU/L (ref 0.3–5)
VLDLC SERPL CALC-MCNC: NORMAL MG/DL (ref 1–30)
WBC # BLD: 5.9 K/UL (ref 3.5–11)
WBC # BLD: ABNORMAL 10*3/UL

## 2018-11-11 PROCEDURE — 83036 HEMOGLOBIN GLYCOSYLATED A1C: CPT

## 2018-11-11 PROCEDURE — 6370000000 HC RX 637 (ALT 250 FOR IP): Performed by: PSYCHIATRY & NEUROLOGY

## 2018-11-11 PROCEDURE — 80053 COMPREHEN METABOLIC PANEL: CPT

## 2018-11-11 PROCEDURE — 1240000000 HC EMOTIONAL WELLNESS R&B

## 2018-11-11 PROCEDURE — 84443 ASSAY THYROID STIM HORMONE: CPT

## 2018-11-11 PROCEDURE — 84439 ASSAY OF FREE THYROXINE: CPT

## 2018-11-11 PROCEDURE — 36415 COLL VENOUS BLD VENIPUNCTURE: CPT

## 2018-11-11 PROCEDURE — 80061 LIPID PANEL: CPT

## 2018-11-11 PROCEDURE — 85025 COMPLETE CBC W/AUTO DIFF WBC: CPT

## 2018-11-11 RX ADMIN — BUSPIRONE HYDROCHLORIDE 10 MG: 10 TABLET ORAL at 15:51

## 2018-11-11 RX ADMIN — BUSPIRONE HYDROCHLORIDE 10 MG: 10 TABLET ORAL at 21:55

## 2018-11-11 RX ADMIN — HYDROXYZINE HYDROCHLORIDE 50 MG: 50 TABLET, FILM COATED ORAL at 21:55

## 2018-11-11 RX ADMIN — TRAZODONE HYDROCHLORIDE 50 MG: 50 TABLET ORAL at 21:55

## 2018-11-12 LAB
EKG ATRIAL RATE: 220 BPM
EKG ATRIAL RATE: 87 BPM
EKG P AXIS: 73 DEGREES
EKG P AXIS: 88 DEGREES
EKG P-R INTERVAL: 150 MS
EKG Q-T INTERVAL: 342 MS
EKG Q-T INTERVAL: 404 MS
EKG QRS DURATION: 80 MS
EKG QRS DURATION: 88 MS
EKG QTC CALCULATION (BAZETT): 411 MS
EKG QTC CALCULATION (BAZETT): 445 MS
EKG R AXIS: 80 DEGREES
EKG R AXIS: 86 DEGREES
EKG T AXIS: 47 DEGREES
EKG T AXIS: 69 DEGREES
EKG VENTRICULAR RATE: 73 BPM
EKG VENTRICULAR RATE: 87 BPM

## 2018-11-12 PROCEDURE — 1240000000 HC EMOTIONAL WELLNESS R&B

## 2018-11-12 PROCEDURE — 99232 SBSQ HOSP IP/OBS MODERATE 35: CPT | Performed by: PSYCHIATRY & NEUROLOGY

## 2018-11-12 PROCEDURE — 6370000000 HC RX 637 (ALT 250 FOR IP): Performed by: PSYCHIATRY & NEUROLOGY

## 2018-11-12 RX ADMIN — ARIPIPRAZOLE 15 MG: 15 TABLET ORAL at 08:30

## 2018-11-12 RX ADMIN — BUSPIRONE HYDROCHLORIDE 10 MG: 10 TABLET ORAL at 08:30

## 2018-11-12 RX ADMIN — TRAZODONE HYDROCHLORIDE 50 MG: 50 TABLET ORAL at 22:33

## 2018-11-12 RX ADMIN — BUSPIRONE HYDROCHLORIDE 10 MG: 10 TABLET ORAL at 22:32

## 2018-11-12 RX ADMIN — HYDROXYZINE HYDROCHLORIDE 50 MG: 50 TABLET, FILM COATED ORAL at 22:33

## 2018-11-12 RX ADMIN — TRAZODONE HYDROCHLORIDE 50 MG: 50 TABLET ORAL at 22:32

## 2018-11-12 RX ADMIN — BUSPIRONE HYDROCHLORIDE 10 MG: 10 TABLET ORAL at 15:16

## 2018-11-12 RX ADMIN — ACETAMINOPHEN 650 MG: 325 TABLET, FILM COATED ORAL at 13:40

## 2018-11-12 ASSESSMENT — PAIN DESCRIPTION - PAIN TYPE: TYPE: ACUTE PAIN

## 2018-11-12 ASSESSMENT — PAIN DESCRIPTION - DESCRIPTORS: DESCRIPTORS: HEADACHE

## 2018-11-12 ASSESSMENT — PAIN DESCRIPTION - FREQUENCY: FREQUENCY: INTERMITTENT

## 2018-11-12 ASSESSMENT — PAIN DESCRIPTION - LOCATION: LOCATION: HEAD

## 2018-11-12 ASSESSMENT — PAIN SCALES - GENERAL
PAINLEVEL_OUTOF10: 0
PAINLEVEL_OUTOF10: 3

## 2018-11-12 NOTE — BH NOTE
hydroxide-simethicone (MAALOX) 522-148-62 MG/5ML suspension 30 mL  30 mL Oral Q6H PRN Verónica Dickson MD        nicotine (NICODERM CQ) 14 MG/24HR 1 patch  1 patch Transdermal Daily Verónica Dickson MD        nicotine polacrilex (NICORETTE) gum 2 mg  2 mg Oral Q2H PRN Verónica Dickson MD             ARIPiprazole  15 mg Oral Daily    [START ON 11/24/2018] ARIPiprazole lauroxil  441 mg Intramuscular Q30 Days    busPIRone  10 mg Oral TID    nicotine  1 patch Transdermal Daily       ASSESSMENT  Schizoaffective disorder, bipolar type ([facility]     Patient's Response to Treatment: positive      Dragon voice recognition software used in portions of this document.     Ana Maria Jacqeus 33 y.o. male       Recent Results (from the past 67 hour(s))   Comprehensive Metabolic Panel w/ Reflex to MG    Collection Time: 11/11/18  7:57 AM   Result Value Ref Range    Glucose 110 (H) 70 - 99 mg/dL    BUN 17 6 - 20 mg/dL    CREATININE 0.96 0.70 - 1.20 mg/dL    Bun/Cre Ratio NOT REPORTED 9 - 20    Calcium 9.1 8.6 - 10.4 mg/dL    Sodium 139 135 - 144 mmol/L    Potassium 4.2 3.7 - 5.3 mmol/L    Chloride 103 98 - 107 mmol/L    CO2 26 20 - 31 mmol/L    Anion Gap 10 9 - 17 mmol/L    Alkaline Phosphatase 41 40 - 129 U/L    ALT 12 5 - 41 U/L    AST 14 <40 U/L    Total Bilirubin 0.21 (L) 0.3 - 1.2 mg/dL    Total Protein 6.9 6.4 - 8.3 g/dL    Alb 4.2 3.5 - 5.2 g/dL    Albumin/Globulin Ratio NOT REPORTED 1.0 - 2.5    GFR Non-African American >60 >60 mL/min    GFR African American >60 >60 mL/min    GFR Comment          GFR Staging NOT REPORTED    Hemoglobin A1c    Collection Time: 11/11/18  7:57 AM   Result Value Ref Range    Hemoglobin A1C 4.4 4.0 - 6.0 %    Estimated Avg Glucose 80 mg/dL   TSH without Reflex    Collection Time: 11/11/18  7:57 AM   Result Value Ref Range    TSH 0.70 0.30 - 5.00 mIU/L   T4, free    Collection Time: 11/11/18  7:57 AM   Result Value Ref Range    Thyroxine, Free 0.97 0.93 - 1.70 ng/dL   Lipid panel - fasting    Collection Time: 11/11/18  7:57 AM   Result Value Ref Range    Cholesterol 153 <200 mg/dL    HDL 49 >40 mg/dL    LDL Cholesterol 76 0 - 130 mg/dL    Chol/HDL Ratio 3.1 <5    Triglycerides 142 <150 mg/dL    VLDL NOT REPORTED 1 - 30 mg/dL   CBC auto differential    Collection Time: 11/11/18  7:57 AM   Result Value Ref Range    WBC 5.9 3.5 - 11.0 k/uL    RBC 4.71 4.5 - 5.9 m/uL    Hemoglobin 14.7 13.5 - 17.5 g/dL    Hematocrit 44.4 41 - 53 %    MCV 94.3 80 - 100 fL    MCH 31.3 26 - 34 pg    MCHC 33.2 31 - 37 g/dL    RDW 14.0 11.5 - 14.9 %    Platelets 030 417 - 078 k/uL    MPV 8.9 6.0 - 12.0 fL    NRBC Automated NOT REPORTED per 100 WBC    Differential Type NOT REPORTED     Seg Neutrophils 56 36 - 66 %    Lymphocytes 30 24 - 44 %    Monocytes 9 (H) 1 - 7 %    Eosinophils % 4 0 - 4 %    Basophils 1 0 - 2 %    Immature Granulocytes NOT REPORTED 0 %    Segs Absolute 3.30 1.3 - 9.1 k/uL    Absolute Lymph # 1.70 1.0 - 4.8 k/uL    Absolute Mono # 0.50 0.1 - 1.3 k/uL    Absolute Eos # 0.20 0.0 - 0.4 k/uL    Basophils # 0.00 0.0 - 0.2 k/uL    Absolute Immature Granulocyte NOT REPORTED 0.00 - 0.30 k/uL    WBC Morphology NOT REPORTED     RBC Morphology NOT REPORTED     Platelet Estimate NOT REPORTED        Review of systems  Constitutional:  negative for chills, fevers, sweats  Respiratory:  negative for cough, dyspnea on exertion, hemoptysis, shortness of breath, wheezing  Cardiovascular:  negative for chest pain, chest pressure/discomfort, lower extremity edema, palpitations  Gastrointestinal:  negative for abdominal pain, constipation, diarrhea, nausea, vomiting  Neurological:  negative for dizziness, headache      Treatment and plan:     Continue current management.
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
## 2018-11-12 NOTE — PLAN OF CARE
Problem: Altered Mood, Depressive Behavior:  Goal: Ability to disclose and discuss suicidal ideas will improve  STG Ability to disclose and discuss suicidal ideas will improve   Outcome: Ongoing  Denies thoughts of self harm  Safety checks q 15 minute per unit protocol
Problem: Altered Mood, Depressive Behavior:  Goal: Ability to disclose and discuss suicidal ideas will improve  STG Ability to disclose and discuss suicidal ideas will improve   Outcome: Ongoing  Pt denies SI/HI - no self harm has occurred. Pt states he feels safe in the hospital and will not harm self. Pt is flat, depressed, hopeless, helpless, despondent. Pt sleeping much of this shift, evasive and short during attempted to talk time. Pt out to milieu for needs only. Pt safety maintained per safety checks every 15 minutes and irregular rounding. Problem: Altered Mood, Psychotic Behavior:  Goal: Able to verbalize decrease in frequency and intensity of hallucinations  LTG Able to verbalize decrease in frequency and intensity of hallucinations   Outcome: Ongoing  Pt denies A/V hallucinations. Pt is distracted, has evident thought blocking, and is preoccupied. Pt is medication compliant. Pt safety maintained per safety checks every 15 minutes and irregular rounding.
Problem: Altered Mood, Depressive Behavior:  Goal: Able to verbalize and/or display a decrease in depressive symptoms  LTG Able to verbalize and/or display a decrease in depressive symptoms   Outcome: Ongoing  Pt did not participate in Cognitive Skills Therapeutic Group at 1330 despite staff encouragement.
Problem: Altered Mood, Psychotic Behavior:  Goal: Able to demonstrate trust by eating, participating in treatment and following staff's direction  STG Able to demonstrate trust by eating, participating in treatment and following staff's direction   Outcome: Ongoing  Karlos Peterson refused his breakfast but agrees to eat lunch and take his morning medications also. He denies SI/HI, depression continues. He has been isolative to his room thus far this shift.
Poor Insight, Unmotivated  Present Suicidal Ideation: No  Present Homicidal Ideation: No    EDUCATION:   Learner Progress Toward Treatment Goals:  Reviewed group plans and strategies for care    Method:  Group therapy, medication compliance, individualized assessments and care planning    Outcome:  Needs reinforcement    PATIENT GOALS:  Pt did not identify, to be discussed with patient within 72 hours.     PLAN/TREATMENT RECOMMENDATIONS:   Group therapy, medications compliance, goal setting, individualized assessments and care, continue to monitor pt on unit      SHORT-TERM GOALS:   Time frame for Short-Term Goals:  5-7 days    LONG-TERM GOALS:  Time frame for Long-Term Goals:  6 months    Members Present in Team Meeting:   See signature sheet    PK Lo  Goal: Ability to disclose and discuss suicidal ideas will improve  STG Ability to disclose and discuss suicidal ideas will improve   Outcome: Ongoing      Problem: Altered Mood, Psychotic Behavior:  Goal: Able to demonstrate trust by eating, participating in treatment and following staff's direction  STG Able to demonstrate trust by eating, participating in treatment and following staff's direction   Outcome: Ongoing    Goal: Able to verbalize decrease in frequency and intensity of hallucinations  LTG Able to verbalize decrease in frequency and intensity of hallucinations   Outcome: Ongoing
Situation  Attention:Normal: No  Attention: Unable to Concentrate  Thought Processes: Blocking  Thought Content:Normal: No  Thought Content: Poverty of Content  Hallucinations: None  Delusions: No  Memory:Normal: No  Memory: Poor Recent, Poor Remote  Insight and Judgment: No  Insight and Judgment: Poor Judgment, Poor Insight, Unmotivated  Present Suicidal Ideation: No  Present Homicidal Ideation: No    Daily Assessment Last Entry:   Daily Sleep (WDL): Within Defined Limits         Patient Currently in Pain: Denies  Daily Nutrition (WDL): Within Defined Limits  Barriers to Nutrition: None  Level of Assistance: Independent/Self    Patient Monitoring:  Frequency of Checks: 4 times per hour, close    Psychiatric Symptoms:   Depression Symptoms  Depression Symptoms: Isolative, Loss of interest, Impaired concentration  Anxiety Symptoms  Anxiety Symptoms: Generalized  Angella Symptoms  Angella Symptoms: No problems reported or observed. Psychosis Symptoms  Delusion Type: No problems reported or observed. Suicide Risk CSSR-S:  1) Within the past month, have you wished you were dead or wished you could go to sleep and not wake up? : YES  2) Within the past month, have you actually had any thoughts of killing yourself? : YES  3) Within the past month, have you been thinking about how you might kill yourself? : YES  5) Within the past month, have you started to work out or worked out the details of how to kill yourself?  Do you intend to carry out this plan? : YES  6)  Have you ever done anything, started to do anything, or prepared to do anything to end your life?: NO  Change in Result:  No Change in Plan of care:  No      EDUCATION:   Learner Progress Toward Treatment Goals:   Reviewed results and recommendations of this team, Reviewed group plan and strategies, Reviewed signs, symptoms and risk of self harm and violent behavior, Reviewed goals and plan of care    Method:  small group, individual verbal

## 2018-11-13 VITALS
HEART RATE: 67 BPM | BODY MASS INDEX: 23.03 KG/M2 | RESPIRATION RATE: 14 BRPM | DIASTOLIC BLOOD PRESSURE: 70 MMHG | SYSTOLIC BLOOD PRESSURE: 99 MMHG | TEMPERATURE: 98.2 F | HEIGHT: 72 IN | WEIGHT: 170 LBS

## 2018-11-13 PROCEDURE — 5130000000 HC BRIDGE APPOINTMENT

## 2018-11-13 PROCEDURE — 99239 HOSP IP/OBS DSCHRG MGMT >30: CPT | Performed by: PSYCHIATRY & NEUROLOGY

## 2018-11-13 PROCEDURE — 6370000000 HC RX 637 (ALT 250 FOR IP): Performed by: PSYCHIATRY & NEUROLOGY

## 2018-11-13 RX ORDER — ARIPIPRAZOLE 15 MG/1
15 TABLET ORAL DAILY
Qty: 30 TABLET | Refills: 0 | Status: ON HOLD | OUTPATIENT
Start: 2018-11-13 | End: 2018-12-13

## 2018-11-13 RX ORDER — TRAZODONE HYDROCHLORIDE 50 MG/1
50 TABLET ORAL NIGHTLY PRN
Qty: 30 TABLET | Refills: 0 | Status: ON HOLD | OUTPATIENT
Start: 2018-11-13 | End: 2018-12-13

## 2018-11-13 RX ORDER — BUSPIRONE HYDROCHLORIDE 10 MG/1
10 TABLET ORAL 3 TIMES DAILY
Qty: 90 TABLET | Refills: 0 | Status: ON HOLD | OUTPATIENT
Start: 2018-11-13 | End: 2018-12-13

## 2018-11-13 RX ADMIN — ARIPIPRAZOLE 15 MG: 15 TABLET ORAL at 08:46

## 2018-11-13 RX ADMIN — BUSPIRONE HYDROCHLORIDE 10 MG: 10 TABLET ORAL at 08:46

## 2018-11-13 NOTE — BH NOTE
Patient given tobacco quitline number 70677235360 at this time, refusing to call at this time, states \" I just dont want to quit now\"- patient given information as to the dangers of long term tobacco use. Continue to reinforce the importance of tobacco cessation.

## 2018-11-13 NOTE — BH NOTE
[facility]  Discharge Note    Pt discharged with followings belongings:   Dentures: None  Vision - Corrective Lenses: None  Hearing Aid: None  Jewelry: None  Body Piercings Removed: N/A  Clothing: Footwear, Shirt, Pants, Jacket / coat, Socks  Were All Patient Medications Collected?: Not Applicable  Other Valuables: Money (Comment) ($0.37)   Valuables sent home with Patient. Valuables retrieved from safe, Security envelope number:  [id number] and returned to patient. Patient left department with Departure Mode: Other (Comment) () via Mobility at Departure: Ambulatory, discharged to Discharged to: Private Residence. Patient education on aftercare instructions: yes  [facility] Patient verbalize understanding of AVS:  yes.     Status EXAM upon discharge:  Status and Exam  Normal: No  Facial Expression: Flat, Avoids Gaze  Affect: Blunt  Level of Consciousness: Alert  Mood:Normal: No  Mood: Suspicious, Ambivalent  Motor Activity:Normal: No  Motor Activity: Decreased  Interview Behavior: Evasive, Cooperative  Preception: [place] to Person, [place] Avile[place] to Time, [place] to Place, [place] to Situation  Attention:Normal: No  Attention: Distractible  Thought Processes: Blocking  Thought Content:Normal: No  Thought Content: Preoccupations, Paranoia  Hallucinations: None  Delusions: No  Memory:Normal: Yes  Memory: Poor Recent, Poor Remote  Insight and Judgment: No  Insight and Judgment: Poor Judgment, Poor Insight, Unmotivated  Present Suicidal Ideation: No  Present Homicidal Ideation: No    Dandre Navarrete RN

## 2018-11-13 NOTE — DISCHARGE SUMMARY
CONTINUE these medications which have CHANGED    Details   busPIRone (BUSPAR) 10 MG tablet Take 1 tablet by mouth 3 times daily  Qty: 90 tablet, Refills: 0      traZODone (DESYREL) 50 MG tablet Take 1 tablet by mouth nightly as needed for Sleep  Qty: 30 tablet, Refills: 0      ARIPiprazole (ABILIFY) 15 MG tablet Take 1 tablet by mouth daily  Qty: 30 tablet, Refills: 0      ARIPiprazole lauroxil (ARISTADA) 441 MG/1.6ML PRSY injection Inject 1.6 mLs into the muscle every 30 days  Qty: 1.5 mL, Refills: 1              patient discharged on two or more antipsychotics because: 1. Cross and taper 2. Augmentation of clozaril 3. Documented hx of three failed mono therapies. 4. other      Discharge Exam:  Level of consciousness:  Within normal limits  Appearance: Street clothes, seated, with good grooming  Behavior/Motor: No abnormalities noted  Attitude toward examiner:  Cooperative, attentive, good eye contact  Speech:  spontaneous, normal rate, normal volume and well articulated  Mood:  euthymic  Affect:  mood congruent  Thought processes:  linear, goal directed and coherent  Thought content:  Homocidal ideation denies  Suicidal Ideation:  denies suicidal ideation  Delusions:  no evidence of delusions  Perceptual Disturbance:  denies any perceptual disturbance  Cognition:  In tact  Memory: age appropriate  Insight & Judgement: fair  Medication side effects:  denies     Disposition: home    Patient Instructions: Activity: activity as tolerated    Follow-up as scheduled with Zepf     Time Spent: 35 minutes    Engagement: Patient displayed a good level of engagement with the treatments offered during this admission. Discharge planning, findings, and recommendations were discussed with    Signed:  Alexandra Ramirez   11/13/2018  8:41 AM  Dragon voice recognition software used in portions of this document.

## 2018-11-27 ENCOUNTER — HOSPITAL ENCOUNTER (EMERGENCY)
Age: 28
Discharge: HOME | End: 2018-11-27
Attending: EMERGENCY MEDICINE
Payer: COMMERCIAL

## 2018-11-27 VITALS
SYSTOLIC BLOOD PRESSURE: 110 MMHG | OXYGEN SATURATION: 100 % | DIASTOLIC BLOOD PRESSURE: 60 MMHG | RESPIRATION RATE: 19 BRPM | BODY MASS INDEX: 23.03 KG/M2 | TEMPERATURE: 98.1 F | WEIGHT: 170 LBS | HEART RATE: 69 BPM | HEIGHT: 72 IN

## 2018-11-27 DIAGNOSIS — R45.851 SUICIDAL IDEATIONS: Primary | ICD-10-CM

## 2018-11-27 DIAGNOSIS — Z76.5 MALINGERING: ICD-10-CM

## 2018-11-27 LAB
EKG ATRIAL RATE: 68 BPM
EKG P AXIS: 80 DEGREES
EKG P-R INTERVAL: 140 MS
EKG Q-T INTERVAL: 386 MS
EKG QRS DURATION: 96 MS
EKG QTC CALCULATION (BAZETT): 410 MS
EKG R AXIS: 86 DEGREES
EKG T AXIS: 64 DEGREES
EKG VENTRICULAR RATE: 68 BPM

## 2018-11-27 PROCEDURE — 93005 ELECTROCARDIOGRAM TRACING: CPT

## 2018-11-27 PROCEDURE — 99285 EMERGENCY DEPT VISIT HI MDM: CPT

## 2018-11-27 ASSESSMENT — ENCOUNTER SYMPTOMS
WHEEZING: 0
COUGH: 0
DIARRHEA: 0
CONSTIPATION: 0
ABDOMINAL PAIN: 0
VOMITING: 0
NAUSEA: 0
SORE THROAT: 0
SHORTNESS OF BREATH: 0
RHINORRHEA: 0

## 2018-11-27 ASSESSMENT — PAIN DESCRIPTION - DESCRIPTORS: DESCRIPTORS: ACHING;CRAMPING

## 2018-11-27 ASSESSMENT — PAIN DESCRIPTION - PAIN TYPE: TYPE: ACUTE PAIN

## 2018-11-27 ASSESSMENT — PAIN DESCRIPTION - ORIENTATION: ORIENTATION: MID

## 2018-11-27 ASSESSMENT — PAIN SCALES - GENERAL: PAINLEVEL_OUTOF10: 8

## 2018-11-27 ASSESSMENT — PAIN DESCRIPTION - PROGRESSION: CLINICAL_PROGRESSION: NOT CHANGED

## 2018-11-27 ASSESSMENT — PAIN DESCRIPTION - FREQUENCY: FREQUENCY: CONTINUOUS

## 2018-11-27 ASSESSMENT — PAIN DESCRIPTION - ONSET: ONSET: GRADUAL

## 2018-11-27 ASSESSMENT — PAIN DESCRIPTION - LOCATION: LOCATION: ABDOMEN

## 2018-11-27 NOTE — ED PROVIDER NOTES
evaluation of this patient and have completed at least one if not all key elements of the E/M (history, physical exam, and MDM). Additional findings are as noted. For APC cases I have personally evaluated and examined the patient in conjunction with the APC and agree with the treatment plan and disposition of the patient as recorded by the APC.     Marquise Salvador MD  Attending Emergency  Physician       Iris Asencio MD  11/28/18 1007

## 2018-12-12 ENCOUNTER — HOSPITAL ENCOUNTER (EMERGENCY)
Age: 28
Discharge: PSYCHIATRIC OR SUBSTANCE USE FACILITY | End: 2018-12-13
Attending: EMERGENCY MEDICINE
Payer: COMMERCIAL

## 2018-12-12 DIAGNOSIS — T14.91XA SUICIDAL BEHAVIOR WITH ATTEMPTED SELF-INJURY (HCC): Primary | ICD-10-CM

## 2018-12-12 DIAGNOSIS — T50.902A INTENTIONAL DRUG OVERDOSE, INITIAL ENCOUNTER (HCC): ICD-10-CM

## 2018-12-12 LAB
ABSOLUTE EOS #: 0.13 K/UL (ref 0–0.44)
ABSOLUTE IMMATURE GRANULOCYTE: <0.03 K/UL (ref 0–0.3)
ABSOLUTE LYMPH #: 1.89 K/UL (ref 1.1–3.7)
ABSOLUTE MONO #: 0.37 K/UL (ref 0.1–1.2)
ACETAMINOPHEN LEVEL: <5 UG/ML (ref 10–30)
AMPHETAMINE SCREEN URINE: NEGATIVE
ANION GAP SERPL CALCULATED.3IONS-SCNC: 13 MMOL/L (ref 9–17)
BARBITURATE SCREEN URINE: NEGATIVE
BASOPHILS # BLD: 1 % (ref 0–2)
BASOPHILS ABSOLUTE: 0.06 K/UL (ref 0–0.2)
BENZODIAZEPINE SCREEN, URINE: NEGATIVE
BILIRUBIN URINE: NEGATIVE
BUN BLDV-MCNC: 10 MG/DL (ref 6–20)
BUN/CREAT BLD: ABNORMAL (ref 9–20)
BUPRENORPHINE URINE: ABNORMAL
CALCIUM SERPL-MCNC: 9 MG/DL (ref 8.6–10.4)
CANNABINOID SCREEN URINE: NEGATIVE
CHLORIDE BLD-SCNC: 109 MMOL/L (ref 98–107)
CO2: 26 MMOL/L (ref 20–31)
COCAINE METABOLITE, URINE: POSITIVE
COLOR: YELLOW
COMMENT UA: NORMAL
CREAT SERPL-MCNC: 0.82 MG/DL (ref 0.7–1.2)
DIFFERENTIAL TYPE: NORMAL
EKG ATRIAL RATE: 81 BPM
EKG P AXIS: 80 DEGREES
EKG P-R INTERVAL: 148 MS
EKG Q-T INTERVAL: 396 MS
EKG QRS DURATION: 92 MS
EKG QTC CALCULATION (BAZETT): 460 MS
EKG R AXIS: 89 DEGREES
EKG T AXIS: 54 DEGREES
EKG VENTRICULAR RATE: 81 BPM
EOSINOPHILS RELATIVE PERCENT: 3 % (ref 1–4)
ETHANOL PERCENT: 0.03 %
ETHANOL: 33 MG/DL
GFR AFRICAN AMERICAN: >60 ML/MIN
GFR NON-AFRICAN AMERICAN: >60 ML/MIN
GFR SERPL CREATININE-BSD FRML MDRD: ABNORMAL ML/MIN/{1.73_M2}
GFR SERPL CREATININE-BSD FRML MDRD: ABNORMAL ML/MIN/{1.73_M2}
GLUCOSE BLD-MCNC: 137 MG/DL (ref 70–99)
GLUCOSE URINE: NEGATIVE
HCT VFR BLD CALC: 41.3 % (ref 40.7–50.3)
HEMOGLOBIN: 13.7 G/DL (ref 13–17)
IMMATURE GRANULOCYTES: 0 %
KETONES, URINE: NEGATIVE
LEUKOCYTE ESTERASE, URINE: NEGATIVE
LYMPHOCYTES # BLD: 36 % (ref 24–43)
MAGNESIUM: 2.2 MG/DL (ref 1.6–2.6)
MCH RBC QN AUTO: 30.9 PG (ref 25.2–33.5)
MCHC RBC AUTO-ENTMCNC: 33.2 G/DL (ref 28.4–34.8)
MCV RBC AUTO: 93.2 FL (ref 82.6–102.9)
MDMA URINE: ABNORMAL
METHADONE SCREEN, URINE: NEGATIVE
METHAMPHETAMINE, URINE: ABNORMAL
MONOCYTES # BLD: 7 % (ref 3–12)
NITRITE, URINE: NEGATIVE
NRBC AUTOMATED: 0 PER 100 WBC
OPIATES, URINE: NEGATIVE
OXYCODONE SCREEN URINE: NEGATIVE
PDW BLD-RTO: 12.4 % (ref 11.8–14.4)
PH UA: 7 (ref 5–8)
PHENCYCLIDINE, URINE: NEGATIVE
PLATELET # BLD: 226 K/UL (ref 138–453)
PLATELET ESTIMATE: NORMAL
PMV BLD AUTO: 10.5 FL (ref 8.1–13.5)
POTASSIUM SERPL-SCNC: 3.4 MMOL/L (ref 3.7–5.3)
PROPOXYPHENE, URINE: ABNORMAL
PROTEIN UA: NEGATIVE
RBC # BLD: 4.43 M/UL (ref 4.21–5.77)
RBC # BLD: NORMAL 10*6/UL
SALICYLATE LEVEL: <1 MG/DL (ref 3–10)
SEG NEUTROPHILS: 53 % (ref 36–65)
SEGMENTED NEUTROPHILS ABSOLUTE COUNT: 2.83 K/UL (ref 1.5–8.1)
SODIUM BLD-SCNC: 148 MMOL/L (ref 135–144)
SPECIFIC GRAVITY UA: 1.02 (ref 1–1.03)
TEST INFORMATION: ABNORMAL
TOXIC TRICYCLIC SC,BLOOD: NEGATIVE
TRICYCLIC ANTIDEPRESSANTS, UR: ABNORMAL
TURBIDITY: CLEAR
URINE HGB: NEGATIVE
UROBILINOGEN, URINE: NORMAL
WBC # BLD: 5.3 K/UL (ref 3.5–11.3)
WBC # BLD: NORMAL 10*3/UL

## 2018-12-12 PROCEDURE — 80048 BASIC METABOLIC PNL TOTAL CA: CPT

## 2018-12-12 PROCEDURE — 6370000000 HC RX 637 (ALT 250 FOR IP): Performed by: STUDENT IN AN ORGANIZED HEALTH CARE EDUCATION/TRAINING PROGRAM

## 2018-12-12 PROCEDURE — 81003 URINALYSIS AUTO W/O SCOPE: CPT

## 2018-12-12 PROCEDURE — 2580000003 HC RX 258: Performed by: STUDENT IN AN ORGANIZED HEALTH CARE EDUCATION/TRAINING PROGRAM

## 2018-12-12 PROCEDURE — 85025 COMPLETE CBC W/AUTO DIFF WBC: CPT

## 2018-12-12 PROCEDURE — G0480 DRUG TEST DEF 1-7 CLASSES: HCPCS

## 2018-12-12 PROCEDURE — 83735 ASSAY OF MAGNESIUM: CPT

## 2018-12-12 PROCEDURE — 87086 URINE CULTURE/COLONY COUNT: CPT

## 2018-12-12 PROCEDURE — 99285 EMERGENCY DEPT VISIT HI MDM: CPT

## 2018-12-12 PROCEDURE — 80307 DRUG TEST PRSMV CHEM ANLYZR: CPT

## 2018-12-12 PROCEDURE — 93005 ELECTROCARDIOGRAM TRACING: CPT

## 2018-12-12 RX ORDER — 0.9 % SODIUM CHLORIDE 0.9 %
1000 INTRAVENOUS SOLUTION INTRAVENOUS ONCE
Status: COMPLETED | OUTPATIENT
Start: 2018-12-12 | End: 2018-12-12

## 2018-12-12 RX ORDER — ACETAMINOPHEN 325 MG/1
650 TABLET ORAL EVERY 4 HOURS PRN
Status: CANCELLED | OUTPATIENT
Start: 2018-12-12

## 2018-12-12 RX ORDER — ONDANSETRON 2 MG/ML
4 INJECTION INTRAMUSCULAR; INTRAVENOUS EVERY 6 HOURS PRN
Status: CANCELLED | OUTPATIENT
Start: 2018-12-12

## 2018-12-12 RX ORDER — POTASSIUM CHLORIDE 20 MEQ/1
40 TABLET, EXTENDED RELEASE ORAL ONCE
Status: COMPLETED | OUTPATIENT
Start: 2018-12-12 | End: 2018-12-12

## 2018-12-12 RX ORDER — SODIUM CHLORIDE 0.9 % (FLUSH) 0.9 %
10 SYRINGE (ML) INJECTION PRN
Status: CANCELLED | OUTPATIENT
Start: 2018-12-12

## 2018-12-12 RX ORDER — SODIUM CHLORIDE 0.9 % (FLUSH) 0.9 %
10 SYRINGE (ML) INJECTION EVERY 12 HOURS SCHEDULED
Status: CANCELLED | OUTPATIENT
Start: 2018-12-12

## 2018-12-12 RX ADMIN — POTASSIUM CHLORIDE 40 MEQ: 1500 TABLET, EXTENDED RELEASE ORAL at 20:16

## 2018-12-12 RX ADMIN — SODIUM CHLORIDE 1000 ML: 9 INJECTION, SOLUTION INTRAVENOUS at 17:22

## 2018-12-12 ASSESSMENT — ENCOUNTER SYMPTOMS
NAUSEA: 0
ABDOMINAL PAIN: 0
VOMITING: 0
COUGH: 0
PHOTOPHOBIA: 0
BACK PAIN: 0
RHINORRHEA: 0
SHORTNESS OF BREATH: 0

## 2018-12-12 NOTE — ED NOTES
and belongings search:     Persons present during search:   Results of search and disposition:       Searchers Name: Officers    These items or items similar should be removed from the room:   [] Chairs   [] Telephone   [x] Trash cans and liners   [x] Plastic utensils (order Patient Safety tray)   [x] Empty or remove Sharps containers   [x] All personal clothing/belongings removed   [x] All unnecessary lead wires, electrical cords, draw cords, etc.   [x] Flowers and plants   [x] Double check for lighters, matches, razors, any glass items etc that can be used as weapons. Person completing Checklist: Jesslyn Homans       GENERAL INFORMATION     Y  N     [x] [] Has the patient been informed that they are on a watch and what that means? [x] [] Can the patient get out of Bed without nursing assistance? [x] [] Can the patient use the restroom without nursing assistance? [] [x] Can the patient walk the halls to [place] their legs? \"   [] [x] Does the patient have metal utensils? [x] [] Have the patient's belongings been placed out of control of the patient? [x] [] Have the patient and his/her belongings been checked for contraband? [x] [] Is the patient under any visitor restrictions? If Yes, explain: per policy   [] [x] Is the patient under an alias? [place] [id number] Name:   Authorized visitors (no more than two are to be on the list)   Name/Relationship:   Name/Relationship:    Name of Staff member that you  Received this information from?: Torey MCCORMICK    General Description:    Denise Hagen BH31/BH31C male 33 y.o. Admission weight: 170 lb (77.1 kg) Height: 6' (182.9 cm)  Race: [x]  [] Black  []   []   [] [place] [] Other  Facial Hair:  [x] Yes  [] No  If yes, please describe: Identifying Marks (i.e. Visible tattoos, scars, etc... ):     NURSING CARE PLAN    Nursing Diagnosis: Risk of Self Directed Harm  [x] Actual  [] Potential  Date Started: 12/12/18      Etiological Factors: (related to)  [x] Expressed or implied suicidal ideation/behavior  [x] Depression  [x] Suicide attempt      [x] Low self-esteem  [] Hallucinations      [x] Feeling of Hopelessness  [x] Substance abuse or withdrawal    [x] Dysfunctional family  [x] Major traumatic event, eg., divorce, etc   [x] Excessive stress/anxiety    12/12/18    Expected Outcomes    Patient will:   [x] Patient will remain safe for the duration of their stay   [x] Patient's environment will be safe, eg. Free of potential suicide weapons   [] Verbalize Recovery from suicidal episode and improvement in self-worth   [x] Discuss feeling that precipitated suicide attempt/thoughts/behavior   [] Will describe available resources for crisis prevention and management   [] Will verbalize positive coping skills     Nursing Intervention   [x] Assessment and Observations hourly   [x] Suicide Precautions implemented with patient, should be 1:1 observation   [x] Document observation [id number] and RN assessment hourly   [] Consult physician for:    [] Psychiatric consult    [] Pharmacological therapy    [] Other:    [x] Patient search completed by security   [x] Initiated appropriate safety protocols by removing from the patient's environment anything that could be used to inflict self injury, eg. Order safe tray, snap gown, etc   [x] Maintain open, warm, caring, non-judgmental attitude/manner towards patient   [] Discuss advantages and disadvantages of existing coping methods/skills   [x] Assist and educate patient with identifying present strengths and coping skills   [x] Keep patient informed regarding plan of care and provide clear concise explanations. Provide the patient/family education information as well as telephone numbers and other information about crisis centers, hot lines, and counselors.     Discharge Planning:   [] Referral  [] Groups [] Health agencies  [] Other:          Reynaldo Mcconnell RN  12/12/18 [address] [place] [address]

## 2018-12-13 ENCOUNTER — HOSPITAL ENCOUNTER (INPATIENT)
Age: 28
LOS: 6 days | Discharge: HOME | DRG: 750 | End: 2018-12-19
Attending: PSYCHIATRY & NEUROLOGY | Admitting: PSYCHIATRY & NEUROLOGY
Payer: COMMERCIAL

## 2018-12-13 VITALS
RESPIRATION RATE: 16 BRPM | HEIGHT: 72 IN | BODY MASS INDEX: 23.03 KG/M2 | HEART RATE: 80 BPM | DIASTOLIC BLOOD PRESSURE: 58 MMHG | OXYGEN SATURATION: 100 % | TEMPERATURE: 97.8 F | SYSTOLIC BLOOD PRESSURE: 110 MMHG | WEIGHT: 170 LBS

## 2018-12-13 PROBLEM — F33.9 MDD (MAJOR DEPRESSIVE DISORDER), RECURRENT EPISODE (HCC): Status: ACTIVE | Noted: 2018-12-13

## 2018-12-13 LAB
CULTURE: NORMAL
Lab: NORMAL
SPECIMEN DESCRIPTION: NORMAL
STATUS: NORMAL

## 2018-12-13 PROCEDURE — 93005 ELECTROCARDIOGRAM TRACING: CPT

## 2018-12-13 PROCEDURE — 1240000000 HC EMOTIONAL WELLNESS R&B

## 2018-12-13 RX ORDER — MAGNESIUM HYDROXIDE/ALUMINUM HYDROXICE/SIMETHICONE 120; 1200; 1200 MG/30ML; MG/30ML; MG/30ML
15 SUSPENSION ORAL EVERY 6 HOURS PRN
Status: DISCONTINUED | OUTPATIENT
Start: 2018-12-13 | End: 2018-12-19 | Stop reason: HOSPADM

## 2018-12-13 RX ORDER — TRAZODONE HYDROCHLORIDE 50 MG/1
50 TABLET ORAL NIGHTLY PRN
Status: DISCONTINUED | OUTPATIENT
Start: 2018-12-13 | End: 2018-12-19 | Stop reason: HOSPADM

## 2018-12-13 RX ORDER — QUETIAPINE FUMARATE 200 MG/1
200 TABLET, FILM COATED ORAL NIGHTLY
Status: ON HOLD | COMMUNITY
Start: 2018-12-03 | End: 2018-12-19 | Stop reason: HOSPADM

## 2018-12-13 RX ORDER — ACETAMINOPHEN 325 MG/1
650 TABLET ORAL EVERY 4 HOURS PRN
Status: DISCONTINUED | OUTPATIENT
Start: 2018-12-13 | End: 2018-12-19 | Stop reason: HOSPADM

## 2018-12-13 RX ORDER — HALOPERIDOL 5 MG/ML
5 INJECTION INTRAMUSCULAR EVERY 4 HOURS PRN
Status: DISCONTINUED | OUTPATIENT
Start: 2018-12-13 | End: 2018-12-19 | Stop reason: HOSPADM

## 2018-12-13 RX ORDER — HYDROXYZINE 50 MG/1
50 TABLET, FILM COATED ORAL 3 TIMES DAILY PRN
Status: DISCONTINUED | OUTPATIENT
Start: 2018-12-13 | End: 2018-12-19 | Stop reason: HOSPADM

## 2018-12-13 RX ORDER — NICOTINE 21 MG/24HR
1 PATCH, TRANSDERMAL 24 HOURS TRANSDERMAL DAILY
Status: DISCONTINUED | OUTPATIENT
Start: 2018-12-13 | End: 2018-12-17

## 2018-12-13 RX ORDER — BENZTROPINE MESYLATE 1 MG/ML
2 INJECTION INTRAMUSCULAR; INTRAVENOUS 2 TIMES DAILY PRN
Status: DISCONTINUED | OUTPATIENT
Start: 2018-12-13 | End: 2018-12-19 | Stop reason: HOSPADM

## 2018-12-13 RX ORDER — OLANZAPINE 5 MG/1
5 TABLET ORAL
Status: ACTIVE | OUTPATIENT
Start: 2018-12-13 | End: 2018-12-13

## 2018-12-13 ASSESSMENT — SLEEP AND FATIGUE QUESTIONNAIRES
DIFFICULTY ARISING: NO
DO YOU HAVE DIFFICULTY SLEEPING: YES
DIFFICULTY STAYING ASLEEP: YES
DIFFICULTY FALLING ASLEEP: YES
RESTFUL SLEEP: NO
AVERAGE NUMBER OF SLEEP HOURS: 4
DO YOU USE A SLEEP AID: NO
SLEEP PATTERN: DIFFICULTY FALLING ASLEEP;DISTURBED/INTERRUPTED SLEEP;RESTLESSNESS;INSOMNIA

## 2018-12-13 ASSESSMENT — PATIENT HEALTH QUESTIONNAIRE - PHQ9: SUM OF ALL RESPONSES TO PHQ QUESTIONS 1-9: 12

## 2018-12-13 ASSESSMENT — LIFESTYLE VARIABLES: HISTORY_ALCOHOL_USE: YES

## 2018-12-13 NOTE — ED NOTES
Pt safe at this time, Pt asleep in bed, bed in lowest position, locked, both siderails up.  Safe guards are in [address]  12/12/18 2744

## 2018-12-13 NOTE — PROGRESS NOTES
- Was called by ED physician last evening for admission of this patient. Admission orders were placed. Patient was boarded in ED overnight. D/w Dr Akhil Barney: A rpt EKG was unremarkable and patient is being medically cleared by ED physician to be discharged to [facility]. I did not evaluate this patient during his stay. Jayla Beckwith. 7:33 AM

## 2018-12-13 NOTE — ED NOTES
Patient sleeping no distress noted respiration unlabored. Maikel Randolph and Marianna monitoring patient for safety.       Vasu Hall, RN  12/13/18 7335

## 2018-12-13 NOTE — ED PROVIDER NOTES
[facility] ED  Emergency Department  Emergency Medicine Resident Sign-out     Care of Adriano Zazueta was assumed from Dr. Derrick Hicks and is being seen for Suicide Attempt (pt reports taking 20 seroquel in an attempt to harm himself)  . The patient's initial evaluation and plan have been discussed with the prior provider who initially evaluated the patient. EMERGENCY DEPARTMENT COURSE / MEDICAL DECISION MAKING:       MEDICATIONS GIVEN:  Orders Placed This Encounter   Medications    0.9 % sodium chloride bolus    potassium chloride (KLOR-CON M) extended release tablet 40 mEq     LABS / RADIOLOGY:     Labs Reviewed   BASIC METABOLIC PANEL - Abnormal; Notable for the following:        Result Value    Glucose 137 (*)     Sodium 148 (*)     Potassium 3.4 (*)     Chloride 109 (*)     All other components within normal limits   TOX SCR, BLD, ED - Abnormal; Notable for the following:     Ethanol 33 (*)     Salicylate Lvl <1 (*)     Acetaminophen Level <5 (*)     All other components within normal limits   URINE DRUG SCREEN - Abnormal; Notable for the following:     Cocaine Metabolite, Urine POSITIVE (*)     All other components within normal limits   URINE CULTURE   CBC WITH AUTO DIFFERENTIAL   MAGNESIUM   URINALYSIS     RECENT VITALS:     Temp: 97.8 °F (36.6 °C),  Pulse: 86, Resp: 18, BP: 104/60, SpO2: 100 %     This patient is a 33 y.o. Male with A suicide attempt by taking a handful of Seroquel. On arrival the EKG was okay and the patient has been hemodynamically stable throughout the duration of the stay. Previous provider contacted poison control who advised him that the patient should be watched until tomorrow at minimum. Poison control requested a repeat ECG in the morning and it demonstrated a QTC of 397. According to the previous provider, The patient is medically clear for discharge/transfer for inpatient psychiatric care.      OUTSTANDING TASKS / RECOMMENDATIONS:       1.  Await
Dior Grant  ED  Emergency Department  Emergency Medicine Resident Sign-out     Care of Jordyn Koenig was assumed from Dr. Basil Hebert and is being seen for Suicide Attempt (pt reports taking 20 seroquel in an attempt to harm himself)  . The patient's initial evaluation and plan have been discussed with the prior provider who initially evaluated the patient. EMERGENCY DEPARTMENT COURSE / MEDICAL DECISION MAKING:       MEDICATIONS GIVEN:  Orders Placed This Encounter   Medications    0.9 % sodium chloride bolus    potassium chloride (KLOR-CON M) extended release tablet 40 mEq       LABS / RADIOLOGY:     Labs Reviewed   BASIC METABOLIC PANEL - Abnormal; Notable for the following:        Result Value    Glucose 137 (*)     Sodium 148 (*)     Potassium 3.4 (*)     Chloride 109 (*)     All other components within normal limits   TOX SCR, BLD, ED - Abnormal; Notable for the following:     Ethanol 33 (*)     Salicylate Lvl <1 (*)     Acetaminophen Level <5 (*)     All other components within normal limits   URINE CULTURE   CBC WITH AUTO DIFFERENTIAL   MAGNESIUM   URINE DRUG SCREEN   URINALYSIS       No results found. RECENT VITALS:     Temp: 97.9 °F (36.6 °C),  Pulse: 87, Resp: 18, BP: 108/62, SpO2: 100 %    This patient is a 33 y.o. Male with A suicide attempt after taking a handful of Seroquel, EKG with no QTC prolongation, normal QRS complexes, plan to admit the patient for observation until tomorrow before patient can be medically cleared for evaluation by psychiatry. Pending bed placement      OUTSTANDING TASKS / RECOMMENDATIONS:    1. Pending bed placement     FINAL IMPRESSION:     1. Suicidal behavior with attempted self-injury ([facility]    2. Intentional drug overdose, initial encounter ([facility]        DISPOSITION:         DISPOSITION:  []  Discharge   []  Transfer -    [x]  Admission    []  Against Medical Advice   []  Eloped   FOLLOW-UP: No follow-up provider specified.    DISCHARGE
[facility]     Emergency Department     Faculty Attestation    I performed a history and physical examination of the patient and discussed management with the resident. I reviewed the residents note and agree with the documented findings and plan of care. Any areas of disagreement are noted on the chart. I was personally present for the key portions of any procedures. I have documented in the chart those procedures where I was not present during the key portions. I have reviewed the emergency nurses triage note. I agree with the chief complaint, past medical history, past surgical history, allergies, medications, social and family history as documented unless otherwise noted below. Documentation of the HPI, Physical Exam and Medical Decision Making performed by medical students or scribes is based on my personal performance of the HPI, PE and MDM. For Physician Assistant/ Nurse Practitioner cases/documentation I have personally evaluated this patient and have completed at least one if not all key elements of the E/M (history, physical exam, and MDM). Additional findings are as noted. Vital signs:   Vitals:    12/12/18 1513   BP: 108/62   Pulse: 87   Resp: 18   Temp: 97.9 °F (36.6 °C)   SpO2: 100%      Seroquel overdose. Took 20 tabs. Does not recall what time. Denies co-ingestions. We will do a tox workup, discuss with poison control.             Lynnette Perez M.D,  Attending Emergency  Physician           Rena Goltz, MD  12/12/18 1756
Suicidal behavior with attempted self-injury ([facility]    2. Intentional drug overdose, initial encounter ([facility]      DISPOSITION:       DISPOSITION:  []  Discharge   []  Transfer -    [x]  Admission -  Intermed   []  Against Medical Advice   []  Eloped   FOLLOW-UP: No follow-up provider specified.    DISCHARGE MEDICATIONS: New Prescriptions    No medications on file          Nery Forrester MD  Emergency Medicine Resident  Arcadio Rivera MD  Resident  12/13/18 0865

## 2018-12-14 PROBLEM — F33.9 MDD (MAJOR DEPRESSIVE DISORDER), RECURRENT EPISODE (HCC): Status: RESOLVED | Noted: 2018-12-13 | Resolved: 2018-12-14

## 2018-12-14 PROBLEM — T14.91XA SUICIDAL BEHAVIOR WITH ATTEMPTED SELF-INJURY (HCC): Status: RESOLVED | Noted: 2018-12-12 | Resolved: 2018-12-14

## 2018-12-14 PROBLEM — F19.10 POLYSUBSTANCE ABUSE (HCC): Status: RESOLVED | Noted: 2018-11-08 | Resolved: 2018-12-14

## 2018-12-14 LAB
EKG ATRIAL RATE: 53 BPM
EKG P AXIS: 79 DEGREES
EKG P-R INTERVAL: 168 MS
EKG Q-T INTERVAL: 424 MS
EKG QRS DURATION: 88 MS
EKG QTC CALCULATION (BAZETT): 397 MS
EKG R AXIS: 87 DEGREES
EKG T AXIS: 61 DEGREES
EKG VENTRICULAR RATE: 53 BPM

## 2018-12-14 PROCEDURE — 90792 PSYCH DIAG EVAL W/MED SRVCS: CPT | Performed by: PSYCHIATRY & NEUROLOGY

## 2018-12-14 PROCEDURE — 1240000000 HC EMOTIONAL WELLNESS R&B

## 2018-12-14 RX ORDER — ARIPIPRAZOLE 15 MG/1
15 TABLET ORAL DAILY
Status: DISCONTINUED | OUTPATIENT
Start: 2018-12-14 | End: 2018-12-19 | Stop reason: HOSPADM

## 2018-12-14 NOTE — H&P
HISTORY and [id number]       NAME:  Fern Jolley  MRN: [id number]   YOB: 1990   Date: 12/14/2018   Age: 33 y.o. Gender: male     COMPLAINT AND PRESENT HISTORY:      Fern Jolley is 33 y.o.,  male, admitted because of depression/ Anxiety. Pt has suicidal ideation, Pt overdosed on Seroquel. Pt denies any homicidal ideation. Pt has a history of previous suicide attempts by OD. Pt feels hopeless, helpless, worthless, lack of interest, loss of energy. Poor insight. Pt has poor sleep, fair appetite, concentration and memory. No current auditory, visual or tactile hallucinations. Patient denies any current alcohol or substance abuse patient drinks 3 beers a day. Tox screen was negative this time. Patient lives with friends is on disability. Pt has been compliant with the psychiatric medications. DIAGNOSTIC RESULTS   Labs:  CBC:   Recent Labs      12/12/18   1621   WBC  5.3   HGB  13.7   PLT  226     BMP:    Recent Labs      12/12/18   1621   NA  148*   K  3.4*   CL  109*   CO2  26   BUN  10   CREATININE  0.82   GLUCOSE  137*       PAST MEDICAL HISTORY     Past Medical History:   Diagnosis Date    ADHD (attention deficit hyperactivity disorder)     Anxiety     Bipolar 1 disorder (HCC)     PTSD (post-traumatic stress disorder)        Pt denies any history of Diabetes mellitus type 2, hypertension, stroke, heart disease, COPD, Asthma, GERD, HLD, Cancer, Seizures,Thyroid disease, Kidney Disease, Hepatitis, TB.    SURGICAL HISTORY     History reviewed. No pertinent surgical history.     FAMILY HISTORY       Family History   Problem Relation Age of Onset    Liver Cancer Brother     Alcohol Abuse Brother     No Known Problems Mother     No Known Problems Father        SOCIAL HISTORY       Social History     Social History    Marital status: Single     Spouse name: N/A    Number of children: N/A    Years of education: N/A     Social History

## 2018-12-15 PROCEDURE — 1240000000 HC EMOTIONAL WELLNESS R&B

## 2018-12-15 PROCEDURE — 99232 SBSQ HOSP IP/OBS MODERATE 35: CPT | Performed by: NURSE PRACTITIONER

## 2018-12-16 PROCEDURE — 1240000000 HC EMOTIONAL WELLNESS R&B

## 2018-12-16 PROCEDURE — 6370000000 HC RX 637 (ALT 250 FOR IP): Performed by: PSYCHIATRY & NEUROLOGY

## 2018-12-16 RX ADMIN — ARIPIPRAZOLE 15 MG: 15 TABLET ORAL at 09:30

## 2018-12-17 PROCEDURE — 99232 SBSQ HOSP IP/OBS MODERATE 35: CPT | Performed by: NURSE PRACTITIONER

## 2018-12-17 PROCEDURE — 1240000000 HC EMOTIONAL WELLNESS R&B

## 2018-12-17 PROCEDURE — 6370000000 HC RX 637 (ALT 250 FOR IP): Performed by: PSYCHIATRY & NEUROLOGY

## 2018-12-17 RX ADMIN — ARIPIPRAZOLE 15 MG: 15 TABLET ORAL at 10:57

## 2018-12-17 ASSESSMENT — PAIN SCALES - GENERAL: PAINLEVEL_OUTOF10: 0

## 2018-12-18 PROCEDURE — 1240000000 HC EMOTIONAL WELLNESS R&B

## 2018-12-18 PROCEDURE — 6370000000 HC RX 637 (ALT 250 FOR IP): Performed by: PSYCHIATRY & NEUROLOGY

## 2018-12-18 PROCEDURE — 99232 SBSQ HOSP IP/OBS MODERATE 35: CPT | Performed by: NURSE PRACTITIONER

## 2018-12-18 RX ADMIN — ARIPIPRAZOLE 15 MG: 15 TABLET ORAL at 10:45

## 2018-12-18 NOTE — PROGRESS NOTES
Department of Psychiatry  Attending Progress Note  Chief Complaint: Schizoaffective disorder, bipolar type ([address]     SUBJECTIVE:  Patient is seen in his room. Patient is isolative and withdrawn for long periods. Does not participate in unit milieu. Patient is disheveled and malodorous. Patient refused medications for several days. Did take oral Abilify yesterday and today. Patient admits to depression, feeling helpless, hopeless. Denies suicidal thoughts and hallucinations but does appear to be responding at times. Explored his feelings and concerns. Reassurance and support provided. Charting and medications reviewed. There is no identifiable safe alternative other than continued hospitalization.      OBJECTIVE    Physical  BP 96/68   Pulse 88   Temp 98.3 °F (36.8 °C) (Oral)   Resp 14   Ht 6' (1.829 m)   Wt 177 lb (80.3 kg)   SpO2 99%   BMI 24.01 kg/m²      Mental Status Evaluation:  Orientation: alertness: alert   Mood:. depressed and irritable      Affect:  normal and blunted      Appearance:  disheveled   Activity:  Within Normal Limits   Gait/Posture: Normal   Speech:  normal pitch and normal volume   Thought Process:  circumstantial   Thought Content:  Logical but appears to be responding to internal stimuli   Sensorium:  person, place, time/date and situation   Cognition:  grossly intact   Memory: intact   Insight:  limited   Judgment: limited   Suicidal Ideations: denies suicidal ideation   Homicidal Ideations: Negative for homicidal ideation      Medication Side Effects: absent       Attention Span attention span appeared shorter than expected for age     Medications  Current Facility-Administered Medications   Medication Dose Route Frequency Provider Last Rate Last Dose    ARIPiprazole lauroxil (ARISTADA) injection 662 mg  662 mg Intramuscular Q30 Days Suki Man, APRN - CNP        ARIPiprazole (ABILIFY) tablet 15 mg  15 mg Oral Daily Malachi Burgos MD   15 mg at 12/17/18 4112   
Department of Psychiatry  Attending Progress Note  Chief Complaint: Schizoaffective disorder, bipolar type ([address]     SUBJECTIVE:  Patient is seen in his room. Patient is isolative and withdrawn for long periods. Does not participate in unit milieu. Will come out to day room in the afternoon. Patient is disheveled and malodorous. Patient refused medications for several days. Did take oral Abilify yesterday and today. Pt agreeable to Aristada injection today. Patient admits to depression, feeling helpless, hopeless. Denies suicidal thoughts and hallucinations but does appear to be responding at times. Explored his feelings and concerns. Reassurance and support provided. Charting and medications reviewed. There is no identifiable safe alternative other than continued hospitalization. Possible discharge home tomorrow.      OBJECTIVE    Physical  BP 99/62   Pulse 64   Temp 98 °F (36.7 °C)   Resp 14   Ht 6' (1.829 m)   Wt 177 lb (80.3 kg)   SpO2 99%   BMI 24.01 kg/m²      Mental Status Evaluation:  Orientation: alertness: alert   Mood:. depressed and irritable      Affect:  normal and blunted      Appearance:  disheveled   Activity:  Within Normal Limits   Gait/Posture: Normal   Speech:  normal pitch and normal volume   Thought Process:  circumstantial   Thought Content:  Logical but appears to be responding to internal stimuli   Sensorium:  person, place, time/date and situation   Cognition:  grossly intact   Memory: intact   Insight:  limited   Judgment: limited   Suicidal Ideations: denies suicidal ideation   Homicidal Ideations: Negative for homicidal ideation      Medication Side Effects: absent       Attention Span attention span appeared shorter than expected for age     Medications  Current Facility-Administered Medications   Medication Dose Route Frequency Provider Last Rate Last Dose    [START ON 1/14/2019] ARIPiprazole lauroxil (ARISTADA) injection 882 mg  882 mg Intramuscular Q30 Days LIBBY Orellana
Psychiatric Admission Note Nurse Practitioner     HPI: Today Nadara Felty is interviewed in his room. He is dressed in hospital clothes and is disheveled with poor ADLS. He continues to admit to feeling down and depressed helpless hopeless and worthless with low energy and low motivation. He had been drinking alcohol and using crack cocaine and cannabis almost daily. He also reported recent auditory hallucinations that were telling him to kill himself as well as paranoid delusional thinking that people are out to get him or kill him. He is denying SI currently and HI. He is not currently medication compliant and is requesting Seroquel at . Provider educated patient regarding medication and follow-up compliance. Chart and medications reviewed. Therapeutic support provided. At this time there is no safe alternative other than inpatient care. Past Psychiatric History   Patient Reports noncompliance with outpatient psychiatric care. Reported history of psychiatric inpatient hospitalizations. Reported history of suicide attempts. History of Substance Abuse     Alcohol cocaine and cannabis dependence as well as periodic opiates    Family History of psychiatric disorders    Family history: positive for depression      Medical History   Allergies:  Advil [ibuprofen]   Past Medical History:   Diagnosis Date    ADHD (attention deficit hyperactivity disorder)     Anxiety     Bipolar 1 disorder (HCC)     PTSD (post-traumatic stress disorder)       History reviewed. No pertinent surgical history. SOCIAL HISTORY. Born and raised in [facility] [place], reports that his parents are still alive, that he has a living sister. 11th grade education, and can stay with an aunt when safe to discharge. He is on SSI and Medicaid  Social History     Social History    Marital status: Single     Spouse name: N/A    Number of children: N/A    Years of education: N/A     Occupational History    Not on file.      Social History Main
Single     Spouse name: N/A    Number of children: N/A    Years of education: N/A     Occupational History    Not on file. Social History Main Topics    Smoking status: Light Tobacco Smoker     Packs/day: 0.50     Types: Cigarettes    Smokeless tobacco: Never Used      Comment: pt accepting of nicotine replacement    Alcohol use 4.2 oz/week     7 Cans of beer per week      Comment: daily, last drink this am 8/24/18    Drug use: Yes     Types: Cocaine, Opiates , Marijuana    Sexual activity: No     Other Topics Concern    Not on file     Social History Narrative    No narrative on file         Mental Status  Pt. was alert, fully oriented, and cooperative. Appearance and hygiene weredisheveled, poor hygiene . Mood was depressed. Affect was blunted\". Thought process was demonstrative of poverty of thought and thought blocking. Patient minimized symptoms but appears to be responding to internal stimuli. Patient endorsed suicidal ideations. Patient denied homicidal ideations . Patient's gross cognitive functions were intact. Insight and judgement were fair. Both recent and remote memory were intact. Psychomotor status was slowed     Diagnostic Impression  Principal Problem:    Schizoaffective disorder, bipolar type ([facility])  Active Problems:    Alcohol use    Episodic cannabis use    Cocaine use  Resolved Problems:    Polysubstance abuse (HCC)    MDD (major depressive disorder), recurrent episode (HCC)      Schizoaffective disorder bipolar type  polysubstance dependence alcohol cannabis, opiates and cocaine    Medications   ARIPiprazole lauroxil  662 mg Intramuscular Q30 Days    ARIPiprazole  15 mg Oral Daily    nicotine  1 patch Transdermal Daily     acetaminophen, hydrOXYzine, haloperidol lactate, traZODone, benztropine mesylate, magnesium hydroxide, aluminum & magnesium hydroxide-simethicone    Treatment Plan:    1. Admit to inpatient psychiatric treatment, safety, and collateral information  2.

## 2018-12-19 VITALS
HEIGHT: 72 IN | OXYGEN SATURATION: 99 % | TEMPERATURE: 98.2 F | RESPIRATION RATE: 14 BRPM | HEART RATE: 74 BPM | SYSTOLIC BLOOD PRESSURE: 109 MMHG | DIASTOLIC BLOOD PRESSURE: 63 MMHG | BODY MASS INDEX: 23.98 KG/M2 | WEIGHT: 177 LBS

## 2018-12-19 PROCEDURE — 99238 HOSP IP/OBS DSCHRG MGMT 30/<: CPT | Performed by: NURSE PRACTITIONER

## 2018-12-19 PROCEDURE — 5130000000 HC BRIDGE APPOINTMENT

## 2018-12-19 RX ORDER — ARIPIPRAZOLE 20 MG/1
20 TABLET ORAL DAILY
Qty: 21 TABLET | Refills: 0 | Status: ON HOLD | OUTPATIENT
Start: 2018-12-20 | End: 2019-02-25 | Stop reason: HOSPADM

## 2018-12-19 RX ORDER — HYDROXYZINE 50 MG/1
50 TABLET, FILM COATED ORAL 3 TIMES DAILY PRN
Qty: 42 TABLET | Refills: 0 | Status: SHIPPED | OUTPATIENT
Start: 2018-12-19 | End: 2018-12-29

## 2018-12-19 NOTE — DISCHARGE SUMMARY
this admission.        Discharge planning, findings, and recommendations were discussed with the patient and treatment team.    Signed:  Gabriel Sabillon CNP  12/19/2018  10:42 AM

## 2018-12-19 NOTE — BH NOTE
Patient given tobacco quitline number 22085006411 at this time, refusing to call at this time, states \" I just dont want to quit now\"- patient given information as to the dangers of long term tobacco use. Continue to reinforce the importance of tobacco cessation.
about quitting (benefits of quitting, techniques in how to quit, available resources  ( ) Referral for counseling faxed to [facility]                                           (x ) Patient refused counseling  ( ) Patient has not smoked in the last 30 days    Metabolic Screening:    Lab Results   Component Value Date    LABA1C 4.4 11/11/2018       No results for input(s): CHOL, TRIG, HDL, LDLCALC, LABVLDL in the last 72 hours. Body mass index is 24.01 kg/m². BP Readings from Last 2 Encounters:   12/13/18 130/70   12/13/18 (!) 110/58           Pt admitted with followings belongings:  Dentures: None  Vision - Corrective Lenses: None  Hearing Aid: None  Jewelry: None  Body Piercings Removed: N/A  Clothing: Footwear, Pants, Shirt  Were All Patient Medications Collected?: Yes  Other Valuables: Other (Comment) (nicorette gum, 15 bullets (sent to main security))     Valuables placed in safe in security envelope, number:  [id number]. Patient's home medications were verified and entered in home med section. Patient oriented to surroundings and program expectations and copy of patient rights given. Received admission packet:  yes. Consents reviewed, signed yes. Patient verbalize understanding:  yes. Patient education on precautions: yes.                    Betsey Dietz RN

## 2019-01-20 ENCOUNTER — HOSPITAL ENCOUNTER (EMERGENCY)
Age: 29
Discharge: HOME | End: 2019-01-20
Attending: EMERGENCY MEDICINE
Payer: COMMERCIAL

## 2019-01-20 VITALS
DIASTOLIC BLOOD PRESSURE: 65 MMHG | TEMPERATURE: 97.2 F | OXYGEN SATURATION: 100 % | HEART RATE: 106 BPM | SYSTOLIC BLOOD PRESSURE: 128 MMHG | RESPIRATION RATE: 18 BRPM

## 2019-01-20 DIAGNOSIS — F10.10 ETOH ABUSE: Primary | ICD-10-CM

## 2019-01-20 LAB
ACETAMINOPHEN LEVEL: <5 UG/ML (ref 10–30)
ANION GAP SERPL CALCULATED.3IONS-SCNC: 13 MMOL/L (ref 9–17)
BUN BLDV-MCNC: 10 MG/DL (ref 6–20)
BUN/CREAT BLD: NORMAL (ref 9–20)
CALCIUM SERPL-MCNC: 9.1 MG/DL (ref 8.6–10.4)
CHLORIDE BLD-SCNC: 106 MMOL/L (ref 98–107)
CO2: 23 MMOL/L (ref 20–31)
CREAT SERPL-MCNC: 0.71 MG/DL (ref 0.7–1.2)
EKG ATRIAL RATE: 90 BPM
EKG P AXIS: 77 DEGREES
EKG P-R INTERVAL: 154 MS
EKG Q-T INTERVAL: 350 MS
EKG QRS DURATION: 88 MS
EKG QTC CALCULATION (BAZETT): 428 MS
EKG R AXIS: 87 DEGREES
EKG T AXIS: 47 DEGREES
EKG VENTRICULAR RATE: 90 BPM
ETHANOL PERCENT: 0.13 %
ETHANOL: 127 MG/DL
GFR AFRICAN AMERICAN: >60 ML/MIN
GFR NON-AFRICAN AMERICAN: >60 ML/MIN
GFR SERPL CREATININE-BSD FRML MDRD: NORMAL ML/MIN/{1.73_M2}
GFR SERPL CREATININE-BSD FRML MDRD: NORMAL ML/MIN/{1.73_M2}
GLUCOSE BLD-MCNC: 86 MG/DL (ref 70–99)
HCT VFR BLD CALC: 44.2 % (ref 40.7–50.3)
HEMOGLOBIN: 14.6 G/DL (ref 13–17)
MAGNESIUM: 2.3 MG/DL (ref 1.6–2.6)
MCH RBC QN AUTO: 30.2 PG (ref 25.2–33.5)
MCHC RBC AUTO-ENTMCNC: 33 G/DL (ref 28.4–34.8)
MCV RBC AUTO: 91.5 FL (ref 82.6–102.9)
NRBC AUTOMATED: 0 PER 100 WBC
PDW BLD-RTO: 12.7 % (ref 11.8–14.4)
PLATELET # BLD: 221 K/UL (ref 138–453)
PMV BLD AUTO: 10.4 FL (ref 8.1–13.5)
POTASSIUM SERPL-SCNC: 4.1 MMOL/L (ref 3.7–5.3)
RBC # BLD: 4.83 M/UL (ref 4.21–5.77)
SALICYLATE LEVEL: <1 MG/DL (ref 3–10)
SODIUM BLD-SCNC: 142 MMOL/L (ref 135–144)
TOXIC TRICYCLIC SC,BLOOD: NEGATIVE
WBC # BLD: 6.7 K/UL (ref 3.5–11.3)

## 2019-01-20 PROCEDURE — 80048 BASIC METABOLIC PNL TOTAL CA: CPT

## 2019-01-20 PROCEDURE — 99284 EMERGENCY DEPT VISIT MOD MDM: CPT

## 2019-01-20 PROCEDURE — 83735 ASSAY OF MAGNESIUM: CPT

## 2019-01-20 PROCEDURE — G0480 DRUG TEST DEF 1-7 CLASSES: HCPCS

## 2019-01-20 PROCEDURE — 93005 ELECTROCARDIOGRAM TRACING: CPT

## 2019-01-20 PROCEDURE — 85027 COMPLETE CBC AUTOMATED: CPT

## 2019-01-20 PROCEDURE — 80307 DRUG TEST PRSMV CHEM ANLYZR: CPT

## 2019-01-20 ASSESSMENT — ENCOUNTER SYMPTOMS
FACIAL SWELLING: 0
VOMITING: 0
CONSTIPATION: 0
COUGH: 0
CHEST TIGHTNESS: 0
PHOTOPHOBIA: 0
NAUSEA: 0
ABDOMINAL PAIN: 0
SHORTNESS OF BREATH: 0
BLOOD IN STOOL: 0
DIARRHEA: 0
ABDOMINAL DISTENTION: 0
SORE THROAT: 0
COLOR CHANGE: 0
WHEEZING: 0

## 2019-01-21 ENCOUNTER — HOSPITAL ENCOUNTER (OUTPATIENT)
Age: 29
Setting detail: SPECIMEN
Discharge: HOME | End: 2019-01-21
Payer: COMMERCIAL

## 2019-01-21 LAB
ABSOLUTE EOS #: 0.24 K/UL (ref 0–0.44)
ABSOLUTE IMMATURE GRANULOCYTE: <0.03 K/UL (ref 0–0.3)
ABSOLUTE LYMPH #: 1.66 K/UL (ref 1.1–3.7)
ABSOLUTE MONO #: 0.46 K/UL (ref 0.1–1.2)
ALBUMIN SERPL-MCNC: 4.3 G/DL (ref 3.5–5.2)
ALBUMIN/GLOBULIN RATIO: 1.4 (ref 1–2.5)
ALP BLD-CCNC: 58 U/L (ref 40–129)
ALT SERPL-CCNC: 16 U/L (ref 5–41)
ANION GAP SERPL CALCULATED.3IONS-SCNC: 12 MMOL/L (ref 9–17)
AST SERPL-CCNC: 16 U/L
BASOPHILS # BLD: 1 % (ref 0–2)
BASOPHILS ABSOLUTE: 0.06 K/UL (ref 0–0.2)
BILIRUB SERPL-MCNC: 0.21 MG/DL (ref 0.3–1.2)
BILIRUBIN DIRECT: <0.08 MG/DL
BILIRUBIN, INDIRECT: ABNORMAL MG/DL (ref 0–1)
BUN BLDV-MCNC: 19 MG/DL (ref 6–20)
CALCIUM SERPL-MCNC: 9.6 MG/DL (ref 8.6–10.4)
CHLORIDE BLD-SCNC: 106 MMOL/L (ref 98–107)
CHOLESTEROL/HDL RATIO: 3.5
CHOLESTEROL: 162 MG/DL
CO2: 25 MMOL/L (ref 20–31)
CREAT SERPL-MCNC: 0.9 MG/DL (ref 0.7–1.2)
DIFFERENTIAL TYPE: ABNORMAL
EOSINOPHILS RELATIVE PERCENT: 3 % (ref 1–4)
GFR AFRICAN AMERICAN: >60 ML/MIN
GFR NON-AFRICAN AMERICAN: >60 ML/MIN
GFR SERPL CREATININE-BSD FRML MDRD: ABNORMAL ML/MIN/{1.73_M2}
GFR SERPL CREATININE-BSD FRML MDRD: ABNORMAL ML/MIN/{1.73_M2}
GLUCOSE BLD-MCNC: 99 MG/DL (ref 70–99)
HAV IGM SER IA-ACNC: NONREACTIVE
HCT VFR BLD CALC: 46.7 % (ref 40.7–50.3)
HDLC SERPL-MCNC: 46 MG/DL
HEMOGLOBIN: 15 G/DL (ref 13–17)
HEPATITIS B CORE IGM ANTIBODY: NONREACTIVE
HEPATITIS B SURFACE ANTIGEN: NONREACTIVE
HEPATITIS C ANTIBODY: NONREACTIVE
HIV AG/AB: NONREACTIVE
IMMATURE GRANULOCYTES: 0 %
LDL CHOLESTEROL: 79 MG/DL (ref 0–130)
LYMPHOCYTES # BLD: 23 % (ref 24–43)
MCH RBC QN AUTO: 30.2 PG (ref 25.2–33.5)
MCHC RBC AUTO-ENTMCNC: 32.1 G/DL (ref 28.4–34.8)
MCV RBC AUTO: 94 FL (ref 82.6–102.9)
MONOCYTES # BLD: 6 % (ref 3–12)
NRBC AUTOMATED: 0 PER 100 WBC
PDW BLD-RTO: 12.9 % (ref 11.8–14.4)
PLATELET # BLD: 269 K/UL (ref 138–453)
PLATELET ESTIMATE: ABNORMAL
PMV BLD AUTO: 11 FL (ref 8.1–13.5)
POTASSIUM SERPL-SCNC: 4.6 MMOL/L (ref 3.7–5.3)
RBC # BLD: 4.97 M/UL (ref 4.21–5.77)
RBC # BLD: ABNORMAL 10*6/UL
SEG NEUTROPHILS: 67 % (ref 36–65)
SEGMENTED NEUTROPHILS ABSOLUTE COUNT: 4.88 K/UL (ref 1.5–8.1)
SODIUM BLD-SCNC: 143 MMOL/L (ref 135–144)
T3 FREE: 2.81 PG/ML (ref 2.02–4.43)
THYROXINE, FREE: 0.96 NG/DL (ref 0.93–1.7)
TOTAL PROTEIN: 7.4 G/DL (ref 6.4–8.3)
TRIGL SERPL-MCNC: 184 MG/DL
TSH SERPL DL<=0.05 MIU/L-ACNC: 0.93 MIU/L (ref 0.3–5)
VLDLC SERPL CALC-MCNC: ABNORMAL MG/DL (ref 1–30)
WBC # BLD: 7.3 K/UL (ref 3.5–11.3)
WBC # BLD: ABNORMAL 10*3/UL

## 2019-01-23 LAB
QUANTI TB GOLD PLUS: NEGATIVE
QUANTI TB1 MINUS NIL: 0 IU/ML (ref 0–0.34)
QUANTI TB2 MINUS NIL: 0 IU/ML (ref 0–0.34)
QUANTIFERON MITOGEN: >10 IU/ML
QUANTIFERON NIL: 0.03 IU/ML

## 2019-02-17 ENCOUNTER — HOSPITAL ENCOUNTER (EMERGENCY)
Age: 29
Discharge: PSYCHIATRIC OR SUBSTANCE USE FACILITY | End: 2019-02-17
Attending: EMERGENCY MEDICINE
Payer: COMMERCIAL

## 2019-02-17 VITALS
HEART RATE: 78 BPM | DIASTOLIC BLOOD PRESSURE: 58 MMHG | TEMPERATURE: 98.3 F | HEIGHT: 72 IN | WEIGHT: 190 LBS | SYSTOLIC BLOOD PRESSURE: 95 MMHG | BODY MASS INDEX: 25.73 KG/M2 | OXYGEN SATURATION: 95 % | RESPIRATION RATE: 16 BRPM

## 2019-02-17 DIAGNOSIS — T50.904A DRUG OVERDOSE, UNDETERMINED INTENT, INITIAL ENCOUNTER: Primary | ICD-10-CM

## 2019-02-17 LAB
ACETAMINOPHEN LEVEL: <5 UG/ML (ref 10–30)
ANION GAP SERPL CALCULATED.3IONS-SCNC: 11 MMOL/L (ref 9–17)
BUN BLDV-MCNC: 14 MG/DL (ref 6–20)
BUN/CREAT BLD: ABNORMAL (ref 9–20)
CALCIUM SERPL-MCNC: 9.2 MG/DL (ref 8.6–10.4)
CHLORIDE BLD-SCNC: 107 MMOL/L (ref 98–107)
CO2: 26 MMOL/L (ref 20–31)
CREAT SERPL-MCNC: 0.83 MG/DL (ref 0.7–1.2)
ETHANOL PERCENT: <0.01 %
ETHANOL: <10 MG/DL
GFR AFRICAN AMERICAN: >60 ML/MIN
GFR NON-AFRICAN AMERICAN: >60 ML/MIN
GFR SERPL CREATININE-BSD FRML MDRD: ABNORMAL ML/MIN/{1.73_M2}
GFR SERPL CREATININE-BSD FRML MDRD: ABNORMAL ML/MIN/{1.73_M2}
GLUCOSE BLD-MCNC: 105 MG/DL (ref 70–99)
POTASSIUM SERPL-SCNC: 4 MMOL/L (ref 3.7–5.3)
SALICYLATE LEVEL: <1 MG/DL (ref 3–10)
SODIUM BLD-SCNC: 144 MMOL/L (ref 135–144)
TOXIC TRICYCLIC SC,BLOOD: NEGATIVE

## 2019-02-17 PROCEDURE — G0480 DRUG TEST DEF 1-7 CLASSES: HCPCS

## 2019-02-17 PROCEDURE — 93005 ELECTROCARDIOGRAM TRACING: CPT

## 2019-02-17 PROCEDURE — 99285 EMERGENCY DEPT VISIT HI MDM: CPT

## 2019-02-17 PROCEDURE — 80048 BASIC METABOLIC PNL TOTAL CA: CPT

## 2019-02-17 PROCEDURE — 80307 DRUG TEST PRSMV CHEM ANLYZR: CPT

## 2019-02-17 RX ORDER — QUETIAPINE FUMARATE 100 MG/1
100 TABLET, FILM COATED ORAL 2 TIMES DAILY
Status: ON HOLD | COMMUNITY
End: 2019-02-28 | Stop reason: HOSPADM

## 2019-02-17 RX ORDER — SERTRALINE HYDROCHLORIDE 100 MG/1
100 TABLET, FILM COATED ORAL DAILY
Status: ON HOLD | COMMUNITY
End: 2019-02-28 | Stop reason: HOSPADM

## 2019-02-17 ASSESSMENT — ENCOUNTER SYMPTOMS
BLOOD IN STOOL: 0
EYE REDNESS: 0
SHORTNESS OF BREATH: 0
APNEA: 0
COUGH: 0
VOMITING: 0
ABDOMINAL PAIN: 0
EYE PAIN: 0
NAUSEA: 0
SINUS PAIN: 0
WHEEZING: 0
DIARRHEA: 0
RHINORRHEA: 0

## 2019-02-22 ENCOUNTER — HOSPITAL ENCOUNTER (INPATIENT)
Age: 29
LOS: 3 days | Discharge: PSYCHIATRIC OR SUBSTANCE USE FACILITY | DRG: 817 | End: 2019-02-25
Attending: EMERGENCY MEDICINE | Admitting: INTERNAL MEDICINE
Payer: COMMERCIAL

## 2019-02-22 DIAGNOSIS — T50.901A ACCIDENTAL DRUG OVERDOSE, INITIAL ENCOUNTER: Primary | ICD-10-CM

## 2019-02-22 LAB
ABSOLUTE EOS #: 0.16 K/UL (ref 0–0.44)
ABSOLUTE IMMATURE GRANULOCYTE: <0.03 K/UL (ref 0–0.3)
ABSOLUTE LYMPH #: 1.6 K/UL (ref 1.1–3.7)
ABSOLUTE MONO #: 0.5 K/UL (ref 0.1–1.2)
ACETAMINOPHEN LEVEL: <5 UG/ML (ref 10–30)
ALBUMIN SERPL-MCNC: 3.5 G/DL (ref 3.5–5.2)
ALBUMIN SERPL-MCNC: 4.5 G/DL (ref 3.5–5.2)
ALBUMIN/GLOBULIN RATIO: 1.6 (ref 1–2.5)
ALBUMIN/GLOBULIN RATIO: 1.6 (ref 1–2.5)
ALP BLD-CCNC: 40 U/L (ref 40–129)
ALP BLD-CCNC: 47 U/L (ref 40–129)
ALT SERPL-CCNC: 16 U/L (ref 5–41)
ALT SERPL-CCNC: 18 U/L (ref 5–41)
AMPHETAMINE SCREEN URINE: NEGATIVE
ANION GAP SERPL CALCULATED.3IONS-SCNC: 9 MMOL/L (ref 9–17)
AST SERPL-CCNC: 15 U/L
AST SERPL-CCNC: 17 U/L
BARBITURATE SCREEN URINE: NEGATIVE
BASOPHILS # BLD: 1 % (ref 0–2)
BASOPHILS ABSOLUTE: 0.05 K/UL (ref 0–0.2)
BENZODIAZEPINE SCREEN, URINE: NEGATIVE
BILIRUB SERPL-MCNC: 0.25 MG/DL (ref 0.3–1.2)
BILIRUB SERPL-MCNC: 0.28 MG/DL (ref 0.3–1.2)
BILIRUBIN DIRECT: 0.08 MG/DL
BILIRUBIN DIRECT: <0.08 MG/DL
BILIRUBIN, INDIRECT: 0.2 MG/DL (ref 0–1)
BILIRUBIN, INDIRECT: ABNORMAL MG/DL (ref 0–1)
BUN BLDV-MCNC: 24 MG/DL (ref 6–20)
BUN/CREAT BLD: ABNORMAL (ref 9–20)
BUPRENORPHINE URINE: NORMAL
CALCIUM SERPL-MCNC: 9.3 MG/DL (ref 8.6–10.4)
CANNABINOID SCREEN URINE: NEGATIVE
CHLORIDE BLD-SCNC: 108 MMOL/L (ref 98–107)
CO2: 28 MMOL/L (ref 20–31)
COCAINE METABOLITE, URINE: NEGATIVE
CREAT SERPL-MCNC: 0.84 MG/DL (ref 0.7–1.2)
DIFFERENTIAL TYPE: NORMAL
EKG ATRIAL RATE: 72 BPM
EKG P AXIS: 82 DEGREES
EKG P-R INTERVAL: 162 MS
EKG Q-T INTERVAL: 382 MS
EKG QRS DURATION: 84 MS
EKG QTC CALCULATION (BAZETT): 418 MS
EKG R AXIS: 79 DEGREES
EKG T AXIS: 52 DEGREES
EKG VENTRICULAR RATE: 72 BPM
EOSINOPHILS RELATIVE PERCENT: 3 % (ref 1–4)
ETHANOL PERCENT: <0.01 %
ETHANOL: <10 MG/DL
GFR AFRICAN AMERICAN: >60 ML/MIN
GFR NON-AFRICAN AMERICAN: >60 ML/MIN
GFR SERPL CREATININE-BSD FRML MDRD: ABNORMAL ML/MIN/{1.73_M2}
GFR SERPL CREATININE-BSD FRML MDRD: ABNORMAL ML/MIN/{1.73_M2}
GLOBULIN: ABNORMAL G/DL (ref 1.5–3.8)
GLOBULIN: ABNORMAL G/DL (ref 1.5–3.8)
GLUCOSE BLD-MCNC: 101 MG/DL (ref 70–99)
HCT VFR BLD CALC: 45.8 % (ref 40.7–50.3)
HEMOGLOBIN: 15.3 G/DL (ref 13–17)
IMMATURE GRANULOCYTES: 0 %
LYMPHOCYTES # BLD: 29 % (ref 24–43)
MAGNESIUM: 1.9 MG/DL (ref 1.6–2.6)
MAGNESIUM: 2.2 MG/DL (ref 1.6–2.6)
MCH RBC QN AUTO: 31.4 PG (ref 25.2–33.5)
MCHC RBC AUTO-ENTMCNC: 33.4 G/DL (ref 28.4–34.8)
MCV RBC AUTO: 94 FL (ref 82.6–102.9)
MDMA URINE: NORMAL
METHADONE SCREEN, URINE: NEGATIVE
METHAMPHETAMINE, URINE: NORMAL
MONOCYTES # BLD: 9 % (ref 3–12)
NRBC AUTOMATED: 0 PER 100 WBC
OPIATES, URINE: NEGATIVE
OXYCODONE SCREEN URINE: NEGATIVE
PDW BLD-RTO: 12.7 % (ref 11.8–14.4)
PHENCYCLIDINE, URINE: NEGATIVE
PLATELET # BLD: 213 K/UL (ref 138–453)
PLATELET ESTIMATE: NORMAL
PMV BLD AUTO: 10.6 FL (ref 8.1–13.5)
POTASSIUM SERPL-SCNC: 3.9 MMOL/L (ref 3.7–5.3)
POTASSIUM SERPL-SCNC: 4.4 MMOL/L (ref 3.7–5.3)
PROPOXYPHENE, URINE: NORMAL
RBC # BLD: 4.87 M/UL (ref 4.21–5.77)
RBC # BLD: NORMAL 10*6/UL
SALICYLATE LEVEL: <1 MG/DL (ref 3–10)
SEG NEUTROPHILS: 58 % (ref 36–65)
SEGMENTED NEUTROPHILS ABSOLUTE COUNT: 3.23 K/UL (ref 1.5–8.1)
SODIUM BLD-SCNC: 145 MMOL/L (ref 135–144)
TEST INFORMATION: NORMAL
TOTAL PROTEIN: 5.7 G/DL (ref 6.4–8.3)
TOTAL PROTEIN: 7.3 G/DL (ref 6.4–8.3)
TOXIC TRICYCLIC SC,BLOOD: NEGATIVE
TRICYCLIC ANTIDEPRESSANTS, UR: NORMAL
WBC # BLD: 5.6 K/UL (ref 3.5–11.3)
WBC # BLD: NORMAL 10*3/UL

## 2019-02-22 PROCEDURE — 36415 COLL VENOUS BLD VENIPUNCTURE: CPT

## 2019-02-22 PROCEDURE — 2500000003 HC RX 250 WO HCPCS: Performed by: STUDENT IN AN ORGANIZED HEALTH CARE EDUCATION/TRAINING PROGRAM

## 2019-02-22 PROCEDURE — 84132 ASSAY OF SERUM POTASSIUM: CPT

## 2019-02-22 PROCEDURE — 90792 PSYCH DIAG EVAL W/MED SRVCS: CPT | Performed by: NURSE PRACTITIONER

## 2019-02-22 PROCEDURE — 6360000002 HC RX W HCPCS: Performed by: STUDENT IN AN ORGANIZED HEALTH CARE EDUCATION/TRAINING PROGRAM

## 2019-02-22 PROCEDURE — 96374 THER/PROPH/DIAG INJ IV PUSH: CPT

## 2019-02-22 PROCEDURE — 2060000002 HC BURN ICU INTERMEDIATE R&B

## 2019-02-22 PROCEDURE — 85025 COMPLETE CBC W/AUTO DIFF WBC: CPT

## 2019-02-22 PROCEDURE — 2580000003 HC RX 258: Performed by: STUDENT IN AN ORGANIZED HEALTH CARE EDUCATION/TRAINING PROGRAM

## 2019-02-22 PROCEDURE — 96372 THER/PROPH/DIAG INJ SC/IM: CPT

## 2019-02-22 PROCEDURE — 93005 ELECTROCARDIOGRAM TRACING: CPT

## 2019-02-22 PROCEDURE — 80048 BASIC METABOLIC PNL TOTAL CA: CPT

## 2019-02-22 PROCEDURE — 99223 1ST HOSP IP/OBS HIGH 75: CPT | Performed by: INTERNAL MEDICINE

## 2019-02-22 PROCEDURE — 94760 N-INVAS EAR/PLS OXIMETRY 1: CPT

## 2019-02-22 PROCEDURE — 83735 ASSAY OF MAGNESIUM: CPT

## 2019-02-22 PROCEDURE — 99285 EMERGENCY DEPT VISIT HI MDM: CPT

## 2019-02-22 PROCEDURE — 80076 HEPATIC FUNCTION PANEL: CPT

## 2019-02-22 PROCEDURE — 2580000003 HC RX 258: Performed by: EMERGENCY MEDICINE

## 2019-02-22 PROCEDURE — G0480 DRUG TEST DEF 1-7 CLASSES: HCPCS

## 2019-02-22 PROCEDURE — 80307 DRUG TEST PRSMV CHEM ANLYZR: CPT

## 2019-02-22 RX ORDER — NALOXONE HYDROCHLORIDE 0.4 MG/ML
0.4 INJECTION, SOLUTION INTRAMUSCULAR; INTRAVENOUS; SUBCUTANEOUS ONCE
Status: DISCONTINUED | OUTPATIENT
Start: 2019-02-22 | End: 2019-02-25 | Stop reason: HOSPADM

## 2019-02-22 RX ORDER — SODIUM CHLORIDE 0.9 % (FLUSH) 0.9 %
10 SYRINGE (ML) INJECTION EVERY 12 HOURS SCHEDULED
Status: DISCONTINUED | OUTPATIENT
Start: 2019-02-22 | End: 2019-02-25 | Stop reason: HOSPADM

## 2019-02-22 RX ORDER — 0.9 % SODIUM CHLORIDE 0.9 %
1000 INTRAVENOUS SOLUTION INTRAVENOUS ONCE
Status: COMPLETED | OUTPATIENT
Start: 2019-02-22 | End: 2019-02-22

## 2019-02-22 RX ORDER — ACETAMINOPHEN 325 MG/1
650 TABLET ORAL EVERY 4 HOURS PRN
Status: DISCONTINUED | OUTPATIENT
Start: 2019-02-22 | End: 2019-02-25 | Stop reason: HOSPADM

## 2019-02-22 RX ORDER — SODIUM CHLORIDE 0.9 % (FLUSH) 0.9 %
10 SYRINGE (ML) INJECTION PRN
Status: DISCONTINUED | OUTPATIENT
Start: 2019-02-22 | End: 2019-02-25 | Stop reason: HOSPADM

## 2019-02-22 RX ORDER — SODIUM CHLORIDE 9 MG/ML
INJECTION, SOLUTION INTRAVENOUS CONTINUOUS
Status: DISCONTINUED | OUTPATIENT
Start: 2019-02-22 | End: 2019-02-23

## 2019-02-22 RX ADMIN — SODIUM CHLORIDE 1000 ML: 9 INJECTION, SOLUTION INTRAVENOUS at 06:33

## 2019-02-22 RX ADMIN — FAMOTIDINE 20 MG: 10 INJECTION, SOLUTION INTRAVENOUS at 21:02

## 2019-02-22 RX ADMIN — SODIUM CHLORIDE: 9 INJECTION, SOLUTION INTRAVENOUS at 10:39

## 2019-02-22 RX ADMIN — SODIUM CHLORIDE 1000 ML: 9 INJECTION, SOLUTION INTRAVENOUS at 15:26

## 2019-02-22 RX ADMIN — ENOXAPARIN SODIUM 40 MG: 40 INJECTION SUBCUTANEOUS at 10:35

## 2019-02-22 RX ADMIN — SODIUM CHLORIDE 1000 ML: 9 INJECTION, SOLUTION INTRAVENOUS at 06:58

## 2019-02-22 RX ADMIN — FAMOTIDINE 20 MG: 10 INJECTION, SOLUTION INTRAVENOUS at 10:36

## 2019-02-22 ASSESSMENT — ENCOUNTER SYMPTOMS
VOMITING: 0
NAUSEA: 1
SHORTNESS OF BREATH: 0
COUGH: 0
RHINORRHEA: 0
EYE PAIN: 0
SORE THROAT: 0
ABDOMINAL PAIN: 0
COLOR CHANGE: 0

## 2019-02-23 LAB
ABSOLUTE EOS #: 0.21 K/UL (ref 0–0.44)
ABSOLUTE IMMATURE GRANULOCYTE: <0.03 K/UL (ref 0–0.3)
ABSOLUTE LYMPH #: 2.03 K/UL (ref 1.1–3.7)
ABSOLUTE MONO #: 0.4 K/UL (ref 0.1–1.2)
ALBUMIN SERPL-MCNC: 3.2 G/DL (ref 3.5–5.2)
ALBUMIN/GLOBULIN RATIO: 1.5 (ref 1–2.5)
ALP BLD-CCNC: 39 U/L (ref 40–129)
ALT SERPL-CCNC: 13 U/L (ref 5–41)
ANION GAP SERPL CALCULATED.3IONS-SCNC: 11 MMOL/L (ref 9–17)
AST SERPL-CCNC: 12 U/L
BASOPHILS # BLD: 1 % (ref 0–2)
BASOPHILS ABSOLUTE: 0.05 K/UL (ref 0–0.2)
BILIRUB SERPL-MCNC: <0.1 MG/DL (ref 0.3–1.2)
BILIRUBIN DIRECT: <0.08 MG/DL
BILIRUBIN, INDIRECT: ABNORMAL MG/DL (ref 0–1)
BUN BLDV-MCNC: 19 MG/DL (ref 6–20)
BUN/CREAT BLD: ABNORMAL (ref 9–20)
CALCIUM SERPL-MCNC: 8.2 MG/DL (ref 8.6–10.4)
CHLORIDE BLD-SCNC: 111 MMOL/L (ref 98–107)
CO2: 23 MMOL/L (ref 20–31)
CREAT SERPL-MCNC: 0.89 MG/DL (ref 0.7–1.2)
DIFFERENTIAL TYPE: ABNORMAL
EKG ATRIAL RATE: 64 BPM
EKG ATRIAL RATE: 66 BPM
EKG ATRIAL RATE: 92 BPM
EKG P AXIS: 73 DEGREES
EKG P AXIS: 83 DEGREES
EKG P AXIS: 85 DEGREES
EKG P-R INTERVAL: 152 MS
EKG P-R INTERVAL: 152 MS
EKG P-R INTERVAL: 160 MS
EKG Q-T INTERVAL: 360 MS
EKG Q-T INTERVAL: 394 MS
EKG Q-T INTERVAL: 398 MS
EKG QRS DURATION: 86 MS
EKG QRS DURATION: 88 MS
EKG QRS DURATION: 94 MS
EKG QTC CALCULATION (BAZETT): 406 MS
EKG QTC CALCULATION (BAZETT): 417 MS
EKG QTC CALCULATION (BAZETT): 445 MS
EKG R AXIS: 75 DEGREES
EKG R AXIS: 84 DEGREES
EKG R AXIS: 86 DEGREES
EKG T AXIS: 35 DEGREES
EKG T AXIS: 47 DEGREES
EKG T AXIS: 61 DEGREES
EKG VENTRICULAR RATE: 64 BPM
EKG VENTRICULAR RATE: 66 BPM
EKG VENTRICULAR RATE: 92 BPM
EOSINOPHILS RELATIVE PERCENT: 4 % (ref 1–4)
GFR AFRICAN AMERICAN: >60 ML/MIN
GFR NON-AFRICAN AMERICAN: >60 ML/MIN
GFR SERPL CREATININE-BSD FRML MDRD: ABNORMAL ML/MIN/{1.73_M2}
GFR SERPL CREATININE-BSD FRML MDRD: ABNORMAL ML/MIN/{1.73_M2}
GLOBULIN: ABNORMAL G/DL (ref 1.5–3.8)
GLUCOSE BLD-MCNC: 111 MG/DL (ref 70–99)
HCT VFR BLD CALC: 40.2 % (ref 40.7–50.3)
HEMOGLOBIN: 13 G/DL (ref 13–17)
IMMATURE GRANULOCYTES: 0 %
LYMPHOCYTES # BLD: 40 % (ref 24–43)
MCH RBC QN AUTO: 30 PG (ref 25.2–33.5)
MCHC RBC AUTO-ENTMCNC: 32.3 G/DL (ref 28.4–34.8)
MCV RBC AUTO: 92.8 FL (ref 82.6–102.9)
MONOCYTES # BLD: 8 % (ref 3–12)
NRBC AUTOMATED: 0 PER 100 WBC
PDW BLD-RTO: 12.6 % (ref 11.8–14.4)
PLATELET # BLD: 185 K/UL (ref 138–453)
PLATELET ESTIMATE: ABNORMAL
PMV BLD AUTO: 10.8 FL (ref 8.1–13.5)
POTASSIUM SERPL-SCNC: 4.2 MMOL/L (ref 3.7–5.3)
POTASSIUM SERPL-SCNC: 4.2 MMOL/L (ref 3.7–5.3)
RBC # BLD: 4.33 M/UL (ref 4.21–5.77)
RBC # BLD: ABNORMAL 10*6/UL
SEG NEUTROPHILS: 47 % (ref 36–65)
SEGMENTED NEUTROPHILS ABSOLUTE COUNT: 2.34 K/UL (ref 1.5–8.1)
SODIUM BLD-SCNC: 145 MMOL/L (ref 135–144)
TOTAL PROTEIN: 5.4 G/DL (ref 6.4–8.3)
WBC # BLD: 5 K/UL (ref 3.5–11.3)
WBC # BLD: ABNORMAL 10*3/UL

## 2019-02-23 PROCEDURE — 93005 ELECTROCARDIOGRAM TRACING: CPT

## 2019-02-23 PROCEDURE — 80048 BASIC METABOLIC PNL TOTAL CA: CPT

## 2019-02-23 PROCEDURE — 36415 COLL VENOUS BLD VENIPUNCTURE: CPT

## 2019-02-23 PROCEDURE — 6360000002 HC RX W HCPCS: Performed by: STUDENT IN AN ORGANIZED HEALTH CARE EDUCATION/TRAINING PROGRAM

## 2019-02-23 PROCEDURE — 84132 ASSAY OF SERUM POTASSIUM: CPT

## 2019-02-23 PROCEDURE — 2500000003 HC RX 250 WO HCPCS: Performed by: STUDENT IN AN ORGANIZED HEALTH CARE EDUCATION/TRAINING PROGRAM

## 2019-02-23 PROCEDURE — 80076 HEPATIC FUNCTION PANEL: CPT

## 2019-02-23 PROCEDURE — 99232 SBSQ HOSP IP/OBS MODERATE 35: CPT | Performed by: INTERNAL MEDICINE

## 2019-02-23 PROCEDURE — 2060000002 HC BURN ICU INTERMEDIATE R&B

## 2019-02-23 PROCEDURE — 2580000003 HC RX 258: Performed by: INTERNAL MEDICINE

## 2019-02-23 PROCEDURE — 2580000003 HC RX 258: Performed by: STUDENT IN AN ORGANIZED HEALTH CARE EDUCATION/TRAINING PROGRAM

## 2019-02-23 PROCEDURE — 85025 COMPLETE CBC W/AUTO DIFF WBC: CPT

## 2019-02-23 RX ORDER — SODIUM CHLORIDE 450 MG/100ML
INJECTION, SOLUTION INTRAVENOUS CONTINUOUS
Status: DISCONTINUED | OUTPATIENT
Start: 2019-02-23 | End: 2019-02-23

## 2019-02-23 RX ORDER — SODIUM CHLORIDE 0.9 % (FLUSH) 0.9 %
10 SYRINGE (ML) INJECTION EVERY 12 HOURS SCHEDULED
Status: CANCELLED | OUTPATIENT
Start: 2019-02-23

## 2019-02-23 RX ORDER — SODIUM CHLORIDE 0.9 % (FLUSH) 0.9 %
10 SYRINGE (ML) INJECTION PRN
Status: CANCELLED | OUTPATIENT
Start: 2019-02-23

## 2019-02-23 RX ORDER — ACETAMINOPHEN 325 MG/1
650 TABLET ORAL EVERY 4 HOURS PRN
Status: CANCELLED | OUTPATIENT
Start: 2019-02-23

## 2019-02-23 RX ADMIN — FAMOTIDINE 20 MG: 10 INJECTION, SOLUTION INTRAVENOUS at 21:22

## 2019-02-23 RX ADMIN — Medication 10 ML: at 11:19

## 2019-02-23 RX ADMIN — FAMOTIDINE 20 MG: 10 INJECTION, SOLUTION INTRAVENOUS at 11:18

## 2019-02-23 RX ADMIN — Medication 10 ML: at 21:23

## 2019-02-23 RX ADMIN — ENOXAPARIN SODIUM 40 MG: 40 INJECTION SUBCUTANEOUS at 11:18

## 2019-02-24 LAB
ABSOLUTE EOS #: 0.31 K/UL (ref 0–0.44)
ABSOLUTE IMMATURE GRANULOCYTE: 0.04 K/UL (ref 0–0.3)
ABSOLUTE LYMPH #: 2.21 K/UL (ref 1.1–3.7)
ABSOLUTE MONO #: 0.51 K/UL (ref 0.1–1.2)
ALBUMIN SERPL-MCNC: 3.8 G/DL (ref 3.5–5.2)
ALBUMIN/GLOBULIN RATIO: 1.5 (ref 1–2.5)
ALP BLD-CCNC: 44 U/L (ref 40–129)
ALT SERPL-CCNC: 15 U/L (ref 5–41)
ANION GAP SERPL CALCULATED.3IONS-SCNC: 12 MMOL/L (ref 9–17)
AST SERPL-CCNC: 13 U/L
BASOPHILS # BLD: 1 % (ref 0–2)
BASOPHILS ABSOLUTE: 0.05 K/UL (ref 0–0.2)
BILIRUB SERPL-MCNC: 0.19 MG/DL (ref 0.3–1.2)
BILIRUBIN DIRECT: <0.08 MG/DL
BILIRUBIN, INDIRECT: ABNORMAL MG/DL (ref 0–1)
BUN BLDV-MCNC: 17 MG/DL (ref 6–20)
BUN/CREAT BLD: NORMAL (ref 9–20)
CALCIUM SERPL-MCNC: 8.6 MG/DL (ref 8.6–10.4)
CHLORIDE BLD-SCNC: 105 MMOL/L (ref 98–107)
CO2: 25 MMOL/L (ref 20–31)
CREAT SERPL-MCNC: 0.88 MG/DL (ref 0.7–1.2)
DIFFERENTIAL TYPE: ABNORMAL
EOSINOPHILS RELATIVE PERCENT: 5 % (ref 1–4)
GFR AFRICAN AMERICAN: >60 ML/MIN
GFR NON-AFRICAN AMERICAN: >60 ML/MIN
GFR SERPL CREATININE-BSD FRML MDRD: NORMAL ML/MIN/{1.73_M2}
GFR SERPL CREATININE-BSD FRML MDRD: NORMAL ML/MIN/{1.73_M2}
GLOBULIN: ABNORMAL G/DL (ref 1.5–3.8)
GLUCOSE BLD-MCNC: 99 MG/DL (ref 70–99)
HCT VFR BLD CALC: 43.8 % (ref 40.7–50.3)
HEMOGLOBIN: 14.7 G/DL (ref 13–17)
IMMATURE GRANULOCYTES: 1 %
LYMPHOCYTES # BLD: 35 % (ref 24–43)
MCH RBC QN AUTO: 30.8 PG (ref 25.2–33.5)
MCHC RBC AUTO-ENTMCNC: 33.6 G/DL (ref 28.4–34.8)
MCV RBC AUTO: 91.8 FL (ref 82.6–102.9)
MONOCYTES # BLD: 8 % (ref 3–12)
NRBC AUTOMATED: 0 PER 100 WBC
PDW BLD-RTO: 12.6 % (ref 11.8–14.4)
PLATELET # BLD: 207 K/UL (ref 138–453)
PLATELET ESTIMATE: ABNORMAL
PMV BLD AUTO: 10.7 FL (ref 8.1–13.5)
POTASSIUM SERPL-SCNC: 4 MMOL/L (ref 3.7–5.3)
RBC # BLD: 4.77 M/UL (ref 4.21–5.77)
RBC # BLD: ABNORMAL 10*6/UL
SEG NEUTROPHILS: 50 % (ref 36–65)
SEGMENTED NEUTROPHILS ABSOLUTE COUNT: 3.25 K/UL (ref 1.5–8.1)
SODIUM BLD-SCNC: 142 MMOL/L (ref 135–144)
TOTAL PROTEIN: 6.4 G/DL (ref 6.4–8.3)
WBC # BLD: 6.4 K/UL (ref 3.5–11.3)
WBC # BLD: ABNORMAL 10*3/UL

## 2019-02-24 PROCEDURE — 6370000000 HC RX 637 (ALT 250 FOR IP): Performed by: STUDENT IN AN ORGANIZED HEALTH CARE EDUCATION/TRAINING PROGRAM

## 2019-02-24 PROCEDURE — 99232 SBSQ HOSP IP/OBS MODERATE 35: CPT | Performed by: INTERNAL MEDICINE

## 2019-02-24 PROCEDURE — 85025 COMPLETE CBC W/AUTO DIFF WBC: CPT

## 2019-02-24 PROCEDURE — 6360000002 HC RX W HCPCS: Performed by: STUDENT IN AN ORGANIZED HEALTH CARE EDUCATION/TRAINING PROGRAM

## 2019-02-24 PROCEDURE — 80048 BASIC METABOLIC PNL TOTAL CA: CPT

## 2019-02-24 PROCEDURE — 80076 HEPATIC FUNCTION PANEL: CPT

## 2019-02-24 PROCEDURE — 2580000003 HC RX 258: Performed by: STUDENT IN AN ORGANIZED HEALTH CARE EDUCATION/TRAINING PROGRAM

## 2019-02-24 PROCEDURE — 36415 COLL VENOUS BLD VENIPUNCTURE: CPT

## 2019-02-24 PROCEDURE — 2060000000 HC ICU INTERMEDIATE R&B

## 2019-02-24 RX ORDER — FAMOTIDINE 20 MG/1
20 TABLET, FILM COATED ORAL 2 TIMES DAILY
Status: DISCONTINUED | OUTPATIENT
Start: 2019-02-24 | End: 2019-02-25 | Stop reason: HOSPADM

## 2019-02-24 RX ADMIN — FAMOTIDINE 20 MG: 20 TABLET, FILM COATED ORAL at 21:11

## 2019-02-24 RX ADMIN — Medication 10 ML: at 09:39

## 2019-02-24 RX ADMIN — ENOXAPARIN SODIUM 40 MG: 40 INJECTION SUBCUTANEOUS at 11:56

## 2019-02-25 ENCOUNTER — HOSPITAL ENCOUNTER (INPATIENT)
Age: 29
LOS: 3 days | Discharge: HOME | DRG: 750 | End: 2019-02-28
Attending: PSYCHIATRY & NEUROLOGY | Admitting: PSYCHIATRY & NEUROLOGY
Payer: COMMERCIAL

## 2019-02-25 VITALS
HEART RATE: 67 BPM | WEIGHT: 209.44 LBS | OXYGEN SATURATION: 96 % | HEIGHT: 72 IN | DIASTOLIC BLOOD PRESSURE: 52 MMHG | TEMPERATURE: 98.5 F | BODY MASS INDEX: 28.37 KG/M2 | SYSTOLIC BLOOD PRESSURE: 101 MMHG | RESPIRATION RATE: 13 BRPM

## 2019-02-25 PROBLEM — F25.9 SCHIZOAFFECTIVE DISORDER (HCC): Status: ACTIVE | Noted: 2019-02-25

## 2019-02-25 PROCEDURE — 99232 SBSQ HOSP IP/OBS MODERATE 35: CPT | Performed by: PSYCHIATRY & NEUROLOGY

## 2019-02-25 PROCEDURE — 6360000002 HC RX W HCPCS: Performed by: STUDENT IN AN ORGANIZED HEALTH CARE EDUCATION/TRAINING PROGRAM

## 2019-02-25 PROCEDURE — 99239 HOSP IP/OBS DSCHRG MGMT >30: CPT | Performed by: INTERNAL MEDICINE

## 2019-02-25 PROCEDURE — 1240000000 HC EMOTIONAL WELLNESS R&B

## 2019-02-25 PROCEDURE — 6370000000 HC RX 637 (ALT 250 FOR IP): Performed by: STUDENT IN AN ORGANIZED HEALTH CARE EDUCATION/TRAINING PROGRAM

## 2019-02-25 RX ORDER — QUETIAPINE FUMARATE 100 MG/1
100 TABLET, FILM COATED ORAL 2 TIMES DAILY
Status: DISCONTINUED | OUTPATIENT
Start: 2019-02-25 | End: 2019-02-25 | Stop reason: HOSPADM

## 2019-02-25 RX ORDER — HYDROXYZINE 50 MG/1
50 TABLET, FILM COATED ORAL 3 TIMES DAILY PRN
Status: DISCONTINUED | OUTPATIENT
Start: 2019-02-25 | End: 2019-02-26

## 2019-02-25 RX ORDER — NICOTINE 21 MG/24HR
1 PATCH, TRANSDERMAL 24 HOURS TRANSDERMAL DAILY
Status: DISCONTINUED | OUTPATIENT
Start: 2019-02-26 | End: 2019-02-26

## 2019-02-25 RX ORDER — BENZTROPINE MESYLATE 1 MG/ML
2 INJECTION INTRAMUSCULAR; INTRAVENOUS 2 TIMES DAILY PRN
Status: DISCONTINUED | OUTPATIENT
Start: 2019-02-25 | End: 2019-02-28 | Stop reason: HOSPADM

## 2019-02-25 RX ORDER — ACETAMINOPHEN 325 MG/1
650 TABLET ORAL EVERY 4 HOURS PRN
Status: DISCONTINUED | OUTPATIENT
Start: 2019-02-25 | End: 2019-02-28 | Stop reason: HOSPADM

## 2019-02-25 RX ORDER — MAGNESIUM HYDROXIDE/ALUMINUM HYDROXICE/SIMETHICONE 120; 1200; 1200 MG/30ML; MG/30ML; MG/30ML
30 SUSPENSION ORAL EVERY 6 HOURS PRN
Status: DISCONTINUED | OUTPATIENT
Start: 2019-02-25 | End: 2019-02-28 | Stop reason: HOSPADM

## 2019-02-25 RX ORDER — TRAZODONE HYDROCHLORIDE 50 MG/1
50 TABLET ORAL NIGHTLY PRN
Status: DISCONTINUED | OUTPATIENT
Start: 2019-02-26 | End: 2019-02-28 | Stop reason: HOSPADM

## 2019-02-25 RX ADMIN — ENOXAPARIN SODIUM 40 MG: 40 INJECTION SUBCUTANEOUS at 08:10

## 2019-02-25 RX ADMIN — SERTRALINE 100 MG: 50 TABLET, FILM COATED ORAL at 11:25

## 2019-02-25 RX ADMIN — QUETIAPINE FUMARATE 100 MG: 100 TABLET ORAL at 11:25

## 2019-02-25 RX ADMIN — FAMOTIDINE 20 MG: 20 TABLET, FILM COATED ORAL at 08:10

## 2019-02-25 ASSESSMENT — SLEEP AND FATIGUE QUESTIONNAIRES
DO YOU USE A SLEEP AID: YES
DIFFICULTY FALLING ASLEEP: NO
DO YOU HAVE DIFFICULTY SLEEPING: NO
SLEEP PATTERN: RESTLESSNESS;DISTURBED/INTERRUPTED SLEEP;DIFFICULTY FALLING ASLEEP
DIFFICULTY STAYING ASLEEP: NO
DIFFICULTY ARISING: NO
RESTFUL SLEEP: YES
AVERAGE NUMBER OF SLEEP HOURS: 7

## 2019-02-25 ASSESSMENT — PAIN SCALES - GENERAL
PAINLEVEL_OUTOF10: 0
PAINLEVEL_OUTOF10: 0

## 2019-02-25 ASSESSMENT — PATIENT HEALTH QUESTIONNAIRE - PHQ9: SUM OF ALL RESPONSES TO PHQ QUESTIONS 1-9: 14

## 2019-02-25 ASSESSMENT — PAIN DESCRIPTION - LOCATION: LOCATION: OTHER (COMMENT)

## 2019-02-25 ASSESSMENT — PAIN DESCRIPTION - PAIN TYPE: TYPE: OTHER (COMMENT)

## 2019-02-25 ASSESSMENT — LIFESTYLE VARIABLES: HISTORY_ALCOHOL_USE: NO

## 2019-02-26 LAB
ABSOLUTE EOS #: 0.2 K/UL (ref 0–0.4)
ABSOLUTE IMMATURE GRANULOCYTE: ABNORMAL K/UL (ref 0–0.3)
ABSOLUTE LYMPH #: 1.6 K/UL (ref 1–4.8)
ABSOLUTE MONO #: 0.6 K/UL (ref 0.1–1.3)
ALBUMIN SERPL-MCNC: 4.4 G/DL (ref 3.5–5.2)
ALBUMIN/GLOBULIN RATIO: ABNORMAL (ref 1–2.5)
ALP BLD-CCNC: 50 U/L (ref 40–129)
ALT SERPL-CCNC: 18 U/L (ref 5–41)
ANION GAP SERPL CALCULATED.3IONS-SCNC: 8 MMOL/L (ref 9–17)
AST SERPL-CCNC: 15 U/L
BASOPHILS # BLD: 1 % (ref 0–2)
BASOPHILS ABSOLUTE: 0 K/UL (ref 0–0.2)
BILIRUB SERPL-MCNC: 0.38 MG/DL (ref 0.3–1.2)
BUN BLDV-MCNC: 21 MG/DL (ref 6–20)
BUN/CREAT BLD: ABNORMAL (ref 9–20)
CALCIUM SERPL-MCNC: 9.3 MG/DL (ref 8.6–10.4)
CHLORIDE BLD-SCNC: 102 MMOL/L (ref 98–107)
CHOLESTEROL/HDL RATIO: 2.9
CHOLESTEROL: 150 MG/DL
CO2: 28 MMOL/L (ref 20–31)
CREAT SERPL-MCNC: 0.99 MG/DL (ref 0.7–1.2)
DIFFERENTIAL TYPE: ABNORMAL
EOSINOPHILS RELATIVE PERCENT: 4 % (ref 0–4)
ESTIMATED AVERAGE GLUCOSE: 85 MG/DL
GFR AFRICAN AMERICAN: >60 ML/MIN
GFR NON-AFRICAN AMERICAN: >60 ML/MIN
GFR SERPL CREATININE-BSD FRML MDRD: ABNORMAL ML/MIN/{1.73_M2}
GFR SERPL CREATININE-BSD FRML MDRD: ABNORMAL ML/MIN/{1.73_M2}
GLUCOSE BLD-MCNC: 101 MG/DL (ref 70–99)
HBA1C MFR BLD: 4.6 % (ref 4–6)
HCT VFR BLD CALC: 45.1 % (ref 41–53)
HDLC SERPL-MCNC: 52 MG/DL
HEMOGLOBIN: 15.1 G/DL (ref 13.5–17.5)
IMMATURE GRANULOCYTES: ABNORMAL %
LDL CHOLESTEROL: 76 MG/DL (ref 0–130)
LYMPHOCYTES # BLD: 23 % (ref 24–44)
MCH RBC QN AUTO: 30.4 PG (ref 26–34)
MCHC RBC AUTO-ENTMCNC: 33.4 G/DL (ref 31–37)
MCV RBC AUTO: 90.8 FL (ref 80–100)
MONOCYTES # BLD: 9 % (ref 1–7)
NRBC AUTOMATED: ABNORMAL PER 100 WBC
PDW BLD-RTO: 13.4 % (ref 11.5–14.9)
PLATELET # BLD: 202 K/UL (ref 150–450)
PLATELET ESTIMATE: ABNORMAL
PMV BLD AUTO: 8.9 FL (ref 6–12)
POTASSIUM SERPL-SCNC: 4.6 MMOL/L (ref 3.7–5.3)
RBC # BLD: 4.96 M/UL (ref 4.5–5.9)
RBC # BLD: ABNORMAL 10*6/UL
SEG NEUTROPHILS: 63 % (ref 36–66)
SEGMENTED NEUTROPHILS ABSOLUTE COUNT: 4.2 K/UL (ref 1.3–9.1)
SODIUM BLD-SCNC: 138 MMOL/L (ref 135–144)
THYROXINE, FREE: 0.87 NG/DL (ref 0.93–1.7)
TOTAL PROTEIN: 7.5 G/DL (ref 6.4–8.3)
TRIGL SERPL-MCNC: 112 MG/DL
TSH SERPL DL<=0.05 MIU/L-ACNC: 1.15 MIU/L (ref 0.3–5)
VLDLC SERPL CALC-MCNC: NORMAL MG/DL (ref 1–30)
WBC # BLD: 6.7 K/UL (ref 3.5–11)
WBC # BLD: ABNORMAL 10*3/UL

## 2019-02-26 PROCEDURE — 84439 ASSAY OF FREE THYROXINE: CPT

## 2019-02-26 PROCEDURE — 80061 LIPID PANEL: CPT

## 2019-02-26 PROCEDURE — 6370000000 HC RX 637 (ALT 250 FOR IP): Performed by: REGISTERED NURSE

## 2019-02-26 PROCEDURE — 85025 COMPLETE CBC W/AUTO DIFF WBC: CPT

## 2019-02-26 PROCEDURE — 80053 COMPREHEN METABOLIC PANEL: CPT

## 2019-02-26 PROCEDURE — 1240000000 HC EMOTIONAL WELLNESS R&B

## 2019-02-26 PROCEDURE — 84443 ASSAY THYROID STIM HORMONE: CPT

## 2019-02-26 PROCEDURE — 90792 PSYCH DIAG EVAL W/MED SRVCS: CPT | Performed by: REGISTERED NURSE

## 2019-02-26 PROCEDURE — 83036 HEMOGLOBIN GLYCOSYLATED A1C: CPT

## 2019-02-26 PROCEDURE — 36415 COLL VENOUS BLD VENIPUNCTURE: CPT

## 2019-02-26 RX ORDER — HYDROXYZINE HYDROCHLORIDE 25 MG/1
25 TABLET, FILM COATED ORAL 3 TIMES DAILY PRN
Status: DISCONTINUED | OUTPATIENT
Start: 2019-02-26 | End: 2019-02-28 | Stop reason: HOSPADM

## 2019-02-26 RX ADMIN — HYDROXYZINE HYDROCHLORIDE 25 MG: 25 TABLET ORAL at 21:00

## 2019-02-26 ASSESSMENT — LIFESTYLE VARIABLES: HISTORY_ALCOHOL_USE: YES

## 2019-02-27 PROCEDURE — 1240000000 HC EMOTIONAL WELLNESS R&B

## 2019-02-27 PROCEDURE — 99232 SBSQ HOSP IP/OBS MODERATE 35: CPT | Performed by: REGISTERED NURSE

## 2019-02-27 PROCEDURE — 6370000000 HC RX 637 (ALT 250 FOR IP): Performed by: REGISTERED NURSE

## 2019-02-27 RX ORDER — QUETIAPINE FUMARATE 100 MG/1
100 TABLET, FILM COATED ORAL 2 TIMES DAILY
Status: DISCONTINUED | OUTPATIENT
Start: 2019-02-27 | End: 2019-02-28 | Stop reason: HOSPADM

## 2019-02-27 RX ADMIN — QUETIAPINE FUMARATE 100 MG: 100 TABLET ORAL at 10:39

## 2019-02-27 RX ADMIN — SERTRALINE HYDROCHLORIDE 50 MG: 50 TABLET ORAL at 10:44

## 2019-02-27 RX ADMIN — QUETIAPINE FUMARATE 100 MG: 100 TABLET ORAL at 20:47

## 2019-02-28 VITALS
DIASTOLIC BLOOD PRESSURE: 53 MMHG | OXYGEN SATURATION: 98 % | TEMPERATURE: 97.2 F | RESPIRATION RATE: 14 BRPM | HEART RATE: 75 BPM | BODY MASS INDEX: 28.37 KG/M2 | SYSTOLIC BLOOD PRESSURE: 109 MMHG | HEIGHT: 72 IN | WEIGHT: 209.44 LBS

## 2019-02-28 PROCEDURE — 99239 HOSP IP/OBS DSCHRG MGMT >30: CPT | Performed by: REGISTERED NURSE

## 2019-02-28 PROCEDURE — 6370000000 HC RX 637 (ALT 250 FOR IP): Performed by: REGISTERED NURSE

## 2019-02-28 PROCEDURE — 5130000000 HC BRIDGE APPOINTMENT

## 2019-02-28 RX ORDER — QUETIAPINE FUMARATE 100 MG/1
100 TABLET, FILM COATED ORAL 2 TIMES DAILY
Qty: 30 TABLET | Refills: 0 | Status: ON HOLD | OUTPATIENT
Start: 2019-02-28 | End: 2019-03-25 | Stop reason: HOSPADM

## 2019-02-28 RX ADMIN — SERTRALINE HYDROCHLORIDE 50 MG: 50 TABLET ORAL at 08:59

## 2019-02-28 RX ADMIN — QUETIAPINE FUMARATE 100 MG: 100 TABLET ORAL at 08:59

## 2019-03-03 ENCOUNTER — HOSPITAL ENCOUNTER (EMERGENCY)
Age: 29
Discharge: PSYCHIATRIC OR SUBSTANCE USE FACILITY | End: 2019-03-04
Attending: EMERGENCY MEDICINE
Payer: COMMERCIAL

## 2019-03-03 DIAGNOSIS — R45.851 SUICIDAL INTENT: Primary | ICD-10-CM

## 2019-03-03 LAB
ACETAMINOPHEN LEVEL: <5 UG/ML (ref 10–30)
AMPHETAMINE SCREEN URINE: NEGATIVE
ANION GAP SERPL CALCULATED.3IONS-SCNC: 12 MMOL/L (ref 9–17)
BARBITURATE SCREEN URINE: NEGATIVE
BENZODIAZEPINE SCREEN, URINE: NEGATIVE
BUN BLDV-MCNC: 21 MG/DL (ref 6–20)
BUN/CREAT BLD: ABNORMAL (ref 9–20)
BUPRENORPHINE URINE: ABNORMAL
CALCIUM SERPL-MCNC: 9.4 MG/DL (ref 8.6–10.4)
CANNABINOID SCREEN URINE: NEGATIVE
CHLORIDE BLD-SCNC: 102 MMOL/L (ref 98–107)
CO2: 28 MMOL/L (ref 20–31)
COCAINE METABOLITE, URINE: POSITIVE
CREAT SERPL-MCNC: 1.1 MG/DL (ref 0.7–1.2)
ETHANOL PERCENT: <0.01 %
ETHANOL: <10 MG/DL
GFR AFRICAN AMERICAN: >60 ML/MIN
GFR NON-AFRICAN AMERICAN: >60 ML/MIN
GFR SERPL CREATININE-BSD FRML MDRD: ABNORMAL ML/MIN/{1.73_M2}
GFR SERPL CREATININE-BSD FRML MDRD: ABNORMAL ML/MIN/{1.73_M2}
GLUCOSE BLD-MCNC: 75 MG/DL (ref 70–99)
MDMA URINE: ABNORMAL
METHADONE SCREEN, URINE: NEGATIVE
METHAMPHETAMINE, URINE: ABNORMAL
OPIATES, URINE: NEGATIVE
OXYCODONE SCREEN URINE: NEGATIVE
PHENCYCLIDINE, URINE: NEGATIVE
POTASSIUM SERPL-SCNC: 4 MMOL/L (ref 3.7–5.3)
PROPOXYPHENE, URINE: ABNORMAL
SALICYLATE LEVEL: <1 MG/DL (ref 3–10)
SODIUM BLD-SCNC: 142 MMOL/L (ref 135–144)
TEST INFORMATION: ABNORMAL
TOXIC TRICYCLIC SC,BLOOD: NEGATIVE
TRICYCLIC ANTIDEPRESSANTS, UR: ABNORMAL

## 2019-03-03 PROCEDURE — G0480 DRUG TEST DEF 1-7 CLASSES: HCPCS

## 2019-03-03 PROCEDURE — 2580000003 HC RX 258: Performed by: STUDENT IN AN ORGANIZED HEALTH CARE EDUCATION/TRAINING PROGRAM

## 2019-03-03 PROCEDURE — 93005 ELECTROCARDIOGRAM TRACING: CPT

## 2019-03-03 PROCEDURE — 80048 BASIC METABOLIC PNL TOTAL CA: CPT

## 2019-03-03 PROCEDURE — G0384 LEV 5 HOSP TYPE B ED VISIT: HCPCS

## 2019-03-03 PROCEDURE — 80307 DRUG TEST PRSMV CHEM ANLYZR: CPT

## 2019-03-03 RX ORDER — 0.9 % SODIUM CHLORIDE 0.9 %
1000 INTRAVENOUS SOLUTION INTRAVENOUS ONCE
Status: COMPLETED | OUTPATIENT
Start: 2019-03-03 | End: 2019-03-03

## 2019-03-03 RX ORDER — SODIUM CHLORIDE, SODIUM LACTATE, POTASSIUM CHLORIDE, AND CALCIUM CHLORIDE .6; .31; .03; .02 G/100ML; G/100ML; G/100ML; G/100ML
1000 INJECTION, SOLUTION INTRAVENOUS ONCE
Status: COMPLETED | OUTPATIENT
Start: 2019-03-03 | End: 2019-03-04

## 2019-03-03 RX ADMIN — SODIUM CHLORIDE, POTASSIUM CHLORIDE, SODIUM LACTATE AND CALCIUM CHLORIDE 1000 ML: 600; 310; 30; 20 INJECTION, SOLUTION INTRAVENOUS at 23:08

## 2019-03-03 RX ADMIN — SODIUM CHLORIDE 1000 ML: 9 INJECTION, SOLUTION INTRAVENOUS at 20:35

## 2019-03-03 ASSESSMENT — ENCOUNTER SYMPTOMS
COUGH: 0
NAUSEA: 0
SHORTNESS OF BREATH: 0
CHEST TIGHTNESS: 0
COLOR CHANGE: 0
BLOOD IN STOOL: 0
BACK PAIN: 0
ABDOMINAL PAIN: 0
EYE DISCHARGE: 0
EYE PAIN: 0
SORE THROAT: 0
VOMITING: 0
CONSTIPATION: 0
RHINORRHEA: 0
DIARRHEA: 0

## 2019-03-04 ENCOUNTER — HOSPITAL ENCOUNTER (INPATIENT)
Age: 29
LOS: 3 days | Discharge: HOME | DRG: 812 | End: 2019-03-07
Attending: PSYCHIATRY & NEUROLOGY | Admitting: PSYCHIATRY & NEUROLOGY
Payer: COMMERCIAL

## 2019-03-04 VITALS
HEIGHT: 72 IN | RESPIRATION RATE: 17 BRPM | OXYGEN SATURATION: 98 % | SYSTOLIC BLOOD PRESSURE: 111 MMHG | TEMPERATURE: 98.5 F | DIASTOLIC BLOOD PRESSURE: 74 MMHG | BODY MASS INDEX: 25.73 KG/M2 | WEIGHT: 190 LBS | HEART RATE: 90 BPM

## 2019-03-04 LAB
EKG ATRIAL RATE: 77 BPM
EKG ATRIAL RATE: 78 BPM
EKG P AXIS: 53 DEGREES
EKG P AXIS: 72 DEGREES
EKG P-R INTERVAL: 146 MS
EKG P-R INTERVAL: 162 MS
EKG Q-T INTERVAL: 370 MS
EKG Q-T INTERVAL: 388 MS
EKG QRS DURATION: 84 MS
EKG QRS DURATION: 92 MS
EKG QTC CALCULATION (BAZETT): 418 MS
EKG QTC CALCULATION (BAZETT): 442 MS
EKG R AXIS: 81 DEGREES
EKG R AXIS: 84 DEGREES
EKG T AXIS: 3 DEGREES
EKG T AXIS: 46 DEGREES
EKG VENTRICULAR RATE: 77 BPM
EKG VENTRICULAR RATE: 78 BPM

## 2019-03-04 PROCEDURE — 1240000000 HC EMOTIONAL WELLNESS R&B

## 2019-03-04 PROCEDURE — 93005 ELECTROCARDIOGRAM TRACING: CPT

## 2019-03-04 PROCEDURE — 6370000000 HC RX 637 (ALT 250 FOR IP): Performed by: PSYCHIATRY & NEUROLOGY

## 2019-03-04 PROCEDURE — 90792 PSYCH DIAG EVAL W/MED SRVCS: CPT | Performed by: PSYCHIATRY & NEUROLOGY

## 2019-03-04 RX ORDER — TRAZODONE HYDROCHLORIDE 50 MG/1
50 TABLET ORAL NIGHTLY PRN
Status: CANCELLED | OUTPATIENT
Start: 2019-03-04

## 2019-03-04 RX ORDER — MAGNESIUM HYDROXIDE/ALUMINUM HYDROXICE/SIMETHICONE 120; 1200; 1200 MG/30ML; MG/30ML; MG/30ML
30 SUSPENSION ORAL EVERY 6 HOURS PRN
Status: CANCELLED | OUTPATIENT
Start: 2019-03-04

## 2019-03-04 RX ORDER — TRAZODONE HYDROCHLORIDE 50 MG/1
50 TABLET ORAL NIGHTLY PRN
Status: DISCONTINUED | OUTPATIENT
Start: 2019-03-04 | End: 2019-03-07 | Stop reason: HOSPADM

## 2019-03-04 RX ORDER — QUETIAPINE FUMARATE 100 MG/1
100 TABLET, FILM COATED ORAL 2 TIMES DAILY
Status: DISCONTINUED | OUTPATIENT
Start: 2019-03-04 | End: 2019-03-07 | Stop reason: HOSPADM

## 2019-03-04 RX ORDER — ACETAMINOPHEN 325 MG/1
650 TABLET ORAL EVERY 4 HOURS PRN
Status: CANCELLED | OUTPATIENT
Start: 2019-03-04

## 2019-03-04 RX ORDER — HYDROXYZINE PAMOATE 50 MG/1
25 CAPSULE ORAL 3 TIMES DAILY PRN
Status: CANCELLED | OUTPATIENT
Start: 2019-03-04

## 2019-03-04 RX ORDER — ACETAMINOPHEN 325 MG/1
650 TABLET ORAL EVERY 4 HOURS PRN
Status: DISCONTINUED | OUTPATIENT
Start: 2019-03-04 | End: 2019-03-07 | Stop reason: HOSPADM

## 2019-03-04 RX ORDER — HALOPERIDOL 5 MG/ML
5 INJECTION INTRAMUSCULAR EVERY 4 HOURS PRN
Status: DISCONTINUED | OUTPATIENT
Start: 2019-03-04 | End: 2019-03-07 | Stop reason: HOSPADM

## 2019-03-04 RX ORDER — OLANZAPINE 5 MG/1
5 TABLET ORAL
Status: ACTIVE | OUTPATIENT
Start: 2019-03-04 | End: 2019-03-04

## 2019-03-04 RX ORDER — MAGNESIUM HYDROXIDE/ALUMINUM HYDROXICE/SIMETHICONE 120; 1200; 1200 MG/30ML; MG/30ML; MG/30ML
30 SUSPENSION ORAL EVERY 6 HOURS PRN
Status: DISCONTINUED | OUTPATIENT
Start: 2019-03-04 | End: 2019-03-07 | Stop reason: HOSPADM

## 2019-03-04 RX ORDER — NICOTINE 21 MG/24HR
1 PATCH, TRANSDERMAL 24 HOURS TRANSDERMAL DAILY
Status: CANCELLED | OUTPATIENT
Start: 2019-03-04

## 2019-03-04 RX ORDER — BENZTROPINE MESYLATE 1 MG/ML
2 INJECTION INTRAMUSCULAR; INTRAVENOUS 2 TIMES DAILY PRN
Status: CANCELLED | OUTPATIENT
Start: 2019-03-04

## 2019-03-04 RX ORDER — NICOTINE 21 MG/24HR
1 PATCH, TRANSDERMAL 24 HOURS TRANSDERMAL DAILY
Status: DISCONTINUED | OUTPATIENT
Start: 2019-03-04 | End: 2019-03-07 | Stop reason: HOSPADM

## 2019-03-04 RX ORDER — HYDROXYZINE HYDROCHLORIDE 25 MG/1
25 TABLET, FILM COATED ORAL 3 TIMES DAILY PRN
Status: DISCONTINUED | OUTPATIENT
Start: 2019-03-04 | End: 2019-03-07 | Stop reason: HOSPADM

## 2019-03-04 RX ORDER — BENZTROPINE MESYLATE 1 MG/ML
2 INJECTION INTRAMUSCULAR; INTRAVENOUS 2 TIMES DAILY PRN
Status: DISCONTINUED | OUTPATIENT
Start: 2019-03-04 | End: 2019-03-07 | Stop reason: HOSPADM

## 2019-03-04 RX ADMIN — QUETIAPINE FUMARATE 100 MG: 100 TABLET ORAL at 20:46

## 2019-03-04 ASSESSMENT — SLEEP AND FATIGUE QUESTIONNAIRES
AVERAGE NUMBER OF SLEEP HOURS: 7
DO YOU HAVE DIFFICULTY SLEEPING: NO
DIFFICULTY ARISING: NO
RESTFUL SLEEP: YES
DIFFICULTY FALLING ASLEEP: YES
DO YOU USE A SLEEP AID: YES
SLEEP PATTERN: DIFFICULTY FALLING ASLEEP;RESTLESSNESS;DISTURBED/INTERRUPTED SLEEP
DIFFICULTY STAYING ASLEEP: NO

## 2019-03-04 ASSESSMENT — LIFESTYLE VARIABLES
HISTORY_ALCOHOL_USE: YES
HISTORY_ALCOHOL_USE: NO

## 2019-03-04 ASSESSMENT — PATIENT HEALTH QUESTIONNAIRE - PHQ9: SUM OF ALL RESPONSES TO PHQ QUESTIONS 1-9: 17

## 2019-03-04 ASSESSMENT — PAIN SCALES - GENERAL: PAINLEVEL_OUTOF10: 0

## 2019-03-05 PROCEDURE — 6370000000 HC RX 637 (ALT 250 FOR IP): Performed by: PSYCHIATRY & NEUROLOGY

## 2019-03-05 PROCEDURE — 1240000000 HC EMOTIONAL WELLNESS R&B

## 2019-03-05 PROCEDURE — 99232 SBSQ HOSP IP/OBS MODERATE 35: CPT | Performed by: PSYCHIATRY & NEUROLOGY

## 2019-03-05 RX ADMIN — QUETIAPINE FUMARATE 100 MG: 100 TABLET ORAL at 23:16

## 2019-03-06 PROCEDURE — 6370000000 HC RX 637 (ALT 250 FOR IP): Performed by: PSYCHIATRY & NEUROLOGY

## 2019-03-06 PROCEDURE — 99232 SBSQ HOSP IP/OBS MODERATE 35: CPT | Performed by: PSYCHIATRY & NEUROLOGY

## 2019-03-06 PROCEDURE — 1240000000 HC EMOTIONAL WELLNESS R&B

## 2019-03-06 RX ORDER — NALTREXONE HYDROCHLORIDE 50 MG/1
25 TABLET, FILM COATED ORAL DAILY
Status: DISCONTINUED | OUTPATIENT
Start: 2019-03-06 | End: 2019-03-07 | Stop reason: HOSPADM

## 2019-03-06 RX ADMIN — SERTRALINE HYDROCHLORIDE 50 MG: 50 TABLET ORAL at 11:31

## 2019-03-06 RX ADMIN — QUETIAPINE FUMARATE 100 MG: 100 TABLET ORAL at 11:31

## 2019-03-06 RX ADMIN — QUETIAPINE FUMARATE 100 MG: 100 TABLET ORAL at 20:32

## 2019-03-07 VITALS
SYSTOLIC BLOOD PRESSURE: 112 MMHG | WEIGHT: 190 LBS | HEART RATE: 75 BPM | HEIGHT: 72 IN | RESPIRATION RATE: 14 BRPM | BODY MASS INDEX: 25.73 KG/M2 | TEMPERATURE: 97.8 F | DIASTOLIC BLOOD PRESSURE: 59 MMHG

## 2019-03-07 PROCEDURE — 5130000000 HC BRIDGE APPOINTMENT

## 2019-03-07 PROCEDURE — 99238 HOSP IP/OBS DSCHRG MGMT 30/<: CPT | Performed by: PSYCHIATRY & NEUROLOGY

## 2019-03-07 PROCEDURE — 6370000000 HC RX 637 (ALT 250 FOR IP): Performed by: PSYCHIATRY & NEUROLOGY

## 2019-03-07 RX ORDER — NALTREXONE HYDROCHLORIDE 50 MG/1
50 TABLET, FILM COATED ORAL DAILY
Qty: 30 TABLET | Refills: 0 | Status: ON HOLD | OUTPATIENT
Start: 2019-03-08 | End: 2019-03-25 | Stop reason: HOSPADM

## 2019-03-07 RX ADMIN — SERTRALINE HYDROCHLORIDE 50 MG: 50 TABLET ORAL at 08:45

## 2019-03-07 RX ADMIN — QUETIAPINE FUMARATE 100 MG: 100 TABLET ORAL at 08:45

## 2019-03-07 RX ADMIN — NALTREXONE HYDROCHLORIDE 25 MG: 50 TABLET, FILM COATED ORAL at 08:45

## 2019-03-09 ENCOUNTER — HOSPITAL ENCOUNTER (OUTPATIENT)
Age: 29
Setting detail: SPECIMEN
Discharge: HOME | End: 2019-03-09
Payer: COMMERCIAL

## 2019-03-09 LAB
ABSOLUTE EOS #: 0.19 K/UL (ref 0–0.44)
ABSOLUTE IMMATURE GRANULOCYTE: <0.03 K/UL (ref 0–0.3)
ABSOLUTE LYMPH #: 1.37 K/UL (ref 1.1–3.7)
ABSOLUTE MONO #: 0.41 K/UL (ref 0.1–1.2)
ALBUMIN SERPL-MCNC: 4.2 G/DL (ref 3.5–5.2)
ALBUMIN/GLOBULIN RATIO: 1.5 (ref 1–2.5)
ALP BLD-CCNC: 52 U/L (ref 40–129)
ALT SERPL-CCNC: 19 U/L (ref 5–41)
ANION GAP SERPL CALCULATED.3IONS-SCNC: 10 MMOL/L (ref 9–17)
AST SERPL-CCNC: 22 U/L
BASOPHILS # BLD: 1 % (ref 0–2)
BASOPHILS ABSOLUTE: 0.05 K/UL (ref 0–0.2)
BILIRUB SERPL-MCNC: 0.34 MG/DL (ref 0.3–1.2)
BUN BLDV-MCNC: 23 MG/DL (ref 6–20)
BUN/CREAT BLD: ABNORMAL (ref 9–20)
CALCIUM SERPL-MCNC: 9.1 MG/DL (ref 8.6–10.4)
CHLORIDE BLD-SCNC: 106 MMOL/L (ref 98–107)
CO2: 25 MMOL/L (ref 20–31)
CREAT SERPL-MCNC: 0.89 MG/DL (ref 0.7–1.2)
DIFFERENTIAL TYPE: ABNORMAL
EOSINOPHILS RELATIVE PERCENT: 3 % (ref 1–4)
GFR AFRICAN AMERICAN: >60 ML/MIN
GFR NON-AFRICAN AMERICAN: >60 ML/MIN
GFR SERPL CREATININE-BSD FRML MDRD: ABNORMAL ML/MIN/{1.73_M2}
GFR SERPL CREATININE-BSD FRML MDRD: ABNORMAL ML/MIN/{1.73_M2}
GLUCOSE BLD-MCNC: 91 MG/DL (ref 70–99)
HAV IGM SER IA-ACNC: NONREACTIVE
HCT VFR BLD CALC: 43.8 % (ref 40.7–50.3)
HEMOGLOBIN: 14.5 G/DL (ref 13–17)
HEPATITIS B CORE IGM ANTIBODY: NONREACTIVE
HEPATITIS B SURFACE ANTIGEN: NONREACTIVE
HEPATITIS C ANTIBODY: NONREACTIVE
HIV AG/AB: NONREACTIVE
IMMATURE GRANULOCYTES: 0 %
LYMPHOCYTES # BLD: 23 % (ref 24–43)
MCH RBC QN AUTO: 31.2 PG (ref 25.2–33.5)
MCHC RBC AUTO-ENTMCNC: 33.1 G/DL (ref 28.4–34.8)
MCV RBC AUTO: 94.2 FL (ref 82.6–102.9)
MONOCYTES # BLD: 7 % (ref 3–12)
NRBC AUTOMATED: 0 PER 100 WBC
PDW BLD-RTO: 13.1 % (ref 11.8–14.4)
PLATELET # BLD: 260 K/UL (ref 138–453)
PLATELET ESTIMATE: ABNORMAL
PMV BLD AUTO: 11 FL (ref 8.1–13.5)
POTASSIUM SERPL-SCNC: 4.5 MMOL/L (ref 3.7–5.3)
RBC # BLD: 4.65 M/UL (ref 4.21–5.77)
RBC # BLD: ABNORMAL 10*6/UL
SEG NEUTROPHILS: 66 % (ref 36–65)
SEGMENTED NEUTROPHILS ABSOLUTE COUNT: 4.01 K/UL (ref 1.5–8.1)
SODIUM BLD-SCNC: 141 MMOL/L (ref 135–144)
TOTAL PROTEIN: 7 G/DL (ref 6.4–8.3)
WBC # BLD: 6.1 K/UL (ref 3.5–11.3)
WBC # BLD: ABNORMAL 10*3/UL

## 2019-03-11 LAB
QUANTI TB GOLD PLUS: NEGATIVE
QUANTI TB1 MINUS NIL: 0 IU/ML (ref 0–0.34)
QUANTI TB2 MINUS NIL: 0 IU/ML (ref 0–0.34)
QUANTIFERON MITOGEN: 6.86 IU/ML
QUANTIFERON NIL: 0.03 IU/ML

## 2019-03-17 ENCOUNTER — HOSPITAL ENCOUNTER (EMERGENCY)
Age: 29
Discharge: PSYCHIATRIC OR SUBSTANCE USE FACILITY | End: 2019-03-18
Attending: EMERGENCY MEDICINE
Payer: COMMERCIAL

## 2019-03-17 DIAGNOSIS — T14.91XA SUICIDE ATTEMPT (HCC): Primary | ICD-10-CM

## 2019-03-17 DIAGNOSIS — T50.902A INTENTIONAL DRUG OVERDOSE, INITIAL ENCOUNTER (HCC): ICD-10-CM

## 2019-03-17 LAB
ACETAMINOPHEN LEVEL: <5 UG/ML (ref 10–30)
ANION GAP SERPL CALCULATED.3IONS-SCNC: 10 MMOL/L (ref 9–17)
BUN BLDV-MCNC: 23 MG/DL (ref 6–20)
BUN/CREAT BLD: ABNORMAL (ref 9–20)
CALCIUM SERPL-MCNC: 8.8 MG/DL (ref 8.6–10.4)
CHLORIDE BLD-SCNC: 103 MMOL/L (ref 98–107)
CO2: 26 MMOL/L (ref 20–31)
CREAT SERPL-MCNC: 0.92 MG/DL (ref 0.7–1.2)
ETHANOL PERCENT: <0.01 %
ETHANOL: <10 MG/DL
GFR AFRICAN AMERICAN: >60 ML/MIN
GFR NON-AFRICAN AMERICAN: >60 ML/MIN
GFR SERPL CREATININE-BSD FRML MDRD: ABNORMAL ML/MIN/{1.73_M2}
GFR SERPL CREATININE-BSD FRML MDRD: ABNORMAL ML/MIN/{1.73_M2}
GLUCOSE BLD-MCNC: 157 MG/DL (ref 70–99)
POTASSIUM SERPL-SCNC: 3.5 MMOL/L (ref 3.7–5.3)
SALICYLATE LEVEL: <1 MG/DL (ref 3–10)
SODIUM BLD-SCNC: 139 MMOL/L (ref 135–144)
TOXIC TRICYCLIC SC,BLOOD: NEGATIVE

## 2019-03-17 PROCEDURE — 80307 DRUG TEST PRSMV CHEM ANLYZR: CPT

## 2019-03-17 PROCEDURE — G0480 DRUG TEST DEF 1-7 CLASSES: HCPCS

## 2019-03-17 PROCEDURE — 93005 ELECTROCARDIOGRAM TRACING: CPT

## 2019-03-17 PROCEDURE — 99285 EMERGENCY DEPT VISIT HI MDM: CPT

## 2019-03-17 PROCEDURE — 80048 BASIC METABOLIC PNL TOTAL CA: CPT

## 2019-03-17 ASSESSMENT — ENCOUNTER SYMPTOMS
NAUSEA: 0
SHORTNESS OF BREATH: 0
PHOTOPHOBIA: 0
CONSTIPATION: 0
COUGH: 0
WHEEZING: 0
DIARRHEA: 0
ABDOMINAL PAIN: 0
VOMITING: 0

## 2019-03-18 ENCOUNTER — HOSPITAL ENCOUNTER (INPATIENT)
Age: 29
LOS: 7 days | Discharge: HOME | DRG: 750 | End: 2019-03-25
Attending: PSYCHIATRY & NEUROLOGY | Admitting: PSYCHIATRY & NEUROLOGY
Payer: COMMERCIAL

## 2019-03-18 VITALS
TEMPERATURE: 97 F | SYSTOLIC BLOOD PRESSURE: 101 MMHG | RESPIRATION RATE: 16 BRPM | WEIGHT: 190 LBS | DIASTOLIC BLOOD PRESSURE: 65 MMHG | OXYGEN SATURATION: 98 % | HEART RATE: 73 BPM | BODY MASS INDEX: 25.73 KG/M2 | HEIGHT: 72 IN

## 2019-03-18 PROBLEM — F25.9 SCHIZOAFFECTIVE DISORDER (HCC): Chronic | Status: ACTIVE | Noted: 2019-02-25

## 2019-03-18 PROCEDURE — 93005 ELECTROCARDIOGRAM TRACING: CPT

## 2019-03-18 PROCEDURE — 1240000000 HC EMOTIONAL WELLNESS R&B

## 2019-03-18 PROCEDURE — 90792 PSYCH DIAG EVAL W/MED SRVCS: CPT | Performed by: PSYCHIATRY & NEUROLOGY

## 2019-03-18 RX ORDER — TRAZODONE HYDROCHLORIDE 50 MG/1
50 TABLET ORAL NIGHTLY PRN
Status: CANCELLED | OUTPATIENT
Start: 2019-03-18

## 2019-03-18 RX ORDER — NICOTINE 21 MG/24HR
1 PATCH, TRANSDERMAL 24 HOURS TRANSDERMAL DAILY
Status: CANCELLED | OUTPATIENT
Start: 2019-03-18

## 2019-03-18 ASSESSMENT — LIFESTYLE VARIABLES
HISTORY_ALCOHOL_USE: YES
HISTORY_ALCOHOL_USE: YES

## 2019-03-18 ASSESSMENT — SLEEP AND FATIGUE QUESTIONNAIRES
DO YOU USE A SLEEP AID: NO
DIFFICULTY STAYING ASLEEP: NO
AVERAGE NUMBER OF SLEEP HOURS: 8
DIFFICULTY FALLING ASLEEP: YES
RESTFUL SLEEP: YES
DIFFICULTY ARISING: NO
SLEEP PATTERN: DIFFICULTY FALLING ASLEEP;DISTURBED/INTERRUPTED SLEEP
DO YOU HAVE DIFFICULTY SLEEPING: NO

## 2019-03-18 ASSESSMENT — PATIENT HEALTH QUESTIONNAIRE - PHQ9: SUM OF ALL RESPONSES TO PHQ QUESTIONS 1-9: 17

## 2019-03-18 ASSESSMENT — PAIN SCALES - GENERAL: PAINLEVEL_OUTOF10: 0

## 2019-03-19 LAB
EKG ATRIAL RATE: 66 BPM
EKG ATRIAL RATE: 69 BPM
EKG P AXIS: 79 DEGREES
EKG P AXIS: 81 DEGREES
EKG P-R INTERVAL: 152 MS
EKG P-R INTERVAL: 154 MS
EKG Q-T INTERVAL: 420 MS
EKG Q-T INTERVAL: 430 MS
EKG QRS DURATION: 90 MS
EKG QRS DURATION: 90 MS
EKG QTC CALCULATION (BAZETT): 440 MS
EKG QTC CALCULATION (BAZETT): 460 MS
EKG R AXIS: 81 DEGREES
EKG R AXIS: 85 DEGREES
EKG T AXIS: 44 DEGREES
EKG T AXIS: 48 DEGREES
EKG VENTRICULAR RATE: 66 BPM
EKG VENTRICULAR RATE: 69 BPM

## 2019-03-19 PROCEDURE — 99232 SBSQ HOSP IP/OBS MODERATE 35: CPT | Performed by: PSYCHIATRY & NEUROLOGY

## 2019-03-19 PROCEDURE — 1240000000 HC EMOTIONAL WELLNESS R&B

## 2019-03-19 PROCEDURE — 6370000000 HC RX 637 (ALT 250 FOR IP): Performed by: PSYCHIATRY & NEUROLOGY

## 2019-03-19 RX ORDER — NALTREXONE HYDROCHLORIDE 50 MG/1
25 TABLET, FILM COATED ORAL
Status: DISCONTINUED | OUTPATIENT
Start: 2019-03-20 | End: 2019-03-25 | Stop reason: HOSPADM

## 2019-03-19 RX ADMIN — BREXPIPRAZOLE 1 MG: 1 TABLET ORAL at 13:11

## 2019-03-19 ASSESSMENT — PAIN - FUNCTIONAL ASSESSMENT: PAIN_FUNCTIONAL_ASSESSMENT: 0-10

## 2019-03-20 PROCEDURE — 6370000000 HC RX 637 (ALT 250 FOR IP): Performed by: PSYCHIATRY & NEUROLOGY

## 2019-03-20 PROCEDURE — 1240000000 HC EMOTIONAL WELLNESS R&B

## 2019-03-20 PROCEDURE — 99232 SBSQ HOSP IP/OBS MODERATE 35: CPT | Performed by: PSYCHIATRY & NEUROLOGY

## 2019-03-20 RX ADMIN — NALTREXONE HYDROCHLORIDE 25 MG: 50 TABLET, FILM COATED ORAL at 08:18

## 2019-03-20 RX ADMIN — BREXPIPRAZOLE 1 MG: 1 TABLET ORAL at 08:07

## 2019-03-21 PROCEDURE — 1240000000 HC EMOTIONAL WELLNESS R&B

## 2019-03-21 PROCEDURE — 6370000000 HC RX 637 (ALT 250 FOR IP): Performed by: PSYCHIATRY & NEUROLOGY

## 2019-03-21 PROCEDURE — 99232 SBSQ HOSP IP/OBS MODERATE 35: CPT | Performed by: PSYCHIATRY & NEUROLOGY

## 2019-03-21 RX ORDER — AMITRIPTYLINE HYDROCHLORIDE 25 MG/1
25 TABLET, FILM COATED ORAL NIGHTLY
Status: DISCONTINUED | OUTPATIENT
Start: 2019-03-21 | End: 2019-03-22

## 2019-03-21 RX ORDER — CETIRIZINE HYDROCHLORIDE 10 MG/1
10 TABLET ORAL DAILY
Status: DISCONTINUED | OUTPATIENT
Start: 2019-03-21 | End: 2019-03-25 | Stop reason: HOSPADM

## 2019-03-21 RX ADMIN — BREXPIPRAZOLE 2 MG: 2 TABLET ORAL at 10:35

## 2019-03-21 RX ADMIN — AMITRIPTYLINE HYDROCHLORIDE 25 MG: 25 TABLET, FILM COATED ORAL at 21:16

## 2019-03-21 RX ADMIN — NALTREXONE HYDROCHLORIDE 25 MG: 50 TABLET, FILM COATED ORAL at 10:36

## 2019-03-21 RX ADMIN — CETIRIZINE HYDROCHLORIDE 10 MG: 10 TABLET, FILM COATED ORAL at 10:35

## 2019-03-22 PROBLEM — F25.0 SCHIZOAFFECTIVE DISORDER, BIPOLAR TYPE (HCC): Chronic | Status: ACTIVE | Noted: 2018-07-16

## 2019-03-22 PROCEDURE — 1240000000 HC EMOTIONAL WELLNESS R&B

## 2019-03-22 PROCEDURE — 6370000000 HC RX 637 (ALT 250 FOR IP): Performed by: PSYCHIATRY & NEUROLOGY

## 2019-03-22 PROCEDURE — 99232 SBSQ HOSP IP/OBS MODERATE 35: CPT | Performed by: PSYCHIATRY & NEUROLOGY

## 2019-03-22 RX ORDER — AMITRIPTYLINE HYDROCHLORIDE 25 MG/1
50 TABLET, FILM COATED ORAL NIGHTLY
Status: DISCONTINUED | OUTPATIENT
Start: 2019-03-22 | End: 2019-03-25 | Stop reason: HOSPADM

## 2019-03-22 RX ADMIN — CETIRIZINE HYDROCHLORIDE 10 MG: 10 TABLET, FILM COATED ORAL at 08:18

## 2019-03-22 RX ADMIN — BREXPIPRAZOLE 2 MG: 2 TABLET ORAL at 08:18

## 2019-03-22 RX ADMIN — AMITRIPTYLINE HYDROCHLORIDE 50 MG: 25 TABLET, FILM COATED ORAL at 21:07

## 2019-03-22 RX ADMIN — NALTREXONE HYDROCHLORIDE 25 MG: 50 TABLET, FILM COATED ORAL at 08:18

## 2019-03-23 PROCEDURE — 6370000000 HC RX 637 (ALT 250 FOR IP): Performed by: PSYCHIATRY & NEUROLOGY

## 2019-03-23 PROCEDURE — 99232 SBSQ HOSP IP/OBS MODERATE 35: CPT | Performed by: NURSE PRACTITIONER

## 2019-03-23 PROCEDURE — 1240000000 HC EMOTIONAL WELLNESS R&B

## 2019-03-23 RX ADMIN — NALTREXONE HYDROCHLORIDE 25 MG: 50 TABLET, FILM COATED ORAL at 08:42

## 2019-03-23 RX ADMIN — AMITRIPTYLINE HYDROCHLORIDE 50 MG: 25 TABLET, FILM COATED ORAL at 20:43

## 2019-03-23 RX ADMIN — BREXPIPRAZOLE 2 MG: 2 TABLET ORAL at 08:42

## 2019-03-23 RX ADMIN — CETIRIZINE HYDROCHLORIDE 10 MG: 10 TABLET, FILM COATED ORAL at 08:42

## 2019-03-24 PROCEDURE — 1240000000 HC EMOTIONAL WELLNESS R&B

## 2019-03-24 PROCEDURE — 99232 SBSQ HOSP IP/OBS MODERATE 35: CPT | Performed by: NURSE PRACTITIONER

## 2019-03-24 PROCEDURE — 6370000000 HC RX 637 (ALT 250 FOR IP): Performed by: PSYCHIATRY & NEUROLOGY

## 2019-03-24 RX ADMIN — NALTREXONE HYDROCHLORIDE 25 MG: 50 TABLET, FILM COATED ORAL at 08:23

## 2019-03-24 RX ADMIN — AMITRIPTYLINE HYDROCHLORIDE 50 MG: 25 TABLET, FILM COATED ORAL at 20:54

## 2019-03-24 RX ADMIN — BREXPIPRAZOLE 2 MG: 2 TABLET ORAL at 08:23

## 2019-03-24 RX ADMIN — CETIRIZINE HYDROCHLORIDE 10 MG: 10 TABLET, FILM COATED ORAL at 08:23

## 2019-03-25 VITALS
DIASTOLIC BLOOD PRESSURE: 69 MMHG | TEMPERATURE: 97.8 F | SYSTOLIC BLOOD PRESSURE: 110 MMHG | WEIGHT: 190 LBS | RESPIRATION RATE: 14 BRPM | HEART RATE: 82 BPM | HEIGHT: 72 IN | BODY MASS INDEX: 25.73 KG/M2

## 2019-03-25 PROCEDURE — 5130000000 HC BRIDGE APPOINTMENT

## 2019-03-25 PROCEDURE — 6370000000 HC RX 637 (ALT 250 FOR IP): Performed by: PSYCHIATRY & NEUROLOGY

## 2019-03-25 PROCEDURE — 99239 HOSP IP/OBS DSCHRG MGMT >30: CPT | Performed by: PSYCHIATRY & NEUROLOGY

## 2019-03-25 RX ORDER — CETIRIZINE HYDROCHLORIDE 10 MG/1
10 TABLET ORAL DAILY
Qty: 30 TABLET | Refills: 0 | Status: ON HOLD | OUTPATIENT
Start: 2019-03-26 | End: 2019-06-05

## 2019-03-25 RX ORDER — NALTREXONE HYDROCHLORIDE 50 MG/1
25 TABLET, FILM COATED ORAL
Qty: 30 TABLET | Refills: 0 | Status: ON HOLD | OUTPATIENT
Start: 2019-03-26 | End: 2019-06-03

## 2019-03-25 RX ORDER — AMITRIPTYLINE HYDROCHLORIDE 50 MG/1
50 TABLET, FILM COATED ORAL NIGHTLY
Qty: 30 TABLET | Refills: 0 | Status: ON HOLD | OUTPATIENT
Start: 2019-03-25 | End: 2019-06-07 | Stop reason: HOSPADM

## 2019-03-25 RX ADMIN — BREXPIPRAZOLE 2 MG: 2 TABLET ORAL at 09:03

## 2019-03-25 RX ADMIN — NALTREXONE HYDROCHLORIDE 25 MG: 50 TABLET, FILM COATED ORAL at 09:03

## 2019-03-25 RX ADMIN — CETIRIZINE HYDROCHLORIDE 10 MG: 10 TABLET, FILM COATED ORAL at 09:03

## 2019-04-02 ENCOUNTER — HOSPITAL ENCOUNTER (EMERGENCY)
Age: 29
Discharge: PSYCHIATRIC OR SUBSTANCE USE FACILITY | End: 2019-04-04
Attending: EMERGENCY MEDICINE
Payer: COMMERCIAL

## 2019-04-02 ENCOUNTER — APPOINTMENT (OUTPATIENT)
Dept: GENERAL RADIOLOGY | Age: 29
End: 2019-04-02
Payer: COMMERCIAL

## 2019-04-02 DIAGNOSIS — K12.2 CELLULITIS OF MOUTH: ICD-10-CM

## 2019-04-02 DIAGNOSIS — R45.851 SUICIDAL IDEATION: ICD-10-CM

## 2019-04-02 DIAGNOSIS — T14.91XA SUICIDE ATTEMPT (HCC): Primary | ICD-10-CM

## 2019-04-02 LAB
ABSOLUTE EOS #: 0.12 K/UL (ref 0–0.44)
ABSOLUTE IMMATURE GRANULOCYTE: <0.03 K/UL (ref 0–0.3)
ABSOLUTE LYMPH #: 1.31 K/UL (ref 1.1–3.7)
ABSOLUTE MONO #: 0.51 K/UL (ref 0.1–1.2)
ACETAMINOPHEN LEVEL: <5 UG/ML (ref 10–30)
ALBUMIN SERPL-MCNC: 4.5 G/DL (ref 3.5–5.2)
ALBUMIN/GLOBULIN RATIO: 1.6 (ref 1–2.5)
ALP BLD-CCNC: 50 U/L (ref 40–129)
ALT SERPL-CCNC: 14 U/L (ref 5–41)
AMPHETAMINE SCREEN URINE: NEGATIVE
ANION GAP SERPL CALCULATED.3IONS-SCNC: 12 MMOL/L (ref 9–17)
AST SERPL-CCNC: 21 U/L
BARBITURATE SCREEN URINE: NEGATIVE
BASOPHILS # BLD: 0 % (ref 0–2)
BASOPHILS ABSOLUTE: <0.03 K/UL (ref 0–0.2)
BENZODIAZEPINE SCREEN, URINE: NEGATIVE
BILIRUB SERPL-MCNC: 0.62 MG/DL (ref 0.3–1.2)
BILIRUBIN DIRECT: 0.15 MG/DL
BILIRUBIN, INDIRECT: 0.47 MG/DL (ref 0–1)
BUN BLDV-MCNC: 18 MG/DL (ref 6–20)
BUN/CREAT BLD: ABNORMAL (ref 9–20)
BUPRENORPHINE URINE: ABNORMAL
CALCIUM SERPL-MCNC: 8.9 MG/DL (ref 8.6–10.4)
CANNABINOID SCREEN URINE: NEGATIVE
CHLORIDE BLD-SCNC: 103 MMOL/L (ref 98–107)
CO2: 27 MMOL/L (ref 20–31)
COCAINE METABOLITE, URINE: POSITIVE
CREAT SERPL-MCNC: 0.96 MG/DL (ref 0.7–1.2)
DIFFERENTIAL TYPE: NORMAL
EOSINOPHILS RELATIVE PERCENT: 3 % (ref 1–4)
ETHANOL PERCENT: <0.01 %
ETHANOL: <10 MG/DL
GFR AFRICAN AMERICAN: >60 ML/MIN
GFR NON-AFRICAN AMERICAN: >60 ML/MIN
GFR SERPL CREATININE-BSD FRML MDRD: ABNORMAL ML/MIN/{1.73_M2}
GFR SERPL CREATININE-BSD FRML MDRD: ABNORMAL ML/MIN/{1.73_M2}
GLOBULIN: NORMAL G/DL (ref 1.5–3.8)
GLUCOSE BLD-MCNC: 107 MG/DL (ref 70–99)
HCT VFR BLD CALC: 42.2 % (ref 40.7–50.3)
HEMOGLOBIN: 14.4 G/DL (ref 13–17)
IMMATURE GRANULOCYTES: 0 %
LIPASE: 14 U/L (ref 13–60)
LYMPHOCYTES # BLD: 29 % (ref 24–43)
MCH RBC QN AUTO: 30.8 PG (ref 25.2–33.5)
MCHC RBC AUTO-ENTMCNC: 34.1 G/DL (ref 28.4–34.8)
MCV RBC AUTO: 90.2 FL (ref 82.6–102.9)
MDMA URINE: ABNORMAL
METHADONE SCREEN, URINE: NEGATIVE
METHAMPHETAMINE, URINE: ABNORMAL
MONOCYTES # BLD: 11 % (ref 3–12)
NRBC AUTOMATED: 0 PER 100 WBC
OPIATES, URINE: NEGATIVE
OXYCODONE SCREEN URINE: NEGATIVE
PDW BLD-RTO: 12.8 % (ref 11.8–14.4)
PHENCYCLIDINE, URINE: NEGATIVE
PLATELET # BLD: 197 K/UL (ref 138–453)
PLATELET ESTIMATE: NORMAL
PMV BLD AUTO: 10.3 FL (ref 8.1–13.5)
POTASSIUM SERPL-SCNC: 4.2 MMOL/L (ref 3.7–5.3)
PROPOXYPHENE, URINE: ABNORMAL
RBC # BLD: 4.68 M/UL (ref 4.21–5.77)
RBC # BLD: NORMAL 10*6/UL
SALICYLATE LEVEL: <1 MG/DL (ref 3–10)
SEG NEUTROPHILS: 57 % (ref 36–65)
SEGMENTED NEUTROPHILS ABSOLUTE COUNT: 2.52 K/UL (ref 1.5–8.1)
SODIUM BLD-SCNC: 142 MMOL/L (ref 135–144)
TEST INFORMATION: ABNORMAL
TOTAL PROTEIN: 7.3 G/DL (ref 6.4–8.3)
TOXIC TRICYCLIC SC,BLOOD: NEGATIVE
TRICYCLIC ANTIDEPRESSANTS, UR: ABNORMAL
TROPONIN INTERP: NORMAL
TROPONIN T: NORMAL NG/ML
TROPONIN, HIGH SENSITIVITY: 7 NG/L (ref 0–22)
WBC # BLD: 4.5 K/UL (ref 3.5–11.3)
WBC # BLD: NORMAL 10*3/UL

## 2019-04-02 PROCEDURE — 93005 ELECTROCARDIOGRAM TRACING: CPT

## 2019-04-02 PROCEDURE — 80076 HEPATIC FUNCTION PANEL: CPT

## 2019-04-02 PROCEDURE — G0480 DRUG TEST DEF 1-7 CLASSES: HCPCS

## 2019-04-02 PROCEDURE — 84484 ASSAY OF TROPONIN QUANT: CPT

## 2019-04-02 PROCEDURE — 83690 ASSAY OF LIPASE: CPT

## 2019-04-02 PROCEDURE — 80048 BASIC METABOLIC PNL TOTAL CA: CPT

## 2019-04-02 PROCEDURE — 71045 X-RAY EXAM CHEST 1 VIEW: CPT

## 2019-04-02 PROCEDURE — 6370000000 HC RX 637 (ALT 250 FOR IP): Performed by: EMERGENCY MEDICINE

## 2019-04-02 PROCEDURE — 99285 EMERGENCY DEPT VISIT HI MDM: CPT

## 2019-04-02 PROCEDURE — 80307 DRUG TEST PRSMV CHEM ANLYZR: CPT

## 2019-04-02 PROCEDURE — 85025 COMPLETE CBC W/AUTO DIFF WBC: CPT

## 2019-04-02 RX ORDER — AMOXICILLIN AND CLAVULANATE POTASSIUM 875; 125 MG/1; MG/1
1 TABLET, FILM COATED ORAL ONCE
Status: COMPLETED | OUTPATIENT
Start: 2019-04-02 | End: 2019-04-02

## 2019-04-02 RX ADMIN — AMOXICILLIN AND CLAVULANATE POTASSIUM 1 TABLET: 875; 125 TABLET, FILM COATED ORAL at 14:26

## 2019-04-02 NOTE — ED NOTES
Pt. Called into security; ambulatory to [facility] for triage. Pt. Reports suicidal ideation and overdosed on prescribed medication Sertaline and Naltrexone; pt. Reports taking 25 of these medications but is unsure of how many of each medication. Pt. Gisela Nciolen both pill bottles with him and both are empty. Pt. Is A/Ox4; speech is clear, vitals stable. Pt. States nausea as the only symptom he is having at this time. The pt also reports doing cocaine last night; denies chest pain and shortness of breath. Denies auditory and visual hallucinations. Denies homicidal ideation. Pt. Placed in paper scrubs, resting on stretcher, NAD, breathing unlabored, no needs expressed, 1:1 sitter at bedside, will continue to monitor.            Vasyl Chavez RN  04/02/19 4738

## 2019-04-02 NOTE — ED PROVIDER NOTES
FOLLOW-UP: No follow-up provider specified.    DISCHARGE MEDICATIONS: New Prescriptions    No medications on file           Salima Grider MD  Emergency Medicine Resident  [address] Salima Grider MD  04/02/19 8313

## 2019-04-02 NOTE — ED NOTES
Pt. Resting on stretcher, monitor on with b/p and pulse ox, NAD, breathing unlabored, no needs expressed at this time, 1:1 sitter at bedside, will continue to monitor     Katy Chisholm RN  04/02/19 9062

## 2019-04-02 NOTE — ED PROVIDER NOTES
[facility]     Emergency Department     Faculty Note/ Attestation      Pt Name: Jonh Bella                                       MRN: [id number]  Denzelgfnavid 1990  Date of evaluation: 4/2/2019  Patients PCP:    No primary care provider on file. Attestation  I performed a history and physical examination of the patient and discussed management with the resident. I reviewed the residents note and agree with the documented findings and plan of care. Any areas of disagreement are noted on the chart. I was personally present for the key portions of any procedures. I have documented in the chart those procedures where I was not present during the key portions. I have reviewed the emergency nurses triage note. I agree with the chief complaint, past medical history, past surgical history, allergies, medications, social and family history as documented unless otherwise noted below. For Physician Assistant/ Nurse Practitioner cases/documentation I have personally evaluated this patient and have completed at least one if not all key elements of the E/M (history, physical exam, and MDM). Additional findings are as noted. Initial Screens:             Vitals:    Vitals:    04/02/19 1327   BP: 109/77   Pulse: 77   Resp: 16   Temp: 96.8 °F (36 °C)   TempSrc: Oral   SpO2: 98%       Chief Complaint      Chief Complaint   Patient presents with    Suicidal          oral temperature is 96.8 °F (36 °C). His blood pressure is 109/77 and his pulse is 77. His respiration is 16 and oxygen saturation is 98%.             DIAGNOSTIC RESULTS       RADIOLOGY:   No orders to display         LABS:  Labs Reviewed   BASIC METABOLIC PANEL - Abnormal; Notable for the following components:       Result Value    Glucose 107 (*)     All other components within normal limits   TOX SCR, BLD, ED - Abnormal; Notable for the following components:    Salicylate Lvl <1 (*)     Acetaminophen Level <5 (*)     All other components within normal limits   CBC WITH AUTO DIFFERENTIAL   LIPASE   HEPATIC FUNCTION PANEL   TROPONIN   URINE DRUG SCREEN         EMERGENCY DEPARTMENT COURSE:     -------------------------  BP: 109/77, Temp: 96.8 °F (36 °C), Pulse: 77, Resp: 16      Comments            Enamorado MD, F.A.C.E.P.   Attending Emergency Physician         Maurisio Sims MD  04/02/19 1530

## 2019-04-02 NOTE — ED NOTES
SW met with patient who reported that he took a bunch of pills (Zoloft and Naltrexone). Per ER resident patient took 13 Zoloft and 15 Naltrexone. Patient reported that he did drugs, cocaine yesterday and when he woke up he was feeling depressed and hearing voices. Patient reported that he hears voices but can't make out what they say. Patient reported past suicide attempt was years ago where he had to get his stomach pumped. Patient denied homicidal hallucinations and visual hallucinations. Patient reported that he feels he would harm himself if he went home. Patient denied any current criminal charges pending. Patient reported to live with his mom and that she is a support for him. Patient reported to be sleeping good and eating normal. Patient reported to have went to [facility] weeks ago with his next appointment being on the 9th of this month. Patient was referred to Mable Saavedra in his [facility] discharge plans with an appointment on the 27th of March of this year. Sergio Miller Work Intern.      Aida Terry, [facility]  04/02/19 1941

## 2019-04-03 LAB
EKG ATRIAL RATE: 73 BPM
EKG P AXIS: 69 DEGREES
EKG P-R INTERVAL: 168 MS
EKG Q-T INTERVAL: 408 MS
EKG QRS DURATION: 90 MS
EKG QTC CALCULATION (BAZETT): 449 MS
EKG R AXIS: 79 DEGREES
EKG T AXIS: 46 DEGREES
EKG VENTRICULAR RATE: 73 BPM

## 2019-04-03 PROCEDURE — 90792 PSYCH DIAG EVAL W/MED SRVCS: CPT | Performed by: PSYCHIATRY & NEUROLOGY

## 2019-04-03 PROCEDURE — 6370000000 HC RX 637 (ALT 250 FOR IP): Performed by: EMERGENCY MEDICINE

## 2019-04-03 RX ORDER — NALTREXONE HYDROCHLORIDE 50 MG/1
50 TABLET, FILM COATED ORAL ONCE
Status: COMPLETED | OUTPATIENT
Start: 2019-04-03 | End: 2019-04-03

## 2019-04-03 RX ADMIN — SERTRALINE HYDROCHLORIDE 50 MG: 50 TABLET, FILM COATED ORAL at 14:47

## 2019-04-03 RX ADMIN — NALTREXONE HYDROCHLORIDE 50 MG: 50 TABLET, FILM COATED ORAL at 14:47

## 2019-04-03 NOTE — ED NOTES
Security called to bedside due to pt arguing with another pt. Dr. Adebayo Montes and Dr. Nasim Canales at the bedside.      Salvador Connor RN  04/03/19 1100

## 2019-04-03 NOTE — ED NOTES
Writer received call from [facility] who stated they have no beds, there are 4-5 people ahead of patient on the waiting list. [facility] stated if a [facility] patient needs admission, that will push patient further down on list. Patient complete telepsyc interview, Dr Edy Finch recommended patient continue zoloft 50mg/day & naltrexone 50mg/day while in ED. Writer to inform Resident/Attending.       Lorrie Rizzo, DEBORA, LSW  04/03/19 9111

## 2019-04-03 NOTE — ED NOTES
Pt sitting in bed. Writer spoke to pt about the need for urine sample. White shirt at the bedside. Will continue to monitor.      Chon Bryson RN  04/02/19 2030

## 2019-04-03 NOTE — ED PROVIDER NOTES
[address] ED  Emergency Department  Emergency Medicine Resident Sign-out     Care of Neha Walsh was assumed from Dr. Carol Roa and is being seen for Suicidal  .  The patient's initial evaluation and plan have been discussed with the prior provider who initially evaluated the patient. EMERGENCY DEPARTMENT COURSE / MEDICAL DECISION MAKING:       MEDICATIONS GIVEN:  Orders Placed This Encounter   Medications    amoxicillin-clavulanate (AUGMENTIN) 875-125 MG per tablet 1 tablet       LABS / RADIOLOGY:     Labs Reviewed   BASIC METABOLIC PANEL - Abnormal; Notable for the following components:       Result Value    Glucose 107 (*)     All other components within normal limits   TOX SCR, BLD, ED - Abnormal; Notable for the following components:    Salicylate Lvl <1 (*)     Acetaminophen Level <5 (*)     All other components within normal limits   URINE DRUG SCREEN - Abnormal; Notable for the following components:    Cocaine Metabolite, Urine POSITIVE (*)     All other components within normal limits   CBC WITH AUTO DIFFERENTIAL   LIPASE   HEPATIC FUNCTION PANEL   TROPONIN       Xr Chest Portable    Result Date: 4/2/2019  EXAMINATION: SINGLE XRAY VIEW OF THE CHEST 4/2/2019 8:37 pm COMPARISON: July 4, 2018 HISTORY: ORDERING SYSTEM PROVIDED HISTORY: medical clearance for psych TECHNOLOGIST PROVIDED HISTORY: medical clearance for psych FINDINGS: The lungs are without acute focal process. There is no effusion or pneumothorax. The cardiomediastinal silhouette is without acute process. The osseous structures are without acute process. No acute process. RECENT VITALS:     Temp: 97.5 °F (36.4 °C),  Pulse: 81, Resp: 17, BP: 113/79, SpO2: 99 %    This patient is a 33 y.o. Male with a suicide overdose attempt. Took 15 pills of sertraline and 15 naltrexone. Asymmtomatic, labs unremarkable. Observation for 6 hours per poison.  Psych admission once medically cleared.     Patient was reevaluated after 6 hours of observation, no concerns from the patient, and alert, awake, oriented, answers questions appropriately, no chest pain, shortness of breath, difficult breathing, no difficulty nausea or vomiting. Patient has no complaints, we'll medically clear for psychiatric admission. Patient will be transferred to  [facility]. Poison control contacted, recommended observation for 6 hours. Awaiting transfer.       OUTSTANDING TASKS / RECOMMENDATIONS:    1. Awaiting transfer to [facility]     FINAL IMPRESSION:     1. Suicide attempt ([address]    2. Suicidal ideation        DISPOSITION:         DISPOSITION:  []  Discharge   [x]  Transfer - [facility]   []  Admission -     []  Against Medical Advice   []  Eloped   FOLLOW-UP: No follow-up provider specified.    DISCHARGE MEDICATIONS: New Prescriptions    No medications on file           Jam Crawley DO  Emergency Medicine Resident  [place], [place]  04/03/19 2327

## 2019-04-03 NOTE — ED PROVIDER NOTES
[facility] ED  Emergency Department  Emergency Medicine Resident Sign-out     Care of Fronie Lefort was assumed from Dr. Albertina Ramos and is being seen for Suicidal  .  The patient's initial evaluation and plan have been discussed with the prior provider who initially evaluated the patient. EMERGENCY DEPARTMENT COURSE / MEDICAL DECISION MAKING:       MEDICATIONS GIVEN:  Orders Placed This Encounter   Medications    amoxicillin-clavulanate (AUGMENTIN) 875-125 MG per tablet 1 tablet    sertraline (ZOLOFT) tablet 50 mg    naltrexone (DEPADE) tablet 50 mg       LABS / RADIOLOGY:     Labs Reviewed   BASIC METABOLIC PANEL - Abnormal; Notable for the following components:       Result Value    Glucose 107 (*)     All other components within normal limits   TOX SCR, BLD, ED - Abnormal; Notable for the following components:    Salicylate Lvl <1 (*)     Acetaminophen Level <5 (*)     All other components within normal limits   URINE DRUG SCREEN - Abnormal; Notable for the following components:    Cocaine Metabolite, Urine POSITIVE (*)     All other components within normal limits   CBC WITH AUTO DIFFERENTIAL   LIPASE   HEPATIC FUNCTION PANEL   TROPONIN       Xr Chest Portable    Result Date: 4/2/2019  EXAMINATION: SINGLE XRAY VIEW OF THE CHEST 4/2/2019 8:37 pm COMPARISON: July 4, 2018 HISTORY: ORDERING SYSTEM PROVIDED HISTORY: medical clearance for psych TECHNOLOGIST PROVIDED HISTORY: medical clearance for psych FINDINGS: The lungs are without acute focal process. There is no effusion or pneumothorax. The cardiomediastinal silhouette is without acute process. The osseous structures are without acute process. No acute process. RECENT VITALS:     Temp: 98.1 °F (36.7 °C),  Pulse: 69, Resp: 16, BP: 102/66, SpO2: 100 %    This patient is a 33 y.o. Male with suicidal ideations. Patient is currently on the list for [facility].   Patient has been evaluated through tele psych, has been started on Zoloft, naltrexone. OUTSTANDING TASKS / RECOMMENDATIONS:    1. Awaiting placement at [facility]. FINAL IMPRESSION:     1. Suicide attempt ([address]    2. Suicidal ideation        DISPOSITION:         DISPOSITION:  []  Discharge   []  Transfer - [facility]    [x]  Admission -      []  Against Medical Advice   []  Eloped   FOLLOW-UP: No follow-up provider specified.    DISCHARGE MEDICATIONS: New Prescriptions    No medications on file           Breana Hernandez MD  Emergency Medicine Resident  [address] [facility] [place]       Breana Hernandez MD  Resident  04/03/19 0103

## 2019-04-03 NOTE — ED NOTES
Temporary report received from [facility]  Pt. Resting in day area, eyes closed, RR even and non-labored  Pt.  Updated on POC   Will continue to monitor  1:1 guard remains in place        Ronald Cook RN  04/03/19 7940

## 2019-04-03 NOTE — ED NOTES
SW called [facility] and spoke with [facility] who reported that she couldn't give an exact time that someone would be over to do the evaluation but if everything stays as is someone should be present in about a hour and a half. Sergio Miller Work Intern.      Pasquale Dunn, [facility]  04/02/19 2008

## 2019-04-03 NOTE — ED NOTES
Pt given box lunch and drink. Denies any pain or needs at this time. White shirt at the bedside. Will continue to monitor.      Cuca Mckee RN  04/03/19 9758

## 2019-04-03 NOTE — CONSULTS
at [facility]. But he went home and started drinking alcohol and smoking cocaine, which made him feeling very depressed and suicidal with command hallucination telling to kill himself. He was then admitted to [facility] on 3/26/19. Current Outpatient Psychiatric Medications:  Amitriptyline. Zoloft and Naltrexone. The patient says Zoloft has been helpful the most among all other medications he has tried for his anxiety and depression. He also says Naltrexone helps him with his \"craving\" for alcohol and cocaine. He reports no side effects of these two medications. Medications:    No current facility-administered medications for this encounter. PAST PSYCHIATRIC HISTORY:  Outpatient: used to be linked with [facility]. But now the patient wants to go to a different mental health agency, such as [facility]. Inpatient: \"A dozen\" hospitalization, mostly in the past 5-10 years. Previous Suicidal Attempt: One previous severe overdose about 7 years and he had his stomach pumped\". Previous Substance Rehab Treatment: Attended AA meeting in the past, with longest sobriety (no alcohol consumption)  about 4 months. Past psychiatric medications include:   Lithium, Depakote and Risperdal and many other medications that he does not remember their names. The patient has not been adherent with outpatient psychiatry services and medication regiments. Adverse reactions from psychotropic medications:  Unable to obtain today. Lifetime Psychiatric Review of Systems:     Angella: endoresed. Panic: denies  Phobia: denies  Hallucinations: yes. Delusions: denies     Past Medical History:        Diagnosis Date    ADHD (attention deficit hyperactivity disorder)     Anxiety     Bipolar 1 disorder (HCC)     PTSD (post-traumatic stress disorder)        Past Surgical History:    No past surgical history on file.     Allergies: Advil [ibuprofen]      Social History: to the high risk of fatality of overdose. May consider restart Zoloft 50 mg 1 qd. For anxiety and depression. Once medically cleared. May consider to restart Naltrexone 50 mg 1 qd for \"craving\" -alcohol and cocain. Once medically cleared. Will need to reevaluate if the patient needs to maintain on Aristada BAER 662 mg IM monthly (last injection given before the patient was discharged from [place] [address] on 3/25/19. Continue general medical support. Thank you very much for allowing us to participate in the care of this patient. Time spent > 60 min.  Physicians Signature:  Electronically signed by Kylah Carrizales MD on 4/3/2019 at 1:30 PM.

## 2019-04-03 NOTE — ED NOTES
JAYNE spoke with Dave Fierro at [facility] reports they have no current beds   She reports they have all the needed paperwork, they are not sure when a bed will be available      SRIDEVI Gaitan  04/03/19 1954

## 2019-04-03 NOTE — ED NOTES
Per Sabina Watters RN pt was seen pacing around the room. Dr. Allison Ho notified.      Elias Pool RN  04/03/19 7270

## 2019-04-03 NOTE — ED NOTES
Discussed the patient with On call provider at length. After discussion on appropriately level of care citing discharge from [facility] on 04.01.19 and multiple [facility] admissions in recent history, patient is recommended for [facility] level of care.       Mannie Sheldon, [facility]  04/02/19 2011

## 2019-04-03 NOTE — ED NOTES
SW received a call from Tierney Shaffer from [facility] who reported that she appreciates SW's patience that she is just finishing up and will be heading to the ER shortly. Tierney Shaffer wanted to know if patient was awake and able to complete an assessment and if she could get a copy of the pink slip. Sergio Miller Work Intern.          Debbie Kimball, [facility]  04/02/19 0836

## 2019-04-03 NOTE — ED NOTES
Pt sitting in bed. Writer offered food/drink but pt declined. Pt denies any needs at this time. RR and unlabored. NAD noted at this time. White shirt at the bedside.      Priya Menjivar RN  04/02/19 1366

## 2019-04-04 VITALS
OXYGEN SATURATION: 97 % | HEART RATE: 65 BPM | TEMPERATURE: 98.1 F | SYSTOLIC BLOOD PRESSURE: 98 MMHG | DIASTOLIC BLOOD PRESSURE: 60 MMHG | RESPIRATION RATE: 17 BRPM

## 2019-04-04 NOTE — ED NOTES
Writer spoke with on-call psychiatrist Dr Zaid Flores. Dr Zaid Flores stated patient's not appropriate for [facility] & needs to continue path to [facility]. Dr Zaid Flores stated patient is discharged from the hospital, uses drugs & comes back stating he's suicidal & sometimes psychotic, sometimes not. Dr Zaid Flores stated patient needs to be in a long-term psychiatric setting. Writer informed Attending. Writer left voicemail with [facility] requesting bed status.       DEBORA Silverio, [facility]  04/04/19 9469

## 2019-04-04 NOTE — ED PROVIDER NOTES
Suicidal ideation        DISPOSITION:         DISPOSITION:  []  Discharge   [x]  Transfer -    []  Admission -     []  Against Medical Advice   []  Eloped   FOLLOW-UP: No follow-up provider specified.    DISCHARGE MEDICATIONS: New Prescriptions    No medications on file           Arely Mitchell DO  Emergency Medicine Resident  [facility]       Arely Stephen [place]  Resident  04/04/19 7076

## 2019-04-04 NOTE — ED NOTES
Pt remains in [facility]. He is free from harm. Will continue to assess.       Jonathan Dockery RN  04/03/19 2011

## 2019-04-04 NOTE — ED NOTES
[facility] at bedside to transport pt to [facility], [address] at bedside to get pt's belongings out of lock up     Smiley Hawthorne RN  04/04/19 4915

## 2019-04-06 ASSESSMENT — ENCOUNTER SYMPTOMS
VOMITING: 0
COUGH: 0
NAUSEA: 0
WHEEZING: 0
ABDOMINAL PAIN: 0
SORE THROAT: 0
SHORTNESS OF BREATH: 0
CONSTIPATION: 0
RHINORRHEA: 0
DIARRHEA: 0

## 2019-05-01 ENCOUNTER — HOSPITAL ENCOUNTER (EMERGENCY)
Age: 29
Discharge: HOME | End: 2019-05-01
Attending: EMERGENCY MEDICINE
Payer: COMMERCIAL

## 2019-05-01 VITALS
DIASTOLIC BLOOD PRESSURE: 81 MMHG | TEMPERATURE: 97.2 F | RESPIRATION RATE: 13 BRPM | BODY MASS INDEX: 25.73 KG/M2 | SYSTOLIC BLOOD PRESSURE: 119 MMHG | WEIGHT: 190 LBS | HEIGHT: 72 IN | HEART RATE: 76 BPM | OXYGEN SATURATION: 99 %

## 2019-05-01 DIAGNOSIS — F10.21 HISTORY OF ALCOHOL DEPENDENCE (HCC): Primary | ICD-10-CM

## 2019-05-01 PROCEDURE — 99284 EMERGENCY DEPT VISIT MOD MDM: CPT

## 2019-05-01 ASSESSMENT — ENCOUNTER SYMPTOMS
COLOR CHANGE: 0
NAUSEA: 1
RHINORRHEA: 0
ABDOMINAL PAIN: 0
CONSTIPATION: 0
DIARRHEA: 0
SHORTNESS OF BREATH: 0
SORE THROAT: 0
VOMITING: 0

## 2019-05-01 NOTE — ED PROVIDER NOTES
[address]  ED  Emergency Department Encounter  EmergencyMedicine Resident     Pt Name:Girish Martel  MRN: [id number]  [place] 1990  Date of evaluation: 5/1/19  PCP:  No primary care provider on file. CHIEF COMPLAINT       Chief Complaint   Patient presents with    Withdrawal     Detoxing from alcohol and cocaine use. Last use on monday       HISTORY OF PRESENT ILLNESS  (Location/Symptom, Timing/Onset, Context/Setting, Quality, Duration, Modifying Factors, Severity.)      Alexandra Mancera is a 33 y.o. male who presents requesting alcohol and cocaine detox. He has a past history of ADHD, anxiety, bipolar disorder, and PTSD. He admits to alcohol and cocaine use. He denies any other drug use. Last use 2 days prior. He admits to tremors of his hands but denies hallucinations, headache, formication, anxiety, or diaphoresis. He states that he is taking all his medications as prescribed. He denies any suicidal or homicidal ideation. PAST MEDICAL / SURGICAL / SOCIAL / FAMILY HISTORY      has a past medical history of ADHD (attention deficit hyperactivity disorder), Anxiety, Bipolar 1 disorder ([facility] [address] and PTSD (post-traumatic stress disorder). has no past surgical history on file. Social History     Socioeconomic History    Marital status: Single     Spouse name: Not on file    Number of children: Not on file    Years of education: Not on file    Highest education level: Not on file   Occupational History    Not on file   Social Needs    Financial resource strain: Not on file    Food insecurity:     Worry: Not on file     Inability: Not on file    Transportation needs:     Medical: Not on file     Non-medical: Not on file   Tobacco Use    Smoking status: Light Tobacco Smoker     Packs/day: 0.50     Types: Cigarettes    Smokeless tobacco: Never Used    Tobacco comment: pt accepting of nicotine replacement   Substance and Sexual Activity    Alcohol use:  Yes Comment: 3 times a week    Drug use: Yes     Comment: crack    Sexual activity: Never   Lifestyle    Physical activity:     Days per week: Not on file     Minutes per session: Not on file    Stress: Not on file   Relationships    Social connections:     Talks on phone: Not on file     Gets together: Not on file     Attends Gnosticist service: Not on file     Active member of club or organization: Not on file     Attends meetings of clubs or organizations: Not on file     Relationship status: Not on file    Intimate partner violence:     Fear of current or ex partner: Not on file     Emotionally abused: Not on file     Physically abused: Not on file     Forced sexual activity: Not on file   Other Topics Concern    Not on file   Social History Narrative    Not on file       Family History   Problem Relation Age of Onset    Liver Cancer Brother     Alcohol Abuse Brother     No Known Problems Mother     No Known Problems Father        Allergies:  Advil [ibuprofen]    Home Medications:  Prior to Admission medications    Medication Sig Start Date End Date Taking? Authorizing Provider   amitriptyline (ELAVIL) 50 MG tablet Take 1 tablet by mouth nightly 3/25/19   Alla Aquino MD   naltrexone (DEPADE) 50 MG tablet Take 0.5 tablets by mouth daily (with breakfast) 3/26/19   Alla Aquino MD   cetirizine (ZYRTEC) 10 MG tablet Take 1 tablet by mouth daily 3/26/19   Alla Aquino MD   brexpiprazole (REXULTI) 2 MG TABS tablet Take 1 tablet by mouth daily 3/26/19   Alla Aquino MD       REVIEW OF SYSTEMS    (2-9 systems for level 4, 10 or more for level 5)      Review of Systems   Constitutional: Negative for chills, diaphoresis, fatigue and fever. HENT: Negative for congestion, rhinorrhea and sore throat. Eyes: Negative for visual disturbance. Respiratory: Negative for shortness of breath. Cardiovascular: Negative for chest pain. Gastrointestinal: Positive for nausea.  Negative dependence    RADIOLOGY:  None    EKG  None    All EKG's are interpreted by the Emergency Department Physician who either signs or Co-signs this chart in the absence of a cardiologist.    EMERGENCY DEPARTMENT COURSE:  Patient is alert and oriented, no distress. Vital signs are within normal limits. He is not tachycardic, no diaphoresis, no observable tremor. Does not clinically appear to be withdrawing. Patient has no medical complaints, I do not feel that further workup or imaging are indicated at this time. He denies homicidal or suicidal ideation. Patient is stable for release to [facility]. He remained stable throughout ED stay. PROCEDURES:  None    CONSULTS:  None    CRITICAL CARE:  None    FINAL IMPRESSION      1. History of alcohol dependence ([facility] [address]          DISPOSITION / PLAN     DISPOSITION Decision To Discharge 05/01/2019 03:28:43 PM      PATIENT REFERRED TO:  [facility]  [address] [place] [address]  [phone]          DISCHARGE MEDICATIONS:  New Prescriptions    No medications on file       Des Darden D.O.   Emergency Medicine Resident    (Please note that portions ofthis note were completed with a voice recognition program.  Efforts were made to edit the dictations but occasionally words are mis-transcribed.)     Des Darden MD  05/01/19 5202

## 2019-05-01 NOTE — ED NOTES
Spoke with Kira Mata, [facility] , who reports that the pt was dropped off by [facility] staff due to indication by the patient that he would need to be admitted for inpatient treatment for alcohol withdrawal. Pt denies any alcohol or drug use for two days, has no history of seizures or DT's. Pt denies being suicidal or homicidal, does admit to depression. At this time, tentative plan for [facility] staff to  and drop off at [facility] to ensure continuity of care with agencies. No other needs at this time, will continue to monitor.       Xiomara Fountain [facility]  05/01/19 8744

## 2019-05-01 NOTE — ED NOTES
Pt's [facility] staff to  and transport. No medical or psychiatric concerns at this time.       [facility]  05/01/19 8772

## 2019-05-06 ENCOUNTER — HOSPITAL ENCOUNTER (EMERGENCY)
Age: 29
Discharge: PSYCHIATRIC OR SUBSTANCE USE FACILITY | End: 2019-05-07
Attending: EMERGENCY MEDICINE
Payer: COMMERCIAL

## 2019-05-06 DIAGNOSIS — F19.10 POLYSUBSTANCE ABUSE (HCC): ICD-10-CM

## 2019-05-06 DIAGNOSIS — R44.3 HALLUCINATIONS: ICD-10-CM

## 2019-05-06 DIAGNOSIS — F10.929 ACUTE ALCOHOLIC INTOXICATION WITH COMPLICATION (HCC): Primary | ICD-10-CM

## 2019-05-06 LAB
ABSOLUTE EOS #: 0.2 K/UL (ref 0–0.4)
ABSOLUTE IMMATURE GRANULOCYTE: NORMAL K/UL (ref 0–0.3)
ABSOLUTE LYMPH #: 2.7 K/UL (ref 1–4.8)
ABSOLUTE MONO #: 0.5 K/UL (ref 0.1–1.3)
ACETAMINOPHEN LEVEL: <5 UG/ML (ref 10–30)
ALBUMIN SERPL-MCNC: 4.4 G/DL (ref 3.5–5.2)
ALBUMIN/GLOBULIN RATIO: ABNORMAL (ref 1–2.5)
ALP BLD-CCNC: 48 U/L (ref 40–129)
ALT SERPL-CCNC: 12 U/L (ref 5–41)
AMPHETAMINE SCREEN URINE: NEGATIVE
ANION GAP SERPL CALCULATED.3IONS-SCNC: 14 MMOL/L (ref 9–17)
AST SERPL-CCNC: 17 U/L
BARBITURATE SCREEN URINE: NEGATIVE
BASOPHILS # BLD: 1 % (ref 0–2)
BASOPHILS ABSOLUTE: 0.1 K/UL (ref 0–0.2)
BENZODIAZEPINE SCREEN, URINE: NEGATIVE
BILIRUB SERPL-MCNC: 0.28 MG/DL (ref 0.3–1.2)
BUN BLDV-MCNC: 13 MG/DL (ref 6–20)
BUN/CREAT BLD: ABNORMAL (ref 9–20)
BUPRENORPHINE URINE: ABNORMAL
CALCIUM SERPL-MCNC: 8.9 MG/DL (ref 8.6–10.4)
CANNABINOID SCREEN URINE: NEGATIVE
CHLORIDE BLD-SCNC: 109 MMOL/L (ref 98–107)
CO2: 23 MMOL/L (ref 20–31)
COCAINE METABOLITE, URINE: POSITIVE
CREAT SERPL-MCNC: 0.84 MG/DL (ref 0.7–1.2)
DIFFERENTIAL TYPE: NORMAL
EOSINOPHILS RELATIVE PERCENT: 3 % (ref 0–4)
ETHANOL PERCENT: 0.23 %
ETHANOL: 235 MG/DL
GFR AFRICAN AMERICAN: >60 ML/MIN
GFR NON-AFRICAN AMERICAN: >60 ML/MIN
GFR SERPL CREATININE-BSD FRML MDRD: ABNORMAL ML/MIN/{1.73_M2}
GFR SERPL CREATININE-BSD FRML MDRD: ABNORMAL ML/MIN/{1.73_M2}
GLUCOSE BLD-MCNC: 117 MG/DL (ref 70–99)
HCT VFR BLD CALC: 43.5 % (ref 41–53)
HEMOGLOBIN: 14.6 G/DL (ref 13.5–17.5)
IMMATURE GRANULOCYTES: NORMAL %
LYMPHOCYTES # BLD: 34 % (ref 24–44)
MAGNESIUM: 2.1 MG/DL (ref 1.6–2.6)
MCH RBC QN AUTO: 30.7 PG (ref 26–34)
MCHC RBC AUTO-ENTMCNC: 33.6 G/DL (ref 31–37)
MCV RBC AUTO: 91.4 FL (ref 80–100)
MDMA URINE: ABNORMAL
METHADONE SCREEN, URINE: NEGATIVE
METHAMPHETAMINE, URINE: ABNORMAL
MONOCYTES # BLD: 6 % (ref 1–7)
NRBC AUTOMATED: NORMAL PER 100 WBC
OPIATES, URINE: NEGATIVE
OXYCODONE SCREEN URINE: NEGATIVE
PDW BLD-RTO: 14 % (ref 11.5–14.9)
PHENCYCLIDINE, URINE: NEGATIVE
PLATELET # BLD: 267 K/UL (ref 150–450)
PLATELET ESTIMATE: NORMAL
PMV BLD AUTO: 8.2 FL (ref 6–12)
POTASSIUM SERPL-SCNC: 3.8 MMOL/L (ref 3.7–5.3)
PROPOXYPHENE, URINE: ABNORMAL
RBC # BLD: 4.75 M/UL (ref 4.5–5.9)
RBC # BLD: NORMAL 10*6/UL
SALICYLATE LEVEL: <1 MG/DL (ref 3–10)
SEG NEUTROPHILS: 56 % (ref 36–66)
SEGMENTED NEUTROPHILS ABSOLUTE COUNT: 4.6 K/UL (ref 1.3–9.1)
SODIUM BLD-SCNC: 146 MMOL/L (ref 135–144)
TEST INFORMATION: ABNORMAL
TOTAL PROTEIN: 7.1 G/DL (ref 6.4–8.3)
TOXIC TRICYCLIC SC,BLOOD: ABNORMAL
TRICYCLIC ANTIDEP,URINE: NEGATIVE
TRICYCLIC ANTIDEPRESSANTS, UR: ABNORMAL
WBC # BLD: 8.1 K/UL (ref 3.5–11)
WBC # BLD: NORMAL 10*3/UL

## 2019-05-06 PROCEDURE — 80053 COMPREHEN METABOLIC PANEL: CPT

## 2019-05-06 PROCEDURE — 83735 ASSAY OF MAGNESIUM: CPT

## 2019-05-06 PROCEDURE — 80307 DRUG TEST PRSMV CHEM ANLYZR: CPT

## 2019-05-06 PROCEDURE — G0480 DRUG TEST DEF 1-7 CLASSES: HCPCS

## 2019-05-06 PROCEDURE — 85025 COMPLETE CBC W/AUTO DIFF WBC: CPT

## 2019-05-06 PROCEDURE — 99285 EMERGENCY DEPT VISIT HI MDM: CPT

## 2019-05-06 PROCEDURE — 36415 COLL VENOUS BLD VENIPUNCTURE: CPT

## 2019-05-06 RX ORDER — ARIPIPRAZOLE 20 MG/1
30 TABLET ORAL DAILY
Status: ON HOLD | COMMUNITY
End: 2019-06-05

## 2019-05-06 ASSESSMENT — ENCOUNTER SYMPTOMS
COUGH: 0
ABDOMINAL PAIN: 0

## 2019-05-06 NOTE — ED PROVIDER NOTES
[address]  eMERGENCY dEPARTMENT eNCOUnter      Pt Name: Tito Turk  MRN: [id number]  [place] 1990  Date of evaluation: 5/6/19      CHIEF COMPLAINT       Chief Complaint   Patient presents with    Hallucinations     Pt is hearing voices    Alcohol Intoxication    Addiction Problem     Polysubstance abuse includes cocaine and ETOH     HISTORY OF PRESENT ILLNESS   HPI 33 y.o. male presents with complaints of hallucinations after using alcohol, his prescription pills, using cocaine. He says that he was doing this recreationally. He said he started to hear voices and \"flip out\"  he started using the cocaine around 5 AM in the morning, been drinking alcohol throughout the morning because he is having a hard time sleeping. Denies any overdose attempt or attempt to hurt himself. Denies any suicidal thoughts or plan. Patient has multiple chronic medical conditions. In regards to the suicide attempt but noted below, he says that he was actually not trying to kill himself that he was an accidental overdose. He denies to me currently any history of a suicide attempt. He does admit to chronic alcohol abuse and polysubstance abuse. REVIEW OF SYSTEMS     Review of Systems   Constitutional: Negative for fever. HENT: Negative for congestion. Eyes: Negative for visual disturbance. Respiratory: Negative for cough. Cardiovascular: Negative for chest pain. Gastrointestinal: Negative for abdominal pain. Musculoskeletal: Negative for arthralgias and gait problem. Skin: Negative for rash. Neurological: Negative for tremors and headaches. Psychiatric/Behavioral: Positive for behavioral problems, decreased concentration, hallucinations and sleep disturbance. Negative for self-injury and suicidal ideas. The patient is nervous/anxious.           PAST MEDICAL HISTORY     Past Medical History:   Diagnosis Date    ADHD (attention deficit hyperactivity disorder)     [facility]     pt drinks 6 beers and a liter of vodka daily.  Anxiety     Bipolar 1 disorder (HCC)     PTSD (post-traumatic stress disorder)     Suicide attempt ([facility]     previous suicide attempt by overdosing on pills at approximately 23 y/o       SURGICAL HISTORY     History reviewed. No pertinent surgical history. CURRENT MEDICATIONS       Previous Medications    AMITRIPTYLINE (ELAVIL) 50 MG TABLET    Take 1 tablet by mouth nightly    ARIPIPRAZOLE (ABILIFY) 20 MG TABLET    Take 20 mg by mouth daily    BREXPIPRAZOLE (REXULTI) 2 MG TABS TABLET    Take 1 tablet by mouth daily    CETIRIZINE (ZYRTEC) 10 MG TABLET    Take 1 tablet by mouth daily    NALTREXONE (DEPADE) 50 MG TABLET    Take 0.5 tablets by mouth daily (with breakfast)       ALLERGIES     is allergic to advil [ibuprofen]. FAMILY HISTORY     indicated that his mother is alive. He indicated that his father is alive. He indicated that the status of his brother is unknown. SOCIAL HISTORY      reports that he has been smoking cigarettes. He has a 7.00 pack-year smoking history. He has never used smokeless tobacco. He reports that he drinks alcohol. He reports that he has current or past drug history. Drug: Cocaine. PHYSICAL EXAM     INITIAL VITALS: /64   Pulse 90   Temp 97.9 °F (36.6 °C) (Oral)   Resp 16   Ht 6' (1.829 m)   Wt 190 lb (86.2 kg)   SpO2 97%   BMI 25.77 kg/m²   Gen.: NAD   Head: Normocephalic, atraumatic  Eye: Pupils equal round reactive to light, no conjunctivitis  Heart: Regular rate and rhythm no murmurs  Lungs: Clear to auscultation bilaterally, no respiratory distress  Chest wall: No crepitus, no tenderness palpation  Abdomen: Soft, nontender, nondistended, with no peritoneal signs  Neurologic: Patient is alert and oriented x3, motor and sensation is intact in all 4 extremities, fluent speech, ambulatory with a normal gait.    Extremities: Full range of motion, no cyanosis, no edema, no signs of trauma, no tenderness to palpation    MEDICAL DECISION MAKING:     MDM  32 yo M with polysubstance abuse, hallucinations. No HI or SI. We'll perform a toxicologic evaluation. Currently pt in no distress and in no sign of alcohol withdrawal.     ED Course as of May 07 0001   Mon May 06, 2019   2032 Alcohol level 235, drawn at 7:20pm.  Estimated sober 1:20am.  No other laboratory abnormalities. Pt currently resting comfortably. [KW]   Tue May 07, 2019   0000 Pt continues to rest comfortably. Reassess when sober. Pt being signed out to oncoming physician Dr. Aracely Sr. [KW]      ED Course User Index  [KW] Vinod Garza MD         DIAGNOSTIC RESULTS     EKG: All EKG's are interpreted by the Emergency Department Physician who either signs or Co-signs this chart in the absence of a cardiologist.    EKG shows a sinus rhythm. HR is 80, , QRS 88, , no MONO, No STD, No TWI, the axis is normal.      LABS: All lab results were reviewed by myself, and all abnormals are listed below.   Labs Reviewed   COMPREHENSIVE METABOLIC PANEL - Abnormal; Notable for the following components:       Result Value    Glucose 117 (*)     Sodium 146 (*)     Chloride 109 (*)     Total Bilirubin 0.28 (*)     All other components within normal limits   URINE DRUG SCREEN - Abnormal; Notable for the following components:    Cocaine Metabolite, Urine POSITIVE (*)     All other components within normal limits   TOX SCR, BLD, ED - Abnormal; Notable for the following components:    Ethanol 193 (*)     Salicylate Lvl <1 (*)     Acetaminophen Level <5 (*)     All other components within normal limits   CBC WITH AUTO DIFFERENTIAL   MAGNESIUM   DRUG SCREEN TRICYCLIC URINE       EMERGENCY DEPARTMENT COURSE:   Vitals:    Vitals:    05/06/19 1839   BP: 110/64   Pulse: 90   Resp: 16   Temp: 97.9 °F (36.6 °C)   TempSrc: Oral   SpO2: 97%   Weight: 190 lb (86.2 kg)   Height: 6' (1.829 m)       The patient was given the following medications while in the emergency department:  No orders of the defined types were placed in this encounter. -------------------------  CRITICAL CARE:   CONSULTS: None  PROCEDURES: Procedures     FINAL IMPRESSION      1. Acute alcoholic intoxication with complication ([facility]    2. Polysubstance abuse (HCC)    3. Hallucinations          DISPOSITION/PLAN   DISPOSITION        PATIENT REFERRED TO:  No follow-up provider specified.     DISCHARGE MEDICATIONS:  New Prescriptions    No medications on file         Jen Lester MD  Attending Emergency Physician                      Jen Lester MD  05/07/19 0001

## 2019-05-06 NOTE — ED NOTES
Pt states he was brought to this ER by a lifesquad from a gas station on [address]. Pt denies S/I or H/I, but states he is hearing voices, and he is requesting help with his addiction to ETOH and cocaine. Pt states he normally drinks about 6 beers and a liter of vodka daily. Pt states he also uses about a gram of cocaine daily = $100. Pt arrives A+O x 4, GCS = 15, PMS x 4 intact, eupneic, and PWD. Pulse is regular et strong with s1 and s2 heart tones. Lung sounds clear t/o bilat. Pt appears unkept with strong body odor, dirty nails, and unkept hair. Pt is calm et cooperative.      Bettina Mckinney RN  05/06/19 7748

## 2019-05-07 VITALS
HEIGHT: 72 IN | OXYGEN SATURATION: 99 % | DIASTOLIC BLOOD PRESSURE: 66 MMHG | WEIGHT: 190 LBS | RESPIRATION RATE: 16 BRPM | BODY MASS INDEX: 25.73 KG/M2 | TEMPERATURE: 98.1 F | SYSTOLIC BLOOD PRESSURE: 102 MMHG | HEART RATE: 90 BPM

## 2019-05-07 PROCEDURE — 6370000000 HC RX 637 (ALT 250 FOR IP): Performed by: EMERGENCY MEDICINE

## 2019-05-07 PROCEDURE — 93005 ELECTROCARDIOGRAM TRACING: CPT

## 2019-05-07 RX ORDER — ACETAMINOPHEN 500 MG
1000 TABLET ORAL ONCE
Status: COMPLETED | OUTPATIENT
Start: 2019-05-07 | End: 2019-05-07

## 2019-05-07 RX ADMIN — ACETAMINOPHEN 1000 MG: 500 TABLET, FILM COATED ORAL at 02:40

## 2019-05-07 ASSESSMENT — PAIN SCALES - GENERAL: PAINLEVEL_OUTOF10: 10

## 2019-05-07 NOTE — ED NOTES
SW provided pt with a water and informed pt his sober time is around 0130. Pt verbalized his understanding. Pt has no further needs at this time.

## 2019-05-07 NOTE — ED NOTES
SW contacted [facility] and informed Daniel Maldonado faxed over a referral for detox. Roxana Rangel will contact SW back after pt's information has been reviewed.

## 2019-05-07 NOTE — ED NOTES
Paperwork and pt's belongings provided to [employer]. Pt escorted to out of [facility] via two [id number] [facility] staff members. Pt cooperative exiting [facility].

## 2019-05-07 NOTE — ED NOTES
Pt accepted to [facility] for detox under the care of Dr. Julita Evans, per [facility]. SW will arrange transport through [facility] and contact [facility] back with an ETA.

## 2019-05-07 NOTE — ED NOTES
Addison Baker is a 33year old male who presents to the ED via squad. Pt was intoxicated upon arrival with a BAL of 235. Pt is now legally sober. Pt is calm and cooperative. Pt denies SI/HI/AH. Pt has a history of attempting suicide by overdose. Pt is linked with [facility] and has an appt next week. Pt reports he has not been taking his psych medications properly. Pt reports he is looking for help to detox off of alcohol and cocaine. Pt reports drinking alcohol and abusing cocaine on a daily basis. Pt requesting to be transferred to a detox facility. SW will send referrals to a detox facility for pt to be transferred to.

## 2019-05-15 ENCOUNTER — HOSPITAL ENCOUNTER (EMERGENCY)
Age: 29
Discharge: PSYCHIATRIC OR SUBSTANCE USE FACILITY | End: 2019-05-15
Attending: EMERGENCY MEDICINE
Payer: COMMERCIAL

## 2019-05-15 VITALS
SYSTOLIC BLOOD PRESSURE: 108 MMHG | WEIGHT: 190 LBS | TEMPERATURE: 97.8 F | BODY MASS INDEX: 25.73 KG/M2 | RESPIRATION RATE: 13 BRPM | DIASTOLIC BLOOD PRESSURE: 68 MMHG | HEIGHT: 72 IN | HEART RATE: 86 BPM | OXYGEN SATURATION: 95 %

## 2019-05-15 DIAGNOSIS — R45.851 SUICIDAL IDEATION: Primary | ICD-10-CM

## 2019-05-15 DIAGNOSIS — T50.902A INTENTIONAL DRUG OVERDOSE, INITIAL ENCOUNTER (HCC): ICD-10-CM

## 2019-05-15 LAB
ABSOLUTE EOS #: 0.28 K/UL (ref 0–0.44)
ABSOLUTE IMMATURE GRANULOCYTE: 0.04 K/UL (ref 0–0.3)
ABSOLUTE LYMPH #: 1.67 K/UL (ref 1.1–3.7)
ABSOLUTE MONO #: 0.86 K/UL (ref 0.1–1.2)
ACETAMINOPHEN LEVEL: <5 UG/ML (ref 10–30)
ALBUMIN SERPL-MCNC: 4.7 G/DL (ref 3.5–5.2)
ALBUMIN/GLOBULIN RATIO: 1.5 (ref 1–2.5)
ALP BLD-CCNC: 51 U/L (ref 40–129)
ALT SERPL-CCNC: 19 U/L (ref 5–41)
ANION GAP SERPL CALCULATED.3IONS-SCNC: 12 MMOL/L (ref 9–17)
AST SERPL-CCNC: 24 U/L
BASOPHILS # BLD: 1 % (ref 0–2)
BASOPHILS ABSOLUTE: 0.06 K/UL (ref 0–0.2)
BILIRUB SERPL-MCNC: 0.67 MG/DL (ref 0.3–1.2)
BILIRUBIN DIRECT: 0.13 MG/DL
BILIRUBIN, INDIRECT: 0.54 MG/DL (ref 0–1)
BUN BLDV-MCNC: 17 MG/DL (ref 6–20)
BUN/CREAT BLD: NORMAL (ref 9–20)
CALCIUM SERPL-MCNC: 9.2 MG/DL (ref 8.6–10.4)
CHLORIDE BLD-SCNC: 100 MMOL/L (ref 98–107)
CO2: 27 MMOL/L (ref 20–31)
CREAT SERPL-MCNC: 0.92 MG/DL (ref 0.7–1.2)
DIFFERENTIAL TYPE: ABNORMAL
EOSINOPHILS RELATIVE PERCENT: 3 % (ref 1–4)
ETHANOL PERCENT: <0.01 %
ETHANOL: <10 MG/DL
GFR AFRICAN AMERICAN: >60 ML/MIN
GFR NON-AFRICAN AMERICAN: >60 ML/MIN
GFR SERPL CREATININE-BSD FRML MDRD: NORMAL ML/MIN/{1.73_M2}
GFR SERPL CREATININE-BSD FRML MDRD: NORMAL ML/MIN/{1.73_M2}
GLOBULIN: NORMAL G/DL (ref 1.5–3.8)
GLUCOSE BLD-MCNC: 87 MG/DL (ref 70–99)
HCT VFR BLD CALC: 44 % (ref 40.7–50.3)
HEMOGLOBIN: 14.6 G/DL (ref 13–17)
IMMATURE GRANULOCYTES: 0 %
LYMPHOCYTES # BLD: 17 % (ref 24–43)
MCH RBC QN AUTO: 30.2 PG (ref 25.2–33.5)
MCHC RBC AUTO-ENTMCNC: 33.2 G/DL (ref 28.4–34.8)
MCV RBC AUTO: 91.1 FL (ref 82.6–102.9)
MONOCYTES # BLD: 9 % (ref 3–12)
NRBC AUTOMATED: 0 PER 100 WBC
PDW BLD-RTO: 13 % (ref 11.8–14.4)
PLATELET # BLD: 287 K/UL (ref 138–453)
PLATELET ESTIMATE: ABNORMAL
PMV BLD AUTO: 10.2 FL (ref 8.1–13.5)
POTASSIUM SERPL-SCNC: 3.9 MMOL/L (ref 3.7–5.3)
RBC # BLD: 4.83 M/UL (ref 4.21–5.77)
RBC # BLD: ABNORMAL 10*6/UL
SALICYLATE LEVEL: <1 MG/DL (ref 3–10)
SEG NEUTROPHILS: 70 % (ref 36–65)
SEGMENTED NEUTROPHILS ABSOLUTE COUNT: 6.72 K/UL (ref 1.5–8.1)
SODIUM BLD-SCNC: 139 MMOL/L (ref 135–144)
TOTAL PROTEIN: 7.8 G/DL (ref 6.4–8.3)
TOXIC TRICYCLIC SC,BLOOD: NEGATIVE
TROPONIN INTERP: NORMAL
TROPONIN T: NORMAL NG/ML
TROPONIN, HIGH SENSITIVITY: 8 NG/L (ref 0–22)
WBC # BLD: 9.6 K/UL (ref 3.5–11.3)
WBC # BLD: ABNORMAL 10*3/UL

## 2019-05-15 PROCEDURE — 80048 BASIC METABOLIC PNL TOTAL CA: CPT

## 2019-05-15 PROCEDURE — 80076 HEPATIC FUNCTION PANEL: CPT

## 2019-05-15 PROCEDURE — 93005 ELECTROCARDIOGRAM TRACING: CPT

## 2019-05-15 PROCEDURE — 80307 DRUG TEST PRSMV CHEM ANLYZR: CPT

## 2019-05-15 PROCEDURE — 85025 COMPLETE CBC W/AUTO DIFF WBC: CPT

## 2019-05-15 PROCEDURE — 84484 ASSAY OF TROPONIN QUANT: CPT

## 2019-05-15 PROCEDURE — G0480 DRUG TEST DEF 1-7 CLASSES: HCPCS

## 2019-05-15 PROCEDURE — 99285 EMERGENCY DEPT VISIT HI MDM: CPT

## 2019-05-15 ASSESSMENT — PAIN DESCRIPTION - DESCRIPTORS: DESCRIPTORS: CRAMPING;PRESSURE

## 2019-05-15 ASSESSMENT — ENCOUNTER SYMPTOMS
VOMITING: 0
SORE THROAT: 0
NAUSEA: 0
SHORTNESS OF BREATH: 0
ABDOMINAL PAIN: 1
RHINORRHEA: 0
COUGH: 0

## 2019-05-15 ASSESSMENT — PAIN DESCRIPTION - FREQUENCY: FREQUENCY: CONTINUOUS

## 2019-05-15 ASSESSMENT — PAIN SCALES - GENERAL
PAINLEVEL_OUTOF10: 0
PAINLEVEL_OUTOF10: 6

## 2019-05-15 ASSESSMENT — PATIENT HEALTH QUESTIONNAIRE - PHQ9: SUM OF ALL RESPONSES TO PHQ QUESTIONS 1-9: 6

## 2019-05-15 ASSESSMENT — PAIN DESCRIPTION - PAIN TYPE: TYPE: ACUTE PAIN

## 2019-05-15 ASSESSMENT — PAIN DESCRIPTION - LOCATION: LOCATION: ABDOMEN

## 2019-05-15 ASSESSMENT — PAIN DESCRIPTION - ORIENTATION: ORIENTATION: OTHER (COMMENT)

## 2019-05-15 NOTE — ED NOTES
Patient's agreeable to transport to [facility], writer informed Attending. Writer to request Security transport once patient's discharged.       Denis Shaw, MSW, LSW  05/15/19 1740

## 2019-05-15 NOTE — ED PROVIDER NOTES
[facility]  EMERGENCY DEPARTMENT ENCOUNTER      PtName: Sudha Quesada  MRN: [id number]  [place] 1990  Date of evaluation: 5/15/19      CHIEF COMPLAINT       Chief Complaint   Patient presents with    Drug Overdose    Psychiatric Evaluation         HISTORY OF PRESENT ILLNESS (4 or more for level 4 or 5)    Sudha Quesada is a 33 y.o. male who presents hearing voices overdose. Voices that increased recently. And today he said he took a \"handful\" of both look to the and Abilify. He says he is having thoughts to want to hurt himself. He denies any chest pain or shortness of breath. He denies any nausea vomiting. He denies any fever, chills or sweats. He is complaining of \"upset stomach\". He also complains of difficulty sleeping and anxiety. He denies any other drugs or alcohol ingestion. REVIEW OF SYSTEMS  (2-9 for Level 4, 10 or more Level 5)     Review of Systems   Constitutional: Negative for chills and fever. HENT: Negative for rhinorrhea and sore throat. Eyes: Negative for visual disturbance. Respiratory: Negative for cough and shortness of breath. Cardiovascular: Negative for chest pain. Gastrointestinal: Positive for abdominal pain. Negative for nausea and vomiting. Genitourinary: Negative for dysuria. Musculoskeletal: Negative for neck stiffness. Skin: Negative for rash. Neurological: Negative for weakness and numbness. Psychiatric/Behavioral: Positive for self-injury, sleep disturbance and suicidal ideas. The patient is nervous/anxious. PAST MEDICAL HISTORY PAST SURGICAL HISTORY (1 for Level 4, 2 for Level 5)    has a past medical history of ADHD (attention deficit hyperactivity disorder), Alcoholism ([facility] Anxiety, Bipolar 1 disorder ([facility] PTSD (post-traumatic stress disorder), and Suicide attempt ([facility] has no past surgical history on file.       CURRENT MEDICATIONS       Discharge Medication List as of 5/15/2019  1:01 PM CONTINUE these medications which have NOT CHANGED    Details   lurasidone (LATUDA) 40 MG TABS tablet Take 40 mg by mouth dailyHistorical Med      ARIPiprazole (ABILIFY) 20 MG tablet Take 20 mg by mouth dailyHistorical Med      amitriptyline (ELAVIL) 50 MG tablet Take 1 tablet by mouth nightly, Disp-30 tablet, R-0Normal      naltrexone (DEPADE) 50 MG tablet Take 0.5 tablets by mouth daily (with breakfast), Disp-30 tablet, R-0Reduce doses taken as pain becomes manageableNormal      cetirizine (ZYRTEC) 10 MG tablet Take 1 tablet by mouth daily, Disp-30 tablet, R-0Normal      brexpiprazole (REXULTI) 2 MG TABS tablet Take 1 tablet by mouth daily, Disp-30 tablet, R-0Normal             ALLERGIES     is allergic to advil [ibuprofen]. FAMILY HISTORY     indicated that his mother is alive. He indicated that his father is alive. He indicated that the status of his brother is unknown.     family history includes Alcohol Abuse in his brother; [place] in his brother; No Known Problems in his father and mother. SOCIAL HISTORY      reports that he has been smoking cigarettes. He has a 7.00 pack-year smoking history. He has never used smokeless tobacco. He reports that he drinks alcohol. He reports that he has current or past drug history. Drug: Cocaine. PHYSICAL EXAM  (5-7 for level 4, 8 or more level 5)   INITIAL VITALS:  height is 6' (1.829 m) and weight is 190 lb (86.2 kg). His oral temperature is 97.8 °F (36.6 °C). His blood pressure is 108/68 and his pulse is 86. His respiration is 13 and oxygen saturation is 95%. Physical Exam   Constitutional: He is oriented to person, place, and time. He appears well-developed and well-nourished. HENT:   Head: Normocephalic and atraumatic. Crusty lesion noted on the lip   Eyes: Conjunctivae are normal. Right eye exhibits no discharge. Left eye exhibits no discharge. Neck: Normal range of motion. No tracheal deviation present.    Cardiovascular: Normal rate and regular rhythm. Pulmonary/Chest: Effort normal and breath sounds normal.   Abdominal: Soft. There is no tenderness. There is no guarding. Musculoskeletal: He exhibits no edema or tenderness. Neurological: He is alert and oriented to person, place, and time. Skin: Skin is warm and dry. Psychiatric: His speech is normal. His affect is blunt. He is withdrawn. He expresses inappropriate judgment. He expresses suicidal ideation. He expresses suicidal plans.        DIFFERENTIAL DIAGNOSIS/ MDM:     Suicidal ideation, Latuda overdose, Abilify overdose,    DIAGNOSTIC RESULTS     EKG Interpretation    Interpreted by me    Rhythm: normal sinus   Rate: normal  Axis: normal  Ectopy: none  Conduction: normal  ST Segments: no acute change  T Waves: no acute change  Q Waves: none  QT corrected 440 ms, normal    Clinical Impression: no acute changes and normal EKG        LABS:  Results for orders placed or performed during the hospital encounter of 05/15/19   CBC Auto Differential   Result Value Ref Range    WBC 9.6 3.5 - 11.3 k/uL    RBC 4.83 4.21 - 5.77 m/uL    Hemoglobin 14.6 13.0 - 17.0 g/dL    Hematocrit 44.0 40.7 - 50.3 %    MCV 91.1 82.6 - 102.9 fL    MCH 30.2 25.2 - 33.5 pg    MCHC 33.2 28.4 - 34.8 g/dL    RDW 13.0 11.8 - 14.4 %    Platelets 827 668 - 835 k/uL    MPV 10.2 8.1 - 13.5 fL    NRBC Automated 0.0 0.0 per 100 WBC    Differential Type NOT REPORTED     Seg Neutrophils 70 (H) 36 - 65 %    Lymphocytes 17 (L) 24 - 43 %    Monocytes 9 3 - 12 %    Eosinophils % 3 1 - 4 %    Basophils 1 0 - 2 %    Immature Granulocytes 0 0 %    Segs Absolute 6.72 1.50 - 8.10 k/uL    Absolute Lymph # 1.67 1.10 - 3.70 k/uL    Absolute Mono # 0.86 0.10 - 1.20 k/uL    Absolute Eos # 0.28 0.00 - 0.44 k/uL    Basophils # 0.06 0.00 - 0.20 k/uL    Absolute Immature Granulocyte 0.04 0.00 - 0.30 k/uL    WBC Morphology NOT REPORTED     RBC Morphology NOT REPORTED     Platelet Estimate NOT REPORTED    Basic Metabolic Panel   Result Value Ref Range    Glucose 87 70 - 99 mg/dL    BUN 17 6 - 20 mg/dL    CREATININE 0.92 0.70 - 1.20 mg/dL    Bun/Cre Ratio NOT REPORTED 9 - 20    Calcium 9.2 8.6 - 10.4 mg/dL    Sodium 139 135 - 144 mmol/L    Potassium 3.9 3.7 - 5.3 mmol/L    Chloride 100 98 - 107 mmol/L    CO2 27 20 - 31 mmol/L    Anion Gap 12 9 - 17 mmol/L    GFR Non-African American >60 >60 mL/min    GFR African American >60 >60 mL/min    GFR Comment          GFR Staging NOT REPORTED    Hepatic Function Panel   Result Value Ref Range    Alb 4.7 3.5 - 5.2 g/dL    Alkaline Phosphatase 51 40 - 129 U/L    ALT 19 5 - 41 U/L    AST 24 <40 U/L    Total Bilirubin 0.67 0.3 - 1.2 mg/dL    Bilirubin, Direct 0.13 <0.31 mg/dL    Bilirubin, Indirect 0.54 0.00 - 1.00 mg/dL    Total Protein 7.8 6.4 - 8.3 g/dL    Globulin NOT REPORTED 1.5 - 3.8 g/dL    Albumin/Globulin Ratio 1.5 1.0 - 2.5   TOX SCR, BLD, ED   Result Value Ref Range    Ethanol <10 <10 mg/dL    Ethanol percent <3.316 <2.234 %    Salicylate Lvl <1 (L) 3 - 10 mg/dL    Acetaminophen Level <5 (L) 10 - 30 ug/mL    Toxic Tricyclic Sc,Blood NEGATIVE NEGATIVE   Troponin   Result Value Ref Range    Troponin, High Sensitivity 8 0 - 22 ng/L    Troponin T NOT REPORTED <0.03 ng/mL    Troponin Interp NOT REPORTED    EKG 12 Lead   Result Value Ref Range    Ventricular Rate 80 BPM    Atrial Rate 80 BPM    P-R Interval 166 ms    QRS Duration 88 ms    Q-T Interval 382 ms    QTc Calculation (Bazett) 440 ms    P Axis 82 degrees    R Axis 77 degrees    T Axis 44 degrees         EMERGENCY DEPARTMENTCOURSE:   Vitals:    Vitals:    05/15/19 0759 05/15/19 1038 05/15/19 1208   BP: 106/65 109/62 108/68   Pulse: 78 80 86   Resp:  14 13   Temp: 97.6 °F (36.4 °C) 97.9 °F (36.6 °C) 97.8 °F (36.6 °C)   TempSrc: Oral Oral Oral   SpO2: 98% 98% 95%   Weight: 190 lb (86.2 kg)     Height: 6' (1.829 m)       -------------------------  BP: 108/68, Temp: 97.8 °F (36.6 °C), Pulse: 86, Resp: 13    Discussed with poison control.  Recommendations based on EKG. If the QTC is greater than 450 recommended to correct any magnesium and calcium phosphorus deficits. If the QTC is greater than 500 recommended 2 g of magnesium sulfate IV. If the patient is without any significant abnormalities or forearm observation from ingestion time was recommended. CRITICAL CARE: There was a high probability of clinically significant/life threatening deterioration in this patient'scondition which required my urgent intervention. Total critical care time was 45 minutes. This excludes any time for separately reportable procedures. CONSULTS:  Poison control        ED Course as of May 18 1856   Wed May 15, 2019   1012 Patient states he is now feeling better. He denies any chest pain or shortness of breath. [YA]   2109 The patient is without any complaints at this time. As is been 4 hours since his ingestion he is being medically stable for psychiatric evaluation. [XN]   5444  discussed case with psychiatry who recommended the patient to be seen at [facility]. The patient is amenable to this. The patient will be sent to [facility]. [GH]      ED Course User Index  [GH] Saulo Gillis MD           FINALIMPRESSION      1. Suicidal ideation    2. Intentional drug overdose, initial encounter [facility])          DISPOSITION/PLAN   DISPOSITION    Transfer for psychiatric evaluation. (Please note that portions of this note were completed with a voice recognitionprogram.  Efforts were made to edit the dictations but occasionally words are mis-transcribed.)    Saulo Gillis MD F.A.C.E.P.   Attending Emergency Physician                   Saulo Gillis MD  05/18/19 2835

## 2019-05-15 NOTE — ED NOTES
Update given to Opal Dill RN at [facility]. Pt is calm, cooperative, in NAD, resting at this time.      Corinne Gails, RN  05/15/19 5860

## 2019-05-15 NOTE — ED NOTES
S.W has seen pt. And pt. Has been discharged to [facility]. Pt. Has been transported by [facility] officer in stable condition. IV has been removed and pt. Remains stable.        Va Chaves RN  05/15/19 2152

## 2019-05-15 NOTE — ED NOTES
Pt. Was received with reports of having over dosed on Abilify and Latuda due to not being able to sleep and hearing voices. Pt is received A&OX3 with stable vitals and no noted distress. Pt. Reports that he is also feeling suicidal with a plan in place. MD, charge RN and security have been informed.         Renee Mohan RN  05/15/19 6502

## 2019-05-15 NOTE — ED TRIAGE NOTES
Pt. States that he  Overdosed on Abilify and Latuda due to not being able to sleep and hearing voices. Pt. States he has only been on [place] for 1 week.

## 2019-05-16 LAB
EKG ATRIAL RATE: 80 BPM
EKG P AXIS: 82 DEGREES
EKG P-R INTERVAL: 166 MS
EKG Q-T INTERVAL: 382 MS
EKG QRS DURATION: 88 MS
EKG QTC CALCULATION (BAZETT): 440 MS
EKG R AXIS: 77 DEGREES
EKG T AXIS: 44 DEGREES
EKG VENTRICULAR RATE: 80 BPM

## 2019-05-30 LAB
EKG ATRIAL RATE: 80 BPM
EKG P AXIS: 80 DEGREES
EKG P-R INTERVAL: 146 MS
EKG Q-T INTERVAL: 374 MS
EKG QRS DURATION: 88 MS
EKG QTC CALCULATION (BAZETT): 431 MS
EKG R AXIS: 87 DEGREES
EKG T AXIS: 37 DEGREES
EKG VENTRICULAR RATE: 80 BPM

## 2019-06-01 ENCOUNTER — HOSPITAL ENCOUNTER (INPATIENT)
Age: 29
LOS: 2 days | Discharge: PSYCHIATRIC OR SUBSTANCE USE FACILITY | DRG: 812 | End: 2019-06-03
Attending: EMERGENCY MEDICINE | Admitting: INTERNAL MEDICINE
Payer: COMMERCIAL

## 2019-06-01 DIAGNOSIS — F31.9 BIPOLAR 1 DISORDER (HCC): ICD-10-CM

## 2019-06-01 DIAGNOSIS — T14.91XA SUICIDE ATTEMPT (HCC): ICD-10-CM

## 2019-06-01 DIAGNOSIS — F10.20 ALCOHOLISM (HCC): ICD-10-CM

## 2019-06-01 DIAGNOSIS — F19.10 POLYSUBSTANCE ABUSE (HCC): ICD-10-CM

## 2019-06-01 DIAGNOSIS — T50.904A DRUG OVERDOSE, UNDETERMINED INTENT, INITIAL ENCOUNTER: Primary | ICD-10-CM

## 2019-06-01 DIAGNOSIS — F41.9 ANXIETY: ICD-10-CM

## 2019-06-01 DIAGNOSIS — F43.10 PTSD (POST-TRAUMATIC STRESS DISORDER): ICD-10-CM

## 2019-06-01 DIAGNOSIS — F90.9 ATTENTION DEFICIT HYPERACTIVITY DISORDER (ADHD), UNSPECIFIED ADHD TYPE: ICD-10-CM

## 2019-06-01 PROBLEM — Z78.9 ALCOHOL USE: Status: RESOLVED | Noted: 2018-11-08 | Resolved: 2019-06-01

## 2019-06-01 PROBLEM — F10.90 ALCOHOL USE: Status: RESOLVED | Noted: 2018-11-08 | Resolved: 2019-06-01

## 2019-06-01 PROBLEM — T50.902A INTENTIONAL DRUG OVERDOSE (HCC): Status: ACTIVE | Noted: 2019-06-01

## 2019-06-01 LAB
ABSOLUTE EOS #: 0.27 K/UL (ref 0–0.44)
ABSOLUTE IMMATURE GRANULOCYTE: 0.03 K/UL (ref 0–0.3)
ABSOLUTE LYMPH #: 2.28 K/UL (ref 1.1–3.7)
ABSOLUTE MONO #: 0.87 K/UL (ref 0.1–1.2)
ACETAMINOPHEN LEVEL: <10 UG/ML (ref 10–30)
ACETAMINOPHEN LEVEL: <10 UG/ML (ref 10–30)
ALBUMIN SERPL-MCNC: 1.8 G/DL (ref 3.5–5.2)
ALBUMIN SERPL-MCNC: 4.3 G/DL (ref 3.5–5.2)
ALBUMIN/GLOBULIN RATIO: ABNORMAL (ref 1–2.5)
ALBUMIN/GLOBULIN RATIO: NORMAL (ref 1–2.5)
ALP BLD-CCNC: 18 U/L (ref 40–129)
ALP BLD-CCNC: 45 U/L (ref 40–129)
ALT SERPL-CCNC: 18 U/L (ref 5–41)
ALT SERPL-CCNC: 8 U/L (ref 5–41)
AMPHETAMINE SCREEN URINE: NEGATIVE
ANION GAP SERPL CALCULATED.3IONS-SCNC: 12 MMOL/L (ref 9–17)
ANION GAP SERPL CALCULATED.3IONS-SCNC: 9 MMOL/L
AST SERPL-CCNC: 14 U/L
AST SERPL-CCNC: 29 U/L
BARBITURATE SCREEN URINE: NEGATIVE
BASOPHILS # BLD: 1 % (ref 0–2)
BASOPHILS ABSOLUTE: 0.06 K/UL (ref 0–0.2)
BENZODIAZEPINE SCREEN, URINE: NEGATIVE
BILIRUB SERPL-MCNC: 0.37 MG/DL (ref 0.3–1.2)
BILIRUB SERPL-MCNC: 0.85 MG/DL (ref 0.3–1.2)
BILIRUBIN DIRECT: 0.12 MG/DL
BILIRUBIN DIRECT: 0.2 MG/DL
BILIRUBIN URINE: NEGATIVE
BILIRUBIN, INDIRECT: 0.25 MG/DL (ref 0–1)
BILIRUBIN, INDIRECT: 0.65 MG/DL (ref 0–1)
BUN BLDV-MCNC: 15 MG/DL (ref 6–20)
BUN BLDV-MCNC: 8 MG/DL (ref 6–20)
BUN/CREAT BLD: 15 (ref 9–20)
BUN/CREAT BLD: ABNORMAL (ref 9–20)
BUPRENORPHINE URINE: ABNORMAL
CALCIUM SERPL-MCNC: 3.8 MG/DL (ref 8.6–10.4)
CALCIUM SERPL-MCNC: 8.4 MG/DL (ref 8.6–10.4)
CANNABINOID SCREEN URINE: NEGATIVE
CHLORIDE BLD-SCNC: 104 MMOL/L (ref 98–107)
CHLORIDE BLD-SCNC: 125 MMOL/L (ref 98–107)
CHP ED QC CHECK: YES
CO2: 13 MMOL/L (ref 20–31)
CO2: 24 MMOL/L (ref 20–31)
COCAINE METABOLITE, URINE: POSITIVE
COLOR: YELLOW
COMMENT UA: ABNORMAL
CREAT SERPL-MCNC: 1.01 MG/DL (ref 0.7–1.2)
CREAT SERPL-MCNC: <0.4 MG/DL (ref 0.7–1.2)
DIFFERENTIAL TYPE: NORMAL
EOSINOPHILS RELATIVE PERCENT: 3 % (ref 1–4)
ETHANOL PERCENT: <0.01 %
ETHANOL PERCENT: <0.01 %
ETHANOL: <10 MG/DL
ETHANOL: <10 MG/DL
GFR AFRICAN AMERICAN: >60 ML/MIN
GFR AFRICAN AMERICAN: ABNORMAL ML/MIN
GFR NON-AFRICAN AMERICAN: >60 ML/MIN
GFR NON-AFRICAN AMERICAN: ABNORMAL ML/MIN
GFR SERPL CREATININE-BSD FRML MDRD: ABNORMAL ML/MIN/{1.73_M2}
GLOBULIN: ABNORMAL G/DL
GLOBULIN: NORMAL G/DL (ref 1.5–3.8)
GLUCOSE BLD-MCNC: 41 MG/DL (ref 70–99)
GLUCOSE BLD-MCNC: 77 MG/DL
GLUCOSE BLD-MCNC: 77 MG/DL (ref 75–110)
GLUCOSE BLD-MCNC: 82 MG/DL (ref 70–99)
GLUCOSE URINE: NEGATIVE
HCT VFR BLD CALC: 43.6 % (ref 40.7–50.3)
HEMOGLOBIN: 14.8 G/DL (ref 13–17)
IMMATURE GRANULOCYTES: 0 %
KETONES, URINE: ABNORMAL
LEUKOCYTE ESTERASE, URINE: NEGATIVE
LYMPHOCYTES # BLD: 27 % (ref 24–43)
MAGNESIUM: 2.2 MG/DL (ref 1.6–2.6)
MCH RBC QN AUTO: 31 PG (ref 25.2–33.5)
MCHC RBC AUTO-ENTMCNC: 33.9 G/DL (ref 28.4–34.8)
MCV RBC AUTO: 91.2 FL (ref 82.6–102.9)
MDMA URINE: ABNORMAL
METHADONE SCREEN, URINE: NEGATIVE
METHAMPHETAMINE, URINE: ABNORMAL
MONOCYTES # BLD: 10 % (ref 3–12)
MYOGLOBIN: 41 NG/ML (ref 28–72)
MYOGLOBIN: 61 NG/ML (ref 28–72)
MYOGLOBIN: 76 NG/ML (ref 28–72)
NITRITE, URINE: NEGATIVE
NRBC AUTOMATED: 0 PER 100 WBC
OPIATES, URINE: NEGATIVE
OXYCODONE SCREEN URINE: NEGATIVE
PDW BLD-RTO: 13.7 % (ref 11.8–14.4)
PH UA: 5.5 (ref 5–8)
PHENCYCLIDINE, URINE: NEGATIVE
PLATELET # BLD: 307 K/UL (ref 138–453)
PLATELET ESTIMATE: NORMAL
PMV BLD AUTO: 11.5 FL (ref 8.1–13.5)
POTASSIUM SERPL-SCNC: 1.9 MMOL/L (ref 3.7–5.3)
POTASSIUM SERPL-SCNC: 4.1 MMOL/L (ref 3.7–5.3)
PROPOXYPHENE, URINE: ABNORMAL
PROTEIN UA: NEGATIVE
RBC # BLD: 4.78 M/UL (ref 4.21–5.77)
RBC # BLD: NORMAL 10*6/UL
SALICYLATE LEVEL: <1 MG/DL (ref 3–10)
SALICYLATE LEVEL: <1 MG/DL (ref 3–10)
SEG NEUTROPHILS: 59 % (ref 36–65)
SEGMENTED NEUTROPHILS ABSOLUTE COUNT: 5.03 K/UL (ref 1.5–8.1)
SODIUM BLD-SCNC: 140 MMOL/L (ref 135–144)
SODIUM BLD-SCNC: 147 MMOL/L (ref 135–144)
SPECIFIC GRAVITY UA: 1.03 (ref 1–1.03)
TEST INFORMATION: ABNORMAL
TOTAL CK: 336 U/L (ref 39–308)
TOTAL CK: 714 U/L (ref 39–308)
TOTAL CK: 757 U/L (ref 39–308)
TOTAL PROTEIN: 3 G/DL (ref 6.4–8.3)
TOTAL PROTEIN: 6.9 G/DL (ref 6.4–8.3)
TRICYCLIC ANTIDEPRESSANTS, UR: ABNORMAL
TROPONIN INTERP: ABNORMAL
TROPONIN INTERP: NORMAL
TROPONIN INTERP: NORMAL
TROPONIN T: ABNORMAL NG/ML
TROPONIN T: NORMAL NG/ML
TROPONIN T: NORMAL NG/ML
TROPONIN, HIGH SENSITIVITY: <6 NG/L (ref 0–22)
TURBIDITY: CLEAR
URINE HGB: NEGATIVE
UROBILINOGEN, URINE: NORMAL
WBC # BLD: 8.5 K/UL (ref 3.5–11.3)
WBC # BLD: NORMAL 10*3/UL

## 2019-06-01 PROCEDURE — 36415 COLL VENOUS BLD VENIPUNCTURE: CPT

## 2019-06-01 PROCEDURE — 85025 COMPLETE CBC W/AUTO DIFF WBC: CPT

## 2019-06-01 PROCEDURE — 99285 EMERGENCY DEPT VISIT HI MDM: CPT

## 2019-06-01 PROCEDURE — 2580000003 HC RX 258: Performed by: NURSE PRACTITIONER

## 2019-06-01 PROCEDURE — 80076 HEPATIC FUNCTION PANEL: CPT

## 2019-06-01 PROCEDURE — 93005 ELECTROCARDIOGRAM TRACING: CPT | Performed by: NURSE PRACTITIONER

## 2019-06-01 PROCEDURE — 96374 THER/PROPH/DIAG INJ IV PUSH: CPT

## 2019-06-01 PROCEDURE — 84484 ASSAY OF TROPONIN QUANT: CPT

## 2019-06-01 PROCEDURE — 80307 DRUG TEST PRSMV CHEM ANLYZR: CPT

## 2019-06-01 PROCEDURE — 82947 ASSAY GLUCOSE BLOOD QUANT: CPT

## 2019-06-01 PROCEDURE — 99221 1ST HOSP IP/OBS SF/LOW 40: CPT | Performed by: NURSE PRACTITIONER

## 2019-06-01 PROCEDURE — 83874 ASSAY OF MYOGLOBIN: CPT

## 2019-06-01 PROCEDURE — 2000000000 HC ICU R&B

## 2019-06-01 PROCEDURE — 96361 HYDRATE IV INFUSION ADD-ON: CPT

## 2019-06-01 PROCEDURE — 82550 ASSAY OF CK (CPK): CPT

## 2019-06-01 PROCEDURE — 80048 BASIC METABOLIC PNL TOTAL CA: CPT

## 2019-06-01 PROCEDURE — G0480 DRUG TEST DEF 1-7 CLASSES: HCPCS

## 2019-06-01 PROCEDURE — 81003 URINALYSIS AUTO W/O SCOPE: CPT

## 2019-06-01 PROCEDURE — 83735 ASSAY OF MAGNESIUM: CPT

## 2019-06-01 PROCEDURE — 6360000002 HC RX W HCPCS: Performed by: NURSE PRACTITIONER

## 2019-06-01 RX ORDER — ACETAMINOPHEN 325 MG/1
650 TABLET ORAL EVERY 4 HOURS PRN
Status: DISCONTINUED | OUTPATIENT
Start: 2019-06-01 | End: 2019-06-02

## 2019-06-01 RX ORDER — TRAZODONE HYDROCHLORIDE 50 MG/1
50 TABLET ORAL NIGHTLY PRN
Status: DISCONTINUED | OUTPATIENT
Start: 2019-06-01 | End: 2019-06-02

## 2019-06-01 RX ORDER — MAGNESIUM HYDROXIDE/ALUMINUM HYDROXICE/SIMETHICONE 120; 1200; 1200 MG/30ML; MG/30ML; MG/30ML
30 SUSPENSION ORAL EVERY 6 HOURS PRN
Status: DISCONTINUED | OUTPATIENT
Start: 2019-06-01 | End: 2019-06-02

## 2019-06-01 RX ORDER — SODIUM CHLORIDE 9 MG/ML
INJECTION, SOLUTION INTRAVENOUS CONTINUOUS
Status: DISCONTINUED | OUTPATIENT
Start: 2019-06-01 | End: 2019-06-02

## 2019-06-01 RX ORDER — ARIPIPRAZOLE 15 MG/1
15 TABLET ORAL DAILY
Status: DISCONTINUED | OUTPATIENT
Start: 2019-06-02 | End: 2019-06-02

## 2019-06-01 RX ORDER — NICOTINE 21 MG/24HR
1 PATCH, TRANSDERMAL 24 HOURS TRANSDERMAL DAILY
Status: DISCONTINUED | OUTPATIENT
Start: 2019-06-02 | End: 2019-06-02

## 2019-06-01 RX ORDER — SODIUM CHLORIDE 0.9 % (FLUSH) 0.9 %
10 SYRINGE (ML) INJECTION PRN
Status: DISCONTINUED | OUTPATIENT
Start: 2019-06-01 | End: 2019-06-02

## 2019-06-01 RX ORDER — NALOXONE HYDROCHLORIDE 0.4 MG/ML
0.4 INJECTION, SOLUTION INTRAMUSCULAR; INTRAVENOUS; SUBCUTANEOUS ONCE
Status: COMPLETED | OUTPATIENT
Start: 2019-06-01 | End: 2019-06-01

## 2019-06-01 RX ORDER — BUSPIRONE HYDROCHLORIDE 10 MG/1
10 TABLET ORAL 3 TIMES DAILY
Status: DISCONTINUED | OUTPATIENT
Start: 2019-06-02 | End: 2019-06-02

## 2019-06-01 RX ORDER — HYDROXYZINE PAMOATE 50 MG/1
25 CAPSULE ORAL 3 TIMES DAILY PRN
Status: DISCONTINUED | OUTPATIENT
Start: 2019-06-01 | End: 2019-06-02

## 2019-06-01 RX ORDER — SODIUM CHLORIDE 0.9 % (FLUSH) 0.9 %
10 SYRINGE (ML) INJECTION EVERY 12 HOURS SCHEDULED
Status: DISCONTINUED | OUTPATIENT
Start: 2019-06-02 | End: 2019-06-02

## 2019-06-01 RX ORDER — NICOTINE 21 MG/24HR
1 PATCH, TRANSDERMAL 24 HOURS TRANSDERMAL DAILY PRN
Status: DISCONTINUED | OUTPATIENT
Start: 2019-06-01 | End: 2019-06-02

## 2019-06-01 RX ORDER — BENZTROPINE MESYLATE 1 MG/ML
2 INJECTION INTRAMUSCULAR; INTRAVENOUS 2 TIMES DAILY PRN
Status: DISCONTINUED | OUTPATIENT
Start: 2019-06-01 | End: 2019-06-02

## 2019-06-01 RX ORDER — 0.9 % SODIUM CHLORIDE 0.9 %
1000 INTRAVENOUS SOLUTION INTRAVENOUS ONCE
Status: COMPLETED | OUTPATIENT
Start: 2019-06-01 | End: 2019-06-01

## 2019-06-01 RX ADMIN — SODIUM CHLORIDE 1000 ML: 9 INJECTION, SOLUTION INTRAVENOUS at 20:25

## 2019-06-01 RX ADMIN — SODIUM CHLORIDE: 9 INJECTION, SOLUTION INTRAVENOUS at 21:30

## 2019-06-01 RX ADMIN — NALOXONE HYDROCHLORIDE 0.4 MG: 0.4 INJECTION, SOLUTION INTRAMUSCULAR; INTRAVENOUS; SUBCUTANEOUS at 20:44

## 2019-06-01 ASSESSMENT — ENCOUNTER SYMPTOMS
ABDOMINAL PAIN: 0
SHORTNESS OF BREATH: 0
VOMITING: 0

## 2019-06-01 NOTE — ED PROVIDER NOTES
[facility] ED  eMERGENCY dEPARTMENT eNCOUnter      Pt Name: Morteza Valle  MRN: [id number]  [place] 1990  Date of evaluation: 6/1/2019  Provider: Beni Tilley       Chief Complaint   Patient presents with    Drug Overdose     pt ingested \"a handful\" (15 - 20) of his anti-psychotic medication today         HISTORY OFPRESENT ILLNESS  (Location/Symptom, Timing/Onset, Context/Setting, Quality, Duration, Modifying Factors, Severity.)   Morteza Valle is a 33 y.o. male who presents to the emergency department by EMS for evaluation of drug overdose. Patient states he was feeling depressed today because he is heroin and cocaine yesterday and took approximately 15-20 2 mg tablets of his Rexulti. Patient states he was also drinking alcohol today. Patient denies suicidal ideations. Nursing Notes were reviewed. PASTMEDICAL HISTORY     Past Medical History:   Diagnosis Date    ADHD (attention deficit hyperactivity disorder)     Alcoholism ([facility]     pt drinks 6 beers and a liter of vodka daily.  Anxiety     Bipolar 1 disorder (HCC)     PTSD (post-traumatic stress disorder)     Suicide attempt ([facility]     previous suicide attempt by overdosing on pills at approximately 25 y/o         SURGICAL HISTORY     History reviewed. No pertinent surgical history.       CURRENT MEDICATIONS     Previous Medications    AMITRIPTYLINE (ELAVIL) 50 MG TABLET    Take 1 tablet by mouth nightly    ARIPIPRAZOLE (ABILIFY) 20 MG TABLET    Take 20 mg by mouth daily    BREXPIPRAZOLE (REXULTI) 2 MG TABS TABLET    Take 1 tablet by mouth daily    CETIRIZINE (ZYRTEC) 10 MG TABLET    Take 1 tablet by mouth daily    NALTREXONE (DEPADE) 50 MG TABLET    Take 0.5 tablets by mouth daily (with breakfast)       ALLERGIES     Advil [ibuprofen]    FAMILY HISTORY       Family History   Problem Relation Age of Onset    Liver Cancer Brother     Alcohol Abuse Brother     No Known Problems Mother     No Known negative. Except as noted above the remainder of the review of systems was reviewed and negative. PHYSICAL EXAM    (up to 7 for level 4, 8 or more for level 5)     ED Triage Vitals [06/01/19 1939]   BP Temp Temp Source Pulse Resp SpO2 Height Weight   106/68 97.9 °F (36.6 °C) Oral 81 16 98 % 6' (1.829 m) 190 lb (86.2 kg)       Physical Exam   Constitutional: He appears well-developed and well-nourished. HENT:   Head: Normocephalic and atraumatic. Right Ear: Hearing and external ear normal.   Left Ear: Hearing and external ear normal.   Nose: Nose normal.   Mouth/Throat: Uvula is midline and oropharynx is clear and moist. Mucous membranes are dry. Eyes: Pupils are equal, round, and reactive to light. Conjunctivae, EOM and lids are normal.   Pupils are 3 mm and equally reactive to light. Neck: Normal range of motion and full passive range of motion without pain. Neck supple. Cardiovascular: Normal rate, regular rhythm, S1 normal, S2 normal, normal heart sounds, intact distal pulses and normal pulses. Pulmonary/Chest: Effort normal and breath sounds normal.   Abdominal: Soft. Normal appearance and bowel sounds are normal. There is no tenderness. Neurological: He has normal strength. No cranial nerve deficit or sensory deficit. GCS eye subscore is 4. GCS verbal subscore is 5. GCS motor subscore is 6. Patient is drowsy upon arrival.  He is oriented ×3. He moves all extremities to command. No neurological deficits. Skin: Skin is warm, dry and intact. Capillary refill takes less than 2 seconds. Psychiatric: His speech is delayed. He is slowed. Thought content is not paranoid. He exhibits a depressed mood. He expresses no homicidal and no suicidal ideation. He exhibits abnormal recent memory.          DIAGNOSTIC RESULTS     EKG:All EKG's are interpreted by the Emergency Department Physician who either signs or Co-signs this chart in the absence of a cardiologist.    EKG interpreted by attending PSYCHIATRY    PROCEDURES:  Procedures    FINAL IMPRESSION      1. Drug overdose, undetermined intent, initial encounter    2. Suicide attempt ([facility]    3. PTSD (post-traumatic stress disorder)    4. Bipolar 1 disorder ([facility]    5. Anxiety    6. Alcoholism ([facility]    7. Attention deficit hyperactivity disorder (ADHD), unspecified ADHD type    8. Polysubstance abuse ([facility]          DISPOSITION/PLAN   DISPOSITION Admitted 06/01/2019 11:57:25 PM      PATIENT REFERRED TO:   No follow-up provider specified.     DISCHARGE MEDICATIONS:     New Prescriptions    No medications on file     Electronically signed by LIBBY Mallory 6/1/2019 at 11:59 PM            LIBBY Mallory CNP  06/02/19 0001

## 2019-06-02 VITALS
HEART RATE: 88 BPM | RESPIRATION RATE: 16 BRPM | SYSTOLIC BLOOD PRESSURE: 107 MMHG | TEMPERATURE: 97.3 F | DIASTOLIC BLOOD PRESSURE: 57 MMHG | OXYGEN SATURATION: 97 % | HEIGHT: 72 IN | WEIGHT: 184.1 LBS | BODY MASS INDEX: 24.94 KG/M2

## 2019-06-02 LAB
ACETAMINOPHEN LEVEL: <10 UG/ML (ref 10–30)
ALBUMIN SERPL-MCNC: 3.9 G/DL (ref 3.5–5.2)
ALBUMIN/GLOBULIN RATIO: ABNORMAL (ref 1–2.5)
ALP BLD-CCNC: 42 U/L (ref 40–129)
ALT SERPL-CCNC: 17 U/L (ref 5–41)
ANION GAP SERPL CALCULATED.3IONS-SCNC: 11 MMOL/L (ref 9–17)
AST SERPL-CCNC: 24 U/L
BILIRUB SERPL-MCNC: 0.94 MG/DL (ref 0.3–1.2)
BUN BLDV-MCNC: 17 MG/DL (ref 6–20)
BUN/CREAT BLD: 15 (ref 9–20)
CALCIUM SERPL-MCNC: 8.3 MG/DL (ref 8.6–10.4)
CHLORIDE BLD-SCNC: 109 MMOL/L (ref 98–107)
CO2: 22 MMOL/L (ref 20–31)
CREAT SERPL-MCNC: 1.11 MG/DL (ref 0.7–1.2)
GFR AFRICAN AMERICAN: >60 ML/MIN
GFR NON-AFRICAN AMERICAN: >60 ML/MIN
GFR SERPL CREATININE-BSD FRML MDRD: ABNORMAL ML/MIN/{1.73_M2}
GFR SERPL CREATININE-BSD FRML MDRD: ABNORMAL ML/MIN/{1.73_M2}
GLUCOSE BLD-MCNC: 86 MG/DL (ref 70–99)
HCT VFR BLD CALC: 40.9 % (ref 40.7–50.3)
HEMOGLOBIN: 13.3 G/DL (ref 13–17)
MAGNESIUM: 2.2 MG/DL (ref 1.6–2.6)
MCH RBC QN AUTO: 30.3 PG (ref 25.2–33.5)
MCHC RBC AUTO-ENTMCNC: 32.5 G/DL (ref 28.4–34.8)
MCV RBC AUTO: 93.2 FL (ref 82.6–102.9)
NRBC AUTOMATED: 0 PER 100 WBC
PDW BLD-RTO: 13.7 % (ref 11.8–14.4)
PLATELET # BLD: 218 K/UL (ref 138–453)
PMV BLD AUTO: 10.6 FL (ref 8.1–13.5)
POTASSIUM SERPL-SCNC: 3.9 MMOL/L (ref 3.7–5.3)
RBC # BLD: 4.39 M/UL (ref 4.21–5.77)
SODIUM BLD-SCNC: 142 MMOL/L (ref 135–144)
TOTAL PROTEIN: 6.4 G/DL (ref 6.4–8.3)
WBC # BLD: 6.1 K/UL (ref 3.5–11.3)

## 2019-06-02 PROCEDURE — 85027 COMPLETE CBC AUTOMATED: CPT

## 2019-06-02 PROCEDURE — 2580000003 HC RX 258: Performed by: NURSE PRACTITIONER

## 2019-06-02 PROCEDURE — 90792 PSYCH DIAG EVAL W/MED SRVCS: CPT | Performed by: NURSE PRACTITIONER

## 2019-06-02 PROCEDURE — 6360000002 HC RX W HCPCS: Performed by: NURSE PRACTITIONER

## 2019-06-02 PROCEDURE — 83735 ASSAY OF MAGNESIUM: CPT

## 2019-06-02 PROCEDURE — 80053 COMPREHEN METABOLIC PANEL: CPT

## 2019-06-02 PROCEDURE — 2060000000 HC ICU INTERMEDIATE R&B

## 2019-06-02 PROCEDURE — 36415 COLL VENOUS BLD VENIPUNCTURE: CPT

## 2019-06-02 PROCEDURE — 6370000000 HC RX 637 (ALT 250 FOR IP): Performed by: NURSE PRACTITIONER

## 2019-06-02 PROCEDURE — 99232 SBSQ HOSP IP/OBS MODERATE 35: CPT | Performed by: INTERNAL MEDICINE

## 2019-06-02 PROCEDURE — 80307 DRUG TEST PRSMV CHEM ANLYZR: CPT

## 2019-06-02 RX ORDER — FOLIC ACID 1 MG/1
1 TABLET ORAL DAILY
Status: DISCONTINUED | OUTPATIENT
Start: 2019-06-02 | End: 2019-06-03 | Stop reason: HOSPADM

## 2019-06-02 RX ORDER — ACETAMINOPHEN 325 MG/1
650 TABLET ORAL EVERY 4 HOURS PRN
Status: DISCONTINUED | OUTPATIENT
Start: 2019-06-02 | End: 2019-06-03 | Stop reason: HOSPADM

## 2019-06-02 RX ORDER — AMITRIPTYLINE HYDROCHLORIDE 50 MG/1
50 TABLET, FILM COATED ORAL NIGHTLY
Status: CANCELLED | OUTPATIENT
Start: 2019-06-02

## 2019-06-02 RX ORDER — NICOTINE 21 MG/24HR
1 PATCH, TRANSDERMAL 24 HOURS TRANSDERMAL DAILY PRN
Status: DISCONTINUED | OUTPATIENT
Start: 2019-06-02 | End: 2019-06-03 | Stop reason: HOSPADM

## 2019-06-02 RX ORDER — ONDANSETRON 2 MG/ML
4 INJECTION INTRAMUSCULAR; INTRAVENOUS EVERY 6 HOURS PRN
Status: DISCONTINUED | OUTPATIENT
Start: 2019-06-02 | End: 2019-06-03 | Stop reason: HOSPADM

## 2019-06-02 RX ORDER — SODIUM CHLORIDE 9 MG/ML
INJECTION, SOLUTION INTRAVENOUS CONTINUOUS
Status: DISCONTINUED | OUTPATIENT
Start: 2019-06-02 | End: 2019-06-03 | Stop reason: HOSPADM

## 2019-06-02 RX ORDER — CETIRIZINE HYDROCHLORIDE 10 MG/1
10 TABLET ORAL DAILY
Status: CANCELLED | OUTPATIENT
Start: 2019-06-02

## 2019-06-02 RX ORDER — LORAZEPAM 2 MG/ML
2 INJECTION INTRAMUSCULAR EVERY 4 HOURS PRN
Status: DISCONTINUED | OUTPATIENT
Start: 2019-06-02 | End: 2019-06-03 | Stop reason: HOSPADM

## 2019-06-02 RX ORDER — NALTREXONE HYDROCHLORIDE 50 MG/1
25 TABLET, FILM COATED ORAL
Status: CANCELLED | OUTPATIENT
Start: 2019-06-03

## 2019-06-02 RX ORDER — SODIUM CHLORIDE 0.9 % (FLUSH) 0.9 %
10 SYRINGE (ML) INJECTION PRN
Status: DISCONTINUED | OUTPATIENT
Start: 2019-06-02 | End: 2019-06-03 | Stop reason: HOSPADM

## 2019-06-02 RX ORDER — ONDANSETRON 2 MG/ML
4 INJECTION INTRAMUSCULAR; INTRAVENOUS EVERY 6 HOURS PRN
Status: DISCONTINUED | OUTPATIENT
Start: 2019-06-02 | End: 2019-06-02 | Stop reason: SDUPTHER

## 2019-06-02 RX ORDER — THIAMINE MONONITRATE (VIT B1) 100 MG
100 TABLET ORAL DAILY
Status: DISCONTINUED | OUTPATIENT
Start: 2019-06-02 | End: 2019-06-03 | Stop reason: HOSPADM

## 2019-06-02 RX ORDER — ARIPIPRAZOLE 20 MG/1
20 TABLET ORAL DAILY
Status: CANCELLED | OUTPATIENT
Start: 2019-06-02

## 2019-06-02 RX ORDER — MULTIVITAMIN WITH FOLIC ACID 400 MCG
1 TABLET ORAL DAILY
Status: DISCONTINUED | OUTPATIENT
Start: 2019-06-02 | End: 2019-06-03 | Stop reason: HOSPADM

## 2019-06-02 RX ORDER — ONDANSETRON 4 MG/1
4 TABLET, ORALLY DISINTEGRATING ORAL EVERY 6 HOURS PRN
Status: DISCONTINUED | OUTPATIENT
Start: 2019-06-02 | End: 2019-06-03 | Stop reason: HOSPADM

## 2019-06-02 RX ORDER — SODIUM CHLORIDE 0.9 % (FLUSH) 0.9 %
10 SYRINGE (ML) INJECTION EVERY 12 HOURS SCHEDULED
Status: DISCONTINUED | OUTPATIENT
Start: 2019-06-02 | End: 2019-06-03 | Stop reason: HOSPADM

## 2019-06-02 RX ORDER — LIDOCAINE 50 MG/G
OINTMENT TOPICAL PRN
Status: DISCONTINUED | OUTPATIENT
Start: 2019-06-02 | End: 2019-06-03 | Stop reason: HOSPADM

## 2019-06-02 RX ADMIN — ONDANSETRON 4 MG: 2 INJECTION INTRAMUSCULAR; INTRAVENOUS at 10:55

## 2019-06-02 RX ADMIN — SODIUM CHLORIDE: 9 INJECTION, SOLUTION INTRAVENOUS at 10:55

## 2019-06-02 RX ADMIN — FOLIC ACID 1 MG: 1 TABLET ORAL at 08:35

## 2019-06-02 RX ADMIN — MULTIVITAMIN TABLET 1 TABLET: TABLET at 08:35

## 2019-06-02 RX ADMIN — SODIUM CHLORIDE: 9 INJECTION, SOLUTION INTRAVENOUS at 05:18

## 2019-06-02 RX ADMIN — Medication 100 MG: at 08:35

## 2019-06-02 RX ADMIN — Medication 10 ML: at 08:35

## 2019-06-02 ASSESSMENT — PAIN SCALES - GENERAL
PAINLEVEL_OUTOF10: 0

## 2019-06-02 NOTE — ED PROVIDER NOTES
eMERGENCY dEPARTMENT eNCOUnter   Attending Attestation     Pt Name: Kyara Smyth  MRN: [id number]  [place] 1990  Date of evaluation: 6/1/19   Kyara Smyth is a 33 y.o. male with CC: Drug Overdose (pt ingested \"a handful\" (13 - 21) of his anti-psychotic medication today)    MDM:       ED Course as of Young 10 1955   Sat Jun 01, 2019   2352 I spoke with the bear Nia Hosteller was in agreement with plan to admit patient. [JA]      ED Course User Index  [JA] Tomi Springer MD       CRITICAL CARE:       EKG: All EKG's are interpreted by the Emergency Department Physician who either signs or Co-signs this chart in the absence of a cardiologist.    Normal sinus rhythm ventricular rate of 74, normal axis unremarkable ST segment    RADIOLOGY:All plain film, CT, MRI, and formal ultrasound images (except ED bedside ultrasound) are read by the radiologist, see reports below, unless otherwise noted in MDM or here. No orders to display     LABS: All lab results were reviewed by myself, and all abnormals are listed below. Labs Reviewed   URINE DRUG SCREEN - Abnormal; Notable for the following components:       Result Value    Cocaine Metabolite, Urine POSITIVE (*)     All other components within normal limits   URINALYSIS - Abnormal; Notable for the following components:    Ketones, Urine 1+ (*)     All other components within normal limits   CK - Abnormal; Notable for the following components:     Total  (*)     All other components within normal limits   ACETAMINOPHEN LEVEL - Abnormal; Notable for the following components:    Acetaminophen Level <10 (*)     All other components within normal limits   BASIC METABOLIC PANEL - Abnormal; Notable for the following components:    Glucose 41 (*)     CREATININE <0.40 (*)     Calcium 3.8 (*)     Sodium 147 (*)     Potassium 1.9 (*)     Chloride 125 (*)     CO2 13 (*)     All other components within normal limits   CK - Abnormal; Notable for the following components: Total  (*)     All other components within normal limits   HEPATIC FUNCTION PANEL - Abnormal; Notable for the following components:    Alb 1.8 (*)     Alkaline Phosphatase 18 (*)     Total Protein 3.0 (*)     All other components within normal limits   SALICYLATE LEVEL - Abnormal; Notable for the following components:    Salicylate Lvl <1 (*)     All other components within normal limits   BASIC METABOLIC PANEL - Abnormal; Notable for the following components:    Calcium 8.4 (*)     All other components within normal limits   ACETAMINOPHEN LEVEL - Abnormal; Notable for the following components:    Acetaminophen Level <10 (*)     All other components within normal limits   CK - Abnormal; Notable for the following components: Total  (*)     All other components within normal limits   TROP/MYOGLOBIN - Abnormal; Notable for the following components:    Myoglobin 76 (*)     All other components within normal limits   SALICYLATE LEVEL - Abnormal; Notable for the following components:    Salicylate Lvl <1 (*)     All other components within normal limits   COMPREHENSIVE METABOLIC PANEL W/ REFLEX TO MG FOR LOW K - Abnormal; Notable for the following components:    Calcium 8.3 (*)     Chloride 109 (*)     All other components within normal limits   ACETAMINOPHEN LEVEL - Abnormal; Notable for the following components:    Acetaminophen Level <10 (*)     All other components within normal limits   POCT GLUCOSE - Normal   CBC WITH AUTO DIFFERENTIAL   TROP/MYOGLOBIN   ETHANOL   TROP/MYOGLOBIN   MAGNESIUM   ETHANOL   HEPATIC FUNCTION PANEL   CBC   MAGNESIUM   POC GLUCOSE FINGERSTICK           I personally evaluated and examined the patient in conjunction with the APC and agree with the assessment, treatment plan, and disposition of the patient as recorded by the APC.    Bernarda Diane MD  Attending Emergency Physician          Ze Pugh MD  06/01/19 [id number]       Ze Pugh MD  06/10/19 1955

## 2019-06-02 NOTE — CARE COORDINATION
Discharge planning    Patient intentional OD . Await tele psych to determine if inpatient. Spoke with Froylan Newton RN and she will call if recommending inpatient. Will need to perfect serve psych on call and they will determine where he will be placed. Patient to be admitted to [facility] and await bed placement. Attempted to call [facility] at [phone] but no answer. Left VM with request to call back     Call to Kendrick Wei at the [facility] of [facility]. They are aware of patient and they are still awaiting the order . Arelis Regalado 1. [place] slip needs to be redone with todays date and fax back to [phone].   2. Also need to fill out box 4, needing inpatient . 3. Needs statement of medical clearance. They do not have bed at this time. They will call ICU .
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
## 2019-06-02 NOTE — VIRTUAL HEALTH
of children: None    Years of education: None    Highest education level: None   Occupational History    None   Social Needs    Financial resource strain: None    Food insecurity:     Worry: None     Inability: None    Transportation needs:     Medical: None     Non-medical: None   Tobacco Use    Smoking status: Light Tobacco Smoker     Packs/day: 0.50     Years: 14.00     Pack years: 7.00     Types: Cigarettes    Smokeless tobacco: Never Used    Tobacco comment: pt accepting of nicotine replacement   Substance and Sexual Activity    Alcohol use: Yes     Comment: pt drinks 6 beers and a liter of vodka daily.     Drug use: Yes     Types: Cocaine, Opiates      Comment: drug abuse includes cocaine & heroin    Sexual activity: Never   Lifestyle    Physical activity:     Days per week: None     Minutes per session: None    Stress: None   Relationships    Social connections:     Talks on phone: None     Gets together: None     Attends Restorationist service: None     Active member of club or organization: None     Attends meetings of clubs or organizations: None     Relationship status: None    Intimate partner violence:     Fear of current or ex partner: None     Emotionally abused: None     Physically abused: None     Forced sexual activity: None   Other Topics Concern    None   Social History Narrative    None       Family Psychiatric History:  Brother- alcohol abuse    Family History:       Problem Relation Age of Onset    Liver Cancer Brother     Alcohol Abuse Brother     No Known Problems Mother     No Known Problems Father          Physical  BP (!) 92/43   Pulse 81   Temp 97.9 °F (36.6 °C) (Oral)   Resp 14   Ht 6' (1.829 m)   Wt 184 lb 1.6 oz (83.5 kg)   SpO2 96%   BMI 24.97 kg/m²     MENTAL STATUS EXAM:  Level of consciousness:  within normal limits  Appearance:  hospital attire and seated in bed  Behavior/Motor:  no abnormalities noted  Attitude toward examiner:  cooperative and attentive  Speech:  spontaneous, normal rate and normal volume  Mood:  ok  Affect:  mood congruent  Thought processes:  linear  Thought content:  Homocidal ideation denies  Suicidal Ideation:  denies suicidal ideation  Delusions:  no evidence of delusions  Perceptual Disturbance:  denies any perceptual disturbance  Cognition:  oriented to person, place, and time  Memory: intact  Insight & Judgement: limited        DSM-V DIAGNOSIS:      Impression    Bipolar disorder    Patient Active Problem List   Diagnosis Code    Schizoaffective disorder, bipolar type ([facility] F25.0    Suicide attempt by drug ingestion ([address] T50.902A    Tobacco use Z72.0    Polysubstance abuse ([facility] F19.10    Atrial flutter, paroxysmal (HCC) I48.92    Episodic cannabis use F12.90    Cocaine use F14.90    Accidental drug overdose T50.901A    Schizoaffective disorder ([facility] F25.9    Suicide attempt ([facility] T14.91XA    PTSD (post-traumatic stress disorder) F43.10    Bipolar 1 disorder ([facility] F31.9    Anxiety F41.9    Alcoholism ([facility] F10.20    ADHD (attention deficit hyperactivity disorder) F90.9    Intentional drug overdose ([facility] T50.902A    Drug overdose T50.901A               PLAN:       1. Monitor for opiate/alcohol withdrawal    2. Hold rexulti at this time due to recent overdose    3. Recommend inpatient psychiatric hospitalization for safety due to intentional ingestion yesterday of Rexulti. Please perfect serve [facility] for admission once medically clear    Thank you very much for allowing us to participate in the care of this patient. Time spent > 60 min. Physicians Signature:  Electronically signed by LIBBY Salomon CNP on 6/2/2019 at 11:16AM        This virtual visit was conducted via interactive/real-time audio/video.

## 2019-06-03 ENCOUNTER — HOSPITAL ENCOUNTER (INPATIENT)
Age: 29
LOS: 4 days | Discharge: HOME | DRG: 750 | End: 2019-06-07
Attending: PSYCHIATRY & NEUROLOGY | Admitting: PSYCHIATRY & NEUROLOGY
Payer: COMMERCIAL

## 2019-06-03 PROBLEM — F33.9 MDD (MAJOR DEPRESSIVE DISORDER), RECURRENT EPISODE (HCC): Status: ACTIVE | Noted: 2019-06-03

## 2019-06-03 LAB
EKG ATRIAL RATE: 74 BPM
EKG P AXIS: 86 DEGREES
EKG P-R INTERVAL: 160 MS
EKG Q-T INTERVAL: 394 MS
EKG QRS DURATION: 86 MS
EKG QTC CALCULATION (BAZETT): 437 MS
EKG R AXIS: 93 DEGREES
EKG T AXIS: 74 DEGREES
EKG VENTRICULAR RATE: 74 BPM

## 2019-06-03 PROCEDURE — 6370000000 HC RX 637 (ALT 250 FOR IP): Performed by: NURSE PRACTITIONER

## 2019-06-03 PROCEDURE — 1240000000 HC EMOTIONAL WELLNESS R&B

## 2019-06-03 PROCEDURE — 93010 ELECTROCARDIOGRAM REPORT: CPT | Performed by: INTERNAL MEDICINE

## 2019-06-03 PROCEDURE — 90792 PSYCH DIAG EVAL W/MED SRVCS: CPT | Performed by: NURSE PRACTITIONER

## 2019-06-03 RX ORDER — TRAZODONE HYDROCHLORIDE 50 MG/1
50 TABLET ORAL NIGHTLY PRN
Status: DISCONTINUED | OUTPATIENT
Start: 2019-06-03 | End: 2019-06-07 | Stop reason: HOSPADM

## 2019-06-03 RX ORDER — CHLORDIAZEPOXIDE HYDROCHLORIDE 25 MG/1
25 CAPSULE, GELATIN COATED ORAL 4 TIMES DAILY PRN
Status: DISCONTINUED | OUTPATIENT
Start: 2019-06-03 | End: 2019-06-07 | Stop reason: HOSPADM

## 2019-06-03 RX ORDER — M-VIT,TX,IRON,MINS/CALC/FOLIC 27MG-0.4MG
1 TABLET ORAL DAILY
Status: DISCONTINUED | OUTPATIENT
Start: 2019-06-03 | End: 2019-06-07 | Stop reason: HOSPADM

## 2019-06-03 RX ORDER — DIPHENHYDRAMINE HCL 25 MG
25 TABLET ORAL NIGHTLY PRN
Status: DISCONTINUED | OUTPATIENT
Start: 2019-06-03 | End: 2019-06-07 | Stop reason: HOSPADM

## 2019-06-03 RX ORDER — ACETAMINOPHEN 325 MG/1
650 TABLET ORAL EVERY 4 HOURS PRN
Status: DISCONTINUED | OUTPATIENT
Start: 2019-06-03 | End: 2019-06-07 | Stop reason: HOSPADM

## 2019-06-03 RX ORDER — CLONIDINE HYDROCHLORIDE 0.1 MG/1
0.1 TABLET ORAL 3 TIMES DAILY PRN
Status: DISCONTINUED | OUTPATIENT
Start: 2019-06-03 | End: 2019-06-07 | Stop reason: HOSPADM

## 2019-06-03 RX ORDER — FOLIC ACID 1 MG/1
1 TABLET ORAL DAILY
Status: DISCONTINUED | OUTPATIENT
Start: 2019-06-03 | End: 2019-06-07 | Stop reason: HOSPADM

## 2019-06-03 RX ORDER — BENZTROPINE MESYLATE 1 MG/1
1 TABLET ORAL 2 TIMES DAILY PRN
Status: DISCONTINUED | OUTPATIENT
Start: 2019-06-03 | End: 2019-06-07 | Stop reason: HOSPADM

## 2019-06-03 RX ORDER — NICOTINE 21 MG/24HR
1 PATCH, TRANSDERMAL 24 HOURS TRANSDERMAL DAILY
Status: DISCONTINUED | OUTPATIENT
Start: 2019-06-03 | End: 2019-06-05

## 2019-06-03 RX ORDER — DICYCLOMINE HYDROCHLORIDE 10 MG/1
20 CAPSULE ORAL 4 TIMES DAILY PRN
Status: DISCONTINUED | OUTPATIENT
Start: 2019-06-03 | End: 2019-06-07 | Stop reason: HOSPADM

## 2019-06-03 RX ORDER — LOPERAMIDE HYDROCHLORIDE 2 MG/1
2 CAPSULE ORAL 4 TIMES DAILY PRN
Status: DISCONTINUED | OUTPATIENT
Start: 2019-06-03 | End: 2019-06-07 | Stop reason: HOSPADM

## 2019-06-03 RX ORDER — THIAMINE MONONITRATE (VIT B1) 100 MG
100 TABLET ORAL DAILY
Status: DISCONTINUED | OUTPATIENT
Start: 2019-06-03 | End: 2019-06-07 | Stop reason: HOSPADM

## 2019-06-03 RX ORDER — CETIRIZINE HYDROCHLORIDE 10 MG/1
10 TABLET ORAL DAILY
Status: DISCONTINUED | OUTPATIENT
Start: 2019-06-03 | End: 2019-06-07 | Stop reason: HOSPADM

## 2019-06-03 RX ORDER — ARIPIPRAZOLE 15 MG/1
15 TABLET ORAL DAILY
Status: DISCONTINUED | OUTPATIENT
Start: 2019-06-03 | End: 2019-06-07 | Stop reason: HOSPADM

## 2019-06-03 RX ORDER — BENZTROPINE MESYLATE 1 MG/1
1 TABLET ORAL 2 TIMES DAILY PRN
Status: ON HOLD | COMMUNITY
End: 2019-06-05

## 2019-06-03 RX ADMIN — ARIPIPRAZOLE 15 MG: 15 TABLET ORAL at 16:37

## 2019-06-03 RX ADMIN — CETIRIZINE HYDROCHLORIDE 10 MG: 10 TABLET, FILM COATED ORAL at 16:37

## 2019-06-03 ASSESSMENT — SLEEP AND FATIGUE QUESTIONNAIRES
AVERAGE NUMBER OF SLEEP HOURS: 7
DO YOU HAVE DIFFICULTY SLEEPING: NO
DO YOU USE A SLEEP AID: YES
DIFFICULTY FALLING ASLEEP: NO
DIFFICULTY ARISING: NO
RESTFUL SLEEP: YES
DIFFICULTY STAYING ASLEEP: NO

## 2019-06-03 ASSESSMENT — LIFESTYLE VARIABLES
HISTORY_ALCOHOL_USE: YES
HISTORY_ALCOHOL_USE: YES

## 2019-06-03 ASSESSMENT — PATIENT HEALTH QUESTIONNAIRE - PHQ9: SUM OF ALL RESPONSES TO PHQ QUESTIONS 1-9: 9

## 2019-06-03 ASSESSMENT — PAIN SCALES - GENERAL: PAINLEVEL_OUTOF10: 0

## 2019-06-03 NOTE — GROUP NOTE
Group Therapy Note    Date: Hayley 3    Group Start Time: 1430  Group End Time: 4802  Group Topic: Cognitive Skills    NA Chauhan, CTRS        Group Therapy Note    Pt did not attend Therapeutic Recreation at 1430 d/t resting in room despite staff invitation to attend. 1:1 talk time offered as alternative to group session, pt declined.

## 2019-06-03 NOTE — BH NOTE
Patient given tobacco quitline number 11761739678 at this time, refusing to call at this time, states \" I just dont want to quit now\"- patient given information as to the dangers of long term tobacco use. Continue to reinforce the importance of tobacco cessation.

## 2019-06-03 NOTE — GROUP NOTE
Group Therapy Note    Date: Hayley 3    Group Start Time: 1000  Group End Time: 2687  Group Topic: Psychotherapy    STCZ CHARU A    SRIDEVI Downs        Group Therapy Note    Pt declined to attend psychotherapy at 1000 am despite encouragement. Pt offered 1:1 and refused.           Signature:  SRIDEVI Downs

## 2019-06-03 NOTE — BH NOTE
reviewed. No pertinent surgical history.   Neurologic Exam    Labs  Recent Results (from the past 72 hour(s))   EKG 12 Lead    Collection Time: 06/01/19  7:50 PM   Result Value Ref Range    Ventricular Rate 74 BPM    Atrial Rate 74 BPM    P-R Interval 160 ms    QRS Duration 86 ms    Q-T Interval 394 ms    QTc Calculation (Bazett) 437 ms    P Axis 86 degrees    R Axis 93 degrees    T Axis 74 degrees   CBC with DIFF    Collection Time: 06/01/19  8:13 PM   Result Value Ref Range    WBC 8.5 3.5 - 11.3 k/uL    RBC 4.78 4.21 - 5.77 m/uL    Hemoglobin 14.8 13.0 - 17.0 g/dL    Hematocrit 43.6 40.7 - 50.3 %    MCV 91.2 82.6 - 102.9 fL    MCH 31.0 25.2 - 33.5 pg    MCHC 33.9 28.4 - 34.8 g/dL    RDW 13.7 11.8 - 14.4 %    Platelets 484 091 - 477 k/uL    MPV 11.5 8.1 - 13.5 fL    NRBC Automated 0.0 0.0 per 100 WBC    Differential Type NOT REPORTED     Seg Neutrophils 59 36 - 65 %    Lymphocytes 27 24 - 43 %    Monocytes 10 3 - 12 %    Eosinophils % 3 1 - 4 %    Basophils 1 0 - 2 %    Immature Granulocytes 0 0 %    Segs Absolute 5.03 1.50 - 8.10 k/uL    Absolute Lymph # 2.28 1.10 - 3.70 k/uL    Absolute Mono # 0.87 0.10 - 1.20 k/uL    Absolute Eos # 0.27 0.00 - 0.44 k/uL    Basophils # 0.06 0.00 - 0.20 k/uL    Absolute Immature Granulocyte 0.03 0.00 - 0.30 k/uL    WBC Morphology NOT REPORTED     RBC Morphology NOT REPORTED     Platelet Estimate NOT REPORTED    Acetaminophen Level    Collection Time: 06/01/19  8:43 PM   Result Value Ref Range    Acetaminophen Level <10 (L) 10 - 30 ug/mL   Ethanol    Collection Time: 06/01/19  8:43 PM   Result Value Ref Range    Ethanol <10 <10 mg/dL    Ethanol percent <0.010 <0.010 %   Basic Metabolic Panel    Collection Time: 06/01/19  8:43 PM   Result Value Ref Range    Glucose 41 (L) 70 - 99 mg/dL    BUN 8 6 - 20 mg/dL    CREATININE <0.40 (L) 0.70 - 1.20 mg/dL    Bun/Cre Ratio CANNOT BE CALCULATED 9 - 20    Calcium 3.8 (LL) 8.6 - 10.4 mg/dL    Sodium 147 (H) 135 - 144 mmol/L    Potassium 1.9 (LL) 3.7 - 5.3 mmol/L    Chloride 125 (H) 98 - 107 mmol/L    CO2 13 (L) 20 - 31 mmol/L    Anion Gap 9 mmol/L    GFR Non- CANNOT BE CALCULATED >60 mL/min    GFR  CANNOT BE CALCULATED >60 mL/min    GFR Comment          GFR Staging NOT REPORTED    CK    Collection Time: 06/01/19  8:43 PM   Result Value Ref Range    Total  (H) 39 - 308 U/L   TROP/MYOGLOBIN    Collection Time: 06/01/19  8:43 PM   Result Value Ref Range    Troponin, High Sensitivity <6 0 - 22 ng/L    Troponin T NOT REPORTED <0.03 ng/mL    Troponin Interp NOT REPORTED     Myoglobin 41 28 - 72 ng/mL   Hepatic Function Panel    Collection Time: 06/01/19  8:43 PM   Result Value Ref Range    Alb 1.8 (L) 3.5 - 5.2 g/dL    Alkaline Phosphatase 18 (L) 40 - 129 U/L    ALT 8 5 - 41 U/L    AST 14 <40 U/L    Total Bilirubin 0.37 0.3 - 1.2 mg/dL    Bilirubin, Direct 0.12 <0.31 mg/dL    Bilirubin, Indirect 0.25 0.00 - 1.00 mg/dL    Total Protein 3.0 (L) 6.4 - 8.3 g/dL    Globulin NOT REPORTED g/dL    Albumin/Globulin Ratio NOT REPORTED 1.0 - 2.5   Salicylate    Collection Time: 06/01/19  8:43 PM   Result Value Ref Range    Salicylate Lvl <1 (L) 3 - 10 mg/dL   Basic Metabolic Prof    Collection Time: 06/01/19  9:30 PM   Result Value Ref Range    Glucose 82 70 - 99 mg/dL    BUN 15 6 - 20 mg/dL    CREATININE 1.01 0.70 - 1.20 mg/dL    Bun/Cre Ratio 15 9 - 20    Calcium 8.4 (L) 8.6 - 10.4 mg/dL    Sodium 140 135 - 144 mmol/L    Potassium 4.1 3.7 - 5.3 mmol/L    Chloride 104 98 - 107 mmol/L    CO2 24 20 - 31 mmol/L    Anion Gap 12 9 - 17 mmol/L    GFR Non-African American >60 >60 mL/min    GFR African American >60 >60 mL/min    GFR Comment          GFR Staging NOT REPORTED    Magnesium    Collection Time: 06/01/19  9:30 PM   Result Value Ref Range    Magnesium 2.2 1.6 - 2.6 mg/dL   POCT Glucose    Collection Time: 06/01/19  9:30 PM   Result Value Ref Range    Glucose 77 mg/dL    QC OK?  yes    Acetaminophen Level    Collection Time: 06/01/19  9:30 PM   Result Value Ref Range    Acetaminophen Level <10 (L) 10 - 30 ug/mL   Ethanol    Collection Time: 06/01/19  9:30 PM   Result Value Ref Range    Ethanol <10 <10 mg/dL    Ethanol percent <0.010 <0.010 %   CK    Collection Time: 06/01/19  9:30 PM   Result Value Ref Range    Total  (H) 39 - 308 U/L   TROP/MYOGLOBIN    Collection Time: 06/01/19  9:30 PM   Result Value Ref Range    Troponin, High Sensitivity <6 0 - 22 ng/L    Troponin T NOT REPORTED <0.03 ng/mL    Troponin Interp NOT REPORTED     Myoglobin 76 (H) 28 - 72 ng/mL   Hepatic Function Panel    Collection Time: 06/01/19  9:30 PM   Result Value Ref Range    Alb 4.3 3.5 - 5.2 g/dL    Alkaline Phosphatase 45 40 - 129 U/L    ALT 18 5 - 41 U/L    AST 29 <40 U/L    Total Bilirubin 0.85 0.3 - 1.2 mg/dL    Bilirubin, Direct 0.20 <0.31 mg/dL    Bilirubin, Indirect 0.65 0.00 - 1.00 mg/dL    Total Protein 6.9 6.4 - 8.3 g/dL    Globulin NOT REPORTED 1.5 - 3.8 g/dL    Albumin/Globulin Ratio NOT REPORTED 1.0 - 2.5   Salicylate    Collection Time: 06/01/19  9:30 PM   Result Value Ref Range    Salicylate Lvl <1 (L) 3 - 10 mg/dL   POC Glucose Fingerstick    Collection Time: 06/01/19  9:32 PM   Result Value Ref Range    POC Glucose 77 75 - 110 mg/dL   Drug screen multi urine    Collection Time: 06/01/19 10:43 PM   Result Value Ref Range    Amphetamine Screen, Ur NEGATIVE NEGATIVE    Barbiturate Screen, Ur NEGATIVE NEGATIVE    Benzodiazepine Screen, Urine NEGATIVE NEGATIVE    Cocaine Metabolite, Urine POSITIVE (A) NEGATIVE    Methadone Screen, Urine NEGATIVE NEGATIVE    Opiates, Urine NEGATIVE NEGATIVE    Phencyclidine, Urine NEGATIVE NEGATIVE    Propoxyphene, Urine NOT REPORTED NEGATIVE    Cannabinoid Scrn, Ur NEGATIVE NEGATIVE    Oxycodone Screen, Ur NEGATIVE NEGATIVE    Methamphetamine, Urine NOT REPORTED NEGATIVE    Tricyclic Antidepressants, Urine NOT REPORTED NEGATIVE    MDMA, Urine NOT REPORTED NEGATIVE    Buprenorphine Urine NOT REPORTED NEGATIVE Test Information       Assay provides medical screening only. The absence of expected drug(s) and/or metabolite(s) may indicate diluted or adulterated urine, limitations of testing or timing of collection. Urinalysis, Routine    Collection Time: 06/01/19 10:43 PM   Result Value Ref Range    Color, UA YELLOW YELLOW    Turbidity UA CLEAR CLEAR    Glucose, Ur NEGATIVE NEGATIVE    Bilirubin Urine NEGATIVE NEGATIVE    Ketones, Urine 1+ (A) NEGATIVE    Specific Gravity, UA 1.028 1.005 - 1.030    Urine Hgb NEGATIVE NEGATIVE    pH, UA 5.5 5.0 - 8.0    Protein, UA NEGATIVE NEGATIVE    Urobilinogen, Urine Normal Normal    Nitrite, Urine NEGATIVE NEGATIVE    Leukocyte Esterase, Urine NEGATIVE NEGATIVE    Urinalysis Comments       Microscopic exam not performed based on chemical results unless requested in original order.    Trop/Myoglobin    Collection Time: 06/01/19 10:56 PM   Result Value Ref Range    Troponin, High Sensitivity <6 0 - 22 ng/L    Troponin T NOT REPORTED <0.03 ng/mL    Troponin Interp NOT REPORTED     Myoglobin 61 28 - 72 ng/mL   CK    Collection Time: 06/01/19 10:56 PM   Result Value Ref Range    Total  (H) 39 - 308 U/L   Acetaminophen Level    Collection Time: 06/02/19  1:36 AM   Result Value Ref Range    Acetaminophen Level <10 (L) 10 - 30 ug/mL   Comprehensive Metabolic Panel w/ Reflex to MG    Collection Time: 06/02/19  5:29 AM   Result Value Ref Range    Glucose 86 70 - 99 mg/dL    BUN 17 6 - 20 mg/dL    CREATININE 1.11 0.70 - 1.20 mg/dL    Bun/Cre Ratio 15 9 - 20    Calcium 8.3 (L) 8.6 - 10.4 mg/dL    Sodium 142 135 - 144 mmol/L    Potassium 3.9 3.7 - 5.3 mmol/L    Chloride 109 (H) 98 - 107 mmol/L    CO2 22 20 - 31 mmol/L    Anion Gap 11 9 - 17 mmol/L    Alkaline Phosphatase 42 40 - 129 U/L    ALT 17 5 - 41 U/L    AST 24 <40 U/L    Total Bilirubin 0.94 0.3 - 1.2 mg/dL    Total Protein 6.4 6.4 - 8.3 g/dL    Alb 3.9 3.5 - 5.2 g/dL    Albumin/Globulin Ratio NOT REPORTED 1.0 - 2.5    GFR Non-African American >60 >60 mL/min    GFR African American >60 >60 mL/min    GFR Comment          GFR Staging NOT REPORTED    CBC    Collection Time: 06/02/19  5:29 AM   Result Value Ref Range    WBC 6.1 3.5 - 11.3 k/uL    RBC 4.39 4.21 - 5.77 m/uL    Hemoglobin 13.3 13.0 - 17.0 g/dL    Hematocrit 40.9 40.7 - 50.3 %    MCV 93.2 82.6 - 102.9 fL    MCH 30.3 25.2 - 33.5 pg    MCHC 32.5 28.4 - 34.8 g/dL    RDW 13.7 11.8 - 14.4 %    Platelets 473 243 - 646 k/uL    MPV 10.6 8.1 - 13.5 fL    NRBC Automated 0.0 0.0 per 100 WBC   Magnesium    Collection Time: 06/02/19  5:29 AM   Result Value Ref Range    Magnesium 2.2 1.6 - 2.6 mg/dL       SOCIAL HISTORY  Social History     Socioeconomic History    Marital status: Single     Spouse name: Not on file    Number of children: Not on file    Years of education: Not on file    Highest education level: Not on file   Occupational History    Not on file   Social Needs    Financial resource strain: Not on file    Food insecurity:     Worry: Not on file     Inability: Not on file    Transportation needs:     Medical: Not on file     Non-medical: Not on file   Tobacco Use    Smoking status: Light Tobacco Smoker     Packs/day: 0.50     Years: 14.00     Pack years: 7.00     Types: Cigarettes    Smokeless tobacco: Never Used    Tobacco comment: pt accepting of nicotine replacement   Substance and Sexual Activity    Alcohol use: Yes     Comment: pt drinks 6 beers and a liter of vodka daily.     Drug use: Yes     Types: Cocaine, Opiates , IV     Comment: drug abuse includes cocaine & heroin    Sexual activity: Never   Lifestyle    Physical activity:     Days per week: Not on file     Minutes per session: Not on file    Stress: Not on file   Relationships    Social connections:     Talks on phone: Not on file     Gets together: Not on file     Attends Jew service: Not on file     Active member of club or organization: Not on file     Attends meetings of clubs or

## 2019-06-03 NOTE — PLAN OF CARE
[address] [facility]  Initial Interdisciplinary Treatment Plan NO      Original treatment plan Date & Time: 6/3/2019   0736    Admission Type:  Admission Type: Involuntary(Pt signed in)    Reason for admission:   Reason for Admission: OD on heroin, cocaine, and a handful of Rexulti Aprox; +alcohol use per PT    Estimated Length of Stay:  5-7days  Estimated Discharge Date: to be determined by physician    PATIENT STRENGTHS:  Patient Strengths:Strengths: Connection to output provider, Positive Support, Communication(zeph; mother)  Patient Strengths and Limitations:Limitations: Inappropriate/potentially harmful leisure interests, Lacks leisure interests, Multiple barriers to leisure interests, Unrealistic self-view  Addictive Behavior: Addictive Behavior  In the past 3 months, have you felt or has someone told you that you have a problem with:  : Other(Comments)(drugs and ETOH)  Do you have a history of Chemical Use?: Yes  Do you have a history of Alcohol Use?: Yes  Do you have a history of Street Drug Abuse?: Yes  Histroy of Prescripton Drug Abuse?: Yes  Medical Problems:  Past Medical History:   Diagnosis Date    ADHD (attention deficit hyperactivity disorder)     Alcoholism ([facility]     pt drinks 6 beers and a liter of vodka daily.     Anxiety     Bipolar 1 disorder (HCC)     PTSD (post-traumatic stress disorder)     Suicide attempt ([facility] [address]     previous suicide attempt by overdosing on pills at approximately 23 y/o     Status EXAM:Status and Exam  Normal: No  Facial Expression: Flat  Affect: Appropriate  Level of Consciousness: Alert  Mood:Normal: No  Mood: Depressed, Anxious, Other (Comment)(dep/anx rating both at 5/10; flat)  Motor Activity:Normal: Yes  Interview Behavior: Cooperative  Preception: [place] to Person, [place] to Time, [place] to Place, [place] to Situation  Attention:Normal: Yes  Thought Content:Normal: No  Thought Content: Poverty of Content  Hallucinations: None(denied current; stated past visuals with withdraw)  Delusions: No  Memory:Normal: No  Memory: Poor Remote  Insight and Judgment: No  Insight and Judgment: Poor Judgment, Poor Insight, Unmotivated  Present Suicidal Ideation: No(denied current)  Present Homicidal Ideation: No(denied current)    EDUCATION:   Learner Progress Toward Treatment Goals: reviewed group plans and strategies for care    Method:group therapy, medication compliance, individualized assessments and care planning    Outcome: needs reinforcement    PATIENT GOALS: to be discussed with patient within 72 hours    PLAN/TREATMENT RECOMMENDATIONS:     continue group therapy , medications compliance, goal setting, individualized assessments and care, continue to monitor pt on unit      SHORT-TERM GOALS:   Time frame for Short-Term Goals: 5-7 days    LONG-TERM GOALS:  Time frame for Long-Term Goals: 6 months  Members Present in Team Meeting: See Signature Sheet    PK Quigley

## 2019-06-03 NOTE — CARE COORDINATION
BHI Biopsychosocial Assessment     Current Level of Psychosocial Functioning      Independent: x  Dependent:    Minimal Assist:       Comments: Pt states he lives with his mother at [address] [place] [address] and will return there upon discharge. Psychosocial High Risk Factors (check all that apply)     Unable to obtain meds:    Chronic illness/pain:     Substance abuse:  x  Lack of Family Support:    Financial stress:    Isolation:    Inadequate Community Resources:    Suicide attempt(s):    Not taking medications:     Victim of crime:    Developmental Delay:     Unable to manage personal needs: x   Age 72 or older:    Homeless:    No transportation:  x  Readmission within 30 days:    Unemployment:  x  Traumatic Event:      Comments: . Psychiatric Advanced Directives: None reported     Family to Involve in Treatment: Mother, PREMA on file     Sexual Orientation: Heterosexual     Patient Strengths: Legal income, stable housing, linked to [facility], medication compliant, support system     Patient Barriers: Lack of coping skills, AoD issues, increased stress and increased anxiety     Opiate Education: Referral to CD counselor     CMHC/mental health history: [employer] on file     Plan of Care   medication management, group/individual therapies, family meetings, psycho -education, treatment team meetings to assist with stabilization     Initial Discharge Plan: To become stable on medications, return to mother's house, and follow up with [facility]. Clinical Summary:       Pt is a 34year old  male who presented to [facility] with SI and OD. Pt states he ingested a handful of pills. Pt states his appetite has been decreased and he has been sleeping okay. Pt states that he is not medication compliant at this time. Pt states he is linked to [facility], [address] [place] [address] on file, and does have a CM. Pt states that he lives with, with his mother, whom is supportive, SHABBIR on file.  Pt states that he does have a form of legal income and is   not employed. Pt states that he receives $771/month in SSI. Pt states that he does have a Hx of AoD use. Pt states that he does not have abuse issues from his past. Pt states that he does not have MI in his family. Pt states that he has [employer]. Pt states that he has not graduated from [employer]. Pt states that he is currently not having AH, VH, SI and HI. Pt states that he has attempted suicide, a long time ago, by OD on pills. Pt is oriented times four. Pt's mood is congruent with his facial expressions. Pt's hygiene is poor. Pt states he wants help with his addiction and was referred to CD counselor. Pt states he uses heroin, daily as well as alcohol.
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
## 2019-06-03 NOTE — GROUP NOTE
Group Therapy Note    Date: Hayley 3    Group Start Time: 0900  Group End Time: 0930  Group Topic: Community Meeting    RADHA Ratliff Child, CTRS        Group Therapy Note    Attendees: 3         Patient's Goal: Patient will demonstrate increased interpersonal interaction    Notes:  Patient attended group and participated appropriately. Status After Intervention:  Improved    Participation Level:  Active Listener and Interactive    Participation Quality: Appropriate, Attentive and Sharing      Speech:  normal      Thought Process/Content: Logical      Affective Functioning: Congruent      Mood: euthymic      Level of consciousness:  Alert and Oriented x4      Response to Learning: Progressing to goal      Endings: None Reported    Modes of Intervention: Education, Support and Socialization      Discipline Responsible: Psychoeducational Specialist      Signature:  Lisa Rae

## 2019-06-03 NOTE — GROUP NOTE
Group Therapy Note    Date: Hayley 3    Group Start Time: 1100  Group End Time: 1564  Group Topic: Cognitive Skills    NA Casillas, CTRS        Group Therapy Note    Pt did not attend Therapeutic Recreation d/t resting in room despite staff invitation to attend. 1:1 talk time offered as alternative to group session, pt declined.

## 2019-06-03 NOTE — PLAN OF CARE
Problem: Suicide risk  Goal: Provide patient with safe environment  Description  Provide patient with safe environment  Outcome: Ongoing  Note:   Patient in suicide precautions. 1:1 constant observer in room. All potential safety hazards removed from room. Patient in closes room available to nurses stations. Increased frequency in rounds on patient. Will continue to monitor patient closely. Problem: Discharge Planning:  Goal: Discharged to appropriate level of care  Description  Discharged to appropriate level of care  Outcome: Ongoing  Note:   Patient pink slipped to [facility]. Arrangements made for patient to be discharged 6/2/19 when bed is available. Will keep patient updated with discharge plans.

## 2019-06-03 NOTE — DISCHARGE INSTR - COC
Continuity of Care Form    Patient Name: Sudha Quesada   :  1990  MRN:  [id number]    Admit date:  2019  Discharge date:  ***    Code Status Order: Full Code   Advance Directives:   Advance Care Flowsheet Documentation     Date/Time Healthcare Directive Type of Healthcare Directive Copy in [address] Agent's Name Healthcare Agent's Phone Number    [phone]  No, patient does not have an advance directive for healthcare treatment -- -- -- -- --          Admitting Physician:  Vanesa Sawyer MD  PCP: No primary care provider on file. Discharging Nurse: [facility] Unit/Room#: 1008/1008-02  Discharging Unit Phone Number: ***    Emergency Contact:   Extended Emergency Contact Information  Primary Emergency Contact: Anne Molina [address] Phone: [phone]  Relation: Other  Secondary Emergency Contact: [place], [address] Phone: [phone]  Relation: Parent    Past Surgical History:  History reviewed. No pertinent surgical history.     Immunization History:   Immunization History   Administered Date(s) Administered    Influenza, Quadv, 3 yrs and older, IM, PF (Fluzone 3 yrs and older or Afluria 5 yrs and older) 2018    Influenza, Kermitt Plaster, 6 mo and older, IM, PF (Flulaval, Fluarix) 2018    Tdap (Boostrix, Adacel) 2014, 2015, 2017       Active Problems:  Patient Active Problem List   Diagnosis Code    Schizoaffective disorder, bipolar type ([address] F25.0    Suicide attempt by drug ingestion ([address] T50.902A    Tobacco use Z72.0    Polysubstance abuse ([address] F19.10    Atrial flutter, paroxysmal ([address] I48.92    Episodic cannabis use F12.90    Cocaine use F14.90    Accidental drug overdose T50.901A    Schizoaffective disorder ([address] F25.9    Suicide attempt ([address] T14.91XA    PTSD (post-traumatic stress disorder) F43.10    Bipolar 1 disorder (HCC) F31.9    Anxiety F41.9    Alcoholism ([facility] F10.20    ADHD (attention deficit hyperactivity disorder) F90.9    Intentional drug overdose ([facility] T50.902A    Drug overdose T50.901A       Isolation/Infection:   Isolation          No Isolation            Nurse Assessment:  Last Vital Signs: BP (!) 107/57   Pulse 88   Temp 97.3 °F (36.3 °C) (Oral)   Resp 16   Ht 6' (1.829 m)   Wt 184 lb 1.6 oz (83.5 kg)   SpO2 97%   BMI 24.97 kg/m²     Last documented pain score (0-10 scale): Pain Level: 0  Last Weight:   Wt Readings from Last 1 Encounters:   06/02/19 184 lb 1.6 oz (83.5 kg)     Mental Status:  {IP PT MENTAL STATUS:20030}    IV Access:  { ROBBIN IV ACCESS:028570152}    Nursing Mobility/ADLs:  Walking   {[facility] DME HSYZ:528280497}  Transfer  {[facility] DME ACWI:783008692}  Bathing  {[facility] DME BLHJ:530507577}  Dressing  {[facility] DME LUGB:779431661}  Toileting  {[facility] DME YTUF:789613965}  Feeding  {[facility] DME JMZM:407555851}  Med Admin  {[facility] DME LBFF:501518260}  Med Delivery   { ROBBIN MED Delivery:802525279}    Wound Care Documentation and Therapy:        Elimination:  Continence:   · Bowel: {YES / PF:49896}  · Bladder: {YES / WILNER:01651}  Urinary Catheter: {Urinary Catheter:877203511}   Colostomy/Ileostomy/Ileal Conduit: {YES / ML:52136}       Date of Last BM: ***    Intake/Output Summary (Last 24 hours) at 6/3/2019 0037  Last data filed at 6/2/2019 1742  Gross per 24 hour   Intake 1049 ml   Output 625 ml   Net 424 ml     I/O last 3 completed shifts:   In: 0268 [I.V.:1049]  Out: 625 [Urine:625]    Safety Concerns:     [address] Safety Concerns:393485916}    Impairments/Disabilities:      [address] Impairments/Disabilities:910645716}    Nutrition Therapy:  Current Nutrition Therapy:   [address] Diet List:284553636}    Routes of Feeding: {CHP DME Other Feedings:645188209}  Liquids: {Slp liquid thickness:17984}  Daily Fluid Restriction: {CHP DME Yes amt example:775131695}  Last Modified Barium Swallow with Video (Video Swallowing Test): {Done Not Done ZIGO:539890549}    Treatments at the Time of Hospital Discharge:   Respiratory Treatments: ***  Oxygen Therapy:  {Therapy; copd oxygen:79958}  Ventilator:    { CC Vent XQCP:892740878}    Rehab Therapies: {THERAPEUTIC INTERVENTION:9687393572}  Weight Bearing Status/Restrictions: { CC Weight Bearin}  Other Medical Equipment (for information only, NOT a DME order):  {EQUIPMENT:717021133}  Other Treatments: ***    Patient's personal belongings (please select all that are sent with patient):  {[facility] DME Belongings:921185002}    RN SIGNATURE:  {Esignature:596248957}    CASE MANAGEMENT/SOCIAL WORK SECTION    Inpatient Status Date: ***    Readmission Risk Assessment Score:  Readmission Risk              Risk of Unplanned Readmission:        62           Discharging to Facility/ Agency   · Name:   · Address:  · Phone:  · Fax:    Dialysis Facility (if applicable)   · Name:  · Address:  · Dialysis Schedule:  · Phone:  · Fax:    / signature: {Esignature:426759680}    PHYSICIAN SECTION    Prognosis: {Prognosis:4928373781}    Condition at Discharge: [address] [place] [address] Patient Condition:873933668}    Rehab Potential (if transferring to Rehab): {Prognosis:7286576592}    Recommended Labs or Other Treatments After Discharge: ***    Physician Certification: I certify the above information and transfer of Douglas López  is necessary for the continuing treatment of the diagnosis listed and that he requires {Admit to Appropriate Level of Care:07305} for {GREATER/LESS:773409334} 30 days.      Update Admission H&P: {CHP DME Changes in APFS}    PHYSICIAN SIGNATURE:  {Esignature:932819160}

## 2019-06-03 NOTE — PROGRESS NOTES
ETA for [facility] transport 0330. Notified Loli BOX of ETA.  Patient to be admitted to [facility] room 210-2
Patient transfered to room 1008-2. A&Ox4. Telemetry on. VSS. Oriented to room. Safety guard at bedside. Call light within sitter's reach. Fall sign posted, fall risk sticker on, bed alarm on. Patient denies any pain. Will continue to monitor patient.
Patient transferred from [facility] to [place] [address] via [facility]. Patient left with all belongings.  Notified [facility].
Psych appt with Psych NP now. Patient states no intention for suicide. Will not be DC'd home for safety due to excessive ingestion. Once cleared medically, call perfect serve to start process for a request for bed due to intentional ingestion.
Received update from [address] [facility] is opening a unit tonight and plans on admitting patient once unit is opened. Will receive update from [facility] once bed is available. Writer will set up transport once bed is available.
[facility]) [F19.10]      Plan:        - Intentional drug overdose, denies suicidal ideations.  Await psych input  - Cocaine use  - Lip ulcers    Advised to quit using drugs  Ok for discharge if ok with psychiatry team      Johnathan Chisholm MD  6/2/2019  11:11 AM

## 2019-06-04 PROCEDURE — 1240000000 HC EMOTIONAL WELLNESS R&B

## 2019-06-04 PROCEDURE — 6370000000 HC RX 637 (ALT 250 FOR IP): Performed by: NURSE PRACTITIONER

## 2019-06-04 PROCEDURE — 99232 SBSQ HOSP IP/OBS MODERATE 35: CPT | Performed by: NURSE PRACTITIONER

## 2019-06-04 RX ADMIN — CETIRIZINE HYDROCHLORIDE 10 MG: 10 TABLET, FILM COATED ORAL at 07:58

## 2019-06-04 RX ADMIN — FOLIC ACID 1 MG: 1 TABLET ORAL at 07:58

## 2019-06-04 RX ADMIN — ARIPIPRAZOLE 15 MG: 15 TABLET ORAL at 07:58

## 2019-06-04 RX ADMIN — MULTIPLE VITAMINS W/ MINERALS TAB 1 TABLET: TAB at 07:58

## 2019-06-04 RX ADMIN — THIAMINE HCL TAB 100 MG 100 MG: 100 TAB at 07:58

## 2019-06-04 ASSESSMENT — ENCOUNTER SYMPTOMS
DIARRHEA: 0
EYE DISCHARGE: 0
RHINORRHEA: 0
ALLERGIC/IMMUNOLOGIC NEGATIVE: 1
ABDOMINAL DISTENTION: 0
SINUS PAIN: 0
CONSTIPATION: 0
EYE REDNESS: 0
COUGH: 0

## 2019-06-04 NOTE — PLAN OF CARE
[facility]  Day 3 Interdisciplinary Treatment Plan NOTE    Review Date & Time: 6/4/19  5783    Admission Type:   Admission Type: Involuntary(Pt signed in)    Reason for admission:  Reason for Admission: OD on heroin, cocaine, and a handful of [employer]; +alcohol use per PT  Estimated Length of Stay: 5-7 days  Estimated Discharge Date Update: to be determined by physician    PATIENT STRENGTHS:  Patient Strengths Strengths: Connection to output provider, Positive Support  Patient Strengths and Limitations:Limitations: Inappropriate/potentially harmful leisure interests, Difficulty problem solving/relies on others to help solve problems  Addictive Behavior:Addictive Behavior  In the past 3 months, have you felt or has someone told you that you have a problem with:  : None  Do you have a history of Chemical Use?: Yes  Do you have a history of Alcohol Use?: Yes  Do you have a history of Street Drug Abuse?: Yes  Histroy of Prescripton Drug Abuse?: Yes  Medical Problems:  Past Medical History:   Diagnosis Date    ADHD (attention deficit hyperactivity disorder)     Alcoholism ([facility]     pt drinks 6 beers and a liter of vodka daily.     Anxiety     Bipolar 1 disorder (HCC)     PTSD (post-traumatic stress disorder)     Suicide attempt ([facility]     previous suicide attempt by overdosing on pills at approximately 25 y/o       Risk:  Fall RiskTotal: 67  Wolfgang Scale Wolfgang Scale Score: 22  BVC Total: 0  Change in scores no Changes to plan of Care no    Status EXAM:   Status and Exam  Normal: No  Facial Expression: Flat  Affect: Blunt  Level of Consciousness: Alert  Mood:Normal: No  Mood: Anxious, Depressed  Motor Activity:Normal: No  Motor Activity: Decreased  Interview Behavior: Cooperative, Evasive  Preception: [place] to Person, [place] to Time, [place] to Place, [place] to Situation  Attention:Normal: No  Attention: Distractible  Thought Processes: Circumstantial  Thought Content:Normal: No  Thought Content: Preoccupations  Hallucinations: None  Delusions: No  Memory:Normal: No  Memory: Poor Recent, Poor Remote  Insight and Judgment: No  Insight and Judgment: Poor Judgment, Poor Insight, Unmotivated  Present Suicidal Ideation: No  Present Homicidal Ideation: No    Daily Assessment Last Entry:   Daily Sleep (WDL): Within Defined Limits         Patient Currently in Pain: Denies  Daily Nutrition (WDL): Within Defined Limits    Patient Monitoring:  Frequency of Checks: 4 times per hour, close    Psychiatric Symptoms:   Depression Symptoms  Depression Symptoms: Isolative, Loss of interest  Anxiety Symptoms  Anxiety Symptoms: Generalized  Angella Symptoms  Angella Symptoms: No problems reported or observed. Psychosis Symptoms  Delusion Type: No problems reported or observed. Suicide Risk CSSR-S:  1) Within the past month, have you wished you were dead or wished you could go to sleep and not wake up? : No  2) Have you actually had any thoughts of killing yourself? : No  3) Have you been thinking about how you might kill yourself? : No  5) Have you started to work out or worked out the details of how to kill yourself? Do you intend to carry out this plan? : No  6) Have you ever done anything, started to do anything, or prepared to do anything to end your life?: No  Change in Result no Change in Plan of care no      EDUCATION:   EDUCATION:   Learner Progress Toward Treatment Goals: Reviewed results and recommendations of this team, Reviewed group plan and strategies, Reviewed signs, symptoms and risk of self harm and violent behavior, Reviewed goals and plan of care    Method:small group, individual verbal education    Outcome:verbalized by patient, but needs reinforcement to obtain goals    PATIENT GOALS:  Short term: To increase positive coping skills  Long term:  To follow up with [facility]    PLAN/TREATMENT RECOMMENDATIONS UPDATE: continue with group therapies, increased socialization, continue planning for after discharge goals, continue with medication compliance    SHORT-TERM GOALS UPDATE:   Time frame for Short-Term Goals: 5-7 days    LONG-TERM GOALS UPDATE:   Time frame for Long-Term Goals: 6 months  Members Present in Team Meeting: See [address], LSW

## 2019-06-04 NOTE — PLAN OF CARE
Problem: Depressive Behavior With or Without Suicide Precautions:  Goal: Able to verbalize and/or display a decrease in depressive symptoms  Description  Able to verbalize and/or display a decrease in depressive symptoms  6/4/2019 1040 by Marika Andre RN  Outcome: Ongoing     Patient denies any thoughts of self harm. Patient is out of room more today but still isolative to self. States that he overdosed unintentionally and that his admission is a mistake. Minimizing of drug use. 15 minute checks maintained for safety.

## 2019-06-04 NOTE — H&P
liter of vodka daily.  Anxiety     Bipolar 1 disorder (HCC)     PTSD (post-traumatic stress disorder)     Suicide attempt ([facility]     previous suicide attempt by overdosing on pills at approximately 25 y/o       Pt denies any history of Diabetes mellitus type 2, hypertension, stroke, heart disease, COPD, Asthma, GERD, HLD, Cancer, Seizures,Thyroid disease, Kidney Disease, Hepatitis, TB, Psychiatric Disorders or Substance abuse. SURGICAL HISTORY     History reviewed. No pertinent surgical history. FAMILY HISTORY       Family History   Problem Relation Age of Onset    Liver Cancer Brother     Alcohol Abuse Brother     No Known Problems Mother     No Known Problems Father        SOCIAL HISTORY       Social History     Socioeconomic History    Marital status: Single     Spouse name: None    Number of children: None    Years of education: None    Highest education level: None   Occupational History    None   Social Needs    Financial resource strain: None    Food insecurity:     Worry: None     Inability: None    Transportation needs:     Medical: None     Non-medical: None   Tobacco Use    Smoking status: Light Tobacco Smoker     Packs/day: 0.50     Years: 14.00     Pack years: 7.00     Types: Cigarettes    Smokeless tobacco: Never Used    Tobacco comment: pt accepting of nicotine replacement   Substance and Sexual Activity    Alcohol use: Yes     Comment: pt drinks 6 beers and a liter of vodka daily.     Drug use: Yes     Types: Cocaine, Opiates , IV     Comment: drug abuse includes cocaine & heroin    Sexual activity: Never   Lifestyle    Physical activity:     Days per week: None     Minutes per session: None    Stress: None   Relationships    Social connections:     Talks on phone: None     Gets together: None     Attends Taoist service: None     Active member of club or organization: None     Attends meetings of clubs or organizations: None     Relationship status: None    Intimate partner violence:     Fear of current or ex partner: None     Emotionally abused: None     Physically abused: None     Forced sexual activity: None   Other Topics Concern    None   Social History Narrative    None           REVIEW OF SYSTEMS      Allergies   Allergen Reactions    Advil [Ibuprofen] Hives       No current facility-administered medications on file prior to encounter. Current Outpatient Medications on File Prior to Encounter   Medication Sig Dispense Refill    benztropine (COGENTIN) 1 MG tablet Take 1 mg by mouth 2 times daily as needed      ARIPiprazole (ABILIFY) 20 MG tablet Take 30 mg by mouth daily       amitriptyline (ELAVIL) 50 MG tablet Take 1 tablet by mouth nightly 30 tablet 0    cetirizine (ZYRTEC) 10 MG tablet Take 1 tablet by mouth daily 30 tablet 0                    Review of Systems   Constitutional: Positive for activity change. [place] tired, No schedule,  Using cocaine and not liking it,    HENT: Negative for congestion, rhinorrhea and sinus pain. Eyes: Negative for discharge and redness. Respiratory: Negative for cough. Cardiovascular: Negative for chest pain and leg swelling. Gastrointestinal: Negative for abdominal distention, constipation and diarrhea. Musculoskeletal: Negative. Allergic/Immunologic: Negative. Neurological: Negative. Psychiatric/Behavioral: Positive for behavioral problems, self-injury, sleep disturbance and suicidal ideas. The patient is nervous/anxious and is hyperactive. Cocaine epidemic     See HPI. Suicidal thinking   GENERAL PHYSICAL EXAM:         Vitals: /68   Pulse 77   Temp 98 °F (36.7 °C) (Oral)   Resp 14   Ht 6' (1.829 m)   Wt 180 lb (81.6 kg)   SpO2 98%   BMI 24.41 kg/m²  Body mass index is 24.41 kg/m². GENERAL APPEARANCE:  Morteza Valle is 33 y.o.,  male, not obese, Alert and speech is clear Denies any distress or pain at this time.           SKIN:  Warm, dry, no cyanosis or jaundice. Has no rashes. HEAD:  Normocephalic. Face symmetrical.    EYES:  MOISES EOMI and gaze is conjugate        EARS:  No  hearing loss. NOSE:  Nares are patent, and mucosa is moist .     THROAT:  Pharynx is not erythematous, Uvula is midline. Dentition is not in good repair, No loose teeth . NECK:  No stiffness, Good ROM of neck . CHEST:   No use of accessory muscles. HEART:  Regular rate and rhythm. No murmur. LUNGS:  Has no wheezes and no cough ,          ABDOMEN: Abdomen is soft on palpation. No localized tenderness. No guarding or rigidity. No palpable organomegaly. LYMPHATICS:  No palpable cervical adenopathy. LOCOMOTOR, BACK AND SPINE:  Has no back pain today. No pain on palpation of spine, No scoliosis       EXTREMITIES:   Has steady gait and no ankle edema and strong peripheral pulses, Extremities are symmetrical,   No calf pain with flexion extension of feet,     NEUROLOGIC:  The patient is conscious, alert, oriented, No apparent focal sensory or motor deficits. Cr N ll-Xll intact,       PROVISIONAL DIAGNOSES:      Principal Problem:    Bipolar 1 disorder (HCC)  Active Problems:    Polysubstance abuse ([facility]    MDD (major depressive disorder), recurrent episode ([facility]  Resolved Problems:    * No resolved hospital problems.  LIBBY Bazzi - CNP on 6/4/2019 at 1:47 PM

## 2019-06-04 NOTE — GROUP NOTE
Group Therapy Note    Date: June 4    Group Start Time: 1430  Group End Time: 1500  Group Topic: Cognitive Skills    [facility]    Wileen Child, CTRS        Group Therapy Note  Pt did not attend Therapeutic Recreation d/t resting in room despite staff invitation to attend. 1:1 talk time offered as alternative to group session, pt declined.

## 2019-06-04 NOTE — PROGRESS NOTES
patch  1 patch Transdermal Daily Gabino Gan MD        chlordiazePOXIDE (LIBRIUM) capsule 25 mg  25 mg Oral 4x Daily PRN Morales Bone, APRN - CNP        vitamin B-1 (THIAMINE) tablet 100 mg  100 mg Oral Daily Morales Bone, APRN - CNP   100 mg at 06/04/19 4153    therapeutic multivitamin-minerals 1 tablet  1 tablet Oral Daily Morales Bone, APRN - CNP   1 tablet at 59/61/68 0825    folic acid (FOLVITE) tablet 1 mg  1 mg Oral Daily Morales Bone, APRN - CNP   1 mg at 06/04/19 9477    cloNIDine (CATAPRES) tablet 0.1 mg  0.1 mg Oral TID PRN Morales Bone, APRN - CNP        loperamide (IMODIUM) capsule 2 mg  2 mg Oral 4x Daily PRN Morales Bone, APRN - CNP        dicyclomine (BENTYL) capsule 20 mg  20 mg Oral 4x Daily PRN Morales Bone, APRN - CNP        diphenhydrAMINE (BENADRYL) tablet 25 mg  25 mg Oral Nightly PRN Morales Bone, APRN - CNP        acetaminophen (TYLENOL) tablet 650 mg  650 mg Oral Q4H PRN Pepe Acron, APRN - CNP        ARIPiprazole (ABILIFY) tablet 15 mg  15 mg Oral Daily Pepe Acron, APRN - CNP   15 mg at 06/04/19 0758    benztropine (COGENTIN) tablet 1 mg  1 mg Oral BID PRN Pepe Acron, APRN - CNP        cetirizine (ZYRTEC) tablet 10 mg  10 mg Oral Daily Pepe Acron, APRN - CNP   10 mg at 06/04/19 0758         nicotine  1 patch Transdermal Daily    thiamine  100 mg Oral Daily    therapeutic multivitamin-minerals  1 tablet Oral Daily    folic acid  1 mg Oral Daily    ARIPiprazole  15 mg Oral Daily    cetirizine  10 mg Oral Daily       ASSESSMENT  Bipolar 1 disorder ([facility]     Patient's Response to Treatment: slow to respond    PLAN  · Supportive therapy with medication management. Reviewed risks and benefits as well as potential side effects with patient. · Therapeutic activities and groups  · Follow up at [facility] after symptoms stabilized. · Restart Abilify 15 mg PO daily and titrate for efficacy, consider BAER if patient agreeable.   Monitor for withdrawal.    · Social work for discharge planning. Referral to [facility], [address] [place] [address] counselor for discharge planning to inpatient facility for rehab. · Please watch patient carefully during medication passes.        Electronically signed by LIBBY Thomas CNP on 6/4/2019 at 12:46 PM.

## 2019-06-04 NOTE — PLAN OF CARE
Problem: Depressive Behavior With or Without Suicide Precautions:  Goal: Able to verbalize and/or display a decrease in depressive symptoms  Description  Able to verbalize and/or display a decrease in depressive symptoms  Outcome: Ongoing  Note:   Denies suicidal and homicidal at this time, isolative, evasive and unmotivated. Minimal interaction with staff. Denies auditory and visual hallucinations. Problem: Depressive Behavior With or Without Suicide Precautions:  Goal: Ability to disclose and discuss suicidal ideas will improve  Description  Ability to disclose and discuss suicidal ideas will improve  Outcome: Ongoing  Note:   Patient denies suicidal and homicidal ideations. Problem: Substance Abuse:  Goal: Absence of drug withdrawal signs and symptoms  Description  Absence of drug withdrawal signs and symptoms  Outcome: Ongoing  Note:   Denies withdrawal symptoms.

## 2019-06-05 PROCEDURE — 6370000000 HC RX 637 (ALT 250 FOR IP): Performed by: NURSE PRACTITIONER

## 2019-06-05 PROCEDURE — 99232 SBSQ HOSP IP/OBS MODERATE 35: CPT | Performed by: NURSE PRACTITIONER

## 2019-06-05 PROCEDURE — 90833 PSYTX W PT W E/M 30 MIN: CPT | Performed by: NURSE PRACTITIONER

## 2019-06-05 PROCEDURE — 1240000000 HC EMOTIONAL WELLNESS R&B

## 2019-06-05 RX ORDER — NALTREXONE HYDROCHLORIDE 50 MG/1
50 TABLET, FILM COATED ORAL DAILY
Status: ON HOLD | COMMUNITY
End: 2019-06-07 | Stop reason: HOSPADM

## 2019-06-05 RX ADMIN — THIAMINE HCL TAB 100 MG 100 MG: 100 TAB at 08:13

## 2019-06-05 RX ADMIN — MULTIPLE VITAMINS W/ MINERALS TAB 1 TABLET: TAB at 08:13

## 2019-06-05 RX ADMIN — FOLIC ACID 1 MG: 1 TABLET ORAL at 08:13

## 2019-06-05 RX ADMIN — ARIPIPRAZOLE 15 MG: 15 TABLET ORAL at 08:13

## 2019-06-05 RX ADMIN — CETIRIZINE HYDROCHLORIDE 10 MG: 10 TABLET, FILM COATED ORAL at 08:13

## 2019-06-05 ASSESSMENT — PAIN SCALES - GENERAL: PAINLEVEL_OUTOF10: 0

## 2019-06-05 NOTE — PLAN OF CARE
Problem: Depressive Behavior With or Without Suicide Precautions:  Goal: Absence of self-harm  Description  Absence of self-harm  6/5/2019 1010 by Velma Torres RN  Outcome: Ongoing     Patient denies any thoughts of self harm. Denies hallucinations. Isolative to self. Flat. Minimizing of drug overdose. 15 minute checks maintained for safety.

## 2019-06-05 NOTE — GROUP NOTE
Group Therapy Note    Date: June 5    Group Start Time: 1330  Group End Time: 5758  Group Topic: Cognitive Skills    STCZ BHNORRIS Helton, CTRS        Group Therapy Note    Pt did not attend Therapeutic Recreation d/t resting in room despite staff invitation to attend. 1:1 talk time offered as alternative to group session, pt declined.

## 2019-06-05 NOTE — GROUP NOTE
Group Therapy Note    Date: June 5    Group Start Time: 1430  Group End Time: 5709  Group Topic: Recovery    [facility]    DEBORA Carrington LSW    patient refused to attend Recovery group at 2:30 pm after encouragement from staff.   1:1 talk time provided as alternative to group session        Signature:  DEBORA Carrington LSW

## 2019-06-05 NOTE — PROGRESS NOTES
Clinical Pharmacist Medication Reconciliation    List of medications patient is currently taking is complete. Source of medications in list is [facility] ([phone]). The following medications were added to the home medication list:  - Aripiprazole monohydrate 300mg IM [id number] (Due 06/19/19)  - Brexpiprazole 2mg PO Daily (PO supplementation should be completed on 06/05/19  - Naltrexone 50mg PO Daily    The following medications were removed from the home medication list:  Aripiprazole 30mg PO Daily  Benztropine 1mg PO BID PRN  Cetirizine 10mg PO Daily  Please let me know if you have any questions about this encounter. Thanks! Elizabeth MURPHY,  6/5/2019  11:29 AM

## 2019-06-05 NOTE — GROUP NOTE
Group Therapy Note    Date: June 5    Group Start Time: 0900  Group End Time: 1348  Group Topic: [facility], [address] [place] [address] Therapy Note    Pt did not attend [employer] d/t resting in room despite staff invitation to attend. 1:1 talk time offered as alternative to group session, pt declined.

## 2019-06-05 NOTE — GROUP NOTE
Group Therapy Note    Date: June 5    Group Start Time: 1545  Group End Time: 1610  Group Topic: Recreational    [facility]    Madhavi Blair RN        Group Therapy Note    Attendees: 7         Patient's Goal:  Learn Coping     Notes:      Status After Intervention:  Unchanged    Participation Level: Interactive    Participation Quality: Appropriate      Speech:  normal      Thought Process/Content: Logical      Affective Functioning: Congruent      Mood: anxious      Level of consciousness:  Alert      Response to Learning: Able to verbalize/acknowledge new learning      Endings: None Reported    Modes of Intervention: Socialization      Discipline Responsible: Registered Nurse      Signature:  Madhavi Blair

## 2019-06-05 NOTE — PLAN OF CARE
Problem: Depressive Behavior With or Without Suicide Precautions:  Goal: Able to verbalize and/or display a decrease in depressive symptoms  Description  Able to verbalize and/or display a decrease in depressive symptoms  6/4/2019 2114 by Sharmaine Campuzano LPN  Outcome: Ongoing     Problem: Depressive Behavior With or Without Suicide Precautions:  Goal: Absence of self-harm  Description  Absence of self-harm  Outcome: Ongoing   Denies SI and remains free from harm att. Reports continued AH . He states he dose not want to talk about the voices right now.

## 2019-06-05 NOTE — PROGRESS NOTES
Department of Psychiatry  Attending Progress Note    Chief Complaint: Bipolar 1 disorder ([facility] [address]     SUBJECTIVE:  Patient is seen today his room. He is flat, poorly reactive and remains disheveled with poor ADLs. Staff continues to watch medication administration carefully due to patient cheeking his medications during this visit. He has been isolative to room and has not attending any groups today. He is denying SI, HI, hallucinations and depression endorsing anxiety. Writer educated patient regarding sobriety and discussed side effects from drugs as well as his numerous admissions. Patient stated that he is willing to discuss inpatient rehab with social work. He denies withdrawal symptoms at this time. He has poor motivation and poor insight and judgement into his mental illness. There is obvious thought blocking. Chart and medications reviewed. Therapeutic support, empathetic care and psycho education provided greater than 20 minutes. At this time there is no safe alternative other than inpatient care. OBJECTIVE    Physical  /67   Pulse 86   Temp 98.2 °F (36.8 °C) (Oral)   Resp 14   Ht 6' (1.829 m)   Wt 180 lb (81.6 kg)   SpO2 98%   BMI 24.41 kg/m²      Mental Status Evaluation:  Orientation: alertness: lethargic   Mood:. dysthymic      Affect:  flat      Appearance:  casually dressed and disheveled, tattoos   Activity:  Psychomotor Retardation   Gait/Posture: Normal   Speech:  normal pitch and normal volume   Thought Process:  circumstantial   Thought Content:   Thought blocked   Sensorium:  person, place and time/date   Cognition:  grossly intact   Memory: intact   Insight:  limited   Judgment: limited   Suicidal Ideations: denies suicidal ideation   Homicidal Ideations: Negative for homicidal ideation      Medication Side Effects: absent       Attention Span attention span appeared shorter than expected for age     Medications  Current Facility-Administered Medications   Medication Dose Route Frequency Provider Last Rate Last Dose    traZODone (DESYREL) tablet 50 mg  50 mg Oral Nightly PRN Shaylee Apodaca MD        chlordiazePOXIDE (LIBRIUM) capsule 25 mg  25 mg Oral 4x Daily PRN Girtha Purdue, APRN - CNP        vitamin B-1 (THIAMINE) tablet 100 mg  100 mg Oral Daily Girtha Purdue, APRN - CNP   100 mg at 06/05/19 0813    therapeutic multivitamin-minerals 1 tablet  1 tablet Oral Daily Girtha Purdue, APRN - CNP   1 tablet at 47/05/71 1617    folic acid (FOLVITE) tablet 1 mg  1 mg Oral Daily Girtha Purdue, APRN - CNP   1 mg at 06/05/19 0813    cloNIDine (CATAPRES) tablet 0.1 mg  0.1 mg Oral TID PRN Girtha Purdue, APRN - CNP        loperamide (IMODIUM) capsule 2 mg  2 mg Oral 4x Daily PRN Girtha Purdue, APRN - CNP        dicyclomine (BENTYL) capsule 20 mg  20 mg Oral 4x Daily PRN Girtha Purdue, APRN - CNP        diphenhydrAMINE (BENADRYL) tablet 25 mg  25 mg Oral Nightly PRN Girtha Purdue, APRN - CNP        acetaminophen (TYLENOL) tablet 650 mg  650 mg Oral Q4H PRN Ardelia Leap, APRN - CNP        ARIPiprazole (ABILIFY) tablet 15 mg  15 mg Oral Daily Ardelia Leap, APRN - CNP   15 mg at 06/05/19 0813    benztropine (COGENTIN) tablet 1 mg  1 mg Oral BID PRN Ardelia Leap, APRN - CNP        cetirizine (ZYRTEC) tablet 10 mg  10 mg Oral Daily Ardelia Leap, APRN - CNP   10 mg at 06/05/19 0813         thiamine  100 mg Oral Daily    therapeutic multivitamin-minerals  1 tablet Oral Daily    folic acid  1 mg Oral Daily    ARIPiprazole  15 mg Oral Daily    cetirizine  10 mg Oral Daily       ASSESSMENT  Bipolar 1 disorder (HCC)     Patient's Response to Treatment: slow to respond    PLAN  · Supportive therapy with medication management. Reviewed risks and benefits as well as potential side effects with patient. · Therapeutic activities and groups  · Follow up at [facility] after symptoms stabilized.   · Restart Abilify 15 mg PO daily and titrate for efficacy, consider BAER if patient agreeable. Monitor for withdrawal.    · Social work for discharge planning. Referral to [facility], [address] [place] [address] counselor for discharge planning to inpatient facility for rehab. · Please watch patient carefully during medication passes. · Therapeutic support, empathetic care and psycho education provided greater than 20 minutes.       Electronically signed by LIBBY Hayes CNP on 6/5/2019 at 4:25 PM.

## 2019-06-05 NOTE — GROUP NOTE
Group Therapy Note    Date: June 5    Group Start Time: 1100  Group End Time: 1130  Group Topic: Recreational    [facility] CHARU Chauhan, CTRS        Group Therapy Note    Pt did not attend Therapeutic Recreation d/t resting in room despite staff invitation to attend. 1:1 talk time offered as alternative to group session, pt declined.

## 2019-06-06 VITALS
WEIGHT: 180 LBS | HEART RATE: 68 BPM | TEMPERATURE: 98.6 F | SYSTOLIC BLOOD PRESSURE: 102 MMHG | DIASTOLIC BLOOD PRESSURE: 61 MMHG | BODY MASS INDEX: 24.38 KG/M2 | RESPIRATION RATE: 14 BRPM | OXYGEN SATURATION: 98 % | HEIGHT: 72 IN

## 2019-06-06 PROCEDURE — 1240000000 HC EMOTIONAL WELLNESS R&B

## 2019-06-06 PROCEDURE — 6370000000 HC RX 637 (ALT 250 FOR IP): Performed by: NURSE PRACTITIONER

## 2019-06-06 PROCEDURE — 99232 SBSQ HOSP IP/OBS MODERATE 35: CPT | Performed by: NURSE PRACTITIONER

## 2019-06-06 RX ADMIN — THIAMINE HCL TAB 100 MG 100 MG: 100 TAB at 09:43

## 2019-06-06 RX ADMIN — ARIPIPRAZOLE 15 MG: 15 TABLET ORAL at 09:43

## 2019-06-06 RX ADMIN — CETIRIZINE HYDROCHLORIDE 10 MG: 10 TABLET, FILM COATED ORAL at 09:43

## 2019-06-06 RX ADMIN — FOLIC ACID 1 MG: 1 TABLET ORAL at 09:44

## 2019-06-06 RX ADMIN — MULTIPLE VITAMINS W/ MINERALS TAB 1 TABLET: TAB at 09:44

## 2019-06-06 NOTE — PROGRESS NOTES
Department of Psychiatry  Attending Progress Note    Chief Complaint: Bipolar 1 disorder ([facility] [address]     SUBJECTIVE:  Patient is seen today his room. He is flat and disheveled. He has poor ADLs. He is isolative to his room and shows no investment into treatment. He reports being depressed and anxious. He denies suicidal thoughts, he denies hallucinations. He continues to report that he is interested in rehab.  to see him today. Chart and medications reviewed. Therapeutic support, empathetic care and psycho education provided. . At this time there is no safe alternative other than inpatient care. OBJECTIVE    Physical  /67   Pulse 86   Temp 98.2 °F (36.8 °C) (Oral)   Resp 14   Ht 6' (1.829 m)   Wt 180 lb (81.6 kg)   SpO2 98%   BMI 24.41 kg/m²      Mental Status Evaluation:  Orientation: alertness: lethargic   Mood:. dysthymic      Affect:  flat      Appearance:  casually dressed and disheveled, tattoos   Activity:  Psychomotor Retardation   Gait/Posture: Normal   Speech:  normal pitch and normal volume   Thought Process:  circumstantial   Thought Content:   Thought blocked   Sensorium:  person, place and time/date   Cognition:  grossly intact   Memory: intact   Insight:  limited   Judgment: limited   Suicidal Ideations: denies suicidal ideation   Homicidal Ideations: Negative for homicidal ideation      Medication Side Effects: absent       Attention Span attention span appeared shorter than expected for age     Medications  Current Facility-Administered Medications   Medication Dose Route Frequency Provider Last Rate Last Dose    traZODone (DESYREL) tablet 50 mg  50 mg Oral Nightly PRN Shaylee Apodaca MD        chlordiazePOXIDE (LIBRIUM) capsule 25 mg  25 mg Oral 4x Daily PRN LIBBY Araiza CNP        vitamin B-1 (THIAMINE) tablet 100 mg  100 mg Oral Daily LIBBY Araiza - CNP   100 mg at 06/06/19 0943    therapeutic multivitamin-minerals 1 tablet  1 tablet Oral Daily Paige WALL Angelina Nava, LIBBY - CNP   1 tablet at 13/18/93 8175    folic acid (FOLVITE) tablet 1 mg  1 mg Oral Daily Morales Bone, APRN - CNP   1 mg at 06/06/19 0944    cloNIDine (CATAPRES) tablet 0.1 mg  0.1 mg Oral TID PRN Morales Bone, APRN - CNP        loperamide (IMODIUM) capsule 2 mg  2 mg Oral 4x Daily PRN Morales Bone, APRN - CNP        dicyclomine (BENTYL) capsule 20 mg  20 mg Oral 4x Daily PRN Morales Bone, APRN - CNP        diphenhydrAMINE (BENADRYL) tablet 25 mg  25 mg Oral Nightly PRN Morales Bone, APRN - CNP        acetaminophen (TYLENOL) tablet 650 mg  650 mg Oral Q4H PRN Pepe Acron, APRN - CNP        ARIPiprazole (ABILIFY) tablet 15 mg  15 mg Oral Daily Pepe Acron, APRN - CNP   15 mg at 06/06/19 4939    benztropine (COGENTIN) tablet 1 mg  1 mg Oral BID PRN Pepe Acron, LIBBY - MAGGIE        cetirizine (ZYRTEC) tablet 10 mg  10 mg Oral Daily Pepe Acron, APRN - CNP   10 mg at 06/06/19 6710         thiamine  100 mg Oral Daily    therapeutic multivitamin-minerals  1 tablet Oral Daily    folic acid  1 mg Oral Daily    ARIPiprazole  15 mg Oral Daily    cetirizine  10 mg Oral Daily       ASSESSMENT  Bipolar 1 disorder (HCC)     Patient's Response to Treatment: slow to respond    PLAN  · Supportive therapy with medication management. Reviewed risks and benefits as well as potential side effects with patient. · Therapeutic activities and groups  · [facility] up at [facility] after symptoms stabilized. · Restart Abilify 15 mg PO daily and titrate for efficacy, consider BAER if patient agreeable. Monitor for withdrawal.    · Social work for discharge planning. Referral to [facility] Graves, [address] [place] [address] counselor for discharge planning to inpatient facility for rehab. · Please watch patient carefully during medication passes.     Electronically signed by LIBBY Rubin CNP on 6/6/2019 at 12:48 PM.

## 2019-06-06 NOTE — CARE COORDINATION
Writer phoned [place] [phone], PREMA on file, who stated he would like to complete a phone interview with pt at 2:00 pm on this date. Writer supplied pt with Zeny Morin contact information.

## 2019-06-06 NOTE — GROUP NOTE
Group Therapy Note    Date: June 6    Group Start Time: 0900  Group End Time: 0915  Group Topic: Community Meeting    NA Hernandez, CTRS    Pt did not attend [employer] at 0900 d/t resting in room despite staff invitation to attend. 1:1 talk time offered as alternative to group session, pt declined.           Signature:  Angelica Thompson

## 2019-06-06 NOTE — PLAN OF CARE
Problem: Depressive Behavior With or Without Suicide Precautions:  Goal: Able to verbalize and/or display a decrease in depressive symptoms  Description  Able to verbalize and/or display a decrease in depressive symptoms  6/6/2019 1225 by BILL Paz  Outcome: Ongoing   Pt does still admit to some depression. Pt agrees to try to attend groups but does not keep his agreement. Pt continues to be isolative to his room. Problem: Depressive Behavior With or Without Suicide Precautions:  Goal: Absence of self-harm  Description  Absence of self-harm  6/6/2019 1225 by BILL Paz  Outcome: Ongoing   Pt denies thoughts of self harm and is agreeable to seeking out staff should thoughts of self harm arise. Safe environment maintained. 15 minute checks for safety continued per unit policy. Will continue to monitor for safety and provide support and reassurance as needed.

## 2019-06-06 NOTE — GROUP NOTE
Group Therapy Note    Date: June 6    Group Start Time: 1000  Group End Time: 1602  Group Topic: Psychotherapy    NA BOX SRIDEVI Pace        Group Therapy Note    Attendees: 7    Patient's Goals: Pt will discuss a support system and current emotional state. Notes: Pt attended and participated in group. Participation Level:  Active     Participation Quality: Support     Speech:  Normal     Thought Process/Content: Logical     Affective Functioning: Congruent     Mood: Congruent     Level of consciousness:  Alert, Awake, Oriented x4     Response to Learning: Able to verbalize current knowledge/experience and Progressing to goal      Endings: None Reported     Modes of Intervention: Support, Socialization, Exploration, Clarifying and Problem-solving     Discipline Responsible: /Counselor              Signature:  SRIDEVI Zafar

## 2019-06-06 NOTE — GROUP NOTE
Group Therapy Note    Date: June 6    Group Start Time: 1430  Group End Time: 5500  Group Topic: Cognitive Skills    NA Ross, CTRS    Pt did not attend RT group at 1430 d/t resting in room despite staff invitation to attend. 1:1 talk time offered as alternative to group session, pt declined.            Signature:  Maryann Cantu

## 2019-06-06 NOTE — GROUP NOTE
Group Therapy Note    Date: June 6    Group Start Time: 1100  Group End Time: 1130  Group Topic: Psychoeducation    [facility]    Peace Hernandez, CTRS          Pt did not attend RT skills group d/t resting in room despite staff invitation to attend. 1:1 talk time offered as alternative to group session, pt declined.     Signature:  Sangeeta Olivarez

## 2019-06-06 NOTE — CARE COORDINATION
Vidhi Clark met with pt on this date. Thee Wilcox stated pt is not allowed to return to his mother's house. Pt is to either go to in rehab, if he chooses, or to have pt cabbed directly to [address] [place] [address] or call and have pt's CM pick pt up from [facility].

## 2019-06-06 NOTE — PLAN OF CARE
Problem: Depressive Behavior With or Without Suicide Precautions:  Goal: Able to verbalize and/or display a decrease in depressive symptoms  Description  Able to verbalize and/or display a decrease in depressive symptoms  Outcome: Ongoing  Note:   Pt admits to depression at this time, Pt isolates to his room. Problem: Depressive Behavior With or Without Suicide Precautions:  Goal: Ability to disclose and discuss suicidal ideas will improve  Description  Ability to disclose and discuss suicidal ideas will improve  Outcome: Ongoing  Note:   Pt denies suicidal ideation at this time. Pt is seclusive to his room. Problem: Depressive Behavior With or Without Suicide Precautions:  Goal: Absence of self-harm  Description  Absence of self-harm  Outcome: Ongoing     Problem: Substance Abuse:  Goal: Absence of drug withdrawal signs and symptoms  Description  Absence of drug withdrawal signs and symptoms  Outcome: Ongoing  Note:   Pt denies having drug withdrawal signs and symptoms at this time. Problem: Substance Abuse:  Goal: Participates in care planning  Description  Participates in care planning  Outcome: Ongoing  Note:   Pt participates in care planning at this time.

## 2019-06-06 NOTE — PLAN OF CARE
Problem: Depressive Behavior With or Without Suicide Precautions:  Goal: Able to verbalize and/or display a decrease in depressive symptoms  Description  Able to verbalize and/or display a decrease in depressive symptoms  6/6/2019 1939 by Lamberto Allen RN  Outcome: Ongoing  Note:   Patient has been calm, controlled and med complaint. Accepting of 1:1 talk time with staff. 1:1 with pt x ten minutes. Pt encouraged to attend unit programming and interact with peers and staff. Pt also encouraged to tend to hygiene and ADLs. Pt encouraged to discuss feelings with staff and feedback and reassurance provided. Reports anxiety and depression 2/10 this evening. Voices readiness for discharge. Problem: Depressive Behavior With or Without Suicide Precautions:  Goal: Ability to disclose and discuss suicidal ideas will improve  Description  Ability to disclose and discuss suicidal ideas will improve  Outcome: Ongoing  Note:   Patient denies suicidal and homicidal thoughts. Patient denies auditory and visual hallucinations. Patient is taking meds and eating well. Problem: Depressive Behavior With or Without Suicide Precautions:  Goal: Absence of self-harm  Description  Absence of self-harm  6/6/2019 1939 by Lamberto Allen RN  Outcome: Ongoing  Note:   No self harm noted this shift. Patient agrees to seek staff out if negative thoughts arise. Will continue to monitor Q15 minute and intermittently. Problem: Substance Abuse:  Goal: Absence of drug withdrawal signs and symptoms  Description  Absence of drug withdrawal signs and symptoms  Outcome: Ongoing  Note:   Denies signs and symptoms of withdrawal at this time.

## 2019-06-07 PROCEDURE — 6370000000 HC RX 637 (ALT 250 FOR IP): Performed by: NURSE PRACTITIONER

## 2019-06-07 PROCEDURE — 5130000000 HC BRIDGE APPOINTMENT

## 2019-06-07 PROCEDURE — 99238 HOSP IP/OBS DSCHRG MGMT 30/<: CPT | Performed by: NURSE PRACTITIONER

## 2019-06-07 RX ORDER — M-VIT,TX,IRON,MINS/CALC/FOLIC 27MG-0.4MG
1 TABLET ORAL DAILY
Qty: 30 TABLET | Refills: 0 | Status: ON HOLD | OUTPATIENT
Start: 2019-06-08 | End: 2019-09-13 | Stop reason: HOSPADM

## 2019-06-07 RX ORDER — LANOLIN ALCOHOL/MO/W.PET/CERES
100 CREAM (GRAM) TOPICAL DAILY
Qty: 30 TABLET | Refills: 0 | Status: ON HOLD | OUTPATIENT
Start: 2019-06-08 | End: 2019-09-13 | Stop reason: HOSPADM

## 2019-06-07 RX ORDER — FOLIC ACID 1 MG/1
1 TABLET ORAL DAILY
Qty: 30 TABLET | Refills: 0 | Status: ON HOLD | OUTPATIENT
Start: 2019-06-08 | End: 2019-09-13 | Stop reason: HOSPADM

## 2019-06-07 RX ORDER — ARIPIPRAZOLE 15 MG/1
15 TABLET ORAL DAILY
Qty: 30 TABLET | Refills: 0 | Status: ON HOLD | OUTPATIENT
Start: 2019-06-08 | End: 2019-09-13 | Stop reason: HOSPADM

## 2019-06-07 RX ADMIN — THIAMINE HCL TAB 100 MG 100 MG: 100 TAB at 09:03

## 2019-06-07 RX ADMIN — FOLIC ACID 1 MG: 1 TABLET ORAL at 09:03

## 2019-06-07 RX ADMIN — ARIPIPRAZOLE 15 MG: 15 TABLET ORAL at 09:03

## 2019-06-07 RX ADMIN — CETIRIZINE HYDROCHLORIDE 10 MG: 10 TABLET, FILM COATED ORAL at 09:03

## 2019-06-07 RX ADMIN — MULTIPLE VITAMINS W/ MINERALS TAB 1 TABLET: TAB at 09:03

## 2019-06-07 NOTE — BH NOTE
[facility]  Discharge Note    Pt discharged with followings belongings:   Dentures: None  Vision - Corrective Lenses: None  Hearing Aid: None  Jewelry: None  Body Piercings Removed: N/A  Clothing: Footwear, Pants, Shirt, Socks  Were All Patient Medications Collected?: Yes  Other Valuables: Other (Comment), Money (Comment)(abilify 30mg, lighter, lip baumx2, hair ties, cell , , melatonin 3mg, amitriptyline 25mg, benztropine 1mg, cetrizine 10mg, $0.64)   Valuables sent home with patient. Valuables retrieved from safe, Security envelope number:  [id number] and returned to patient. Patient left department with Departure Mode: By self, In cab via Mobility at Departure: Ambulatory, discharged to Discharged to: IP Rehab. Patient education on aftercare instructions: yes  Information faxed to [employer] by yes Patient verbalize understanding of AVS:  yes.     Status EXAM upon discharge:  Status and Exam  Normal: No  Facial Expression: Expressionless, Flat  Affect: Blunt  Level of Consciousness: Alert  Mood:Normal: No  Mood: Depressed, Anxious  Motor Activity:Normal: Yes  Motor Activity: Decreased  Interview Behavior: Cooperative, Evasive  Preception: F[place]ian to Person, Verneita Daily to Time, F[place] to Place, Fithian to Situation  Attention:Normal: Yes  Attention: Unable to Concentrate  Thought Processes: Blocking  Thought Content:Normal: No  Thought Content: Poverty of Content, Preoccupations  Hallucinations: None  Delusions: No  Memory:Normal: Yes  Memory: Poor Recent, Poor Remote  Insight and Judgment: No  Insight and Judgment: Poor Judgment, Poor Insight, Unmotivated  Present Suicidal Ideation: No  Present Homicidal Ideation: No      Metabolic Screening:    Lab Results   Component Value Date    LABA1C 4.6 02/26/2019       Lab Results   Component Value Date    CHOL 150 02/26/2019    CHOL 162 01/21/2019    CHOL 153 11/11/2018    CHOL 129 07/05/2018    CHOL 145 11/05/2017    CHOL 140 06/13/2015     Lab Results   Component Value Date    TRIG 112 02/26/2019    TRIG 184 (H) 01/21/2019    TRIG 142 11/11/2018    TRIG 104 07/05/2018    TRIG 105 11/05/2017    TRIG 167 (H) 06/13/2015     Lab Results   Component Value Date    HDL 52 02/26/2019    HDL 46 01/21/2019    HDL 49 11/11/2018    HDL 73 07/05/2018    HDL 67 11/05/2017    HDL 31 (L) 06/13/2015     No components found for: LDLCAL  No results found for: Natty Gibson RN

## 2019-06-07 NOTE — PROGRESS NOTES
CLINICAL PHARMACY NOTE: MEDS TO [address] Select Patient?: No  Total # of Prescriptions Filled: 4   The following medications were delivered to the patient:  · Folic Acid  · Vitamin B  · Therems  · Abilify  ·   Total # of Interventions Completed: 0  Time Spent (min): 30    Additional Documentation:

## 2019-06-07 NOTE — DISCHARGE SUMMARY
DISCHARGE SUMMARY  Patient ID:  Godfrey Colón  [id number]  41 y.o.  1990    Admit date: 6/3/2019    Discharge date and time: 6/7/2019  10:17 AM     Admitting Physician: Rosa Balderas MD     Discharge Physician:  LIBBY Puentes - CNP    Admission Diagnoses: Bipolar 1 disorder ([address] [F31.9]  MDD (major depressive disorder), recurrent episode ([address] [F33.9]    Discharge Diagnoses:   Bipolar 1 disorder ([address]     Patient Active Problem List   Diagnosis Code    Schizoaffective disorder, bipolar type ([address] F25.0    Suicide attempt by drug ingestion ([address] T50.902A    Tobacco use Z72.0    Polysubstance abuse ([address] F19.10    Atrial flutter, paroxysmal ([address] I48.92    Episodic cannabis use F12.90    Cocaine use F14.90    Accidental drug overdose T50.901A    Schizoaffective disorder ([address] F25.9    Suicide attempt ([address] T14.91XA    PTSD (post-traumatic stress disorder) F43.10    Bipolar 1 disorder ([address] F31.9    Anxiety F41.9    Alcoholism ([address] F10.20    ADHD (attention deficit hyperactivity disorder) F90.9    Intentional drug overdose ([address] T50.902A    Drug overdose T50.901A    MDD (major depressive disorder), recurrent episode ([address] F33.9        Admission Condition: poor    Discharged Condition: stable    Indication for Admission: threat to self    History of Present Illnes (present tense wording indicates findings from admission exam on 6/3/2019 and are not necessarily indicative of current findings): Hospital Course:   Upon admission, Godfrey Colón was provided a safe secure environment, introduced to unit milieu. Patient participated in groups and individual therapies. Meds were adjusted. After few days of hospital care, patient began to feel improvement. Depression lifted, thoughts to harm self ceased. Sleep improved, appetite was good. On morning rounds 6/7/2019, patient endorsed feeling ready for discharge.   Patient denies suicidal or homicidal ideations, denies hallucinations or delusions. Denies SE's from meds. It was decided that pt had achieved maximum benefit from hospital care and can be discharged     Consults:   None    Significant Diagnostic Studies: Routine labs and diagnostics    Treatments: Psychotropic medications, therapy with group, milieu, and 1:1 with nurses, social workers, PASungC/CNP, and Attending physician.       Discharge Medications:  Current Discharge Medication List      START taking these medications    Details   folic acid (FOLVITE) 1 MG tablet Take 1 tablet by mouth daily  Qty: 30 tablet, Refills: 0      Multiple Vitamins-Minerals (THERAPEUTIC MULTIVITAMIN-MINERALS) tablet Take 1 tablet by mouth daily  Qty: 30 tablet, Refills: 0      vitamin B-1 100 MG tablet Take 1 tablet by mouth daily  Qty: 30 tablet, Refills: 0         CONTINUE these medications which have CHANGED    Details   ARIPiprazole (ABILIFY) 15 MG tablet Take 1 tablet by mouth daily MEDS TO BEDS  Qty: 30 tablet, Refills: 0         CONTINUE these medications which have NOT CHANGED    Details   ARIPiprazole er (ABILIFY MAINTENA) 300 MG PRSY prefilled syringe Inject 300 mg into the muscle every 28 days Indications: Due 06/19         STOP taking these medications       naltrexone (DEPADE) 50 MG tablet Comments:   Reason for Stopping:         brexpiprazole (REXULTI) 2 MG TABS tablet Comments:   Reason for Stopping:         benztropine (COGENTIN) 1 MG tablet Comments:   Reason for Stopping:         amitriptyline (ELAVIL) 50 MG tablet Comments:   Reason for Stopping:         naltrexone (DEPADE) 50 MG tablet Comments:   Reason for Stopping:         cetirizine (ZYRTEC) 10 MG tablet Comments:   Reason for Stopping:         brexpiprazole (REXULTI) 2 MG TABS tablet Comments:   Reason for Stopping:                    Discharge Exam:  Level of consciousness:  Within normal limits  Appearance: Street clothes, seated, with good grooming  Behavior/Motor: No abnormalities noted  Attitude toward examiner:  Cooperative, attentive, good eye contact  Speech:  spontaneous, normal rate, normal volume and well articulated  Mood:  euthymic  Affect:  mood congruent  Thought processes:  linear, goal directed and coherent  Thought content:  Homocidal ideation denies  Suicidal Ideation:  denies suicidal ideation  Delusions:  no evidence of delusions  Perceptual Disturbance:  denies any perceptual disturbance  Cognition:  In tact  Memory: age appropriate  Insight & Judgement: fair  Medication side effects:  denies     Disposition: home    Patient Instructions: Activity: activity as tolerated    Follow-up as scheduled with [facility]    Time Spent: 35 minutes    Engagement: Patient displayed a good level of engagement with the treatments offered during this admission. Discharge planning, findings, and recommendations were discussed with the patient and treatment team.   Signed:  Marcus Fiore CNP  6/7/2019  10:17 AM  Dragon voice recognition software used in portions of this document.

## 2019-06-07 NOTE — GROUP NOTE
Group Therapy Note    Date: June 7    Group Start Time: 0900  Group End Time: 0915  Group Topic: Community Meeting    NA Hernandez, CTRS    Pt did not attend [employer] at 0900 d/t resting in room despite staff invitation to attend. 1:1 talk time offered as alternative to group session, pt declined.          Signature:  Yolanda Mosley

## 2019-06-07 NOTE — CARE COORDINATION
Name: Fernando Correa    : 1990    Discharge Date: 69  Primary Auth/Cert #: [id number]    Pt was discharged to in rehab. Discharge Medications:      Medication List      START taking these medications    folic acid 1 MG tablet  Commonly known as:  FOLVITE  Take 1 tablet by mouth daily  Start taking on:  2019  Notes to patient:  Supplement     therapeutic multivitamin-minerals tablet  Take 1 tablet by mouth daily  Start taking on:  2019  Notes to patient:  Supplement     thiamine 100 MG tablet  Take 1 tablet by mouth daily  Start taking on:  2019  Notes to patient:  Supplement        CHANGE how you take these medications    * ARIPiprazole 15 MG tablet  Commonly known as:  ABILIFY  Take 1 tablet by mouth daily MEDS TO BEDS  Start taking on:  2019  What changed: You were already taking a medication with the same name, and this prescription was added. Make sure you understand how and when to take each. Notes to patient:  Helps with clear thoughts     * ARIPiprazole er 300 MG Prsy prefilled syringe  Commonly known as:  ABILIFY MAINTENA  What changed:  Another medication with the same name was added. Make sure you understand how and when to take each. Notes to patient:  Helps with clear thoughts         * This list has 2 medication(s) that are the same as other medications prescribed for you. Read the directions carefully, and ask your doctor or other care provider to review them with you.             STOP taking these medications    amitriptyline 50 MG tablet  Commonly known as:  ELAVIL     brexpiprazole 2 MG Tabs tablet  Commonly known as:  REXULTI     naltrexone 50 MG tablet  Commonly known as:  DEPADE           Where to Get Your Medications      These medications were sent to [address] [place], [place] [address]Jared sams   [address], [address] [facility] [address]    Phone:  [phone]   · ARIPiprazole 15 MG tablet  · folic acid 1 MG tablet  · therapeutic multivitamin-minerals tablet  · thiamine 100 MG tablet         Follow Up Appointment: [address] [facility].  [address] [place] [address] [address]  [phone]    Go to  D

## 2019-06-07 NOTE — BH NOTE
Patient given tobacco quitline number 74772492142 at this time, refusing to call at this time, states \" I just dont want to quit now\"- patient given information as to the dangers of long term tobacco use. Continue to reinforce the importance of tobacco cessation.

## 2019-06-07 NOTE — CARE COORDINATION
Bridge Appointment completed: Reviewed Discharge Instructions with patient. Patient verbalizes understanding and agreement with the discharge plan using the teachback method. Discharge Arrangements: Pt is scheduled to follow up with [facility] [address] at 6/11/19 at 9:45 am for medication management.     Guardian notified: N/A  Discharge destination/address: [facility] [address]; [address], [place]  Transported by: [employer], set up through [email or web address]

## 2019-06-08 ENCOUNTER — HOSPITAL ENCOUNTER (EMERGENCY)
Age: 29
Discharge: HOME | End: 2019-06-08
Attending: EMERGENCY MEDICINE
Payer: COMMERCIAL

## 2019-06-08 VITALS
DIASTOLIC BLOOD PRESSURE: 68 MMHG | SYSTOLIC BLOOD PRESSURE: 107 MMHG | OXYGEN SATURATION: 98 % | HEART RATE: 84 BPM | HEIGHT: 72 IN | TEMPERATURE: 98.4 F | BODY MASS INDEX: 24.38 KG/M2 | RESPIRATION RATE: 18 BRPM | WEIGHT: 180 LBS

## 2019-06-08 VITALS
HEART RATE: 88 BPM | DIASTOLIC BLOOD PRESSURE: 80 MMHG | HEIGHT: 72 IN | SYSTOLIC BLOOD PRESSURE: 110 MMHG | RESPIRATION RATE: 19 BRPM | BODY MASS INDEX: 25.73 KG/M2 | OXYGEN SATURATION: 100 % | TEMPERATURE: 97.2 F | WEIGHT: 190 LBS

## 2019-06-08 DIAGNOSIS — R45.851 SUICIDAL IDEATION: Primary | ICD-10-CM

## 2019-06-08 PROCEDURE — 99284 EMERGENCY DEPT VISIT MOD MDM: CPT

## 2019-06-08 ASSESSMENT — ENCOUNTER SYMPTOMS
SHORTNESS OF BREATH: 0
ABDOMINAL PAIN: 0
RHINORRHEA: 0

## 2019-06-08 NOTE — ED PROVIDER NOTES
nondistended, disheveled appearance    Impression: Drug abuse    Plan:  consult, discharge      Luana Alvarez MD  Attending Emergency Physician        Shaunna Skiff, MD  06/08/19 1370

## 2019-06-08 NOTE — ED PROVIDER NOTES
[facility] ED  Emergency Department Encounter  Emergency Medicine Resident     Pt Name: Sekou Araiza  MRN: [id number]  [place] 1990  Date ofevaluation: 6/8/19  PCP:  No primary care provider on file. CHIEF COMPLAINT       Chief Complaint   Patient presents with    Drug / Alcohol Assessment       HISTORY OF PRESENT ILLNESS  (Location/Symptom, Timing/Onset, Context/Setting, Quality, Duration, Modifying Factors, Severity, Associated signs/symptoms)     Sekou Araiza is a 33 y.o. male who presents interested in detoxification. Patient states that he did use heroin and cocaine several hours ago. Denies any alcohol use today. Patient states that he is interested in detoxification from all the substances. States that he has a history of having seizures when he withdraws from alcohol that this was a long time ago. He otherwise denies any other medical complaints at this time. No suicidal or homicidal ideation. PAST MEDICAL / SURGICAL / SOCIAL / FAMILY HISTORY      has a past medical history of ADHD (attention deficit hyperactivity disorder), Alcoholism ([facility] Anxiety, Bipolar 1 disorder ([facility] PTSD (post-traumatic stress disorder), and [facility] has no past surgical history on file.  Denies    Social History     Socioeconomic History    Marital status: Single     Spouse name: Not on file    Number of children: Not on file    Years of education: Not on file    Highest education level: Not on file   Occupational History    Not on file   Social Needs    Financial resource strain: Not on file    Food insecurity:     Worry: Not on file     Inability: Not on file    Transportation needs:     Medical: Not on file     Non-medical: Not on file   Tobacco Use    Smoking status: Light Tobacco Smoker     Packs/day: 0.50     Years: 14.00     Pack years: 7.00     Types: Cigarettes    Smokeless tobacco: Never Used    Tobacco comment: pt accepting of nicotine replacement   Substance and Sexual Activity    Alcohol use: Yes     Comment: pt drinks 6 beers and a liter of vodka daily.  Drug use: Yes     Types: Cocaine, Opiates , IV     Comment: drug abuse includes cocaine & heroin    Sexual activity: Never   Lifestyle    Physical activity:     Days per week: Not on file     Minutes per session: Not on file    Stress: Not on file   Relationships    Social connections:     Talks on phone: Not on file     Gets together: Not on file     Attends Judaism service: Not on file     Active member of club or organization: Not on file     Attends meetings of clubs or organizations: Not on file     Relationship status: Not on file    Intimate partner violence:     Fear of current or ex partner: Not on file     Emotionally abused: Not on file     Physically abused: Not on file     Forced sexual activity: Not on file   Other Topics Concern    Not on file   Social History Narrative    Not on file       Family History   Problem Relation Age of Onset    Liver Cancer Brother     Alcohol Abuse Brother     No Known Problems Mother     No Known Problems Father        Allergies:  Advil [ibuprofen]    Home Medications:  Prior to Admission medications    Medication Sig Start Date End Date Taking?  Authorizing Provider   ARIPiprazole (ABILIFY) 15 MG tablet Take 1 tablet by mouth daily MEDS TO BEDS 6/8/19   LIBBY Butterfield CNP   folic acid (FOLVITE) 1 MG tablet Take 1 tablet by mouth daily 6/8/19   LIBBY Butterfield CNP   Multiple Vitamins-Minerals (THERAPEUTIC MULTIVITAMIN-MINERALS) tablet Take 1 tablet by mouth daily 6/8/19   LIBBY Butterfield CNP   vitamin B-1 100 MG tablet Take 1 tablet by mouth daily 6/8/19   LIBBY Butterfield CNP   ARIPiprazole er (ABILIFY MAINTENA) 300 MG PRSY prefilled syringe Inject 300 mg into the muscle every 28 days Indications: Due 06/19    Historical Provider, MD       REVIEW OF SYSTEMS    (2-9 systems for level 4, 10 or more for level 5)      Review of Systems   Constitutional: Negative for chills and fever. HENT: Negative for congestion and rhinorrhea. Eyes: Negative for visual disturbance. Respiratory: Negative for shortness of breath. Cardiovascular: Negative for chest pain. Gastrointestinal: Negative for abdominal pain. Genitourinary: Negative for dysuria and frequency. Musculoskeletal: Negative for arthralgias. Skin: Negative for wound. Neurological: Negative for dizziness and light-headedness. Psychiatric/Behavioral: Negative for hallucinations and suicidal ideas. PHYSICAL EXAM   (up to 7 for level 4, 8 or more for level 5)      INITIAL VITALS:   /68   Pulse 84   Temp 98.4 °F (36.9 °C) (Oral)   Resp 18   Ht 6' (1.829 m)   Wt 180 lb (81.6 kg)   SpO2 98%   BMI 24.41 kg/m²     Physical Exam   Constitutional: He is oriented to person, place, and time. No distress. HENT:   Head: Normocephalic and atraumatic. Eyes: Right eye exhibits no discharge. Left eye exhibits no discharge. Cardiovascular: Normal rate, regular rhythm and normal heart sounds. Exam reveals no friction rub. No murmur heard. Pulmonary/Chest: Effort normal and breath sounds normal. No stridor. No respiratory distress. He has no wheezes. He has no rales. He exhibits no tenderness. Abdominal: Soft. He exhibits no distension. There is no tenderness. There is no guarding. Neurological: He is alert and oriented to person, place, and time. Skin: Skin is warm and dry. He is not diaphoretic. Psychiatric: He expresses no homicidal and no suicidal ideation. He expresses no suicidal plans and no homicidal plans. Nursing note and vitals reviewed. DIAGNOSTICS     PLAN (LABS / IMAGING / EKG):  No orders of the defined types were placed in this encounter. MEDICATIONS ORDERED:  No orders of the defined types were placed in this encounter.       DIAGNOSTIC RESULTS / EMERGENCYDEPARTMENT COURSE / MDM     LABS:  No results found for this visit on 06/08/19. EMERGENCY DEPARTMENT COURSE:    MDM: Patient not clinically withdrawing at this time. No acute distress. Given resources by  for outpatient follow-up. Advised return if he develops any symptoms. Patient is agreeable with discharge plan. PROCEDURES:  None    CONSULTS:  None    FINAL IMPRESSION      1.  Drug or alcohol risk assessment or counseling          DISPOSITION / PLAN     DISPOSITION  discharged      PATIENT REFERRED TO:  [facility] ED  [address] [place] [address]  [phone]    If symptoms worsen      DISCHARGE MEDICATIONS:  Discharge Medication List as of 6/8/2019 12:29 PM          Lisa Canas MD  Emergency Medicine Resident  [address] [facility]    (Please note that portions of this note were completed with a voice recognition program.  Efforts were made to edit thedictations but occasionally words are mis-transcribed.)       Lisa Canas MD  Resident  06/08/19 6374 None

## 2019-06-08 NOTE — ED TRIAGE NOTES
Pt presents to ed tx room aaox3 c/o hearing voices and requesting to go to Rescue. Pt denies any symptoms at this time. Pt denies SI and HI. VSS, steady gait, clear speech, normal respirations.

## 2019-06-08 NOTE — ED NOTES
Bed: 04  Expected date:   Expected time:   Means of arrival:   Comments:  [address], RN  06/08/19 1015

## 2019-06-08 NOTE — ED NOTES
Pt given d/c and f/u instructions, understanding verbalized. Pt left er aaox3, in nad with steady gait, normal respirations. Pt escorted to Rescue with .       Jose Francisco Enciso RN  06/08/19 0546

## 2019-06-08 NOTE — ED PROVIDER NOTES
[facility] ED  Emergency Department Encounter  EmergencyMedicine Resident     Pt Name:Girish Pride  MRN: [id number]  [place] 1990  Date of evaluation: 6/8/19  PCP:  No primary care provider on file. CHIEF COMPLAINT       Chief Complaint   Patient presents with    Hallucinations       HISTORY OF PRESENT ILLNESS  (Location/Symptom, Timing/Onset, Context/Setting, Quality, Duration, Modifying Factors, Severity.)      Lilibeth Sanchez is a 33 y.o. male who presents with suicidal ideation. Patient states he was outside and is checked into the hospital so he can get a ride to rescue crisis. He denies any plan. Seizure-free bili for similar complaints and does not follow-up. He states he does need to get his meds adjusted. PAST MEDICAL / SURGICAL / SOCIAL / FAMILY HISTORY      has a past medical history of ADHD (attention deficit hyperactivity disorder), Alcoholism ([facility] Anxiety, Bipolar 1 disorder ([facility] PTSD (post-traumatic stress disorder), and Suicide attempt ([facility] has no past surgical history on file. Social History     Socioeconomic History    Marital status: Single     Spouse name: Not on file    Number of children: Not on file    Years of education: Not on file    Highest education level: Not on file   Occupational History    Not on file   Social Needs    Financial resource strain: Not on file    Food insecurity:     Worry: Not on file     Inability: Not on file    Transportation needs:     Medical: Not on file     Non-medical: Not on file   Tobacco Use    Smoking status: Light Tobacco Smoker     Packs/day: 0.50     Years: 14.00     Pack years: 7.00     Types: Cigarettes    Smokeless tobacco: Never Used    Tobacco comment: pt accepting of nicotine replacement   Substance and Sexual Activity    Alcohol use: Yes     Comment: pt drinks 6 beers and a liter of vodka daily.     Drug use: Yes     Types: Cocaine, Opiates , IV     Comment: drug abuse includes PHYSICAL EXAM   (up to 7 for level 4, 8 or more for level 5)      INITIAL VITALS:   /74   Pulse 86   Temp 97.2 °F (36.2 °C) (Oral)   Resp 18   Ht 6' (1.829 m)   Wt 190 lb (86.2 kg)   SpO2 100%   BMI 25.77 kg/m²     Physical Exam   Constitutional: He is oriented to person, place, and time. He appears well-developed and well-nourished. No distress. HENT:   Head: Normocephalic and atraumatic. Cardiovascular: Normal rate. Pulmonary/Chest: Effort normal.   Neurological: He is alert and oriented to person, place, and time. Skin: Skin is warm. Capillary refill takes less than 2 seconds. He is not diaphoretic. Psychiatric: He is slowed and withdrawn. He exhibits a depressed mood. He expresses suicidal ideation. He expresses no suicidal plans. He is inattentive. Nursing note and vitals reviewed. DIFFERENTIAL  DIAGNOSIS     PLAN (LABS / IMAGING / EKG):  No orders of the defined types were placed in this encounter. MEDICATIONS ORDERED:  No orders of the defined types were placed in this encounter. DDX:     DIAGNOSTIC RESULTS / EMERGENCY DEPARTMENT COURSE / MDM     LABS:  No results found for this visit on 06/08/19. IMPRESSION: Patient states he checked himself into get a ride to rescue crisis. He is endorsing suicidal ideation. I had discussion with patient that he is misusing emergency room resources and that he could've walked to rescue crisis himself. Social work has evaluated patient. We'll provide providing transport for him to rescue crisis to get a psychiatric evaluation. RADIOLOGY:  None    EKG  None    All EKG's are interpreted by the Emergency Department Physician who either signs or Co-signs this chart in the absence of a cardiologist.    EMERGENCY DEPARTMENT COURSE:    PROCEDURES:  None    CONSULTS:  None    CRITICAL CARE:  None    FINAL IMPRESSION      1.  Suicidal ideation          DISPOSITION / PLAN     DISPOSITION Decision To Discharge 06/08/2019 04:22:34 PM      PATIENT REFERRED TO:  No follow-up provider specified. DISCHARGE MEDICATIONS:  New Prescriptions    No medications on file       Tahir Mcrae DO  Emergency Medicine Resident    (Please note that portions of thisnote were completed with a voice recognition program.  Efforts were made to edit the dictations but occasionally words are mis-transcribed. )        Tahir Mcrae DO  Resident  06/08/19 9684

## 2019-06-08 NOTE — ED TRIAGE NOTES
Pt to ED by ems for heroin use and stating that he wants to get help. Pt states that he injected heroin earlier this morning, pt did not receive any narcan per ems. Pt denies any other drug use or alcohol use today. Pt denies any other pain or complaints.

## 2019-06-08 NOTE — DISCHARGE SUMMARY
[facility]    Discharge Summary     Patient ID: Jonh Bella  :  1990   MRN: [id number]     ACCOUNT:  [de-identified]   Patient's PCP: No primary care provider on file. Admit Date: 2019   Discharge Date: 2019     Length of Stay: 2  Code Status:  Prior  Admitting Physician: Hiral Pacheco, DO  Discharge Physician: Sol Morgan MD     Active Discharge Diagnoses:     Hospital Problem Lists:  Principal Problem:    Intentional drug overdose [facility])  Active Problems:    Tobacco use    Polysubstance abuse ([facility]    Cocaine use    Anxiety    Alcoholism ([facility]  Resolved Problems:    * No resolved hospital problems. *    Admission Condition:  fair     Discharged Condition: fair    Hospital Stay:     Hospital Course:    Mr Marisol Chen is a 33year old with history of bipolar disorder, on multiple psychiatric medications. Also has poly substance abuse with heroin and cocaine use. Admitted after he took multiple psychiatric medications as intentional overdose from depression and helplessness. Used multiple drugs recently including cocaine, alcohol and heroin. Admitted overnight, once medically clear admitted to psychiatric unit    Significant therapeutic interventions: none    Significant Diagnostic Studies:     Consultations:    Consults:     Final Specialist Recommendations/Findings:   IP CONSULT TO INTERNAL MEDICINE  IP CONSULT TO PSYCHIATRY      The patient was seen and examined on day of discharge and this discharge summary is in conjunction with any daily progress note from day of discharge. Discharge plan:     Disposition: psychiatric unit    Physician Follow Up:     No follow-up provider specified.      Requiring Further Evaluation/Follow Up POST HOSPITALIZATION/Incidental Findings: none    Diet: regular diet    Activity: As tolerated    Instructions to Patient: none    Discharge Medications:      Medication List      STOP taking these medications    amitriptyline 50 MG tablet  Commonly known as:  ELAVIL     brexpiprazole 2 MG Tabs tablet  Commonly known as:  REXULTI     lurasidone 40 MG Tabs tablet  Commonly known as:  LATUDA     naltrexone 50 MG tablet  Commonly known as:  DEPADE            Time Spent on discharge is  20 mins in patient examination, evaluation, counseling as well as medication reconciliation, prescriptions for required medications, discharge plan and follow up.     Electronically signed by   Jake Neri MD  6/8/2019  11:19 AM

## 2019-06-08 NOTE — ED PROVIDER NOTES
[address] [facility]     Emergency Department     Faculty Attestation    I performed a history and physical examination of the patient and discussed management with the resident. I reviewed the residents note and agree with the documented findings and plan of care. Any areas of disagreement are noted on the chart. I was personally present for the key portions of any procedures. I have documented in the chart those procedures where I was not present during the key portions. I have reviewed the emergency nurses triage note. I agree with the chief complaint, past medical history, past surgical history, allergies, medications, social and family history as documented unless otherwise noted below. Documentation of the HPI, Physical Exam and Medical Decision Making performed by medical students or scribes is based on my personal performance of the HPI, PE and MDM. For Midlevel providers: I have personally seen and evaluated the patient. I find the patient's history and physical exam are consistent with the NP/PA documentation. I agree with the care provided, treatment rendered, disposition and follow-up plan        Prior history of psychiatric issues.       Critical Care          MD Kwame Cain MD  06/08/19 3566

## 2019-06-22 ENCOUNTER — HOSPITAL ENCOUNTER (EMERGENCY)
Age: 29
Discharge: PSYCHIATRIC OR SUBSTANCE USE FACILITY | End: 2019-06-22
Attending: EMERGENCY MEDICINE
Payer: COMMERCIAL

## 2019-06-22 VITALS
SYSTOLIC BLOOD PRESSURE: 102 MMHG | BODY MASS INDEX: 20.32 KG/M2 | DIASTOLIC BLOOD PRESSURE: 62 MMHG | WEIGHT: 150 LBS | HEART RATE: 69 BPM | OXYGEN SATURATION: 96 % | TEMPERATURE: 98.3 F | HEIGHT: 72 IN | RESPIRATION RATE: 16 BRPM

## 2019-06-22 DIAGNOSIS — F11.93 ACUTE OPIOID WITHDRAWAL (HCC): ICD-10-CM

## 2019-06-22 DIAGNOSIS — F10.930 ALCOHOL WITHDRAWAL SYNDROME WITHOUT COMPLICATION (HCC): Primary | ICD-10-CM

## 2019-06-22 LAB
ABSOLUTE EOS #: 0.45 K/UL (ref 0–0.44)
ABSOLUTE IMMATURE GRANULOCYTE: 0.03 K/UL (ref 0–0.3)
ABSOLUTE LYMPH #: 1.97 K/UL (ref 1.1–3.7)
ABSOLUTE MONO #: 0.71 K/UL (ref 0.1–1.2)
ACETAMINOPHEN LEVEL: <5 UG/ML (ref 10–30)
ALBUMIN SERPL-MCNC: 4.5 G/DL (ref 3.5–5.2)
ALBUMIN/GLOBULIN RATIO: 1.5 (ref 1–2.5)
ALP BLD-CCNC: 46 U/L (ref 40–129)
ALT SERPL-CCNC: 15 U/L (ref 5–41)
AMPHETAMINE SCREEN URINE: NEGATIVE
ANION GAP SERPL CALCULATED.3IONS-SCNC: 13 MMOL/L (ref 9–17)
AST SERPL-CCNC: 20 U/L
BARBITURATE SCREEN URINE: NEGATIVE
BASOPHILS # BLD: 1 % (ref 0–2)
BASOPHILS ABSOLUTE: 0.04 K/UL (ref 0–0.2)
BENZODIAZEPINE SCREEN, URINE: NEGATIVE
BILIRUB SERPL-MCNC: 0.36 MG/DL (ref 0.3–1.2)
BILIRUBIN URINE: NEGATIVE
BUN BLDV-MCNC: 15 MG/DL (ref 6–20)
BUN/CREAT BLD: NORMAL (ref 9–20)
BUPRENORPHINE URINE: ABNORMAL
CALCIUM SERPL-MCNC: 9.1 MG/DL (ref 8.6–10.4)
CANNABINOID SCREEN URINE: NEGATIVE
CHLORIDE BLD-SCNC: 104 MMOL/L (ref 98–107)
CO2: 24 MMOL/L (ref 20–31)
COCAINE METABOLITE, URINE: POSITIVE
COLOR: YELLOW
COMMENT UA: ABNORMAL
CREAT SERPL-MCNC: 0.72 MG/DL (ref 0.7–1.2)
DIFFERENTIAL TYPE: ABNORMAL
EOSINOPHILS RELATIVE PERCENT: 6 % (ref 1–4)
ETHANOL PERCENT: 0.11 %
ETHANOL: 111 MG/DL
GFR AFRICAN AMERICAN: >60 ML/MIN
GFR NON-AFRICAN AMERICAN: >60 ML/MIN
GFR SERPL CREATININE-BSD FRML MDRD: NORMAL ML/MIN/{1.73_M2}
GFR SERPL CREATININE-BSD FRML MDRD: NORMAL ML/MIN/{1.73_M2}
GLUCOSE BLD-MCNC: 85 MG/DL (ref 70–99)
GLUCOSE URINE: NEGATIVE
HCT VFR BLD CALC: 45.1 % (ref 40.7–50.3)
HEMOGLOBIN: 14.5 G/DL (ref 13–17)
IMMATURE GRANULOCYTES: 0 %
KETONES, URINE: NEGATIVE
LEUKOCYTE ESTERASE, URINE: NEGATIVE
LYMPHOCYTES # BLD: 24 % (ref 24–43)
MCH RBC QN AUTO: 30 PG (ref 25.2–33.5)
MCHC RBC AUTO-ENTMCNC: 32.2 G/DL (ref 28.4–34.8)
MCV RBC AUTO: 93.2 FL (ref 82.6–102.9)
MDMA URINE: ABNORMAL
METHADONE SCREEN, URINE: NEGATIVE
METHAMPHETAMINE, URINE: ABNORMAL
MONOCYTES # BLD: 9 % (ref 3–12)
NITRITE, URINE: NEGATIVE
NRBC AUTOMATED: 0 PER 100 WBC
OPIATES, URINE: NEGATIVE
OXYCODONE SCREEN URINE: NEGATIVE
PDW BLD-RTO: 13.1 % (ref 11.8–14.4)
PH UA: 5.5 (ref 5–8)
PHENCYCLIDINE, URINE: NEGATIVE
PLATELET # BLD: 238 K/UL (ref 138–453)
PLATELET ESTIMATE: ABNORMAL
PMV BLD AUTO: 10.6 FL (ref 8.1–13.5)
POTASSIUM SERPL-SCNC: 4 MMOL/L (ref 3.7–5.3)
PROPOXYPHENE, URINE: ABNORMAL
PROTEIN UA: NEGATIVE
RBC # BLD: 4.84 M/UL (ref 4.21–5.77)
RBC # BLD: ABNORMAL 10*6/UL
SALICYLATE LEVEL: <1 MG/DL (ref 3–10)
SEG NEUTROPHILS: 60 % (ref 36–65)
SEGMENTED NEUTROPHILS ABSOLUTE COUNT: 4.94 K/UL (ref 1.5–8.1)
SODIUM BLD-SCNC: 141 MMOL/L (ref 135–144)
SPECIFIC GRAVITY UA: 1 (ref 1–1.03)
TEST INFORMATION: ABNORMAL
TOTAL PROTEIN: 7.6 G/DL (ref 6.4–8.3)
TOXIC TRICYCLIC SC,BLOOD: NEGATIVE
TRICYCLIC ANTIDEPRESSANTS, UR: ABNORMAL
TURBIDITY: CLEAR
URINE HGB: NEGATIVE
UROBILINOGEN, URINE: NORMAL
WBC # BLD: 8.1 K/UL (ref 3.5–11.3)
WBC # BLD: ABNORMAL 10*3/UL

## 2019-06-22 PROCEDURE — 80307 DRUG TEST PRSMV CHEM ANLYZR: CPT

## 2019-06-22 PROCEDURE — 80053 COMPREHEN METABOLIC PANEL: CPT

## 2019-06-22 PROCEDURE — G0480 DRUG TEST DEF 1-7 CLASSES: HCPCS

## 2019-06-22 PROCEDURE — 99285 EMERGENCY DEPT VISIT HI MDM: CPT

## 2019-06-22 PROCEDURE — 85025 COMPLETE CBC W/AUTO DIFF WBC: CPT

## 2019-06-22 PROCEDURE — 81003 URINALYSIS AUTO W/O SCOPE: CPT

## 2019-06-22 PROCEDURE — 6370000000 HC RX 637 (ALT 250 FOR IP): Performed by: STUDENT IN AN ORGANIZED HEALTH CARE EDUCATION/TRAINING PROGRAM

## 2019-06-22 RX ORDER — CLONIDINE HYDROCHLORIDE 0.1 MG/1
0.1 TABLET ORAL ONCE
Status: COMPLETED | OUTPATIENT
Start: 2019-06-22 | End: 2019-06-22

## 2019-06-22 RX ORDER — CHLORDIAZEPOXIDE HYDROCHLORIDE 10 MG/1
10 CAPSULE, GELATIN COATED ORAL ONCE
Status: COMPLETED | OUTPATIENT
Start: 2019-06-22 | End: 2019-06-22

## 2019-06-22 RX ORDER — ONDANSETRON 4 MG/1
4 TABLET, ORALLY DISINTEGRATING ORAL ONCE
Status: COMPLETED | OUTPATIENT
Start: 2019-06-22 | End: 2019-06-22

## 2019-06-22 RX ORDER — EPIRUBICIN HYDROCHLORIDE 2 MG/ML
INJECTION, SOLUTION INTRAVENOUS ONCE
Status: ON HOLD | COMMUNITY
End: 2019-09-13 | Stop reason: HOSPADM

## 2019-06-22 RX ADMIN — CLONIDINE HYDROCHLORIDE 0.1 MG: 0.1 TABLET ORAL at 14:23

## 2019-06-22 RX ADMIN — ONDANSETRON 4 MG: 4 TABLET, ORALLY DISINTEGRATING ORAL at 14:23

## 2019-06-22 RX ADMIN — CHLORDIAZEPOXIDE HYDROCHLORIDE 10 MG: 10 CAPSULE ORAL at 20:48

## 2019-06-22 ASSESSMENT — ENCOUNTER SYMPTOMS
ABDOMINAL DISTENTION: 0
ABDOMINAL PAIN: 1
COUGH: 0
COLOR CHANGE: 0
SINUS PRESSURE: 0
BACK PAIN: 0
WHEEZING: 0
VOMITING: 1
SORE THROAT: 0
DIARRHEA: 0
NAUSEA: 1
CHEST TIGHTNESS: 0
SHORTNESS OF BREATH: 0
SINUS PAIN: 0

## 2019-06-22 NOTE — ED NOTES
Spoke with [facility], they received paperwork. Will review and contact back.       Misha Villeda  06/22/19 3687

## 2019-06-22 NOTE — ED NOTES
SW spoke with staff at [facility]. Staff reported at this time patient is unable to come due to staffing.       Patti Cunningham, MSW, LSW  06/22/19 6734

## 2019-06-22 NOTE — ED NOTES
JAYNE made a phone call to [facility] in regards to patient admission. JAYNE spoke with Loyda Rehman and she reported patient was accepted at [facility]. She reported will call JAYNE back in 10 minutes.       Mesha Arthur MSW, LSW  06/22/19 3295

## 2019-06-22 NOTE — ED NOTES
Lab work and notes faxed to [facility] for review.       Charlie Negrete [place]  06/22/19 6101

## 2019-06-22 NOTE — ED PROVIDER NOTES
No Known Problems Father        Allergies:    Advil [ibuprofen]    Home Medications:  Prior to Admission medications    Medication Sig Start Date End Date Taking? Authorizing Provider   epiRUBicin HCl (ELLENCE) 50 MG/25ML SOLN chemo syringe Infuse intravenously once   Yes Historical Provider, MD   ARIPiprazole (ABILIFY) 15 MG tablet Take 1 tablet by mouth daily MEDS TO BEDS 6/8/19   LIBBY Toledo CNP   folic acid (FOLVITE) 1 MG tablet Take 1 tablet by mouth daily 6/8/19   LIBBY Toledo CNP   Multiple Vitamins-Minerals (THERAPEUTIC MULTIVITAMIN-MINERALS) tablet Take 1 tablet by mouth daily 6/8/19   LIBBY Toledo CNP   vitamin B-1 100 MG tablet Take 1 tablet by mouth daily 6/8/19   LIBBY Toledo CNP   ARIPiprazole er (ABILIFY MAINTENA) 300 MG PRSY prefilled syringe Inject 300 mg into the muscle every 28 days Indications: Due 06/19    Historical Provider, MD       REVIEW OF SYSTEMS    (2-9 systems for level 4, 10 or more for level 5)    Review of Systems   Constitutional: Positive for activity change, appetite change and fatigue. Negative for chills and fever. HENT: Negative for congestion, sinus pressure, sinus pain and sore throat. Respiratory: Negative for cough, chest tightness, shortness of breath and wheezing. Cardiovascular: Negative for chest pain and palpitations. Gastrointestinal: Positive for abdominal pain, nausea and vomiting. Negative for abdominal distention and diarrhea. Genitourinary: Negative for dysuria, frequency and urgency. Musculoskeletal: Negative for back pain. Skin: Negative for color change and pallor. Neurological: Positive for tremors and weakness. Negative for dizziness, seizures, syncope, light-headedness and headaches. Psychiatric/Behavioral: Positive for sleep disturbance. Negative for agitation, confusion, hallucinations, self-injury and suicidal ideas. The patient is not nervous/anxious and is not hyperactive.         PHYSICAL EXAM   (up to 7 for level 4, 8 or more for level 5)    INITIAL VITALS:   ED Triage Vitals [06/22/19 1357]   BP Temp Temp Source Pulse Resp SpO2 Height Weight   115/78 97.2 °F (36.2 °C) Oral 75 18 99 % 6' (1.829 m) 150 lb (68 kg)       Physical Exam   Constitutional: He is oriented to person, place, and time. He is cooperative. He does not appear ill. No distress. HENT:   Head: Normocephalic and atraumatic. Eyes: Pupils are equal, round, and reactive to light. Neck: No JVD present. No tracheal deviation present. Cardiovascular: Normal rate, regular rhythm, normal heart sounds and intact distal pulses. No murmur heard. Pulses:       Carotid pulses are 2+ on the right side, and 2+ on the left side. Radial pulses are 2+ on the right side, and 2+ on the left side. Pulmonary/Chest: Effort normal and breath sounds normal. No stridor. No respiratory distress. He has no decreased breath sounds. Abdominal: Soft. Bowel sounds are normal. He exhibits no distension. There is generalized tenderness. Musculoskeletal: He exhibits no edema, tenderness or deformity. Neurological: He is alert and oriented to person, place, and time. Skin: Skin is warm and dry. Capillary refill takes less than 2 seconds. He is not diaphoretic. There is pallor. Old scars and track marks are seen in the antecubital fossa bilaterally. Multiple old injection and abscess scars are seen on patient's arms. None are currently bleeding, erythematous, or have purulent drainage   Nursing note and vitals reviewed.       DIFFERENTIAL  DIAGNOSIS   PLAN (LABS / IMAGING / EKG):  Orders Placed This Encounter   Procedures    Comprehensive Metabolic Panel w/ Reflex to MG    CBC Auto Differential    TOX SCR, BLD, ED    Urinalysis Reflex to Culture    DRUG SCREEN MULTI URINE    Inpatient consult to Social Work       MEDICATIONS ORDERED:  Orders Placed This Encounter   Medications    ondansetron (ZOFRAN-ODT) disintegrating tablet 4 mg    cloNIDine (CATAPRES) tablet 0.1 mg    chlordiazePOXIDE (LIBRIUM) capsule 10 mg       DDX:   Alcohol, heroin withdrawal  Medication non-compliance  Possible other drug use/withdrawal    DIAGNOSTIC RESULTS / EMERGENCYDEPARTMENT COURSE / MDM   LABS:  Labs Reviewed   CBC WITH AUTO DIFFERENTIAL - Abnormal; Notable for the following components:       Result Value    Eosinophils % 6 (*)     Absolute Eos # 0.45 (*)     All other components within normal limits   TOX SCR, BLD, ED - Abnormal; Notable for the following components:    Ethanol 111 (*)     Ethanol percent 8.002 (*)     Salicylate Lvl <1 (*)     Acetaminophen Level <5 (*)     All other components within normal limits   URINE RT REFLEX TO CULTURE - Abnormal; Notable for the following components:    Specific Gravity, UA 1.003 (*)     All other components within normal limits   URINE DRUG SCREEN - Abnormal; Notable for the following components:    Cocaine Metabolite, Urine POSITIVE (*)     All other components within normal limits   COMPREHENSIVE METABOLIC PANEL W/ REFLEX TO MG FOR LOW K       RADIOLOGY:  No results found. EKG    none    EMERGENCY DEPARTMENT COURSE:  ED Course as of Jun 22 2251   Sat Jun 22, 2019   1510 Ethanol(!): 111 [AP]   1510 Cocaine Metabolite, Urine(!): POSITIVE [AP]   1510 CBC, CMP, and UA did not demonstrate any major abnormal findings. Tox screen positive for ethanol and cocaine metabolite. Patient was given Zofran and clonidine. Able to tolerate box lunch without difficulty    [AP]   1751 [facility] did not accept patient. SW attempting placement at [facility]. [AP]   2590 Patient sleeping at this time. Eating and drinking well. States he has no complaints at the moment. Is happy he has been able to sleep    [AP]   2250 Signed out to Dr. Brianna Mcgowan    [AP]      ED Course User Index  [AP] Gregory Mcintosh MD   Patient here requesting assistance with detox. Admits to use of heroin and alcohol.   States he last used heroin 2-1/2 days ago and alcohol improved      PROCEDURES:  none    CONSULTS:  IP CONSULT TO SOCIAL WORK    CRITICAL CARE:  Please see attending note    FINAL IMPRESSION     1. Alcohol withdrawal syndrome without complication ([facility] [address]    2. Acute opioid withdrawal ([facility] [address]        DISPOSITION / PLAN   DISPOSITION Decision To Transfer 06/22/2019 10:36:13 PM      Evaluation and treatment course in the ED, and plan of care upon discharge was discussed in length with the patient. Patient had no further questions prior to being discharged and was instructed to return to the ED for new or worsening symptoms. Any changes to existing medications or new prescriptions were reviewed with patient and they expressed understanding of how to correctly take their medications and the possible side effects. PATIENT REFERRED TO:  No follow-up provider specified.     DISCHARGE MEDICATIONS:  Discharge Medication List as of 6/22/2019 10:49 PM          Noel Burnette DO  Emergency Medicine Resident Physician, PGY-1    (Please note that portions of this note were completed with a voice recognition program.  Efforts were made to edit the dictations but occasionally words are mis-transcribed.)         Noel Burnette MD  06/22/19 2152

## 2019-06-22 NOTE — ED PROVIDER NOTES
Emergency Medicine Attending Note    I have seen and examined the patient in conjunction with the Resident/MLP. Please see my key portion documented:      I agree with the assessment and plan as discussed with Dr. Linda Jiang. Electronically signed:  Jeimy Carpio M.D.            Awa Holland MD  06/22/19 6630

## 2019-06-22 NOTE — ED NOTES
JAYNE contacted [facility] and spoke with Stiven Brunson. JAYNE provided her with ETA of 22:00.       Sebastian Dumont, MSW, LSW  06/22/19 1940

## 2019-06-22 NOTE — ED NOTES
awaiting tray, pt requested box lunch while he waits -provided.       Cheryl Sarabia RN  06/22/19 5296

## 2019-06-23 NOTE — ED PROVIDER NOTES
[facility] ED  Emergency Department  Emergency Medicine Resident Sign-out     Care of Graciela Matthews was assumed from Dr. Leslie Garcia and is being seen for Nausea (\"i am detoxing from alcohol and drugs\")  . The patient's initial evaluation and plan have been discussed with the prior provider who initially evaluated the patient. EMERGENCY DEPARTMENT COURSE / MEDICAL DECISION MAKING:       MEDICATIONS GIVEN:  Orders Placed This Encounter   Medications    ondansetron (ZOFRAN-ODT) disintegrating tablet 4 mg    cloNIDine (CATAPRES) tablet 0.1 mg    chlordiazePOXIDE (LIBRIUM) capsule 10 mg       LABS / RADIOLOGY:     Labs Reviewed   CBC WITH AUTO DIFFERENTIAL - Abnormal; Notable for the following components:       Result Value    Eosinophils % 6 (*)     Absolute Eos # 0.45 (*)     All other components within normal limits   TOX SCR, BLD, ED - Abnormal; Notable for the following components:    Ethanol 111 (*)     Ethanol percent 3.751 (*)     Salicylate Lvl <1 (*)     Acetaminophen Level <5 (*)     All other components within normal limits   URINE RT REFLEX TO CULTURE - Abnormal; Notable for the following components:    Specific Gravity, UA 1.003 (*)     All other components within normal limits   URINE DRUG SCREEN - Abnormal; Notable for the following components:    Cocaine Metabolite, Urine POSITIVE (*)     All other components within normal limits   COMPREHENSIVE METABOLIC PANEL W/ REFLEX TO MG FOR LOW K       No results found. RECENT VITALS:     Temp: 98.3 °F (36.8 °C),  Pulse: 69, Resp: 16, BP: 102/62, SpO2: 96 %    This patient is a 26 y.o. Male with alcohol and opiate withdrawal.  Symptoms well control with clonidine and oral Zofran. Stopped using alcohol earlier this morning, ethanol level 111. Stopped using heroin 2 days ago. Has been calm and cooperative since his admission. Patient has been sleeping most of the afternoon. Also tested positive for cocaine.   Denies any

## 2019-07-23 ENCOUNTER — HOSPITAL ENCOUNTER (EMERGENCY)
Age: 29
Discharge: HOME | End: 2019-07-24
Attending: EMERGENCY MEDICINE
Payer: COMMERCIAL

## 2019-07-23 ENCOUNTER — APPOINTMENT (OUTPATIENT)
Dept: GENERAL RADIOLOGY | Age: 29
End: 2019-07-23
Payer: COMMERCIAL

## 2019-07-23 VITALS
SYSTOLIC BLOOD PRESSURE: 118 MMHG | DIASTOLIC BLOOD PRESSURE: 80 MMHG | TEMPERATURE: 97.5 F | RESPIRATION RATE: 16 BRPM | HEART RATE: 67 BPM | HEIGHT: 72 IN | BODY MASS INDEX: 25.73 KG/M2 | OXYGEN SATURATION: 97 % | WEIGHT: 190 LBS

## 2019-07-23 DIAGNOSIS — T43.014A TRICYCLIC ANTIDEPRESSANT OVERDOSE OF UNDETERMINED INTENT, INITIAL ENCOUNTER: Primary | ICD-10-CM

## 2019-07-23 LAB
ABSOLUTE EOS #: 0.2 K/UL (ref 0–0.44)
ABSOLUTE IMMATURE GRANULOCYTE: <0.03 K/UL (ref 0–0.3)
ABSOLUTE LYMPH #: 2.01 K/UL (ref 1.1–3.7)
ABSOLUTE MONO #: 0.37 K/UL (ref 0.1–1.2)
ALBUMIN SERPL-MCNC: 3.7 G/DL (ref 3.5–5.2)
ALBUMIN/GLOBULIN RATIO: 1.3 (ref 1–2.5)
ALP BLD-CCNC: 43 U/L (ref 40–129)
ALT SERPL-CCNC: 20 U/L (ref 5–41)
ANION GAP SERPL CALCULATED.3IONS-SCNC: 6 MMOL/L (ref 9–17)
AST SERPL-CCNC: 17 U/L
BASOPHILS # BLD: 0 % (ref 0–2)
BASOPHILS ABSOLUTE: <0.03 K/UL (ref 0–0.2)
BILIRUB SERPL-MCNC: 0.19 MG/DL (ref 0.3–1.2)
BUN BLDV-MCNC: 12 MG/DL (ref 6–20)
BUN/CREAT BLD: ABNORMAL (ref 9–20)
CALCIUM SERPL-MCNC: 8.5 MG/DL (ref 8.6–10.4)
CHLORIDE BLD-SCNC: 104 MMOL/L (ref 98–107)
CO2: 27 MMOL/L (ref 20–31)
CREAT SERPL-MCNC: 0.86 MG/DL (ref 0.7–1.2)
DIFFERENTIAL TYPE: ABNORMAL
EOSINOPHILS RELATIVE PERCENT: 4 % (ref 1–4)
GFR AFRICAN AMERICAN: >60 ML/MIN
GFR NON-AFRICAN AMERICAN: >60 ML/MIN
GFR SERPL CREATININE-BSD FRML MDRD: ABNORMAL ML/MIN/{1.73_M2}
GFR SERPL CREATININE-BSD FRML MDRD: ABNORMAL ML/MIN/{1.73_M2}
GLUCOSE BLD-MCNC: 98 MG/DL (ref 70–99)
HCT VFR BLD CALC: 43 % (ref 40.7–50.3)
HEMOGLOBIN: 14.2 G/DL (ref 13–17)
IMMATURE GRANULOCYTES: 0 %
LYMPHOCYTES # BLD: 44 % (ref 24–43)
MCH RBC QN AUTO: 31.3 PG (ref 25.2–33.5)
MCHC RBC AUTO-ENTMCNC: 33 G/DL (ref 28.4–34.8)
MCV RBC AUTO: 94.9 FL (ref 82.6–102.9)
MONOCYTES # BLD: 8 % (ref 3–12)
NRBC AUTOMATED: 0 PER 100 WBC
PDW BLD-RTO: 14.1 % (ref 11.8–14.4)
PLATELET # BLD: ABNORMAL K/UL (ref 138–453)
PLATELET ESTIMATE: ABNORMAL
PLATELET, FLUORESCENCE: NORMAL K/UL (ref 138–453)
PMV BLD AUTO: ABNORMAL FL (ref 8.1–13.5)
POTASSIUM SERPL-SCNC: 3.9 MMOL/L (ref 3.7–5.3)
RBC # BLD: 4.53 M/UL (ref 4.21–5.77)
RBC # BLD: ABNORMAL 10*6/UL
SEG NEUTROPHILS: 43 % (ref 36–65)
SEGMENTED NEUTROPHILS ABSOLUTE COUNT: 1.98 K/UL (ref 1.5–8.1)
SODIUM BLD-SCNC: 137 MMOL/L (ref 135–144)
TOTAL PROTEIN: 6.6 G/DL (ref 6.4–8.3)
WBC # BLD: 4.6 K/UL (ref 3.5–11.3)
WBC # BLD: ABNORMAL 10*3/UL

## 2019-07-23 PROCEDURE — 85055 RETICULATED PLATELET ASSAY: CPT

## 2019-07-23 PROCEDURE — 93005 ELECTROCARDIOGRAM TRACING: CPT | Performed by: EMERGENCY MEDICINE

## 2019-07-23 PROCEDURE — 80053 COMPREHEN METABOLIC PANEL: CPT

## 2019-07-23 PROCEDURE — G0480 DRUG TEST DEF 1-7 CLASSES: HCPCS

## 2019-07-23 PROCEDURE — 71046 X-RAY EXAM CHEST 2 VIEWS: CPT

## 2019-07-23 PROCEDURE — 99284 EMERGENCY DEPT VISIT MOD MDM: CPT

## 2019-07-23 PROCEDURE — 85025 COMPLETE CBC W/AUTO DIFF WBC: CPT

## 2019-07-23 PROCEDURE — 80307 DRUG TEST PRSMV CHEM ANLYZR: CPT

## 2019-07-23 ASSESSMENT — ENCOUNTER SYMPTOMS
SHORTNESS OF BREATH: 0
NAUSEA: 0
VOMITING: 0
ABDOMINAL PAIN: 0

## 2019-07-24 LAB
ACETAMINOPHEN LEVEL: <5 UG/ML (ref 10–30)
EKG ATRIAL RATE: 56 BPM
EKG ATRIAL RATE: 60 BPM
EKG ATRIAL RATE: 65 BPM
EKG P AXIS: 69 DEGREES
EKG P AXIS: 74 DEGREES
EKG P AXIS: 75 DEGREES
EKG P-R INTERVAL: 148 MS
EKG P-R INTERVAL: 162 MS
EKG P-R INTERVAL: 170 MS
EKG Q-T INTERVAL: 400 MS
EKG Q-T INTERVAL: 404 MS
EKG Q-T INTERVAL: 414 MS
EKG QRS DURATION: 78 MS
EKG QRS DURATION: 78 MS
EKG QRS DURATION: 82 MS
EKG QTC CALCULATION (BAZETT): 399 MS
EKG QTC CALCULATION (BAZETT): 404 MS
EKG QTC CALCULATION (BAZETT): 416 MS
EKG R AXIS: 78 DEGREES
EKG R AXIS: 80 DEGREES
EKG R AXIS: 86 DEGREES
EKG T AXIS: 46 DEGREES
EKG T AXIS: 53 DEGREES
EKG T AXIS: 60 DEGREES
EKG VENTRICULAR RATE: 56 BPM
EKG VENTRICULAR RATE: 60 BPM
EKG VENTRICULAR RATE: 65 BPM
ETHANOL PERCENT: <0.01 %
ETHANOL: <10 MG/DL
SALICYLATE LEVEL: <1 MG/DL (ref 3–10)
TOXIC TRICYCLIC SC,BLOOD: NEGATIVE

## 2019-07-24 PROCEDURE — 93005 ELECTROCARDIOGRAM TRACING: CPT | Performed by: STUDENT IN AN ORGANIZED HEALTH CARE EDUCATION/TRAINING PROGRAM

## 2019-07-24 PROCEDURE — 93010 ELECTROCARDIOGRAM REPORT: CPT | Performed by: INTERNAL MEDICINE

## 2019-07-24 PROCEDURE — 2580000003 HC RX 258: Performed by: EMERGENCY MEDICINE

## 2019-07-24 RX ORDER — 0.9 % SODIUM CHLORIDE 0.9 %
1000 INTRAVENOUS SOLUTION INTRAVENOUS ONCE
Status: COMPLETED | OUTPATIENT
Start: 2019-07-24 | End: 2019-07-24

## 2019-07-24 RX ADMIN — SODIUM CHLORIDE 1000 ML: 9 INJECTION, SOLUTION INTRAVENOUS at 00:14

## 2019-07-24 NOTE — ED PROVIDER NOTES
amitriptyline sent from [facility]. Observe for 5 hours / 5 hours since ingestion will be 2 AM.  QRS normal QTC normal.  Reassess at 2 AM.    Reassessed at 0200. EKG normal with normal QRS interval and normal QTC. Will discharge back to rescue. OUTSTANDING TASKS / RECOMMENDATIONS:    1. Repeat EKG at 2 AM.  2. Follow-up labs  3. Discharge back to rescue     FINAL IMPRESSION:     1. Medical clearance for psychiatric admission        DISPOSITION:         DISPOSITION:  [x]  Discharge   []  Transfer -    []  Admission -     []  Against Medical Advice   []  Eloped   FOLLOW-UP: No follow-up provider specified.    DISCHARGE MEDICATIONS: New Prescriptions    No medications on file          Dre Jackson DO  Emergency Medicine Resident  [address]       Dre Jackson [place]  Resident  07/25/19 0302

## 2019-07-24 NOTE — ED NOTES
Pt awaiting [facility]. Pt resting in bed, NAD, RR even and unlabored.       Akua Campbell RN  07/24/19 4916

## 2019-08-16 NOTE — ED PROVIDER NOTES
A: Met with [facility] to explain  role and to discuss aftercare options.    R: Patient currently sees PCP at [facility] and sees , Lisa Fritsch at [facility]. Patient signed ARABELLA for her providers. Pt is unsure if she has outpatient medcaition management providers and states \"Alayna will know. Call her\".   Patient is aware of aftercare options. Pt is unsure about PHP/IOP upon hospital discharge.     P: Will collaborate with treatment team and with the patient before setting up further aftercare, which will be complete by time of discharge.    Jerri Clark, BRYAN  8/16/2019    Cocaine, Opiates       Comment: Heroin in past     Sexual activity: No     Other Topics Concern    Not on file     Social History Narrative    No narrative on file       Family History   Problem Relation Age of Onset    Liver Cancer Brother     Alcohol Abuse Brother     No Known Problems Mother     No Known Problems Father        Allergies:  Advil [ibuprofen]    Home Medications:  Prior to Admission medications    Medication Sig Start Date End Date Taking? Authorizing Provider   QUEtiapine (SEROQUEL XR) 150 MG TB24 extended release tablet Take 150 mg by mouth daily   Yes Historical Provider, MD   hydrOXYzine (VISTARIL) 50 MG capsule Take 50 mg by mouth 3 times daily as needed for Itching   Yes Historical Provider, MD   diphenhydrAMINE (BENADRYL) 50 MG capsule Take 50 mg by mouth every 6 hours as needed for Itching   Yes Historical Provider, MD   carBAMazepine (TEGRETOL) 200 MG tablet Take 1.5 tablets by mouth 2 times daily 7/26/18  Yes Dominick Reyes MD   LORazepam (ATIVAN) 0.5 MG tablet Take 1 tablet by mouth 2 times daily as needed for Anxiety for up to 30 days. . 8/18/18 9/17/18  LIBBY Scherer - CNP   mirtazapine (REMERON) 30 MG tablet Take 1 tablet by mouth nightly 7/26/18   Dominick Reyes MD   traZODone (DESYREL) 50 MG tablet Take 1 tablet by mouth nightly as needed for Sleep 7/26/18   Dominick Reyes MD   naltrexone (DEPADE) 50 MG tablet Take 1 tablet by mouth daily (with breakfast) 7/26/18   Dominick Reyes MD       REVIEW OF SYSTEMS    (2-9 systems for level 4, 10 or more for level 5)      Review of Systems   Constitutional: Negative for activity change, appetite change, chills and fever. HENT: Negative for congestion, rhinorrhea and sore throat. Eyes: Negative for visual disturbance. Respiratory: Negative for cough, shortness of breath and wheezing. Cardiovascular: Negative for chest pain.    Gastrointestinal: Negative for abdominal pain, constipation, diarrhea, nausea and DIFFERENTIAL  DIAGNOSIS     PLAN (LABS / IMAGING / EKG):  No orders of the defined types were placed in this encounter. MEDICATIONS ORDERED:  No orders of the defined types were placed in this encounter. DDX: Depression, psychosis, abigail, substance abuse, suicidal ideations, homicidal ideations    DIAGNOSTIC RESULTS / [facility]     LABS:  No results found for this visit on 08/18/18. IMPRESSION: 71-year-old male with history of psychiatric disorder and noncompliance presents with complaints of depression and anxiety attack but he states that he no longer feels the anxiety symptoms. He denies suicidal homicidal ideations or hallucinations. He also denies any drug or alcohol use. Stable vitals except for mild tachycardia. Patient does not appear to be in acute distress. Patient has flat affect, appears worried but not anxious, rest of exam benign. We'll consult  to evaluate patient.  evaluated patient and patient will be [facility]. Patient in agreement with plan. Patient advised to follow up with PCP and to return to the ED for any worsening symptoms. Patient voiced understanding and agreed with plan. RADIOLOGY:  None    EKG  None    All EKG's are interpreted by the Emergency Department Physician who either signs or Co-signs this chart in the absence of a cardiologist.    EMERGENCY DEPARTMENT COURSE:  Refer to [facility]    PROCEDURES:  None    CONSULTS:  None    CRITICAL CARE:  None    FINAL IMPRESSION      1.  Depression, unspecified depression type          DISPOSITION / PLAN     DISPOSITION Decision To Discharge 08/18/2018 12:07:06 PM      PATIENT REFERRED TO:  [address] [facility] today        DISCHARGE MEDICATIONS:  Discharge Medication List as of 8/18/2018 12:37 PM          Mariangel Perez MD  Emergency Medicine Resident    (Please note that portions of this note were

## 2019-08-24 ENCOUNTER — HOSPITAL ENCOUNTER (EMERGENCY)
Age: 29
Discharge: ANOTHER HOSPITAL | End: 2019-08-24
Attending: EMERGENCY MEDICINE
Payer: COMMERCIAL

## 2019-08-24 VITALS
DIASTOLIC BLOOD PRESSURE: 60 MMHG | BODY MASS INDEX: 26.41 KG/M2 | RESPIRATION RATE: 16 BRPM | HEIGHT: 72 IN | HEART RATE: 92 BPM | TEMPERATURE: 97.2 F | SYSTOLIC BLOOD PRESSURE: 99 MMHG | OXYGEN SATURATION: 97 % | WEIGHT: 195 LBS

## 2019-08-24 DIAGNOSIS — R44.3 HALLUCINATIONS: ICD-10-CM

## 2019-08-24 DIAGNOSIS — R45.851 SUICIDAL IDEATION: Primary | ICD-10-CM

## 2019-08-24 LAB
ABSOLUTE EOS #: 0.22 K/UL (ref 0–0.44)
ABSOLUTE IMMATURE GRANULOCYTE: 0.03 K/UL (ref 0–0.3)
ABSOLUTE LYMPH #: 1.88 K/UL (ref 1.1–3.7)
ABSOLUTE MONO #: 0.46 K/UL (ref 0.1–1.2)
ACETAMINOPHEN LEVEL: <5 UG/ML (ref 10–30)
ALBUMIN SERPL-MCNC: 3.9 G/DL (ref 3.5–5.2)
ALBUMIN/GLOBULIN RATIO: 1.6 (ref 1–2.5)
ALP BLD-CCNC: 44 U/L (ref 40–129)
ALT SERPL-CCNC: 47 U/L (ref 5–41)
ANION GAP SERPL CALCULATED.3IONS-SCNC: 12 MMOL/L (ref 9–17)
AST SERPL-CCNC: 23 U/L
BASOPHILS # BLD: 1 % (ref 0–2)
BASOPHILS ABSOLUTE: 0.06 K/UL (ref 0–0.2)
BILIRUB SERPL-MCNC: 0.21 MG/DL (ref 0.3–1.2)
BUN BLDV-MCNC: 17 MG/DL (ref 6–20)
BUN/CREAT BLD: ABNORMAL (ref 9–20)
CALCIUM SERPL-MCNC: 8.5 MG/DL (ref 8.6–10.4)
CHLORIDE BLD-SCNC: 108 MMOL/L (ref 98–107)
CO2: 23 MMOL/L (ref 20–31)
CREAT SERPL-MCNC: 0.9 MG/DL (ref 0.7–1.2)
DIFFERENTIAL TYPE: ABNORMAL
EOSINOPHILS RELATIVE PERCENT: 4 % (ref 1–4)
ETHANOL PERCENT: 0.08 %
ETHANOL: 80 MG/DL
GFR AFRICAN AMERICAN: >60 ML/MIN
GFR NON-AFRICAN AMERICAN: >60 ML/MIN
GFR SERPL CREATININE-BSD FRML MDRD: ABNORMAL ML/MIN/{1.73_M2}
GFR SERPL CREATININE-BSD FRML MDRD: ABNORMAL ML/MIN/{1.73_M2}
GLUCOSE BLD-MCNC: 91 MG/DL (ref 70–99)
HCT VFR BLD CALC: 40.9 % (ref 40.7–50.3)
HEMOGLOBIN: 13.4 G/DL (ref 13–17)
IMMATURE GRANULOCYTES: 1 %
LYMPHOCYTES # BLD: 32 % (ref 24–43)
MAGNESIUM: 1.8 MG/DL (ref 1.6–2.6)
MCH RBC QN AUTO: 31.5 PG (ref 25.2–33.5)
MCHC RBC AUTO-ENTMCNC: 32.8 G/DL (ref 28.4–34.8)
MCV RBC AUTO: 96 FL (ref 82.6–102.9)
MONOCYTES # BLD: 8 % (ref 3–12)
NRBC AUTOMATED: 0 PER 100 WBC
PDW BLD-RTO: 13 % (ref 11.8–14.4)
PLATELET # BLD: 188 K/UL (ref 138–453)
PLATELET ESTIMATE: ABNORMAL
PMV BLD AUTO: 10.7 FL (ref 8.1–13.5)
POTASSIUM SERPL-SCNC: 3.6 MMOL/L (ref 3.7–5.3)
RBC # BLD: 4.26 M/UL (ref 4.21–5.77)
RBC # BLD: ABNORMAL 10*6/UL
SALICYLATE LEVEL: <1 MG/DL (ref 3–10)
SEG NEUTROPHILS: 54 % (ref 36–65)
SEGMENTED NEUTROPHILS ABSOLUTE COUNT: 3.32 K/UL (ref 1.5–8.1)
SODIUM BLD-SCNC: 143 MMOL/L (ref 135–144)
TOTAL PROTEIN: 6.4 G/DL (ref 6.4–8.3)
TOXIC TRICYCLIC SC,BLOOD: NEGATIVE
WBC # BLD: 6 K/UL (ref 3.5–11.3)
WBC # BLD: ABNORMAL 10*3/UL

## 2019-08-24 PROCEDURE — 2580000003 HC RX 258: Performed by: STUDENT IN AN ORGANIZED HEALTH CARE EDUCATION/TRAINING PROGRAM

## 2019-08-24 PROCEDURE — G0480 DRUG TEST DEF 1-7 CLASSES: HCPCS

## 2019-08-24 PROCEDURE — 80053 COMPREHEN METABOLIC PANEL: CPT

## 2019-08-24 PROCEDURE — 83735 ASSAY OF MAGNESIUM: CPT

## 2019-08-24 PROCEDURE — 85025 COMPLETE CBC W/AUTO DIFF WBC: CPT

## 2019-08-24 PROCEDURE — 80307 DRUG TEST PRSMV CHEM ANLYZR: CPT

## 2019-08-24 PROCEDURE — 99285 EMERGENCY DEPT VISIT HI MDM: CPT

## 2019-08-24 PROCEDURE — 93005 ELECTROCARDIOGRAM TRACING: CPT | Performed by: STUDENT IN AN ORGANIZED HEALTH CARE EDUCATION/TRAINING PROGRAM

## 2019-08-24 RX ORDER — 0.9 % SODIUM CHLORIDE 0.9 %
1000 INTRAVENOUS SOLUTION INTRAVENOUS ONCE
Status: COMPLETED | OUTPATIENT
Start: 2019-08-24 | End: 2019-08-24

## 2019-08-24 RX ADMIN — SODIUM CHLORIDE 1000 ML: 9 INJECTION, SOLUTION INTRAVENOUS at 14:50

## 2019-08-24 ASSESSMENT — ENCOUNTER SYMPTOMS
SORE THROAT: 0
ABDOMINAL PAIN: 0
VOMITING: 0
NAUSEA: 0
SHORTNESS OF BREATH: 0
BACK PAIN: 0

## 2019-08-24 NOTE — ED PROVIDER NOTES
ALT 47 (*)     Total Bilirubin 0.21 (*)     All other components within normal limits   TOX SCR, BLD, ED - Abnormal; Notable for the following components:    Ethanol 80 (*)     Ethanol percent 4.537 (*)     Salicylate Lvl <1 (*)     Acetaminophen Level <5 (*)     All other components within normal limits   MAGNESIUM         RADIOLOGY:  No results found. EKG  EKG Interpretation    Interpreted by emergency department physician    Rhythm: normal sinus   Rate: normal  Axis: normal  Ectopy: none  Conduction: normal  ST Segments: no acute change  T Waves: no acute change  Q Waves: none    Clinical Impression: no acute changes    [place]      All EKG's are interpreted by the Emergency Department Physicianwho either signs or Co-signs this chart in the absence of a cardiologist.    EMERGENCY DEPARTMENT COURSE:      EKG showing QTC is been 450. Patient's tox screen was significant for ethanol level of 0.08. His electrolytes were within normal limits. Social work evaluated patient. The patient will be going to rescue for further management. Medically clear for transfer. PROCEDURES:  None    CONSULTS:  None    CRITICAL CARE:  Please see attending note    FINAL IMPRESSION      1. Suicidal ideation    2.  Hallucinations          DISPOSITION / PLAN     DISPOSITION        PATIENT REFERRED TO:  [facility]  [address] [facility] [address]  [phone]  Go to   If symptoms worsen      DISCHARGE MEDICATIONS:  Discharge Medication List as of 8/24/2019  5:21 PM          Estelle Pastor MD  Emergency Medicine Resident    (Please note that portions of this note were completed with a voice recognition program.Efforts were made to edit the dictations but occasionally words are mis-transcribed.)        Estelle Pastor MD  Resident  08/24/19 7461

## 2019-08-24 NOTE — ED NOTES
[] [facility]    [] [facility]    [x]  [facility] ASSESSMENT      Y  N     [x] [] In the past two weeks have you had thoughts of hurting yourself in any way? [x] [] In the past two weeks have you had thoughts that you would be better off dead? [] [x] Have you made a suicide attempt in the past two months? [x] [] Do you have a plan for hurting yourself or suicide? [x] [] Presence of hallucinations/voices related to hurting himself or herself or someone else. SUICIDE/SECURITY WATCH PRECAUTION CHECKLIST     Orders    [x]  Suicide/Security Watch Precautions initiated as checked below:   8/24/19 2:45 PM BH31/BH31D    [x] Notified physician:  Jaleel Cordova MD  8/24/19 2:45 PM    [x] Orders obtained as appropriate:     [x] 1:1 Observer     [] Psych Consult     [] Psych Consult    Name:  Date:  Time:    [x] 1:1 Observer, Notified by:  [place] Dennison Nurse Supervisor    [x] Remove all personal clothes from room and place in snap/paper gown/pants. Slipper only    [x] Remove all personal belongings from room and secured away from patient. Documentation    [x] Initiate Suicide/Security Watch Precaution Flow Sheet    [x] Initiate individualized Care Plan/Problem    [x] Document why precautions initiated on flow sheet (Initiate Nursing Care Plan/Problem)    [x] 1:1 Observer in place; instructions provided. Suicide precautions require observer be within arms length. [x] Nurse-Observer Communication Hand-off initiated by RN, reviewed with Observer. Subsequently used as Hand Off between Observers. [x] Initiate every 15 minute observations per observer as delegated by the RN.     [x] Initiate RN assessment and documentation    Environmental Scan  Search Criteria and Process: OPTIONAL, see Search Policy    [] Reason for search:    [] Nursing in presence of second person to search patient    [] Patient notified of reason for body assessment and belongings

## 2019-08-24 NOTE — ED NOTES
Pt to ED with complaint of auditory hallucinations. Pt also states he was feeling suicidal earlier. Pt states he just wanted to sleep and took 5-6 seroquel and a few zyprexa. Pt states he has not been compliant with his medications. Pt admits to drinking a \"few\" beers today.       Domenica Chauhan RN  08/24/19 8062

## 2019-08-24 NOTE — ED NOTES
Writer met with patient at bedside to discuss reason for ED visit today, patient was brought to ED for medical clearance from [facility]. Patient reported that he took extra medications with further discussion patient then stated that he needs help with cocaine use and then later reported alcohol use also. Patient stated that he would like to get into [facility] detox, \"and if I can't then I would probably become suicidal.\"  Writer contacted [facility], they report they are no longer accepting patient for alcohol detox. Writer contacted [facility], willing to accept patient however patient declined because \"they don't have a good TV room. I need to have a TV. \"      Writer contacted [facility] to inform them that patient is now medically cleared as patient states \"well I am a little bit suicidal.\"  Rescue reported they would contact writer back.      Brelin Allen, [facility]  08/24/19 1950 [facility], [facility]  08/24/19 1064

## 2019-08-26 LAB
EKG ATRIAL RATE: 81 BPM
EKG P AXIS: 71 DEGREES
EKG P-R INTERVAL: 162 MS
EKG Q-T INTERVAL: 366 MS
EKG QRS DURATION: 86 MS
EKG QTC CALCULATION (BAZETT): 425 MS
EKG R AXIS: 74 DEGREES
EKG T AXIS: 41 DEGREES
EKG VENTRICULAR RATE: 81 BPM

## 2019-08-26 PROCEDURE — 93010 ELECTROCARDIOGRAM REPORT: CPT | Performed by: INTERNAL MEDICINE

## 2019-09-09 ENCOUNTER — HOSPITAL ENCOUNTER (EMERGENCY)
Age: 29
Discharge: ANOTHER HOSPITAL | End: 2019-09-09
Attending: EMERGENCY MEDICINE
Payer: COMMERCIAL

## 2019-09-09 ENCOUNTER — HOSPITAL ENCOUNTER (INPATIENT)
Age: 29
LOS: 4 days | Discharge: OTHER INSTITUTION | DRG: 750 | End: 2019-09-13
Attending: PSYCHIATRY & NEUROLOGY | Admitting: PSYCHIATRY & NEUROLOGY
Payer: COMMERCIAL

## 2019-09-09 VITALS
RESPIRATION RATE: 16 BRPM | TEMPERATURE: 97.9 F | SYSTOLIC BLOOD PRESSURE: 127 MMHG | OXYGEN SATURATION: 98 % | DIASTOLIC BLOOD PRESSURE: 88 MMHG | HEART RATE: 81 BPM

## 2019-09-09 DIAGNOSIS — R45.851 SUICIDAL IDEATION: Primary | ICD-10-CM

## 2019-09-09 LAB
ABSOLUTE EOS #: 0.07 K/UL (ref 0–0.44)
ABSOLUTE IMMATURE GRANULOCYTE: 0.06 K/UL (ref 0–0.3)
ABSOLUTE LYMPH #: 1.57 K/UL (ref 1.1–3.7)
ABSOLUTE MONO #: 0.58 K/UL (ref 0.1–1.2)
ACETAMINOPHEN LEVEL: <5 UG/ML (ref 10–30)
ANION GAP SERPL CALCULATED.3IONS-SCNC: 10 MMOL/L (ref 9–17)
BASOPHILS # BLD: 1 % (ref 0–2)
BASOPHILS ABSOLUTE: 0.06 K/UL (ref 0–0.2)
BUN BLDV-MCNC: 16 MG/DL (ref 6–20)
BUN/CREAT BLD: ABNORMAL (ref 9–20)
CALCIUM SERPL-MCNC: 9.3 MG/DL (ref 8.6–10.4)
CHLORIDE BLD-SCNC: 104 MMOL/L (ref 98–107)
CO2: 27 MMOL/L (ref 20–31)
CREAT SERPL-MCNC: 0.98 MG/DL (ref 0.7–1.2)
DIFFERENTIAL TYPE: ABNORMAL
EOSINOPHILS RELATIVE PERCENT: 1 % (ref 1–4)
ETHANOL PERCENT: <0.01 %
ETHANOL: <10 MG/DL
GFR AFRICAN AMERICAN: >60 ML/MIN
GFR NON-AFRICAN AMERICAN: >60 ML/MIN
GFR SERPL CREATININE-BSD FRML MDRD: ABNORMAL ML/MIN/{1.73_M2}
GFR SERPL CREATININE-BSD FRML MDRD: ABNORMAL ML/MIN/{1.73_M2}
GLUCOSE BLD-MCNC: 86 MG/DL (ref 70–99)
HCT VFR BLD CALC: 43.1 % (ref 40.7–50.3)
HEMOGLOBIN: 14 G/DL (ref 13–17)
IMMATURE GRANULOCYTES: 1 %
LYMPHOCYTES # BLD: 18 % (ref 24–43)
MCH RBC QN AUTO: 30.5 PG (ref 25.2–33.5)
MCHC RBC AUTO-ENTMCNC: 32.5 G/DL (ref 28.4–34.8)
MCV RBC AUTO: 93.9 FL (ref 82.6–102.9)
MONOCYTES # BLD: 7 % (ref 3–12)
NRBC AUTOMATED: 0 PER 100 WBC
PDW BLD-RTO: 13.1 % (ref 11.8–14.4)
PLATELET # BLD: 270 K/UL (ref 138–453)
PLATELET ESTIMATE: ABNORMAL
PMV BLD AUTO: 10.5 FL (ref 8.1–13.5)
POTASSIUM SERPL-SCNC: 3.4 MMOL/L (ref 3.7–5.3)
RBC # BLD: 4.59 M/UL (ref 4.21–5.77)
RBC # BLD: ABNORMAL 10*6/UL
SALICYLATE LEVEL: <1 MG/DL (ref 3–10)
SEG NEUTROPHILS: 72 % (ref 36–65)
SEGMENTED NEUTROPHILS ABSOLUTE COUNT: 6.65 K/UL (ref 1.5–8.1)
SODIUM BLD-SCNC: 141 MMOL/L (ref 135–144)
TOXIC TRICYCLIC SC,BLOOD: NEGATIVE
WBC # BLD: 9 K/UL (ref 3.5–11.3)
WBC # BLD: ABNORMAL 10*3/UL

## 2019-09-09 PROCEDURE — 93005 ELECTROCARDIOGRAM TRACING: CPT | Performed by: STUDENT IN AN ORGANIZED HEALTH CARE EDUCATION/TRAINING PROGRAM

## 2019-09-09 PROCEDURE — 1240000000 HC EMOTIONAL WELLNESS R&B

## 2019-09-09 PROCEDURE — G0480 DRUG TEST DEF 1-7 CLASSES: HCPCS

## 2019-09-09 PROCEDURE — 99285 EMERGENCY DEPT VISIT HI MDM: CPT

## 2019-09-09 PROCEDURE — 80307 DRUG TEST PRSMV CHEM ANLYZR: CPT

## 2019-09-09 PROCEDURE — 80048 BASIC METABOLIC PNL TOTAL CA: CPT

## 2019-09-09 PROCEDURE — 85025 COMPLETE CBC W/AUTO DIFF WBC: CPT

## 2019-09-09 RX ORDER — NICOTINE 21 MG/24HR
1 PATCH, TRANSDERMAL 24 HOURS TRANSDERMAL DAILY
Status: DISCONTINUED | OUTPATIENT
Start: 2019-09-10 | End: 2019-09-13 | Stop reason: HOSPADM

## 2019-09-09 RX ORDER — CHLORDIAZEPOXIDE HYDROCHLORIDE 10 MG/1
10 CAPSULE, GELATIN COATED ORAL 3 TIMES DAILY PRN
Status: DISCONTINUED | OUTPATIENT
Start: 2019-09-09 | End: 2019-09-13 | Stop reason: HOSPADM

## 2019-09-09 RX ORDER — TRAZODONE HYDROCHLORIDE 50 MG/1
50 TABLET ORAL NIGHTLY PRN
Status: DISCONTINUED | OUTPATIENT
Start: 2019-09-09 | End: 2019-09-13 | Stop reason: HOSPADM

## 2019-09-09 RX ORDER — ACETAMINOPHEN 325 MG/1
650 TABLET ORAL EVERY 4 HOURS PRN
Status: DISCONTINUED | OUTPATIENT
Start: 2019-09-09 | End: 2019-09-13 | Stop reason: HOSPADM

## 2019-09-09 RX ORDER — TRAZODONE HYDROCHLORIDE 50 MG/1
50 TABLET ORAL NIGHTLY PRN
Status: DISCONTINUED | OUTPATIENT
Start: 2019-09-10 | End: 2019-09-09

## 2019-09-09 RX ORDER — MAGNESIUM HYDROXIDE/ALUMINUM HYDROXICE/SIMETHICONE 120; 1200; 1200 MG/30ML; MG/30ML; MG/30ML
30 SUSPENSION ORAL EVERY 6 HOURS PRN
Status: DISCONTINUED | OUTPATIENT
Start: 2019-09-09 | End: 2019-09-13 | Stop reason: HOSPADM

## 2019-09-09 RX ORDER — BUSPIRONE HYDROCHLORIDE 10 MG/1
10 TABLET ORAL 2 TIMES DAILY
Status: ON HOLD | COMMUNITY
End: 2019-09-13 | Stop reason: HOSPADM

## 2019-09-09 RX ORDER — NALTREXONE HYDROCHLORIDE 50 MG/1
50 TABLET, FILM COATED ORAL DAILY
Status: ON HOLD | COMMUNITY
End: 2019-09-13 | Stop reason: HOSPADM

## 2019-09-09 RX ORDER — AMITRIPTYLINE HYDROCHLORIDE 25 MG/1
25 TABLET, FILM COATED ORAL NIGHTLY
Status: ON HOLD | COMMUNITY
End: 2019-09-13 | Stop reason: HOSPADM

## 2019-09-09 RX ORDER — OLANZAPINE 5 MG/1
5 TABLET ORAL 2 TIMES DAILY
Status: ON HOLD | COMMUNITY
End: 2019-09-13 | Stop reason: HOSPADM

## 2019-09-09 RX ORDER — BENZTROPINE MESYLATE 1 MG/1
1 TABLET ORAL 2 TIMES DAILY PRN
Status: ON HOLD | COMMUNITY
End: 2019-09-13 | Stop reason: HOSPADM

## 2019-09-09 RX ORDER — BENZTROPINE MESYLATE 1 MG/ML
2 INJECTION INTRAMUSCULAR; INTRAVENOUS 2 TIMES DAILY PRN
Status: DISCONTINUED | OUTPATIENT
Start: 2019-09-09 | End: 2019-09-13 | Stop reason: HOSPADM

## 2019-09-09 ASSESSMENT — ENCOUNTER SYMPTOMS
VOMITING: 0
WHEEZING: 0
ABDOMINAL PAIN: 0
BACK PAIN: 0
SHORTNESS OF BREATH: 0
DIARRHEA: 0

## 2019-09-09 ASSESSMENT — SLEEP AND FATIGUE QUESTIONNAIRES
AVERAGE NUMBER OF SLEEP HOURS: 6
DO YOU USE A SLEEP AID: NO
DO YOU HAVE DIFFICULTY SLEEPING: NO

## 2019-09-09 ASSESSMENT — LIFESTYLE VARIABLES: HISTORY_ALCOHOL_USE: NO

## 2019-09-09 ASSESSMENT — PAIN SCALES - GENERAL: PAINLEVEL_OUTOF10: 0

## 2019-09-09 NOTE — ED PROVIDER NOTES
does smoke marijuana. No K2 use or other ingestions. No use of Tylenol. No medical complaints. On exam he is nontoxic afebrile vital signs normal.  Pupils are dilated. Skin is warm and dry. Normal speech and mentation. He is able to focus. No apparent toxidrome. Impression is psychosis, overuse of medications, suicidal thought with plan. Plan is psychiatric/social work consultation, consider blood work. Richardson Hartman.  Kathleen Azevedo MD, [facility]  Attending Emergency  Physician                Susana Wilson MD  09/09/19 6180       Susana Wilson MD  09/09/19 4811
MAINTENA) 300 MG PRSY prefilled syringe Inject 300 mg into the muscle every 28 days Indications: Due 06/19    Historical Provider, MD       REVIEW OF SYSTEMS    (2-9 systems for level 4, 10 or more for level 5)      Review of Systems   Constitutional: Negative for chills and fever. Respiratory: Negative for shortness of breath and wheezing. Cardiovascular: Negative for chest pain. Gastrointestinal: Negative for abdominal pain, diarrhea and vomiting. Musculoskeletal: Negative for back pain. Skin: Negative for rash. Neurological: Negative for headaches. Psychiatric/Behavioral: Positive for dysphoric mood, hallucinations and suicidal ideas. PHYSICAL EXAM   (up to 7 for level 4, 8 or more for level 5)      INITIAL VITALS:  /88   Pulse 81   Temp 97.9 °F (36.6 °C) (Oral)   Resp 16   SpO2 98%     Physical Exam   Constitutional: He is oriented to person, place, and time. He appears well-developed and well-nourished. No distress. HENT:   Head: Normocephalic and atraumatic. Eyes: Pupils are equal, round, and reactive to light. EOM are normal.   Cardiovascular: Normal rate, regular rhythm and normal heart sounds. No murmur heard. Pulmonary/Chest: Effort normal and breath sounds normal. No respiratory distress. Abdominal: Soft. He exhibits no distension. There is no tenderness. Musculoskeletal: Normal range of motion. Neurological: He is alert and oriented to person, place, and time. No cranial nerve deficit. Skin: Skin is warm and dry. He is not diaphoretic. Psychiatric:   flat affect appears to be responding to visual and/or auditory cues that are not present   Vitals reviewed.       DIFFERENTIAL  DIAGNOSIS     PLAN (LABS / IMAGING / EKG):  Orders Placed This Encounter   Procedures    TOX SCR, BLD, ED    CBC Auto Differential    Basic Metabolic Panel    EKG 12 Lead    Insert peripheral IV       MEDICATIONS ORDERED:  No orders of the defined types were placed in this

## 2019-09-09 NOTE — ED NOTES
Writer submitted perfect serve request for [facility] admission.       SRIDEVI Damico  09/09/19 8963

## 2019-09-09 NOTE — ED NOTES
[] [place]    [x] [facility]    [x]  [facility] ASSESSMENT      Y  N     [] [x] In the past two weeks have you had thoughts of hurting yourself in any way? [] [x] In the past two weeks have you had thoughts that you would be better off dead? [] [x] Have you made a suicide attempt in the past two months? [] [x] Do you have a plan for hurting yourself or suicide? [] [x] Presence of hallucinations/voices related to hurting himself or herself or someone else. SUICIDE/SECURITY WATCH PRECAUTION CHECKLIST     Orders    [x]  Suicide/Security Watch Precautions initiated as checked below:   9/9/19 4:46 PM BH31/BH31A    [x] Notified physician:  Ericka Norton MD  9/9/19 4:46 PM    [x] Orders obtained as appropriate:     [x] 1:1 Observer     [] Psych Consult     [] Psych Consult    Name:  Date:  Time:    [x] 1:1 Observer, Notified by:  Kvng Martinez Nurse Supervisor    [x] Remove all personal clothes from room and place in snap/paper gown/pants. Slipper only    [x] Remove all personal belongings from room and secured away from patient. Documentation    [x] Initiate Suicide/Security Watch Precaution Flow Sheet    [x] Initiate individualized Care Plan/Problem    [x] Document why precautions initiated on flow sheet (Initiate Nursing Care Plan/Problem)    [x] 1:1 Observer in place; instructions provided. Suicide precautions require observer be within arms length. [x] Nurse-Observer Communication Hand-off initiated by RN, reviewed with Observer. Subsequently used as Hand Off between Observers. [x] Initiate every 15 minute observations per observer as delegated by the RN.     [x] Initiate RN assessment and documentation    Environmental Scan  Search Criteria and Process: OPTIONAL, see Search Policy    [] Reason for search: Flight Risk    [] Nursing in presence of second person to search patient    [] Patient notified of reason for body assessment

## 2019-09-09 NOTE — ED NOTES
Provisional Diagnosis:    Depression     Psychosocial and Contextual Factors:     Unstable housing    C-SSRS Summary:    Patient: X  Family:  Agency:    Present Suicidal Behavior:    Verbal: X    Attempt:    Past Suicidal Behavior:    Verbal: X    Attempt:    Self-Injurious/Self-Mutilation:      Protective Factors:    None reported    Risk Factors:    Unstable housing, difficulty with management of basic needs, substance use. Clinical Summary:    Patient is a 26year old  male that presents today with depressive symptoms, auditory hallucinations and suicidal ideations. Patient reports that the voices make him feel like \"overdosing on my pills. \"  Patient is vague with discussion regarding triggers for current thoughts. Patient states that he took 6-7 Tylenol PM, 5 Zoloft and \"some\" Zyprexa while drinking one beer. Patient reports vague states about this being an attempt and writer is unsure if patient was trying to kill himself. Patient has limited to no social supports. He is linked with the [facility] team and is not compliant with treatment or medications.     Level of Care Disposition:    Pending decision of on call psychiatrist.      SRIDEVI Sheppard  09/09/19 6420

## 2019-09-09 NOTE — ED NOTES
Pt reports auditory hallucinations, no visual, no thoughts of hurting self or others, states he took to many Zoloft & unknown medication, pt called it \"valprexin\", states voices [place] like purgatory\"       Vielka Edge RN  09/09/19 1600

## 2019-09-10 LAB
ABSOLUTE EOS #: 0.3 K/UL (ref 0–0.4)
ABSOLUTE IMMATURE GRANULOCYTE: ABNORMAL K/UL (ref 0–0.3)
ABSOLUTE LYMPH #: 1.5 K/UL (ref 1–4.8)
ABSOLUTE MONO #: 0.6 K/UL (ref 0.1–1.3)
ALBUMIN SERPL-MCNC: 4 G/DL (ref 3.5–5.2)
ALBUMIN/GLOBULIN RATIO: ABNORMAL (ref 1–2.5)
ALP BLD-CCNC: 49 U/L (ref 40–129)
ALT SERPL-CCNC: 62 U/L (ref 5–41)
ANION GAP SERPL CALCULATED.3IONS-SCNC: 10 MMOL/L (ref 9–17)
AST SERPL-CCNC: 37 U/L
BASOPHILS # BLD: 1 % (ref 0–2)
BASOPHILS ABSOLUTE: 0 K/UL (ref 0–0.2)
BILIRUB SERPL-MCNC: 0.5 MG/DL (ref 0.3–1.2)
BUN BLDV-MCNC: 18 MG/DL (ref 6–20)
BUN/CREAT BLD: ABNORMAL (ref 9–20)
CALCIUM SERPL-MCNC: 9 MG/DL (ref 8.6–10.4)
CHLORIDE BLD-SCNC: 106 MMOL/L (ref 98–107)
CHOLESTEROL/HDL RATIO: 3.9
CHOLESTEROL: 175 MG/DL
CO2: 26 MMOL/L (ref 20–31)
CREAT SERPL-MCNC: 0.99 MG/DL (ref 0.7–1.2)
DIFFERENTIAL TYPE: ABNORMAL
EOSINOPHILS RELATIVE PERCENT: 4 % (ref 0–4)
ESTIMATED AVERAGE GLUCOSE: 94 MG/DL
GFR AFRICAN AMERICAN: >60 ML/MIN
GFR NON-AFRICAN AMERICAN: >60 ML/MIN
GFR SERPL CREATININE-BSD FRML MDRD: ABNORMAL ML/MIN/{1.73_M2}
GFR SERPL CREATININE-BSD FRML MDRD: ABNORMAL ML/MIN/{1.73_M2}
GLUCOSE BLD-MCNC: 105 MG/DL (ref 70–99)
HBA1C MFR BLD: 4.9 % (ref 4–6)
HCT VFR BLD CALC: 41.1 % (ref 41–53)
HDLC SERPL-MCNC: 45 MG/DL
HEMOGLOBIN: 14 G/DL (ref 13.5–17.5)
IMMATURE GRANULOCYTES: ABNORMAL %
LDL CHOLESTEROL: 106 MG/DL (ref 0–130)
LYMPHOCYTES # BLD: 24 % (ref 24–44)
MCH RBC QN AUTO: 31.3 PG (ref 26–34)
MCHC RBC AUTO-ENTMCNC: 33.9 G/DL (ref 31–37)
MCV RBC AUTO: 92.2 FL (ref 80–100)
MONOCYTES # BLD: 9 % (ref 1–7)
NRBC AUTOMATED: ABNORMAL PER 100 WBC
PDW BLD-RTO: 13.7 % (ref 11.5–14.9)
PLATELET # BLD: 246 K/UL (ref 150–450)
PLATELET ESTIMATE: ABNORMAL
PMV BLD AUTO: 8.1 FL (ref 6–12)
POTASSIUM SERPL-SCNC: 3.9 MMOL/L (ref 3.7–5.3)
RBC # BLD: 4.46 M/UL (ref 4.5–5.9)
RBC # BLD: ABNORMAL 10*6/UL
SEG NEUTROPHILS: 62 % (ref 36–66)
SEGMENTED NEUTROPHILS ABSOLUTE COUNT: 4.2 K/UL (ref 1.3–9.1)
SODIUM BLD-SCNC: 142 MMOL/L (ref 135–144)
THYROXINE, FREE: 0.97 NG/DL (ref 0.93–1.7)
TOTAL PROTEIN: 6.8 G/DL (ref 6.4–8.3)
TRIGL SERPL-MCNC: 121 MG/DL
TSH SERPL DL<=0.05 MIU/L-ACNC: 1.15 MIU/L (ref 0.3–5)
VLDLC SERPL CALC-MCNC: NORMAL MG/DL (ref 1–30)
WBC # BLD: 6.6 K/UL (ref 3.5–11)
WBC # BLD: ABNORMAL 10*3/UL

## 2019-09-10 PROCEDURE — 80053 COMPREHEN METABOLIC PANEL: CPT

## 2019-09-10 PROCEDURE — 6370000000 HC RX 637 (ALT 250 FOR IP): Performed by: NURSE PRACTITIONER

## 2019-09-10 PROCEDURE — 36415 COLL VENOUS BLD VENIPUNCTURE: CPT

## 2019-09-10 PROCEDURE — 84443 ASSAY THYROID STIM HORMONE: CPT

## 2019-09-10 PROCEDURE — 1240000000 HC EMOTIONAL WELLNESS R&B

## 2019-09-10 PROCEDURE — 85025 COMPLETE CBC W/AUTO DIFF WBC: CPT

## 2019-09-10 PROCEDURE — 90792 PSYCH DIAG EVAL W/MED SRVCS: CPT | Performed by: NURSE PRACTITIONER

## 2019-09-10 PROCEDURE — 83036 HEMOGLOBIN GLYCOSYLATED A1C: CPT

## 2019-09-10 PROCEDURE — 84439 ASSAY OF FREE THYROXINE: CPT

## 2019-09-10 PROCEDURE — 80061 LIPID PANEL: CPT

## 2019-09-10 RX ORDER — FOLIC ACID 1 MG/1
1 TABLET ORAL DAILY
Status: DISCONTINUED | OUTPATIENT
Start: 2019-09-10 | End: 2019-09-12

## 2019-09-10 RX ORDER — M-VIT,TX,IRON,MINS/CALC/FOLIC 27MG-0.4MG
1 TABLET ORAL DAILY
Status: DISCONTINUED | OUTPATIENT
Start: 2019-09-10 | End: 2019-09-12

## 2019-09-10 RX ORDER — THIAMINE MONONITRATE (VIT B1) 100 MG
100 TABLET ORAL DAILY
Status: DISCONTINUED | OUTPATIENT
Start: 2019-09-10 | End: 2019-09-12

## 2019-09-10 RX ORDER — BUSPIRONE HYDROCHLORIDE 10 MG/1
10 TABLET ORAL 2 TIMES DAILY
Status: DISCONTINUED | OUTPATIENT
Start: 2019-09-10 | End: 2019-09-12

## 2019-09-10 RX ORDER — ARIPIPRAZOLE 20 MG/1
20 TABLET ORAL DAILY
Status: DISCONTINUED | OUTPATIENT
Start: 2019-09-10 | End: 2019-09-13 | Stop reason: HOSPADM

## 2019-09-10 RX ADMIN — THIAMINE HCL TAB 100 MG 100 MG: 100 TAB at 18:10

## 2019-09-10 RX ADMIN — FOLIC ACID 1 MG: 1 TABLET ORAL at 18:09

## 2019-09-10 RX ADMIN — MULTIPLE VITAMINS W/ MINERALS TAB 1 TABLET: TAB at 18:10

## 2019-09-10 ASSESSMENT — SLEEP AND FATIGUE QUESTIONNAIRES
DO YOU HAVE DIFFICULTY SLEEPING: NO
DO YOU USE A SLEEP AID: NO
AVERAGE NUMBER OF SLEEP HOURS: 6

## 2019-09-10 ASSESSMENT — LIFESTYLE VARIABLES: HISTORY_ALCOHOL_USE: NO

## 2019-09-10 ASSESSMENT — PAIN SCALES - GENERAL: PAINLEVEL_OUTOF10: 0

## 2019-09-10 NOTE — GROUP NOTE
Group Therapy Note     Date: September 10     Group Start Time: 1430  Group End Time: 1525  Group Topic: Cognitive Skills     [facility] PK Fernandez     Pt did not attend 1430 Cognitive Skills Group d/t resting in room despite staff invitation to attend. Talk time was offered as alternative to group session, pt declined.          Signature:  Petey Escamilla [place]

## 2019-09-10 NOTE — H&P
HISTORY and T[address] Tabares [address]47       NAME:  Silvino Barr  MRN: [id number]   YOB: 1990   Date: 9/10/2019   Age: 26 y.o. Gender: male     COMPLAINT AND PRESENT HISTORY:      Silvino Barr is 26 y.o.,  male, admitted because of Schizoaffective Disorder. Patient has auditory hallucinations, patient hears command voices to kill self, but not others. Patient denies any tactile or  visual hallucinations. Pt has suicidal ideation, Pt has plans to overdose on pills/ drugs. Pt denies any homicidal ideation. Pt has a history of previous suicide attempts. Patient states that he is been off of his Seroquel for a week now and believes that this is the cause for his suicidal ideations. Pt has poor insight. Pt has poor sleep, loss of appetite, poor concentration and memory. Patient admits to being a daily drinker who reports drinking 6 beers a day +1 L of vodka. Patient states he also uses cocaine every now and then. Patient lives in own place. Patient has no somatic complaints.     DIAGNOSTIC RESULTS   Labs:  CBC:   Recent Labs     09/09/19  1710 09/10/19  0756   WBC 9.0 6.6   HGB 14.0 14.0    246     BMP:    Recent Labs     09/09/19  1710 09/10/19  0756    142   K 3.4* 3.9    106   CO2 27 26   BUN 16 18   CREATININE 0.98 0.99   GLUCOSE 86 105*     Hepatic:   Recent Labs     09/10/19  0756   AST 37   ALT 62*   BILITOT 0.50   ALKPHOS 49     Lipids:   Recent Labs     09/10/19  0756   CHOL 175   HDL 45     U/A:  Lab Results   Component Value Date    NITRITE neg 03/16/2017    COLORU YELLOW 06/22/2019    WBCUA None 10/13/2018    RBCUA None 10/13/2018    MUCUS NOT REPORTED 10/13/2018    BACTERIA NOT REPORTED 10/13/2018    CLARITYU clear 03/16/2017    SPECGRAV 1.003 06/22/2019    LEUKOCYTESUR NEGATIVE 06/22/2019    BLOODU neg 03/16/2017    GLUCOSEU NEGATIVE 06/22/2019    AMORPHOUS NOT REPORTED 10/13/2018       PAST MEDICAL HISTORY     Past Medical History:   Diagnosis Date    ADHD (attention deficit hyperactivity disorder)     [facility]     pt drinks 6 beers and a liter of vodka daily.  Anxiety     Bipolar 1 disorder (HCC)     PTSD (post-traumatic stress disorder)     Suicide attempt ([facility]     previous suicide attempt by overdosing on pills at approximately 25 y/o       Pt denies any history of Diabetes mellitus type 2, hypertension, stroke, heart disease, COPD, Asthma, GERD, HLD, Cancer, Seizures,Thyroid disease, Kidney Disease, Hepatitis, TB.    SURGICAL HISTORY     History reviewed. No pertinent surgical history. FAMILY HISTORY       Family History   Problem Relation Age of Onset    Liver Cancer Brother     Alcohol Abuse Brother     No Known Problems Mother     No Known Problems Father        SOCIAL HISTORY       Social History     Socioeconomic History    Marital status: Single     Spouse name: None    Number of children: None    Years of education: None    Highest education level: None   Occupational History    None   Social Needs    Financial resource strain: None    Food insecurity:     Worry: None     Inability: None    Transportation needs:     Medical: None     Non-medical: None   Tobacco Use    Smoking status: Current Some Day Smoker     Packs/day: 0.50     Years: 14.00     Pack years: 7.00     Types: Cigarettes    Smokeless tobacco: Never Used    Tobacco comment: pt accepting of nicotine replacement   Substance and Sexual Activity    Alcohol use: Yes     Comment: pt drinks 6 beers and a liter of vodka daily.     Drug use: Yes     Types: Cocaine, Opiates , IV     Comment: drug abuse includes cocaine & heroin    Sexual activity: Never   Lifestyle    Physical activity:     Days per week: None     Minutes per session: None    Stress: None   Relationships    Social connections:     Talks on phone: None     Gets together: None     Attends Denominational service: None     Active member of club or organization: None

## 2019-09-10 NOTE — GROUP NOTE
Group Therapy Note    Date: September 10    Group Start Time: 1330  Group End Time: 6394  Group Topic: Recovery    [facility] D    DEBORA Thomas LSW    patient refused to attend Recovery group at 1:30pm after encouragement from staff.   1:1 talk time provided as alternative to group session      Signature:  DEBORA Thomas LSW

## 2019-09-10 NOTE — GROUP NOTE
Group Therapy Note    Date: September 10    Group Start Time: 0900  Group End Time: 6661  Group Topic: Community Meeting    [facility] PK Montanez    Pt did not attend 0900 community meeting/ goals setting group d/t resting in room despite staff invitation to attend. 1:1 talk time offered as alternative to group session, pt declined.         Signature:  Liza Beal

## 2019-09-10 NOTE — BH NOTE
%    Segs Absolute 4.20 1.3 - 9.1 k/uL    Absolute Lymph # 1.50 1.0 - 4.8 k/uL    Absolute Mono # 0.60 0.1 - 1.3 k/uL    Absolute Eos # 0.30 0.0 - 0.4 k/uL    Basophils Absolute 0.00 0.0 - 0.2 k/uL    Absolute Immature Granulocyte NOT REPORTED 0.00 - 0.30 k/uL    WBC Morphology NOT REPORTED     RBC Morphology NOT REPORTED     Platelet Estimate NOT REPORTED          Diagnostic Impression  Active Problems:    Schizoaffective disorder ([facility]  Resolved Problems:    * No resolved hospital problems. *          Medications   therapeutic multivitamin-minerals  1 tablet Oral Daily    ARIPiprazole  20 mg Oral Daily    busPIRone  10 mg Oral BID    folic acid  1 mg Oral Daily    thiamine  100 mg Oral Daily    nicotine  1 patch Transdermal Daily     acetaminophen, benztropine mesylate, magnesium hydroxide, aluminum & magnesium hydroxide-simethicone, nicotine polacrilex, chlordiazePOXIDE, traZODone    Treatment Plan:     Admit to inpatient psychiatric treatment   Supportive therapy with medication management. Reviewed risks and benefits as well as potential side effects with patient.  Therapeutic activities and groups   Follow up at [facility] after symptoms stabilized   Increase patient's Abilify to 20 mg daily. Will have case management or nursing confirmed the patient's last administration of Abilify Maintena. Would consider increasing dose to 400 mg monthly from noted dose of 300 mg every month. 9/10/19    Estimated length of stay: 5-7 days    Tatum Cummins, APRN - CNP  Psychiatric Advanced Practice Nurse  Trell voice recognition software used in portions of this document.  Occasionally words are mis-transcribed

## 2019-09-10 NOTE — CARE COORDINATION
BHI Biopsychosocial Assessment     Current Level of Psychosocial Functioning      Independent:    Dependent:  X  Minimal Assist:       Comments:  PT has a principle diagnosis as entered in ED at time of admission of Schizoaffective disorder, bipolar type, chronic; history of PTSD, suicidal ideations, ADHD, Alcohol Use Disorder, severe; Stimulant Use Disorder, cocaine type, severe     Psychosocial High-Risk Factors (check all that apply)     Unable to obtain meds:    Chronic illness/pain:     Substance abuse:  X  Lack of Family Support:    Financial stress:    Isolation:  X  Inadequate Community Resources:  X  Suicide attempt(s):  X  Not taking medications:   X  Victim of crime:    Developmental Delay:     Unable to manage personal needs: X  Age 72 or older:    Homeless:    No transportation:    Readmission within 30 days:    Unemployment:  X  Traumatic Event:       Psychiatric Advanced Directives: None reported     Family to Involve in Treatment: Mother, PREMA on file     Sexual Orientation: PT is currently not in a relationship     Patient Strengths: Legal income, stable housing, linked to [facility], medication compliant, support system     Patient Barriers: Lack of coping skills, AoD issues, increased stress and increased anxiety     AOD Referral and/or Education: Referral to CD counselor     CMHC/mental health history: PREMA Barnhart on file     Plan of Care   medication management, group/individual therapies, family meetings, psycho-education, treatment team meetings to assist with stabilization     Initial Discharge Plan: Stabilize mood and medications; return to [facility] house at [address] [place] [address]; follow-up appointment at [facility]    Clinical Summary:  Writer meets with PT and completes assessment.   PT is A&O x4; presents with depression as evidence by sad mood, flat affect, loss of interest in pleasurable activities, not sleeping or eating, inability to concentrate, and thoughts of suicide by

## 2019-09-11 PROCEDURE — 99232 SBSQ HOSP IP/OBS MODERATE 35: CPT | Performed by: NURSE PRACTITIONER

## 2019-09-11 PROCEDURE — 1240000000 HC EMOTIONAL WELLNESS R&B

## 2019-09-11 PROCEDURE — 6370000000 HC RX 637 (ALT 250 FOR IP): Performed by: NURSE PRACTITIONER

## 2019-09-11 RX ADMIN — ARIPIPRAZOLE 20 MG: 20 TABLET ORAL at 14:16

## 2019-09-11 NOTE — PROGRESS NOTES
Department of Psychiatry  Attending Progress Note  Chief Complaint: Schizoaffective disorder [facility])     SUBJECTIVE: This is a 65-year-old male being seen for follow-up. The patient was admitted to the [facility] after he presented to the emergency room with suicidal ideation to overdose on his medications as well as reports of auditory hallucinations. The patient did refuse to attend treatment planning today. He also refuses morning medications. Charting documentation have been reviewed. Patient seen in his room today. He is calm cooperative and pleasant. To most questions during assessment the patient would respond \"fine\" or\". \"  The patient denied feeling down or depressed today. Minimizing symptoms. He appeared to be responding to internal stimuli, but denied any auditory or visual hallucinations. When confronted as to why he would not take his medications he states \"is identical to be awake. I want to sleep all day, Abilify will keep me up. \"  When asked why the patient was specifically asking for Seroquel he states \"it makes me sleep. All I want to do sleep . \"  Continues to display apathy avolition anhedonia. When asked with the patient was having thoughts about wanting to hurt himself he said \"I am cool. \"  When reviewing the patient's admission criteria and how the patient had suicidal thoughts to overdose of medications he stated \"the thought keeps coming and going, but I cannot do anything here. \"  No adverse medication effects. Patient did agree to take his medications. By review of documentation the patient did get his Abilify at [phone] today.     OBJECTIVE    Physical  BP (!) 136/95   Pulse 77   Temp 98 °F (36.7 °C) (Oral)   Resp 16   Ht 6' (1.829 m)   Wt 208 lb (94.3 kg)   SpO2 99%   BMI 28.21 kg/m²      Mental Status Evaluation:  Orientation: alertness: alert   Mood:. constricted and decreased range      Affect:  normal and constricted      Appearance:  age appropriate, casually dressed and disheveled   Activity:  Psychomotor Retardation   Gait/Posture: Normal   Speech:  delayed   Thought Process:  blocked   Thought Content:   Patient denies any hallucinations, though he does appear to be tracking unseen objects during assessment. No suicidal or homicidal ideation. No notes of delusions or paranoia.    Sensorium:  person, place, time/date and situation   Cognition:  grossly intact   Memory: intact   Insight:  limited   Judgment: limited   Suicidal Ideations: denies suicidal ideation   Homicidal Ideations: Negative for homicidal ideation      Medication Side Effects: absent       Attention Span attention span appeared shorter than expected for age       Labs  Recent Results (from the past 67 hour(s))   EKG 12 Lead    Collection Time: 09/09/19  4:05 PM   Result Value Ref Range    Ventricular Rate 79 BPM    Atrial Rate 79 BPM    P-R Interval 156 ms    QRS Duration 86 ms    Q-T Interval 372 ms    QTc Calculation (Bazett) 426 ms    P Axis 73 degrees    R Axis 71 degrees    T Axis 30 degrees   TOX SCR, BLD, ED    Collection Time: 09/09/19  5:10 PM   Result Value Ref Range    Ethanol <10 <10 mg/dL    Ethanol percent <8.942 <8.129 %    Salicylate Lvl <1 (L) 3 - 10 mg/dL    Acetaminophen Level <5 (L) 10 - 30 ug/mL    Toxic Tricyclic Sc,Blood NEGATIVE NEGATIVE   CBC Auto Differential    Collection Time: 09/09/19  5:10 PM   Result Value Ref Range    WBC 9.0 3.5 - 11.3 k/uL    RBC 4.59 4.21 - 5.77 m/uL    Hemoglobin 14.0 13.0 - 17.0 g/dL    Hematocrit 43.1 40.7 - 50.3 %    MCV 93.9 82.6 - 102.9 fL    MCH 30.5 25.2 - 33.5 pg    MCHC 32.5 28.4 - 34.8 g/dL    RDW 13.1 11.8 - 14.4 %    Platelets 622 779 - 720 k/uL    MPV 10.5 8.1 - 13.5 fL    NRBC Automated 0.0 0.0 per 100 WBC    Differential Type NOT REPORTED     Seg Neutrophils 72 (H) 36 - 65 %    Lymphocytes 18 (L) 24 - 43 %    Monocytes 7 3 - 12 %    Eosinophils % 1 1 - 4 %    Basophils 1 0 - 2 %    Immature Granulocytes 1 (H) 0 %    Segs Absolute 6.65 1.50 -

## 2019-09-11 NOTE — GROUP NOTE
Group Therapy Note     Date: September 11     Group Start Time: 1100am  Group End Time: 1150am  Group Topic: Cognitive Skills     NA Forbes, CTRS     Pt did not attend 1100am Cognitive Skills Group d/t resting in room despite staff invitation to attend. Talk time offered as alternative to group session, pt declined.          Signature:  Tobi Morse

## 2019-09-11 NOTE — PLAN OF CARE
5 [facility]  Day 3 Interdisciplinary Treatment Plan NOTE    Review Date & Time: 9/11/19           1300    Admission Type:   Admission Type: Involuntary    Reason for admission:  Reason for Admission: SI no plan  Estimated Length of Stay: 5-7 days  Estimated Discharge Date Update: to be determined by physician    PATIENT STRENGTHS:  Patient Strengths Strengths: Positive Support, No significant Physical Illness  Patient Strengths and Limitations:Limitations: Tendency to isolate self, Lacks leisure interests, Difficulty problem solving/relies on others to help solve problems, Hopeless about future, Multiple barriers to leisure interests, Inappropriate/potentially harmful leisure interests (depression substance abuse anxiety poor coping skills)  Addictive Behavior:Addictive Behavior  In the past 3 months, have you felt or has someone told you that you have a problem with:  : None  Do you have a history of Chemical Use?: No  Do you have a history of Alcohol Use?: No  Do you have a history of Street Drug Abuse?: Yes  Histroy of Prescripton Drug Abuse?: No  Medical Problems:No past medical history on file.     Risk:  Fall RiskTotal: 53  Wolfgang Scale Wolfgang Scale Score: 22  BVC Total: 0  Change in scores no Changes to plan of Care no    Status EXAM:   Status and Exam  Normal: No  Facial Expression: Elevated  Affect: Inappropriate  Level of Consciousness: Alert  Mood:Normal: No  Mood: Depressed, Anxious  Motor Activity:Normal: No  Motor Activity: Decreased  Interview Behavior: Cooperative  Preception: Casey to Person, Beatriz Karan to Time, Casey to Place, Casey to Situation  Attention:Normal: Yes  Attention: Distractible  Thought Processes: Circumstantial  Thought Content:Normal: Yes  Thought Content: Preoccupations  Hallucinations: None  Delusions: No  Memory:Normal: Yes  Memory: Poor Recent, Confabulation  Insight and Judgment: No  Insight and Judgment: Unmotivated  Present Suicidal Ideation: No  Present

## 2019-09-12 LAB
EKG ATRIAL RATE: 79 BPM
EKG P AXIS: 73 DEGREES
EKG P-R INTERVAL: 156 MS
EKG Q-T INTERVAL: 372 MS
EKG QRS DURATION: 86 MS
EKG QTC CALCULATION (BAZETT): 426 MS
EKG R AXIS: 71 DEGREES
EKG T AXIS: 30 DEGREES
EKG VENTRICULAR RATE: 79 BPM

## 2019-09-12 PROCEDURE — 6370000000 HC RX 637 (ALT 250 FOR IP): Performed by: NURSE PRACTITIONER

## 2019-09-12 PROCEDURE — 93010 ELECTROCARDIOGRAM REPORT: CPT | Performed by: INTERNAL MEDICINE

## 2019-09-12 PROCEDURE — 1240000000 HC EMOTIONAL WELLNESS R&B

## 2019-09-12 PROCEDURE — 99232 SBSQ HOSP IP/OBS MODERATE 35: CPT | Performed by: NURSE PRACTITIONER

## 2019-09-12 RX ADMIN — ARIPIPRAZOLE 20 MG: 20 TABLET ORAL at 08:05

## 2019-09-12 NOTE — GROUP NOTE
Group Therapy Note    Date: September 12    Group Start Time: 1600  Group End Time: 1700  Group Topic: Recovery    NA Du        Group Therapy Note    Attendees: 6/19       patient refused to attend Recovery group at 1600 after encouragement from staff.   1:1 talk time provided as alternative to group session    Signature:  Savannah Du

## 2019-09-12 NOTE — PLAN OF CARE
Problem: Altered Mood, Depressive Behavior:  Goal: Able to verbalize and/or display a decrease in depressive symptoms  Description  Able to verbalize and/or display a decrease in depressive symptoms  9/11/2019 2255 by Billie Connolly LPN  Outcome: Met This Shift  Note:   Pt denies depressive symptoms at this time, Pt is isolative to room with a flat affect only coming out for HS snack. Problem: Altered Mood, Depressive Behavior:  Goal: Absence of self-harm  Description  Absence of self-harm  9/11/2019 2255 by Billie Connolly LPN  Outcome: Met This Shift  Note:   Pt remains free of self harm, Pt denies the desire to self harm and agree's to seek staff help should the thought arise. 15 minute checks maintained for patient safety.

## 2019-09-12 NOTE — PLAN OF CARE
Problem: Altered Mood, Depressive Behavior:  Goal: Absence of self-harm  Description  Absence of self-harm  9/12/2019 0945 by Nabila Harley  Outcome: Ongoing  Note:   Pt denies any current suicidal ideations upon request this morning. He is sitting up in bed staring for long intervals. When asked to get up and join group he replied \"no thanks, maybe later\". Selective with meds, reporting he does not take buspar. Support, reassurance and redirection given. Encouraged to attend unit programing and take medications as prescribed. Agrees to seek out staff as needed and before harming self if negative self harm thoughts arise. Q15 minute checks for safety cont.

## 2019-09-12 NOTE — GROUP NOTE
Group Therapy Note     Date: September 12     Group Start Time: 1430  Group End Time: 1097  Group Topic: Cognitive Skills     PK Kenny     Pt did not attend 1430 Cognitive Skills Group d/t resting in room despite staff invitation to attend. Talk time offered as alternative to group session, pt declined.          Signature:  Nathan Lincoln

## 2019-09-12 NOTE — GROUP NOTE
Group Therapy Note    Date: September 12    Group Start Time: 1330  Group End Time: 4520  Group Topic: Recovery    [facility] D    DEBORA Conti, SRIDEVI    patient refused to attend Recovery group at 1:30pm after encouragement from staff.   1:1 talk time not provided as alternative to group session        Signature:  DEBORA Conti, SRIDEVI

## 2019-09-13 VITALS
WEIGHT: 208 LBS | SYSTOLIC BLOOD PRESSURE: 105 MMHG | OXYGEN SATURATION: 98 % | BODY MASS INDEX: 28.17 KG/M2 | HEIGHT: 72 IN | HEART RATE: 69 BPM | RESPIRATION RATE: 14 BRPM | TEMPERATURE: 97.6 F | DIASTOLIC BLOOD PRESSURE: 53 MMHG

## 2019-09-13 PROCEDURE — 99239 HOSP IP/OBS DSCHRG MGMT >30: CPT | Performed by: NURSE PRACTITIONER

## 2019-09-13 PROCEDURE — 6370000000 HC RX 637 (ALT 250 FOR IP): Performed by: NURSE PRACTITIONER

## 2019-09-13 PROCEDURE — 5130000000 HC BRIDGE APPOINTMENT

## 2019-09-13 RX ORDER — ARIPIPRAZOLE 20 MG/1
20 TABLET ORAL DAILY
Qty: 14 TABLET | Refills: 0 | Status: ON HOLD | OUTPATIENT
Start: 2019-09-14 | End: 2019-10-07 | Stop reason: ALTCHOICE

## 2019-09-13 RX ADMIN — ARIPIPRAZOLE 20 MG: 20 TABLET ORAL at 09:07

## 2019-09-13 NOTE — GROUP NOTE
Group Therapy Note    Date: September 13    Group Start Time: 0900  Group End Time: 9339  Group Topic: Community Meeting    [facility] [place]    Pt did not attend 0900 community meeting / goal setting  skills group d/t resting in room despite staff invitation to attend. 1:1 talk time offered as alternative to group session, pt declined.           Signature:  Dylan Jolley

## 2019-09-13 NOTE — GROUP NOTE
Group Therapy Note    Date: September 13    Group Start Time: 1000  Group End Time: 7265  Group Topic: Cognitive Skills    [facility]    Damon Atkinson    Patient declined to attend recreational therapy group at 1000 despite encouragement from staff. 1:1 talk time offered, but refused.        Signature:  Damon Atkinson

## 2019-09-13 NOTE — PROGRESS NOTES
CLINICAL PHARMACY NOTE: MEDS TO [address] Select Patient?: No  Total # of Prescriptions Filled: 1   The following medications were delivered to the patient:  · Abilify  ·   Total # of Interventions Completed: 0  Time Spent (min): 30    Additional Documentation:

## 2019-09-13 NOTE — PLAN OF CARE
Problem: Altered Mood, Depressive Behavior:  Goal: Able to verbalize and/or display a decrease in depressive symptoms  Description  Able to verbalize and/or display a decrease in depressive symptoms  Outcome: Ongoing     Problem: Altered Mood, Depressive Behavior:  Goal: Absence of self-harm  Description  Absence of self-harm  9/12/2019 2223 by Princess Jensen RN  Outcome: Ongoing     Patient denies suicidal and homicidal ideation at this time. Patient denies depression and anxiety at this time. Patient was out in dayroom most of the evening but aloof of peers. Patient is free of self harm at this time. Patient agrees to seek out staff if thoughts to harm self arise. Staff will provide support and reassurance as needed. Safety checks maintained every 15 minutes.

## 2019-09-13 NOTE — DISCHARGE INSTR - DIET

## 2019-10-07 ENCOUNTER — HOSPITAL ENCOUNTER (INPATIENT)
Age: 29
LOS: 4 days | Discharge: HOME | DRG: 750 | End: 2019-10-11
Attending: PSYCHIATRY & NEUROLOGY | Admitting: PSYCHIATRY & NEUROLOGY
Payer: COMMERCIAL

## 2019-10-07 ENCOUNTER — HOSPITAL ENCOUNTER (EMERGENCY)
Age: 29
Discharge: ANOTHER HOSPITAL | End: 2019-10-07
Attending: EMERGENCY MEDICINE
Payer: COMMERCIAL

## 2019-10-07 VITALS
RESPIRATION RATE: 16 BRPM | OXYGEN SATURATION: 96 % | HEART RATE: 78 BPM | BODY MASS INDEX: 28.21 KG/M2 | WEIGHT: 208 LBS | SYSTOLIC BLOOD PRESSURE: 120 MMHG | TEMPERATURE: 98.2 F | DIASTOLIC BLOOD PRESSURE: 66 MMHG

## 2019-10-07 DIAGNOSIS — T50.902A INTENTIONAL DRUG OVERDOSE, INITIAL ENCOUNTER (HCC): Primary | ICD-10-CM

## 2019-10-07 DIAGNOSIS — R45.851 SUICIDAL IDEATION: ICD-10-CM

## 2019-10-07 LAB
ACETAMINOPHEN LEVEL: <5 UG/ML (ref 10–30)
ANION GAP SERPL CALCULATED.3IONS-SCNC: 11 MMOL/L (ref 9–17)
BUN BLDV-MCNC: 19 MG/DL (ref 6–20)
BUN/CREAT BLD: NORMAL (ref 9–20)
CALCIUM SERPL-MCNC: 8.8 MG/DL (ref 8.6–10.4)
CHLORIDE BLD-SCNC: 102 MMOL/L (ref 98–107)
CO2: 25 MMOL/L (ref 20–31)
CREAT SERPL-MCNC: 0.85 MG/DL (ref 0.7–1.2)
EKG ATRIAL RATE: 74 BPM
EKG P AXIS: 63 DEGREES
EKG P-R INTERVAL: 156 MS
EKG Q-T INTERVAL: 388 MS
EKG QRS DURATION: 80 MS
EKG QTC CALCULATION (BAZETT): 430 MS
EKG R AXIS: 71 DEGREES
EKG T AXIS: 28 DEGREES
EKG VENTRICULAR RATE: 74 BPM
ETHANOL PERCENT: <0.01 %
ETHANOL: <10 MG/DL
GFR AFRICAN AMERICAN: >60 ML/MIN
GFR NON-AFRICAN AMERICAN: >60 ML/MIN
GFR SERPL CREATININE-BSD FRML MDRD: NORMAL ML/MIN/{1.73_M2}
GFR SERPL CREATININE-BSD FRML MDRD: NORMAL ML/MIN/{1.73_M2}
GLUCOSE BLD-MCNC: 95 MG/DL (ref 70–99)
POTASSIUM SERPL-SCNC: 4.1 MMOL/L (ref 3.7–5.3)
SALICYLATE LEVEL: <1 MG/DL (ref 3–10)
SODIUM BLD-SCNC: 138 MMOL/L (ref 135–144)
TOXIC TRICYCLIC SC,BLOOD: NEGATIVE

## 2019-10-07 PROCEDURE — 6370000000 HC RX 637 (ALT 250 FOR IP): Performed by: REGISTERED NURSE

## 2019-10-07 PROCEDURE — 93005 ELECTROCARDIOGRAM TRACING: CPT | Performed by: STUDENT IN AN ORGANIZED HEALTH CARE EDUCATION/TRAINING PROGRAM

## 2019-10-07 PROCEDURE — 90792 PSYCH DIAG EVAL W/MED SRVCS: CPT | Performed by: REGISTERED NURSE

## 2019-10-07 PROCEDURE — 80048 BASIC METABOLIC PNL TOTAL CA: CPT

## 2019-10-07 PROCEDURE — 99285 EMERGENCY DEPT VISIT HI MDM: CPT

## 2019-10-07 PROCEDURE — G0480 DRUG TEST DEF 1-7 CLASSES: HCPCS

## 2019-10-07 PROCEDURE — 1240000000 HC EMOTIONAL WELLNESS R&B

## 2019-10-07 PROCEDURE — 93010 ELECTROCARDIOGRAM REPORT: CPT | Performed by: INTERNAL MEDICINE

## 2019-10-07 PROCEDURE — 80307 DRUG TEST PRSMV CHEM ANLYZR: CPT

## 2019-10-07 RX ORDER — HYDROXYZINE HYDROCHLORIDE 25 MG/1
25 TABLET, FILM COATED ORAL 3 TIMES DAILY PRN
Status: DISCONTINUED | OUTPATIENT
Start: 2019-10-07 | End: 2019-10-11 | Stop reason: HOSPADM

## 2019-10-07 RX ORDER — ACETAMINOPHEN 325 MG/1
650 TABLET ORAL EVERY 4 HOURS PRN
Status: DISCONTINUED | OUTPATIENT
Start: 2019-10-07 | End: 2019-10-11 | Stop reason: HOSPADM

## 2019-10-07 RX ORDER — SERTRALINE HYDROCHLORIDE 100 MG/1
100 TABLET, FILM COATED ORAL DAILY
Status: ON HOLD | COMMUNITY
End: 2019-10-07 | Stop reason: ALTCHOICE

## 2019-10-07 RX ORDER — MAGNESIUM HYDROXIDE/ALUMINUM HYDROXICE/SIMETHICONE 120; 1200; 1200 MG/30ML; MG/30ML; MG/30ML
30 SUSPENSION ORAL EVERY 6 HOURS PRN
Status: DISCONTINUED | OUTPATIENT
Start: 2019-10-07 | End: 2019-10-11 | Stop reason: HOSPADM

## 2019-10-07 RX ORDER — TRAZODONE HYDROCHLORIDE 50 MG/1
50 TABLET ORAL NIGHTLY PRN
Status: DISCONTINUED | OUTPATIENT
Start: 2019-10-07 | End: 2019-10-11 | Stop reason: HOSPADM

## 2019-10-07 RX ORDER — ARIPIPRAZOLE 20 MG/1
20 TABLET ORAL DAILY
Status: DISCONTINUED | OUTPATIENT
Start: 2019-10-07 | End: 2019-10-07

## 2019-10-07 RX ORDER — BENZTROPINE MESYLATE 1 MG/ML
2 INJECTION INTRAMUSCULAR; INTRAVENOUS 2 TIMES DAILY PRN
Status: DISCONTINUED | OUTPATIENT
Start: 2019-10-07 | End: 2019-10-11 | Stop reason: HOSPADM

## 2019-10-07 RX ORDER — QUETIAPINE FUMARATE 300 MG/1
300 TABLET, FILM COATED ORAL NIGHTLY
Status: DISCONTINUED | OUTPATIENT
Start: 2019-10-07 | End: 2019-10-11 | Stop reason: HOSPADM

## 2019-10-07 RX ORDER — OLANZAPINE 10 MG/1
10 TABLET ORAL NIGHTLY
Status: ON HOLD | COMMUNITY
End: 2019-10-07

## 2019-10-07 RX ORDER — NICOTINE 21 MG/24HR
1 PATCH, TRANSDERMAL 24 HOURS TRANSDERMAL DAILY
Status: DISCONTINUED | OUTPATIENT
Start: 2019-10-07 | End: 2019-10-10

## 2019-10-07 RX ORDER — CHLORDIAZEPOXIDE HYDROCHLORIDE 25 MG/1
25 CAPSULE, GELATIN COATED ORAL EVERY 6 HOURS PRN
Status: DISCONTINUED | OUTPATIENT
Start: 2019-10-07 | End: 2019-10-11 | Stop reason: HOSPADM

## 2019-10-07 RX ADMIN — QUETIAPINE FUMARATE 300 MG: 300 TABLET ORAL at 21:56

## 2019-10-07 ASSESSMENT — SLEEP AND FATIGUE QUESTIONNAIRES
DIFFICULTY ARISING: NO
DIFFICULTY STAYING ASLEEP: NO
SLEEP PATTERN: NORMAL
AVERAGE NUMBER OF SLEEP HOURS: 7
DO YOU HAVE DIFFICULTY SLEEPING: NO
DIFFICULTY FALLING ASLEEP: NO
RESTFUL SLEEP: YES
DO YOU USE A SLEEP AID: YES

## 2019-10-07 ASSESSMENT — PAIN SCALES - GENERAL: PAINLEVEL_OUTOF10: 0

## 2019-10-07 ASSESSMENT — LIFESTYLE VARIABLES: HISTORY_ALCOHOL_USE: YES

## 2019-10-07 ASSESSMENT — PATIENT HEALTH QUESTIONNAIRE - PHQ9: SUM OF ALL RESPONSES TO PHQ QUESTIONS 1-9: 10

## 2019-10-08 PROCEDURE — 99232 SBSQ HOSP IP/OBS MODERATE 35: CPT | Performed by: REGISTERED NURSE

## 2019-10-08 PROCEDURE — 1240000000 HC EMOTIONAL WELLNESS R&B

## 2019-10-08 PROCEDURE — 6370000000 HC RX 637 (ALT 250 FOR IP): Performed by: REGISTERED NURSE

## 2019-10-08 RX ADMIN — QUETIAPINE FUMARATE 300 MG: 300 TABLET ORAL at 20:37

## 2019-10-08 ASSESSMENT — SLEEP AND FATIGUE QUESTIONNAIRES
DIFFICULTY ARISING: NO
DO YOU USE A SLEEP AID: YES
RESTFUL SLEEP: YES
AVERAGE NUMBER OF SLEEP HOURS: 7
SLEEP PATTERN: DIFFICULTY FALLING ASLEEP;RESTLESSNESS
DIFFICULTY FALLING ASLEEP: NO
DO YOU HAVE DIFFICULTY SLEEPING: NO
DIFFICULTY STAYING ASLEEP: NO

## 2019-10-08 ASSESSMENT — LIFESTYLE VARIABLES: HISTORY_ALCOHOL_USE: NO

## 2019-10-09 PROCEDURE — 99232 SBSQ HOSP IP/OBS MODERATE 35: CPT | Performed by: REGISTERED NURSE

## 2019-10-09 PROCEDURE — 6370000000 HC RX 637 (ALT 250 FOR IP): Performed by: REGISTERED NURSE

## 2019-10-09 PROCEDURE — 1240000000 HC EMOTIONAL WELLNESS R&B

## 2019-10-09 RX ADMIN — QUETIAPINE FUMARATE 300 MG: 300 TABLET ORAL at 21:03

## 2019-10-09 RX ADMIN — ACETAMINOPHEN 650 MG: 325 TABLET, FILM COATED ORAL at 02:14

## 2019-10-09 RX ADMIN — CHLORDIAZEPOXIDE HYDROCHLORIDE 25 MG: 25 CAPSULE ORAL at 02:14

## 2019-10-09 ASSESSMENT — PAIN SCALES - GENERAL
PAINLEVEL_OUTOF10: 0
PAINLEVEL_OUTOF10: 4

## 2019-10-10 PROCEDURE — 6370000000 HC RX 637 (ALT 250 FOR IP): Performed by: REGISTERED NURSE

## 2019-10-10 PROCEDURE — 99232 SBSQ HOSP IP/OBS MODERATE 35: CPT | Performed by: REGISTERED NURSE

## 2019-10-10 PROCEDURE — 1240000000 HC EMOTIONAL WELLNESS R&B

## 2019-10-10 RX ADMIN — QUETIAPINE FUMARATE 300 MG: 300 TABLET ORAL at 21:23

## 2019-10-11 VITALS
OXYGEN SATURATION: 99 % | DIASTOLIC BLOOD PRESSURE: 86 MMHG | SYSTOLIC BLOOD PRESSURE: 143 MMHG | RESPIRATION RATE: 14 BRPM | TEMPERATURE: 98 F | HEART RATE: 66 BPM | WEIGHT: 208 LBS | HEIGHT: 72 IN | BODY MASS INDEX: 28.17 KG/M2

## 2019-10-11 PROCEDURE — 5130000000 HC BRIDGE APPOINTMENT

## 2019-10-11 PROCEDURE — 99238 HOSP IP/OBS DSCHRG MGMT 30/<: CPT | Performed by: PSYCHIATRY & NEUROLOGY

## 2019-10-11 RX ORDER — NALTREXONE HYDROCHLORIDE 50 MG/1
50 TABLET, FILM COATED ORAL
Qty: 10 TABLET | Refills: 0 | Status: ON HOLD | OUTPATIENT
Start: 2019-10-12 | End: 2020-01-09 | Stop reason: HOSPADM

## 2019-10-11 RX ORDER — NALTREXONE HYDROCHLORIDE 50 MG/1
50 TABLET, FILM COATED ORAL
Status: DISCONTINUED | OUTPATIENT
Start: 2019-10-12 | End: 2019-10-11 | Stop reason: HOSPADM

## 2019-10-11 RX ORDER — QUETIAPINE FUMARATE 300 MG/1
300 TABLET, FILM COATED ORAL NIGHTLY
Qty: 10 TABLET | Refills: 0 | Status: ON HOLD | OUTPATIENT
Start: 2019-10-11 | End: 2020-01-02 | Stop reason: ALTCHOICE

## 2019-10-16 ENCOUNTER — HOSPITAL ENCOUNTER (OUTPATIENT)
Age: 29
Setting detail: SPECIMEN
Discharge: HOME | End: 2019-10-16
Payer: COMMERCIAL

## 2019-10-16 LAB
ABSOLUTE EOS #: 0.25 K/UL (ref 0–0.44)
ABSOLUTE IMMATURE GRANULOCYTE: 0.03 K/UL (ref 0–0.3)
ABSOLUTE LYMPH #: 1.68 K/UL (ref 1.1–3.7)
ABSOLUTE MONO #: 0.72 K/UL (ref 0.1–1.2)
ALBUMIN SERPL-MCNC: 4.1 G/DL (ref 3.5–5.2)
ALBUMIN/GLOBULIN RATIO: 1.5 (ref 1–2.5)
ALP BLD-CCNC: 53 U/L (ref 40–129)
ALT SERPL-CCNC: 25 U/L (ref 5–41)
ANION GAP SERPL CALCULATED.3IONS-SCNC: 11 MMOL/L (ref 9–17)
AST SERPL-CCNC: 20 U/L
BASOPHILS # BLD: 1 % (ref 0–2)
BASOPHILS ABSOLUTE: 0.04 K/UL (ref 0–0.2)
BILIRUB SERPL-MCNC: 0.38 MG/DL (ref 0.3–1.2)
BUN BLDV-MCNC: 17 MG/DL (ref 6–20)
BUN/CREAT BLD: ABNORMAL (ref 9–20)
CALCIUM SERPL-MCNC: 8.9 MG/DL (ref 8.6–10.4)
CHLORIDE BLD-SCNC: 106 MMOL/L (ref 98–107)
CO2: 26 MMOL/L (ref 20–31)
CREAT SERPL-MCNC: 1.22 MG/DL (ref 0.7–1.2)
DIFFERENTIAL TYPE: ABNORMAL
EOSINOPHILS RELATIVE PERCENT: 4 % (ref 1–4)
GFR AFRICAN AMERICAN: >60 ML/MIN
GFR NON-AFRICAN AMERICAN: >60 ML/MIN
GFR SERPL CREATININE-BSD FRML MDRD: ABNORMAL ML/MIN/{1.73_M2}
GFR SERPL CREATININE-BSD FRML MDRD: ABNORMAL ML/MIN/{1.73_M2}
GLUCOSE BLD-MCNC: 104 MG/DL (ref 70–99)
HAV IGM SER IA-ACNC: NONREACTIVE
HCT VFR BLD CALC: 40.5 % (ref 40.7–50.3)
HEMOGLOBIN: 13.5 G/DL (ref 13–17)
HEPATITIS B CORE IGM ANTIBODY: NONREACTIVE
HEPATITIS B SURFACE ANTIGEN: NONREACTIVE
HEPATITIS C ANTIBODY: NONREACTIVE
HIV AG/AB: NONREACTIVE
IMMATURE GRANULOCYTES: 0 %
LYMPHOCYTES # BLD: 24 % (ref 24–43)
MCH RBC QN AUTO: 32 PG (ref 25.2–33.5)
MCHC RBC AUTO-ENTMCNC: 33.3 G/DL (ref 28.4–34.8)
MCV RBC AUTO: 96 FL (ref 82.6–102.9)
MONOCYTES # BLD: 10 % (ref 3–12)
NRBC AUTOMATED: 0 PER 100 WBC
PDW BLD-RTO: 12.6 % (ref 11.8–14.4)
PLATELET # BLD: 204 K/UL (ref 138–453)
PLATELET ESTIMATE: ABNORMAL
PMV BLD AUTO: 10.9 FL (ref 8.1–13.5)
POTASSIUM SERPL-SCNC: 4.2 MMOL/L (ref 3.7–5.3)
RBC # BLD: 4.22 M/UL (ref 4.21–5.77)
RBC # BLD: ABNORMAL 10*6/UL
SEG NEUTROPHILS: 61 % (ref 36–65)
SEGMENTED NEUTROPHILS ABSOLUTE COUNT: 4.18 K/UL (ref 1.5–8.1)
SODIUM BLD-SCNC: 143 MMOL/L (ref 135–144)
TOTAL PROTEIN: 6.9 G/DL (ref 6.4–8.3)
WBC # BLD: 6.9 K/UL (ref 3.5–11.3)
WBC # BLD: ABNORMAL 10*3/UL

## 2019-10-19 LAB
QUANTI TB GOLD PLUS: NEGATIVE
QUANTI TB1 MINUS NIL: 0.02 IU/ML (ref 0–0.34)
QUANTI TB2 MINUS NIL: 0.04 IU/ML (ref 0–0.34)
QUANTIFERON MITOGEN: 8.61 IU/ML
QUANTIFERON NIL: 0.02 IU/ML

## 2020-01-01 ENCOUNTER — HOSPITAL ENCOUNTER (EMERGENCY)
Age: 30
Discharge: PSYCHIATRIC OR SUBSTANCE USE FACILITY | End: 2020-01-02
Attending: EMERGENCY MEDICINE
Payer: COMMERCIAL

## 2020-01-01 VITALS
BODY MASS INDEX: 29.12 KG/M2 | OXYGEN SATURATION: 99 % | TEMPERATURE: 98.2 F | RESPIRATION RATE: 16 BRPM | DIASTOLIC BLOOD PRESSURE: 66 MMHG | HEIGHT: 72 IN | SYSTOLIC BLOOD PRESSURE: 121 MMHG | HEART RATE: 72 BPM | WEIGHT: 215 LBS

## 2020-01-01 PROCEDURE — 99285 EMERGENCY DEPT VISIT HI MDM: CPT

## 2020-01-01 RX ORDER — BENZTROPINE MESYLATE 1 MG/ML
2 INJECTION INTRAMUSCULAR; INTRAVENOUS 2 TIMES DAILY PRN
Status: CANCELLED | OUTPATIENT
Start: 2020-01-01

## 2020-01-01 RX ORDER — NICOTINE 21 MG/24HR
1 PATCH, TRANSDERMAL 24 HOURS TRANSDERMAL DAILY
Status: CANCELLED | OUTPATIENT
Start: 2020-01-02

## 2020-01-01 RX ORDER — MAGNESIUM HYDROXIDE/ALUMINUM HYDROXICE/SIMETHICONE 120; 1200; 1200 MG/30ML; MG/30ML; MG/30ML
30 SUSPENSION ORAL EVERY 6 HOURS PRN
Status: CANCELLED | OUTPATIENT
Start: 2020-01-01

## 2020-01-01 RX ORDER — TRAZODONE HYDROCHLORIDE 50 MG/1
50 TABLET ORAL NIGHTLY PRN
Status: CANCELLED | OUTPATIENT
Start: 2020-01-02

## 2020-01-01 RX ORDER — ACETAMINOPHEN 325 MG/1
650 TABLET ORAL EVERY 4 HOURS PRN
Status: CANCELLED | OUTPATIENT
Start: 2020-01-01

## 2020-01-01 ASSESSMENT — ENCOUNTER SYMPTOMS
SORE THROAT: 0
NAUSEA: 0
BACK PAIN: 0
EYE PAIN: 0
VOMITING: 0
ABDOMINAL PAIN: 0
SHORTNESS OF BREATH: 0

## 2020-01-02 ENCOUNTER — HOSPITAL ENCOUNTER (INPATIENT)
Age: 30
LOS: 7 days | Discharge: HOME | DRG: 751 | End: 2020-01-09
Attending: PSYCHIATRY & NEUROLOGY | Admitting: PSYCHIATRY & NEUROLOGY
Payer: COMMERCIAL

## 2020-01-02 PROBLEM — F33.9 MAJOR DEPRESSIVE DISORDER, RECURRENT (HCC): Status: ACTIVE | Noted: 2020-01-02

## 2020-01-02 PROBLEM — F33.9 MAJOR DEPRESSION, RECURRENT (HCC): Status: ACTIVE | Noted: 2020-01-02

## 2020-01-02 PROCEDURE — G0379 DIRECT REFER HOSPITAL OBSERV: HCPCS

## 2020-01-02 PROCEDURE — 90792 PSYCH DIAG EVAL W/MED SRVCS: CPT | Performed by: NURSE PRACTITIONER

## 2020-01-02 PROCEDURE — G0378 HOSPITAL OBSERVATION PER HR: HCPCS

## 2020-01-02 PROCEDURE — 1240000000 HC EMOTIONAL WELLNESS R&B

## 2020-01-02 PROCEDURE — 6370000000 HC RX 637 (ALT 250 FOR IP): Performed by: NURSE PRACTITIONER

## 2020-01-02 RX ORDER — OLANZAPINE 10 MG/1
10 TABLET ORAL NIGHTLY
Status: ON HOLD | COMMUNITY
End: 2020-01-09 | Stop reason: HOSPADM

## 2020-01-02 RX ORDER — BENZTROPINE MESYLATE 1 MG/ML
2 INJECTION INTRAMUSCULAR; INTRAVENOUS 2 TIMES DAILY PRN
Status: DISCONTINUED | OUTPATIENT
Start: 2020-01-02 | End: 2020-01-09 | Stop reason: HOSPADM

## 2020-01-02 RX ORDER — TRAZODONE HYDROCHLORIDE 100 MG/1
100 TABLET ORAL NIGHTLY
Status: ON HOLD | COMMUNITY
End: 2020-01-09 | Stop reason: HOSPADM

## 2020-01-02 RX ORDER — NALTREXONE HYDROCHLORIDE 50 MG/1
50 TABLET, FILM COATED ORAL
Status: DISCONTINUED | OUTPATIENT
Start: 2020-01-03 | End: 2020-01-04

## 2020-01-02 RX ORDER — ACETAMINOPHEN 325 MG/1
650 TABLET ORAL EVERY 4 HOURS PRN
Status: DISCONTINUED | OUTPATIENT
Start: 2020-01-02 | End: 2020-01-09 | Stop reason: HOSPADM

## 2020-01-02 RX ORDER — NICOTINE 21 MG/24HR
1 PATCH, TRANSDERMAL 24 HOURS TRANSDERMAL DAILY
Status: DISCONTINUED | OUTPATIENT
Start: 2020-01-02 | End: 2020-01-07

## 2020-01-02 RX ORDER — QUETIAPINE FUMARATE 300 MG/1
300 TABLET, FILM COATED ORAL NIGHTLY
Status: DISCONTINUED | OUTPATIENT
Start: 2020-01-02 | End: 2020-01-09 | Stop reason: HOSPADM

## 2020-01-02 RX ORDER — TRAZODONE HYDROCHLORIDE 50 MG/1
50 TABLET ORAL NIGHTLY PRN
Status: DISCONTINUED | OUTPATIENT
Start: 2020-01-02 | End: 2020-01-03

## 2020-01-02 RX ORDER — MAGNESIUM HYDROXIDE/ALUMINUM HYDROXICE/SIMETHICONE 120; 1200; 1200 MG/30ML; MG/30ML; MG/30ML
30 SUSPENSION ORAL EVERY 6 HOURS PRN
Status: DISCONTINUED | OUTPATIENT
Start: 2020-01-02 | End: 2020-01-09 | Stop reason: HOSPADM

## 2020-01-02 RX ADMIN — QUETIAPINE FUMARATE 300 MG: 300 TABLET ORAL at 21:04

## 2020-01-02 ASSESSMENT — SLEEP AND FATIGUE QUESTIONNAIRES
AVERAGE NUMBER OF SLEEP HOURS: 4
DIFFICULTY FALLING ASLEEP: YES
DO YOU HAVE DIFFICULTY SLEEPING: YES
DIFFICULTY STAYING ASLEEP: YES
DIFFICULTY ARISING: NO
RESTFUL SLEEP: NO
DO YOU USE A SLEEP AID: NO
SLEEP PATTERN: DIFFICULTY FALLING ASLEEP;RESTLESSNESS;DISTURBED/INTERRUPTED SLEEP

## 2020-01-02 ASSESSMENT — PAIN SCALES - GENERAL: PAINLEVEL_OUTOF10: 0

## 2020-01-02 ASSESSMENT — LIFESTYLE VARIABLES: HISTORY_ALCOHOL_USE: NO

## 2020-01-02 NOTE — GROUP NOTE
Group Therapy Note    Date: 1/2/2020    Group Start Time: 1430  Group End Time: 0650  Group Topic: Psychoeducation    [facility]    Patient refused to attend music jose albertoa group at 1430 after encouragement from staff. 1:1 talk time offered by staff as alternative to group session.

## 2020-01-02 NOTE — H&P
HISTORY and [address]       NAME:  Mallika Kelly  MRN: [id number]   YOB: 1990   Date: 1/2/2020   Age: 26 y.o. Gender: male     COMPLAINT AND PRESENT HISTORY:      Mallika Kelly is 26 y.o.,  male, admitted because of recurrent depression with suicidal ideation. Per chart note,  Pt presented to [facility] ED voluntarily with suicidal ideation with plans to overdose on pills/ drugs. Pt denies current suicidal ideation. Pt denies any homicidal ideation. Pt has a history of multiple previous episodes of suicidal ideation and attempts by overdose on medication/drugs. Pt feels hopeless, helpless, loss of energy. Pt reports he \"just want to get my meds and I'll be alright. \" Pt has poor sleep, loss of appetite, poor concentration and memory. Pt has current auditory hallucinations of commanding voices telling him to hurt himself. No current visual or tactile hallucinations. Patients current stressors include substance abuse, medication noncompiance. Patient denies any current alcohol abuse. Admits to substance abuse (cocaine). Patient lives alone. Pt has been non compliant with the psychiatric medications 4 days stating he \"missed an appointment and ran out of meds\". Scabbing to bilateral posterior ankle, no redness, warmth or drainage. Pt denies any somatic complaints. DIAGNOSTIC RESULTS       PAST MEDICAL HISTORY     Past Medical History:   Diagnosis Date    ADHD (attention deficit hyperactivity disorder)     Alcoholism ([facility] [address]     pt drinks 6 beers and a liter of vodka daily.     Anxiety     Bipolar 1 disorder (HCC)     PTSD (post-traumatic stress disorder)     Suicide attempt ([address]     previous suicide attempt by overdosing on pills at approximately 25 y/o     Pt denies any history of Diabetes mellitus type 2, hypertension, stroke, heart disease, COPD, Asthma, GERD, HLD, Cancer, Seizures,Thyroid disease, Kidney Disease, Hepatitis, TB.    SURGICAL HISTORY     No past surgical history on file. FAMILY HISTORY       Family History   Problem Relation Age of Onset    Liver Cancer Brother     Alcohol Abuse Brother     No Known Problems Mother     No Known Problems Father        SOCIAL HISTORY       Social History     Socioeconomic History    Marital status: Single     Spouse name: Not on file    Number of children: Not on file    Years of education: Not on file    Highest education level: Not on file   Occupational History    Not on file   Social Needs    Financial resource strain: Not on file    Food insecurity:     Worry: Not on file     Inability: Not on file    Transportation needs:     Medical: Not on file     Non-medical: Not on file   Tobacco Use    Smoking status: Current Some Day Smoker     Packs/day: 0.50     Years: 14.00     Pack years: 7.00     Types: Cigarettes    Smokeless tobacco: Never Used    Tobacco comment: .pt accepting nicotine gum   Substance and Sexual Activity    Alcohol use: Yes     Comment: pt drinks 6 beers and a liter of vodka daily.     Drug use: Yes     Types: Cocaine, Opiates , IV     Comment: drug abuse includes cocaine & heroin    Sexual activity: Never   Lifestyle    Physical activity:     Days per week: Not on file     Minutes per session: Not on file    Stress: Not on file   Relationships    Social connections:     Talks on phone: Not on file     Gets together: Not on file     Attends Orthodox service: Not on file     Active member of club or organization: Not on file     Attends meetings of clubs or organizations: Not on file     Relationship status: Not on file    Intimate partner violence:     Fear of current or ex partner: Not on file     Emotionally abused: Not on file     Physically abused: Not on file     Forced sexual activity: Not on file   Other Topics Concern    Not on file   Social History Narrative    Not on file        REVIEW OF SYSTEMS      Allergies   Allergen Reactions  Advil [Ibuprofen] Hives       No current facility-administered medications on file prior to encounter. Current Outpatient Medications on File Prior to Encounter   Medication Sig Dispense Refill    naltrexone (DEPADE) 50 MG tablet Take 1 tablet by mouth daily (with breakfast) 10 tablet 0    QUEtiapine (SEROQUEL) 300 MG tablet Take 1 tablet by mouth nightly 10 tablet 0                   General health:  Fairly good. No fever or chills. Skin:  No itching, redness or rash. Head, eyes, ears, nose, throat:  No headache, epistaxis, rhinorrhea hearing loss or sore throat. Neck:  No pain, stiffness or masses. Cardiovascular/Respiratory system:  No chest pain, palpitation, shortness of breath, coughing or expectoration. Gastrointestinal tract: No abdominal pain, nausea, vomiting, dysphagia, diarrhea or constipation. Genitourinary:  No burning on micturition. No hesitancy, urgency, frequency or discoloration of urine. Locomotor:  No bone or joint pains. No swelling or deformities. Neuropsychiatric:  See HPI. GENERAL PHYSICAL EXAM:     Vitals: /60   Pulse 74   Temp 97.3 °F (36.3 °C) (Oral)   Resp 14   Ht 6' (1.829 m)   Wt 215 lb (97.5 kg)   BMI 29.16 kg/m²  Body mass index is 29.16 kg/m². Pt was examined with a nurse present in the room. GENERAL APPEARANCE:  Ry Zavala is 26 y.o.,  male, not obese, nourished, conscious, alert, anxious. Does not appear to be distress or pain at this time. SKIN:  Scabbing to bilateral posterior ankle, no redness, warmth or drainage. Warm, dry, no cyanosis or jaundice. HEAD:  Normocephalic, atraumatic, no swelling or tenderness. EYES:  Pupils equal, reactive to light, Conjunctiva is clear, EOMs intact lan. eyelids WNL. EARS:  No discharge, no marked hearing loss. NOSE:  No rhinorrhea, epistaxis or septal deformity. THROAT:  Not congested.  No ulceration bleeding or discharge. NECK:  No stiffness, trachea central.  No palpable masses or L.N.      CHEST:  Symmetrical and equal on expansion. HEART:  Regular rate and rhythm. S1 > S2, No audible murmurs or gallops. LUNGS:  Equal on expansion, normal breath sounds. No adventitious sounds. ABDOMEN:  Obese. Soft on palpation. No localized tenderness. No guarding or rigidity. No palpable organomegaly. LYMPHATICS:  No palpable cervical Lymphadenopathy. LOCOMOTOR, BACK AND SPINE:  No tenderness or deformities. EXTREMITIES:  Symmetrical, no pretibial edema. Clarissas sign negative. No discoloration or ulcerations. NEUROLOGIC:  The patient is conscious, alert, oriented, Cranial nerve II-XII intact, taste and smell were not examined. No apparent focal sensory or motor deficits. Muscle strength equal Velasquez. No facial droop, tongue protrudes centrally, no slurring of the speech. PROVISIONAL DIAGNOSES:      Active Problems:    Major depressive disorder, recurrent (HCC)    Major depression, recurrent ([address]  Resolved Problems:    * No resolved hospital problems.  LIBBY Limon - CNP on 1/2/2020 at 9:02 AM

## 2020-01-02 NOTE — BH NOTE
`[facility]  Admission Note     Admission Type:   Admission Type: Voluntary    Reason for admission:  Reason for Admission: Patient voluntary from [facility] with suicidal ideation. Patient abused cocaine yesterday and now he is having auditory hallucination and just wants to die. PATIENT STRENGTHS:  Strengths: No significant Physical Illness, Connection to output provider    Patient Strengths and Limitations:  Limitations: General negative or hopeless attitude about future/recovery, Inappropriate/potentially harmful leisure interests, Tendency to isolate self, Hopeless about future    Addictive Behavior:   Addictive Behavior  In the past 3 months, have you felt or has someone told you that you have a problem with:  : None  Do you have a history of Chemical Use?: No  Do you have a history of Alcohol Use?: No  Do you have a history of Street Drug Abuse?: Yes  Histroy of Prescripton Drug Abuse?: No    Medical Problems:   Past Medical History:   Diagnosis Date    ADHD (attention deficit hyperactivity disorder)     Alcoholism ([facility] [address]     pt drinks 6 beers and a liter of vodka daily.  Anxiety     Bipolar 1 disorder (HCC)     PTSD (post-traumatic stress disorder)     Suicide attempt ([facility] [address]     previous suicide attempt by overdosing on pills at approximately 25 y/o       Status EXAM:  Status and Exam  Normal: No  Facial Expression: Flat, Sad  Affect: Appropriate  Level of Consciousness: Alert  Mood:Normal: No  Mood: Depressed, Anxious, Sad  Motor Activity:Normal: Yes  Interview Behavior: Cooperative  Preception: [place] to Person, Marvie Zachary to Time, [place] to Place, [place] to Situation  Attention:Normal: No  Attention: Distractible  Thought Processes: Circumstantial  Thought Content:Normal: No  Thought Content: Preoccupations  Hallucinations:  Auditory (Comment)  Delusions: No  Memory:Normal: Yes  Insight and Judgment: No  Insight and Judgment: Poor Judgment, Poor Insight  Present Suicidal Ideation: No  Present Homicidal Ideation: No    Tobacco Screening:  Practical Counseling, on admission, petey X, if applicable and completed (first 3 are required if patient doesn't refuse):            ( )  Recognizing danger situations (included triggers and roadblocks)                    ( )  Coping skills (new ways to manage stress, exercise, relaxation techniques, changing routine, distraction)                                                           ( )  Basic information about quitting (benefits of quitting, techniques in how to quit, available resources  ( ) Referral for counseling faxed to [facility]                                           (x ) Patient refused counseling  ( ) Patient has not smoked in the last 30 days    Metabolic Screening:    Lab Results   Component Value Date    LABA1C 4.9 09/10/2019       Lab Results   Component Value Date    CHOL 175 09/10/2019    CHOL 150 02/26/2019    CHOL 162 01/21/2019    CHOL 153 11/11/2018    CHOL 129 07/05/2018    CHOL 145 11/05/2017    CHOL 140 06/13/2015     Lab Results   Component Value Date    TRIG 121 09/10/2019    TRIG 112 02/26/2019    TRIG 184 (H) 01/21/2019    TRIG 142 11/11/2018    TRIG 104 07/05/2018    TRIG 105 11/05/2017    TRIG 167 (H) 06/13/2015     Lab Results   Component Value Date    HDL 45 09/10/2019    HDL 52 02/26/2019    HDL 46 01/21/2019    HDL 49 11/11/2018    HDL 73 07/05/2018    HDL 67 11/05/2017    HDL 31 (L) 06/13/2015     No components found for: LDLCAL  No results found for: LABVLDL      Body mass index is 29.16 kg/m². BP Readings from Last 2 Encounters:   01/02/20 110/64   01/01/20 121/66           Pt admitted with followings belongings:  Dentures: None  Vision - Corrective Lenses: Glasses  Hearing Aid: None  Jewelry: Watch, Bracelet  Body Piercings Removed: N/A  Clothing: Footwear, Pants, Shirt, Socks  Were All Patient Medications Collected?: Not Applicable  Other Valuables:  Other (Comment)(master card)      Valuables

## 2020-01-02 NOTE — BH NOTE
Psychiatric Admission Note - Psychiatric Nurse Practitioner         Ry Zavala is a 26 y.o. male who was admitted from [facility] on a voluntary basis. He has been off medications for a week and used cocaine yesterday. He started experiencing auditory hallucinations of voices telling him to hurt himself. He admitted to suicidal thoughts in the ED and wanted to be admitted. Today Juan Manuel Proctor was interviewed in his room while laying in bed. He is casually dressed and has adequate hygiene. He reports that he is not experiencing any suicidal thoughts today and denies homicidal thoughts. He does report that he has attempted suicide multiple times in the past and usually by overdose. The last attempt was in December 2019. He reports multiple hospitalizations in the past also as well as multiple times in rehab. He had reported in the ED that he was having command hallucinations but today reports that he doesn't hear voices just mumble. He reports that he primarily hears things when using. He denies any visual hallucinations unless he has been awake for days. He does report manic episodes but it has been a while since he has been manic. When he is depressed it is horrible, he doesn't want to get out of bed, and doesn't want to do anything. He does get anxious a lot and uses drug to feel better. He does have trouble sleeping and doesn't sleep unless he has Seroquel. He also feel that Naltrexone helps him not use when he takes. He is now linked with [facility] but doesn't follow up. Chart and medications reviewed. Therapeutic support, empathetic care and psycho education provided greater than 20 minutes. At this time there is no safe alternative other than inpatient care. Past Psychiatric History   Patient Reports noncompliance with outpatient psychiatric care. Reported history of psychiatric inpatient hospitalizations.  Reported history of suicide attempts. History of Substance Abuse     He does not smoke cigarettes. He quit drinking alcohol 3 months ago. He does abuse cocaine. Family History of psychiatric disorders    Family history: positive for his father abusing drugs and alcohol    Past psychiatric medications  Zoloft, Buspar, Elavil, Abilify, Zyprexa, Rexulti, Aristada, Trazodone, Atarax, Remeron, Risperdal    Medical History   Allergies:  Advil [ibuprofen]   Past Medical History:   Diagnosis Date    ADHD (attention deficit hyperactivity disorder)     Alcoholism ([facility] [address]     pt drinks 6 beers and a liter of vodka daily.  Anxiety     Bipolar 1 disorder (HCC)     PTSD (post-traumatic stress disorder)     Suicide attempt ([facility] [address]     previous suicide attempt by overdosing on pills at approximately 25 y/o      No past surgical history on file. Neurologic Exam     Mental Status   Oriented to person, place, and time. Attention: normal. Concentration: normal.   Speech: speech is normal   Level of consciousness: alert  Knowledge: good. Gait, Coordination, and Reflexes     Gait  Gait: normal      See H&P for further physical examination. SOCIAL HISTORY   Jamaal Seals was born and raised in [place]. He was raised by his mother and his dad was in [address] [place] [address] and he doesn't know him. He has 1 brother. He has no legal issues. 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 He has no children. He dropped out of  in the 11th grade. He has tried for his GED but couldn't pass the algebra. He has no support other than one friend. Mental Status  Pt. was alert, fully oriented, and cooperative. Appearance and hygiene were appropriate and adequate hygiene . Mood was depressed. Affect was \"dysthymic and poorly reactive Thought process was within normal limits. Patient endorsed auditory hallucinations. Patient denied suicidal ideations. Patient denied homicidal ideations . Patient's gross cognitive functions were intact. Insight and judgement were fair. Both recent and remote memory were intact. Psychomotor status was without abnormality     Labs  No results found for this or any previous visit (from the past 72 hour(s)). Diagnostic Impression  Active Problems:    Major depressive disorder, recurrent (HCC)    Major depression, recurrent ([facility] [address]  Resolved Problems:    * No resolved hospital problems. *          Medications   nicotine  1 patch Transdermal Daily     acetaminophen, aluminum & magnesium hydroxide-simethicone, benztropine mesylate, magnesium hydroxide, nicotine polacrilex, traZODone    Treatment Plan:     Admit to inpatient psychiatric treatment   Supportive therapy with medication management. Reviewed risks and benefits as well as potential side effects with patient.  Therapeutic activities and groups   Follow up at [facility] after symptoms stabilized   Restart home medications of Naltrexone 50 mg po daily and Seroquel 300 mg po qhs on 1/2/20202.     Estimated length of stay: 5-7 days    Yany Lindsey APRN - CNP  Psychiatric Advanced Practice Nurse

## 2020-01-02 NOTE — BH NOTE
Patient given tobacco quitline number 52778792251 at this time, refusing to call at this time, states \" I just dont want to quit now\"- patient given information as to the dangers of long term tobacco use. Continue to reinforce the importance of tobacco cessation.

## 2020-01-02 NOTE — ED NOTES
Pt comfort assessed, pt resting in stretcher requesting crackers and a drink. Pt provided with crackers with peanut butter and also water. Will continue to monitor.      Cy Farmer RN  01/02/20 8301

## 2020-01-02 NOTE — PLAN OF CARE
[facility]  Initial Interdisciplinary Treatment Plan NO      Original treatment plan Date & Time: 1/2/20 1302    Admission Type:  Admission Type: Voluntary    Reason for admission:   Reason for Admission: Patient voluntary from [facility] with suicidal ideation. Patient abused cocaine yesterday and now he is having auditory hallucination and just wants to die. Estimated Length of Stay:  5-7days  Estimated Discharge Date: to be determined by physician    PATIENT STRENGTHS:  Patient Strengths:Strengths: No significant Physical Illness, Connection to output provider  Patient Strengths and Limitations:Limitations: Difficult relationships / poor social skills, Tendency to isolate self, Limited education -> difficulty reading or writing, Lacks leisure interests, Difficulty problem solving/relies on others to help solve problems, Inappropriate/potentially harmful leisure interests, Demonstrates discomfort with /lack of social skills, Multiple barriers to leisure interests, Apathetic / unmotivated, External locus of control  Addictive Behavior: Addictive Behavior  In the past 3 months, have you felt or has someone told you that you have a problem with:  : None  Do you have a history of Chemical Use?: No  Do you have a history of Alcohol Use?: No  Do you have a history of Street Drug Abuse?: Yes  Histroy of Prescripton Drug Abuse?: No  Medical Problems:  Past Medical History:   Diagnosis Date    ADHD (attention deficit hyperactivity disorder)     Alcoholism ([facility] [address]     pt drinks 6 beers and a liter of vodka daily.     Anxiety     Bipolar 1 disorder (HCC)     PTSD (post-traumatic stress disorder)     Suicide attempt ([facility] [address]     previous suicide attempt by overdosing on pills at approximately 25 y/o     Status EXAM:Status and Exam  Normal: No  Facial Expression: Flat, Sad  Affect: Appropriate  Level of Consciousness: Alert  Mood:Normal: No  Mood: Depressed, Anxious, Sad  Motor Activity:Normal: Yes  Kerrie Number Behavior: Cooperative  Preception: [place] to Person, [place] to Time, [place] to Place, [place] to Situation  Attention:Normal: No  Attention: Distractible  Thought Processes: Circumstantial  Thought Content:Normal: No  Thought Content: Preoccupations  Hallucinations:  Auditory (Comment)  Delusions: No  Memory:Normal: Yes  Insight and Judgment: No  Insight and Judgment: Poor Judgment, Poor Insight  Present Suicidal Ideation: No  Present Homicidal Ideation: No    EDUCATION:   Learner Progress Toward Treatment Goals: reviewed group plans and strategies for care    Method:group therapy, medication compliance, individualized assessments and care planning    Outcome: needs reinforcement    PATIENT GOALS: to be discussed with patient within 72 hours    PLAN/TREATMENT RECOMMENDATIONS:     continue group therapy , medications compliance, goal setting, individualized assessments and care, continue to monitor pt on unit      SHORT-TERM GOALS:   Time frame for Short-Term Goals: 5-7 days    LONG-TERM GOALS:  Time frame for Long-Term Goals: 6 months  Members Present in Team Meeting: See Signature Sheet    AKUA AmbroseS

## 2020-01-02 NOTE — BH NOTE
On call provider, Autumn Michel NP, notified of best practice advisory suggesting to place patient on suicide precautions. Provider to discontinue the order as patient does not meet criteria for suicide precautions at this time. Continue to observe patient on every 15 minute checks.

## 2020-01-02 NOTE — ED NOTES
Perfect Serve sent to on call provider, Reji Nevarez NP.        Shanice Drummond, MSW, LSW     Grey Diazman  01/01/20 6754

## 2020-01-02 NOTE — GROUP NOTE
Group Therapy Note    Date: 1/2/2020    Group Start Time: 1000  Group End Time: 9604  Group Topic: Psychotherapy    [address], LSW    patient refused to attend psychotherapy group at [facility] after encouragement from staff.   1:1 talk time provided as alternative to group session

## 2020-01-02 NOTE — ED NOTES
[] Camila Jerica    [] [address]    [x]  [address]    SUICIDE/SECURITY WATCH PRECAUTION CHECKLIST    Orders    [x]  Suicide/Security Watch Precautions initiated as checked below:   1/1/20 10:39 PM BH31/BH31A    [x] Notified physician:  No att. providers found  1/1/20 10:39 PM    [x] Orders obtained as appropriate:     [x] 1:1 Observer     [] Psych Consult     [] Psych Consult    Name:  Date:  Time:    [x] 1:1 Observer, Notifed by:  Bette Franklin Nurse Supervisor    [x] Remove all personal clothes from room and place in snap/paper gown/pants. Slipper only    [x] Remove all personal belongings from room and secured away from patient. Documentation    [x] Initiate Suicide/Security Watch Precaution Flow Sheet    [x] Initiate individualized Care Plan/Problem    [x] Document why precautions initiated on flow sheet (Initiate Nursing Care Plan/Problem)    [x] 1:1 Observer in place; instructions provided. Suicide precautions require observer be within arms length. [x] Nurse-Observer Communication Hand-off initiated by RN, reviewed with Observer. Subsequently used as Hand Off between Observers. [x] Initiate every 15 minute observations per observer as delegated by the RN.     [x] Initiate RN assessment and documentation    Environmental Scan  Search Criteria and Process: OPTIONAL, see Search Policy    [] Reason for search:    [] Nursing in presence of second person to search patient    [] Patient notified of reason for body assessment and belongings search:     Persons present during search:   Results of search and disposition:       Searchers Name: [address] [place] [address]    These items or items similar should be removed from the room:   [] Chairs   [x] Telephone   [x] Trash cans and liners   [x] Plastic utensils (order Patient Safety tray)   [x] Empty or remove Sharps containers   [x] All personal clothing/belongings removed   [x] All unnecessary lead wires, electrical cords, draw cords, etc.   [x]

## 2020-01-02 NOTE — GROUP NOTE
Group Therapy Note    Date: 1/2/2020    Group Start Time: 1330  Group End Time: 7692  Group Topic: Psychoeducation    [facility]    Melina Fitzgerald      Patient declined to attend recreational therapy group at 1330 despite encouragement from staff. 1:1 talk time offered by staff as alternative to group session.       Signature:  Nicole Wheat

## 2020-01-02 NOTE — ED PROVIDER NOTES
[address]  ED  eMERGENCY dEPARTMENT eNCOUnter   Attending Attestation     Pt Name: Kennedi Mai  MRN: [id number]  Denzelgfnavid 1990  Date of evaluation: 1/2/20       Kennedi Mai is a 26 y.o. male who presents with Suicidal      History: Pt presents with suicidal ideation. Pt has been having sleep issues that are causing him to feel suicidal      Exam: HRRR, lungs CTABL, abdomen soft and non tender. Plan for social work consult and likely admit to [facility]. I performed a history and physical examination of the patient and discussed management with the resident. I reviewed the residents note and agree with the documented findings and plan of care. Any areas of disagreement are noted on the chart. I was personally present for the key portions of any procedures. I have documented in the chart those procedures where I was not present during the key portions. I have personally reviewed all images and agree with the resident's interpretation. I have reviewed the emergency nurses triage note. I agree with the chief complaint, past medical history, past surgical history, allergies, medications, social and family history as documented unless otherwise noted below. Documentation of the HPI, Physical Exam and Medical Decision Making performed by medical students or scribes is based on my personal performance of the HPI, PE and MDM. For Phys Assistant/ Nurse Practitioner cases/documentation I have had a face to face evaluation of this patient and have completed at least one if not all key elements of the E/M (history, physical exam, and MDM). Additional findings are as noted. For APC cases I have personally evaluated and examined the patient in conjunction with the APC and agree with the treatment plan and disposition of the patient as recorded by the APC.     Maurisio Gresham MD  Attending Emergency  Physician       Emerson Padron MD  01/02/20 5415
EMERGENCY DEPARTMENT COURSE / [facility]     LABS:  No results found for this visit on 01/01/20. IMPRESSION:  26 y.o. male presented with suicidal ideation, plans to take handful of pills. Use cocaine 1 time yesterday, no other alcohol or drugs. Took his Zyprexa today, ran out of his Seroquel 1 week ago. Has auditory hallucinations. On exam, patient with normal physical exam, vital signs normal.  No signs of self injury. We will plan for transfer to [facility] as patient is medically cleared. Patient requesting discharge. Patient was given written and verbal instructions prior to discharge. Patient understood and agreed. Patient had no further questions. RADIOLOGY:  No orders to display         EKG      All EKG's are interpreted by the Emergency Department Physician who either signs or Co-signs this chart in the absence of a cardiologist.    EMERGENCY DEPARTMENT COURSE:  ED Course as of Jan 01 2304   Wed Jan 01, 2020 2239 Pt with SI plan to OD on pills, + auditory hallucinations, no alcohol or other drugs. , cocaine use yesterday, took his zyprexa today but ran out of seroquel 1 week ago. Will consult SW and psych. Pt medically clear for transfer    [SF]      ED Course User Index  [SF] Marco A Mercedes DO       PROCEDURES:      CONSULTS:  IP CONSULT TO PSYCHIATRY  IP CONSULT TO SOCIAL WORK  IP CONSULT TO HOSPITALIST        FINAL IMPRESSION      1. Suicidal ideation    2. Cocaine abuse ([facility] [address]          DISPOSITION / PLAN     DISPOSITION Decision To Transfer 01/01/2020 10:39:15 PM      PATIENT REFERREDTO:  No follow-up provider specified.     DISCHARGE MEDICATIONS:  New Prescriptions    No medications on file       Mario Berger DO  PGY 2  Resident Physician Emergency Medicine  01/01/20 11:04 PM        (Please note that portions of this note were completed with a voice recognition program.Efforts were made to edit the dictations but occasionally words are mis-transcribed.)        Mario Berger

## 2020-01-02 NOTE — PLAN OF CARE
1:1 with pt x ten minutes. Pt encouraged to attend unit programming and interact with peers and staff. Pt also encouraged to tend to hygiene and ADLs. Pt encouraged to discuss feelings with staff and feedback and reassurance provided. Pt denies thoughts of self harm and is agreeable to seeking out should thoughts of self harm arise. Safe environment maintained. Q15 minute checks for safety cont per unit policy. Will cont to monitor for safety and provides support and reassurance as needed. Pt admits to auditory hallucinations. Seclusive to room for long intervals and has refused groups.

## 2020-01-02 NOTE — BH NOTE
Patient refused to attend Music Wellness group at this time despite staff encouragement. Declined one to one talk time.

## 2020-01-02 NOTE — ED NOTES
Patient signed voluntary admission form for [facility].      Gilford Cho, MSW, WILLIAMW     Jose Francisco Naylor  01/01/20 8835

## 2020-01-02 NOTE — ED NOTES
Spoke with on call provider for Thompson Hardin NP. Will admit to [facility].        Anny Fulton, MSW, WILLIAMW     Griselda Akers  01/01/20 3636

## 2020-01-02 NOTE — ED TRIAGE NOTES
Pt comes to the ED w/ a chief complaint of depression and suicidal ideation. Pt states that over the last few weeks he has taken extra Seroquel and zyprexa but is still having a hard time sleeping. Pt reports that today he only took 3-4 of his 15mg zypreza and decided it ws time to come in and get some help. Pt has had a past suicide attempt and inpatient treatment but states that it was \"a long time ago\" Pt would like to be placed. Pt AAOx4, no s/s of distress and has flat affect. VSS, will continue to monitor.

## 2020-01-02 NOTE — GROUP NOTE
Group Therapy Note    Date: 1/2/2020    Group Start Time: 0900  Group End Time: 6327  Group Topic: [address]      Patient declined to attend community meeting/goal setting group at 0900 despite encouragement from staff. 1:1 talk time offered by staff as alternative to group session.         Signature:  Misael Ashford

## 2020-01-03 LAB
ABSOLUTE EOS #: 0.4 K/UL (ref 0–0.4)
ABSOLUTE IMMATURE GRANULOCYTE: ABNORMAL K/UL (ref 0–0.3)
ABSOLUTE LYMPH #: 1.7 K/UL (ref 1–4.8)
ABSOLUTE MONO #: 0.7 K/UL (ref 0.1–1.3)
ALBUMIN SERPL-MCNC: 3.7 G/DL (ref 3.5–5.2)
ALBUMIN/GLOBULIN RATIO: ABNORMAL (ref 1–2.5)
ALP BLD-CCNC: 47 U/L (ref 40–129)
ALT SERPL-CCNC: 35 U/L (ref 5–41)
ANION GAP SERPL CALCULATED.3IONS-SCNC: 11 MMOL/L (ref 9–17)
AST SERPL-CCNC: 21 U/L
BASOPHILS # BLD: 0 % (ref 0–2)
BASOPHILS ABSOLUTE: 0 K/UL (ref 0–0.2)
BILIRUB SERPL-MCNC: 0.24 MG/DL (ref 0.3–1.2)
BUN BLDV-MCNC: 14 MG/DL (ref 6–20)
BUN/CREAT BLD: ABNORMAL (ref 9–20)
CALCIUM SERPL-MCNC: 8.5 MG/DL (ref 8.6–10.4)
CHLORIDE BLD-SCNC: 107 MMOL/L (ref 98–107)
CHOLESTEROL/HDL RATIO: 3.3
CHOLESTEROL: 125 MG/DL
CO2: 24 MMOL/L (ref 20–31)
CREAT SERPL-MCNC: 0.82 MG/DL (ref 0.7–1.2)
DIFFERENTIAL TYPE: ABNORMAL
EOSINOPHILS RELATIVE PERCENT: 5 % (ref 0–4)
ESTIMATED AVERAGE GLUCOSE: 94 MG/DL
GFR AFRICAN AMERICAN: >60 ML/MIN
GFR NON-AFRICAN AMERICAN: >60 ML/MIN
GFR SERPL CREATININE-BSD FRML MDRD: ABNORMAL ML/MIN/{1.73_M2}
GFR SERPL CREATININE-BSD FRML MDRD: ABNORMAL ML/MIN/{1.73_M2}
GLUCOSE BLD-MCNC: 110 MG/DL (ref 70–99)
HBA1C MFR BLD: 4.9 % (ref 4–6)
HCT VFR BLD CALC: 41.2 % (ref 41–53)
HDLC SERPL-MCNC: 38 MG/DL
HEMOGLOBIN: 13.9 G/DL (ref 13.5–17.5)
IMMATURE GRANULOCYTES: ABNORMAL %
LDL CHOLESTEROL: 72 MG/DL (ref 0–130)
LYMPHOCYTES # BLD: 20 % (ref 24–44)
MCH RBC QN AUTO: 30.4 PG (ref 26–34)
MCHC RBC AUTO-ENTMCNC: 33.7 G/DL (ref 31–37)
MCV RBC AUTO: 90.3 FL (ref 80–100)
MONOCYTES # BLD: 9 % (ref 1–7)
NRBC AUTOMATED: ABNORMAL PER 100 WBC
PDW BLD-RTO: 13.6 % (ref 11.5–14.9)
PLATELET # BLD: 205 K/UL (ref 150–450)
PLATELET ESTIMATE: ABNORMAL
PMV BLD AUTO: 8.9 FL (ref 6–12)
POTASSIUM SERPL-SCNC: 3.8 MMOL/L (ref 3.7–5.3)
RBC # BLD: 4.56 M/UL (ref 4.5–5.9)
RBC # BLD: ABNORMAL 10*6/UL
SEG NEUTROPHILS: 66 % (ref 36–66)
SEGMENTED NEUTROPHILS ABSOLUTE COUNT: 5.6 K/UL (ref 1.3–9.1)
SODIUM BLD-SCNC: 142 MMOL/L (ref 135–144)
THYROXINE, FREE: 1 NG/DL (ref 0.93–1.7)
TOTAL PROTEIN: 6.4 G/DL (ref 6.4–8.3)
TRIGL SERPL-MCNC: 76 MG/DL
TSH SERPL DL<=0.05 MIU/L-ACNC: 1.31 MIU/L (ref 0.3–5)
VLDLC SERPL CALC-MCNC: ABNORMAL MG/DL (ref 1–30)
WBC # BLD: 8.5 K/UL (ref 3.5–11)
WBC # BLD: ABNORMAL 10*3/UL

## 2020-01-03 PROCEDURE — 84443 ASSAY THYROID STIM HORMONE: CPT

## 2020-01-03 PROCEDURE — 84439 ASSAY OF FREE THYROXINE: CPT

## 2020-01-03 PROCEDURE — 36415 COLL VENOUS BLD VENIPUNCTURE: CPT

## 2020-01-03 PROCEDURE — 83036 HEMOGLOBIN GLYCOSYLATED A1C: CPT

## 2020-01-03 PROCEDURE — 80061 LIPID PANEL: CPT

## 2020-01-03 PROCEDURE — 99224 PR SBSQ OBSERVATION CARE/DAY 15 MINUTES: CPT | Performed by: NURSE PRACTITIONER

## 2020-01-03 PROCEDURE — 1240000000 HC EMOTIONAL WELLNESS R&B

## 2020-01-03 PROCEDURE — 80053 COMPREHEN METABOLIC PANEL: CPT

## 2020-01-03 PROCEDURE — G0378 HOSPITAL OBSERVATION PER HR: HCPCS

## 2020-01-03 PROCEDURE — G0379 DIRECT REFER HOSPITAL OBSERV: HCPCS

## 2020-01-03 PROCEDURE — 6370000000 HC RX 637 (ALT 250 FOR IP): Performed by: NURSE PRACTITIONER

## 2020-01-03 PROCEDURE — 85025 COMPLETE CBC W/AUTO DIFF WBC: CPT

## 2020-01-03 RX ADMIN — Medication 1 MG: at 22:41

## 2020-01-03 RX ADMIN — QUETIAPINE FUMARATE 300 MG: 300 TABLET ORAL at 22:41

## 2020-01-03 ASSESSMENT — LIFESTYLE VARIABLES: HISTORY_ALCOHOL_USE: NO

## 2020-01-03 NOTE — GROUP NOTE
Group Therapy Note    Date: 1/3/2020    Group Start Time: 1430  Group End Time: 1500  Group Topic: Relaxation    [facility] WHIT Wei    Patient refused to attend coping skills/ relaxation group at 1430 after encouragement from staff. 1:1 talk time provided as alternative to group session.       Signature:  Reynaldo Wei

## 2020-01-03 NOTE — GROUP NOTE
Group Therapy Note    Date: 1/3/2020    Group Start Time: 2045  Group End Time: 2110  Group Topic: Wrap-Up    NA Christine RN        Group Therapy Note    Attendees: 7/23         Patient refused to attend wrap up group at 2045 after encouragement from staff. 1:1 talk time provided as alternative to group session. Signature:   Ramírez Christine RN

## 2020-01-03 NOTE — GROUP NOTE
Group Therapy Note    Date: 1/3/2020    Group Start Time: 1330  Group End Time: 0039  Group Topic: Psychoeducation    NA Butler, CTRS    Patient did not attend Stress Management and Coping Skills Group after encouragement from staff. 1:1 talk time offered but patient refused.      Signature:  Bogdan Stevens

## 2020-01-03 NOTE — PLAN OF CARE
Problem: Altered Mood, Depressive Behavior:  Goal: Absence of self-harm  Description  Absence of self-harm  Outcome: Ongoing   Patient currently denies suicidal and homicidal thoughts. No self harm noted. Patient encouraged to inform staff if he develops any thoughts. Will continue safety checks every 15 minutes.

## 2020-01-03 NOTE — GROUP NOTE
Group Therapy Note    Date: 1/3/2020    Group Start Time: 0900  Group End Time: 9380  Group Topic: [address] [facility]    Patient refused to attend Community Meeting/Goal Setting Group after encouragement from staff. 1:1 talk time offered but patient refused.      Signature:  Jesus Javier

## 2020-01-03 NOTE — PLAN OF CARE
Patient affect is flat. Patient uncooperative for assessment. Patient refused 1:1 talk time. Patient refused vitals and scheduled AM medications. Patient remain safe while on the unit. 15 MIN safety checks maintained.

## 2020-01-03 NOTE — PLAN OF CARE
[facility]  Day 3 Interdisciplinary Treatment Plan NOTE    Review Date & Time: 1/3/2020 1316    Admission Type:   Admission Type: Voluntary    Reason for admission:  Reason for Admission: Patient voluntary from [facility] with suicidal ideation. Patient abused cocaine yesterday and now he is having auditory hallucination and just wants to die. Estimated Length of Stay: 5-7 days  Estimated Discharge Date Update: to be determined by physician    PATIENT STRENGTHS:  Patient Strengths Strengths: No significant Physical Illness, Connection to output provider  Patient Strengths and Limitations:Limitations: Difficult relationships / poor social skills, Tendency to isolate self, Limited education -> difficulty reading or writing, Lacks leisure interests, Difficulty problem solving/relies on others to help solve problems, Inappropriate/potentially harmful leisure interests, Demonstrates discomfort with /lack of social skills, Multiple barriers to leisure interests, Apathetic / unmotivated, External locus of control  Addictive Behavior:Addictive Behavior  In the past 3 months, have you felt or has someone told you that you have a problem with:  : None  Do you have a history of Chemical Use?: No  Do you have a history of Alcohol Use?: No  Do you have a history of Street Drug Abuse?: Yes  Histroy of Prescripton Drug Abuse?: No  Medical Problems:  Past Medical History:   Diagnosis Date    ADHD (attention deficit hyperactivity disorder)     Alcoholism ([facility] [address]     pt drinks 6 beers and a liter of vodka daily.     Anxiety     Bipolar 1 disorder (HCC)     PTSD (post-traumatic stress disorder)     Suicide attempt ([facility] [address]     previous suicide attempt by overdosing on pills at approximately 25 y/o       Risk:  Fall RiskTotal: 59  Wolfgang Scale Wolfgang Scale Score: 22  BVC Total: 1  Change in scores no Changes to plan of Care no    Status EXAM:   Status and Exam  Normal: No  Facial Expression: Expressionless  Affect: Blunt  Level of Consciousness: Alert  Mood:Normal: No  Mood: Irritable  Motor Activity:Normal: Yes  Interview Behavior: Uncooperative/Withdrawn  Preception: [place] to Person  Attention:Normal: No  Attention: (GRANT)  Thought Processes: (GRANT)  Thought Content:Normal: No  Thought Content: Preoccupations  Hallucinations: Unable to assess  Delusions: (GRANT)  Memory:Normal: (GRANT)  Insight and Judgment: (GRANT)  Insight and Judgment: Poor Judgment, Poor Insight, Unmotivated  Present Suicidal Ideation: No  Present Homicidal Ideation: No    Daily Assessment Last Entry:   Daily Sleep (WDL): Within Defined Limits         Patient Currently in Pain: No       Patient Monitoring:  Frequency of Checks: 4 times per hour, close    Psychiatric Symptoms:   Depression Symptoms  Depression Symptoms: Isolative, Loss of interest  Anxiety Symptoms  Anxiety Symptoms: (GRANT patient refused 1:1 talk time.)  Angella Symptoms  Angella Symptoms: No problems reported or observed. Psychosis Symptoms  Delusion Type: No problems reported or observed.     Suicide Risk CSSR-S:  1) Within the past month, have you wished you were dead or wished you could go to sleep and not wake up? : No  2) Have you actually had any thoughts of killing yourself? : No  6) Have you ever done anything, started to do anything, or prepared to do anything to end your life?: No  Change in Result No Change in Plan of care No      EDUCATION:   EDUCATION:   Learner Progress Toward Treatment Goals: Reviewed results and recommendations of this team, Reviewed group plan and strategies, Reviewed signs, symptoms and risk of self harm and violent behavior, Reviewed goals and plan of care    Method:small group, individual verbal education    Outcome:verbalized by patient, but needs reinforcement to obtain goals    PATIENT GOALS:  Short term: Pt refused to attend treatment team meeting  Long term: Pt refused to attend treatment team meeting     PLAN/TREATMENT RECOMMENDATIONS UPDATE: continue with group therapies, increased socialization, continue planning for after discharge goals, continue with medication compliance    SHORT-TERM GOALS UPDATE:   Time frame for Short-Term Goals: 5-7 days    LONG-TERM GOALS UPDATE:   Time frame for Long-Term Goals: 6 months  Members Present in Team Meeting: See Signature Sheet    Sasha Sanz

## 2020-01-03 NOTE — GROUP NOTE
Group Therapy Note    Date: 1/3/2020    Group Start Time: 1110  Group End Time: 1150  Group Topic: Recreational    [facility] [address], CTRS    Patient refused to attend Recreation Therapy Group after encouragement from staff. 1:1 talk time offered but patient refused.      Signature:  Marco Altamirano

## 2020-01-03 NOTE — GROUP NOTE
Group Therapy Note    Date: 1/3/2020    Group Start Time: 1000  Group End Time: 9307  Group Topic: Psychotherapy    [address], LSW    patient refused to attend psychotherapy group at [facility] after encouragement from staff.   1:1 talk time provided as alternative to group session

## 2020-01-03 NOTE — PROGRESS NOTES
Psychiatric Progress Note - Psychiatric Nurse Practitioner      Pertinent History & Psychiatric Examination    HPI:  Sonia Massey was interviewed in his room today. He is dressed in hospital clothing, has poor hygiene, and is malodorous. He does report that he had nightmares last night that he doesn't usually have. He is requesting Melatonin for sleep as that is what he uses at home. He reports that his depression is pretty bad and he is a little anxious. He is no longer having suicidal thoughts and denies homicidal thoughts. He does also deny auditory and visual hallucinations. He reports that he uses heroin to feel better and that he has very little support outside the hospital.  We did discuss options available for support and also discussed treatment for his substance use. He will think about this. He has not been out of bed today other than for meals. He is not attending groups and was encouraged to do so. He did refuse treatment team.  Chart and medications reviewed. Therapeutic support, empathetic care and psycho education provided. At this time there is no safe alternative other than inpatient care.        Complaints of Pain: none  Functioning Relationships: alone & isolated      Mental Status Evaluation:  Orientation: alertness: alert   Mood:. depressed      Affect:  flat      Appearance:  disheveled   Activity:  Within Normal Limits   Gait/Posture: Normal   Speech:  normal pitch and normal volume   Thought Process:  within normal limits   Thought Content:  No abnormal thoughts   Sensorium:  person, place, time/date and situation   Cognition:  grossly intact   Memory: intact   Insight:  limited   Judgment: limited   Suicidal Ideations: denies suicidal ideation   Homicidal Ideations: Negative for homicidal ideation      Medication Side Effects: absent       Attention Span attention span and concentration were age appropriate     Clinical Assessment Medical Decision    Axis I: Major Depression, Rec        Precautions with Justification:   None    Medication Review/Mgmt: Medications reviewed - no changes    Medical Issues: See Chart    Assessment of Risk for Harm to Self/Others:  None    PLAN  · Continue inpatient psychiatric treatment  · Supportive therapy with medication management. Reviewed risks and benefits as well as potential side effects with patient. · Review medications for efficacy and side effects. · Therapeutic support and empathetic care provided. · Continue Naltrexone 50 mg po daily and Seroquel 300 mg po qhs started on 1/2/2020. · Start Melatonin ER 1 mg po qhs prn on 1/3/2020. · Engage in therapeutic activities and groups. · Follow up at [facility] after symptoms stabilized.       Anticipated Discharge Date: 3-5 days    Patient's Response to Treatment: minimal    Maya Mince  1/3/2020  2:05 PM

## 2020-01-04 PROCEDURE — 1240000000 HC EMOTIONAL WELLNESS R&B

## 2020-01-04 PROCEDURE — 6370000000 HC RX 637 (ALT 250 FOR IP): Performed by: PSYCHIATRY & NEUROLOGY

## 2020-01-04 PROCEDURE — 99224 PR SBSQ OBSERVATION CARE/DAY 15 MINUTES: CPT | Performed by: PSYCHIATRY & NEUROLOGY

## 2020-01-04 PROCEDURE — 6370000000 HC RX 637 (ALT 250 FOR IP): Performed by: NURSE PRACTITIONER

## 2020-01-04 PROCEDURE — G0378 HOSPITAL OBSERVATION PER HR: HCPCS

## 2020-01-04 RX ADMIN — Medication 3 MG: at 22:08

## 2020-01-04 RX ADMIN — QUETIAPINE FUMARATE 300 MG: 300 TABLET ORAL at 22:08

## 2020-01-04 NOTE — BH NOTE
Patient did not participate in the 2:30 pm recreational group despite staff invite to attend. 1:1 talk time offered.

## 2020-01-04 NOTE — PLAN OF CARE
Patient affect remain flat;unwilling to communicate also non compliant with medications. Patient remain safe while on the unit. Patient encouraged to tend to hygiene. Patient has a decrease appetite. 15 MIN safety checks maintained.

## 2020-01-04 NOTE — PLAN OF CARE
Problem: Altered Mood, Depressive Behavior:  Goal: Absence of self-harm  Description  Absence of self-harm  1/4/2020 0227 by Rebecca Vargas LPN  Outcome: Ongoing  Patient remains free from self harm. Patient agrees to seek staff if thoughts arise. 15 minute checks maintained for patient safety. Will continue to monitor and provide support and reassurance as needed. Problem: Altered Mood, Depressive Behavior:  Goal: Ability to disclose and discuss suicidal ideas will improve  Description  Ability to disclose and discuss suicidal ideas will improve  1/4/2020 0227 by Rebecca Vargas LPN  Outcome: Ongoing  Patient is isolative to room majority of shift. Patient appears depressed and unmotivated. Patient has flat affect and discusses no plans for discharge at this time. Patient is medication compliant and verbalizes no concerns with his medications. Patient currently denies any suicidal thoughts. Will continue to monitor.

## 2020-01-04 NOTE — BH NOTE
Patient refused to attend Wellness- Life Stressors group at 1100 after encouragement from staff.   1:1 talk time provided as alternative to group session

## 2020-01-04 NOTE — GROUP NOTE
Group Therapy Note    Date: 1/4/2020    Group Start Time: 1000  Group End Time: 6492  Group Topic: Psychoeducation    NA [facility] DEBORA Mcnally LSW        Group Therapy Note    Attendees: 8/24    patient refused to attend psychoeducation group at 1000 after encouragement from staff.          Signature:  DEBORA Mane LSW

## 2020-01-04 NOTE — GROUP NOTE
Group Therapy Note    Date: 1/4/2020    Group Start Time: 0900  Group End Time: 0930  Group Topic: Community Meeting    [facility]    Patient refused to attend community meeting and goal setting group at 0900 after encouragement from staff. 1:1 talk time offered by staff as alternative to group session.

## 2020-01-04 NOTE — GROUP NOTE
Group Therapy Note    Date: 1/4/2020    Group Start Time: 1330  Group End Time: 4977  Group Topic: Psychoeducation    [facility]    Patient refused to attend coping skills identification group at 1330 after encouragement from staff. 1:1 talk time offered by staff as alternative to group session.

## 2020-01-05 PROCEDURE — 1240000000 HC EMOTIONAL WELLNESS R&B

## 2020-01-05 PROCEDURE — 99224 PR SBSQ OBSERVATION CARE/DAY 15 MINUTES: CPT | Performed by: PSYCHIATRY & NEUROLOGY

## 2020-01-05 PROCEDURE — 6370000000 HC RX 637 (ALT 250 FOR IP): Performed by: PSYCHIATRY & NEUROLOGY

## 2020-01-05 PROCEDURE — 6370000000 HC RX 637 (ALT 250 FOR IP): Performed by: NURSE PRACTITIONER

## 2020-01-05 PROCEDURE — G0378 HOSPITAL OBSERVATION PER HR: HCPCS

## 2020-01-05 RX ADMIN — QUETIAPINE FUMARATE 300 MG: 300 TABLET ORAL at 21:37

## 2020-01-05 RX ADMIN — Medication 3 MG: at 21:37

## 2020-01-05 NOTE — GROUP NOTE
Group Therapy Note    Date: 1/5/2020    Group Start Time: 1330  Group End Time: 6770  Group Topic: Psychoeducation    NA Castañeda, CTRS    Patient refused to attend problem solving group at 1330 after encouragement from staff. 1:1 talk time offered by staff as alternative to group session.

## 2020-01-05 NOTE — GROUP NOTE
Group Therapy Note    Date: 1/5/2020    Group Start Time: 0900  Group End Time: 0852  Group Topic: Community Meeting    [facility]    Patient refused to attend community meeting and goal setting group at 0900 after encouragement from staff. 1:1 talk time offered by staff as alternative to group session.

## 2020-01-05 NOTE — PLAN OF CARE
Patient is alert and oriented, able to make needs known. Patient denies suicidal thoughts, denies thoughts of self harm or harm to others. Patient is irritable and evasive with assessment. Patient appears depressed flat isolates to self and room, out for meals only. Patient denies tobacco cessation refused nicotine patch.  Refuses to attend groups despite staff encouragement

## 2020-01-06 PROCEDURE — 99232 SBSQ HOSP IP/OBS MODERATE 35: CPT | Performed by: NURSE PRACTITIONER

## 2020-01-06 PROCEDURE — 6370000000 HC RX 637 (ALT 250 FOR IP): Performed by: NURSE PRACTITIONER

## 2020-01-06 PROCEDURE — 6370000000 HC RX 637 (ALT 250 FOR IP): Performed by: PSYCHIATRY & NEUROLOGY

## 2020-01-06 PROCEDURE — G0378 HOSPITAL OBSERVATION PER HR: HCPCS

## 2020-01-06 PROCEDURE — 1240000000 HC EMOTIONAL WELLNESS R&B

## 2020-01-06 RX ADMIN — Medication 3 MG: at 20:32

## 2020-01-06 RX ADMIN — ACETAMINOPHEN 650 MG: 325 TABLET, FILM COATED ORAL at 20:31

## 2020-01-06 RX ADMIN — QUETIAPINE FUMARATE 300 MG: 300 TABLET ORAL at 20:32

## 2020-01-06 ASSESSMENT — PAIN DESCRIPTION - LOCATION: LOCATION: MOUTH

## 2020-01-06 ASSESSMENT — PAIN DESCRIPTION - PAIN TYPE: TYPE: ACUTE PAIN

## 2020-01-06 ASSESSMENT — PAIN SCALES - GENERAL
PAINLEVEL_OUTOF10: 0
PAINLEVEL_OUTOF10: 4

## 2020-01-06 NOTE — PROGRESS NOTES
Department of Psychiatry  Attending Progress Note    Chief Complaint: Major depressive disorder, recurrent ([address]     SUBJECTIVE:  Nanette Lilly was seen in his room today. Malodorous. Describes his mood as \"all right. \"  Says he slept much better with the increased dose of melatonin. I noted that he seems somewhat lethargic (not getting up much for groups, etc.); pt said he is \"waking up a little\" after withdrawing from cocaine. Denies SI, HI, AVH. Denies withdrawal symptoms at this point. Again states that he wants to go to inpatient rehab. ROS:  Review of Systems completed and no physical complaints. Pertinent psychiatric information as noted in the HPI. OBJECTIVE:     Physical  /60   Pulse 74   Temp 97.3 °F (36.3 °C) (Oral)   Resp 12   Ht 6' (1.829 m)   Wt 215 lb (97.5 kg)   BMI 29.16 kg/m²      Mental Status Evaluation:  Orientation: alertness: alert   Mood:. \"all right\"      Affect:  Generally blunted with some reactivity      Appearance:  casually dressed and disheveled, malodorous   Activity:  Within Normal Limits   Gait/Posture: Normal   Speech:  normal pitch and normal volume   Thought Process:  goal directed   Thought Content:  No overtly delusional content   Sensorium:  person, place and situation   Cognition:  grossly intact   Memory: intact   Insight:  fair   Judgment: fair   Suicidal Ideations: denies suicidal ideation   Homicidal Ideations: Negative for homicidal ideation      Medication Side Effects: absent       Attention Span attention span and concentration were age appropriate     Labs  No results found for this or any previous visit (from the past 67 hour(s)).     Medications  Current Facility-Administered Medications   Medication Dose Route Frequency Provider Last Rate Last Dose    melatonin ER tablet 3 mg  3 mg Oral Nightly PRN Mariposa Post MD   3 mg at 01/05/20 2137    acetaminophen (TYLENOL) tablet 650 mg  650 mg Oral Q4H PRN Any Wong APRN - MAGGIE Campuzano aluminum & magnesium hydroxide-simethicone (MAALOX) 200-200-20 MG/5ML suspension 30 mL  30 mL Oral Q6H PRN Silvana Christianson, APRN - CNP        benztropine mesylate (COGENTIN) injection 2 mg  2 mg Intramuscular BID PRN Silvana Meghan, APRN - CNP        magnesium hydroxide (MILK OF MAGNESIA) 400 MG/5ML suspension 30 mL  30 mL Oral Daily PRN Silvana Christianson, APRN - CNP        nicotine (NICODERM CQ) 14 MG/24HR 1 patch  1 patch Transdermal Daily Silvana Meghan, APRN - CNP        nicotine polacrilex (NICORETTE) gum 2 mg  2 mg Oral Q2H PRN Silvana Christianson, APRN - CNP        QUEtiapine (SEROQUEL) tablet 300 mg  300 mg Oral Nightly LIBBY Campos - CNP   300 mg at 01/05/20 2137         nicotine  1 patch Transdermal Daily    QUEtiapine  300 mg Oral Nightly       ASSESSMENT  Major depressive disorder, recurrent (HCC)     Patient's Response to Treatment: positive    PLAN  · Continue inpatient psychiatric admission  · Supportive therapy with medication management. · Therapeutic activities and groups  · Follow up at [facility] [place] after symptoms stabilized  · Discharge planning with social work. Encouraged pt to work on contacting rehab programs himself. · Continue current medications.     Meka Vora MD  Psychiatrist

## 2020-01-06 NOTE — PROGRESS NOTES
Department of Psychiatry  Attending Progress Note    Chief Complaint: Major depressive disorder, recurrent ([address]     SUBJECTIVE:  Amy Ba was seen in his room today. Says his mood is \"all right\" and that he is feeling better than at time of admission. Denies SI, HI. Denies AVH. Says he did not sleep well last night and asks for an increase in dose of melatonin; says 3 mg has worked well for him in the past.  Says he wants to go to a rehab facility at time of discharge. Has been refusing naltrexone because says he is already on the Vivitrol injection. ROS:  Review of Systems completed and no physical complaints. Pertinent psychiatric information as noted in the HPI. OBJECTIVE:     Physical  /60   Pulse 74   Temp 97.3 °F (36.3 °C) (Oral)   Resp 12   Ht 6' (1.829 m)   Wt 215 lb (97.5 kg)   BMI 29.16 kg/m²      Mental Status Evaluation:  Orientation: alertness: alert   Mood:. \"all right\"      Affect:  Generally blunted with some reactivity      Appearance:  casually dressed and disheveled   Activity:  Within Normal Limits   Gait/Posture: Normal   Speech:  normal pitch and normal volume   Thought Process:  goal directed   Thought Content:  No overtly delusional content   Sensorium:  person, place and situation   Cognition:  grossly intact   Memory: intact   Insight:  fair   Judgment: fair   Suicidal Ideations: denies suicidal ideation   Homicidal Ideations: Negative for homicidal ideation      Medication Side Effects: absent       Attention Span attention span and concentration were age appropriate     Labs  No results found for this or any previous visit (from the past 67 hour(s)).     Medications  Current Facility-Administered Medications   Medication Dose Route Frequency Provider Last Rate Last Dose    melatonin ER tablet 3 mg  3 mg Oral Nightly PRN Jinny Del Valle MD   3 mg at 01/05/20 2137    acetaminophen (TYLENOL) tablet 650 mg  650 mg Oral Q4H PRN LIBBY Siddiqui - MAGGIE Munguia aluminum & magnesium hydroxide-simethicone (MAALOX) 200-200-20 MG/5ML suspension 30 mL  30 mL Oral Q6H PRN Elester Gomez, APRN - CNP        benztropine mesylate (COGENTIN) injection 2 mg  2 mg Intramuscular BID PRN Elester Claymont, APRN - CNP        magnesium hydroxide (MILK OF MAGNESIA) 400 MG/5ML suspension 30 mL  30 mL Oral Daily PRN Elester Claymont, APRN - CNP        nicotine (NICODERM CQ) 14 MG/24HR 1 patch  1 patch Transdermal Daily Elester Gomez, APRN - CNP        nicotine polacrilex (NICORETTE) gum 2 mg  2 mg Oral Q2H PRN Elester Claymont, APRN - CNP        QUEtiapine (SEROQUEL) tablet 300 mg  300 mg Oral Nightly Mardel Kitty, APRN - CNP   300 mg at 01/05/20 2137         nicotine  1 patch Transdermal Daily    QUEtiapine  300 mg Oral Nightly       ASSESSMENT  Major depressive disorder, recurrent (HCC)     Patient's Response to Treatment: positive    PLAN  · Continue inpatient psychiatric admission  · Supportive therapy with medication management. · Therapeutic activities and groups  · Follow up at [facility] [place] after symptoms stabilized  · Discharge planning with social work. Mentioned to  that pt is interested in inpatient rehab. · Increase melatonin to 3 mg. · Stop naltrexone (pt reports already being on Vivitrol).     Ulises Cartagena MD  Psychiatrist

## 2020-01-06 NOTE — GROUP NOTE
Group Therapy Note    Date: 1/6/2020    Group Start Time: 1330  Group End Time: 9993  Group Topic: Cognitive Skills    [facility] [address]    Patient was not able to attend Cognitive Skills Group after encouragement from staff. 1:1 talk time offered but patient refused.      Signature:  Coco Gorman

## 2020-01-06 NOTE — GROUP NOTE
Group Therapy Note    Date: 1/6/2020    Group Start Time: 2045  Group End Time: 2115  Group Topic: Wrap-Up    [facility]    Kerrie Castillo RN        Group Therapy Note    Attendees: 11/23         Patient refused to attend wrap up group at 2045 after encouragement from staff. 1:1 talk time provided as alternative to group session. Signature:   Kerrie Castillo RN

## 2020-01-06 NOTE — PLAN OF CARE
Problem: Altered Mood, Psychotic Behavior:  Goal: Able to verbalize decrease in frequency and intensity of hallucinations  Description  Able to verbalize decrease in frequency and intensity of hallucinations  Outcome: Ongoing     Problem: Altered Mood, Psychotic Behavior:  Goal: Able to verbalize reality based thinking  Description  Able to verbalize reality based thinking  Outcome: Ongoing     Problem: Altered Mood, Psychotic Behavior:  Goal: Absence of self-harm  Description  Absence of self-harm  Outcome: Ongoing    Patient denies he is experiencing any perceptual disturbances. However, patient is observed responding to internal stimuli. Continues to display some psychotic symptoms; disorganized thinking, thought blocking, difficulty providing any coherent responses to questioning, responding to internal stimuli, exhibiting self-talk, blunted affect. Patient encourage to attend group therapy. Therapeutic support provided. Q15min checks for safety.

## 2020-01-06 NOTE — PLAN OF CARE
Problem: Altered Mood, Depressive Behavior:  Goal: Able to verbalize and/or display a decrease in depressive symptoms  Description  Able to verbalize and/or display a decrease in depressive symptoms  Outcome: Ongoing     Problem: Altered Mood, Depressive Behavior:  Goal: Ability to disclose and discuss suicidal ideas will improve  Description  Ability to disclose and discuss suicidal ideas will improve  Outcome: Ongoing     Problem: Altered Mood, Depressive Behavior:  Goal: Absence of self-harm  Description  Absence of self-harm  Outcome: Ongoing     Patient denies suicidal and homicidal ideation at this time. Patient denies depression and anxiety at this time. Patient was seclusive to his room most of the evening and out for needs only. Patient is free of self harm at this time. Patient agrees to seek out staff if thoughts to harm self arise. Staff will provide support and reassurance as needed. Safety checks maintained every 15 minutes.

## 2020-01-06 NOTE — GROUP NOTE
Group Therapy Note    Date: 1/6/2020    Group Start Time: 1000  Group End Time: 1757  Group Topic: Psychotherapy    [address], LSW    patient refused to attend psychotherapy group at [facility] after encouragement from staff.   1:1 talk time provided as alternative to group session

## 2020-01-06 NOTE — PROGRESS NOTES
Department of Psychiatry  Attending Progress Note    Chief Complaint: Major depressive disorder, recurrent ([address]     SUBJECTIVE: The patient is seen today in his room. He continues to isolate there for long periods. His ADLs are poor and he is malodorous. He continues to show poor motivation into treatment. He denies any suicidal or homicidal thoughts. He continues to feel depressed and helpless at times. He states he did call [facility] recovery [place] this morning and that he is working on securing placement there. He cannot contract for safety outside of hospital setting. Explored his  feelings and concerns. Reassurance and support provided. Charting and medications reviewed. Denies any side effects from medications. There is no identifiable safe alternative other than continued hospitalization. ROS:  Review of Systems completed and no physical complaints. Pertinent psychiatric information as noted in the HPI. OBJECTIVE:     Physical  /60   Pulse 74   Temp 97.3 °F (36.3 °C) (Oral)   Resp 12   Ht 6' (1.829 m)   Wt 215 lb (97.5 kg)   BMI 29.16 kg/m²      Mental Status Evaluation:  Orientation: alertness: alert   Mood:. down      Affect:  blunted      Appearance:  casually dressed and disheveled, malodorous   Activity:  Within Normal Limits   Gait/Posture: Normal   Speech:  normal pitch and normal volume   Thought Process:  goal directed   Thought Content:  No overtly delusional content   Sensorium:  person, place and situation   Cognition:  grossly intact   Memory: intact   Insight:  fair   Judgment: fair   Suicidal Ideations: denies suicidal ideation   Homicidal Ideations: Negative for homicidal ideation      Medication Side Effects: absent       Attention Span attention span and concentration were age appropriate     Labs  No results found for this or any previous visit (from the past 67 hour(s)).     Medications  Current Facility-Administered Medications   Medication Dose Route Frequency Provider Last Rate Last Dose    melatonin ER tablet 3 mg  3 mg Oral Nightly PRN Andrea Virgen MD   3 mg at 01/05/20 2137    acetaminophen (TYLENOL) tablet 650 mg  650 mg Oral Q4H PRN Helena Calderon, LIBBY - CNP        aluminum & magnesium hydroxide-simethicone (MAALOX) 200-200-20 MG/5ML suspension 30 mL  30 mL Oral Q6H PRN Helena Yumiko, APRN - MAGGIE        benztropine mesylate (COGENTIN) injection 2 mg  2 mg Intramuscular BID PRN Helena Yumiko, APRN - CNP        magnesium hydroxide (MILK OF MAGNESIA) 400 MG/5ML suspension 30 mL  30 mL Oral Daily PRN Helena Yumiko, APRN - CNP        nicotine (NICODERM CQ) 14 MG/24HR 1 patch  1 patch Transdermal Daily Helena Calderon, APRN - CNP        nicotine polacrilex (NICORETTE) gum 2 mg  2 mg Oral Q2H PRN Helena Calderon, APRN - MAGGIE        QUEtiapine (SEROQUEL) tablet 300 mg  300 mg Oral Nightly LIBBY Gentile CNP   300 mg at 01/05/20 2137         nicotine  1 patch Transdermal Daily    QUEtiapine  300 mg Oral Nightly       ASSESSMENT  Major depressive disorder, recurrent ([facility]     Patient's Response to Treatment: fair    PLAN  · Continue inpatient psychiatric admission  · Supportive therapy with medication management. · Therapeutic activities and groups  · Follow up at [facility] after symptoms stabilized  · Discharge planning with social work. Encouraged patientt to work on contacting rehab programs himself. States he is waiting to call back the [address]  · Continue current medications.     LIBBY Jung CNP  3:44 PM  01/06/20

## 2020-01-07 PROCEDURE — 6370000000 HC RX 637 (ALT 250 FOR IP): Performed by: PSYCHIATRY & NEUROLOGY

## 2020-01-07 PROCEDURE — 1240000000 HC EMOTIONAL WELLNESS R&B

## 2020-01-07 PROCEDURE — G0378 HOSPITAL OBSERVATION PER HR: HCPCS

## 2020-01-07 PROCEDURE — 99232 SBSQ HOSP IP/OBS MODERATE 35: CPT | Performed by: NURSE PRACTITIONER

## 2020-01-07 PROCEDURE — 6370000000 HC RX 637 (ALT 250 FOR IP): Performed by: NURSE PRACTITIONER

## 2020-01-07 RX ADMIN — Medication 3 MG: at 20:41

## 2020-01-07 RX ADMIN — QUETIAPINE FUMARATE 300 MG: 300 TABLET ORAL at 20:42

## 2020-01-07 NOTE — GROUP NOTE
Group Therapy Note    Date: 1/7/2020    Group Start Time: 1430  Group End Time: 8914  Group Topic: Recreational    [facility], CTRS    Patient refused to attend Recreational Therapy Group at 1430 after encouragement from staff. 1:1 talk time offered.     Signature:  Roxane Nunez

## 2020-01-07 NOTE — GROUP NOTE
Group Therapy Note    Date: 1/7/2020    Group Start Time: 0900  Group End Time: 0930  Group Topic: Community Meeting    [facility]    Patient refused to attend community meeting and goal setting group at 0900 after encouragement from staff. 1:1 talk time offered by staff as alternative to group session.

## 2020-01-07 NOTE — GROUP NOTE
Group Therapy Note    Date: 1/7/2020    Group Start Time: 8076  Group End Time: 1646  Group Topic: Psychoeducation    [facility]    Patient refused to attend goal setting skills group at 1100 after encouragement from staff. 1:1 talk time offered by staff as alternative to group session.

## 2020-01-07 NOTE — PLAN OF CARE
Problem: Altered Mood, Psychotic Behavior:  Goal: Able to verbalize decrease in frequency and intensity of hallucinations  Description  Able to verbalize decrease in frequency and intensity of hallucinations  1/6/2020 2102 by Seth Tse LPN  Outcome: Ongoing  Note:   Patient denies auditory or visual hallucination at this time. Patient is isolative to room, reading books. Also denies depression and anxiety. Safety check every 15 minute. Problem: Altered Mood, Psychotic Behavior:  Goal: Absence of self-harm  Description  Absence of self-harm  1/6/2020 2102 by Seth Tse LPN  Outcome: Ongoing  Note:   Patient denies the intention of self harm of harm to others at this time. Denies depression and anxiety and isolative to room.

## 2020-01-07 NOTE — GROUP NOTE
Group Therapy Note    Date: 1/7/2020    Group Start Time: 1000  Group End Time: 4822  Group Topic: Psychotherapy    [facility]    DEBORA Andrews LSW        Group Therapy Note    patient refused to attend psychotherapy group at 10:00am after encouragement from staff.       Signature:  DEBORA Andrews LSW

## 2020-01-07 NOTE — PROGRESS NOTES
Department of Psychiatry  Attending Progress Note    Chief Complaint: Major depressive disorder, recurrent ([address]     SUBJECTIVE: The patient is seen today in his room. He is isolative to his room for long periods of time. He is not attending groups. He is disheveled and malodorous and has not showered in a few days. He filled out application for [facility] housing. Encouraged patient to attend groups and attend to ADL's today. He denies current SI/HI/AVH. Patient feels helpless and hopeless at times about current situation and cannot contract for safety outside of hospital setting. Explored his  feelings and concerns. Reassurance and support provided. Charting and medications reviewed. Denies any side effects from medications. There is no identifiable safe alternative other than continued hospitalization. ROS:  Review of Systems completed and no physical complaints. Pertinent psychiatric information as noted in the HPI. OBJECTIVE:     Physical  BP 91/64   Pulse 75   Temp 97.8 °F (36.6 °C) (Oral)   Resp 16   Ht 6' (1.829 m)   Wt 215 lb (97.5 kg)   BMI 29.16 kg/m²      Mental Status Evaluation:  Orientation: alertness: alert   Mood:. down      Affect:  blunted      Appearance:  casually dressed and disheveled, malodorous   Activity:  Within Normal Limits   Gait/Posture: Normal   Speech:  normal pitch and normal volume   Thought Process:  goal directed   Thought Content:  No overtly delusional content   Sensorium:  person, place and situation   Cognition:  grossly intact   Memory: intact   Insight:  fair   Judgment: fair   Suicidal Ideations: denies suicidal ideation   Homicidal Ideations: Negative for homicidal ideation      Medication Side Effects: absent       Attention Span attention span and concentration were age appropriate     Labs  No results found for this or any previous visit (from the past 67 hour(s)).     Medications  Current Facility-Administered Medications   Medication Dose Route Frequency Provider Last Rate Last Dose    melatonin ER tablet 3 mg  3 mg Oral Nightly PRN Angela Hernández MD   3 mg at 01/06/20 2032    acetaminophen (TYLENOL) tablet 650 mg  650 mg Oral Q4H PRN LIBBY Mojica CNP   650 mg at 01/06/20 2031    aluminum & magnesium hydroxide-simethicone (MAALOX) 200-200-20 MG/5ML suspension 30 mL  30 mL Oral Q6H PRN LIBBY Mojica CNP        benztropine mesylate (COGENTIN) injection 2 mg  2 mg Intramuscular BID PRN LIBBY Mojica CNP        magnesium hydroxide (MILK OF MAGNESIA) 400 MG/5ML suspension 30 mL  30 mL Oral Daily PRN LIBBY Mojica CNP        nicotine polacrilex (NICORETTE) gum 2 mg  2 mg Oral Q2H PRN LIBBY Mojica CNP        QUEtiapine (SEROQUEL) tablet 300 mg  300 mg Oral Nightly LIBBY Meza CNP   300 mg at 01/06/20 2032         QUEtiapine  300 mg Oral Nightly       ASSESSMENT  Major depressive disorder, recurrent ([facility] [address]     Patient's Response to Treatment: fair    PLAN  · Continue inpatient psychiatric admission  · Supportive therapy with medication management. · Therapeutic activities and groups  · Follow up at [facility] after symptoms stabilized  · Discharge planning with social work. Encouraged patientt to work on contacting rehab programs himself. States he is waiting to call back the [address]  · Continue current medications.     LIBBY Martinez CNP  3:27 PM  01/07/20

## 2020-01-07 NOTE — PLAN OF CARE
Patient denies depression and anxiety. Patient denies any suicidal and homicidal ideations. Patient remains re of self harm. Q15 min safety checks continue .

## 2020-01-07 NOTE — GROUP NOTE
Group Therapy Note    Date: 1/7/2020    Group Start Time: 1330  Group End Time: 1547  Group Topic: Psychoeducation    [facility]    Patient refused to attend mental health advocacy group at 1330 after encouragement from staff. 1:1 talk time offered by staff as alternative to group session.

## 2020-01-08 PROCEDURE — 99232 SBSQ HOSP IP/OBS MODERATE 35: CPT | Performed by: NURSE PRACTITIONER

## 2020-01-08 PROCEDURE — G0378 HOSPITAL OBSERVATION PER HR: HCPCS

## 2020-01-08 PROCEDURE — 6370000000 HC RX 637 (ALT 250 FOR IP): Performed by: NURSE PRACTITIONER

## 2020-01-08 PROCEDURE — 6370000000 HC RX 637 (ALT 250 FOR IP): Performed by: PSYCHIATRY & NEUROLOGY

## 2020-01-08 PROCEDURE — 1240000000 HC EMOTIONAL WELLNESS R&B

## 2020-01-08 RX ADMIN — QUETIAPINE FUMARATE 300 MG: 300 TABLET ORAL at 20:33

## 2020-01-08 RX ADMIN — Medication 3 MG: at 20:33

## 2020-01-08 NOTE — GROUP NOTE
Group Therapy Note    Date: 1/8/2020    Group Start Time: 0900  Group End Time: 1482  Group Topic: Community Meeting    [facility]    Patient refused to attend community meeting and goal setting group at 0900 after encouragement from staff. 1:1 talk time offered by staff as alternative to group session.

## 2020-01-08 NOTE — PLAN OF CARE
Problem: Tobacco Use:  Goal: Inpatient tobacco use cessation counseling participation  Outcome: Ongoing     Problem: Altered Mood, Psychotic Behavior:  Goal: Able to verbalize decrease in frequency and intensity of hallucinations  Outcome: Ongoing     Problem: Altered Mood, Psychotic Behavior:  Goal: Able to verbalize reality based thinking  Outcome: Ongoing     Problem: Altered Mood, Psychotic Behavior:  Goal: Absence of self-harm  Outcome: Ongoing     Patient is in behavioral control. Patient has been isolative to room and out for meals and needs only. Patient denies suicidal and homicidal ideation. Patient denies auditory and visual hallucinations. Patient has engaged in ADL's. Patient has consumed meals and snacks this shift. Patient reports improved sleep. Patient has remained free from harm. Every 15 minute and spontaneous safety checks have been maintained.

## 2020-01-08 NOTE — PROGRESS NOTES
Department of Psychiatry  Attending Progress Note    Chief Complaint: Major depressive disorder, recurrent ([address]     SUBJECTIVE: Patient is seen in his room today. Patient continues to isolate to his room majority of the day. He is disheveled and malodorous and has not showered in a few days. He filled out application for [facility] as his plan A and stated IOP and [facility] as his Plan B options for discharge plan. Encouraged patient to attend groups and attend to ADL's today. He current denies SI/HI/AVH. Patient feels helpless and hopeless at times about current situation and cannot contract for safety outside of hospital setting. Explored his  feelings and concerns. Reassurance and support provided. Charting and medications reviewed. Denies any side effects from medications. There is no identifiable safe alternative other than continued hospitalization. ROS:  Review of Systems completed and no physical complaints. Pertinent psychiatric information as noted in the HPI. OBJECTIVE:     Physical  BP 91/64   Pulse 75   Temp 97.8 °F (36.6 °C) (Oral)   Resp 16   Ht 6' (1.829 m)   Wt 215 lb (97.5 kg)   BMI 29.16 kg/m²      Mental Status Evaluation:  Orientation: alertness: alert   Mood:. down      Affect:  blunted      Appearance:  casually dressed and disheveled, malodorous   Activity:  Within Normal Limits   Gait/Posture: Normal   Speech:  normal pitch and normal volume   Thought Process:  goal directed   Thought Content:  No overtly delusional content   Sensorium:  person, place and situation   Cognition:  grossly intact   Memory: intact   Insight:  fair   Judgment: fair   Suicidal Ideations: denies suicidal ideation   Homicidal Ideations: Negative for homicidal ideation      Medication Side Effects: absent       Attention Span attention span and concentration were age appropriate     Labs  No results found for this or any previous visit (from the past 67 hour(s)).     Medications  Current Facility-Administered Medications   Medication Dose Route Frequency Provider Last Rate Last Dose    melatonin ER tablet 3 mg  3 mg Oral Nightly PRN Bill Moreira MD   3 mg at 01/07/20 2041    acetaminophen (TYLENOL) tablet 650 mg  650 mg Oral Q4H PRN Liza Pih, APRN - CNP   650 mg at 01/06/20 2031    aluminum & magnesium hydroxide-simethicone (MAALOX) 200-200-20 MG/5ML suspension 30 mL  30 mL Oral Q6H PRN Liza Pih, APRN - CNP        benztropine mesylate (COGENTIN) injection 2 mg  2 mg Intramuscular BID PRN Liza Pih, APRN - CNP        magnesium hydroxide (MILK OF MAGNESIA) 400 MG/5ML suspension 30 mL  30 mL Oral Daily PRN Liza Pih, APRN - CNP        nicotine polacrilex (NICORETTE) gum 2 mg  2 mg Oral Q2H PRN Liza Pih, APRN - CNP        QUEtiapine (SEROQUEL) tablet 300 mg  300 mg Oral Nightly Orlene Sake, APRN - CNP   300 mg at 01/07/20 2042         QUEtiapine  300 mg Oral Nightly       ASSESSMENT  Major depressive disorder, recurrent ([facility]     Patient's Response to Treatment: fair    PLAN  · Continue inpatient psychiatric admission  · Supportive therapy with medication management. · Therapeutic activities and groups  · Follow up at [facility] after symptoms stabilized  · Discharge planning with social work. Encouraged patientt to work on contacting rehab programs himself. States he is waiting to call back the [address]  · Continue current medications.     LIBBY Cruz CNP  2:00 PM  01/08/20

## 2020-01-08 NOTE — GROUP NOTE
Group Therapy Note    Date: 1/8/2020    Group Start Time: 1000  Group End Time: 7452  Group Topic: Psychotherapy    [address], LSW    patient refused to attend psychotherapy group at [facility] after encouragement from staff.   1:1 talk time provided as alternative to group session

## 2020-01-08 NOTE — PLAN OF CARE
Problem: Altered Mood, Psychotic Behavior:  Goal: Able to verbalize decrease in frequency and intensity of hallucinations  Description  Able to verbalize decrease in frequency and intensity of hallucinations  1/7/2020 2032 by Ryann Thompson LPN  Outcome: Ongoing  Note:   Patient denies suicidal ideations and hallucinations as well as depression and anxiety. He has been mostly seclusive to his room, reading and did not attend group. Q15min safety checks continue     Problem: Altered Mood, Psychotic Behavior:  Goal: Absence of self-harm  Description  Absence of self-harm  1/7/2020 2032 by Ryann Thompson LPN  Outcome: Ongoing  Note:   Patient denies thoughts of self harm at this time. Patient agrees to seek out staff if they begin having thoughts of harming self or they need to talk.  Q15min safety checks continue

## 2020-01-08 NOTE — GROUP NOTE
Group Therapy Note    Date: 1/8/2020    Group Start Time: 1330  Group End Time: 1376  Group Topic: Recovery    [facility]    DEBORA Saldana, SRIDEVI        Group Therapy Note    Attendees: 7/18    patient refused to attend Recovery group at 1330 after encouragement from staff.          Signature:  DEBORA Saldana LSW

## 2020-01-08 NOTE — BH NOTE
Pt was encouraged to attend evening wrap up group with staff encouragement. Pt declined to attend. Pt will continue to be encouraged to attend groups.  1:1 talk time offered to patient and he was accepting.

## 2020-01-08 NOTE — GROUP NOTE
Group Therapy Note    Date: 1/8/2020    Group Start Time: 1100  Group End Time: 1120  Group Topic: Cognitive Skills    [facility] WHIT Vargas    Patient refused to attend leisure/ cognitive skills group at 1100 after encouragement from staff. 1:1 talk time provided as alternative to group session.       Signature:  Andria Vargas

## 2020-01-09 VITALS
TEMPERATURE: 98.4 F | DIASTOLIC BLOOD PRESSURE: 51 MMHG | RESPIRATION RATE: 14 BRPM | WEIGHT: 215 LBS | BODY MASS INDEX: 29.12 KG/M2 | HEIGHT: 72 IN | HEART RATE: 77 BPM | SYSTOLIC BLOOD PRESSURE: 100 MMHG

## 2020-01-09 PROCEDURE — 5130000000 HC BRIDGE APPOINTMENT

## 2020-01-09 PROCEDURE — G0378 HOSPITAL OBSERVATION PER HR: HCPCS

## 2020-01-09 PROCEDURE — 99238 HOSP IP/OBS DSCHRG MGMT 30/<: CPT | Performed by: NURSE PRACTITIONER

## 2020-01-09 RX ORDER — QUETIAPINE FUMARATE 300 MG/1
300 TABLET, FILM COATED ORAL NIGHTLY
Qty: 14 TABLET | Refills: 0 | Status: SHIPPED | OUTPATIENT
Start: 2020-01-09 | End: 2020-03-04

## 2020-01-09 RX ORDER — CHOLECALCIFEROL (VITAMIN D3) 25 MCG
3 TABLET ORAL NIGHTLY PRN
Qty: 14 TABLET | Refills: 0 | Status: ON HOLD | OUTPATIENT
Start: 2020-01-09 | End: 2020-07-10

## 2020-01-09 NOTE — DISCHARGE SUMMARY
DISCHARGE SUMMARY  Patient ID:  Chris Esparza  [id number]  10 y.o.  1990    Admit date: 1/2/2020    Discharge date and time: 1/9/2020  10:40 AM     Admitting Physician: Erika Pate MD     Discharge Physician:  LIBBY Lucia - CNP    Admission Diagnoses: Major depressive disorder, recurrent ([address] [F33.9]  Major depression, recurrent ([address] [F33.9]    Discharge Diagnoses:   Major depressive disorder, recurrent ([address]     Patient Active Problem List   Diagnosis Code    Schizoaffective disorder, bipolar type ([address] F25.0    Suicide attempt by drug ingestion ([address] T50.902A    Tobacco use Z72.0    Polysubstance abuse ([address] F19.10    Atrial flutter, paroxysmal ([address] I48.92    Episodic cannabis use F12.90    Cocaine use F14.90    Accidental drug overdose T50.901A    Schizoaffective disorder ([address] F25.9    Suicide attempt ([address] T14.91XA    PTSD (post-traumatic stress disorder) F43.10    Bipolar 1 disorder ([address] F31.9    Anxiety F41.9    Alcoholism ([address] F10.20    ADHD (attention deficit hyperactivity disorder) F90.9    Intentional drug overdose ([address] T50.902A    Drug overdose T50.901A    MDD (major depressive disorder), recurrent episode ([address] F33.9    Major depressive disorder, recurrent ([address] F33.9    Major depression, recurrent ([address] F33.9        Admission Condition: poor    Discharged Condition: stable    Indication for Admission: threat to self    History of Present Illnes (present tense wording indicates findings from admission exam on 1/2/2020 and are not necessarily indicative of current findings): Hospital Course:   Upon admission, Chris Esparza was provided a safe secure environment, introduced to unit milieu. Patient participated in groups and individual therapies. Meds were adjusted. After few days of hospital care, patient began to feel improvement. Depression lifted, thoughts to harm self ceased. Sleep improved, appetite was good.  On morning rounds 1/9/2020, patient Patient displayed a good level of engagement with the treatments offered during this admission. Discharge planning, findings, and recommendations were discussed with the patient and treatment team.     Signed:  Sam Frey CNP  1/9/2020  10:40 AM  Dragon voice recognition software used in portions of this document.

## 2020-01-09 NOTE — PROGRESS NOTES
Bridge Appointment completed: Reviewed Discharge Instructions with patient. Patient verbalizes understanding and agreement with the discharge plan using the teachback method. Discharge Arrangements:  [address] [place] [address]  [phone]  1/15/2020  @ 10am with Narinder for AOD assessment  [place] [address] [facility].   [address] [place] [address]  [phone]  1/13/2020  @ 10:20am for medication management     Guardian notified: N/A     Discharge destination/address:  [facility]   Transported by:    Nusrat Doherty

## 2020-01-09 NOTE — GROUP NOTE
Group Therapy Note    Date: 1/9/2020    Group Start Time: 0900  Group End Time: 0920  Group Topic: [address]      Patient declined to attend community meeting/goal setting group at 0900 despite encouragement from staff. 1:1 talk time offered by staff as alternative to group session.       Signature:  Tor Geiger

## 2020-01-09 NOTE — BH NOTE
[facility]  Discharge Note    Pt discharged with followings belongings:   Dentures: None  Vision - Corrective Lenses: Glasses  Hearing Aid: None  Jewelry: Watch, Bracelet  Body Piercings Removed: N/A  Clothing: Footwear, Pants, Shirt, Socks  Were All Patient Medications Collected?: Not Applicable  Other Valuables: Other (Comment)(master card)   Patient discharged via Black and white cab to home. Valuables sent home with patient. Valuables retrieved from safe, Security envelope number:  [id number] and returned to patient. Patient education on aftercare instructions: yes, and voiced understanding. Information faxed to [facility] by nurse. Patient verbalize understanding of AVS:  Yes, by read back method.     Status EXAM upon discharge:  Status and Exam  Normal: No  Facial Expression: Flat  Affect: Stable  Level of Consciousness: Alert  Mood:Normal: No  Mood: Depressed  Motor Activity:Normal: No  Motor Activity: Decreased  Interview Behavior: Cooperative  Preception: [place] to Person, Adriane Shank to Time, [place] to Place, [place] to Situation  Attention:Normal: No  Attention: Distractible  Thought Processes: Circumstantial  Thought Content:Normal: No  Thought Content: Poverty of Content  Hallucinations: None  Delusions: No  Memory:Normal: Yes  Memory: Other(See comment)  Insight and Judgment: No  Insight and Judgment: Unmotivated, Poor Insight, Poor Judgment  Present Suicidal Ideation: No  Present Homicidal Ideation: No      Metabolic Screening:    Lab Results   Component Value Date    LABA1C 4.9 01/03/2020       Lab Results   Component Value Date    CHOL 125 01/03/2020    CHOL 175 09/10/2019    CHOL 150 02/26/2019    CHOL 162 01/21/2019    CHOL 153 11/11/2018    CHOL 129 07/05/2018    CHOL 145 11/05/2017    CHOL 140 06/13/2015     Lab Results   Component Value Date    TRIG 76 01/03/2020    TRIG 121 09/10/2019    TRIG 112 02/26/2019    TRIG 184 (H) 01/21/2019    TRIG 142 11/11/2018    TRIG 104 07/05/2018    TRIG 105 11/05/2017    TRIG 167 (H) 06/13/2015     Lab Results   Component Value Date    HDL 38 (L) 01/03/2020    HDL 45 09/10/2019    HDL 52 02/26/2019    HDL 46 01/21/2019    HDL 49 11/11/2018    HDL 73 07/05/2018    HDL 67 11/05/2017    HDL 31 (L) 06/13/2015     No components found for: LDLCAL  No results found for: Roddy Post LPN

## 2020-01-09 NOTE — GROUP NOTE
Group Therapy Note    Date: 1/9/2020    Group Start Time: 1000  Group End Time: 6708  Group Topic: Psychotherapy    [address], LSW    patient refused to attend psychotherapy group at [facility] after encouragement from staff.   1:1 talk time provided as alternative to group session

## 2020-01-09 NOTE — BH NOTE
Patient given tobacco quitline number 42984010807 at this time, refusing to call at this time, states \" I just dont want to quit now\"- patient given information as to the dangers of long term tobacco use. Continue to reinforce the importance of tobacco cessation.

## 2020-01-09 NOTE — PLAN OF CARE
Problem: Altered Mood, Psychotic Behavior:  Goal: Able to verbalize decrease in frequency and intensity of hallucinations  Description  Able to verbalize decrease in frequency and intensity of hallucinations  1/8/2020 2138 by Madina Hollingsworth  Outcome: Ongoing  1/8/2020 1738 by Florentin Luis RN  Outcome: Ongoing     Problem: Altered Mood, Psychotic Behavior:  Goal: Able to verbalize reality based thinking  Description  Able to verbalize reality based thinking  1/8/2020 2138 by Madina Hollingsworth  Outcome: Ongoing  1/8/2020 1738 by Florentin Luis RN  Outcome: Ongoing   Patient has been seclusive to his room and resting all shift. He denies suicidal thoughts or hallucinations. Thought processes have been logical this shift.

## 2020-01-09 NOTE — GROUP NOTE
Group Therapy Note    Date: 1/8/2020    Group Start Time: 2115  Group End Time: 2130  Group Topic: Relaxation    [facility]    Escobar Eason RN        Group Therapy Note    Attendees: 8/18    patient refused to attend relaxation group at 2115 after encouragement from staff. 1:1 talk time provided as alternative to group session but pt declined         Patient's Goal:  To relax and get ready for bed    Notes: To relax and get ready for bed.        Signature:  Escobar Eason RN

## 2020-01-09 NOTE — DISCHARGE INSTR - COC
Continuity of Care Form    Patient Name: Rohan Zepeda   :  1990  MRN:  [id number]    Admit date:  2020  Discharge date:  ***    Code Status Order: Full Code   Advance Directives:   Advance Care Flowsheet Documentation     Date/Time Healthcare Directive Type of Healthcare Directive Copy in [address] Agent's Name Healthcare Agent's Phone Number    20 0540  No, patient does not have an advance directive for healthcare treatment -- -- -- -- --          Admitting Physician:  Claudine Shelley MD  PCP: No primary care provider on file. Discharging Nurse: [facility] Unit/Room#: 0128/0128-02  Discharging Unit Phone Number: ***    Emergency Contact:   Extended Emergency Contact Information  Primary Emergency Contact: Reed Nunez [address] Phone: [phone]  Relation: Other  Secondary Emergency Contact: Gilma Patel [address] Phone: [phone]  Relation: Parent    Past Surgical History:  History reviewed. No pertinent surgical history.     Immunization History:   Immunization History   Administered Date(s) Administered    Influenza, Quadv, 6 mo and older, IM, PF (Flulaval, Fluarix) 2018    Influenza, Quadv, IM, PF (6 mo and older Fluzone, Flulaval, Fluarix, and 3 yrs and older Afluria) 2018    Tdap (Boostrix, Adacel) 2014, 2015, 2017       Active Problems:  Patient Active Problem List   Diagnosis Code    Schizoaffective disorder, bipolar type ([facility] F25.0    Suicide attempt by drug ingestion ([address] T50.902A    Tobacco use Z72.0    Polysubstance abuse ([address] F19.10    Atrial flutter, paroxysmal ([address] I48.92    Episodic cannabis use F12.90    Cocaine use F14.90    Accidental drug overdose T50.901A    Schizoaffective disorder ([address] F25.9    Suicide attempt ([address] T14.91XA    PTSD (post-traumatic stress disorder) F43.10    Bipolar 1 disorder (HCC) F31.9    Anxiety F41.9    Alcoholism ([facility] F10.20    ADHD (attention deficit hyperactivity disorder) F90.9    Int[facility] overdose [facility] T50.902A    Drug overdose T50.901A    MDD (major depressive disorder), recurrent episode ([facility] F33.9    Major depressive disorder, recurrent ([facility] F33.9    Major depression, recurrent ([facility] F33.9       Isolation/Infection:   Isolation          No Isolation        Patient Infection Status     None to display          Nurse Assessment:  Last Vital Signs: BP (!) 100/51   Pulse 77   Temp 98.4 °F (36.9 °C) (Oral)   Resp 14   Ht 6' (1.829 m)   Wt 215 lb (97.5 kg)   BMI 29.16 kg/m²     Last documented pain score (0-10 scale): Pain Level: 0  Last Weight:   Wt Readings from Last 1 Encounters:   01/02/20 215 lb (97.5 kg)     Mental Status:  {IP PT MENTAL STATUS:20030}    IV Access:  { RBOBIN IV ACCESS:928455756}    Nursing Mobility/ADLs:  Walking   {P DME PYBY:317749863}  Transfer  {P DME IHRV:439405451}  Bathing  {P DME VMEO:783311639}  Dressing  {P DME CMWS:943921961}  Toileting  {P DME BRHD:270974683}  Feeding  {[facility] DME KYOI:985158243}  Med Admin  {[facility] DME PZFS:658179177}  Med Delivery   {[facility] MED Delivery:300171423}    Wound Care Documentation and Therapy:        Elimination:  Continence:   · Bowel: {YES / HV:89064}  · Bladder: {YES / XM:89299}  Urinary Catheter: {Urinary Catheter:010118854}   Colostomy/Ileostomy/Ileal Conduit: {YES / XP:14848}       Date of Last BM: ***  No intake or output data in the 24 hours ending 01/09/20 1110  No intake/output data recorded.     Safety Concerns:     [employer] Safety Concerns:959941198}    Impairments/Disabilities:      [employer] Impairments/Disabilities:726826835}    Nutrition Therapy:  Current Nutrition Therapy:   [address] Diet List:299169547}    Routes of Feeding: {CHP DME Other Feedings:191942425}  Liquids: {Slp liquid thickness:15732}  Daily Fluid Restriction: {CHP DME Yes amt ETThe Rehabilitation Institute of St. [facility]:966786971}  Last Modified Barium Swallow with Video (Video Swallowing Test): {Done Not Done BJNT:342460993}    Treatments at the Time of Hospital Discharge:   Respiratory Treatments: ***  Oxygen Therapy:  {Therapy; copd oxygen:06667}  Ventilator:    {[facility] Vent XGBT:978067144}    Rehab Therapies: {THERAPEUTIC INTERVENTION:0710926810}  Weight Bearing Status/Restrictions: {[facility] Weight Bearin}  Other Medical Equipment (for information only, NOT a DME order):  {EQUIPMENT:355835382}  Other Treatments: ***    Patient's personal belongings (please select all that are sent with patient):  {[facility] DME Belongings:338201187}    RN SIGNATURE:  {Esignature:392243547}    CASE MANAGEMENT/SOCIAL WORK SECTION    Inpatient Status Date: ***    Readmission Risk Assessment Score:  Readmission Risk              Risk of Unplanned Readmission:        29           Discharging to Facility/ Agency   · Name:   · Address:  · Phone:  · Fax:    Dialysis Facility (if applicable)   · Name:  · Address:  · Dialysis Schedule:  · Phone:  · Fax:    / signature: {Esignature:364601655}    PHYSICIAN SECTION    Prognosis: {Prognosis:6798555657}    Condition at Discharge: [address] [place] [address] Patient Condition:731404034}    Rehab Potential (if transferring to Rehab): {Prognosis:0255172625}    Recommended Labs or Other Treatments After Discharge: ***    Physician Certification: I certify the above information and transfer of Casandra Jacques  is necessary for the continuing treatment of the diagnosis listed and that he requires {Admit to Appropriate Level of Care:09939} for {GREATER/LESS:281260508} 30 days.      Update Admission H&P: {CHP DME Changes in YBBGZ:907707883}    PHYSICIAN SIGNATURE:  {Esignature:401121614}

## 2020-01-09 NOTE — GROUP NOTE
Group Therapy Note    Date: 1/8/2020    Group Start Time: 2100  Group End Time: 2115  Group Topic: Wrap-Up    [facility]    Frandy Fernandes RN        Group Therapy Note    Attendees: 8/18    patient refused to attend wrap-up group at 9 PM  after encouragement from staff. 1:1 talk time provided as alternative to group session but pt declined.               Signature:  Frandy Fernandes RN

## 2020-01-09 NOTE — GROUP NOTE
Group Therapy Note    Date: 1/9/2020    Group Start Time: 1100  Group End Time: 6471  Group Topic: Psychoeducation    NA Bains Agent, CTRS    Patient refused to attend socialization and coping skills group at 1100 after encouragement from staff. 1:1 talk time offered by staff as alternative to group session.

## 2020-01-10 NOTE — CARE COORDINATION
AOD Treatment & D/C planning:      Patient presents lethargic with dysphoric mood. Patient reports he wishes to pursue inpatient substance abuse treatment after hospital discharge. This writer provided patient with a list of facilities and encouraged him to reach out for availability and complete phone assessments. Patient agreed to call facilities on 1/6/2020.
Name: Henrique Blackman    : 1990    Discharge Date: 20    Primary Auth/Cert #: denied     Discharge Medications:      Medication List      START taking these medications    Melatonin CR 3 MG Tbcr  Take 3 mg by mouth nightly as needed (sleep)  Notes to patient:  As needed sleep aid        CONTINUE taking these medications    QUEtiapine 300 MG tablet  Commonly known as:  SEROQUEL  Take 1 tablet by mouth nightly  Notes to patient: To help clear thoughts        STOP taking these medications    naltrexone 50 MG tablet  Commonly known as:  DEPADE     OLANZapine 10 MG tablet  Commonly known as:  ZYPREXA     traZODone 100 MG tablet  Commonly known as:  DESYREL           Where to Get Your Medications      These medications were sent to [facility] [address] [place] [address] Louie Carreon [address]    Phone:  [phone]   · Melatonin CR 3 MG Tbcr  · QUEtiapine 300 MG tablet         Follow Up Appointment: Family member's home  [address] [place] [address]  [phone]    On 1/15/2020  @ 10am with Bonnie Parent for [address]  [facility].   [address] [phone]  On 2020  @ 10:20am for medication management
medication. Pt reports use of crack cocaine which caused audio hallucinations, denied audio hallucinations at this time. He states he lives alone in an apartment and receives SSI. He has [facility] medicaid. He denied all legal concerns. He denied history of abuse. Pt denied AOD referral. Pt states, \"I'm done talking, I am too tired\". He maintains he is not hearing voices or experiencing visual hallucinations. He denied current suicidal ideations. Denied homicidal ideations.

## 2020-02-25 ENCOUNTER — HOSPITAL ENCOUNTER (EMERGENCY)
Age: 30
Discharge: HOME | End: 2020-02-25
Attending: EMERGENCY MEDICINE
Payer: COMMERCIAL

## 2020-02-25 VITALS
DIASTOLIC BLOOD PRESSURE: 77 MMHG | BODY MASS INDEX: 27.77 KG/M2 | RESPIRATION RATE: 14 BRPM | HEIGHT: 72 IN | WEIGHT: 205 LBS | SYSTOLIC BLOOD PRESSURE: 104 MMHG | TEMPERATURE: 98.4 F | OXYGEN SATURATION: 97 % | HEART RATE: 85 BPM

## 2020-02-25 LAB
CHP ED QC CHECK: NORMAL
GLUCOSE BLD-MCNC: 108 MG/DL
GLUCOSE BLD-MCNC: 108 MG/DL (ref 75–110)

## 2020-02-25 PROCEDURE — 99284 EMERGENCY DEPT VISIT MOD MDM: CPT

## 2020-02-25 PROCEDURE — 82947 ASSAY GLUCOSE BLOOD QUANT: CPT

## 2020-02-25 PROCEDURE — 93005 ELECTROCARDIOGRAM TRACING: CPT | Performed by: EMERGENCY MEDICINE

## 2020-02-25 PROCEDURE — 6360000002 HC RX W HCPCS

## 2020-02-25 PROCEDURE — 96360 HYDRATION IV INFUSION INIT: CPT

## 2020-02-25 PROCEDURE — 2580000003 HC RX 258: Performed by: EMERGENCY MEDICINE

## 2020-02-25 RX ORDER — NALOXONE HYDROCHLORIDE 1 MG/ML
INJECTION INTRAMUSCULAR; INTRAVENOUS; SUBCUTANEOUS
Status: COMPLETED
Start: 2020-02-25 | End: 2020-02-25

## 2020-02-25 RX ORDER — 0.9 % SODIUM CHLORIDE 0.9 %
1000 INTRAVENOUS SOLUTION INTRAVENOUS ONCE
Status: COMPLETED | OUTPATIENT
Start: 2020-02-25 | End: 2020-02-25

## 2020-02-25 RX ADMIN — NALOXONE HYDROCHLORIDE 1 MG: 1 INJECTION PARENTERAL at 02:39

## 2020-02-25 RX ADMIN — SODIUM CHLORIDE 1000 ML: 9 INJECTION, SOLUTION INTRAVENOUS at 03:28

## 2020-02-25 ASSESSMENT — ENCOUNTER SYMPTOMS
COUGH: 0
ABDOMINAL PAIN: 0
NAUSEA: 0
DIARRHEA: 0
BLOOD IN STOOL: 0
WHEEZING: 0
SHORTNESS OF BREATH: 0
BACK PAIN: 0
VOMITING: 0
CONSTIPATION: 0
SORE THROAT: 0

## 2020-02-25 NOTE — ED PROVIDER NOTES
[facility] ED  Emergency Department Encounter  EmergencyMedicine Resident     Pt Name:Girish Friend  MRN: [id number]  [place] 1990  Date of evaluation: 2/25/20  PCP:  No primary care provider on file. CHIEF COMPLAINT       Chief Complaint   Patient presents with    Drug Overdose       HISTORY OF PRESENT ILLNESS  (Location/Symptom, Timing/Onset, Context/Setting, Quality, Duration, Modifying Factors, Severity.)      Magali Ponce is a 26 y.o. male who presents with primary complaint that he overdose, patient states he snorted heroin. He intermittently uses heroin. He denies trying to hurt himself has no suicidal thoughts. Patient received Narcan acting appropriately at this time he has overdosed in the past.  Patient admits he has used cocaine in the past today he is unsure if there was some cocaine mixed in with the heroin today. PAST MEDICAL / SURGICAL / SOCIAL / FAMILY HISTORY      has a past medical history of ADHD (attention deficit hyperactivity disorder), Alcoholism ([facility] [address] Anxiety, Bipolar 1 disorder ([facility] [address] PTSD (post-traumatic stress disorder), and [facility] has no past surgical history on file.     Social History     Socioeconomic History    Marital status: Single     Spouse name: Not on file    Number of children: Not on file    Years of education: Not on file    Highest education level: Not on file   Occupational History    Not on file   Social Needs    Financial resource strain: Not on file    Food insecurity:     Worry: Not on file     Inability: Not on file    Transportation needs:     Medical: Not on file     Non-medical: Not on file   Tobacco Use    Smoking status: Current Some Day Smoker     Packs/day: 0.50     Years: 14.00     Pack years: 7.00     Types: Cigarettes    Smokeless tobacco: Never Used    Tobacco comment: .pt accepting nicotine gum   Substance and Sexual Activity    Alcohol use: Yes     Comment: pt drinks 6 beers Co-signs this chart in the absence of a cardiologist.    MDM/EMERGENCY DEPARTMENT COURSE:       ED Course as of Feb 25 0403   Tue Feb 25, 2020   0205 Drug overdose, patient received 4 of intranasal Narcan 2 of IV Narcan acting appropriately at this time will continue to monitor. EKG shows sinus tachycardia. Patient denies any suicidal ideation admits he was trying to get high was not trying to harm himself. He has is overdoses in the past.  Malaika Camacho his primary use was snorting heroin today he has used IV in the past.  He admits there may have been some cocaine in the heroin but he is unsure. [KW]   0227 Glucose: 108 [KW]   2022 Patient has been continually reassessed, patient alert and oriented at this time he is attempting to call a sober ride home as he admits he has been drinking some alcohol.    [KW]   0356 Patient sober ride arrived, patient discharged with sober ride. [KW]      ED Course User Index  [KW] Alex Donis DO         PROCEDURES:  None    CONSULTS:  None    CRITICAL CARE:  None    FINAL IMPRESSION      1. Accidental overdose of heroin, subsequent encounter          DISPOSITION / PLAN     DISPOSITION     PATIENT REFERRED TO:  No follow-up provider specified.     DISCHARGE MEDICATIONS:  New Prescriptions    No medications on file       Alex Donis DO  Emergency Medicine Resident    (Please note that portions of this note were completed with a voicerecognition program.  Efforts were made to edit the dictations but occasionally words are mis-transcribed.)       Alex Donis DO  02/25/20 [address] DO  02/25/20 0407

## 2020-02-25 NOTE — ED NOTES
Pt's O2 dropping, falls asleep quickly but arouses to verbal stimuli. O2 sat 88% on room air, 1 mg Narcan IV per verbal order from Dr. Ez Slater.      Yu Mcgowan RN  02/25/20 5150

## 2020-02-25 NOTE — ED PROVIDER NOTES
time for separately reportable procedures.        [place], DO  02/25/20 [place] JasonCordele, DO  02/25/20 5567

## 2020-02-25 NOTE — ED TRIAGE NOTES
Pt found down with agonal respirations and pinpoint pupils. Pt given 4 mg Narcan by police and 2 mg by EMS. Pt awake and alert, walked into ED with EMS. Denies any complaints at this time.

## 2020-02-25 NOTE — ED NOTES
Pt awakens to verbal stimuli, resting comfortably on stretcher.      Marita Valdez RN  02/25/20 3699

## 2020-02-26 LAB
EKG ATRIAL RATE: 103 BPM
EKG P AXIS: 76 DEGREES
EKG P-R INTERVAL: 162 MS
EKG Q-T INTERVAL: 370 MS
EKG QRS DURATION: 86 MS
EKG QTC CALCULATION (BAZETT): 484 MS
EKG R AXIS: 78 DEGREES
EKG T AXIS: 60 DEGREES
EKG VENTRICULAR RATE: 103 BPM

## 2020-02-26 PROCEDURE — 93010 ELECTROCARDIOGRAM REPORT: CPT | Performed by: INTERNAL MEDICINE

## 2020-03-04 ENCOUNTER — HOSPITAL ENCOUNTER (EMERGENCY)
Age: 30
Discharge: HOME | End: 2020-03-04
Attending: EMERGENCY MEDICINE
Payer: COMMERCIAL

## 2020-03-04 VITALS
WEIGHT: 198.1 LBS | DIASTOLIC BLOOD PRESSURE: 50 MMHG | BODY MASS INDEX: 26.83 KG/M2 | TEMPERATURE: 97.5 F | SYSTOLIC BLOOD PRESSURE: 87 MMHG | HEIGHT: 72 IN | HEART RATE: 57 BPM | RESPIRATION RATE: 13 BRPM | OXYGEN SATURATION: 96 %

## 2020-03-04 LAB
-: ABNORMAL
ABSOLUTE EOS #: 0.14 K/UL (ref 0–0.44)
ABSOLUTE IMMATURE GRANULOCYTE: 0.01 K/UL (ref 0–0.3)
ABSOLUTE LYMPH #: 2.66 K/UL (ref 1.1–3.7)
ABSOLUTE MONO #: 0.52 K/UL (ref 0.1–1.2)
ACETAMINOPHEN LEVEL: 14 UG/ML (ref 10–30)
ALBUMIN SERPL-MCNC: 4.4 G/DL (ref 3.5–5.2)
ALBUMIN/GLOBULIN RATIO: ABNORMAL (ref 1–2.5)
ALP BLD-CCNC: 40 U/L (ref 40–129)
ALT SERPL-CCNC: 26 U/L (ref 5–41)
AMORPHOUS: ABNORMAL
AMPHETAMINE SCREEN URINE: NEGATIVE
ANION GAP SERPL CALCULATED.3IONS-SCNC: 13 MMOL/L (ref 9–17)
AST SERPL-CCNC: 17 U/L
BACTERIA: ABNORMAL
BARBITURATE SCREEN URINE: NEGATIVE
BASOPHILS # BLD: 1 % (ref 0–2)
BASOPHILS ABSOLUTE: 0.04 K/UL (ref 0–0.2)
BENZODIAZEPINE SCREEN, URINE: NEGATIVE
BILIRUB SERPL-MCNC: 0.7 MG/DL (ref 0.3–1.2)
BILIRUBIN URINE: NEGATIVE
BUN BLDV-MCNC: 21 MG/DL (ref 6–20)
BUN/CREAT BLD: 20 (ref 9–20)
BUPRENORPHINE URINE: NORMAL
CALCIUM SERPL-MCNC: 9.4 MG/DL (ref 8.6–10.4)
CANNABINOID SCREEN URINE: NEGATIVE
CASTS UA: ABNORMAL /LPF
CHLORIDE BLD-SCNC: 104 MMOL/L (ref 98–107)
CO2: 25 MMOL/L (ref 20–31)
COCAINE METABOLITE, URINE: NEGATIVE
COLOR: YELLOW
COMMENT UA: ABNORMAL
CREAT SERPL-MCNC: 1.04 MG/DL (ref 0.7–1.2)
CRYSTALS, UA: ABNORMAL /HPF
DIFFERENTIAL TYPE: NORMAL
EOSINOPHILS RELATIVE PERCENT: 2 % (ref 1–4)
EPITHELIAL CELLS UA: ABNORMAL /HPF (ref 0–5)
ETHANOL PERCENT: <0.01 %
ETHANOL: <10 MG/DL
GFR AFRICAN AMERICAN: >60 ML/MIN
GFR NON-AFRICAN AMERICAN: >60 ML/MIN
GFR SERPL CREATININE-BSD FRML MDRD: ABNORMAL ML/MIN/{1.73_M2}
GFR SERPL CREATININE-BSD FRML MDRD: ABNORMAL ML/MIN/{1.73_M2}
GLUCOSE BLD-MCNC: 129 MG/DL (ref 70–99)
GLUCOSE URINE: NEGATIVE
HCT VFR BLD CALC: 42 % (ref 40.7–50.3)
HEMOGLOBIN: 14.4 G/DL (ref 13–17)
IMMATURE GRANULOCYTES: 0 %
KETONES, URINE: NEGATIVE
LEUKOCYTE ESTERASE, URINE: NEGATIVE
LYMPHOCYTES # BLD: 37 % (ref 24–43)
MCH RBC QN AUTO: 30.8 PG (ref 25.2–33.5)
MCHC RBC AUTO-ENTMCNC: 34.3 G/DL (ref 28.4–34.8)
MCV RBC AUTO: 89.9 FL (ref 82.6–102.9)
MDMA URINE: NORMAL
METHADONE SCREEN, URINE: NEGATIVE
METHAMPHETAMINE, URINE: NORMAL
MONOCYTES # BLD: 7 % (ref 3–12)
MUCUS: ABNORMAL
NITRITE, URINE: NEGATIVE
NRBC AUTOMATED: 0 PER 100 WBC
OPIATES, URINE: NEGATIVE
OTHER OBSERVATIONS UA: ABNORMAL
OXYCODONE SCREEN URINE: NEGATIVE
PDW BLD-RTO: 12.9 % (ref 11.8–14.4)
PH UA: 6 (ref 5–8)
PHENCYCLIDINE, URINE: NEGATIVE
PLATELET # BLD: 261 K/UL (ref 138–453)
PLATELET ESTIMATE: NORMAL
PMV BLD AUTO: 11.1 FL (ref 8.1–13.5)
POTASSIUM SERPL-SCNC: 3.9 MMOL/L (ref 3.7–5.3)
PROPOXYPHENE, URINE: NORMAL
PROTEIN UA: NEGATIVE
RBC # BLD: 4.67 M/UL (ref 4.21–5.77)
RBC # BLD: NORMAL 10*6/UL
RBC UA: ABNORMAL /HPF (ref 0–2)
RENAL EPITHELIAL, UA: ABNORMAL /HPF
SALICYLATE LEVEL: <1 MG/DL (ref 3–10)
SEG NEUTROPHILS: 53 % (ref 36–65)
SEGMENTED NEUTROPHILS ABSOLUTE COUNT: 3.91 K/UL (ref 1.5–8.1)
SODIUM BLD-SCNC: 142 MMOL/L (ref 135–144)
SPECIFIC GRAVITY UA: 1.02 (ref 1–1.03)
TEST INFORMATION: NORMAL
TOTAL PROTEIN: 7.6 G/DL (ref 6.4–8.3)
TRICHOMONAS: ABNORMAL
TRICYCLIC ANTIDEPRESSANTS, UR: NORMAL
TURBIDITY: CLEAR
URINE HGB: NEGATIVE
UROBILINOGEN, URINE: NORMAL
WBC # BLD: 7.3 K/UL (ref 3.5–11.3)
WBC # BLD: NORMAL 10*3/UL
WBC UA: ABNORMAL /HPF (ref 0–5)
YEAST: ABNORMAL

## 2020-03-04 PROCEDURE — 96374 THER/PROPH/DIAG INJ IV PUSH: CPT

## 2020-03-04 PROCEDURE — 2580000003 HC RX 258: Performed by: EMERGENCY MEDICINE

## 2020-03-04 PROCEDURE — 6370000000 HC RX 637 (ALT 250 FOR IP): Performed by: EMERGENCY MEDICINE

## 2020-03-04 PROCEDURE — 99284 EMERGENCY DEPT VISIT MOD MDM: CPT

## 2020-03-04 PROCEDURE — 96361 HYDRATE IV INFUSION ADD-ON: CPT

## 2020-03-04 PROCEDURE — 81001 URINALYSIS AUTO W/SCOPE: CPT

## 2020-03-04 PROCEDURE — 85025 COMPLETE CBC W/AUTO DIFF WBC: CPT

## 2020-03-04 PROCEDURE — 80053 COMPREHEN METABOLIC PANEL: CPT

## 2020-03-04 PROCEDURE — 6360000002 HC RX W HCPCS: Performed by: EMERGENCY MEDICINE

## 2020-03-04 PROCEDURE — 80307 DRUG TEST PRSMV CHEM ANLYZR: CPT

## 2020-03-04 PROCEDURE — G0480 DRUG TEST DEF 1-7 CLASSES: HCPCS

## 2020-03-04 RX ORDER — CHLORDIAZEPOXIDE HYDROCHLORIDE 25 MG/1
25 CAPSULE, GELATIN COATED ORAL ONCE
Status: COMPLETED | OUTPATIENT
Start: 2020-03-04 | End: 2020-03-04

## 2020-03-04 RX ORDER — 0.9 % SODIUM CHLORIDE 0.9 %
1000 INTRAVENOUS SOLUTION INTRAVENOUS ONCE
Status: COMPLETED | OUTPATIENT
Start: 2020-03-04 | End: 2020-03-04

## 2020-03-04 RX ORDER — ONDANSETRON 2 MG/ML
4 INJECTION INTRAMUSCULAR; INTRAVENOUS ONCE
Status: COMPLETED | OUTPATIENT
Start: 2020-03-04 | End: 2020-03-04

## 2020-03-04 RX ADMIN — ONDANSETRON 4 MG: 2 INJECTION INTRAMUSCULAR; INTRAVENOUS at 01:48

## 2020-03-04 RX ADMIN — CHLORDIAZEPOXIDE HYDROCHLORIDE 25 MG: 25 CAPSULE ORAL at 01:48

## 2020-03-04 RX ADMIN — SODIUM CHLORIDE 1000 ML: 9 INJECTION, SOLUTION INTRAVENOUS at 01:48

## 2020-03-04 ASSESSMENT — ENCOUNTER SYMPTOMS
COUGH: 0
NAUSEA: 1
VOMITING: 0

## 2020-03-04 NOTE — ED PROVIDER NOTES
EMERGENCY DEPARTMENT ENCOUNTER    Pt Name: Kitty Frausto  MRN: [id number]  [place] 1990  Date of evaluation: 3/4/20  CHIEF COMPLAINT       Chief Complaint   Patient presents with    Alcohol Problem     requesting detox     HISTORY OF PRESENT ILLNESS   Presents with complaint of alcohol withdrawal.  Patient has been attempting to quit drinking alcohol for the past 3 days. Notes shaking. Denies seizure or hallucinations. Endorses nausea, causing decreased oral intake. Denies vomiting. Patient states that he took a gulp, to rivka the symptoms of shaking. He would like to go to [facility] for detox, but did not call them. He has been on naltrexone in the past, with positive response. Old records show history of cocaine use, but no opiate use    The history is provided by the patient and medical records. Alcohol Intoxication   Severity:  Mild  Onset quality:  Gradual  Duration:  3 days  Timing:  Constant  Progression:  Waxing and waning  Suspected agents:  Alcohol  Associated symptoms: nausea    Associated symptoms: no seizures and no vomiting    Associated symptoms comment:  Shaking  Risk factors: mental illness        REVIEW OF SYSTEMS     Review of Systems   Constitutional: Negative for chills and fever. HENT: Negative for congestion. Eyes: Negative for visual disturbance. Respiratory: Negative for cough. Gastrointestinal: Positive for nausea. Negative for vomiting. Endocrine: Negative for cold intolerance and heat intolerance. Skin: Negative for pallor. Neurological: Negative for seizures. Hematological: Does not bruise/bleed easily. Psychiatric/Behavioral: Negative for decreased concentration. PASTMEDICAL HISTORY     Past Medical History:   Diagnosis Date    ADHD (attention deficit hyperactivity disorder)     Alcoholism ([facility] [address]     pt drinks 6 beers and a liter of vodka daily.     Anxiety     Bipolar 1 disorder (HCC)     PTSD (post-traumatic stress disorder)     Suicide film, CT, MRI, and formal ultrasound images (except ED bedside ultrasound) are read by the radiologist, see reports below, unless otherwisenoted in MDM or here. No orders to display     LABS: All lab results were reviewed by myself, and all abnormals are listed below. Labs Reviewed   COMPREHENSIVE METABOLIC PANEL - Abnormal; Notable for the following components:       Result Value    Glucose 129 (*)     BUN 21 (*)     All other components within normal limits   SALICYLATE LEVEL - Abnormal; Notable for the following components:    Salicylate Lvl <1 (*)     All other components within normal limits   URINALYSIS WITH MICROSCOPIC - Abnormal; Notable for the following components:    Mucus, UA 3+ (*)     All other components within normal limits   CBC WITH AUTO DIFFERENTIAL   ACETAMINOPHEN LEVEL   ETHANOL   URINE DRUG SCREEN       EMERGENCY DEPARTMENTCOURSE:         Vitals:    Vitals:    03/04/20 0224 03/04/20 0228 03/04/20 0239 03/04/20 0254   BP: (!) 76/38 (!) 87/43 (!) 81/46 (!) 87/50   Pulse: 58 61 59 57   Resp: 14 17 14 13   Temp:       TempSrc:       SpO2: 96% 98% 95% 96%   Weight:       Height:           The patient was given the following medications while in the emergency department:  Orders Placed This Encounter   Medications    0.9 % sodium chloride bolus    ondansetron (ZOFRAN) injection 4 mg    chlordiazePOXIDE (LIBRIUM) capsule 25 mg     CONSULTS:  None    FINAL IMPRESSION      1.  Alcohol use disorder, severe, dependence ([facility] [address]    2. Opioid use disorder [facility])          DISPOSITION/PLAN   DISPOSITION Decision To Discharge 03/04/2020 05:23:06 AM      PATIENT REFERRED TO:  [address] [place] [address] today      DISCHARGE MEDICATIONS:  Discharge Medication List as of 3/4/2020  5:24 AM        Margarete Lanes, MD  Attending Emergency Physician                    Neymar Sarah MD  03/04/20 6408

## 2020-03-09 ENCOUNTER — HOSPITAL ENCOUNTER (EMERGENCY)
Age: 30
Discharge: HOME | End: 2020-03-10
Attending: EMERGENCY MEDICINE
Payer: COMMERCIAL

## 2020-03-09 ENCOUNTER — APPOINTMENT (OUTPATIENT)
Dept: GENERAL RADIOLOGY | Age: 30
End: 2020-03-09
Payer: COMMERCIAL

## 2020-03-09 LAB
CHP ED QC CHECK: NORMAL
GLUCOSE BLD-MCNC: 83 MG/DL

## 2020-03-09 PROCEDURE — 80307 DRUG TEST PRSMV CHEM ANLYZR: CPT

## 2020-03-09 PROCEDURE — 80053 COMPREHEN METABOLIC PANEL: CPT

## 2020-03-09 PROCEDURE — 80178 ASSAY OF LITHIUM: CPT

## 2020-03-09 PROCEDURE — 99284 EMERGENCY DEPT VISIT MOD MDM: CPT

## 2020-03-09 PROCEDURE — 93005 ELECTROCARDIOGRAM TRACING: CPT | Performed by: EMERGENCY MEDICINE

## 2020-03-09 PROCEDURE — 85025 COMPLETE CBC W/AUTO DIFF WBC: CPT

## 2020-03-09 PROCEDURE — G0480 DRUG TEST DEF 1-7 CLASSES: HCPCS

## 2020-03-09 PROCEDURE — 82947 ASSAY GLUCOSE BLOOD QUANT: CPT

## 2020-03-10 ENCOUNTER — APPOINTMENT (OUTPATIENT)
Dept: GENERAL RADIOLOGY | Age: 30
End: 2020-03-10
Payer: COMMERCIAL

## 2020-03-10 VITALS
BODY MASS INDEX: 25.73 KG/M2 | OXYGEN SATURATION: 100 % | DIASTOLIC BLOOD PRESSURE: 60 MMHG | SYSTOLIC BLOOD PRESSURE: 110 MMHG | TEMPERATURE: 98.1 F | HEART RATE: 82 BPM | WEIGHT: 190 LBS | RESPIRATION RATE: 15 BRPM | HEIGHT: 72 IN

## 2020-03-10 LAB
ABSOLUTE EOS #: 0.15 K/UL (ref 0–0.44)
ABSOLUTE IMMATURE GRANULOCYTE: <0.03 K/UL (ref 0–0.3)
ABSOLUTE LYMPH #: 2.27 K/UL (ref 1.1–3.7)
ABSOLUTE MONO #: 0.53 K/UL (ref 0.1–1.2)
ACETAMINOPHEN LEVEL: <5 UG/ML (ref 10–30)
ALBUMIN SERPL-MCNC: 4.4 G/DL (ref 3.5–5.2)
ALBUMIN/GLOBULIN RATIO: 1.6 (ref 1–2.5)
ALP BLD-CCNC: 43 U/L (ref 40–129)
ALT SERPL-CCNC: 16 U/L (ref 5–41)
ANION GAP SERPL CALCULATED.3IONS-SCNC: 17 MMOL/L (ref 9–17)
AST SERPL-CCNC: 17 U/L
BASOPHILS # BLD: 1 % (ref 0–2)
BASOPHILS ABSOLUTE: 0.06 K/UL (ref 0–0.2)
BILIRUB SERPL-MCNC: 0.36 MG/DL (ref 0.3–1.2)
BUN BLDV-MCNC: 13 MG/DL (ref 6–20)
BUN/CREAT BLD: ABNORMAL (ref 9–20)
CALCIUM SERPL-MCNC: 8.7 MG/DL (ref 8.6–10.4)
CHLORIDE BLD-SCNC: 109 MMOL/L (ref 98–107)
CO2: 15 MMOL/L (ref 20–31)
CREAT SERPL-MCNC: 0.92 MG/DL (ref 0.7–1.2)
DIFFERENTIAL TYPE: ABNORMAL
EKG ATRIAL RATE: 80 BPM
EKG P AXIS: 65 DEGREES
EKG P-R INTERVAL: 168 MS
EKG Q-T INTERVAL: 388 MS
EKG QRS DURATION: 94 MS
EKG QTC CALCULATION (BAZETT): 447 MS
EKG R AXIS: 66 DEGREES
EKG T AXIS: 31 DEGREES
EKG VENTRICULAR RATE: 80 BPM
EOSINOPHILS RELATIVE PERCENT: 2 % (ref 1–4)
ETHANOL PERCENT: 0.29 %
ETHANOL: 289 MG/DL
GFR AFRICAN AMERICAN: >60 ML/MIN
GFR NON-AFRICAN AMERICAN: >60 ML/MIN
GFR SERPL CREATININE-BSD FRML MDRD: ABNORMAL ML/MIN/{1.73_M2}
GFR SERPL CREATININE-BSD FRML MDRD: ABNORMAL ML/MIN/{1.73_M2}
GLUCOSE BLD-MCNC: 83 MG/DL (ref 75–110)
GLUCOSE BLD-MCNC: 86 MG/DL (ref 70–99)
HCT VFR BLD CALC: 40.6 % (ref 40.7–50.3)
HEMOGLOBIN: 13.5 G/DL (ref 13–17)
IMMATURE GRANULOCYTES: 0 %
LITHIUM DATE LAST DOSE: ABNORMAL
LITHIUM DOSE AMOUNT: ABNORMAL
LITHIUM DOSE TIME: ABNORMAL
LITHIUM LEVEL: <0.1 MMOL/L (ref 0.6–1.2)
LYMPHOCYTES # BLD: 34 % (ref 24–43)
MCH RBC QN AUTO: 30.8 PG (ref 25.2–33.5)
MCHC RBC AUTO-ENTMCNC: 33.3 G/DL (ref 28.4–34.8)
MCV RBC AUTO: 92.7 FL (ref 82.6–102.9)
MONOCYTES # BLD: 8 % (ref 3–12)
NRBC AUTOMATED: 0 PER 100 WBC
PDW BLD-RTO: 12.9 % (ref 11.8–14.4)
PLATELET # BLD: 249 K/UL (ref 138–453)
PLATELET ESTIMATE: ABNORMAL
PMV BLD AUTO: 11.7 FL (ref 8.1–13.5)
POTASSIUM SERPL-SCNC: 3.7 MMOL/L (ref 3.7–5.3)
RBC # BLD: 4.38 M/UL (ref 4.21–5.77)
RBC # BLD: ABNORMAL 10*6/UL
SALICYLATE LEVEL: <1 MG/DL (ref 3–10)
SEG NEUTROPHILS: 55 % (ref 36–65)
SEGMENTED NEUTROPHILS ABSOLUTE COUNT: 3.59 K/UL (ref 1.5–8.1)
SODIUM BLD-SCNC: 141 MMOL/L (ref 135–144)
TOTAL PROTEIN: 7.1 G/DL (ref 6.4–8.3)
TOXIC TRICYCLIC SC,BLOOD: NEGATIVE
WBC # BLD: 6.6 K/UL (ref 3.5–11.3)
WBC # BLD: ABNORMAL 10*3/UL

## 2020-03-10 PROCEDURE — 74022 RADEX COMPL AQT ABD SERIES: CPT

## 2020-03-10 ASSESSMENT — ENCOUNTER SYMPTOMS
WHEEZING: 0
SORE THROAT: 0
TROUBLE SWALLOWING: 0
ABDOMINAL PAIN: 0
PHOTOPHOBIA: 0
SHORTNESS OF BREATH: 0
EYE PAIN: 0
VOMITING: 0

## 2020-03-10 NOTE — ED NOTES
Writer attempted to get urine specimen from pt. Pt unable to provide any urine at this time.      Willian Mancilla RN  03/10/20 8486

## 2020-03-10 NOTE — ED NOTES
Report taken from Doroteo Gan, [address] [place] [address].       Suki Balderas, JACE  03/10/20 4628

## 2020-03-10 NOTE — ED NOTES
Meal tray order faxed to dietary. Pt resting on stretcher in NAD, even and unlabored respirations noted. Will continue to monitor.      Candace Ta RN  03/10/20 0581

## 2020-03-10 NOTE — ED PROVIDER NOTES
History    Not on file   Social Needs    Financial resource strain: Not on file    Food insecurity     Worry: Not on file     Inability: Not on file    Transportation needs     Medical: Not on file     Non-medical: Not on file   Tobacco Use    Smoking status: Former Smoker     Packs/day: 0.50     Years: 14.00     Pack years: 7.00     Types: Cigarettes    Smokeless tobacco: Never Used   Substance and Sexual Activity    Alcohol use: Yes     Comment: drinks 1 pint of vodka daily    Drug use: Not Currently     Types: Cocaine, Opiates , IV     Comment: drug abuse includes cocaine & heroin    Sexual activity: Never   Lifestyle    Physical activity     Days per week: Not on file     Minutes per session: Not on file    Stress: Not on file   Relationships    Social connections     Talks on phone: Not on file     Gets together: Not on file     Attends Voodoo service: Not on file     Active member of club or organization: Not on file     Attends meetings of clubs or organizations: Not on file     Relationship status: Not on file    Intimate partner violence     Fear of current or ex partner: Not on file     Emotionally abused: Not on file     Physically abused: Not on file     Forced sexual activity: Not on file   Other Topics Concern    Not on file   Social History Narrative    Not on file       Family History   Problem Relation Age of Onset    Liver Cancer Brother     Alcohol Abuse Brother     No Known Problems Mother     No Known Problems Father        Allergies:  Advil [ibuprofen]    Home Medications:  Prior to Admission medications    Medication Sig Start Date End Date Taking? Authorizing Provider   melatonin ER 3 MG TBCR Take 3 mg by mouth nightly as needed (sleep) 1/9/20  Yes Thedaily Majestic, APRN - CNP       REVIEW OF SYSTEMS    (2-9 systems for level 4, 10 or more for level 5)      Review of Systems   Constitutional: Negative for chills and fatigue.    HENT: Negative for sore throat and trouble Coloration: Skin is not pale. Findings: No bruising or rash. Neurological:      Mental Status: He is alert. Comments: Slurring words without focal deficit. No pronator drift. Cooperative with examination. DIFFERENTIAL  DIAGNOSIS     PLAN (LABS / IMAGING / EKG):  Orders Placed This Encounter   Procedures    XR Acute Abd Series Chest 1 VW    Urine Drug Screen    Ethanol    Acetaminophen level    Salicylate    Lithium level    TOXIC TRICYCLIC SC,B    CBC Auto Differential    Comprehensive Metabolic Panel w/ Reflex to MG    POCT glucose    POC Glucose Fingerstick    EKG 12 Lead    EKG REPORT       MEDICATIONS ORDERED:  No orders of the defined types were placed in this encounter. DDX: Avila Basilio is a 26 y.o. male who presents to the emergency department with acute intoxication. Differential diagnosis includes alcohol, medication overdose including antipsychotic or depressant, electrolyte abnormalities such as alcoholic hyponatremia, use of intravenous heroin or cocaine, or other drug abuse.     DIAGNOSTIC RESULTS / EMERGENCY DEPARTMENT COURSE / MDM   LAB RESULTS:  Results for orders placed or performed during the hospital encounter of 03/09/20   Ethanol   Result Value Ref Range    Ethanol 289 (H) <10 mg/dL    Ethanol percent 0.289 (H) <0.010 %   Acetaminophen level   Result Value Ref Range    Acetaminophen Level <5 (L) 10 - 30 ug/mL   Salicylate   Result Value Ref Range    Salicylate Lvl <1 (L) 3 - 10 mg/dL   Lithium level   Result Value Ref Range    Lithium Lvl <0.1 (L) 0.6 - 1.2 mmol/L    Lithium Dose Amount NOT REPORTED     Lithium Date Last Dose NOT REPORTED     Lithium Dose Time NOT REPORTED    TOXIC TRICYCLIC SC,B   Result Value Ref Range    Toxic Tricyclic Sc,Blood NEGATIVE NEGATIVE   CBC Auto Differential   Result Value Ref Range    WBC 6.6 3.5 - 11.3 k/uL    RBC 4.38 4.21 - 5.77 m/uL    Hemoglobin 13.5 13.0 - 17.0 g/dL    Hematocrit 40.6 (L) 40.7 - 50.3 %    MCV Alem Ramirez is a 26 y.o. male who presents to the emergency department with acute intoxication. Given the fact that patient admitted to rectal insertion of depressant medications an unknown white round pills, abdominal x-ray will be obtained to attempt to visualize any further pills which may still be in the rectum. Urine drug screen and toxicology panel will be obtained. Patient states he takes lithium for bipolar disorder and a lithium level will therefore be obtained. Point-of-care glucose was normal but EKG will also be obtained to evaluate for toxidrome. RADIOLOGY:  No results found. EKG  EKG Interpretation    Interpreted by emergency department physician    Rhythm: normal sinus   Rate: normal  Axis: normal  Ectopy: none  Conduction: normal  ST Segments: no acute change  T Waves: no acute change  Q Waves: none    Clinical Impression: no acute changes    Brayan Muñoz MD      All EKG's are interpreted by the Emergency Department Physician who either signs or co-signs this chart in the absence of a cardiologist.    EMERGENCY DEPARTMENT COURSE:  ED Course as of Mar 10 2229   Tue Mar 10, 2020   0016 Normoglycemic   POC Glucose Fingerstick:    POC Glucose 83 [TS]   0053 Lithium Lvl(!): <0.1 [TS]   1881 Sober time 0700    [TS]   0656 Patient slept comfortably throughout the night. On reevaluation this morning he denies complaints and appears clinically sober. [TS]      ED Course User Index  [TS] Diana Quiroz MD         PROCEDURES:  None    CONSULTS:  None    CRITICAL CARE:  Please see attending note. FINAL IMPRESSION      1. Acute alcoholic intoxication without complication ([address]          DISPOSITION / PLAN     DISPOSITION        PATIENT REFERRED TO:  No follow-up provider specified.     DISCHARGE MEDICATIONS:  Discharge Medication List as of 3/10/2020  7:01 AM          Diana Quiroz MD  Emergency Medicine Resident    (Please note that portions of thisnote were completed with a voice recognition

## 2020-03-23 ENCOUNTER — HOSPITAL ENCOUNTER (EMERGENCY)
Age: 30
Discharge: HOME | End: 2020-03-23
Attending: EMERGENCY MEDICINE
Payer: COMMERCIAL

## 2020-03-23 ENCOUNTER — HOSPITAL ENCOUNTER (INPATIENT)
Age: 30
LOS: 2 days | Discharge: HOME | DRG: 750 | End: 2020-03-25
Attending: EMERGENCY MEDICINE | Admitting: PSYCHIATRY & NEUROLOGY
Payer: COMMERCIAL

## 2020-03-23 VITALS
TEMPERATURE: 97.3 F | HEART RATE: 68 BPM | BODY MASS INDEX: 26.41 KG/M2 | RESPIRATION RATE: 16 BRPM | SYSTOLIC BLOOD PRESSURE: 129 MMHG | WEIGHT: 195 LBS | OXYGEN SATURATION: 97 % | HEIGHT: 72 IN | DIASTOLIC BLOOD PRESSURE: 78 MMHG

## 2020-03-23 PROCEDURE — 99285 EMERGENCY DEPT VISIT HI MDM: CPT

## 2020-03-23 PROCEDURE — 1240000000 HC EMOTIONAL WELLNESS R&B

## 2020-03-23 PROCEDURE — 99283 EMERGENCY DEPT VISIT LOW MDM: CPT

## 2020-03-23 RX ORDER — TRAZODONE HYDROCHLORIDE 100 MG/1
100 TABLET ORAL NIGHTLY PRN
Status: ON HOLD | COMMUNITY
End: 2020-03-25 | Stop reason: HOSPADM

## 2020-03-23 RX ORDER — ARIPIPRAZOLE 10 MG/1
10 TABLET ORAL DAILY
Status: ON HOLD | COMMUNITY
End: 2020-03-25 | Stop reason: HOSPADM

## 2020-03-23 RX ORDER — DIPHENHYDRAMINE HCL 25 MG
25 TABLET ORAL EVERY 6 HOURS PRN
Status: ON HOLD | COMMUNITY
End: 2020-03-24

## 2020-03-23 RX ORDER — ESCITALOPRAM OXALATE 10 MG/1
10 TABLET ORAL DAILY
Status: ON HOLD | COMMUNITY
End: 2020-03-25 | Stop reason: HOSPADM

## 2020-03-23 ASSESSMENT — SLEEP AND FATIGUE QUESTIONNAIRES
DIFFICULTY STAYING ASLEEP: YES
DO YOU USE A SLEEP AID: NO
DIFFICULTY ARISING: NO
AVERAGE NUMBER OF SLEEP HOURS: 4
RESTFUL SLEEP: NO
DO YOU HAVE DIFFICULTY SLEEPING: YES
DIFFICULTY FALLING ASLEEP: YES
SLEEP PATTERN: DIFFICULTY FALLING ASLEEP

## 2020-03-23 ASSESSMENT — ENCOUNTER SYMPTOMS
WHEEZING: 0
ABDOMINAL PAIN: 0
SORE THROAT: 0
CHEST TIGHTNESS: 0
CONSTIPATION: 0
DIARRHEA: 0
SHORTNESS OF BREATH: 0
BLOOD IN STOOL: 0
VOMITING: 0
NAUSEA: 0

## 2020-03-23 ASSESSMENT — LIFESTYLE VARIABLES: HISTORY_ALCOHOL_USE: YES

## 2020-03-23 NOTE — BH NOTE
admission, petey X, if applicable and completed (first 3 are required if patient doesn't refuse):            ( )  Recognizing danger situations (included triggers and roadblocks)                    ( )  Coping skills (new ways to manage stress, exercise, relaxation techniques, changing routine, distraction)                                                           ( )  Basic information about quitting (benefits of quitting, techniques in how to quit, available resources  ( ) Referral for counseling faxed to [facility]                                           ( ) Patient refused counseling  (X) Patient has not smoked in the last 30 days    Metabolic Screening:    Lab Results   Component Value Date    LABA1C 4.9 01/03/2020       Lab Results   Component Value Date    CHOL 125 01/03/2020    CHOL 175 09/10/2019    CHOL 150 02/26/2019    CHOL 162 01/21/2019    CHOL 153 11/11/2018    CHOL 129 07/05/2018    CHOL 145 11/05/2017    CHOL 140 06/13/2015     Lab Results   Component Value Date    TRIG 76 01/03/2020    TRIG 121 09/10/2019    TRIG 112 02/26/2019    TRIG 184 (H) 01/21/2019    TRIG 142 11/11/2018    TRIG 104 07/05/2018    TRIG 105 11/05/2017    TRIG 167 (H) 06/13/2015     Lab Results   Component Value Date    HDL 38 (L) 01/03/2020    HDL 45 09/10/2019    HDL 52 02/26/2019    HDL 46 01/21/2019    HDL 49 11/11/2018    HDL 73 07/05/2018    HDL 67 11/05/2017    HDL 31 (L) 06/13/2015     No components found for: LDLCAL  No results found for: LABVLDL      Body mass index is 26.37 kg/m². BP Readings from Last 2 Encounters:   03/23/20 130/87   03/23/20 129/78           Pt admitted with followings belongings:  Dentures: None  Vision - Corrective Lenses: None  Hearing Aid: None  Jewelry: None  Body Piercings Removed: N/A  Clothing: Footwear, Jacket / coat, Shirt, Sweater, Pants  Were All Patient Medications Collected?: Not Applicable  Other Valuables:  Other (Comment)(earbuds, CD)     Valuables placed in safe

## 2020-03-23 NOTE — ED PROVIDER NOTES
palpitations and leg swelling. Gastrointestinal: Negative for abdominal pain, blood in stool, constipation, diarrhea, nausea and vomiting. Genitourinary: Negative for dysuria and hematuria. Musculoskeletal: Negative for neck pain and neck stiffness. Skin: Negative for pallor and rash. Neurological: Negative for dizziness, tremors, syncope, weakness, light-headedness and headaches. Psychiatric/Behavioral: Positive for confusion. Negative for agitation, hallucinations, self-injury and suicidal ideas. The patient is not nervous/anxious. PHYSICAL EXAM   (up to 7 for level 4, 8 or more for level 5)      INITIAL VITALS:   /78   Pulse 68   Temp 97.3 °F (36.3 °C)   Resp 16   Ht 6' (1.829 m)   Wt 195 lb (88.5 kg)   SpO2 97%   BMI 26.45 kg/m²     Physical Exam  Constitutional:       General: He is not in acute distress. Appearance: Normal appearance. He is well-developed. He is not ill-appearing, toxic-appearing or diaphoretic. HENT:      Head: Normocephalic and atraumatic. Right Ear: External ear normal.      Left Ear: External ear normal.      Nose: Nose normal.      Mouth/Throat:      Mouth: Mucous membranes are moist.   Eyes:      General:         Right eye: No discharge. Left eye: No discharge. Pupils: Pupils are equal, round, and reactive to light. Neck:      Musculoskeletal: Normal range of motion and neck supple. Vascular: No JVD. Trachea: No tracheal deviation. Cardiovascular:      Rate and Rhythm: Normal rate and regular rhythm. Pulses: Normal pulses. Heart sounds: Normal heart sounds. No murmur. No friction rub. No gallop. Pulmonary:      Effort: Pulmonary effort is normal. No respiratory distress. Breath sounds: Normal breath sounds. No stridor. No wheezing, rhonchi or rales. Chest:      Chest wall: No tenderness. Abdominal:      General: There is no distension. Palpations: Abdomen is soft. There is no mass. Tenderness: There is no abdominal tenderness. There is no guarding. Musculoskeletal: Normal range of motion. Skin:     General: Skin is warm. Capillary Refill: Capillary refill takes less than 2 seconds. Neurological:      General: No focal deficit present. Mental Status: He is alert and oriented to person, place, and time. Cranial Nerves: No cranial nerve deficit. Sensory: No sensory deficit. Motor: No weakness. Psychiatric:         Mood and Affect: Mood normal.         Behavior: Behavior normal.         DIFFERENTIAL  DIAGNOSIS     PLAN (LABS / IMAGING / EKG):  No orders of the defined types were placed in this encounter. MEDICATIONS ORDERED:  No orders of the defined types were placed in this encounter. DDX: Concern for alcohol withdrawal    DIAGNOSTIC RESULTS / EMERGENCY DEPARTMENT COURSE / MDM   LAB RESULTS:  No results found for this visit on 03/23/20. IMPRESSION: 59-year-old male coming in with concern for alcohol withdrawal, usually drinks a pint of vodka daily, has been trying to wean himself, last drink was at 3 AM.  Patient not actively going through withdrawal, would [facility]. Patient is medically stable, will discharge so he can [facility] to [facility] for [facility] detoxification. RADIOLOGY:  None    EKG  None    All EKG's are interpreted by the Emergency Department Physician who either signs or Co-signs this chart in the absence of a cardiologist.    EMERGENCY DEPARTMENT COURSE:  Patient came to emergency department, HPI and physical exam were conducted. All nursing notes were reviewed. Patient remained stable in the emergency department with normal vital signs. Patient was not actively withdrawing. Gave strict return precautions to the emergency department and discharge patient. Recommended patient immediately go to [facility] for [facility] detox[facility].       PROCEDURES:  None    CONSULTS:  None    CRITICAL CARE:  Please see attending note    FINAL IMPRESSION      1.  Alcohol use          DISPOSITION / PLAN     DISPOSITION Decision To Discharge 03/23/2020 03:06:44 PM      PATIENT REFERRED TO:  [facility] ED  [address] [facility] [address]  [phone]  Go to   As needed, If symptoms worsen    [facility]  [address] [facility] [address] [address]  [phone]  Schedule an appointment as soon as possible for a visit in 3 days  For reassessment      DISCHARGE MEDICATIONS:  Discharge Medication List as of 3/23/2020  3:08 PM          Bri Doherty MD  Emergency Medicine Resident    (Please note that portions of thisnote were completed with a voice recognition program.  Efforts were made to edit the dictations but occasionally words are mis-transcribed.)        Bri Doherty MD  Resident  03/23/20 6500

## 2020-03-23 NOTE — ED NOTES
Provisional Diagnosis:   Schizoaffective Disorder bipolar type    Psychosocial and Contextual Factors:   Patient reports he has been feeling depressed and suicidal. Patient states he has been trying to taper off his alcohol use for the last week. C-SSRS Summary:      Patient: X  Family:   Agency:     Substance Abuse: Patient reports daily alcohol use     Present Suicidal Behavior:  Patient reports suicidal ideation. Patient states he was thinking of \"taking a bunch of pills. \"    Verbal: X    Attempt:    Past Suicidal Behavior: Patient reports suicide attempt \"years ago\" in which he intentionally overdosed on pills. Verbal:X    Attempt:      Self-Injurious/Self-Mutilation: Patient denies. Trauma Identified:  Patient denies. Protective Factors:    Patient reports he has housing. Patient states he has an income \"under the table. \"     Risk Factors:    Patient reports poor support system. Clinical Summary:    Karina Grove is a 26 y.o. male who presents to the ED for a psychiatric evaluation. Patient states he has been having intermittent suicidal ideation with plans of overdosing on pills. Patient states fear that he will act on thoughts. Patient states he has been feeling depressed and hopeless. Patient reports he has begun to see auditory hallucinations of shadows the started a few days ago. Patient states he has also been hearing voices in which he can't make out the words. Patient has poor insight. Patient has difficulty concentrating on questions when asked. Patient is a poor historian. Patient reports poor sleep every night and poor appetite. Patient was at [facility] earlier today and states \"I didn't tell them I was suicidal or depressed and I just left there. \"  Patient states he typically drinks 1 pint of vodka a night, but states he has been attempting to taper himself off over the last week.    Patient states he had been taking Abilify that was prescribed at a primary care clinic. Level of Care Disposition:    Per Dr. Calvin Camargo patient is medically cleared for psychiatry consult. Writer consulted with Dr. Jocelyn Mariscal who recommends inpatient psychiatric admission for safety and stabilization. Patient is in agreement and signed application for voluntary admission.

## 2020-03-24 PROCEDURE — 90792 PSYCH DIAG EVAL W/MED SRVCS: CPT | Performed by: NURSE PRACTITIONER

## 2020-03-24 PROCEDURE — 1240000000 HC EMOTIONAL WELLNESS R&B

## 2020-03-24 PROCEDURE — 6370000000 HC RX 637 (ALT 250 FOR IP): Performed by: NURSE PRACTITIONER

## 2020-03-24 RX ORDER — TRAZODONE HYDROCHLORIDE 150 MG/1
150 TABLET ORAL NIGHTLY PRN
Status: DISCONTINUED | OUTPATIENT
Start: 2020-03-24 | End: 2020-03-25 | Stop reason: HOSPADM

## 2020-03-24 RX ORDER — HYDROXYZINE HYDROCHLORIDE 25 MG/1
25 TABLET, FILM COATED ORAL 3 TIMES DAILY PRN
Status: DISCONTINUED | OUTPATIENT
Start: 2020-03-24 | End: 2020-03-25 | Stop reason: HOSPADM

## 2020-03-24 RX ORDER — MAGNESIUM HYDROXIDE/ALUMINUM HYDROXICE/SIMETHICONE 120; 1200; 1200 MG/30ML; MG/30ML; MG/30ML
30 SUSPENSION ORAL EVERY 6 HOURS PRN
Status: DISCONTINUED | OUTPATIENT
Start: 2020-03-24 | End: 2020-03-25 | Stop reason: HOSPADM

## 2020-03-24 RX ORDER — ESCITALOPRAM OXALATE 10 MG/1
15 TABLET ORAL DAILY
Status: DISCONTINUED | OUTPATIENT
Start: 2020-03-25 | End: 2020-03-25 | Stop reason: HOSPADM

## 2020-03-24 RX ORDER — ESCITALOPRAM OXALATE 10 MG/1
10 TABLET ORAL DAILY
Status: DISCONTINUED | OUTPATIENT
Start: 2020-03-24 | End: 2020-03-24

## 2020-03-24 RX ORDER — ARIPIPRAZOLE 10 MG/1
10 TABLET ORAL DAILY
Status: DISCONTINUED | OUTPATIENT
Start: 2020-03-24 | End: 2020-03-24

## 2020-03-24 RX ORDER — ACETAMINOPHEN 325 MG/1
650 TABLET ORAL EVERY 4 HOURS PRN
Status: DISCONTINUED | OUTPATIENT
Start: 2020-03-24 | End: 2020-03-25 | Stop reason: HOSPADM

## 2020-03-24 RX ORDER — ARIPIPRAZOLE 15 MG/1
15 TABLET ORAL DAILY
Status: DISCONTINUED | OUTPATIENT
Start: 2020-03-25 | End: 2020-03-25 | Stop reason: HOSPADM

## 2020-03-24 RX ORDER — CETIRIZINE HYDROCHLORIDE 10 MG/1
10 TABLET ORAL DAILY
Status: DISCONTINUED | OUTPATIENT
Start: 2020-03-24 | End: 2020-03-25 | Stop reason: HOSPADM

## 2020-03-24 RX ADMIN — CETIRIZINE HYDROCHLORIDE 10 MG: 10 TABLET, FILM COATED ORAL at 12:14

## 2020-03-24 RX ADMIN — Medication 2 MG: at 20:50

## 2020-03-24 ASSESSMENT — ENCOUNTER SYMPTOMS
ABDOMINAL PAIN: 0
CONSTIPATION: 0
DIARRHEA: 0
TROUBLE SWALLOWING: 0
RHINORRHEA: 0
COUGH: 0
SINUS PAIN: 0
NAUSEA: 0
WHEEZING: 0
VOMITING: 0
SHORTNESS OF BREATH: 0

## 2020-03-24 ASSESSMENT — LIFESTYLE VARIABLES: HISTORY_ALCOHOL_USE: YES

## 2020-03-24 ASSESSMENT — SLEEP AND FATIGUE QUESTIONNAIRES
SLEEP PATTERN: RESTLESSNESS
DO YOU USE A SLEEP AID: NO
RESTFUL SLEEP: NO
DIFFICULTY ARISING: NO
DIFFICULTY STAYING ASLEEP: YES
DIFFICULTY FALLING ASLEEP: YES
DO YOU HAVE DIFFICULTY SLEEPING: YES
AVERAGE NUMBER OF SLEEP HOURS: 6

## 2020-03-24 ASSESSMENT — PATIENT HEALTH QUESTIONNAIRE - PHQ9: SUM OF ALL RESPONSES TO PHQ QUESTIONS 1-9: 11

## 2020-03-24 NOTE — PLAN OF CARE
[facility]  Initial Interdisciplinary Treatment Plan NO      Original treatment plan Date & Time: 3/24/20    Admission Type:  Admission Type: Voluntary    Reason for admission:   Reason for Admission: suicidal ideation to overdose on medications     Estimated Length of Stay:  5-7days  Estimated Discharge Date: to be determined by physician    PATIENT STRENGTHS:  Patient Strengths:Strengths: No significant Physical Illness  Patient Strengths and Limitations:Limitations: Difficult relationships / poor social skills, Apathetic / unmotivated, Inappropriate/potentially harmful leisure interests, Tendency to isolate self, Lacks leisure interests  Addictive Behavior: Addictive Behavior  In the past 3 months, have you felt or has someone told you that you have a problem with:  : None  Do you have a history of Chemical Use?: No  Do you have a history of Alcohol Use?: Yes  Do you have a history of Street Drug Abuse?: No  Histroy of Prescripton Drug Abuse?: No  Medical Problems:  Past Medical History:   Diagnosis Date    ADHD (attention deficit hyperactivity disorder)     Alcoholism ([facility]     pt drinks 6 beers and a liter of vodka daily.     Anxiety     Bipolar 1 disorder (HCC)     PTSD (post-traumatic stress disorder)     Suicide attempt ([facility]     previous suicide attempt by overdosing on pills at approximately 25 y/o     Status EXAM:Status and Exam  Normal: No  Facial Expression: Avoids Gaze  Affect: Appropriate  Level of Consciousness: Alert  Mood:Normal: No  Mood: Depressed  Motor Activity:Normal: No  Motor Activity: Decreased  Interview Behavior: Cooperative  Preception: [place] to Person, [place] to Time, [place] to Place, [place] to Situation  Attention:Normal: No  Attention: Unable to Concentrate  Thought Processes: Blocking  Thought Content:Normal: No  Thought Content: Poverty of Content, Preoccupations  Hallucinations: None  Delusions: No  Memory:Normal: No  Memory: Poor Recent, Poor Remote  Insight and Judgment: No  Insight and Judgment: Poor Insight, Poor Judgment  Present Suicidal Ideation: No  Present Homicidal Ideation: No    EDUCATION:   Learner Progress Toward Treatment Goals: reviewed group plans and strategies for care    Method:group therapy, medication compliance, individualized assessments and care planning    Outcome: needs reinforcement    PATIENT GOALS: to be discussed with patient within 72 hours    PLAN/TREATMENT RECOMMENDATIONS:     continue group therapy , medications compliance, goal setting, individualized assessments and care, continue to monitor pt on unit      SHORT-TERM GOALS:   Time frame for Short-Term Goals: 5-7 days    LONG-TERM GOALS:  Time frame for Long-Term Goals: 6 months  Members Present in Team Meeting: See Signature Sheet    Guy Sandoval [place]

## 2020-03-24 NOTE — BH NOTE
Psychiatric Admission Note       Identifying Information: This is a 79-year-old male being seen for psychiatric evaluation. The patient was admitted on a voluntary basis from the [facility] AN [facility]FFIL[facility] OF [facility]. He presented with suicidal ideation with plan to overdose on his medications. He had recently been discharged from [facility] on 3/23. He had presented to the emergency department reporting increased depression and suicidal thoughts. He states he had been trying to taper off his alcohol use for the last week, but again, the patient had been hospitalized and was not actually drinking. He reported a suicidal plan with intent of taking \"a bunch of pills. \"  While in the ED he was feeling depressed and hopeless. He reported visual hallucinations of shadows which began 2 to 3 days prior to admission. He also reported auditory hallucinations but could not make out the words. Presented with difficulty concentrating. Poor historian. Poor sleep and poor appetite. Patient is seen in his room today. He states he is \"all right. \"  He reports he is here because he just stopped drinking. He reports decreased motivation and energy. Rates depression a 10 out of 10 on numeric rating scale of 0-10 with 0 being none and 10 the worst.  Reports that currently he does not have suicidal thoughts but is \"really depressed. \"  Reports that his sleep is been broken and has difficulty falling back asleep. States appetite is normal.  Currently denied any auditory hallucinations but did report visual hallucinations which began approximately 1 week ago. He reports hallucinations are bothersome. He states he does not feel safe if he were not in the hospital at this time. Given the patient's elevated risk of harm to self and others he will remain hospitalized level of care necessary for safety and stabilization is greater than that which may provide on the outpatient basis.     Past Psychiatric History Patient Reports noncompliance with outpatient psychiatric care. Reported history of psychiatric inpatient hospitalizations. Reported history of suicide attempts. History of Substance Abuse     Reports no illicit drug use at this time. Reports uses tobacco.  The patient states he has not drank in approximately one week and that at that time he was drinking approximately 1 pint of vodka daily for an unknown period of time. Family History of psychiatric disorders    Family history: denied . Past psychiatric medications:  Zoloft, abilify, prozac, zyprexa, lexapro, celexa    Medical History   Allergies:  Advil [ibuprofen]   Past Medical History:   Diagnosis Date    ADHD (attention deficit hyperactivity disorder)     Alcoholism ([facility]     pt drinks 6 beers and a liter of vodka daily.  Anxiety     Bipolar 1 disorder (HCC)     PTSD (post-traumatic stress disorder)     Suicide attempt ([facility]     previous suicide attempt by overdosing on pills at approximately 23 y/o      History reviewed. No pertinent surgical history. Neurologic Exam    See H&P for further physical examination. SOCIAL HISTORY: Patient was born and raised in [place]. Currently lives in [place]. Reports he completed 11th grade. He identifies that he is single. He does not have any children. He is not working and does not have a source of reliable income. He denies any current drug use. States he has not been drinking for at least a week. Prior to this he would drink a pint of vodka daily. Denies  history. Denies any current legal issues.   Social History     Socioeconomic History    Marital status: Single     Spouse name: Not on file    Number of children: Not on file    Years of education: Not on file    Highest education level: Not on file   Occupational History    Not on file   Social Needs    Financial resource strain: Not on file    Food insecurity     Worry: Not on file     Inability: Not on file   Dorita Arrieta

## 2020-03-24 NOTE — GROUP NOTE
Group Therapy Note    Date: 3/24/2020    Group Start Time: 1100  Group End Time: 0623  Group Topic: Psychoeducation    [facility]    Patient refused to attend mental health advocacy and problem solving skills group at 1100 after encouragement from staff. 1:1 talk time offered by staff as alternative to group session.

## 2020-03-24 NOTE — PLAN OF CARE
Problem: Depressive Behavior With or Without Suicide Precautions:  Goal: Absence of self-harm  Description: Absence of self-harm  Outcome: Ongoing  Note: Patient denies homicidal ideations and suicidal ideation. Patient reported feeling very depressed. Patient has been isolated to room this evening. Patient has been calm, cooperative at this time. Q 15 minutes safety checks maintained.

## 2020-03-24 NOTE — ED PROVIDER NOTES
[address]  eMERGENCY dEPARTMENT eNCOUnter      Pt Name: Giovany Real  MRN: [id number]  [place] 1990  Date of evaluation: 8/19/18      CHIEF COMPLAINT       Chief Complaint   Patient presents with    Ingestion    Suicidal     HISTORY OF PRESENT ILLNESS   HPI 33 y.o. male who presents after possible ingestion. He states that he took about 8 tablets of over-the-counter sleep aids, doesn't know the name, about 2 hours prior to presentation. He denies that he was trying to kill himself. The patient reports that he's been having a hard time sleeping. Nares been having anxiety and racing thoughts. He says that he needs to see a psychiatrist and get back on his psych meds. He states that the pill overdose was not a suicide attempt. When asked that he has any specific suicidal plan says no. Says that he does have thoughts of suicide sometimes, but that he would never kill himself. Patient has had multiple ED presentations over the last few days. He was just seen at [facility] a few hours ago. He left there and came here. He was [facility]. The patient was admitted twice for depression/suicidal thoughts in the month of July. REVIEW OF SYSTEMS     Review of Systems   Constitutional: Negative for fatigue and fever. HENT: Negative for congestion. Eyes: Negative for visual disturbance. Respiratory: Negative for shortness of breath. Cardiovascular: Negative for chest pain. Gastrointestinal: Negative for abdominal pain, nausea and vomiting. Endocrine: Negative for cold intolerance and heat intolerance. Genitourinary: Negative for dysuria. Musculoskeletal: Negative for gait problem. Skin: Negative for wound. Allergic/Immunologic: Negative for immunocompromised state. Neurological: Negative for dizziness and headaches. Hematological: Negative for adenopathy.    Psychiatric/Behavioral: Positive for dysphoric mood and sleep Next 4/14/2020  Last 1/8/2020  Pharm [facility] [address] disturbance. Negative for agitation, confusion and suicidal ideas. The patient is nervous/anxious. PAST MEDICAL HISTORY     Past Medical History:   Diagnosis Date    ADHD (attention deficit hyperactivity disorder)     Anxiety     Bipolar 1 disorder (HCC)     PTSD (post-traumatic stress disorder)        SURGICAL HISTORY     History reviewed. No pertinent surgical history. CURRENT MEDICATIONS       Previous Medications    CARBAMAZEPINE (TEGRETOL) 200 MG TABLET    Take 1.5 tablets by mouth 2 times daily    DIPHENHYDRAMINE (BENADRYL) 50 MG CAPSULE    Take 50 mg by mouth every 6 hours as needed for Itching    HYDROXYZINE (VISTARIL) 50 MG CAPSULE    Take 50 mg by mouth 3 times daily as needed for Itching    LORAZEPAM (ATIVAN) 0.5 MG TABLET    Take 1 tablet by mouth 2 times daily as needed for Anxiety for up to 30 days. Mansoor Iqbal MIRTAZAPINE (REMERON) 30 MG TABLET    Take 1 tablet by mouth nightly    NALTREXONE (DEPADE) 50 MG TABLET    Take 1 tablet by mouth daily (with breakfast)    QUETIAPINE (SEROQUEL XR) 150 MG TB24 EXTENDED RELEASE TABLET    Take 150 mg by mouth daily    TRAZODONE (DESYREL) 50 MG TABLET    Take 1 tablet by mouth nightly as needed for Sleep       ALLERGIES     is allergic to advil [ibuprofen]. FAMILY HISTORY     indicated that the status of his mother is unknown. He indicated that the status of his father is unknown. He indicated that the status of his brother is unknown. SOCIAL HISTORY      reports that he has quit smoking. His smoking use included Cigarettes. He smoked 0.50 packs per day. He has never used smokeless tobacco. He reports that he uses drugs, including Cocaine and Opiates . He reports that he does not drink alcohol.     PHYSICAL EXAM     INITIAL VITALS: /77   Pulse 74   Temp 98.4 °F (36.9 °C) (Oral)   Resp 18   SpO2 100%   Gen.: NAD   Head: Normocephalic, atraumatic  Eye: Pupils equal round reactive to light, no conjunctivitis  Heart: Regular rate and rhythm components within normal limits   TOX SCR, BLD, ED - Abnormal; Notable for the following:     Salicylate Lvl <1 (*)     Acetaminophen Level <5 (*)     All other components within normal limits       EMERGENCY DEPARTMENT COURSE:   Vitals:    Vitals:    08/19/18 0419   BP: 103/77   Pulse: 74   Resp: 18   Temp: 98.4 °F (36.9 °C)   TempSrc: Oral   SpO2: 100%       The patient was given the following medications while in the emergency department:  No orders of the defined types were placed in this encounter. -------------------------  CRITICAL CARE:   CONSULTS: None  PROCEDURES: Procedures     FINAL IMPRESSION      1.  Schizoaffective disorder, unspecified type [facility])          DISPOSITION/PLAN   DISPOSITION        PATIENT REFERRED TO:  Outpatient [address] [facility] ED  Anish [address]arias [address]  [address] [facility]  [phone]    If symptoms worsen      DISCHARGE MEDICATIONS:  New Prescriptions    No medications on file         Gavino Samaniego MD  Attending Emergency Physician                      Gavino Samaniego MD  08/19/18 8751

## 2020-03-24 NOTE — H&P
HISTORY and [facility] [address]       NAME:  Claudine Yadav  MRN: [id number]   YOB: 1990   Date: 3/24/2020   Age: 26 y.o. Gender: male     COMPLAINT AND PRESENT HISTORY:      Claudine Yadav is 26 y.o.,  male, admitted because of Schizoaffective Disorder. Pt admitted via the ED with SI, plan to overdose on medications. Pt d/c from [facility] inpatient on 3/23/2020. Pt reports fleeting SI, no HI. Pt denies auditory or visual hallucinations to examiner. Per psych notes, pt has visual hallucinations of \"shadow\" and audio hallucinations. Pt reports fair appetite, poor sleep and states he is taking Melatonin. Pt feels hopeless, helpless, worthless, lack of interest, loss of energy. Poor insight. Poor concentration and memory. Patient denies any current substance abuse. Pt reports use of alcohol prior to admission, but does not disclose the amount. Patient states he has stable housing. He was d/c from [facility] 3/23/2020 and admitted to [facility] 3/23/2020. He did not take outpatient meds. States he is tired, allows limited physical exam. No other somatic complaints. See psychiatric admission note for further psychiatric history. DIAGNOSTIC RESULTS       PAST MEDICAL HISTORY     Past Medical History:   Diagnosis Date    ADHD (attention deficit hyperactivity disorder)     Alcoholism ([address]     pt drinks 6 beers and a liter of vodka daily.  Anxiety     Bipolar 1 disorder (HCC)     PTSD (post-traumatic stress disorder)     Suicide attempt ([facility]     previous suicide attempt by overdosing on pills at approximately 23 y/o       SURGICAL HISTORY     History reviewed. No pertinent surgical history.     FAMILY HISTORY       Family History   Problem Relation Age of Onset    Liver Cancer Brother     Alcohol Abuse Brother     No Known Problems Mother     No Known Problems Father        SOCIAL HISTORY       Social History     Socioeconomic History    Marital tenderness. No discoloration or ulcerations. NEUROLOGIC:  The patient is conscious, alert, oriented. No apparent focal sensory deficits. No motor deficits, muscle strength equal Velasquez. No facial droop, tongue protrudes centrally, no slurring of the speech. Cranial nerves 3-12 reveal no deficits, taste and smell not assessed. PROVISIONAL DIAGNOSES:      Principal Problem:    Schizoaffective disorder ([facility]  Resolved Problems:    * No resolved hospital problems.  LIBBY Britton - CNP on 3/24/2020 at 3:05 PM

## 2020-03-25 VITALS
RESPIRATION RATE: 14 BRPM | TEMPERATURE: 97.9 F | HEART RATE: 83 BPM | BODY MASS INDEX: 26.33 KG/M2 | SYSTOLIC BLOOD PRESSURE: 120 MMHG | DIASTOLIC BLOOD PRESSURE: 71 MMHG | OXYGEN SATURATION: 98 % | WEIGHT: 194.4 LBS | HEIGHT: 72 IN

## 2020-03-25 PROCEDURE — 99239 HOSP IP/OBS DSCHRG MGMT >30: CPT | Performed by: NURSE PRACTITIONER

## 2020-03-25 NOTE — PLAN OF CARE
Problem: Depressive Behavior With or Without Suicide Precautions:  Goal: Absence of self-harm  Description: Absence of self-harm  3/24/2020 2258 by Tammy Leger RN  Outcome: Ongoing     Patient denies suicidal and homicidal ideation at this time. Patient admits to depression and anxiety at this time. Patient was seclusive to his room most of the evening. Patient is free of self harm at this time. Patient agrees to seek out staff if thoughts to harm self arise. Staff will provide support and reassurance as needed. Safety checks maintained every 15 minutes.

## 2020-03-25 NOTE — DISCHARGE SUMMARY
DISCHARGE SUMMARY  Patient ID:  Avila Basilio  [id number]  80 y.o.  1990    Admit date: 3/23/2020    Discharge date and time: 3/25/2020  11:36 AM     Admitting Physician: Derrell Baptiste MD     Discharge Physician:  LIBBY Waller CNP    Admission Diagnoses: Schizoaffective disorder ([address] [F25.9]    Discharge Diagnoses:   Schizoaffective disorder [facility])     Patient Active Problem List   Diagnosis Code    Schizoaffective disorder, bipolar type ([address] F25.0    Suicide attempt by drug ingestion ([address] T50.902A    Tobacco use Z72.0    Polysubstance abuse ([address] F19.10    Atrial flutter, paroxysmal ([address] I48.92    Episodic cannabis use F12.90    Cocaine use F14.90    Accidental drug overdose T50.901A    Schizoaffective disorder ([address] F25.9    Suicide attempt ([address] T14.91XA    PTSD (post-traumatic stress disorder) F43.10    Bipolar 1 disorder ([address] F31.9    Anxiety F41.9    Alcoholism ([address] F10.20    ADHD (attention deficit hyperactivity disorder) F90.9    Intentional drug overdose ([address] T50.902A    Drug overdose T50.901A    MDD (major depressive disorder), recurrent episode ([address] F33.9    Major depressive disorder, recurrent ([address] F33.9    Major depression, recurrent ([address] F33.9        Admission Condition: poor    Discharged Condition: stable    Indication for Admission: threat to self    History of Present Illnes (present tense wording indicates findings from admission exam on 3/23/2020 and are not necessarily indicative of current findings): This is a 26-year-old male who was admitted on a voluntary basis from the [facility]. He presented with suicidal reports of wanting to overdose on medications. Additionally he had reported that he had been drinking daily and trying to withdraw himself from alcohol.   Given the patient's elevated risk of harm to self as well as concern of alcohol detoxification, he was hospitalized as level of care necessary for safety and stabilization was greater than that which can be provided on the outpatient basis. Hospital Course:   Upon admission, Rey Vera was provided a safe secure environment, introduced to unit [facility]. He was provided the opportunity to participate in activities and therapies though his participation was limited. Meds were adjusted but he refused stating \"medicines just make me more depressed. \"  After few days of hospital care, patient began to report improvement. On morning rounds 3/25/2020, patient endorsed feeling ready for discharge. He had been denying suicidal ideation since arrival to the [facility]. He denied auditory and visual hallucinations. He expressed that \"I have no need for being here. I do not need any meds. I just wanted somewhere to sleep. \"   Denies SE's from meds. It was decided that pt had achieved maximum benefit from hospital care and can be discharged     Consults: Internal medicine    Significant Diagnostic Studies: Routine labs and diagnostics    Treatments: Psychotropic medications, therapy with group, milieu, and 1:1 with nurses, social workers, PA-C/CNP, and Attending physician.       Discharge Medications:  Current Discharge Medication List      CONTINUE these medications which have NOT CHANGED    Details   melatonin ER 3 MG TBCR Take 3 mg by mouth nightly as needed (sleep)  Qty: 14 tablet, Refills: 0         STOP taking these medications       diphenhydrAMINE (BENADRYL) 25 MG tablet Comments:   Reason for Stopping:         ARIPiprazole (ABILIFY) 10 MG tablet Comments:   Reason for Stopping:         escitalopram (LEXAPRO) 10 MG tablet Comments:   Reason for Stopping:         traZODone (DESYREL) 100 MG tablet Comments:   Reason for Stopping:                Core Measures statement:   Not applicable    Discharge Exam:  Level of consciousness:  Within normal limits  Appearance: Street clothes, walking, with fair grooming  Behavior/Motor: No abnormalities noted  Attitude toward examiner:  Cooperative but mildly

## 2020-03-25 NOTE — GROUP NOTE
Group Therapy Note    Date: 3/25/2020    Group Start Time: 0900  Group End Time: 0920  Group Topic: Community Meeting    [facility]    Patient refused to attend community meeting and goal setting group at 0900 after encouragement from staff. 1:1 talk time offered by staff as alternative to group session.

## 2020-03-25 NOTE — FLOWSHEET NOTE
*Patient participated in the Niharika Dixon       03/25/20 1520   Encounter Summary   Services provided to: Patient   Referral/Consult From: Rounding   Continue Visiting   (3/25/20)   Complexity of Encounter Low   Length of Encounter 30 minutes   Spiritual Assessment Completed Yes   Spiritual/Buddhist   Type Spiritual support   Assessment Calm; Approachable   Intervention Active listening;Prayer   Outcome Receptive;Engaged in conversation;Expressed gratitude

## 2020-03-25 NOTE — BH NOTE
[facility]  Discharge Note    Pt discharged with followings belongings:   Dentures: None  Vision - Corrective Lenses: None  Hearing Aid: None  Jewelry: None  Body Piercings Removed: N/A  Clothing: Footwear, Jacket / coat, Shirt, Sweater, Pants  Were All Patient Medications Collected?: Not Applicable  Other Valuables: Other (Comment)(earbuds, CD)   Valuables sent home with patient. Valuables retrieved from safe, Security envelope number:  [id number] and returned to patient. Patient education on aftercare instructions: yes  Information faxed to [facility] by nurse Patient verbalize understanding of AVS:  Yes. Patient left ambulatory via taxi by self to private residence.     Status EXAM upon discharge:  Status and Exam  Normal: No  Facial Expression: Flat  Affect: Appropriate  Level of Consciousness: Alert  Mood:Normal: No  Mood: Depressed, Anxious  Motor Activity:Normal: Yes  Motor Activity: Decreased  Interview Behavior: Evasive  Preception: [place] to Person, Luvenia Idler to Time, [place] to Place, [place] to Situation  Attention:Normal: No  Attention: Distractible  Thought Processes: Circumstantial  Thought Content:Normal: No  Thought Content: Poverty of Content  Hallucinations: None  Delusions: No  Memory:Normal: No  Memory: Poor Recent, Poor Remote  Insight and Judgment: No  Insight and Judgment: Poor Judgment, Poor Insight, Unmotivated  Present Suicidal Ideation: No  Present Homicidal Ideation: No      Metabolic Screening:    Lab Results   Component Value Date    LABA1C 4.9 01/03/2020       Lab Results   Component Value Date    CHOL 125 01/03/2020    CHOL 175 09/10/2019    CHOL 150 02/26/2019    CHOL 162 01/21/2019    CHOL 153 11/11/2018    CHOL 129 07/05/2018    CHOL 145 11/05/2017    CHOL 140 06/13/2015     Lab Results   Component Value Date    TRIG 76 01/03/2020    TRIG 121 09/10/2019    TRIG 112 02/26/2019    TRIG 184 (H) 01/21/2019    TRIG 142 11/11/2018    TRIG 104 07/05/2018    TRIG 105 11/05/2017    TRIG

## 2020-03-25 NOTE — DISCHARGE INSTR - COC
Continuity of Care Form    Patient Name: Babar Robison   :  1990  MRN:  [id number]    Admit date:  3/23/2020  Discharge date:  ***    Code Status Order: Prior   Advance Directives:   [facility] Documentation     Date/Time Healthcare Directive Type of Healthcare Directive Copy in [facility] [address] Agent's Name Healthcare Agent's Phone Number    [phone]  No, patient does not have an advance directive for healthcare treatment -- -- -- -- --          Admitting Physician:  Mecca Li MD  PCP: No primary care provider on file. Discharging Nurse: [facility] Unit/Room#: 0132/0132-01  Discharging Unit Phone Number: ***    Emergency Contact:   Extended Emergency Contact Information  Primary Emergency Contact: Truong Palma [address] Phone: [phone]  Relation: Other    Past Surgical History:  History reviewed. No pertinent surgical history.     Immunization History:   Immunization History   Administered Date(s) Administered    Influenza, Quadv, 6 mo and older, IM, PF (Flulaval, Fluarix) 2018    Influenza, Quadv, IM, PF (6 mo and older Fluzone, Flulaval, Fluarix, and 3 yrs and older Afluria) 2018    Tdap (Boostrix, Adacel) 2014, 2015, 2017       Active Problems:  Patient Active Problem List   Diagnosis Code    Schizoaffective disorder, bipolar type ([address] F25.0    Suicide attempt by drug ingestion ([address] T50.902A    Tobacco use Z72.0    Polysubstance abuse ([address] F19.10    Atrial flutter, paroxysmal ([address] I48.92    Episodic cannabis use F12.90    Cocaine use F14.90    Accidental drug overdose T50.901A    Schizoaffective disorder ([address] F25.9    Suicide attempt ([address] T14.91XA    PTSD (post-traumatic stress disorder) F43.10    Bipolar 1 disorder ([address] F31.9    Anxiety F41.9    Alcoholism ([address] F10.20    ADHD (attention deficit hyperactivity disorder) F90.9    Intentional drug overdose ([facility] T50.902A    Drug overdose T50.901A    MDD (major depressive disorder), recurrent episode ([facility] F33.9    Major depressive disorder, recurrent ([facility] F33.9    Major depression, recurrent ([facility] F33.9       Isolation/Infection:   Isolation          No Isolation        Patient Infection Status     None to display          Nurse Assessment:  Last Vital Signs: /71   Pulse 83   Temp 97.9 °F (36.6 °C) (Oral)   Resp 14   Ht 6' (1.829 m)   Wt 194 lb 6.4 oz (88.2 kg)   SpO2 98%   BMI 26.37 kg/m²     Last documented pain score (0-10 scale):    Last Weight:   Wt Readings from Last 1 Encounters:   20 194 lb 6.4 oz (88.2 kg)     Mental Status:  {IP PT MENTAL STATUS:25608}    IV Access:  { ROBBIN IV ACCESS:138652774}    Nursing Mobility/ADLs:  Walking   {P DME PQWD:487315115}  Transfer  {P DME LPFZ:866729175}  Bathing  {P DME GXMY:308714484}  Dressing  {P DME JHIK:161488866}  Toileting  {CHP DME MHNK:775234122}  Feeding  {[facility] DME ZBCQ:952668867}  Med Admin  {P DME CAR}  Med Delivery   { ROBBIN MED Delivery:469073846}    Wound Care Documentation and Therapy:        Elimination:  Continence:   · Bowel: {YES / EA:27271}  · Bladder: {YES / :83513}  Urinary Catheter: {Urinary Catheter:677859757}   Colostomy/Ileostomy/Ileal Conduit: {YES / TI:05819}       Date of Last BM: ***  No intake or output data in the 24 hours ending 20 1124  No intake/output data recorded.     Safety Concerns:     [address] Safety Concerns:450405995}    Impairments/Disabilities:      [address] Impairments/Disabilities:841287624}    Nutrition Therapy:  Current Nutrition Therapy:   [address] Diet List:527334244}    Routes of Feeding: {[facility] DME Other Feedings:380171396}  Liquids: {Slp liquid thickness:92383}  Daily Fluid Restriction: {CHP DME Yes amt example:791495463}  Last Modified Barium Swallow with Video (Video Swallowing Test): {[facility]879835708}    Treatments at the Time of Hospital Discharge:   Respiratory Treatments: ***  Oxygen Therapy:  {Therapy; copd oxygen:37728}  Ventilator:    {MH CC Vent BBSV:892390155}    Rehab Therapies: {THERAPEUTIC INTERVENTION:7305958970}  Weight Bearing Status/Restrictions: 50Lalitha SAMSON Weight Bearin}  Other Medical Equipment (for information only, NOT a DME order):  {EQUIPMENT:953650562}  Other Treatments: ***    Patient's personal belongings (please select all that are sent with patient):  {P DME Belongings:067123896}    RN SIGNATURE:  {Esignature:895797176}    CASE MANAGEMENT/SOCIAL WORK SECTION    Inpatient Status Date: ***    Readmission Risk Assessment Score:  Readmission Risk              Risk of Unplanned Readmission:        28           Discharging to Facility/ Agency   · Name:   · Address:  · Phone:  · Fax:    Dialysis Facility (if applicable)   · Name:  · Address:  · Dialysis Schedule:  · Phone:  · Fax:    / signature: {Esignature:111983391}    PHYSICIAN SECTION    Prognosis: {Prognosis:6338795696}    Condition at Discharge: 50Lalitha Jackson Patient Condition:035521346}    Rehab Potential (if transferring to Rehab): {Prognosis:1120346732}    Recommended Labs or Other Treatments After Discharge: ***    Physician Certification: I certify the above information and transfer of Ulster Silvia  is necessary for the continuing treatment of the diagnosis listed and that he requires {Admit to Appropriate Level of Care:36366} for {GREATER/LESS:661985752} 30 days.      Update Admission H&P: {CHP DME Changes in [facility]:603019761}    PHYSICIAN SIGNATURE:  {Esignature:007231538}

## 2020-03-25 NOTE — BH NOTE
Patient given tobacco quitline number 78472832833 at this time, refusing to call at this time, states \" I just dont want to quit now\"- patient given information as to the dangers of long term tobacco use. Continue to reinforce the importance of tobacco cessation.

## 2020-03-26 ASSESSMENT — ENCOUNTER SYMPTOMS
NAUSEA: 0
SHORTNESS OF BREATH: 0
VOMITING: 0
SORE THROAT: 0
COUGH: 0
ABDOMINAL PAIN: 0

## 2020-03-26 NOTE — CARE COORDINATION
Name: Baldomero Barry    : 1990    Discharge Date: 3/25/20    Primary Auth/Cert #: [id number]    Discharged to home     Discharge Medications:      Medication List      CONTINUE taking these medications    Melatonin CR 3 MG Tbcr  Take 3 mg by mouth nightly as needed (sleep)  Notes to patient:  As needed sleep aid        STOP taking these medications    ARIPiprazole 10 MG tablet  Commonly known as:  ABILIFY     diphenhydrAMINE 25 MG tablet  Commonly known as:  BENADRYL     Lexapro 10 MG tablet  Generic drug:  escitalopram     traZODone 100 MG tablet  Commonly known as:  DESYREL            Follow Up Appointment: Aqmaryann[facility]inraymond [address] [place], [place] [address] [address] [address]  [phone]    Go on 4/3/2020  1:00pm with Jenniffer Whitney for medication follow up    [address] [place], [place] [address]Yanely Ambrocio  [facility] Marty Ray  Go on 3/30/2020  12pm (they will contact you by phone)

## 2020-03-26 NOTE — ED PROVIDER NOTES
[address] [facility] ED  Emergency Department Encounter  Emergency Medicine Attending Note     Pt Name: Yrn Burnett  MRN: [id number]  Denzelgfnavid 1990   Date of evaluation: 3/23/2020  PCP:  No primary care provider on file. CHIEF COMPLAINT       Chief Complaint   Patient presents with    Mental Health Problem       HISTORY OF PRESENT ILLNESS  (Location/Symptom, Timing/Onset, Context/Setting, Quality, Duration, Modifying Factors, Severity.)      Yrn Burnett is a 26 y.o. male who presents with depression and suicidal ideations. He states that he has been having intermittent suicidal thoughts where he wants to overdose on pills. Has fear that he will actually act on this and that is why he is here. Has had some hallucinations of \"shadows\" and hearing voices htat he cannot make out. No homidical ideations. No medical complaints at this time. No alcohol or drug use today. PAST MEDICAL / SURGICAL / SOCIAL / FAMILY HISTORY     Past Medical History:  has a past medical history of ADHD (attention deficit hyperactivity disorder), Alcoholism ([facility] Anxiety, Bipolar 1 disorder ([facility] PTSD (post-traumatic stress disorder), and Suicide attempt ([facility] Past Surgical History:  has no past surgical history on file. Allergies:  Advil [ibuprofen]     Home Meds:   Prior to Visit Medications    Medication Sig Taking? Authorizing Provider   melatonin ER 3 MG TBCR Take 3 mg by mouth nightly as needed (sleep) Yes Bridgette Ast, APRN - CNP     Please note that medications prescribed at discharge will auto-populate into this medication list when note is refreshed. Please look at prescription date andprescriber to clarify. Family History:family history includes Alcohol Abuse in his brother; Liver Cancer in his brother; No Known Problems in his father and mother. Social History: He reports that he has quit smoking. His smoking use included cigarettes. He has a 7.00 pack-year smoking history. ULTRASOUND:  Not clinically indicated at this time. ED COURSE      ED Medication Orders (From admission, onward)    None          EMERGENCYDEPARTMENT COURSE:    Admitted     PROCEDURES:  None     CONSULTS:  IP CONSULT TO HISTORY AND PHYSICAL    CRITICAL CARE:  None     FINAL IMPRESSION      1. Severe episode of recurrent major depressive disorder, without psychotic features ([facility]          DISPOSITION / [address] Admitted 03/23/2020 05:02:46 PM      PATIENT REFERRED TO:  [address] [place] [address].    [facility] [address]  [phone]    Go on 4/3/2020  1:00pm with Wash Delay for medication follow up    [address] [place], [place] [address] [phone]  Go on 3/30/2020  12pm (they will contact you by phone)      DISCHARGE MEDICATIONS:  Discharge Medication List as of 3/25/2020  1:34 PM          Anjali Olmstead MD  Emergency Medicine Attending      (Please note that portions of this note were completed with a voice recognition program.  Efforts were made to edit the dictations but occasionallywords are mis-transcribed.)          Anjali Olmstead MD  03/26/20 4214

## 2020-04-02 ENCOUNTER — HOSPITAL ENCOUNTER (EMERGENCY)
Age: 30
Discharge: HOME | End: 2020-04-03
Attending: EMERGENCY MEDICINE
Payer: COMMERCIAL

## 2020-04-02 ENCOUNTER — APPOINTMENT (OUTPATIENT)
Dept: GENERAL RADIOLOGY | Age: 30
End: 2020-04-02
Payer: COMMERCIAL

## 2020-04-02 LAB
ACETAMINOPHEN LEVEL: <5 UG/ML (ref 10–30)
ACETAMINOPHEN LEVEL: <5 UG/ML (ref 10–30)
ALBUMIN SERPL-MCNC: 4.7 G/DL (ref 3.5–5.2)
ALBUMIN/GLOBULIN RATIO: 1.5 (ref 1–2.5)
ALP BLD-CCNC: 57 U/L (ref 40–129)
ALT SERPL-CCNC: 31 U/L (ref 5–41)
ANION GAP SERPL CALCULATED.3IONS-SCNC: 15 MMOL/L (ref 9–17)
AST SERPL-CCNC: 70 U/L
BILIRUB SERPL-MCNC: 0.78 MG/DL (ref 0.3–1.2)
BUN BLDV-MCNC: 21 MG/DL (ref 6–20)
BUN/CREAT BLD: ABNORMAL (ref 9–20)
CALCIUM SERPL-MCNC: 9.2 MG/DL (ref 8.6–10.4)
CHLORIDE BLD-SCNC: 99 MMOL/L (ref 98–107)
CO2: 23 MMOL/L (ref 20–31)
CREAT SERPL-MCNC: 0.92 MG/DL (ref 0.7–1.2)
ETHANOL PERCENT: <0.01 %
ETHANOL PERCENT: <0.01 %
ETHANOL: <10 MG/DL
ETHANOL: <10 MG/DL
GFR AFRICAN AMERICAN: >60 ML/MIN
GFR NON-AFRICAN AMERICAN: >60 ML/MIN
GFR SERPL CREATININE-BSD FRML MDRD: ABNORMAL ML/MIN/{1.73_M2}
GFR SERPL CREATININE-BSD FRML MDRD: ABNORMAL ML/MIN/{1.73_M2}
GLUCOSE BLD-MCNC: 125 MG/DL (ref 70–99)
POTASSIUM SERPL-SCNC: 4.2 MMOL/L (ref 3.7–5.3)
SALICYLATE LEVEL: <1 MG/DL (ref 3–10)
SALICYLATE LEVEL: <1 MG/DL (ref 3–10)
SERUM OSMOLALITY: 296 MOSM/KG (ref 275–295)
SODIUM BLD-SCNC: 137 MMOL/L (ref 135–144)
TOTAL PROTEIN: 7.8 G/DL (ref 6.4–8.3)
TOXIC TRICYCLIC SC,BLOOD: NEGATIVE
TOXIC TRICYCLIC SC,BLOOD: NEGATIVE

## 2020-04-02 PROCEDURE — 80307 DRUG TEST PRSMV CHEM ANLYZR: CPT

## 2020-04-02 PROCEDURE — 93005 ELECTROCARDIOGRAM TRACING: CPT | Performed by: STUDENT IN AN ORGANIZED HEALTH CARE EDUCATION/TRAINING PROGRAM

## 2020-04-02 PROCEDURE — 80053 COMPREHEN METABOLIC PANEL: CPT

## 2020-04-02 PROCEDURE — G0480 DRUG TEST DEF 1-7 CLASSES: HCPCS

## 2020-04-02 PROCEDURE — 2580000003 HC RX 258: Performed by: STUDENT IN AN ORGANIZED HEALTH CARE EDUCATION/TRAINING PROGRAM

## 2020-04-02 PROCEDURE — 74018 RADEX ABDOMEN 1 VIEW: CPT

## 2020-04-02 PROCEDURE — 99284 EMERGENCY DEPT VISIT MOD MDM: CPT

## 2020-04-02 PROCEDURE — 83930 ASSAY OF BLOOD OSMOLALITY: CPT

## 2020-04-02 RX ORDER — 0.9 % SODIUM CHLORIDE 0.9 %
1000 INTRAVENOUS SOLUTION INTRAVENOUS ONCE
Status: COMPLETED | OUTPATIENT
Start: 2020-04-02 | End: 2020-04-02

## 2020-04-02 RX ADMIN — SODIUM CHLORIDE 1000 ML: 9 INJECTION, SOLUTION INTRAVENOUS at 20:35

## 2020-04-02 RX ADMIN — SODIUM CHLORIDE 1000 ML: 9 INJECTION, SOLUTION INTRAVENOUS at 22:20

## 2020-04-02 ASSESSMENT — ENCOUNTER SYMPTOMS
NAUSEA: 0
PHOTOPHOBIA: 0
SORE THROAT: 0
DIARRHEA: 0
BACK PAIN: 0
EYE ITCHING: 0
SHORTNESS OF BREATH: 0
VOMITING: 0
RHINORRHEA: 0
ABDOMINAL PAIN: 0
COUGH: 0

## 2020-04-03 VITALS
WEIGHT: 195 LBS | DIASTOLIC BLOOD PRESSURE: 72 MMHG | SYSTOLIC BLOOD PRESSURE: 109 MMHG | BODY MASS INDEX: 25.84 KG/M2 | RESPIRATION RATE: 14 BRPM | HEIGHT: 73 IN | HEART RATE: 64 BPM | OXYGEN SATURATION: 99 % | TEMPERATURE: 98.2 F

## 2020-04-03 LAB
-: NORMAL
AMORPHOUS: NORMAL
AMPHETAMINE SCREEN URINE: NEGATIVE
BACTERIA: NORMAL
BARBITURATE SCREEN URINE: NEGATIVE
BENZODIAZEPINE SCREEN, URINE: NEGATIVE
BILIRUBIN URINE: NEGATIVE
BUPRENORPHINE URINE: ABNORMAL
CANNABINOID SCREEN URINE: NEGATIVE
CASTS UA: NORMAL /LPF (ref 0–8)
COCAINE METABOLITE, URINE: POSITIVE
COLOR: ABNORMAL
COMMENT UA: ABNORMAL
CRYSTALS, UA: NORMAL /HPF
EPITHELIAL CELLS UA: NORMAL /HPF (ref 0–5)
GLUCOSE URINE: NEGATIVE
KETONES, URINE: ABNORMAL
LEUKOCYTE ESTERASE, URINE: NEGATIVE
MDMA URINE: ABNORMAL
METHADONE SCREEN, URINE: NEGATIVE
METHAMPHETAMINE, URINE: ABNORMAL
MUCUS: NORMAL
NITRITE, URINE: NEGATIVE
OPIATES, URINE: NEGATIVE
OTHER OBSERVATIONS UA: NORMAL
OXYCODONE SCREEN URINE: NEGATIVE
PH UA: 5.5 (ref 5–8)
PHENCYCLIDINE, URINE: NEGATIVE
PROPOXYPHENE, URINE: ABNORMAL
PROTEIN UA: ABNORMAL
RBC UA: NORMAL /HPF (ref 0–4)
RENAL EPITHELIAL, UA: NORMAL /HPF
SPECIFIC GRAVITY UA: 1.04 (ref 1–1.03)
TEST INFORMATION: ABNORMAL
TRICHOMONAS: NORMAL
TRICYCLIC ANTIDEPRESSANTS, UR: ABNORMAL
TURBIDITY: CLEAR
URINE HGB: NEGATIVE
UROBILINOGEN, URINE: NORMAL
WBC UA: NORMAL /HPF (ref 0–5)
YEAST: NORMAL

## 2020-04-03 PROCEDURE — 81001 URINALYSIS AUTO W/SCOPE: CPT

## 2020-04-03 PROCEDURE — 80307 DRUG TEST PRSMV CHEM ANLYZR: CPT

## 2020-04-03 NOTE — ED NOTES
Dr. Bibiana Coleman at bedside to evaluate patient     Cris Cain RN  04/02/20 2017
Patient admitted to Dr. Quique Weinberg that he took an entire bottle of NyQuil 3 hours ago  Tox screen to be redrawn at the 4 hour petey     Kate Ceron RN  04/02/20 2034
Patient moved from room 28 to [facility] per Dr. Michaelle Gosselin request     Nina Winters RN  04/02/20 0305
Patient to ED via self ambulatory to room 28  Patient here with c/o overdose of medications  Patient states he took around 12 pills today of Melatonin and Zyprexa but is unsure of how many of each. Patient states he wasn't trying to harm himself and has no intention of doing so but does have a hx of depression and suicidal attempt. Patient states he is prescribed the zyprexa for depression and melatonin as a sleep aid.  Patient states he has been having an increasingly difficulty time falling asleep these past few weeks and believes he is becoming tolerant of his medications  Patient states he now feels nauseous and drowsy but otherwise okay  Patient states he has had alcohol yesterday and a bottle of wine today  Placed on monitor, call light provided, respirations even and non-labored  No other needs at this time       Kimberlee Christianson RN  04/02/20 2011
Security notified to come and change patient out  Patient still unable to provide a urine sample at this time  2nd tox screen collected, labeled, and sent to lab     Hira Javier RN  04/02/20 3567
Xray at bedside     Irvin Cote, RN  04/02/20 2050
[x] Flowers and plants   [x] Double check for lighters, matches, razors, any glass items etc that can be used as weapons. Person completing Checklist: Deborah Gonzales       GENERAL INFORMATION     Y  N     [x] [] Has the patient been informed that they are on a watch and what that means? [x] [] Can the patient get out of Bed without nursing assistance? [x] [] Can the patient use the restroom without nursing assistance? [] [x] Can the patient walk the halls to [place] their legs? \"   [] [x] Does the patient have metal utensils? [x] [] Have the patient's belongings been placed out of control of the patient? [x] [] Have the patient and his/her belongings been checked for contraband? [x] [] Is the patient under any visitor restrictions? If Yes, explain: Policy   [] [x] Is the patient under an alias? [facility] Name:   Authorized visitors (no more than two are to be on the list)   Name/Relationship:   Name/Relationship:    Name of Staff member that you  Received this information from?: [address] [place] [address] Description:    Sloan Padilla 30/31 male 26 y.o.  Admission weight: 195 lb (88.5 kg) Height: 6' 1\" (185.4 cm)  Race: [x]  [] Black  []   []   [] [place] [] Other  Facial Hair:  [x] Yes  [] No  If yes, please describe: Goatee  Identifying Marks (i.e. Visible tattoos, scars, etc...): Tattoo near eye and arms       Deborah Gonzales RN  04/02/20 7062

## 2020-04-03 NOTE — ED PROVIDER NOTES
PROVIDED HISTORY: Assess for radiopaque pills. Overdose. Reason for Exam: supine Acuity: Acute Type of Exam: Initial FINDINGS: Moderate colonic stool. No radiopaque foreign bodies. No dilated small bowel. Phleboliths in the pelvis. Osseous structures appear grossly intact. Moderate colonic stool. No radiopaque foreign bodies. RECENT VITALS:     Temp: 98.2 °F (36.8 °C),  Pulse: 64, Resp: 14, BP: 109/72, SpO2: 99 %    This patient is a 26 y.o. Male who presents with concern for accidental versus intentional drug overdose. Patient admits to wanting to be admitted to the hospital but denies any suicidal ideation or plan. Patient states he took melatonin, Zyprexa and cough syrup. Poison control was contacted, they recommend 6 to 8-hour watch. Patient was discharged to rescue crisis, patient is requesting rescue crisis. Patient continues to adamantly deny suicidal ideation after 6-hour wait and watch. OUTSTANDING TASKS / RECOMMENDATIONS:    1. 6-8 hr watch - per poison control  2. Social work concern      FINAL IMPRESSION:     1. Accidental drug overdose, initial encounter    2.  Accidental drug ingestion, initial encounter        DISPOSITION:         DISPOSITION:  [x]  Discharge   []  Transfer -    []  Admission -     []  Against Medical Advice   []  Eloped   FOLLOW-UP: [facility]  [address] [facility] [address] [phone]  Call   As needed     DISCHARGE MEDICATIONS: Discharge Medication List as of 4/3/2020  2:35 AM             Robert Garcia DO  Emergency Medicine Resident  [place], [place]  Resident  04/03/20 5115 [facility]  Resident  04/03/20 3949
sinus   Rate: normal  Axis: normal  Ectopy: none  Conduction: normal  ST Segments: no acute change  T Waves: no acute change  Q Waves: none    Clinical Impression: When compared to EKG dated 3/9/2020, no acute changes and non-specific EKG    Normal Interval Reference:  P-wave <110 ms  -200 ms  QRS <100 ms  QT <420 ms  QTc 330-470 ms    All EKG's are interpreted by the Emergency Department Physician who either signs or Co-signsthis chart in the absence of a cardiologist.    EMERGENCY DEPARTMENT COURSE:    ED Course as of Apr 03 0042   Thu Apr 02, 2020 2104 Discussed with poison control. Recommend an observation period of 6 to 8 hours given possible Zyprexa use. We will continue to monitor.    [TC]   Fri Apr 03, 2020 0041 Recheck patient who remains asymptomatic. Patient did actually have a bottle of cough medication on him which was actually doxylamine and dextromethorphan. Discussed with [facility] again, who recommend patient is safe to be cleared medically at 6-hour petey as long as he remains asymptomatic. [TC]      ED Course User Index  [TC] Vijay Enriquez DO     Patient evaluated by attending physician and myself. Patient presenting with intentional ingestion of Zyprexa, melatonin, as well as dextromethorphan and doxylamine. He states he took this and attention to sleep is had some insomnia recently. He denies suicidal ideation to me, but he does want to be admitted so his \"medications can be adjusted \". On exam he is nontoxic-appearing no acute distress. Vitals unremarkable. Heart regular rate and rhythm, lungs are clear to auscultation bilaterally R[place] soft nontender. No nystagmus noted. He is moving all extremities appropriately and is appropriate alert and oriented. Will obtain EKG, tox screen, basic labs, urine drug screen, urinalysis. Patient remained symptomatic at 4-hour petey. Tylenol levels are undetectable.   Discussed with poison control who recommends an

## 2020-04-04 LAB
EKG ATRIAL RATE: 74 BPM
EKG P AXIS: 66 DEGREES
EKG P-R INTERVAL: 146 MS
EKG Q-T INTERVAL: 398 MS
EKG QRS DURATION: 84 MS
EKG QTC CALCULATION (BAZETT): 441 MS
EKG R AXIS: 79 DEGREES
EKG T AXIS: 55 DEGREES
EKG VENTRICULAR RATE: 74 BPM

## 2020-04-04 PROCEDURE — 93010 ELECTROCARDIOGRAM REPORT: CPT | Performed by: INTERNAL MEDICINE

## 2020-04-06 ENCOUNTER — HOSPITAL ENCOUNTER (EMERGENCY)
Age: 30
Discharge: HOME | End: 2020-04-07
Attending: EMERGENCY MEDICINE
Payer: COMMERCIAL

## 2020-04-06 PROCEDURE — 99285 EMERGENCY DEPT VISIT HI MDM: CPT

## 2020-04-06 RX ORDER — LORAZEPAM 1 MG/1
1 TABLET ORAL EVERY 6 HOURS PRN
Status: ON HOLD | COMMUNITY
End: 2020-05-13 | Stop reason: HOSPADM

## 2020-04-07 VITALS
DIASTOLIC BLOOD PRESSURE: 68 MMHG | HEART RATE: 77 BPM | RESPIRATION RATE: 18 BRPM | HEIGHT: 72 IN | TEMPERATURE: 97 F | BODY MASS INDEX: 26.41 KG/M2 | SYSTOLIC BLOOD PRESSURE: 105 MMHG | WEIGHT: 195 LBS | OXYGEN SATURATION: 98 %

## 2020-04-07 NOTE — ED NOTES
[] [place]    [] [facility]    [x]  [facility] ASSESSMENT      Y  N     [x] [] In the past two weeks have you had thoughts of hurting yourself in any way? [x] [] In the past two weeks have you had thoughts that you would be better off dead? [] [x] Have you made a suicide attempt in the past two months? [x] [] Do you have a plan for hurting yourself or suicide? [] [x] Presence of hallucinations/voices related to hurting himself or herself or someone else. SUICIDE/SECURITY WATCH PRECAUTION CHECKLIST     Orders    [x]  Suicide/Security Watch Precautions initiated as checked below:   4/6/20 11:18 PM EDT 24/24    [x] Notified physician:  Coreen Chew MD  4/6/20 11:18 PM EDT    [x] Orders obtained as appropriate:     [x] 1:1 Observer     [] Psych Consult     [] Psych Consult    Name:  Date:  Time:    [x] 1:1 Observer, Notified by:  Alejandro Gan Nurse Supervisor    [x] Remove all personal clothes from room and place in snap/paper gown/pants. Slipper only    [x] Remove all personal belongings from room and secured away from patient. Documentation    [x] Initiate Suicide/Security Watch Precaution Flow Sheet    [x] Initiate individualized Care Plan/Problem    [x] Document why precautions initiated on flow sheet (Initiate Nursing Care Plan/Problem)    [x] 1:1 Observer in place; instructions provided. Suicide precautions require observer be within arms length. [x] Nurse-Observer Communication Hand-off initiated by RN, reviewed with Observer. Subsequently used as Hand Off between Observers. [x] Initiate every 15 minute observations per observer as delegated by the RN.     [x] Initiate RN assessment and documentation    Environmental Scan  Search Criteria and Process: OPTIONAL, see Search Policy    [] Reason for search:    [] Nursing in presence of second person to search patient    [] Patient notified of reason for body assessment and belongings search:     Persons present during search:   Results of search and disposition:       Searchers Name: security    These items or items similar should be removed from the room:   [] Chairs   [x] Telephone   [x] Trash cans and liners   [x] Plastic utensils (order Patient Safety tray)   [x] Empty or remove Sharps containers   [x] All personal clothing/belongings removed   [x] All unnecessary lead wires, electrical cords, draw cords, etc.   [x] Flowers and plants   [x] Double check for lighters, matches, razors, any glass items etc that can be used as weapons. Person completing Checklist: Christal Brunner       GENERAL INFORMATION     Y  N     [x] [] Has the patient been informed that they are on a watch and what that means? [x] [] Can the patient get out of Bed without nursing assistance? [x] [] Can the patient use the restroom without nursing assistance? [x] [] Can the patient walk the halls to [place] their legs? \"   [x] [] Does the patient have metal utensils? [x] [] Have the patient's belongings been placed out of control of the patient? [x] [] Have the patient and his/her belongings been checked for contraband? [x] [] Is the patient under any visitor restrictions? If Yes, explain:   [] [x] Is the patient under an alias? [facility] Name:   Authorized visitors (no more than two are to be on the list)   Name/Relationship:   Name/Relationship:    Name of Staff member that you  Received this information from?: security    General Description:    Beto Daly 19/21 male 26 y.o. Admission weight: 195 lb (88.5 kg) Height: 6' (182.9 cm)  Race: [x]  [] Black  []   []   [] [place] [] Other  Facial Hair:  [x] Yes  [] No  If yes, please describe: Identifying Marks (i.e. Visible tattoos, scars, etc... ):     NURSING CARE PLAN    Nursing Diagnosis: Risk of Self Directed Harm  [x] Actual  [] Potential  Date Started: 4/6/20      Etiological Factors: (related to)  [x] Expressed or implied

## 2020-04-07 NOTE — ED PROVIDER NOTES
[facility] ED  Emergency Department Encounter  EmergencyMedicine Resident     Pt Name:Girish Tang  MRN: [id number]  [place] 1990  Date of evaluation: 4/6/20  PCP:  No primary care provider on file. CHIEF COMPLAINT       Chief Complaint   Patient presents with    Alcohol Intoxication    Suicidal       HISTORY OF PRESENT ILLNESS  (Location/Symptom, Timing/Onset, Context/Setting, Quality, Duration, Modifying Factors, Severity.)      Lorena Evans is a 26 y.o. male who presents with suicidal ideation without attempt as well as intoxication. Patient states he drank 2 bottles of wine earlier today has been having bad thoughts about killing himself but did not overdose on any medications any other drugs and did not attempt any self-harm. Patient has history of anxiety depression and suicidal ideation drug use. He has no other complaints no abdominal pain no shortness of breath no chest pain tolerating oral intake no recent falls or loss of consciousness    States he is feeling down does not give a specific plan however states he would likely harm himself if he went home    PAST MEDICAL / SURGICAL / SOCIAL / FAMILY HISTORY      has a past medical history of ADHD (attention deficit hyperactivity disorder), Alcoholism ([facility] Anxiety, Bipolar 1 disorder ([facility] PTSD (post-traumatic stress disorder), and Suicide attempt ([facility] has no past surgical history on file.     Social History     Socioeconomic History    Marital status: Single     Spouse name: Not on file    Number of children: Not on file    Years of education: Not on file    Highest education level: Not on file   Occupational History    Not on file   Social Needs    Financial resource strain: Not on file    Food insecurity     Worry: Not on file     Inability: Not on file    Transportation needs     Medical: Not on file     Non-medical: Not on file   Tobacco Use    Smoking status: Current Some Day Smoker

## 2020-04-07 NOTE — ED NOTES
Pt presents to ED ambulatory a&o x4. Pt comes with complaints of suicidal thoughts and intoxicated tonight. Pt states he drank 2 bottles of wine, denies drug use tonight. Pt is a&oi x4. Pt states he would take all of his medications at home to kill himself. Security called to take pt to [facility]. VSS.       Shakir Gaytan RN  04/06/20 2132

## 2020-04-08 ENCOUNTER — HOSPITAL ENCOUNTER (EMERGENCY)
Age: 30
Discharge: HOME | End: 2020-04-08
Attending: EMERGENCY MEDICINE
Payer: COMMERCIAL

## 2020-04-08 VITALS
SYSTOLIC BLOOD PRESSURE: 118 MMHG | DIASTOLIC BLOOD PRESSURE: 71 MMHG | BODY MASS INDEX: 25.84 KG/M2 | HEART RATE: 81 BPM | TEMPERATURE: 98.7 F | WEIGHT: 195 LBS | OXYGEN SATURATION: 98 % | HEIGHT: 73 IN | RESPIRATION RATE: 16 BRPM

## 2020-04-08 VITALS
HEART RATE: 79 BPM | RESPIRATION RATE: 16 BRPM | BODY MASS INDEX: 26.41 KG/M2 | HEIGHT: 72 IN | TEMPERATURE: 98.2 F | OXYGEN SATURATION: 96 % | SYSTOLIC BLOOD PRESSURE: 99 MMHG | WEIGHT: 195 LBS | DIASTOLIC BLOOD PRESSURE: 64 MMHG

## 2020-04-08 LAB
ABSOLUTE EOS #: 0.06 K/UL (ref 0–0.44)
ABSOLUTE IMMATURE GRANULOCYTE: 0.02 K/UL (ref 0–0.3)
ABSOLUTE LYMPH #: 1.96 K/UL (ref 1.1–3.7)
ABSOLUTE MONO #: 0.67 K/UL (ref 0.1–1.2)
ACETAMINOPHEN LEVEL: <10 UG/ML (ref 10–30)
AMPHETAMINE SCREEN URINE: NEGATIVE
ANION GAP SERPL CALCULATED.3IONS-SCNC: 19 MMOL/L (ref 9–17)
BARBITURATE SCREEN URINE: NEGATIVE
BASOPHILS # BLD: 1 % (ref 0–2)
BASOPHILS ABSOLUTE: 0.06 K/UL (ref 0–0.2)
BENZODIAZEPINE SCREEN, URINE: NEGATIVE
BUN BLDV-MCNC: 20 MG/DL (ref 6–20)
BUN/CREAT BLD: 20 (ref 9–20)
BUPRENORPHINE URINE: ABNORMAL
CALCIUM SERPL-MCNC: 9.1 MG/DL (ref 8.6–10.4)
CANNABINOID SCREEN URINE: NEGATIVE
CHLORIDE BLD-SCNC: 102 MMOL/L (ref 98–107)
CO2: 23 MMOL/L (ref 20–31)
COCAINE METABOLITE, URINE: POSITIVE
CREAT SERPL-MCNC: 0.99 MG/DL (ref 0.7–1.2)
DIFFERENTIAL TYPE: ABNORMAL
EOSINOPHILS RELATIVE PERCENT: 1 % (ref 1–4)
ETHANOL PERCENT: 0.02 %
ETHANOL: 20 MG/DL
GFR AFRICAN AMERICAN: >60 ML/MIN
GFR NON-AFRICAN AMERICAN: >60 ML/MIN
GFR SERPL CREATININE-BSD FRML MDRD: ABNORMAL ML/MIN/{1.73_M2}
GFR SERPL CREATININE-BSD FRML MDRD: ABNORMAL ML/MIN/{1.73_M2}
GLUCOSE BLD-MCNC: 89 MG/DL (ref 70–99)
HCT VFR BLD CALC: 42.1 % (ref 40.7–50.3)
HEMOGLOBIN: 14.2 G/DL (ref 13–17)
IMMATURE GRANULOCYTES: 0 %
LYMPHOCYTES # BLD: 24 % (ref 24–43)
MCH RBC QN AUTO: 31.2 PG (ref 25.2–33.5)
MCHC RBC AUTO-ENTMCNC: 33.7 G/DL (ref 28.4–34.8)
MCV RBC AUTO: 92.5 FL (ref 82.6–102.9)
MDMA URINE: ABNORMAL
METHADONE SCREEN, URINE: NEGATIVE
METHAMPHETAMINE, URINE: ABNORMAL
MONOCYTES # BLD: 8 % (ref 3–12)
NRBC AUTOMATED: 0 PER 100 WBC
OPIATES, URINE: NEGATIVE
OXYCODONE SCREEN URINE: NEGATIVE
PDW BLD-RTO: 13.7 % (ref 11.8–14.4)
PHENCYCLIDINE, URINE: NEGATIVE
PLATELET # BLD: 234 K/UL (ref 138–453)
PLATELET ESTIMATE: ABNORMAL
PMV BLD AUTO: 10.7 FL (ref 8.1–13.5)
POTASSIUM SERPL-SCNC: 3.7 MMOL/L (ref 3.7–5.3)
PROPOXYPHENE, URINE: ABNORMAL
RBC # BLD: 4.55 M/UL (ref 4.21–5.77)
RBC # BLD: ABNORMAL 10*6/UL
SALICYLATE LEVEL: <1 MG/DL (ref 3–10)
SEG NEUTROPHILS: 66 % (ref 36–65)
SEGMENTED NEUTROPHILS ABSOLUTE COUNT: 5.3 K/UL (ref 1.5–8.1)
SODIUM BLD-SCNC: 144 MMOL/L (ref 135–144)
TEST INFORMATION: ABNORMAL
TOXIC TRICYCLIC SC,BLOOD: NEGATIVE
TRICYCLIC ANTIDEPRESSANTS, UR: ABNORMAL
WBC # BLD: 8.1 K/UL (ref 3.5–11.3)
WBC # BLD: ABNORMAL 10*3/UL

## 2020-04-08 PROCEDURE — G0480 DRUG TEST DEF 1-7 CLASSES: HCPCS

## 2020-04-08 PROCEDURE — 85025 COMPLETE CBC W/AUTO DIFF WBC: CPT

## 2020-04-08 PROCEDURE — 36415 COLL VENOUS BLD VENIPUNCTURE: CPT

## 2020-04-08 PROCEDURE — 99284 EMERGENCY DEPT VISIT MOD MDM: CPT

## 2020-04-08 PROCEDURE — 80048 BASIC METABOLIC PNL TOTAL CA: CPT

## 2020-04-08 PROCEDURE — 80307 DRUG TEST PRSMV CHEM ANLYZR: CPT

## 2020-04-08 ASSESSMENT — ENCOUNTER SYMPTOMS
VOMITING: 0
SHORTNESS OF BREATH: 0
COUGH: 0
DIARRHEA: 0
EYE REDNESS: 0
COLOR CHANGE: 0
SORE THROAT: 0
RHINORRHEA: 0
NAUSEA: 0
EYE DISCHARGE: 0

## 2020-04-08 NOTE — ED NOTES
[facility] calls and talks with Levar  and reviewing his paperwork and will call us back if can take him. If they do won't be til after 1500 today.      Massiel Dubose RN  04/08/20 4177

## 2020-04-08 NOTE — ED NOTES
Calls out requesting something to eat. Lunch tray ordered. Also informed about going to [facility]  Recovery at 1515.   Voices understanding and acceptance     Massiel Dubose RN  04/08/20 8999

## 2020-04-08 NOTE — ED PROVIDER NOTES
limits       EMERGENCY DEPARTMENTCOURSE:         Vitals:    Vitals:    04/08/20 0754   BP: 118/71   Pulse: 81   Resp: 16   Temp: 98.7 °F (37.1 °C)   TempSrc: Oral   SpO2: 98%   Weight: 195 lb (88.5 kg)   Height: 6' 1\" (1.854 m)       The patient was given the following medications while in the emergency department:  No orders of the defined types were placed in this encounter. CONSULTS:  None    FINAL IMPRESSION      1.  Alcohol withdrawal syndrome without complication ([facility]    2. Opioid withdrawal [facility])          DISPOSITION/PLAN   DISPOSITION Discharge - Pending Orders Complete 04/08/2020 12:30:53 PM      PATIENT REFERRED TO:  [address] [address] [phone]  Schedule an appointment as soon as possible for a visit in 2 days      DISCHARGE MEDICATIONS:  Current Discharge Medication List        Harleen Jaramillo MD  Attending Emergency Physician                    Harleen Jaramillo MD  04/08/20 [address], [place]  04/08/20 4915

## 2020-04-08 NOTE — ED PROVIDER NOTES
[address] ED  eMERGENCY dEPARTMENT eNCOUnter      Pt Name: Marcello Alejandra  MRN: [id number]  [place] 1990  Date of evaluation: 4/8/20      CHIEF COMPLAINT       Chief Complaint   Patient presents with    Alcohol Problem    Addiction Problem     HISTORY OF PRESENT ILLNESS   HPI 26 y.o. male comes to the emergency looking for help with substance abuse. Reported using alcohol amd drug abuse. No HI/SI. Pt wants to be admitted to substance abuse treatment. REVIEW OF SYSTEMS     Review of Systems  Declines to discuss    PAST MEDICAL HISTORY     Past Medical History:   Diagnosis Date    ADHD (attention deficit hyperactivity disorder)     Alcoholism ([facility]     pt drinks 6 beers and a liter of vodka daily.  Anxiety     Bipolar 1 disorder (HCC)     PTSD (post-traumatic stress disorder)     Suicide attempt ([facility]     previous suicide attempt by overdosing on pills at approximately 25 y/o       SURGICAL HISTORY     History reviewed. No pertinent surgical history. CURRENT MEDICATIONS       Previous Medications    LORAZEPAM (ATIVAN) 1 MG TABLET    Take 1 mg by mouth every 6 hours as needed for Anxiety. MELATONIN ER 3 MG TBCR    Take 3 mg by mouth nightly as needed (sleep)       ALLERGIES     is allergic to advil [ibuprofen]. FAMILY HISTORY     He indicated that his mother is alive. He indicated that his father is alive. He indicated that the status of his brother is unknown. SOCIAL HISTORY      reports that he quit smoking about a year ago. His smoking use included cigarettes. He quit after 14.00 years of use. He has never used smokeless tobacco. He reports current alcohol use. He reports current drug use. Drugs: Cocaine, Opiates , and IV.     PHYSICAL EXAM     INITIAL VITALS: BP 99/64   Pulse 79   Temp 98.2 °F (36.8 °C) (Oral)   Resp 16   Ht 6' (1.829 m)   Wt 195 lb (88.5 kg)   SpO2 96%   BMI 26.45 kg/m²   General Appearance: alert, well appearing, and in no acute

## 2020-04-08 NOTE — ED NOTES
Mode of arrival (squad #, walk in, police, etc) : pt states that he arrived by taxi        Chief complaint(s): *pt states that he wants help with drug and ETOH abuse        Arrival Note (brief scenario, treatment PTA, etc). : pt is A&Ox4, walked in under his own power, co-operative to questions, vital signs stable        C= \"Have you ever felt that you should Cut down on your drinking? \"  Yes  A= \"Have people Annoyed you by criticizing your drinking? \"  Yes  G= \"Have you ever felt bad or Guilty about your drinking? \"  Yes  E= \"Have you ever had a drink as an Eye-opener first thing in the morning to steady your nerves or to help a hangover? \"  Yes      Deferred []          *If yes to two or more: probable alcohol abuse. David Heath RN  04/08/20 8373

## 2020-04-08 NOTE — ED NOTES
[facility] and talks with . Has been approved for admission. Will call us back with time and transportation arrangements.      Jim Lynne RN  04/08/20 5588

## 2020-04-18 ENCOUNTER — HOSPITAL ENCOUNTER (EMERGENCY)
Age: 30
Discharge: HOME | End: 2020-04-18
Attending: EMERGENCY MEDICINE
Payer: COMMERCIAL

## 2020-04-18 VITALS
SYSTOLIC BLOOD PRESSURE: 121 MMHG | WEIGHT: 201.6 LBS | RESPIRATION RATE: 18 BRPM | DIASTOLIC BLOOD PRESSURE: 78 MMHG | HEIGHT: 72 IN | OXYGEN SATURATION: 100 % | TEMPERATURE: 97.9 F | HEART RATE: 76 BPM | BODY MASS INDEX: 27.3 KG/M2

## 2020-04-18 LAB
ABSOLUTE EOS #: 0.06 K/UL (ref 0–0.44)
ABSOLUTE IMMATURE GRANULOCYTE: 0.02 K/UL (ref 0–0.3)
ABSOLUTE LYMPH #: 1.31 K/UL (ref 1.1–3.7)
ABSOLUTE MONO #: 0.53 K/UL (ref 0.1–1.2)
ANION GAP SERPL CALCULATED.3IONS-SCNC: 14 MMOL/L (ref 9–17)
BASOPHILS # BLD: 1 % (ref 0–2)
BASOPHILS ABSOLUTE: 0.05 K/UL (ref 0–0.2)
BUN BLDV-MCNC: 19 MG/DL (ref 6–20)
BUN/CREAT BLD: 19 (ref 9–20)
CALCIUM SERPL-MCNC: 9.3 MG/DL (ref 8.6–10.4)
CHLORIDE BLD-SCNC: 100 MMOL/L (ref 98–107)
CO2: 23 MMOL/L (ref 20–31)
CREAT SERPL-MCNC: 1.02 MG/DL (ref 0.7–1.2)
DIFFERENTIAL TYPE: ABNORMAL
EOSINOPHILS RELATIVE PERCENT: 1 % (ref 1–4)
GFR AFRICAN AMERICAN: >60 ML/MIN
GFR NON-AFRICAN AMERICAN: >60 ML/MIN
GFR SERPL CREATININE-BSD FRML MDRD: ABNORMAL ML/MIN/{1.73_M2}
GFR SERPL CREATININE-BSD FRML MDRD: ABNORMAL ML/MIN/{1.73_M2}
GLUCOSE BLD-MCNC: 121 MG/DL (ref 70–99)
HCT VFR BLD CALC: 43.4 % (ref 40.7–50.3)
HEMOGLOBIN: 14.8 G/DL (ref 13–17)
IMMATURE GRANULOCYTES: 0 %
LYMPHOCYTES # BLD: 19 % (ref 24–43)
MCH RBC QN AUTO: 31.6 PG (ref 25.2–33.5)
MCHC RBC AUTO-ENTMCNC: 34.1 G/DL (ref 28.4–34.8)
MCV RBC AUTO: 92.5 FL (ref 82.6–102.9)
MONOCYTES # BLD: 8 % (ref 3–12)
NRBC AUTOMATED: 0 PER 100 WBC
PDW BLD-RTO: 13.2 % (ref 11.8–14.4)
PLATELET # BLD: 216 K/UL (ref 138–453)
PLATELET ESTIMATE: ABNORMAL
PMV BLD AUTO: 10.7 FL (ref 8.1–13.5)
POTASSIUM SERPL-SCNC: 4 MMOL/L (ref 3.7–5.3)
RBC # BLD: 4.69 M/UL (ref 4.21–5.77)
RBC # BLD: ABNORMAL 10*6/UL
SEG NEUTROPHILS: 71 % (ref 36–65)
SEGMENTED NEUTROPHILS ABSOLUTE COUNT: 5.01 K/UL (ref 1.5–8.1)
SODIUM BLD-SCNC: 137 MMOL/L (ref 135–144)
WBC # BLD: 7 K/UL (ref 3.5–11.3)
WBC # BLD: ABNORMAL 10*3/UL

## 2020-04-18 PROCEDURE — 80048 BASIC METABOLIC PNL TOTAL CA: CPT

## 2020-04-18 PROCEDURE — 99284 EMERGENCY DEPT VISIT MOD MDM: CPT

## 2020-04-18 PROCEDURE — 85025 COMPLETE CBC W/AUTO DIFF WBC: CPT

## 2020-04-18 PROCEDURE — 36415 COLL VENOUS BLD VENIPUNCTURE: CPT

## 2020-04-18 ASSESSMENT — ENCOUNTER SYMPTOMS
ABDOMINAL PAIN: 0
FACIAL SWELLING: 0
VOMITING: 0
EYE DISCHARGE: 0
EYE REDNESS: 0
COLOR CHANGE: 0
SHORTNESS OF BREATH: 0
BLOOD IN STOOL: 1
CONSTIPATION: 0
COUGH: 0
DIARRHEA: 0

## 2020-04-18 ASSESSMENT — PAIN SCALES - GENERAL: PAINLEVEL_OUTOF10: 6

## 2020-04-18 ASSESSMENT — PAIN DESCRIPTION - FREQUENCY: FREQUENCY: CONTINUOUS

## 2020-04-18 ASSESSMENT — PAIN DESCRIPTION - DESCRIPTORS: DESCRIPTORS: CRAMPING

## 2020-04-18 ASSESSMENT — PAIN DESCRIPTION - LOCATION: LOCATION: ABDOMEN

## 2020-04-18 NOTE — ED PROVIDER NOTES
cigarettes. He quit after 14.00 years of use. He has never used smokeless tobacco. He reports current alcohol use. He reports current drug use. Drugs: Cocaine, Opiates , and IV. REVIEW OF SYSTEMS    (2-9 systems for level 4, 10 or more for level 5)     Review of Systems   Constitutional: Negative for chills, fatigue and fever. HENT: Negative for congestion, ear discharge and facial swelling. Eyes: Negative for discharge and redness. Respiratory: Negative for cough and shortness of breath. Cardiovascular: Negative for chest pain. Gastrointestinal: Positive for blood in stool. Negative for abdominal pain, constipation, diarrhea and vomiting. Genitourinary: Negative for dysuria and hematuria. Musculoskeletal: Negative for arthralgias. Skin: Negative for color change and rash. Neurological: Negative for syncope, numbness and headaches. Hematological: Negative for adenopathy. Psychiatric/Behavioral: Negative for confusion. The patient is nervous/anxious. Except as noted above the remainder of the review of systems was reviewed and negative. PHYSICAL EXAM    (up to 7 for level 4, 8 or more for level 5)     Vitals:    04/18/20 0911   BP: 119/75   Pulse: 81   Resp: 18   Temp: 97.9 °F (36.6 °C)   TempSrc: Oral   SpO2: 97%   Weight: 201 lb 9.6 oz (91.4 kg)   Height: 6' (1.829 m)       Physical Exam  Vitals signs reviewed. Constitutional:       General: He is not in acute distress. Appearance: He is well-developed. He is not diaphoretic. HENT:      Head: Normocephalic and atraumatic. Eyes:      General: No scleral icterus. Right eye: No discharge. Left eye: No discharge. Neck:      Musculoskeletal: Neck supple. Cardiovascular:      Rate and Rhythm: Normal rate and regular rhythm. Pulmonary:      Effort: Pulmonary effort is normal. No respiratory distress. Breath sounds: Normal breath sounds. No stridor. No wheezing or rales.    Abdominal:      General: patient. CONSULTS:  None    PROCEDURES:  None    FINAL IMPRESSION      1.  Diarrhea, unspecified type          DISPOSITION/PLAN   DISPOSITION Decision To Discharge 04/18/2020 10:08:18 AM      PATIENT REFERRED TO:   [facility] ED  [address] [facility]  [phone]    If symptoms worsen      DISCHARGE MEDICATIONS:     New Prescriptions    No medications on file         (Please note that portions of this note were completed with a voice recognition program.  Efforts were made to edit the dictations but occasionally words are mis-transcribed.)    Felicitas Miles MD  Attending Emergency Physician            Felicitas Miles MD  04/18/20 1013

## 2020-05-09 ENCOUNTER — HOSPITAL ENCOUNTER (INPATIENT)
Age: 30
LOS: 4 days | Discharge: HOME | DRG: 750 | End: 2020-05-13
Attending: PSYCHIATRY & NEUROLOGY | Admitting: PSYCHIATRY & NEUROLOGY
Payer: COMMERCIAL

## 2020-05-09 PROCEDURE — 1240000000 HC EMOTIONAL WELLNESS R&B

## 2020-05-09 RX ORDER — HALOPERIDOL 5 MG/ML
5 INJECTION INTRAMUSCULAR EVERY 6 HOURS PRN
Status: DISCONTINUED | OUTPATIENT
Start: 2020-05-09 | End: 2020-05-13 | Stop reason: HOSPADM

## 2020-05-09 RX ORDER — HYDROXYZINE HYDROCHLORIDE 25 MG/1
25 TABLET, FILM COATED ORAL 3 TIMES DAILY PRN
Status: DISCONTINUED | OUTPATIENT
Start: 2020-05-09 | End: 2020-05-13 | Stop reason: HOSPADM

## 2020-05-09 RX ORDER — DIPHENHYDRAMINE HYDROCHLORIDE 50 MG/ML
50 INJECTION INTRAMUSCULAR; INTRAVENOUS EVERY 6 HOURS PRN
Status: DISCONTINUED | OUTPATIENT
Start: 2020-05-09 | End: 2020-05-13 | Stop reason: HOSPADM

## 2020-05-09 RX ORDER — TRAZODONE HYDROCHLORIDE 50 MG/1
50 TABLET ORAL NIGHTLY PRN
Status: DISCONTINUED | OUTPATIENT
Start: 2020-05-10 | End: 2020-05-13 | Stop reason: HOSPADM

## 2020-05-09 RX ORDER — MAGNESIUM HYDROXIDE/ALUMINUM HYDROXICE/SIMETHICONE 120; 1200; 1200 MG/30ML; MG/30ML; MG/30ML
30 SUSPENSION ORAL EVERY 6 HOURS PRN
Status: DISCONTINUED | OUTPATIENT
Start: 2020-05-09 | End: 2020-05-13 | Stop reason: HOSPADM

## 2020-05-09 RX ORDER — ACETAMINOPHEN 325 MG/1
650 TABLET ORAL EVERY 4 HOURS PRN
Status: DISCONTINUED | OUTPATIENT
Start: 2020-05-09 | End: 2020-05-13 | Stop reason: HOSPADM

## 2020-05-09 ASSESSMENT — LIFESTYLE VARIABLES: HISTORY_ALCOHOL_USE: YES

## 2020-05-09 ASSESSMENT — SLEEP AND FATIGUE QUESTIONNAIRES
AVERAGE NUMBER OF SLEEP HOURS: 6
DO YOU HAVE DIFFICULTY SLEEPING: NO
DIFFICULTY ARISING: NO
DIFFICULTY STAYING ASLEEP: NO
DIFFICULTY FALLING ASLEEP: NO
DO YOU USE A SLEEP AID: YES
RESTFUL SLEEP: YES
SLEEP PATTERN: NORMAL

## 2020-05-09 ASSESSMENT — PAIN SCALES - GENERAL: PAINLEVEL_OUTOF10: 0

## 2020-05-10 PROCEDURE — 1240000000 HC EMOTIONAL WELLNESS R&B

## 2020-05-10 PROCEDURE — 90792 PSYCH DIAG EVAL W/MED SRVCS: CPT | Performed by: NURSE PRACTITIONER

## 2020-05-10 PROCEDURE — 6370000000 HC RX 637 (ALT 250 FOR IP): Performed by: NURSE PRACTITIONER

## 2020-05-10 RX ORDER — OLANZAPINE 15 MG/1
15 TABLET ORAL NIGHTLY
Status: DISCONTINUED | OUTPATIENT
Start: 2020-05-10 | End: 2020-05-13 | Stop reason: HOSPADM

## 2020-05-10 RX ORDER — QUETIAPINE FUMARATE 200 MG/1
200 TABLET, FILM COATED ORAL NIGHTLY
Status: ON HOLD | COMMUNITY
End: 2020-05-13 | Stop reason: HOSPADM

## 2020-05-10 RX ORDER — ARIPIPRAZOLE 10 MG/1
10 TABLET ORAL DAILY
Status: ON HOLD | COMMUNITY
End: 2020-05-13 | Stop reason: HOSPADM

## 2020-05-10 RX ORDER — TRAZODONE HYDROCHLORIDE 100 MG/1
100 TABLET ORAL NIGHTLY
Status: ON HOLD | COMMUNITY
End: 2020-05-13 | Stop reason: HOSPADM

## 2020-05-10 RX ORDER — ESCITALOPRAM OXALATE 10 MG/1
10 TABLET ORAL DAILY
Status: ON HOLD | COMMUNITY
End: 2020-05-13 | Stop reason: HOSPADM

## 2020-05-10 RX ADMIN — Medication 1 MG: at 20:44

## 2020-05-10 RX ADMIN — TRAZODONE HYDROCHLORIDE 50 MG: 50 TABLET ORAL at 20:44

## 2020-05-10 RX ADMIN — HYDROXYZINE HYDROCHLORIDE 25 MG: 25 TABLET, FILM COATED ORAL at 20:44

## 2020-05-10 ASSESSMENT — PAIN SCALES - GENERAL
PAINLEVEL_OUTOF10: 0
PAINLEVEL_OUTOF10: 0

## 2020-05-10 ASSESSMENT — LIFESTYLE VARIABLES: HISTORY_ALCOHOL_USE: YES

## 2020-05-10 NOTE — CARE COORDINATION
days ago for drinking and using cocaine. Patient reports he last used both substances 5/8/2020, \"one bottle of wine and 1/2 gram cocaine\". Patient reports he is interested in inpatient [facility] treatment and was accepting of referral to   to discuss and coordinate treatment. Patient has history of sucidial ideations, depression, and anxiety. Patient has history of psychiatric hospitalizations, most recent [facility] admission 3/23/2020. Patient reports he was most recently linked to [address] [place] [address] and has been non-compliant with psychiatric medication. Patient reports he has no income at this time and has a home in [place] but not wish to return upon hospital discharge. Patient has [employer]. Patient reports he has no family support and little positive peer support at this time. Patient denies history of and/ or current abuse of any kind.

## 2020-05-10 NOTE — BH NOTE
Patient Education - Tobacco Cessation      Patient given tobacco quitline number 32229801669 at this time, refusing to call at this time, states \" I just dont want to quit now\"- patient given information as to the dangers of long term tobacco use. Continue to reinforce the importance of tobacco cessation.

## 2020-05-10 NOTE — VIRTUAL HEALTH
Psychiatric Admission Note    Patient location:  [facility], [facility]    Provider location:  Misty Lynne     Identifying Information: This is a 26 y.o. male who was admitted from [facility] on a pink slip. The patient had presented to [facility] ED with a plan to overdose on melatonin, zyprexa, and heroin. Upon admission the patient was calm and cooperative. Reported fleeting SI to nursing staff. The patient is seen via virtual health monitoring system accompanied by staff. The patient is calm, fairly cooperative. The patient is evasive with responses but this appears to be his baseline based on previous encounters with the patient. The patient states that he was at a treatment center prior to coming here. Stated he left treatment center because he started having suicidal thoughts. Reports that he does feel \"better\" today and is no longer having thoughts about wanting to end his life. The patient rates depression 8/10 (on numeric rating scale of 0 to 10 with 0 being none and 10 being the worst). The patent endorses feelings of helplessness but denies feeling hopeless or worthless. Reports no difficult with sleep onset or maintenance. States that he has had a fluctuating appetite. The patient denies auditory and visual hallucinations today. The patient is not displaying any delusions. No evidence of disorganization or navjot. The patient will remain hospitalized as the level of care necessary for safety and stabilization is greater than that which can be provided on the outpatient basis. Past Psychiatric History   Patient reports current outpatient psychiatric linkage. . Reported history of psychiatric inpatient hospitalizations. Reported history of suicide attempts. History of Substance Abuse     Reports that he was just released from a treatment center within the past 1-2 days. States that he had one drink since being released.   Also reports activity: Never   Lifestyle    Physical activity     Days per week: Not on file     Minutes per session: Not on file    Stress: Not on file   Relationships    Social connections     Talks on phone: Not on file     Gets together: Not on file     Attends Christianity service: Not on file     Active member of club or organization: Not on file     Attends meetings of clubs or organizations: Not on file     Relationship status: Not on file    Intimate partner violence     Fear of current or ex partner: Not on file     Emotionally abused: Not on file     Physically abused: Not on file     Forced sexual activity: Not on file   Other Topics Concern    Not on file   Social History Narrative    Not on file         Mental Status  Pt. was alert, fully oriented, and cooperative. Appearance and hygiene wereappropriate, well-groomed . Mood was down, apathetic. Affect was constricted Thought process was linear and well-organized. Patient denied any hallucinations or paranoia. Patient denied suicidal ideations. Patient denied homicidal ideations . Patient's gross cognitive functions were intact. Insight and judgement were poor. Both recent and remote memory were intact. Psychomotor status was slowed     Labs  No results found for this or any previous visit (from the past 72 hour(s)). Diagnostic Impression  Schizoaffective disorder, bipolar type (F25.0)        Medications    acetaminophen, [START ON 5/10/2020] traZODone, magnesium hydroxide, aluminum & magnesium hydroxide-simethicone, hydrOXYzine, haloperidol lactate **AND** diphenhydrAMINE    Treatment Plan:     Admit to inpatient psychiatric treatment   Supportive therapy with medication management. Reviewed risks and benefits as well as potential side effects with patient.  Restart olanzapine and melatonin. Titrate as necessary.    Therapeutic activities and groups   Follow up at [facility] mental health center after symptoms stabilized    Estimated length of

## 2020-05-10 NOTE — BH NOTE
`[facility]  Admission Note     Admission Type:   Admission Type: Involuntary    Reason for admission:  Reason for Admission: increased SI with depression    PATIENT STRENGTHS:  Strengths: Communication, No significant Physical Illness, Positive Support    Patient Strengths and Limitations:  Limitations: General negative or hopeless attitude about future/recovery, Multiple barriers to leisure interests    Addictive Behavior:   Addictive Behavior  In the past 3 months, have you felt or has someone told you that you have a problem with:  : None  Do you have a history of Chemical Use?: Yes  Do you have a history of Alcohol Use?: Yes  Do you have a history of Street Drug Abuse?: No  Histroy of Prescripton Drug Abuse?: No    Medical Problems:   Past Medical History:   Diagnosis Date    ADHD (attention deficit hyperactivity disorder)     [facility]     pt drinks 6 beers and a liter of vodka daily.     Anxiety     Bipolar 1 disorder (HCC)     PTSD (post-traumatic stress disorder)     Suicide attempt ([facility]     previous suicide attempt by overdosing on pills at approximately 25 y/o       Status EXAM:  Status and Exam  Normal: No  Facial Expression: Flat  Affect: Blunt  Level of Consciousness: Alert  Mood:Normal: No  Mood: Depressed, Anxious  Motor Activity:Normal: Yes  Interview Behavior: Cooperative  Preception: [place] to Person, Susie Gip to Time, [place] to Place, [place] to Situation  Attention:Normal: No  Attention: Distractible  Thought Processes: Circumstantial  Thought Content:Normal: No  Hallucinations: None  Delusions: No  Memory:Normal: No  Memory: Poor Recent  Insight and Judgment: No  Insight and Judgment: Poor Judgment, Poor Insight  Present Suicidal Ideation: Yes  Present Homicidal Ideation: No    Tobacco Screening:  Practical Counseling, on admission, petey X, if applicable and completed (first 3 are required if patient doesn't refuse):            ( )  Recognizing danger situations

## 2020-05-10 NOTE — BH NOTE
Pt's COVID-19 test done at [facility], result is negative. Test result faxed to the unit, placed on pt's chart. Pt transferred from [facility] unit Room 212 to [facility] unit Room 128-1. Pt alert and oriented, ambulatory. Pt accompanied with 2 staff. Pt's chart and  belongings were sent with patient.

## 2020-05-11 PROCEDURE — 99233 SBSQ HOSP IP/OBS HIGH 50: CPT | Performed by: PSYCHIATRY & NEUROLOGY

## 2020-05-11 PROCEDURE — 6370000000 HC RX 637 (ALT 250 FOR IP): Performed by: NURSE PRACTITIONER

## 2020-05-11 PROCEDURE — 1240000000 HC EMOTIONAL WELLNESS R&B

## 2020-05-11 RX ORDER — CHLORDIAZEPOXIDE HYDROCHLORIDE 5 MG/1
10 CAPSULE, GELATIN COATED ORAL EVERY 6 HOURS PRN
Status: DISCONTINUED | OUTPATIENT
Start: 2020-05-11 | End: 2020-05-13 | Stop reason: HOSPADM

## 2020-05-11 RX ORDER — CLONIDINE HYDROCHLORIDE 0.1 MG/1
0.1 TABLET ORAL 3 TIMES DAILY PRN
Status: DISCONTINUED | OUTPATIENT
Start: 2020-05-11 | End: 2020-05-13 | Stop reason: HOSPADM

## 2020-05-11 RX ORDER — ONDANSETRON 4 MG/1
4 TABLET, ORALLY DISINTEGRATING ORAL EVERY 8 HOURS PRN
Status: DISCONTINUED | OUTPATIENT
Start: 2020-05-11 | End: 2020-05-13 | Stop reason: HOSPADM

## 2020-05-11 RX ADMIN — HYDROXYZINE HYDROCHLORIDE 25 MG: 25 TABLET, FILM COATED ORAL at 20:54

## 2020-05-11 RX ADMIN — OLANZAPINE 15 MG: 15 TABLET, FILM COATED ORAL at 20:54

## 2020-05-11 RX ADMIN — Medication 1 MG: at 20:54

## 2020-05-11 RX ADMIN — TRAZODONE HYDROCHLORIDE 50 MG: 50 TABLET ORAL at 20:54

## 2020-05-11 ASSESSMENT — PAIN SCALES - GENERAL: PAINLEVEL_OUTOF10: 0

## 2020-05-11 NOTE — BH NOTE
Patient refused to attend relaxation group at 1600 after encouragement from staff.   1:1 talk time offered as alternative to group session

## 2020-05-11 NOTE — PROGRESS NOTES
Department of Psychiatry  Attending Physician Progress Note    Chief Complaint: Schizoaffective disorder, bipolar type ([address]     SUBJECTIVE:     The patient was feeling very depressed and hopeless about the future. But he reported feeling \"a little bit better\". He admitted using heroin every day prior to admission and complained of mild withdrawal symptoms and nausea. He reported drinking a pint of vodka every day as well. He denied having visual or auditory hallucinations at this time. Sleep, energy and appetite were impaired. He has poor motivation. The suicidal ideations are less. There was no major side effects. There is no safe alternative other than the hospital treatment at this time. OBJECTIVE    Physical  VITALS:    BP (!) 83/52   Pulse 76   Temp 98.1 °F (36.7 °C) (Oral)   Resp 14   Ht 6' (1.829 m)   Wt 195 lb (88.5 kg)   SpO2 100%   BMI 26.45 kg/m²     Mental Status Examination:    Level of consciousness:  Within normal limits  Appearance: Street clothes, seated, with good grooming  Behavior/Motor: No abnormalities noted  Attitude toward examiner:  Cooperative, attentive, good eye contact  Speech:  spontaneous, normal rate, normal volume and well articulated  Mood:  Depressed   Affect:  Mood-congruent, constricted in range. Thought processes:  linear, goal-directed and coherent  Thought content:  denies homicidal ideation  Suicidal Ideation:  less suicidal ideation  Delusions:  no evidence of delusions  Perceptual Disturbance:  No visual or auditory hallucinations. Cognition:  Intact  Memory: grossly intact.   Insight & Judgement: partial       Medications  Current Facility-Administered Medications: melatonin ER tablet 1 mg, 1 mg, Oral, Nightly  OLANZapine (ZYPREXA) tablet 15 mg, 15 mg, Oral, Nightly  acetaminophen (TYLENOL) tablet 650 mg, 650 mg, Oral, Q4H PRN  traZODone (DESYREL) tablet 50 mg, 50 mg, Oral, Nightly PRN  magnesium hydroxide (MILK OF MAGNESIA) 400 MG/5ML suspension 30 mL, 30 mL, Oral, Daily PRN  aluminum & magnesium hydroxide-simethicone (MAALOX) 200-200-20 MG/5ML suspension 30 mL, 30 mL, Oral, Q6H PRN  hydrOXYzine (ATARAX) tablet 25 mg, 25 mg, Oral, TID PRN  haloperidol lactate (HALDOL) injection 5 mg, 5 mg, Intramuscular, Q6H PRN **AND** diphenhydrAMINE (BENADRYL) injection 50 mg, 50 mg, Intramuscular, Q6H PRN    No results found for this or any previous visit (from the past 72 hour(s)). ASSESSMENT     Principal Problem:    Schizoaffective disorder, bipolar type ([facility]  Resolved Problems:    * No resolved hospital problems. *      PLAN    · Detoxification from possible alcohol and opiate withdrawal.  · Continue olanzapine. · Continue unit milieu and group psychotherapy.     Electronically Signed by Jes Hernandez MD , 5/11/2020 4:39 PM

## 2020-05-11 NOTE — GROUP NOTE
Group Therapy Note    Date: 5/11/2020    Group Start Time: 1000  Group End Time: 6001  Group Topic: Psychotherapy    [address], LSW    patient refused to attend psychotherapy group at [facility] after encouragement from staff.   1:1 talk time provided as alternative to group session

## 2020-05-12 PROCEDURE — 99232 SBSQ HOSP IP/OBS MODERATE 35: CPT | Performed by: NURSE PRACTITIONER

## 2020-05-12 PROCEDURE — 1240000000 HC EMOTIONAL WELLNESS R&B

## 2020-05-12 PROCEDURE — 6370000000 HC RX 637 (ALT 250 FOR IP): Performed by: NURSE PRACTITIONER

## 2020-05-12 RX ADMIN — OLANZAPINE 15 MG: 15 TABLET, FILM COATED ORAL at 20:45

## 2020-05-12 RX ADMIN — Medication 1 MG: at 20:45

## 2020-05-12 RX ADMIN — TRAZODONE HYDROCHLORIDE 50 MG: 50 TABLET ORAL at 20:45

## 2020-05-12 RX ADMIN — HYDROXYZINE HYDROCHLORIDE 25 MG: 25 TABLET, FILM COATED ORAL at 21:19

## 2020-05-12 NOTE — GROUP NOTE
Group Therapy Note    Date: 5/12/2020    Group Start Time: 1000  Group End Time: 5641  Group Topic: Psychotherapy    [address], LSW    patient refused to attend psychotherapy group at [facility] after encouragement from staff.   1:1 talk time provided as alternative to group session

## 2020-05-12 NOTE — BH NOTE
Patient did not attend [address] after encouragement from staff. 1:1 talk time offered as an alternative.

## 2020-05-12 NOTE — VIRTUAL HEALTH
Patient Location:  [address] [place] [address]    Provider Location ([facility]): \[facility] [place] [facility]\""    Department of Psychiatry  Nurse Practitioner Progress Note    Chief Complaint: Schizoaffective disorder, bipolar type [facility])     SUBJECTIVE:  This is a 26 y.o. male who was admitted from [facility] on a pink slip. The patient had presented to [facility] ED with a plan to overdose on melatonin, zyprexa, and heroin. Upon admission the patient was calm and cooperative. Reported fleeting SI to nursing staff. Patient refused assessment. He has a history of frequent admissions to the [facility] with poor compliance with follow-up and medication. He is selective regarding staff, does not participate in his care and is currently refusing medication. Malingering is highly suspected in this case based on inconsistency between the voiced symptoms and physical exam (incongruent affect, no thought blocking or attending to internal stimuli despite complaining of hallucinations, repeated hospitalizations and emergency visits with suicidal ideations but no history of suicidal attempts). Patient is refusing medication. Chart and medications reviewed.     OBJECTIVE    Physical  BP (!) 108/59   Pulse 75   Temp 97.9 °F (36.6 °C) (Oral)   Resp 14   Ht 6' (1.829 m)   Wt 195 lb (88.5 kg)   SpO2 100%   BMI 26.45 kg/m²      Mental Status Evaluation Per Staff Report:  Orientation: alertness: lethargic   Mood:. depressed      Affect:  flat      Appearance:  disheveled   Activity:  Restless & fidgety   Gait/Posture: Normal   Speech:  Soft, selectively mute   Thought Process:  blocked   Thought Content:  Preoccupied   Sensorium:  person, place, time/date and situation   Cognition:  impaired due to disease process   Memory: impaired recent memory and remote memory   Insight:  limited   Judgment: limited   Suicidal Ideations: GRANT   Homicidal Ideations: GRANT     Medication Side Effects: absent       Attention Span attention span appeared olanzapine and melatonin. Titrate as necessary. · Therapeutic activities and groups  · Follow [facility] at [facility] after symptoms stabilized     Estimated length of stay: 1-2 days      Electronically signed by LIBBY James CNP on 5/12/2020 at 4:33 PM.      This virtual visit was conducted via interactive/real-time audio/video.

## 2020-05-12 NOTE — GROUP NOTE
Group Therapy Note    Date: 5/12/2020    Group Start Time: 1400  Group End Time: 5629  Group Topic: Psychoeducation    NA Bo, CTRS    Patient refused to attend trivia skills group at 1400 after encouragement from staff. 1:1 talk time offered by staff as alternative to group session.

## 2020-05-13 VITALS
SYSTOLIC BLOOD PRESSURE: 108 MMHG | DIASTOLIC BLOOD PRESSURE: 59 MMHG | BODY MASS INDEX: 26.41 KG/M2 | RESPIRATION RATE: 14 BRPM | HEIGHT: 72 IN | WEIGHT: 195 LBS | TEMPERATURE: 97.9 F | HEART RATE: 75 BPM | OXYGEN SATURATION: 100 %

## 2020-05-13 PROCEDURE — 99238 HOSP IP/OBS DSCHRG MGMT 30/<: CPT | Performed by: NURSE PRACTITIONER

## 2020-05-13 RX ORDER — OLANZAPINE 15 MG/1
15 TABLET ORAL NIGHTLY
Qty: 30 TABLET | Refills: 0 | Status: ON HOLD | OUTPATIENT
Start: 2020-05-13 | End: 2020-07-10 | Stop reason: ALTCHOICE

## 2020-05-13 RX ORDER — TRAZODONE HYDROCHLORIDE 50 MG/1
50 TABLET ORAL NIGHTLY PRN
Qty: 30 TABLET | Refills: 0 | Status: ON HOLD | OUTPATIENT
Start: 2020-05-13 | End: 2020-07-10

## 2020-05-13 RX ORDER — HYDROXYZINE HYDROCHLORIDE 25 MG/1
25 TABLET, FILM COATED ORAL 3 TIMES DAILY PRN
Qty: 90 TABLET | Refills: 0 | Status: SHIPPED | OUTPATIENT
Start: 2020-05-13 | End: 2020-06-12

## 2020-05-13 NOTE — DISCHARGE SUMMARY
Patient Location:  [address] [place] [address]    Provider Location (City/State): [address] PRACTITIONER    Patient ID:  Adrienne Ordaz  [id number]  28 y.o.  1990    Admit date: 5/9/2020    Discharge date and time: 5/13/2020  4:44 PM     Admitting Physician: Warren Randolph MD     Discharge Physician:  LIBBY Sierra - CNP    Admission Diagnoses: Schizoaffective disorder, bipolar type ([address] [F25.0]    Discharge Diagnoses:   Schizoaffective disorder, bipolar type [facility])     Patient Active Problem List   Diagnosis Code    Schizoaffective disorder, bipolar type ([address] F25.0    Suicide attempt by drug ingestion ([address] T50.902A    Tobacco use Z72.0    Polysubstance abuse ([address] F19.10    Atrial flutter, paroxysmal ([address] I48.92    Episodic cannabis use F12.90    Cocaine use F14.90    Accidental drug overdose T50.901A    Schizoaffective disorder ([address] F25.9    Suicide attempt ([address] T14.91XA    PTSD (post-traumatic stress disorder) F43.10    Bipolar 1 disorder ([address] F31.9    Anxiety F41.9    Alcoholism ([address] F10.20    ADHD (attention deficit hyperactivity disorder) F90.9    Intentional drug overdose ([address] T50.902A    Drug overdose T50.901A    MDD (major depressive disorder), recurrent episode ([address] F33.9    Major depressive disorder, recurrent ([address] F33.9    Major depression, recurrent ([address] F33.9        Admission Condition: poor    Discharged Condition: stable. Admission Hx: This is a 26 y.o. male who was admitted from [facility] on a pink slip. The patient had presented to [facility] ED with a plan to overdose on melatonin, zyprexa, and heroin. Upon admission the patient was calm and cooperative. Reported fleeting SI to nursing staff. Patient is verbalizing readiness for discharge. He denies all including SI, HI, hallucinations, anxiety and depression stating that he is \"much better. \" He has been medication compliant refusing to attend groups.  Patient reports improved sleep and good appetite. Provider discussed importance of follow-up and medication compliance. Chart and medications reviewed. Therapeutic support provided. Indication for Admission: threat to self    History of Present Illnes (present tense wording indicates findings from admission exam on 5/9/2020 and are not necessarily indicative of current findings): Hospital Course:   Upon admission, Osmar Kohler was provided a safe secure environment, introduced to unit milieu. Patient refused to participate in groups and individual therapies. Meds were adjusted. After few days of hospital care, patient began to feel improvement. Depression lifted, thoughts to harm self ceased. Sleep improved, appetite was good. On morning rounds 5/13/2020, patient endorsed feeling ready for discharge. Patient denies suicidal or homicidal ideations, denies hallucinations or delusions. Denies SE's from meds. It was decided that pt had achieved maximum benefit from hospital care and can be discharged     Consults: None    Significant Diagnostic Studies: Routine labs and diagnostics    Treatments: Psychotropic medications and 1:1 with nurses, social workers, PA-C/CNP, and Attending physician.       Discharge Medications:  Discharge Medication List as of 5/13/2020  2:21 PM      START taking these medications    Details   hydrOXYzine (ATARAX) 25 MG tablet Take 1 tablet by mouth 3 times daily as needed for Itching or Anxiety, Disp-90 tablet, R-0Normal      OLANZapine (ZYPREXA) 15 MG tablet Take 1 tablet by mouth nightly, Disp-30 tablet, R-0Normal         CONTINUE these medications which have CHANGED    Details   traZODone (DESYREL) 50 MG tablet Take 1 tablet by mouth nightly as needed for Sleep, Disp-30 tablet, R-0Normal         CONTINUE these medications which have NOT CHANGED    Details   melatonin ER 3 MG TBCR Take 3 mg by mouth nightly as needed (sleep), Disp-14 tablet, R-0Normal         STOP taking these medications       QUEtiapine (SEROQUEL) 200 MG tablet Comments:   Reason for Stopping:         escitalopram (LEXAPRO) 10 MG tablet Comments:   Reason for Stopping:         ARIPiprazole (ABILIFY) 10 MG tablet Comments:   Reason for Stopping:         LORazepam (ATIVAN) 1 MG tablet Comments:   Reason for Stopping:                  Core Measures statement:   Not applicable    Discharge Exam:  Level of consciousness:  Within normal limits  Appearance: Street clothes, seated, with fair grooming  Behavior/Motor: No abnormalities noted  Attitude toward examiner:  Cooperative, attentive, good eye contact  Speech:  spontaneous, normal rate, normal volume  Mood:  euthymic  Affect:  mood congruent  Thought processes:  linear, goal directed and coherent  Thought content:  Homocidal ideation denies  Suicidal Ideation:  denies suicidal ideation  Delusions:  no evidence of delusions  Perceptual Disturbance:  denies any perceptual disturbance  Cognition:  In tact  Memory: age appropriate  Insight & Judgement: fair  Medication side effects:  denies     Disposition: home    Patient Instructions: Activity: activity as tolerated    Follow-up as scheduled with [facility]    Time Spent: 15 minutes    Engagement: Patient displayed a good level of engagement with the treatments offered during this admission. Discharge planning, findings, and recommendations were discussed with patient and treatment team.      Signed:  Tato Paris   5/13/2020  4:44 PM    This virtual visit was conducted via interactive/real-time audio/video.

## 2020-05-13 NOTE — PLAN OF CARE
Problem: Depressive Behavior With or Without Suicide Precautions:  Goal: Able to verbalize acceptance of life and situations over which he or she has no control  Description: Able to verbalize acceptance of life and situations over which he or she has no control  5/10/2020 1121 by Kimmy Lopez RN  Outcome: Ongoing, reports fleeting suicidal thought that come and go. Contracts for safety. Isolates to self or room.  Flat affect      Problem: Tobacco Use:  Goal: Inpatient tobacco use cessation counseling participation  Description: Inpatient tobacco use cessation counseling participation  5/10/2020 1121 by Kimmy Lopez RN  Outcome: Ongoing, denies issue at this time     Problem: Suicide risk  Goal: Provide patient with safe environment  Description: Provide patient with safe environment  5/10/2020 1121 by Kimmy Lopze RN  Outcome: Ongoing
Problem: Depressive Behavior With or Without Suicide Precautions:  Goal: LTG-Able to verbalize and/or display a decrease in depressive symptoms  5/12/2020 2008 by Elisa Tobias RN  Outcome: Ongoing  Note: Patient is isolative to self and room. Patient is non interactive with staff and avoids gaze. Problem: Tobacco Use:  Goal: Inpatient tobacco use cessation counseling participation  Description: Inpatient tobacco use cessation counseling participation  Outcome: Ongoing  Note: Patient given tobacco quitline number 96177209479 at this time, refusing to call at this time, states \" I just dont want to quit now\"- patient given information as to the dangers of long term tobacco use. Continue to reinforce the importance of tobacco cessation.        Problem: Depressive Behavior With or Without Suicide Precautions:  Goal: STG-Able to verbalize suicidal ideations  Outcome: Ongoing
Problem: Depressive Behavior With or Without Suicide Precautions:  Goal: LTG-Able to verbalize and/or display a decrease in depressive symptoms  5/13/2020 1312 by Troy Kapadia RN  Outcome: Completed  5/13/2020 0921 by Troy Kapadia RN  Outcome: Ongoing  Note: Pt refuses vitals assessments and mental health assessments. He is isolative to his room, exhibits poor eye contact. He comes out for needs only. Safety checks maintained q15 min and irregular rounding maintained. Goal: STG-Able to verbalize suicidal ideations  5/13/2020 1312 by Tryo Kapadia RN  Outcome: Completed  5/13/2020 0921 by Troy Kapadia RN  Outcome: Ongoing  Note: Unable to assess if patient is having suicidal ideations. Pt refuses assessments and is isolative to room. He exhibits flat affect and poor eye contact. He has not attempted self harm thus far during shift. Safety checks maintained q15 min and irregular rounding maintained.        Problem: Tobacco Use:  Goal: Inpatient tobacco use cessation counseling participation  Description: Inpatient tobacco use cessation counseling participation  Outcome: Completed     Problem: Suicide risk  Goal: Provide patient with safe environment  Description: Provide patient with safe environment  Outcome: Completed
Problem: Depressive Behavior With or Without Suicide Precautions:  Goal: LTG-Able to verbalize and/or display a decrease in depressive symptoms  Outcome: Ongoing  Note: This morning patient refused vitals and general assessment. He did not make eye contact with staff. Pt has been isolative to his room. Safety checks maintained q15 min and irregular rounding maintained. Problem: Suicide risk  Goal: Provide patient with safe environment  Description: Provide patient with safe environment  Outcome: Ongoing  Note: Safety checks maintained q15 min and irregular rounding maintained. Pt has been in his bed all morning, no attempts to hurt self.
Learner Progress Toward Treatment Goals: reviewed group plans and strategies for care    Method:group therapy, medication compliance, individualized assessments and care planning    Outcome: needs reinforcement    PATIENT GOALS: to be discussed with patient within 72 hours    PLAN/TREATMENT RECOMMENDATIONS:     continue group therapy , medications compliance, goal setting, individualized assessments and care, continue to monitor pt on unit      SHORT-TERM GOALS:   Time frame for Short-Term Goals: 5-7 days    LONG-TERM GOALS:  Time frame for Long-Term Goals: 6 months  Members Present in Team Meeting: See Signature Sheet    Belkis Mitchell

## 2020-05-16 ENCOUNTER — HOSPITAL ENCOUNTER (INPATIENT)
Age: 30
LOS: 1 days | Discharge: HOME | DRG: 750 | End: 2020-05-17
Attending: PSYCHIATRY & NEUROLOGY | Admitting: PSYCHIATRY & NEUROLOGY
Payer: COMMERCIAL

## 2020-05-16 PROBLEM — F39 MOOD DISORDER (HCC): Status: ACTIVE | Noted: 2020-05-16

## 2020-05-16 PROCEDURE — G0378 HOSPITAL OBSERVATION PER HR: HCPCS

## 2020-05-16 PROCEDURE — 1240000000 HC EMOTIONAL WELLNESS R&B

## 2020-05-16 PROCEDURE — G0379 DIRECT REFER HOSPITAL OBSERV: HCPCS

## 2020-05-16 RX ORDER — TRAZODONE HYDROCHLORIDE 50 MG/1
50 TABLET ORAL NIGHTLY PRN
Status: DISCONTINUED | OUTPATIENT
Start: 2020-05-17 | End: 2020-05-17 | Stop reason: HOSPADM

## 2020-05-16 ASSESSMENT — SLEEP AND FATIGUE QUESTIONNAIRES
DIFFICULTY FALLING ASLEEP: NO
DO YOU HAVE DIFFICULTY SLEEPING: NO
SLEEP PATTERN: RESTLESSNESS
DIFFICULTY ARISING: NO
DIFFICULTY STAYING ASLEEP: NO
AVERAGE NUMBER OF SLEEP HOURS: 7
DO YOU USE A SLEEP AID: YES
RESTFUL SLEEP: NO

## 2020-05-16 ASSESSMENT — LIFESTYLE VARIABLES: HISTORY_ALCOHOL_USE: YES

## 2020-05-16 ASSESSMENT — PAIN SCALES - GENERAL: PAINLEVEL_OUTOF10: 0

## 2020-05-16 ASSESSMENT — PATIENT HEALTH QUESTIONNAIRE - PHQ9: SUM OF ALL RESPONSES TO PHQ QUESTIONS 1-9: 6

## 2020-05-17 VITALS
DIASTOLIC BLOOD PRESSURE: 66 MMHG | SYSTOLIC BLOOD PRESSURE: 107 MMHG | TEMPERATURE: 98.7 F | HEIGHT: 72 IN | WEIGHT: 195 LBS | HEART RATE: 73 BPM | RESPIRATION RATE: 14 BRPM | BODY MASS INDEX: 26.41 KG/M2

## 2020-05-17 PROBLEM — F39 MOOD DISORDER (HCC): Status: RESOLVED | Noted: 2020-05-16 | Resolved: 2020-05-17

## 2020-05-17 PROCEDURE — 90792 PSYCH DIAG EVAL W/MED SRVCS: CPT | Performed by: NURSE PRACTITIONER

## 2020-05-17 PROCEDURE — G0378 HOSPITAL OBSERVATION PER HR: HCPCS

## 2020-05-17 RX ORDER — OLANZAPINE 15 MG/1
15 TABLET ORAL NIGHTLY
Status: DISCONTINUED | OUTPATIENT
Start: 2020-05-17 | End: 2020-05-17 | Stop reason: HOSPADM

## 2020-05-17 ASSESSMENT — LIFESTYLE VARIABLES: HISTORY_ALCOHOL_USE: YES

## 2020-05-17 ASSESSMENT — SLEEP AND FATIGUE QUESTIONNAIRES
AVERAGE NUMBER OF SLEEP HOURS: 7
DIFFICULTY FALLING ASLEEP: NO
DO YOU USE A SLEEP AID: YES
RESTFUL SLEEP: NO
DO YOU HAVE DIFFICULTY SLEEPING: NO
DIFFICULTY STAYING ASLEEP: NO
SLEEP PATTERN: RESTLESSNESS
DIFFICULTY ARISING: NO

## 2020-05-17 NOTE — GROUP NOTE
Group Therapy Note    Date: 5/17/2020    Group Start Time: 1330  Group End Time: 1430  Group Topic: Recreational    [address], [address]        Group Therapy Note    Attendees: 3/8    patient refused to 1:1 at [phone] after encouragement from staff. 1:1 talk time provided as alternative to group session.          Signature:  Corrie Hoffman, [address]

## 2020-05-17 NOTE — BH NOTE
Approval from charge nurse to call [employer]
RT ASSESSMENT TREATMENT GOALS    [x]Pt Goal:  Pt will identify 1-2 positive coping skills by time of discharge. []Pt Goal:  Pt will identify 1-2 positive aspects of self by time of discharge. [x]Pt Goal:  Pt will remain on task/topic for 15-30 minutes during group by time of discharge. [x]Pt Goal:  Pt will identify 1-2 aspects of relapse prevention plan by time of discharge. []Pt Goal:  Pt will join in conversation with peers 1-2 times per group by time of discharge. []Pt Goal:  Pt will identify 1-2 new leisure interests by time of discharge. []Pt Goal:  Pt will not voice any delusional content by time of discharge.
`[facility]  Admission Note     Admission Type:   Admission Type: Involuntary(signed in on admission)    Reason for admission:  Reason for Admission: suicidal ideations to overdose, pt denied on admission reports just feeling depressed    PATIENT STRENGTHS:  Strengths: Communication, Connection to output provider    Patient Strengths and Limitations:  Limitations: Tendency to isolate self, Lacks leisure interests, External locus of control    Addictive Behavior:   Addictive Behavior  In the past 3 months, have you felt or has someone told you that you have a problem with:  : None  Do you have a history of Chemical Use?: Yes  Do you have a history of Alcohol Use?: Yes  Do you have a history of Street Drug Abuse?: Yes  Histroy of Prescripton Drug Abuse?: No    Medical Problems:   Past Medical History:   Diagnosis Date    ADHD (attention deficit hyperactivity disorder)     Alcoholism ([facility]     pt drinks 6 beers and a liter of vodka daily.     Anxiety     Bipolar 1 disorder (HCC)     PTSD (post-traumatic stress disorder)     Suicide attempt ([facility]     previous suicide attempt by overdosing on pills at approximately 23 y/o       Status EXAM:  Status and Exam  Normal: No  Facial Expression: Flat  Affect: Appropriate  Level of Consciousness: Alert  Mood:Normal: No  Mood: Depressed, Anxious  Motor Activity:Normal: No  Motor Activity: Decreased  Interview Behavior: Cooperative, Evasive  Preception: [place] to Person, [place] to Time, [place] to Situation, [place] to Place  Attention:Normal: No  Attention: Distractible  Thought Processes: Blocking  Thought Content:Normal: No  Thought Content: Poverty of Content  Hallucinations: None  Delusions: No  Memory:Normal: Yes  Memory: Poor Recent, Poor Remote  Insight and Judgment: No  Insight and Judgment: Poor Judgment, Poor Insight, Unmotivated  Present Suicidal Ideation: No  Present Homicidal Ideation: No    Tobacco Screening:  Practical Counseling, on admission,
06/13/2015     Lab Results   Component Value Date    HDL 38 (L) 01/03/2020    HDL 45 09/10/2019    HDL 52 02/26/2019    HDL 46 01/21/2019    HDL 49 11/11/2018    HDL 73 07/05/2018    HDL 67 11/05/2017    HDL 31 (L) 06/13/2015     No components found for: LDLCAL  No results found for: Drew Smith, RN
Portia Mcelroy is a 34-year old male who was voluntarily admitted from the [facility] with suicidal ideations to overdose. During assessment client denies any depression and/or thoughts of suicide. Client has a significant admission history at the [facility] with recent discharge on 5/13/2020 and has a history of poor participation in programming while on the unit. Client also has multiple admissions between [address] [place] [address] [id number]. 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 Client has also reported that he owns a home and rents it out to friends and this helps with his income as well. Client has been linked to [facility] and most recently to [facility] and history of being non-compliant. Client presents alert, oriented x4, with dysphoric mood and flat affect. Client is cooperative during assessment but provides short and non descriptive answers. At this time patient denies suicidal ideation, homicidal ideation, hallucinations, delusions. Client is single; no children; estranged from parents; raised by biological mother; denies any history of childhood trauma or abuse; born and raised in [place]; dropped out of high school in 11th grade but did get his GED; denies any  history and denies any current legal issues. Client is unsure at time of admission if interested in AOD inpatient treatment. Writer will continue to work with PT on a safe discharge plan; encourage group and 1:1 participation as well as attending treatment team meetings; and, to socialize with peers and to make daily goals in AM community group meetings.

## 2020-05-17 NOTE — VIRTUAL HEALTH
use.    Family History of psychiatric disorders    Family history: denied       Medical History   Allergies:  Advil [ibuprofen]   Past Medical History:   Diagnosis Date    ADHD (attention deficit hyperactivity disorder)     Alcoholism ([facility]     pt drinks 6 beers and a liter of vodka daily.  Anxiety     Bipolar 1 disorder (HCC)     PTSD (post-traumatic stress disorder)     Suicide attempt ([facility]     previous suicide attempt by overdosing on pills at approximately 25 y/o      History reviewed. No pertinent surgical history. Neurologic Exam      Mental Status   Oriented to person, place, and time. Oriented to city, area, street and number. Oriented to country. Registration: recalls 3 of 3 objects. Recall at 5 minutes: recalls 3 of 3 objects. Follows 3 step commands. Attention: normal. Concentration: normal.   Speech: speech is normal   Level of consciousness: alert  Knowledge: good. Able to perform simple calculations. Able to name object. Able to read. Able to repeat. Able to write.  Normal comprehension.      Cranial Nerves   Cranial nerves II through XII intact.      Motor Exam   Muscle bulk: normal  Overall muscle tone: normal     Strength   Strength 5/5 throughout.      Sensory Exam   Light touch normal.      Gait, Coordination, and Reflexes      Normal     Coordination   Romberg: negative     Tremor   Resting tremor: absent  Intention tremor: absent  Action tremor: absent     Reflexes   Right brachioradialis: 2+  Left brachioradialis: 2+  Right biceps: 2+  Left biceps: 2+  Right triceps: 2+  Left triceps: 2+  Right patellar: 2+  Left patellar: 2+  Right achilles: 2+  Left achilles: 2+  Right : 2+  Left : 2+    SOCIAL HISTORY: Patient was born and raised in [address] lives in [address] [place] [address] he completed [address]th [employer]ooks identifies that he is single. [facility] does not have any children. [facility] is not working and does not have a source of reliable income.   Denies  history. Reese Goldstein

## 2020-05-17 NOTE — DISCHARGE SUMMARY
Patient Location:  [address] [place] [address]    Provider Location (City/State): [address] PRACTITIONER    Patient ID:  Mitul Hansen  [id number]  46 y.o.  1990    Admit date: 5/16/2020    Discharge date and time: 5/17/2020  1:39 PM     Admitting Physician: Willa Diaz MD     Discharge Physician:  LIBBY Zurita CNP    Admission Diagnoses: Mood disorder [facility]) [F39]    Discharge Diagnoses:   Schizoaffective Disorder    Patient Active Problem List   Diagnosis Code    Schizoaffective disorder, bipolar type ([address] F25.0    Suicide attempt by drug ingestion ([address] T50.902A    Tobacco use Z72.0    Polysubstance abuse ([address] F19.10    Atrial flutter, paroxysmal ([address] I48.92    Episodic cannabis use F12.90    Cocaine use F14.90    Accidental drug overdose T50.901A    Schizoaffective disorder ([address] F25.9    Suicide attempt ([address] T14.91XA    PTSD (post-traumatic stress disorder) F43.10    Bipolar 1 disorder ([address] F31.9    Anxiety F41.9    Alcoholism ([address] F10.20    ADHD (attention deficit hyperactivity disorder) F90.9    Intentional drug overdose ([address] T50.902A    Drug overdose T50.901A    MDD (major depressive disorder), recurrent episode ([address] F33.9    Major depressive disorder, recurrent ([address] F33.9    Major depression, recurrent ([address] F33.9        Admission Condition: poor    Discharged Condition: stable. Admission Hx: Mitul Hansen is a 26 y.o. male who was voluntarily admitted from the [facility] with suicidal ideations to overdose. At presentation to the [facility], he admitted to depression only. Patient has a significant admission history at the [facility] with recent discharge on 5/13/2020 and has a history of poor participation in programming while on the unit as well as non-compliance after discharge. Patient was seen today via tele psych.  He took his social security money and bought drugs resulting in increased hallucinations and depression. Today he is denying SI, HI, hallucinations and anxiety endorsing minimal depression as the result of his drug use. He stated that he used to take Zoloft for depression and that it worked well. A prescription was provided. He refused COVID testing stating that he does not want to get moved off of [facility] because he can stay in his room and he will not be assigned a roommate. Writer informed patient that if he does not permit testing, he will be discharged as his symptoms can be managed in an outpatient basis. After rounding, Laina Maher was informed that patient continued to refuse and was requesting discharge. Additionally, due to patient's repeated admissions, writer discussed obtaining a Payee so that patient is able to remain sober and use his social security money as intended. Indication for Admission: threat to self    History of Present Illnes (present tense wording indicates findings from admission exam on 5/16/2020 and are not necessarily indicative of current findings): Hospital Course:   Upon admission, Melissa Seals was provided a safe secure environment, introduced to unit milieu. Patient refused to participate in groups and individual therapies. Meds were adjusted. Depression was reported as minimal and related to drug use, thoughts to harm self ceased. Sleep improved, appetite was good. On morning rounds 5/17/2020, patient was educated regarding the need for COVID testing and refused due to not wanting to be transferred to another unit. Patient denies suicidal or homicidal ideations, denies hallucinations or delusions. Denies SE's from meds. It was decided that pt had achieved maximum benefit from hospital care and can be discharged to pre-established follow-up care from recent discharge on 5/13/2020. Patient has a history of non-compliance.     Consults: None    Significant Diagnostic Studies: Routine labs and diagnostics    Treatments: 1:1 with nurses, social workers,

## 2020-07-10 ENCOUNTER — HOSPITAL ENCOUNTER (INPATIENT)
Age: 30
LOS: 1 days | Discharge: HOME | DRG: 750 | End: 2020-07-10
Attending: PSYCHIATRY & NEUROLOGY | Admitting: PSYCHIATRY & NEUROLOGY
Payer: COMMERCIAL

## 2020-07-10 VITALS
HEART RATE: 60 BPM | DIASTOLIC BLOOD PRESSURE: 80 MMHG | WEIGHT: 184.97 LBS | RESPIRATION RATE: 14 BRPM | BODY MASS INDEX: 25.05 KG/M2 | TEMPERATURE: 98.2 F | HEIGHT: 72 IN | SYSTOLIC BLOOD PRESSURE: 117 MMHG

## 2020-07-10 PROCEDURE — 90792 PSYCH DIAG EVAL W/MED SRVCS: CPT | Performed by: NURSE PRACTITIONER

## 2020-07-10 PROCEDURE — G0378 HOSPITAL OBSERVATION PER HR: HCPCS

## 2020-07-10 PROCEDURE — G0379 DIRECT REFER HOSPITAL OBSERV: HCPCS

## 2020-07-10 PROCEDURE — 1240000000 HC EMOTIONAL WELLNESS R&B

## 2020-07-10 RX ORDER — QUETIAPINE FUMARATE 100 MG/1
100 TABLET, FILM COATED ORAL NIGHTLY
Status: ON HOLD | COMMUNITY
End: 2020-07-10 | Stop reason: HOSPADM

## 2020-07-10 RX ORDER — HYDROXYZINE HYDROCHLORIDE 25 MG/1
25 TABLET, FILM COATED ORAL 3 TIMES DAILY PRN
Status: DISCONTINUED | OUTPATIENT
Start: 2020-07-10 | End: 2020-07-10 | Stop reason: HOSPADM

## 2020-07-10 RX ORDER — NICOTINE 21 MG/24HR
1 PATCH, TRANSDERMAL 24 HOURS TRANSDERMAL DAILY
Status: DISCONTINUED | OUTPATIENT
Start: 2020-07-10 | End: 2020-07-10

## 2020-07-10 RX ORDER — CHOLECALCIFEROL (VITAMIN D3) 25 MCG
3 TABLET ORAL NIGHTLY PRN
Qty: 14 TABLET | Refills: 0 | Status: ON HOLD
Start: 2020-07-10 | End: 2020-12-26 | Stop reason: HOSPADM

## 2020-07-10 RX ORDER — ESCITALOPRAM OXALATE 10 MG/1
10 TABLET ORAL DAILY
Status: ON HOLD | COMMUNITY
End: 2020-07-10 | Stop reason: HOSPADM

## 2020-07-10 RX ORDER — ACETAMINOPHEN 325 MG/1
650 TABLET ORAL EVERY 4 HOURS PRN
Status: DISCONTINUED | OUTPATIENT
Start: 2020-07-10 | End: 2020-07-10 | Stop reason: HOSPADM

## 2020-07-10 RX ORDER — TRAZODONE HYDROCHLORIDE 50 MG/1
50 TABLET ORAL NIGHTLY PRN
Status: DISCONTINUED | OUTPATIENT
Start: 2020-07-10 | End: 2020-07-10 | Stop reason: HOSPADM

## 2020-07-10 ASSESSMENT — SLEEP AND FATIGUE QUESTIONNAIRES
AVERAGE NUMBER OF SLEEP HOURS: 6
SLEEP PATTERN: RESTLESSNESS
DIFFICULTY ARISING: NO
DO YOU HAVE DIFFICULTY SLEEPING: YES
SLEEP PATTERN: RESTLESSNESS
DIFFICULTY STAYING ASLEEP: YES
AVERAGE NUMBER OF SLEEP HOURS: 6
DO YOU USE A SLEEP AID: YES
DIFFICULTY STAYING ASLEEP: YES
RESTFUL SLEEP: NO
DIFFICULTY ARISING: NO
DIFFICULTY FALLING ASLEEP: YES
DO YOU HAVE DIFFICULTY SLEEPING: YES
DO YOU USE A SLEEP AID: YES
RESTFUL SLEEP: NO
DIFFICULTY FALLING ASLEEP: YES

## 2020-07-10 ASSESSMENT — LIFESTYLE VARIABLES
HISTORY_ALCOHOL_USE: NO
HISTORY_ALCOHOL_USE: YES

## 2020-07-10 ASSESSMENT — PAIN SCALES - GENERAL: PAINLEVEL_OUTOF10: 0

## 2020-07-10 NOTE — PROGRESS NOTES
Pharmacy Medication History Note      List of current medications patient is taking is complete. Source of information: [facility], Care Everywhere)    Changes made to medication list:  Medications removed (include reason, ex. therapy complete or physician discontinued, noncompliance):  Melatonin (list clean up), Trazodone (list clean up), Sertraline (alternate therapy), Olanzapine (alternate therapy)    Medications added/doses adjusted: Added Escitalopram 10 mg daily  Added Quetiapine 100 mg nightly    Other notes (ex. Recent course of antibiotics, Coumadin dosing): The patient did not  his prescriptions at [facility] after he was last discharged from the [facility] in May. The patient was discharged from [facility] on 7/5/20 and started on the above medications that were added. Please let me know if you have any questions about this encounter. Thank you!     Electronically signed by Julio Martinez, [address] [place] [address] on 7/10/2020 at 8:42 AM

## 2020-07-10 NOTE — H&P
HISTORY and [id number]       NAME:  Avi Montoya  MRN: [id number]   YOB: 1990   Date: 7/10/2020   Age: 31 y.o. Gender: male     COMPLAINT AND PRESENT HISTORY:      Avi Montoya is 31 y.o.,  male, admitted because of Schizoaffective Disorder. According to ED notes, she came in on a pink slip he was intoxicated and reported being suicidal.  Patient's alcohol level rated at 20 today and also had positive drug level of cocaine. Upon speaking with patient he was very distracted and evasive. Patient essentially agreed to everything that was being said and omitted a lot of things. Patient states that he has been compliant with his medication. Denies any alcohol or cocaine although his drug screening proves differently. Patient voices anxiety to be discharged. Patient states he will be returning back home to his own place. Patient denies any somatic complaints. No significant lab values or procedures. No  chest pain or  shortness of breath. No fever/chills. Please see patient's psychiatric hx for more information. DIAGNOSTIC RESULTS         PAST MEDICAL HISTORY     Past Medical History:   Diagnosis Date    ADHD (attention deficit hyperactivity disorder)     Alcoholism ([address]     pt drinks 6 beers and a liter of vodka daily.  Anxiety     Bipolar 1 disorder (HCC)     PTSD (post-traumatic stress disorder)     Su[facility] ([facility]     previous suicide attempt by overdosing on pills at approximately 25 y/o       Pt denies any history of Diabetes mellitus type 2, hypertension, stroke, heart disease, COPD, Asthma, GERD, HLD, Cancer, Seizures,Thyroid disease, Kidney Disease, Hepatitis, TB.    SURGICAL HISTORY     History reviewed. No pertinent surgical history.     FAMILY HISTORY       Family History   Problem Relation Age of Onset    Liver Cancer Brother     Alcohol Abuse Brother     No Known Problems Mother     No Known Problems Father SOCIAL HISTORY       Social History     Socioeconomic History    Marital status: Single     Spouse name: None    Number of children: None    Years of education: None    Highest education level: None   Occupational History    None   Social Needs    Financial resource strain: None    Food insecurity     Worry: None     Inability: None    Transportation needs     Medical: None     Non-medical: None   Tobacco Use    Smoking status: Former Smoker     Years: 14.00     Types: Cigarettes     Last attempt to quit: 2019     Years since quittin.2    Smokeless tobacco: Never Used   Substance and Sexual Activity    Alcohol use: Yes     Comment: 2 bottles wine daily    Drug use: Yes     Types: Cocaine, Opiates , IV     Comment: drug abuse includes cocaine & heroin    Sexual activity: Never   Lifestyle    Physical activity     Days per week: None     Minutes per session: None    Stress: None   Relationships    Social connections     Talks on phone: None     Gets together: None     Attends Mosque service: None     Active member of club or organization: None     Attends meetings of clubs or organizations: None     Relationship status: None    Intimate partner violence     Fear of current or ex partner: None     Emotionally abused: None     Physically abused: None     Forced sexual activity: None   Other Topics Concern    None   Social History Narrative    None           REVIEW OF SYSTEMS      Allergies   Allergen Reactions    Advil [Ibuprofen] Hives       No current facility-administered medications on file prior to encounter. No current outpatient medications on file prior to encounter. General health:  Fairly good. No fever or chills. Skin:  No itching, redness or rash. Head, eyes, ears, nose, throat:  No headache, epistaxis, rhinorrhea hearing loss or sore throat. Neck:  No pain, stiffness or masses.      Cardiovascular/Respiratory system: No chest pain, palpitation, shortness of breath, coughing or expectoration. Gastrointestinal tract: No abdominal pain, nausea, vomiting, dysphagia, diarrhea or constipation. Genitourinary:  No burning on micturition. No hesitancy, urgency, frequency or discoloration of urine. Locomotor:  No bone or joint pains. No swelling or deformities. Neuropsychiatric:  See HPI. GENERAL PHYSICAL EXAM:     Vitals: /80   Pulse 60   Temp 98.2 °F (36.8 °C) (Oral)   Resp 14   Ht 6' 0.01\" (1.829 m)   Wt 184 lb 15.5 oz (83.9 kg)   BMI 25.08 kg/m²  Body mass index is 25.08 kg/m². Pt was examined with a nurse present in the room. GENERAL APPEARANCE:  Gerri Chin is 31 y.o.,  male, mildly obese, nourished, conscious, alert. Does not appear to be distress or pain at this time. SKIN:  Warm, dry, no cyanosis or jaundice. HEAD:  Normocephalic, atraumatic, no swelling or tenderness. EYES:  Pupils equal, reactive to light, Conjunctiva is clear, EOMs intact lan. eyelids WNL. EARS:  No discharge, no marked hearing loss. NOSE:  No rhinorrhea, epistaxis or septal deformity. THROAT:  Not congested. No ulceration bleeding or discharge. NECK:  No stiffness, trachea central.  No palpable masses or L.N.      CHEST:  Symmetrical and equal on expansion. HEART:  Regular rate and rhythm. S1 > S2, No audible murmurs or gallops. LUNGS:  Equal on expansion, normal breath sounds. No adventitious sounds. ABDOMEN:  Mildly obese. Soft on palpation. No localized tenderness. No guarding or rigidity. No palpable organomegaly. LYMPHATICS:  No palpable cervical Lymphadenopathy. LOCOMOTOR, BACK AND SPINE:  No tenderness or deformities. EXTREMITIES:  Symmetrical, no pretibial edema. Clarissas sign negative. No discoloration or ulcerations.     NEUROLOGIC:  The patient is conscious, alert, oriented,Cranial nerve II-XII intact, taste and smell were

## 2020-07-10 NOTE — PLAN OF CARE
Problem: Depressive Behavior With or Without Suicide Precautions:  Goal: Absence of self-harm  Description: Absence of self-harm  7/10/2020 0925 by Geraldo Oneill LPN  Outcome: Ongoing  Note: Patient denies thoughts of self harm and is agreeable to seeking out should thoughts of self harm arise. Safe environment maintained. Every 15 minute checks for safety cont per unit policy. Will continue to monitor for safety and provides support and reassurance as needed.

## 2020-07-10 NOTE — CARE COORDINATION
BHI Biopsychosocial Assessment    Current Level of Psychosocial Functioning     Independent   Dependent    Minimal Assist X    Psychosocial High Risk Factors     Unable to obtain meds   Chronic illness/pain  X  Substance abuse X - hx of alcohol / cocaine Polysubstance   Addictive Behaviors  Lack of Family Support X  Financial stress X  Isolation X  Inadequate Community Resources  Suicide attempt(s) X  Self Mutilation X  Safety Plan X- safety plan provided and explained to patient to fill out and return to staff   Not taking medications  X  Victim of crime   Developmental Delay  Learning Disabilities  Unable to manage personal needs    Age 72 or older   Homeless X  Legal Issues X  No transportation   Readmission within 30 days  Unemployment X  Traumatic Event    Psychiatric Advanced Directives: none    Family to Involve in Treatment: refused    Sexual Orientation:  heterosexual    Patient Strengths: Medicaid, SSI, linked to [facility]    Patient Barriers:  AOD alcohol dependence, does not follow through and is not compliant, unstable housing, poor insight, poor coping / problem solving skills    Opiate/AOD Education Provided:   ETOH provided and patient refused reports he is linked with [facility] and attends AA out in community    CMHC/mental health history: long hx of inpatient MH/AoD admits, ED visits, Detox and has been linked to all outpatient CMHCs    Plan of Care   medication management, group/individual therapies, family meetings, psycho -education, treatment team meetings to assist with stabilization    Initial Discharge Plan:  continue with [facility] and cab back to friends where patient is staying    Clinical Summary:   Levy Dominguez is a single 26-year-old  male admitted to the [facility] for self-reported suicidal attempt to OD on medications and was positive for cocaine and alcohol at admission.   Patient is currently denying any suicidal / homicidal ideations and denies any auditory / visual hallucinations at this time. Patient reports he is staying with a friend and got drunk from drinking vodka and said stupid shit when I was drunk. Patient reports he did not overdose on any medications, he reports he is linked and compliant with [facility] and he attends AA out in the community and slipped up and drank last night. Patient reports sleep as ok and appetite is fine. Patient is wanting to be discharged and signed PREMA for [facility] this date and will continue with services with them.

## 2020-07-10 NOTE — VIRTUAL HEALTH
Psychiatric Admission Note - Psychiatric Nurse Practitioner         Zara Jose is a 31 y.o. male who was admitted from [address] on a pink slip. He was intoxicated and stated he was suicidal and had overdosed. Today Hieu Jarrell was interviewed via telehealth with staff present. He was in his room and casually dressed. He reports that he is depressed at times but is not feeling depressed today. He does have anxiety at times but also denies anxiety today. He denies experiencing auditory or visual hallucinations. He has no delusional or paranoid thoughts. He has a history of suicide attempts but denies that he attempted suicide yesterday and was just worried that he had drank too much. He has little insight and is not willing to consider any type of substance abuse treatment. He feels that he is ready for discharge and does not want to continue treatment. He will be discharged today. Past Psychiatric History   Patient Reports noncompliance with outpatient psychiatric care. Reported history of psychiatric inpatient hospitalizations. Reported history of suicide attempts. History of Substance Abuse     He does not use nicotine products. He abuses alcohol, cocaine and heroin. Family History of psychiatric disorders    Family history: denied . Past psychiatric medications  Zoloft, Zyprexa, Seroquel, Lexapro, Abilify, Buspar, Elavil, Abilify Maintena, Rexulti, Latuda, Aristada, Remeron, Tegretol, Naltrexone. Medical History   Allergies:  Advil [ibuprofen]   Past Medical History:   Diagnosis Date    ADHD (attention deficit hyperactivity disorder)     Alcoholism ([facility]     pt drinks 6 beers and a liter of vodka daily.  Anxiety     Bipolar 1 disorder (HCC)     PTSD (post-traumatic stress disorder)     Suicide attempt ([facility]     previous suicide attempt by overdosing on pills at approximately 25 y/o      History reviewed.  No pertinent surgical history. Neurologic Exam    See H&P for further physical examination. SOCIAL HISTORY   Chaz Pope was born and raised in [place]. He dropped out of school in the 11th grade. He has no children and has never . He has no income but is working for his landlord in exchange for a room. He has no history of abuse. He has no current legal issues. Mental Status  Pt. was alert, fully oriented, and cooperative. Appearance and hygiene were age appropriate with adequate hygiene . Mood was euthymic. Affect was euthymic and appropriately reactive Thought process was organized. Patient denied any hallucinations or paranoia. Patient denied suicidal ideations. Patient denied homicidal ideations . Patient's gross cognitive functions were intact. Insight and judgement were poor. Both recent and remote memory were intact. Psychomotor status was without abnormality     Labs  No results found for this or any previous visit (from the past 72 hour(s)). Diagnostic Impression  Active Problems:    Schizoaffective disorder ([facility]  Resolved Problems:    * No resolved hospital problems. *          Medications    traZODone, hydrOXYzine, acetaminophen    Treatment Plan:     Admit to inpatient psychiatric treatment   Supportive therapy with medication management. Reviewed risks and benefits as well as potential side effects with patient.  Therapeutic activities and groups   Follow up at [facility] after symptoms stabilized    Estimated length of stay: 1 day - DC 7-    LIBBY Palacios - CNP  Psychiatric Advanced Practice Nurse        Patient Location:  [address] [place] [address]    Provider Location ([facility]): [place], [place]    This virtual visit was conducted via interactive/real-time audio/video.

## 2020-07-10 NOTE — BH NOTE
On call provider, Francia Avila NP, notified of best practice advisory suggesting to place patient on suicide precautions. Provider to discontinue the order as patient does not meet criteria for suicide precautions at this time. Continue to observe patient on every 15 minute checks.
Patient given tobacco quitline number 89478520342 at this time, refusing to call at this time, states \" I just dont want to quit now\"- patient given information as to the dangers of long term tobacco use. Continue to reinforce the importance of tobacco cessation.
Patient is a nonsmoker.
Patient refused morning vitals.
Patient was seen by Humberto Gardner NP via telepsych.
packet: yes. Consents reviewed, signed yes. Patient verbalize understanding:  yes.     Patient education on precautions: yes                   Zafar Lee RN

## 2020-07-10 NOTE — PLAN OF CARE
[facility]  Initial Interdisciplinary Treatment Plan NO      Original treatment plan Date & Time: 7/10/2020 7138    Admission Type:  Admission Type: Involuntary(signed in )    Reason for admission:   Reason for Admission: suicidal, alcohol issues, used cocaine also, multiple psy admits    Estimated Length of Stay:  5-7days  Estimated Discharge Date: to be determined by physician    PATIENT STRENGTHS:  Patient Strengths:Strengths: No significant Physical Illness  Patient Strengths and Limitations:Limitations: Difficulty problem solving/relies on others to help solve problems  Addictive Behavior: Addictive Behavior  In the past 3 months, have you felt or has someone told you that you have a problem with:  : None  Do you have a history of Chemical Use?: No  Do you have a history of Alcohol Use?: No  Do you have a history of Street Drug Abuse?: No  Histroy of Prescripton Drug Abuse?: No  Medical Problems:  Past Medical History:   Diagnosis Date    ADHD (attention deficit hyperactivity disorder)     Alcoholism ([facility]     pt drinks 6 beers and a liter of vodka daily.     Anxiety     Bipolar 1 disorder (HCC)     PTSD (post-traumatic stress disorder)     Suicide attempt ([facility]     previous suicide attempt by overdosing on pills at approximately 25 y/o     Status EXAM:Status and Exam  Normal: Yes  Facial Expression: Flat  Affect: Appropriate  Level of Consciousness: Alert  Mood:Normal: No  Mood: Depressed  Motor Activity:Normal: Yes  Interview Behavior: Cooperative  Preception: Edon to Person, Edon to Place, Edon to Time, Edon to Situation  Attention:Normal: Yes  Thought Content:Normal: Yes  Hallucinations: None  Delusions: No  Memory:Normal: Yes  Insight and Judgment: No  Insight and Judgment: Poor Judgment, Poor Insight  Present Suicidal Ideation: Yes  Present Homicidal Ideation: No    EDUCATION:   Learner Progress Toward Treatment Goals: reviewed group plans and strategies for care    Method:group therapy, medication compliance, individualized assessments and care planning    Outcome: needs reinforcement    PATIENT GOALS: to be discussed with patient within 72 hours    PLAN/TREATMENT RECOMMENDATIONS:     continue group therapy , medications compliance, goal setting, individualized assessments and care, continue to monitor pt on unit      SHORT-TERM GOALS:   Time frame for Short-Term Goals: 5-7 days    LONG-TERM GOALS:  Time frame for Long-Term Goals: 6 months  Members Present in Team Meeting: See Signature [address]

## 2020-07-10 NOTE — VIRTUAL HEALTH
DISCHARGE SUMMARY  Patient ID:  Kelsi Freeman  [id number]  29 y.o.  1990    Admit date: 7/10/2020    Discharge date and time: 7/10/2020  10:24 AM     Admitting Physician: Ron Knight MD     Discharge Physician:  LIBBY Eisenberg CNP    Admission Diagnoses: Schizoaffective disorder [facility]) [F25.9]    Discharge Diagnoses:   <principal problem not specified>     Patient Active Problem List   Diagnosis Code    Schizoaffective disorder, bipolar type ([address] F25.0    Suicide attempt by drug ingestion ([address] T50.902A    Tobacco use Z72.0    Polysubstance abuse ([address] F19.10    Atrial flutter, paroxysmal ([address] I48.92    Episodic cannabis use F12.90    Cocaine use F14.90    Accidental drug overdose T50.901A    Schizoaffective disorder ([address] F25.9    Suicide attempt ([address] T14.91XA    PTSD (post-traumatic stress disorder) F43.10    Bipolar 1 disorder ([address] F31.9    Anxiety F41.9    Alcoholism ([address] F10.20    ADHD (attention deficit hyperactivity disorder) F90.9    Intentional drug overdose ([address] T50.902A    Drug overdose T50.901A    MDD (major depressive disorder), recurrent episode ([address] F33.9    Major depressive disorder, recurrent ([address] F33.9    Major depression, recurrent ([address] F33.9        Admission Condition: poor    Discharged Condition: stable    Indication for Admission: threat to self    History of Present Illnes (present tense wording indicates findings from admission exam on 7/10/2020 and are not necessarily indicative of current findings):   Diego Pate was admitted on a pink slip for suicidal thoughts and he was intoxicated. Hospital Course:   Upon admission, he was provided a safe secure environment, introduced to unit milieu. He did not want to start medication and he did not participate in unit activities. Sleep improved, appetite was good. On morning rounds 7/10/2020, patient endorsed feeling ready for discharge.   Patient denies suicidal or homicidal ideations, denies with patient and treatment team.     Signed:  Arlene Burns   7/10/2020  10:24 AM        Patient Location:  [address] [place] [address]    Provider Location ([facility]): [place], [place]    This virtual visit was conducted via interactive/real-time audio/video.

## 2020-08-01 ENCOUNTER — HOSPITAL ENCOUNTER (EMERGENCY)
Age: 30
Discharge: HOME | End: 2020-08-01
Attending: EMERGENCY MEDICINE
Payer: COMMERCIAL

## 2020-08-01 VITALS
RESPIRATION RATE: 16 BRPM | WEIGHT: 180 LBS | OXYGEN SATURATION: 96 % | TEMPERATURE: 98.1 F | DIASTOLIC BLOOD PRESSURE: 75 MMHG | HEIGHT: 71 IN | HEART RATE: 92 BPM | BODY MASS INDEX: 25.2 KG/M2 | SYSTOLIC BLOOD PRESSURE: 142 MMHG

## 2020-08-01 LAB
-: ABNORMAL
ABSOLUTE EOS #: 0.03 K/UL (ref 0–0.44)
ABSOLUTE IMMATURE GRANULOCYTE: 0.04 K/UL (ref 0–0.3)
ABSOLUTE LYMPH #: 2.61 K/UL (ref 1.1–3.7)
ABSOLUTE MONO #: 0.83 K/UL (ref 0.1–1.2)
ACETAMINOPHEN LEVEL: <10 UG/ML (ref 10–30)
AMORPHOUS: ABNORMAL
AMPHETAMINE SCREEN URINE: NEGATIVE
ANION GAP SERPL CALCULATED.3IONS-SCNC: 18 MMOL/L (ref 9–17)
BACTERIA: ABNORMAL
BARBITURATE SCREEN URINE: NEGATIVE
BASOPHILS # BLD: 1 % (ref 0–2)
BASOPHILS ABSOLUTE: 0.06 K/UL (ref 0–0.2)
BENZODIAZEPINE SCREEN, URINE: NEGATIVE
BILIRUBIN URINE: NEGATIVE
BUN BLDV-MCNC: 21 MG/DL (ref 6–20)
BUN/CREAT BLD: 21 (ref 9–20)
BUPRENORPHINE URINE: ABNORMAL
CALCIUM SERPL-MCNC: 9.5 MG/DL (ref 8.6–10.4)
CANNABINOID SCREEN URINE: NEGATIVE
CASTS UA: ABNORMAL /LPF
CHLORIDE BLD-SCNC: 103 MMOL/L (ref 98–107)
CO2: 23 MMOL/L (ref 20–31)
COCAINE METABOLITE, URINE: POSITIVE
COLOR: YELLOW
COMMENT UA: ABNORMAL
CREAT SERPL-MCNC: 1.02 MG/DL (ref 0.7–1.2)
CRYSTALS, UA: ABNORMAL /HPF
DIFFERENTIAL TYPE: ABNORMAL
EOSINOPHILS RELATIVE PERCENT: 0 % (ref 1–4)
EPITHELIAL CELLS UA: ABNORMAL /HPF (ref 0–5)
ETHANOL PERCENT: 0.06 %
ETHANOL PERCENT: 0.12 %
ETHANOL: 115 MG/DL
ETHANOL: 63 MG/DL
GFR AFRICAN AMERICAN: >60 ML/MIN
GFR NON-AFRICAN AMERICAN: >60 ML/MIN
GFR SERPL CREATININE-BSD FRML MDRD: ABNORMAL ML/MIN/{1.73_M2}
GFR SERPL CREATININE-BSD FRML MDRD: ABNORMAL ML/MIN/{1.73_M2}
GLUCOSE BLD-MCNC: 75 MG/DL (ref 70–99)
GLUCOSE URINE: NEGATIVE
HCT VFR BLD CALC: 41.1 % (ref 40.7–50.3)
HEMOGLOBIN: 13.7 G/DL (ref 13–17)
IMMATURE GRANULOCYTES: 1 %
KETONES, URINE: NEGATIVE
LEUKOCYTE ESTERASE, URINE: NEGATIVE
LYMPHOCYTES # BLD: 30 % (ref 24–43)
MAGNESIUM: 2.2 MG/DL (ref 1.6–2.6)
MCH RBC QN AUTO: 31.4 PG (ref 25.2–33.5)
MCHC RBC AUTO-ENTMCNC: 33.3 G/DL (ref 28.4–34.8)
MCV RBC AUTO: 94.3 FL (ref 82.6–102.9)
MDMA URINE: ABNORMAL
METHADONE SCREEN, URINE: NEGATIVE
METHAMPHETAMINE, URINE: ABNORMAL
MONOCYTES # BLD: 9 % (ref 3–12)
MUCUS: ABNORMAL
NITRITE, URINE: NEGATIVE
NRBC AUTOMATED: 0 PER 100 WBC
OPIATES, URINE: NEGATIVE
OTHER OBSERVATIONS UA: ABNORMAL
OXYCODONE SCREEN URINE: NEGATIVE
PDW BLD-RTO: 13.2 % (ref 11.8–14.4)
PH UA: 5.5 (ref 5–8)
PHENCYCLIDINE, URINE: NEGATIVE
PLATELET # BLD: 253 K/UL (ref 138–453)
PLATELET ESTIMATE: ABNORMAL
PMV BLD AUTO: 10.6 FL (ref 8.1–13.5)
POTASSIUM SERPL-SCNC: 4.5 MMOL/L (ref 3.7–5.3)
PROPOXYPHENE, URINE: ABNORMAL
PROTEIN UA: ABNORMAL
RBC # BLD: 4.36 M/UL (ref 4.21–5.77)
RBC # BLD: ABNORMAL 10*6/UL
RBC UA: ABNORMAL /HPF (ref 0–2)
RENAL EPITHELIAL, UA: ABNORMAL /HPF
SALICYLATE LEVEL: <1 MG/DL (ref 3–10)
SEG NEUTROPHILS: 59 % (ref 36–65)
SEGMENTED NEUTROPHILS ABSOLUTE COUNT: 5.24 K/UL (ref 1.5–8.1)
SODIUM BLD-SCNC: 144 MMOL/L (ref 135–144)
SPECIFIC GRAVITY UA: 1.03 (ref 1–1.03)
TEST INFORMATION: ABNORMAL
TRICHOMONAS: ABNORMAL
TRICYCLIC ANTIDEPRESSANTS, UR: ABNORMAL
TURBIDITY: CLEAR
URINE HGB: NEGATIVE
UROBILINOGEN, URINE: NORMAL
WBC # BLD: 8.8 K/UL (ref 3.5–11.3)
WBC # BLD: ABNORMAL 10*3/UL
WBC UA: ABNORMAL /HPF (ref 0–5)
YEAST: ABNORMAL

## 2020-08-01 PROCEDURE — 2580000003 HC RX 258: Performed by: EMERGENCY MEDICINE

## 2020-08-01 PROCEDURE — 80307 DRUG TEST PRSMV CHEM ANLYZR: CPT

## 2020-08-01 PROCEDURE — 81001 URINALYSIS AUTO W/SCOPE: CPT

## 2020-08-01 PROCEDURE — 99284 EMERGENCY DEPT VISIT MOD MDM: CPT

## 2020-08-01 PROCEDURE — 96374 THER/PROPH/DIAG INJ IV PUSH: CPT

## 2020-08-01 PROCEDURE — 85025 COMPLETE CBC W/AUTO DIFF WBC: CPT

## 2020-08-01 PROCEDURE — 6360000002 HC RX W HCPCS: Performed by: EMERGENCY MEDICINE

## 2020-08-01 PROCEDURE — G0480 DRUG TEST DEF 1-7 CLASSES: HCPCS

## 2020-08-01 PROCEDURE — 83735 ASSAY OF MAGNESIUM: CPT

## 2020-08-01 PROCEDURE — 80048 BASIC METABOLIC PNL TOTAL CA: CPT

## 2020-08-01 RX ORDER — 0.9 % SODIUM CHLORIDE 0.9 %
1000 INTRAVENOUS SOLUTION INTRAVENOUS ONCE
Status: COMPLETED | OUTPATIENT
Start: 2020-08-01 | End: 2020-08-01

## 2020-08-01 RX ORDER — ONDANSETRON 2 MG/ML
4 INJECTION INTRAMUSCULAR; INTRAVENOUS ONCE
Status: COMPLETED | OUTPATIENT
Start: 2020-08-01 | End: 2020-08-01

## 2020-08-01 RX ADMIN — ONDANSETRON 4 MG: 2 INJECTION INTRAMUSCULAR; INTRAVENOUS at 15:28

## 2020-08-01 RX ADMIN — SODIUM CHLORIDE 1000 ML: 9 INJECTION, SOLUTION INTRAVENOUS at 15:28

## 2020-08-01 ASSESSMENT — PAIN SCALES - GENERAL: PAINLEVEL_OUTOF10: 6

## 2020-08-01 ASSESSMENT — ENCOUNTER SYMPTOMS
ABDOMINAL PAIN: 0
CHEST TIGHTNESS: 0
EYE DISCHARGE: 0
ABDOMINAL DISTENTION: 0
SHORTNESS OF BREATH: 0
FACIAL SWELLING: 0
BACK PAIN: 0
EYE PAIN: 0

## 2020-08-01 NOTE — ED PROVIDER NOTES
eMERGENCY dEPARTMENT eNCOUnter   Independent Attestation     Pt Name: Shraddha Freedman  MRN: [id number]  [place] 1990  Date of evaluation: 8/1/20     Shraddha Freedman is a 31 y.o. male with CC: Alcohol Intoxication    Patient here for alcohol and drug detox. Work-up in progress, plan is to speak with rescue crisis for transfer. Based on the medical record the care appears appropriate. I was personally available for consultation in the Emergency Department.     Byron Rosas MD  Attending Emergency Physician                   Byron Rosas MD  08/01/20 1369

## 2020-08-01 NOTE — ED PROVIDER NOTES
overdosing on pills at approximately 25 y/o     SURGICAL HISTORY     History reviewed. No pertinent surgical history. CURRENT MEDICATIONS       Discharge Medication List as of 2020 10:22 PM      CONTINUE these medications which have NOT CHANGED    Details   Melatonin CR 3 MG TBCR Take 3 mg by mouth nightly as needed (sleep), Disp-14 tablet, R-0NO PRINT           ALLERGIES     is allergic to advil [ibuprofen]. FAMILY HISTORY     He indicated that his mother is alive. He indicated that his father is alive. He indicated that the status of his brother is unknown. SOCIAL HISTORY       Social History     Tobacco Use    Smoking status: Former Smoker     Years: 14.00     Types: Cigarettes     Last attempt to quit: 2019     Years since quittin.3    Smokeless tobacco: Never Used   Substance Use Topics    Alcohol use: Yes     Comment: 2 bottles wine daily    Drug use: Yes     Types: Cocaine, Opiates , IV     Comment: drug abuse includes cocaine & heroin     PHYSICAL EXAM     INITIAL VITALS: BP (!) 142/75   Pulse 92   Temp 98.1 °F (36.7 °C)   Resp 16   Ht 5' 11\" (1.803 m)   Wt 180 lb (81.6 kg)   SpO2 96%   BMI 25.10 kg/m²    Physical Exam  Vitals signs and nursing note reviewed. Constitutional:       General: He is not in acute distress. Appearance: He is well-developed. He is not diaphoretic. HENT:      Head: Normocephalic and atraumatic. Eyes:      Pupils: Pupils are equal, round, and reactive to light. Neck:      Musculoskeletal: Normal range of motion and neck supple. Cardiovascular:      Rate and Rhythm: Normal rate and regular rhythm. Pulmonary:      Effort: Pulmonary effort is normal.      Breath sounds: Normal breath sounds. Abdominal:      General: Bowel sounds are normal.      Palpations: Abdomen is soft. Musculoskeletal: Normal range of motion. Skin:     General: Skin is warm. Capillary Refill: Capillary refill takes less than 2 seconds.    Neurological: 21 (*)     Anion Gap 18 (*)     All other components within normal limits   ETHANOL - Abnormal; Notable for the following components:    Ethanol 115 (*)     Ethanol percent 0.115 (*)     All other components within normal limits   ACETAMINOPHEN LEVEL - Abnormal; Notable for the following components:    Acetaminophen Level <10 (*)     All other components within normal limits   SALICYLATE LEVEL - Abnormal; Notable for the following components:    Salicylate Lvl <1 (*)     All other components within normal limits   URINE DRUG SCREEN - Abnormal; Notable for the following components:    Cocaine Metabolite, Urine POSITIVE (*)     All other components within normal limits   URINALYSIS WITH MICROSCOPIC - Abnormal; Notable for the following components:    Specific Gravity, UA 1.031 (*)     Protein, UA 1+ (*)     Mucus, UA 1+ (*)     Amorphous, UA 1+ (*)     All other components within normal limits   ETHANOL - Abnormal; Notable for the following components:    Ethanol 63 (*)     Ethanol percent 0.063 (*)     All other components within normal limits   MAGNESIUM       EMERGENCY DEPARTMENTCOURSE:         Vitals:    Vitals:    08/01/20 1353 08/01/20 1401   BP:  (!) 142/75   Pulse:  92   Resp:  16   Temp:  98.1 °F (36.7 °C)   SpO2:  96%   Weight: 180 lb (81.6 kg)    Height: 5' 11\" (1.803 m)        The patient was given the following medications while in the emergency department:  Orders Placed This Encounter   Medications    0.9 % sodium chloride bolus    ondansetron (ZOFRAN) injection 4 mg     CONSULTS:  None    FINAL IMPRESSION      1. Acute alcoholic intoxication without complication ([facility]    2. Substance abuse [facility])          DISPOSITION/PLAN   DISPOSITION Decision To Transfer 08/01/2020 05:48:30 PM      PATIENT REFERRED TO:  No follow-up provider specified.   DISCHARGE MEDICATIONS:  Discharge Medication List as of 8/1/2020 69:67 PM        Shiraz Galarza MD  Attending Emergency Physician    This note was created with the assistance of a speech-recognition program. While intending to generate a document that actually reflects the content of the visit, no guarantees can be provided that every mistake has been identified and corrected by editing.                     Eileen Saavedra MD  41/45/24 8436

## 2020-08-01 NOTE — ED NOTES
Pt presents to ed for detox. He is an alcoholic and drug abuser and wants to quit. He states he has been taking sips of alcohol this morning. He last used heroin last night. He does not appear to be in withdrawal at this time.       Irene Schumacher RN  08/01/20 6841

## 2020-08-02 NOTE — ED NOTES
[facility] called for pt. I wake up pt to get packed up, sign discharge papers and give him a boxed lunch.  Waiting in room for cab     Malinda Jamil RN  08/01/20 5881

## 2020-08-14 ENCOUNTER — HOSPITAL ENCOUNTER (EMERGENCY)
Age: 30
Discharge: HOME | End: 2020-08-15
Attending: EMERGENCY MEDICINE
Payer: COMMERCIAL

## 2020-08-14 VITALS
HEIGHT: 72 IN | SYSTOLIC BLOOD PRESSURE: 108 MMHG | OXYGEN SATURATION: 100 % | TEMPERATURE: 97.9 F | RESPIRATION RATE: 18 BRPM | HEART RATE: 108 BPM | WEIGHT: 195 LBS | DIASTOLIC BLOOD PRESSURE: 77 MMHG | BODY MASS INDEX: 26.41 KG/M2

## 2020-08-14 PROCEDURE — 99284 EMERGENCY DEPT VISIT MOD MDM: CPT

## 2020-08-14 PROCEDURE — 6370000000 HC RX 637 (ALT 250 FOR IP): Performed by: NURSE PRACTITIONER

## 2020-08-14 RX ORDER — FLUOXETINE HYDROCHLORIDE 20 MG/1
20 CAPSULE ORAL DAILY
Status: ON HOLD | COMMUNITY
End: 2020-12-26 | Stop reason: HOSPADM

## 2020-08-14 RX ORDER — LORAZEPAM 1 MG/1
1 TABLET ORAL ONCE
Status: COMPLETED | OUTPATIENT
Start: 2020-08-14 | End: 2020-08-14

## 2020-08-14 RX ADMIN — LORAZEPAM 1 MG: 1 TABLET ORAL at 23:45

## 2020-08-14 ASSESSMENT — ENCOUNTER SYMPTOMS
SHORTNESS OF BREATH: 0
ABDOMINAL PAIN: 0

## 2020-08-15 ENCOUNTER — HOSPITAL ENCOUNTER (EMERGENCY)
Age: 30
Discharge: HOME | End: 2020-08-16
Attending: EMERGENCY MEDICINE
Payer: COMMERCIAL

## 2020-08-15 VITALS
BODY MASS INDEX: 26.41 KG/M2 | HEIGHT: 72 IN | TEMPERATURE: 98.9 F | RESPIRATION RATE: 20 BRPM | SYSTOLIC BLOOD PRESSURE: 121 MMHG | OXYGEN SATURATION: 96 % | DIASTOLIC BLOOD PRESSURE: 71 MMHG | HEART RATE: 96 BPM | WEIGHT: 195 LBS

## 2020-08-15 LAB — GLUCOSE BLD-MCNC: 99 MG/DL (ref 75–110)

## 2020-08-15 PROCEDURE — 99284 EMERGENCY DEPT VISIT MOD MDM: CPT

## 2020-08-15 PROCEDURE — 82947 ASSAY GLUCOSE BLOOD QUANT: CPT

## 2020-08-15 NOTE — ED PROVIDER NOTES
eMERGENCY dEPARTMENT eNCOUnter   Independent Attestation     Pt Name: Kahlil Goode  MRN: [id number]  [place] 1990  Date of evaluation: 8/14/20     Kahlil Goode is a 31 y.o. male with CC: Anxiety      Based on the medical record the care appears appropriate. I was personally available for consultation in the Emergency Department.     Sindy Gil MD  Attending Emergency Physician                    Darlene Gurrola MD  08/14/20 [id number]

## 2020-08-15 NOTE — ED PROVIDER NOTES
[facility] ED  EMERGENCY DEPARTMENT ENCOUNTER      Pt Name: Ned Jiménez  MRN: [id number]  [place] 1990  Date of evaluation: 8/14/2020  Provider: Rondell Frankel, [address]       Chief Complaint   Patient presents with    Anxiety         HISTORY Ethel  (Location/Symptom, Timing/Onset, Context/Setting, Quality, Duration, Modifying Factors, Severity.)   Ned Jiménez is a 31 y.o. male who presents to the emergency department for evaluation of anxiety. Patient has a history of bipolar disorder. Patient states he has been out of her Seroquel for several days. States earlier today he felt short of breath and tingling in his lips so he went to [facility] emergency department was evaluated there. Had negative cardiac work-up. He was discharged from the hospital with a prescription for Seroquel for 14 days. Patient states he took his prescription to the pharmacy but they would not fill it yet. He is unsure why. States he developed anxiety again this evening. He is requesting a prescription for Ativan. No suicidal ideations. No chest pain or shortness of breath upon arrival.      Nursing Notes were reviewed. PASTMEDICAL HISTORY     Past Medical History:   Diagnosis Date    ADHD (attention deficit hyperactivity disorder)     Alcoholism ([facility]     pt drinks 6 beers and a liter of vodka daily.  Anxiety     Bipolar 1 disorder (HCC)     PTSD (post-traumatic stress disorder)     Suicide attempt ([facility]     previous suicide attempt by overdosing on pills at approximately 25 y/o         SURGICAL HISTORY     History reviewed. No pertinent surgical history.       CURRENT MEDICATIONS     Current Discharge Medication List      CONTINUE these medications which have NOT CHANGED    Details   QUEtiapine Fumarate (SEROQUEL PO) Take 100 mg by mouth daily      FLUoxetine (PROZAC) 20 MG capsule Take 20 mg by mouth daily      Melatonin CR 3 MG TBCR Take 3 mg by mouth nightly as needed (sleep)  Qty: 14 tablet, Refills: 0             ALLERGIES     Advil [ibuprofen]    FAMILY HISTORY       Family History   Problem Relation Age of Onset    Liver Cancer Brother     Alcohol Abuse Brother     No Known Problems Mother     No Known Problems Father           SOCIAL HISTORY       Social History     Socioeconomic History    Marital status: Single     Spouse name: None    Number of children: None    Years of education: None    Highest education level: None   Occupational History    None   Social Needs    Financial resource strain: None    Food insecurity     Worry: None     Inability: None    Transportation needs     Medical: None     Non-medical: None   Tobacco Use    Smoking status: Former Smoker     Years: 14.00     Types: Cigarettes     Last attempt to quit: 2019     Years since quittin.3    Smokeless tobacco: Never Used   Substance and Sexual Activity    Alcohol use: Yes     Comment: 2 bottles wine daily    Drug use: Yes     Types: Cocaine, Opiates , IV     Comment: drug abuse includes cocaine & heroin    Sexual activity: Never   Lifestyle    Physical activity     Days per week: None     Minutes per session: None    Stress: None   Relationships    Social connections     Talks on phone: None     Gets together: None     Attends Caodaism service: None     Active member of club or organization: None     Attends meetings of clubs or organizations: None     Relationship status: None    Intimate partner violence     Fear of current or ex partner: None     Emotionally abused: None     Physically abused: None     Forced sexual activity: None   Other Topics Concern    None   Social History Narrative    None         REVIEW OF SYSTEMS    (2-9 systems for level 4, 10 or more for level 5)     Review of Systems   Respiratory: Negative for shortness of breath. Cardiovascular: Negative for chest pain. Gastrointestinal: Negative for abdominal pain.    Psychiatric/Behavioral:

## 2020-08-16 NOTE — ED NOTES
Bed: 18  Expected date:   Expected time:   Means of arrival:   Comments:  [facility] [address], RN  08/15/20 2040

## 2020-08-16 NOTE — ED PROVIDER NOTES
1. 3    Smokeless tobacco: Never Used   Substance Use Topics    Alcohol use: Yes     Comment: 2 bottles wine daily    Drug use: Yes     Types: Cocaine, Opiates , IV     Comment: drug abuse includes cocaine & heroin     PHYSICAL EXAM     INITIAL VITALS: /71   Pulse 96   Temp 98.9 °F (37.2 °C) (Oral)   Resp 20   Ht 6' (1.829 m)   Wt 195 lb (88.5 kg)   SpO2 96%   BMI 26.45 kg/m²    Physical Exam  Constitutional:       Comments: Odor of alcohol on breath. HENT:      Head: Normocephalic. Right Ear: External ear normal.      Left Ear: External ear normal.      Nose: Nose normal.   Eyes:      Conjunctiva/sclera: Conjunctivae normal.   Cardiovascular:      Rate and Rhythm: Normal rate. Pulmonary:      Effort: Pulmonary effort is normal.   Abdominal:      General: Abdomen is flat. Skin:     General: Skin is dry. Neurological:      General: No focal deficit present. Mental Status: He is alert and oriented to person, place, and time. Mental status is at baseline. Cranial Nerves: No cranial nerve deficit. Sensory: No sensory deficit. Psychiatric:         Mood and Affect: Mood normal.         Behavior: Behavior normal.         MEDICAL DECISION MAKING:   The patient is hemodynamically stable, afebrile, odor of alcohol on breath. Physical exam unremarkable. ED plan for observation, discharge. DIAGNOSTIC RESULTS   EKG:All EKG's are interpreted by the Emergency Department Physician who either signs or Co-signs this chart in the absence of a cardiologist.        RADIOLOGY:All plain film, CT, MRI, and formal ultrasound images (except ED bedside ultrasound) are read by the radiologist, see reports below, unless otherwisenoted in MDM or here. No orders to display     LABS: All lab results were reviewed by myself, and all abnormals are listed below.   Labs Reviewed   POC GLUCOSE FINGERSTICK       EMERGENCY DEPARTMENTCOURSE:         Vitals:    Vitals:    08/15/20 2043 08/15/20 2045 BP: 121/71    Pulse: 96    Resp: 20    Temp: 98.9 °F (37.2 °C)    TempSrc: Oral    SpO2: 96%    Weight:  195 lb (88.5 kg)   Height:  6' (1.829 m)       The patient was given the following medications while in the emergency department:  No orders of the defined types were placed in this encounter. CONSULTS:  None    FINAL IMPRESSION      1. Acute alcoholic intoxication without complication [facility])          DISPOSITION/PLAN   DISPOSITION Decision To Discharge 08/15/2020 09:28:59 PM      PATIENT REFERRED TO:  No follow-up provider specified.   DISCHARGE MEDICATIONS:  New Prescriptions    No medications on file     Reji Micahud MD  Attending Emergency Physician                    Annette Woods MD  08/16/20 5727

## 2020-12-21 ENCOUNTER — HOSPITAL ENCOUNTER (INPATIENT)
Age: 30
LOS: 5 days | Discharge: HOME | DRG: 750 | End: 2020-12-26
Attending: PSYCHIATRY & NEUROLOGY | Admitting: PSYCHIATRY & NEUROLOGY
Payer: COMMERCIAL

## 2020-12-21 LAB
ACETAMINOPHEN LEVEL: < 5 UG/ML (ref 0–20)
ALBUMIN SERPL-MCNC: 4.4 G/DL (ref 3.5–5.1)
ALP BLD-CCNC: 36 U/L (ref 38–126)
ALT SERPL-CCNC: 10 U/L (ref 11–66)
AMPHETAMINE+METHAMPHETAMINE URINE SCREEN: NEGATIVE
ANION GAP SERPL CALCULATED.3IONS-SCNC: 14 MEQ/L (ref 8–16)
AST SERPL-CCNC: 16 U/L (ref 5–40)
BARBITURATE QUANTITATIVE URINE: NEGATIVE
BASOPHILS # BLD: 0.6 %
BASOPHILS ABSOLUTE: 0 THOU/MM3 (ref 0–0.1)
BENZODIAZEPINE QUANTITATIVE URINE: NEGATIVE
BILIRUB SERPL-MCNC: 1.1 MG/DL (ref 0.3–1.2)
BILIRUBIN DIRECT: 0.3 MG/DL (ref 0–0.3)
BILIRUBIN URINE: NEGATIVE
BLOOD, URINE: NEGATIVE
BUN BLDV-MCNC: 21 MG/DL (ref 7–22)
CALCIUM SERPL-MCNC: 9.2 MG/DL (ref 8.5–10.5)
CANNABINOID QUANTITATIVE URINE: NEGATIVE
CHARACTER, URINE: CLEAR
CHLORIDE BLD-SCNC: 102 MEQ/L (ref 98–111)
CO2: 25 MEQ/L (ref 23–33)
COCAINE METABOLITE QUANTITATIVE URINE: NEGATIVE
COLOR: YELLOW
CREAT SERPL-MCNC: 1 MG/DL (ref 0.4–1.2)
EOSINOPHIL # BLD: 2.4 %
EOSINOPHILS ABSOLUTE: 0.1 THOU/MM3 (ref 0–0.4)
ERYTHROCYTE [DISTWIDTH] IN BLOOD BY AUTOMATED COUNT: 12.7 % (ref 11.5–14.5)
ERYTHROCYTE [DISTWIDTH] IN BLOOD BY AUTOMATED COUNT: 42.4 FL (ref 35–45)
ETHYL ALCOHOL, SERUM: < 0.01 %
GFR SERPL CREATININE-BSD FRML MDRD: 87 ML/MIN/1.73M2
GLUCOSE BLD-MCNC: 87 MG/DL (ref 70–108)
GLUCOSE URINE: NEGATIVE MG/DL
HCT VFR BLD CALC: 39.5 % (ref 42–52)
HEMOGLOBIN: 13.5 GM/DL (ref 14–18)
IMMATURE GRANS (ABS): 0.01 THOU/MM3 (ref 0–0.07)
IMMATURE GRANULOCYTES: 0.2 %
KETONES, URINE: 15
LEUKOCYTE ESTERASE, URINE: NEGATIVE
LYMPHOCYTES # BLD: 22.6 %
LYMPHOCYTES ABSOLUTE: 1.2 THOU/MM3 (ref 1–4.8)
MCH RBC QN AUTO: 31.7 PG (ref 26–33)
MCHC RBC AUTO-ENTMCNC: 34.2 GM/DL (ref 32.2–35.5)
MCV RBC AUTO: 92.7 FL (ref 80–94)
MONOCYTES # BLD: 8.7 %
MONOCYTES ABSOLUTE: 0.5 THOU/MM3 (ref 0.4–1.3)
NITRITE, URINE: NEGATIVE
NUCLEATED RED BLOOD CELLS: 0 /100 WBC
OPIATES, URINE: NEGATIVE
OSMOLALITY CALCULATION: 283.6 MOSMOL/KG (ref 275–300)
OXYCODONE: NEGATIVE
PH UA: 6 (ref 5–9)
PHENCYCLIDINE QUANTITATIVE URINE: NEGATIVE
PLATELET # BLD: 184 THOU/MM3 (ref 130–400)
PMV BLD AUTO: 11.2 FL (ref 9.4–12.4)
POTASSIUM SERPL-SCNC: 3.7 MEQ/L (ref 3.5–5.2)
PROTEIN UA: NEGATIVE
RBC # BLD: 4.26 MILL/MM3 (ref 4.7–6.1)
SALICYLATE, SERUM: < 0.3 MG/DL (ref 2–10)
SEG NEUTROPHILS: 65.5 %
SEGMENTED NEUTROPHILS ABSOLUTE COUNT: 3.5 THOU/MM3 (ref 1.8–7.7)
SODIUM BLD-SCNC: 141 MEQ/L (ref 135–145)
SPECIFIC GRAVITY, URINE: 1.02 (ref 1–1.03)
TOTAL PROTEIN: 6.6 G/DL (ref 6.1–8)
TSH SERPL DL<=0.05 MIU/L-ACNC: 1.12 UIU/ML (ref 0.4–4.2)
UROBILINOGEN, URINE: 1 EU/DL (ref 0–1)
WBC # BLD: 5.4 THOU/MM3 (ref 4.8–10.8)

## 2020-12-21 PROCEDURE — 80307 DRUG TEST PRSMV CHEM ANLYZR: CPT

## 2020-12-21 PROCEDURE — G0480 DRUG TEST DEF 1-7 CLASSES: HCPCS

## 2020-12-21 PROCEDURE — 82248 BILIRUBIN DIRECT: CPT

## 2020-12-21 PROCEDURE — 1240000000 HC EMOTIONAL WELLNESS R&B

## 2020-12-21 PROCEDURE — 80053 COMPREHEN METABOLIC PANEL: CPT

## 2020-12-21 PROCEDURE — 84443 ASSAY THYROID STIM HORMONE: CPT

## 2020-12-21 PROCEDURE — 81003 URINALYSIS AUTO W/O SCOPE: CPT

## 2020-12-21 PROCEDURE — 85025 COMPLETE CBC W/AUTO DIFF WBC: CPT

## 2020-12-21 PROCEDURE — 36415 COLL VENOUS BLD VENIPUNCTURE: CPT

## 2020-12-21 PROCEDURE — 99283 EMERGENCY DEPT VISIT LOW MDM: CPT

## 2020-12-21 RX ORDER — HYDROXYZINE HYDROCHLORIDE 25 MG/1
50 TABLET, FILM COATED ORAL 3 TIMES DAILY PRN
Status: DISCONTINUED | OUTPATIENT
Start: 2020-12-21 | End: 2020-12-26 | Stop reason: HOSPADM

## 2020-12-21 RX ORDER — NICOTINE 21 MG/24HR
1 PATCH, TRANSDERMAL 24 HOURS TRANSDERMAL DAILY
Status: DISCONTINUED | OUTPATIENT
Start: 2020-12-22 | End: 2020-12-22

## 2020-12-21 RX ORDER — ACETAMINOPHEN 325 MG/1
650 TABLET ORAL EVERY 4 HOURS PRN
Status: DISCONTINUED | OUTPATIENT
Start: 2020-12-21 | End: 2020-12-26 | Stop reason: HOSPADM

## 2020-12-21 RX ORDER — MAGNESIUM HYDROXIDE/ALUMINUM HYDROXICE/SIMETHICONE 120; 1200; 1200 MG/30ML; MG/30ML; MG/30ML
30 SUSPENSION ORAL EVERY 6 HOURS PRN
Status: DISCONTINUED | OUTPATIENT
Start: 2020-12-21 | End: 2020-12-26 | Stop reason: HOSPADM

## 2020-12-21 RX ORDER — TRAZODONE HYDROCHLORIDE 50 MG/1
50 TABLET ORAL NIGHTLY PRN
Status: DISCONTINUED | OUTPATIENT
Start: 2020-12-21 | End: 2020-12-26 | Stop reason: HOSPADM

## 2020-12-21 ASSESSMENT — PAIN SCALES - GENERAL: PAINLEVEL_OUTOF10: 0

## 2020-12-21 ASSESSMENT — SLEEP AND FATIGUE QUESTIONNAIRES
RESTFUL SLEEP: NO
DO YOU HAVE DIFFICULTY SLEEPING: YES
DO YOU HAVE DIFFICULTY SLEEPING: YES
DIFFICULTY STAYING ASLEEP: YES
DIFFICULTY ARISING: NO
DO YOU USE A SLEEP AID: NO
AVERAGE NUMBER OF SLEEP HOURS: 0
DIFFICULTY FALLING ASLEEP: YES
RESTFUL SLEEP: NO
AVERAGE NUMBER OF SLEEP HOURS: 0
DIFFICULTY ARISING: NO
SLEEP PATTERN: DIFFICULTY FALLING ASLEEP;DISTURBED/INTERRUPTED SLEEP;INSOMNIA
SLEEP PATTERN: INSOMNIA
DIFFICULTY STAYING ASLEEP: YES
DO YOU USE A SLEEP AID: NO
DIFFICULTY FALLING ASLEEP: YES

## 2020-12-21 ASSESSMENT — LIFESTYLE VARIABLES: HISTORY_ALCOHOL_USE: YES

## 2020-12-21 ASSESSMENT — PATIENT HEALTH QUESTIONNAIRE - PHQ9
SUM OF ALL RESPONSES TO PHQ QUESTIONS 1-9: 17
SUM OF ALL RESPONSES TO PHQ QUESTIONS 1-9: 17

## 2020-12-22 PROBLEM — F31.81 SEVERE BIPOLAR II DISORDER, MOST RECENT EPISODE MAJOR DEPRESSIVE WITHOUT PSYCHOTIC FEATURES (HCC): Status: ACTIVE | Noted: 2020-12-22

## 2020-12-22 PROCEDURE — 6370000000 HC RX 637 (ALT 250 FOR IP): Performed by: REGISTERED NURSE

## 2020-12-22 PROCEDURE — 99223 1ST HOSP IP/OBS HIGH 75: CPT | Performed by: PSYCHIATRY & NEUROLOGY

## 2020-12-22 PROCEDURE — 1240000000 HC EMOTIONAL WELLNESS R&B

## 2020-12-22 PROCEDURE — 6370000000 HC RX 637 (ALT 250 FOR IP): Performed by: PSYCHIATRY & NEUROLOGY

## 2020-12-22 RX ADMIN — TRAZODONE HYDROCHLORIDE 50 MG: 50 TABLET ORAL at 21:21

## 2020-12-22 RX ADMIN — HYDROXYZINE HYDROCHLORIDE 50 MG: 25 TABLET ORAL at 21:21

## 2020-12-22 RX ADMIN — LURASIDONE HYDROCHLORIDE 20 MG: 40 TABLET, FILM COATED ORAL at 21:21

## 2020-12-22 ASSESSMENT — SLEEP AND FATIGUE QUESTIONNAIRES
DO YOU USE A SLEEP AID: NO
DIFFICULTY STAYING ASLEEP: YES
SLEEP PATTERN: DIFFICULTY FALLING ASLEEP;RESTLESSNESS;DISTURBED/INTERRUPTED SLEEP
DO YOU HAVE DIFFICULTY SLEEPING: YES
AVERAGE NUMBER OF SLEEP HOURS: 3
RESTFUL SLEEP: NO
DIFFICULTY ARISING: NO
DIFFICULTY FALLING ASLEEP: YES

## 2020-12-22 ASSESSMENT — ENCOUNTER SYMPTOMS
SHORTNESS OF BREATH: 0
RHINORRHEA: 0
ABDOMINAL PAIN: 0
COUGH: 0
CHEST TIGHTNESS: 0
BACK PAIN: 0

## 2020-12-22 ASSESSMENT — PAIN SCALES - GENERAL: PAINLEVEL_OUTOF10: 0

## 2020-12-22 NOTE — ED NOTES
Pt resting on cot, with easy respirations.       [address], [address] [place] [address]  12/21/20 2144

## 2020-12-22 NOTE — PLAN OF CARE
Patient has not attended any of the groups today and has not been out much for social interaction with others this shift so he has not met his socialization goal.

## 2020-12-22 NOTE — PATIENT CARE CONFERENCE
[facility]  Initial Interdisciplinary Treatment Plan NOTE    Review Date & Time: 12/22/2020  1110    Patient was in treatment team.  See Multidisciplinary Treatment Team sheet for participants. Admission Type:   Admission Type: Involuntary    Reason for admission:  Reason for Admission: depressed      Estimated Length of Stay Update:  3-5 days  Estimated Discharge Date Update: 3-5 days    PATIENT STRENGTHS:  Patient Strengths Strengths: Communication, No significant Physical Illness  Patient Strengths and Limitations:Limitations: Difficulty problem solving/relies on others to help solve problems, Tendency to isolate self  Addictive Behavior:Addictive Behavior  In the past 3 months, have you felt or has someone told you that you have a problem with:  : None  Do you have a history of Chemical Use?: No  Do you have a history of Alcohol Use?: Yes  Do you have a history of Street Drug Abuse?: Yes  Histroy of Prescripton Drug Abuse?: No  Medical Problems:  Past Medical History:   Diagnosis Date    ADHD (attention deficit hyperactivity disorder)     Alcoholism ([facility]     pt drinks 6 beers and a liter of vodka daily.  Anxiety     Bipolar 1 disorder (HCC)     PTSD (post-traumatic stress disorder)     Suicide attempt ([facility]     previous suicide attempt by overdosing on pills at approximately 23 y/o       EDUCATION:   Learner Progress Toward Treatment Goals: Reviewed results and recommendations of this team, Reviewed group plan and strategies, Reviewed signs, symptoms and risk of self harm and violent behavior and Reviewed goals and plan of care    Method: Individual    Outcome: Verbalized understanding, Demonstrated Understanding and Needs reinforcement    PATIENT GOALS: To go to groups    PLAN/TREATMENT RECOMMENDATIONS UPDATE:   1. What is the most important thing we can help you with while you are here? Patient identified that his only goal is to attend groups.   2. Who is your support system? Patient identified family and friends as support system, although none of them are local  3. Do you have follow-up providers? No  4. Do you have the ability to pay for your medications? [employer]  5. Where will you be residing when you leave the hospital?  Patient states he wishes to enter treatment upon discharge. 6. Will need a return to work slip or FMLA paper completion?   No      GOALS UPDATE:   Time frame for Short-Term Goals: Daily    Lorrane Mast

## 2020-12-22 NOTE — BH NOTE
Patient declined to attend the goal group and was unable to attend the recreation group this morning due to being in the shower at time of group.

## 2020-12-22 NOTE — BH NOTE
INPATIENT RECREATIONAL THERAPY  ADULT BEHAVIORAL SERVICES  EVALUATION    REFERRING PHYSICIAN:   Dr. Becky Cushing  DIAGNOSIS:    Major Depressive Disorder, Recurrent  PRECAUTIONS:   Standard precautions    HISTORY OF PRESENT ILLNESS/INJURY:  Patient was admitted to the unit due to suicidal ideation and depression. Patient reported that he was having thoughts of taking an overdose prior to admission. Patient stated that he has a history of opoid and alcohol abuse. Patient has been clean and sober for 2 months. Patient has also been noncompliant with medications. Patient reported a history of PTSD, anxiety, panic attacks, hallucinations and poor sleep. Patient cooperative but evasive. PMH:  Please see medical chart for prior medical history, allergies, and medication    HISTORY OF PSYCHIATRIC TREATMENT:  IP:  [facility] [place], [facility] [address] [place] [address] Street:   [birth year]  GENDER:   male  MARITAL STATUS:   single  EMPLOYMENT STATUS:      LIVING SITUATION/SUPPORT:  Patient resides at [facility] but is unsure if he wants to return. Patient stated that his friends and family are both supportive. EDUCATIONAL LEVEL:   GED    MEDICATION/DRUG USE:  Opoid abuse. Clean for 2 months. Alcohol abuse. Sober for 2 months. Noncompliance with medications. Overdose in the past.    LEISURE INTERESTS:  reading, watching TV and movies, listening to music, activities with friends, family activities  ACTIVITY PREFERENCE:  Small group  ACTIVITY TYPES:  Passive. Indoor. COGNITION:  A&Ox4 - history of ADHD. COPING:  poor  ATTENTION:  Poor concentration - history of ADHD. RELAXATION:  Patient reported a history of anxiety, panic attacks and poor sleep. SELF-ESTEEM:  poor  MOTIVATION:   Poor - limited insight    SOCIAL SKILLS:   Fair - evasive  FRUSTRATION TOLERANCE:   Poor - history of cutting  ATTENTION SEEKING:  None noted but patient does have a history of cutting.    COOPERATION: Cooperative but evasive  AFFECT:  flat  APPEARANCE:  appropriate    HEARING:   No problems noted  VISION:   No problems noted   VERBAL COMMUNICATION:   evasive  WRITTEN COMMUNICATION:   No problems noted    COORDINATION:  No problems noted  MOBILITY:  Ambulates independently     GOALS:  (1) identify 2 new positive coping skills by time of discharge. (2) Increase socialization by attending groups on the unit daily.

## 2020-12-22 NOTE — PROGRESS NOTES
[facility] Biopsychosocial Assessment    Current Level of Psychosocial Functioning     Independent       XXX  Dependent    Minimal Assist     Comments:      Psychosocial High Risk Factors (check all that apply)    Unable to obtain meds   Chronic illness/pain    Substance abuse       XXX  Lack of Family Support   Financial stress   Isolation   Inadequate Community Resources  Suicide attempt(s)  Not taking medications        Victim of crime   Developmental Delay  Unable to manage personal needs    Age 72 or older   Homeless  No transportation   Readmission within 30 days  Unemployment      XXX  Traumatic Event    Psychiatric Advanced Directive:    [facility] but did sign a voluntary today    Family to involve in treatment:    None    Sexual Orientation:       Heterosexual    Patient Strengths:      Education, 2 months clean and sober    Patient Barriers:       Little support locally, not engaged in [place] [id number]    Opiate education provided:    Accepted and understands    Safety plan:       Contracts for safety    CMHC/MH history:      XXX    Plan of Care:  medication management, group/individual therapies, family meetings, psycho -education, treatment team meetings to assist with stabilization    Initial Discharge Plan:  Return to [facility]. Clinical Summary:  Phillip Rene is a 31year old male with a history of alcohol and opioid dependence who presented to the Emergency Department with worsening depression. [facility] denies suicidal ideation and stated that he has been living at [facility] for about 2 months. [facility] denies using substances for that same period of time. [facility] tells me today, that he is thinking of relocating to a different program and was told to come to this unit so that he may obtain a list of other treatment programs. He was born and raised in [place]. Onset of alcohol use began at the age of 14.  For the past 7 years, pt has been drinking daily for the most part. His preference is wine, drinking 1 to 2 bottles daily. He does acknowledge reading the alcohol content on the bottle before selecting his wine. He also has a history of cocaine and opioid abuse (percocet), beginning in his early 18's. Pt will swallow 2 or more percocet's while drinking and will episodically use cocaine when offered. Pt may be minimizing his drug use history. Pt has had past  Inpatient treatment as well as outpatient care. His longest period of abstinence in 3 months. He has attended AA but not obtained a sponsor. Denies suicidal/homicidal ideation. Denies legal history. His affect is flat with depressed mood during our conversation.

## 2020-12-22 NOTE — PROGRESS NOTES
This RN has reviewed and agrees with JAYLEN Villalpando LPN's data collection and has collaborated with this LPN regarding the patient's care plan.

## 2020-12-22 NOTE — ED PROVIDER NOTES
[facility] Emergency Department    CHIEF COMPLAINT       Chief Complaint   Patient presents with    Depression       Nurses Notes reviewed and I agree except as noted in the HPI. HISTORY OF PRESENT ILLNESS    Ti Joyce jatinder 31 y.o. male who presents to the ED for evaluation of depression with a plan to overdose. The patient is residing at [facility], the [facility]/treatment center for opiates and ETOH. The patient states he has been there for a month and has not been on any of his psych meds. He states that his depression is just getting worse and he is having lots of thoughts of killing himself. HPI was provided by the patient. REVIEW OF SYSTEMS     Review of Systems   Constitutional: Negative for chills, fatigue and fever. HENT: Negative for congestion, ear discharge, ear pain, postnasal drip and rhinorrhea. Eyes: Negative for visual disturbance. Respiratory: Negative for cough, chest tightness and shortness of breath. Cardiovascular: Negative for chest pain. Gastrointestinal: Negative for abdominal pain. Genitourinary: Negative for difficulty urinating, dysuria, enuresis, flank pain and hematuria. Musculoskeletal: Negative for back pain and joint swelling. Neurological: Negative for dizziness, light-headedness, numbness and headaches. Psychiatric/Behavioral: Positive for behavioral problems, dysphoric mood, sleep disturbance and suicidal ideas. Negative for agitation and confusion. All other systems negative except as noted. PAST MEDICAL HISTORY     Past Medical History:   Diagnosis Date    ADHD (attention deficit hyperactivity disorder)     Alcoholism ([facility]     pt drinks 6 beers and a liter of vodka daily.     Anxiety     Bipolar 1 disorder (HCC)     PTSD (post-traumatic stress disorder)     Suicide attempt ([facility]     previous suicide attempt by overdosing on pills at approximately 23 y/o       SURGICALHISTORY      has no past surgical history on file. CURRENT MEDICATIONS       Current Discharge Medication List      CONTINUE these medications which have NOT CHANGED    Details   QUEtiapine Fumarate (SEROQUEL PO) Take 100 mg by mouth daily      FLUoxetine (PROZAC) 20 MG capsule Take 20 mg by mouth daily      Melatonin CR 3 MG TBCR Take 3 mg by mouth nightly as needed (sleep)  Qty: 14 tablet, Refills: 0             ALLERGIES     is allergic to advil [ibuprofen]. FAMILY HISTORY     He indicated that his mother is . He indicated that the status of his father is unknown. He indicated that the status of his brother is unknown.   family history includes Alcohol Abuse in his brother; Cancer in his mother; Liver Cancer in his brother; No Known Problems in his father.     SOCIAL HISTORY       Social History     Socioeconomic History    Marital status: Single     Spouse name: Not on file    Number of children: Not on file    Years of education: Not on file    Highest education level: Not on file   Occupational History    Not on file   Social Needs    Financial resource strain: Not on file    Food insecurity     Worry: Not on file     Inability: Not on file    Transportation needs     Medical: Not on file     Non-medical: Not on file   Tobacco Use    Smoking status: Former Smoker     Years: 14.00     Types: Cigarettes     Quit date: 2019     Years since quittin.7    Smokeless tobacco: Never Used   Substance and Sexual Activity    Alcohol use: Not Currently     Comment: 2 months since last drink    Drug use: Not Currently     Types: Opiates      Comment: clean 2 months    Sexual activity: Not Currently     Partners: Female   Lifestyle    Physical activity     Days per week: Not on file     Minutes per session: Not on file    Stress: Not on file   Relationships    Social connections     Talks on phone: Not on file     Gets together: Not on file     Attends Buddhism service: Not on file     Active member of club or organization: Not on file     Attends meetings of clubs or organizations: Not on file     Relationship status: Not on file    Intimate partner violence     Fear of current or ex partner: Not on file     Emotionally abused: Not on file     Physically abused: Not on file     Forced sexual activity: Not on file   Other Topics Concern    Not on file   Social History Narrative    Not on file       PHYSICAL EXAM     INITIAL VITALS:  height is 5' 11\" (1.803 m) and weight is 169 lb (76.7 kg). His oral temperature is 98 °F (36.7 °C). His blood pressure is 110/72 and his pulse is 85. His respiration is 16 and oxygen saturation is 98%. Physical Exam  Vitals signs and nursing note reviewed. Constitutional:       General: He is not in acute distress. Appearance: He is well-developed. He is not diaphoretic. HENT:      Head: Normocephalic and atraumatic. Eyes:      General:         Right eye: No discharge. Left eye: No discharge. Conjunctiva/sclera: Conjunctivae normal.   Neck:      Musculoskeletal: Normal range of motion. Trachea: No tracheal deviation. Cardiovascular:      Rate and Rhythm: Normal rate and regular rhythm. Heart sounds: Normal heart sounds. No murmur. No gallop. Comments: Normal capillary refill  Pulmonary:      Effort: Pulmonary effort is normal. No respiratory distress. Breath sounds: Normal breath sounds. No stridor. Abdominal:      General: Bowel sounds are normal.      Palpations: Abdomen is soft. Musculoskeletal: Normal range of motion. General: No tenderness or deformity. Skin:     General: Skin is warm and dry. Coloration: Skin is not pale. Findings: No erythema or rash. Neurological:      Mental Status: He is alert and oriented to person, place, and time. Cranial Nerves: No cranial nerve deficit. Psychiatric:         Mood and Affect: Mood is anxious and depressed. Affect is flat. Speech: Speech is rapid and pressured. Behavior: Behavior normal.         Thought Content: Thought content includes suicidal ideation. Thought content includes suicidal plan. DIFFERENTIAL DIAGNOSIS:   Depression, SI, noncompliance with medications. DIAGNOSTIC RESULTS     EKG: All EKG's are interpreted by the Emergency Department Physician who eithersigns or Co-signs this chart in the absence of a cardiologist.        RADIOLOGY: non-plainfilm images(s) such as CT, Ultrasound and MRI are read by the radiologist.  Plain radiographic images are visualized and preliminarily interpreted by the emergency physician unless otherwise stated below.   No orders to display         LABS:   Labs Reviewed   CBC WITH AUTO DIFFERENTIAL - Abnormal; Notable for the following components:       Result Value    RBC 4.26 (*)     Hemoglobin 13.5 (*)     Hematocrit 39.5 (*)     All other components within normal limits   HEPATIC FUNCTION PANEL - Abnormal; Notable for the following components:    Alkaline Phosphatase 36 (*)     ALT 10 (*)     All other components within normal limits   SALICYLATE LEVEL - Abnormal; Notable for the following components:    Salicylate, Serum < 0.3 (*)     All other components within normal limits   URINE RT REFLEX TO CULTURE - Abnormal; Notable for the following components:    Ketones, Urine 15 (*)     All other components within normal limits   GLOMERULAR FILTRATION RATE, ESTIMATED - Abnormal; Notable for the following components:    Est, Glom Filt Rate 87 (*)     All other components within normal limits   ACETAMINOPHEN LEVEL   BASIC METABOLIC PANEL   ETHANOL   TSH WITHOUT REFLEX   URINE DRUG SCREEN   ANION GAP   OSMOLALITY       EMERGENCY DEPARTMENT COURSE:   Vitals:    Vitals:    12/21/20 1946 12/21/20 2027 12/21/20 2255 12/21/20 2328   BP: 119/75   110/72   Pulse: 75   85   Resp: 18   16   Temp:  98 °F (36.7 °C)  98 °F (36.7 °C)   TempSrc:  Oral  Oral   SpO2: 98%      Weight:   169 lb (76.7 kg)    Height:   5' 11\" (1.803 m) [facility]                  ED Course as of Dec 22 0615   Mon Dec 21, 2020   2127 Pt is accepted by Dr. Lyndon Headley for admission. Requested [facility]. Filled out and signed. [KJ]      ED Course User Index  [KJ] LIBBY Nelson CNP         [facility]    The patient was seen and evaluated within the ED today for the evaluation of suicidal ideation. Physical exam revealed no significant abnormalities or concerns. I completed a medical evaluation of the patient and ordered appropriate labs which were unremarkable. SULEIMAN and social work completed a full psychiatric evaluation of the patient and determined that he met inpatient criteria. I medically cleared the patient. SULEIMAN and social work's noted should be consulted for the psychiatric evaluation and reason for admission to the inpatient psychiatric unit. Medications   acetaminophen (TYLENOL) tablet 650 mg (has no administration in time range)   magnesium hydroxide (MILK OF MAGNESIA) 400 MG/5ML suspension 30 mL (has no administration in time range)   aluminum & magnesium hydroxide-simethicone (MAALOX) 200-200-20 MG/5ML suspension 30 mL (has no administration in time range)   hydrOXYzine (ATARAX) tablet 50 mg (has no administration in time range)   traZODone (DESYREL) tablet 50 mg (has no administration in time range)         Patient was seen independently by myself. The patient's final impression and disposition and plan was determined by myself. Strict return precautions and follow up instructions were discussed with the patient prior to discharge, with which the patient agrees. Physical assessment findings, diagnostic testing(s) if applicable, and vital signs reviewed with patient/patient representative. Questions answered. Medications asdirected, including OTC medications for supportive care. Education provided on medications. Differential diagnosis(s) discussed with patient/patient representative.   Home care/self care instructions reviewed withpatient/patient representative. Patient is to follow-up with family care provider in 2-3 days if no improvement. Patient is to go to the emergency department if symptoms worsen. Patient/patient representative isaware of care plan, questions answered, verbalizes understanding and is in agreement. CRITICAL CARE:   None    CONSULTS:  [facility]    PROCEDURES:  None    FINAL IMPRESSION     1. Major depressive disorder with current active episode, unspecified depression episode severity, unspecified whether recurrent          DISPOSITION/PLAN   DISPOSITION Admitted 12/21/2020 09:39:50 PM      PATIENT REFERREDTO:  No follow-up provider specified.     DISCHARGE MEDICATIONS:  Current Discharge Medication List          (Please note that portions of this note were completed with a voice recognition program.  Efforts were made to edit the dictations but occasionally words are mis-transcribed.)         LIBBY Suggs CNP, APRN - CNP  12/22/20 4466

## 2020-12-22 NOTE — GROUP NOTE
Group Therapy Note    Date: 12/22/2020    Group Start Time: 1300  Group End Time: 1330  Group Topic: Healthy Living/Wellness    [facility] 4E    Kristen Rodriguez LPN        Group Therapy Note    Attendees: 4         Notes:  Did not attend    Discipline Responsible: Licensed Practical Nurse      Signature:  Kristen Rodriguez LPN

## 2020-12-22 NOTE — BH NOTE
Behavioral Health   Admission Note     Admission Type:   Admission Type: Involuntary    Reason for admission:  Reason for Admission: depressed    PATIENT STRENGTHS:  Strengths: Communication, No significant Physical Illness    Patient Strengths and Limitations:  Limitations: Difficulty problem solving/relies on others to help solve problems, Tendency to isolate self    Addictive Behavior:   Addictive Behavior  In the past 3 months, have you felt or has someone told you that you have a problem with:  : None  Do you have a history of Chemical Use?: No  Do you have a history of Alcohol Use?: Yes  Do you have a history of Street Drug Abuse?: Yes  Histroy of Prescripton Drug Abuse?: No    Medical Problems:   Past Medical History:   Diagnosis Date    ADHD (attention deficit hyperactivity disorder)     [facility]     pt drinks 6 beers and a liter of vodka daily.  Anxiety     Bipolar 1 disorder (HCC)     PTSD (post-traumatic stress disorder)     Suicide attempt ([facility]     previous suicide attempt by overdosing on pills at approximately 23 y/o       Status EXAM:  Status and Exam  Normal: No  Facial Expression: Avoids Gaze, Sad  Affect: Blunt  Level of Consciousness: Alert  Mood:Normal: No  Mood: Depressed  Motor Activity:Normal: Yes  Interview Behavior: Cooperative  Preception: [place] to Person, Faisal Bees to Time, [place] to Place, [place] to Situation  Attention:Normal: No  Attention: Unable to Concentrate  Thought Processes: Circumstantial  Thought Content:Normal: Yes  Hallucinations: None  Delusions: No  Memory:Normal: No  Memory: Poor Recent  Insight and Judgment: No  Insight and Judgment: Poor Judgment, Poor Insight  Present Suicidal Ideation: No  Present Homicidal Ideation: No    Pt admitted with followings belongings:  Dentures: None  Vision - Corrective Lenses: None  Hearing Aid: None  Jewelry: None  Body Piercings Removed: N/A  Clothing:  Footwear, Jacket / coat, Pants, Shirt, Socks, Pajamas, Other (Comment)(gloves, toiletries, misc, 2 lap top computers, towels)  Were All Patient Medications Collected?: Not Applicable  Other Valuables: None     Admission order obtained yes. Patient oriented to surroundings and program expectations and copy of patient rights given. Received admission packet:  yes. Consents reviewed, signed yes. Patient verbalize understanding:  yes. Patient education on precautions: yes        2 person skin assessment completed upon admission Channing Mckeon/Milagros KHAN . Explained patients right to have family, representative or physician notified of their admission. Patient has Declined for physician to be notified. Patient has Declined for family/representative to be notified. 32 yo male pt admitted from ED under an [facility]. Pt reports very depressed, denies suicidal thoughts, denies homicidal thoughts, denies hallucinations. Pt reports he has a hx of drinking wine and popping pills, could not elaborate on what drugs he was using, reports he has been clean and sober for 2 months, is at [facility] but states he is not sure he wants to return there, states I'll make some phone calls. Pt oriented to room and the unit          Afshan Rider RN

## 2020-12-22 NOTE — ED NOTES
ED to inpatient nurses report    Chief Complaint   Patient presents with    Depression      Present to ED from group home  LOC: alert and orientated to name, place, date  Vital signs   Vitals:    12/21/20 1946 12/21/20 2027   BP: 119/75    Pulse: 75    Resp: 18    Temp:  98 °F (36.7 °C)   TempSrc:  Oral   SpO2: 98%       Oxygen Baseline ROOM AIR    Current needs required ROOM AIR Bipap/Cpap No  LDAs:    Mobility: Independent  Pending ED orders: NONE      Electronically signed by Walker Laureano RN on 12/21/2020 at 10:10 PM       [address] [facility]  12/21/20 2210

## 2020-12-22 NOTE — PROGRESS NOTES
Provisional Diagnosis:   Unspecified depressive disorder     Risk, Psychosocial and Contextual Factors:   Psych history, History of drug and alcohol use     Current  Treatment:  [facility] [place]      Present Suicidal Behavior:      Verbal:  See note          Attempt: Denied     Access to Weapons:   Denied     Current Suicide Risk: Low, Moderate or High:    Low     Past Suicidal Behavior:       Verbal:  Yes    Attempt: Yes    Self-Injurious/Self-Mutilation: Denied     Traumatic Event Within Past 2 Weeks:   Denied     Current Abuse: Denied     Legal: Denied     Violence: Denied     Protective Factors: Adequate group housing, connection to services, peer support     Housing: [facility]        CPAP/Oxygen/Ambulation Difficulties: Denied     Basic Vital Signs Normal?: Check with Patients Nurse prior to Calling Psychiatry    Critical Labs?: Check with Patients Nurse prior to Calling Psychiatry    Clinical Summary:      Patient is a 31year old male who presents to the ED voluntarily reporting he has been off his medication for one month and his depression is getting bad. Patient reports that he is severely depressed. He states that he has stayed up the past two days. Patient told staff at [address] [place] [address] about his depression and need for medication and they recommended that he come to the hospital because the provider is not in tonight to see patient.s   Patient denies suicidal thoughts and/or plans. Patient states, 'well. ..just write yes if that will get me admitted'. Homicidal thoughts and/or plans denied. No delusions noted. Patient reports that he sees shadows, but denies hallucinations. AOD denied. Patient in recovery. Patient is living at [facility] [place]. Patient states that he can see a psychiatrist this Thursday for medication. Patient looks at the ceiling throughout the assessment. Poor insight. Poor judgement. Patient thoughts are disorganized.  Patient requests to talk to a doctor and be given medications multiple times through the assessment. Level of Care Disposition:      2015 Patient assessed. 2030 Provider in to assess patient. Patient states that he is suicidal with a plan to overdose. 2122 Consulted with provider. Patient is medically cleared. 2125 Consulted with Dr. Kenyatta Guzmán. Patient to be admitted. Patient to be placed on [facility].   2126 Provider informed. Patient placed on [facility]   2128 Patient informed. Patient explained [facility]. Patient has no concerns. 2135 Consulted with nurse. No abnormalities at this time.    2143 Report given to 4E   2146 Patient awaiting transport

## 2020-12-22 NOTE — H&P
Department of Psychiatry  Attending Physician Psychiatric Assessment     Reason for Admission to Psychiatric Unit:  Threat to self requiring 24 hour professional observation  Concerns about patient's safety in the community    CHIEF COMPLAINT:  Suicidal ideations with plan to kill self by overdose    History obtained from:  patient, electronic medical record and family members    HISTORY OF PRESENT ILLNESS:    Dori Fagan is a 31 y.o. male with significant past psychiatric history of chromic alcohol abuse, anxiety who presented to the ED for evaluation of depression with a plan to overdose. Per ED notes: \"the patient is residing at [facility], the [facility]/treatment center for opiates and ETOH. The patient states he has been there for a month and has not been on any of his psych meds. He states that his depression is just getting worse and he is having lots of thoughts of killing himself. Patient is a 31year old male who presents to the ED voluntarily reporting he has been off his medication for one month and his depression is getting bad. Patient reports that he is severely depressed. He states that he has stayed up the past two days. Patient told staff at [facility] about his depression and need for medication and they recommended that he come to the hospital because the provider is not in tonight to see patient. Patient denies suicidal thoughts and/or plans. Patient states, 'well. ..just write yes if that will get me admitted'. Patient looks at the ceiling throughout the assessment. Poor insight. Poor judgement. Patient thoughts are disorganized. Patient requests to talk to a doctor and be given medications multiple times through the assessment\"    Upon admission to 4E: 32 yo male pt admitted from ED under an [facility]. Pt reports very depressed, denies suicidal thoughts, denies homicidal thoughts, denies hallucinations.  Pt reports he has a hx of drinking wine and popping pills, could not elaborate on what drugs he was using, reports he has been clean and sober for 2 months, is at [facility] but states he is not sure he wants to return there, states I'll make some phone calls. Pt oriented to room and the unit    Initial provider encounter: Gerald Brennan appears down and flat this morning. He is very polite and cooperative throughout assessment. Seems to either be easily distracted or overtly paranoid as he is unable to keep himself from looking around the unit constantly. He is able to answer all questions while he is looking around. Reports feeling down and sad for more days than not for the past couple of months. Endorses anhedonia. Has not been sleep in well \"for a while\". Only slept an hour each day for the past 3 days,. Reports he has been experiencing racing thoughts. Poor appetite and concentration. Loss of interest in thing he once enjoyed. Has been isolating lately and can't motivate himself to attend groups and complete rehab program activities. Started drinking \"a few glasses of wine\" on the weekends in his mid-twenties to unwind from the week. Progressed to drinking throughout the week, then eventually drinking all night and presenting to work intoxicated. He has been clean for the past two months. UDS negative for all. PSYCHIATRIC HISTORY:  yes - was diagnosed \"with a mood disorder\" ~1 year ago. Cannot remember who he saw at the time. Currently follows with no one  denies lifetime suicide attempts  denies psychiatric hospital admissions--reports \"I almost had one because I drank so much I blacked out, but I declined to go\"; does not recall when this episode occurred  Gerald Brennan denies being linked with a psychiatrist and reports he has not been taking his meds for 7 or 8 months. He was diagnosed \"with a mood disorder\" about a year ago.      Past psychiatric medications includes: \"a mood stabilizer\"    Adverse reactions from psychotropic medications: no    Lifetime Psychiatric Review of Systems         Angella or Hypomania: reports hypomanic symptoms; first episode \"a few years ago\"     Panic Attacks: denies      Phobias: denies     Obsessions and Compulsions:denies     Body or Vocal Tics:  denies     Hallucinations:denies     Delusions: denies--appears to be acting paranoid    Past Medical History:        Diagnosis Date    ADHD (attention deficit hyperactivity disorder)     Alcoholism ([facility]     pt drinks 6 beers and a liter of vodka daily.  Anxiety     Bipolar 1 disorder (HCC)     PTSD (post-traumatic stress disorder)     Suicide attempt ([facility]     previous suicide attempt by overdosing on pills at approximately 25 y/o       Past Surgical History:    History reviewed. No pertinent surgical history. Allergies:  Advil [ibuprofen]    Social History:     BORN IN [facility] [address] [place]. LEVEL OF EDUCATION: 11th grade; received GED  MARITAL STATUS: single, never .  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 support system, working on staying sober, wants help    DRUG USE HISTORY  Social History     Tobacco Use   Smoking Status Former Smoker    Years: 14.00    Types: Cigarettes    Quit date: 2019    Years since quittin.7   Smokeless Tobacco Never Used     Social History     Substance and Sexual Activity   Alcohol Use Not Currently    Comment: 2 months since last drink     Social History     Substance and Sexual Activity   Drug Use Not Currently    Types: Opiates     Comment: clean 2 months     denies  LEGAL HISTORY:   HISTORY OF INCARCERATION: no     Family History:       Problem Relation Age of Onset    Liver Cancer Brother     Alcohol Abuse Brother     Cancer Mother     No Known Problems Father        Psychiatric Family History  denies  denies suicides in family  denies substance use in family    PHYSICAL EXAM:  Vitals:  BP coherent and slow  Thought content:  Denies homicidal ideation  Suicidal Ideation:  passive, without plan and without intent  Delusions:  paranoid  Perceptual Disturbance:  denies any perceptual disturbance  Cognition: Patient is oriented to person, place, time and situation  Concentration: clinically adequate  Memory: intact  Insight & Judgement: age appropriate, poor    DSM-5 Diagnosis  Bipolar II disorder, MRE depressed, severe, without psychosis  Opioid use disorder, early remission    Psychosocial and Contextual factors:  Financial  Occupational  Relationship  Legal   Living situation  Educational     Past Medical History:   Diagnosis Date    ADHD (attention deficit hyperactivity disorder)     Alcoholism ([facility] [address] [facility]     pt drinks 6 beers and a liter of vodka daily.  Anxiety     Bipolar 1 disorder (HCC)     PTSD (post-traumatic stress disorder)     Suicide attempt ([facility] [address] [facility]     previous suicide attempt by overdosing on pills at approximately 23 y/o        TREATMENT PLAN    Risk Management:  close watch per standard protocol      Psychotherapy:  participation in milieu and group and individual sessions with Attending Physician,  and Physician Assistant/CNP      Estimated length of stay: It might take more than 2 midnights to stabilize patient's mood/thoughts and titrate medications to effect. GENERAL PATIENT/FAMILY EDUCATION  Patient will understand basic signs and symptoms, Patient will understand benefits/risks and potential side effects from proposed meds and Patient will understand their role in recovery. Family is  active in patient's care. Patient assets that may be helpful during treatment include: Intent to participate and engage in treatment, sufficient fund of knowledge and intellect to understand and utilize treatments. Risk level: High     Goals:    Reviewed labs  Reviewed EKG  Will obtain records and review them today.   Medication adjustment: start Latuda 20mg  Consults: none          Behavioral Services  Medicare Certification     Admission Day 1  I certify that this patient's inpatient psychiatric hospital admission is medically necessary for:    x (1) treatment which could reasonably be expected to improve this patient's condition, or    x (2) diagnostic study or its equivalent.         Physicians Signature:  Electronically signed by Regino Aase, MD on 12/22/20 at 8:22 PM EST

## 2020-12-23 PROCEDURE — 90833 PSYTX W PT W E/M 30 MIN: CPT | Performed by: PSYCHIATRY & NEUROLOGY

## 2020-12-23 PROCEDURE — 6370000000 HC RX 637 (ALT 250 FOR IP): Performed by: REGISTERED NURSE

## 2020-12-23 PROCEDURE — 1240000000 HC EMOTIONAL WELLNESS R&B

## 2020-12-23 PROCEDURE — 99232 SBSQ HOSP IP/OBS MODERATE 35: CPT | Performed by: PSYCHIATRY & NEUROLOGY

## 2020-12-23 RX ADMIN — LURASIDONE HYDROCHLORIDE 20 MG: 40 TABLET, FILM COATED ORAL at 21:10

## 2020-12-23 ASSESSMENT — PAIN SCALES - GENERAL
PAINLEVEL_OUTOF10: 0
PAINLEVEL_OUTOF10: 0

## 2020-12-23 NOTE — PLAN OF CARE
Problem: Depressive Behavior With or Without Suicide Precautions:  Goal: Able to verbalize and/or display a decrease in depressive symptoms  Description: Able to verbalize and/or display a decrease in depressive symptoms  Outcome: Ongoing  Note: Patient reports depressive symptoms during shift assessment. Patient reports feeling \"a little depressed\" but was unable to rate. Goal: Participates in care planning  Description: Participates in care planning  Outcome: Ongoing  Note: Patient participates in care planning with staff during shift. Problem: Discharge Planning:  Goal: Discharged to appropriate level of care  Description: Discharged to appropriate level of care  Outcome: Ongoing  Note: No discharge plans noted at this time during shift. Problem: Activity:  Goal: Sleeping patterns will improve  Description: Sleeping patterns will improve  Outcome: Ongoing  Note: Previous shift reports patient sleeping throughout the night. Per charting patient slept 5 hours broken. Problem: Coping:  Goal: Ability to identify problematic behaviors that deter socialization will improve  Description: Ability to identify problematic behaviors that deter socialization will improve  12/23/2020 1518 by Roya Denton LPN  Outcome: Ongoing  Note: Patient isolates to bed and room. Out on unit for meals and needs. Patient has not attended groups/activities at this time during shift. 12/23/2020 1349 by Michelle Hicks  Outcome: Not Met This Shift     Problem: Anxiety:  Goal: Level of anxiety will decrease  Description: Level of anxiety will decrease  Outcome: Ongoing  Note: Patient denies anxiety during shift assessment. Care plan reviewed with patient.   Patient does verbalize understanding of the plan of care and does contribute to goal setting

## 2020-12-23 NOTE — PROGRESS NOTES
This RN has reviewed and agrees with Arron Dominguez LPN's data collection and has collaborated with this LPN regarding the patient's care plan.

## 2020-12-23 NOTE — PLAN OF CARE
Patient has not attended any groups today and has not been out of his room to socialize with others this shift so he has not met his socialization goal. Patient will be encouraged to attend all groups on the unit daily during the rest of his hospital stay.

## 2020-12-23 NOTE — PLAN OF CARE
will improve  Outcome: Ongoing  Note: Pt had no peer interaction this evening     Problem: Anxiety:  Goal: Level of anxiety will decrease  Description: Level of anxiety will decrease  Outcome: Ongoing  Note: Pt denied anxiety this evening, pt agreed to take atarax at bedtime d/t pt appeared anxious     Problem: Depressive Behavior With or Without Suicide Precautions:  Goal: Ability to disclose and discuss suicidal ideas will improve  Description: Ability to disclose and discuss suicidal ideas will improve  12/22/2020 2301 by Tad Guzmán RN  Outcome: Completed  Note: Pt denies suicidal thoughts  12/22/2020 1016 by Darek Nicholson LPN  Outcome: Met This Shift  Note: Denies suicidal ideations  Goal: Able to verbalize support systems  Description: Able to verbalize support systems  12/22/2020 2301 by Tad Guzmán RN  Outcome: Completed  Note: Reports he has family and friends that are supportive  12/22/2020 1016 by Darek Nicholson LPN  Outcome: Met This Shift  Note: Patient verbalizes having  family and friends as his support  Goal: Absence of self-harm  Description: Absence of self-harm  12/22/2020 2301 by Tad Guzmán RN  Outcome: Completed  Note: No self harm, denies urge to self harm  12/22/2020 1016 by Darek Nicholson LPN  Outcome: Met This Shift  Note: No self harm

## 2020-12-24 PROCEDURE — 1240000000 HC EMOTIONAL WELLNESS R&B

## 2020-12-24 PROCEDURE — 6370000000 HC RX 637 (ALT 250 FOR IP): Performed by: REGISTERED NURSE

## 2020-12-24 PROCEDURE — 90833 PSYTX W PT W E/M 30 MIN: CPT | Performed by: PSYCHIATRY & NEUROLOGY

## 2020-12-24 PROCEDURE — 99231 SBSQ HOSP IP/OBS SF/LOW 25: CPT | Performed by: PSYCHIATRY & NEUROLOGY

## 2020-12-24 RX ADMIN — LURASIDONE HYDROCHLORIDE 20 MG: 40 TABLET, FILM COATED ORAL at 21:37

## 2020-12-24 ASSESSMENT — PAIN SCALES - GENERAL
PAINLEVEL_OUTOF10: 0
PAINLEVEL_OUTOF10: 0

## 2020-12-24 NOTE — PLAN OF CARE
Patient attended part of one group this morning and has been out of his room today to walk up and down the halls so he is working toward his socialization goal for this shift. Patient will be encouraged to attend all groups on the unit daily during his hospital stay.

## 2020-12-24 NOTE — PATIENT CARE CONFERENCE
[facility]  Day 3 Interdisciplinary Treatment Plan NOTE    Review Date & Time: 12/24/20 1415    Patient was in treatment team    Admission Type:   Admission Type: Involuntary    Reason for admission:  Reason for Admission: depressed  Estimated Length of Stay Update:  3-5 days  Estimated Discharge Date Update: 3-5 days    PATIENT STRENGTHS:  Patient Strengths Strengths: Communication, Positive Support, Social Skills, Connection to output provider, Motivated  Patient Strengths and Limitations:Limitations: Inappropriate/potentially harmful leisure interests, Lacks leisure interests, Tendency to isolate self  Addictive Behavior:Addictive Behavior  In the past 3 months, have you felt or has someone told you that you have a problem with:  : None  Do you have a history of Chemical Use?: No  Do you have a history of Alcohol Use?: Yes  Do you have a history of Street Drug Abuse?: Yes  Histroy of Prescripton Drug Abuse?: No  Medical Problems:  Past Medical History:   Diagnosis Date    ADHD (attention deficit hyperactivity disorder)     [facility]     pt drinks 6 beers and a liter of vodka daily.     Anxiety     Bipolar 1 disorder (HCC)     PTSD (post-traumatic stress disorder)     Suicide attempt ([facility]     previous suicide attempt by overdosing on pills at approximately 23 y/o       Risk:  Fall RiskTotal: 53  Wolfgang Scale Wolfgang Scale Score: 22  BVC Total: 0  Change in scores: No. Changes to plan of Care: No    Status EXAM:   Status and Exam  Normal: No  Facial Expression: Avoids Gaze, Flat  Affect: Appropriate, Blunt  Level of Consciousness: Alert  Mood:Normal: No  Mood: Depressed  Motor Activity:Normal: Yes  Interview Behavior: Cooperative  Preception: [place] to Person, Terisa Quant to Time, [place] to Place, [place] to Situation  Attention:Normal: No  Attention: Unable to Concentrate  Thought Processes: Tangential  Thought Content:Normal: No  Thought Content: Paranoia  Hallucinations: None(denies)  Delusions: No  Memory:Normal: No  Memory: Poor Recent, Poor Remote  Insight and Judgment: No  Insight and Judgment: Poor Judgment, Poor Insight  Present Suicidal Ideation: No(denies)  Present Homicidal Ideation: No(denies)    Daily Assessment Last Entry:   Daily Sleep (WDL): Within Defined Limits         Patient Currently in Pain: Denies  Daily Nutrition (WDL): Within Defined Limits    Patient Monitoring:  Frequency of Checks: 4 times per hour, close    Psychiatric Symptoms:   Depression Symptoms  Depression Symptoms: Impaired concentration, Sleep disturbance, Loss of interest  Anxiety Symptoms  Anxiety Symptoms: No problems reported or observed. Angella Symptoms  Angella Symptoms: No problems reported or observed. Psychosis Symptoms  Delusion Type: No problems reported or observed. Suicide Risk CSSR-S:  1) Within the past month, have you wished you were dead or wished you could go to sleep and not wake up? : No  2) Have you actually had any thoughts of killing yourself? : No  6) Have you ever done anything, started to do anything, or prepared to do anything to end your life?: No  Change in Result: No Change in Plan of care: No      EDUCATION:   Learner Progress Toward Treatment Goals: Reviewed results and recommendations of this team, Reviewed group plan and strategies, Reviewed signs, symptoms and risk of self harm and violent behavior and Reviewed goals and plan of care    Method: Individual    Outcome: Verbalized understanding and Needs reinforcement    PATIENT GOALS: Go to group    PLAN/TREATMENT RECOMMENDATIONS UPDATE:  1. How are you progressing toward meeting your main treatment goal?  - Improving somewhat     2. Are there discharge barriers/lingering problems that need to be addressed?          - Continue to be paranoia        3. Do you have the ability to pay for your medications? - Yes        4.   How is your group participation?  - Fair      GOALS UPDATE:   Time frame for Short-Term Goals: Daily       SRIDEVI Peralta

## 2020-12-24 NOTE — PLAN OF CARE
Problem: Depressive Behavior With or Without Suicide Precautions:  Goal: Able to verbalize and/or display a decrease in depressive symptoms  Description: Able to verbalize and/or display a decrease in depressive symptoms  12/24/2020 0132 by Anil Linn RN  Outcome: Ongoing  Note: Patient verbalizes a decrease in depressive symptoms. 12/23/2020 1518 by Adelia Brush LPN  Outcome: Ongoing  Note: Patient reports depressive symptoms during shift assessment. Patient reports feeling \"a little depressed\" but was unable to rate. Goal: Participates in care planning  Description: Participates in care planning  12/24/2020 0132 by Anil Linn RN  Outcome: Ongoing  Note: Patient participates in care planning. Care plan reviewed with patient. Patient does verbalize understanding of the plan of care and does contribute to goal setting. 12/23/2020 1518 by Adelia Brush LPN  Outcome: Ongoing  Note: Patient participates in care planning with staff during shift. Problem: Discharge Planning:  Goal: Discharged to appropriate level of care  Description: Discharged to appropriate level of care  12/24/2020 0132 by Anil Linn RN  Outcome: Not Met This Shift  Note: Discharge planning in progress, patient not discharged this evening. 12/23/2020 1518 by Adelia Brush LPN  Outcome: Ongoing  Note: No discharge plans noted at this time during shift. Problem: Activity:  Goal: Sleeping patterns will improve  Description: Sleeping patterns will improve  12/24/2020 0132 by Anil Linn RN  Outcome: Ongoing  Note: Ongoing. 12/23/2020 1518 by Adelia Brush LPN  Outcome: Ongoing  Note: Previous shift reports patient sleeping throughout the night. Per charting patient slept 5 hours broken.      Problem: Coping:  Goal: Ability to identify problematic behaviors that deter socialization will improve  Description: Ability to identify problematic behaviors that deter socialization will improve  12/24/2020 0132 by Amber Noriega RN  Outcome: Ongoing  Note: Ongoing. 12/23/2020 1518 by Rey Sue LPN  Outcome: Ongoing  Note: Patient isolates to bed and room. Out on unit for meals and needs. Patient has not attended groups/activities at this time during shift. 12/23/2020 1349 by Krystal Chandler  Outcome: Not Met This Shift     Problem: Anxiety:  Goal: Level of anxiety will decrease  Description: Level of anxiety will decrease  12/24/2020 0132 by Amber Noriega RN  Outcome: Met This Shift  Note: Patient denied anxiety this evening. 12/23/2020 1518 by Rey Sue LPN  Outcome: Ongoing  Note: Patient denies anxiety during shift assessment.

## 2020-12-24 NOTE — PLAN OF CARE
Problem: Depressive Behavior With or Without Suicide Precautions:  Goal: Able to verbalize and/or display a decrease in depressive symptoms  Description: Able to verbalize and/or display a decrease in depressive symptoms  12/24/2020 1456 by Leander Arciniega LPN  Outcome: Ongoing  Note: Patient denies depressive symptoms during shift assessment. 12/24/2020 0132 by Nimisha Sheffield RN  Outcome: Ongoing  Note: Patient verbalizes a decrease in depressive symptoms. Goal: Participates in care planning  Description: Participates in care planning  12/24/2020 1456 by Leander Arciniega LPN  Outcome: Ongoing  Note: Patient participates in care planning with staff during shift. 12/24/2020 0132 by Nimisha Sheffield RN  Outcome: Ongoing  Note: Patient participates in care planning. Problem: Discharge Planning:  Goal: Discharged to appropriate level of care  Description: Discharged to appropriate level of care  12/24/2020 1456 by Leander Arciniega LPN  Outcome: Ongoing  Note: No discharge plans noted at this time during shift. 12/24/2020 0132 by Nimisha Sheffield RN  Outcome: Not Met This Shift  Note: Discharge planning in progress, patient not discharged this evening. Problem: Activity:  Goal: Sleeping patterns will improve  Description: Sleeping patterns will improve  12/24/2020 1456 by Leander Arciniega LPN  Outcome: Ongoing  Note: Previous shift reports patient sleeping throughout the night. Per charting patient slept 5 hours broken. 12/24/2020 0132 by Nimisha Sheffield RN  Outcome: Ongoing  Note: Ongoing. Problem: Coping:  Goal: Ability to identify problematic behaviors that deter socialization will improve  Description: Ability to identify problematic behaviors that deter socialization will improve  12/24/2020 1456 by Leander Arciniega LPN  Outcome: Ongoing  Note: Patient on the fringe with peers when out on unit with peers.   12/24/2020 1335 by Pamela Smith  Outcome: Ongoing  12/24/2020 0132 by Nimisha Sheffield RN  Outcome: Ongoing  Note: Ongoing. Problem: Anxiety:  Goal: Level of anxiety will decrease  Description: Level of anxiety will decrease  12/24/2020 1456 by Leander Arciniega LPN  Outcome: Ongoing  Note: Patient denies anxiety during shift assessment. 12/24/2020 0132 by Nimisha Sheffield RN  Outcome: Met This Shift  Note: Patient denied anxiety this evening. Care plan reviewed with patient.   Patient does verbalize understanding of the plan of care and does not contribute to goal setting

## 2020-12-24 NOTE — PROGRESS NOTES
Department of Psychiatry  Progress Note     Chief Complaint:  Severe bipolar II disorder, most recent episode major depressive without psychotic features ([facility] [address]     SUBJECTIVE:    PROGRESS:  [facility] is feeling \"okay\" over all. Rates mood as 8/10  Believes depression is getting \"a little better\"  Anxiety has improved slightly, as well  Denies hallucinations and delusional thinking  States \"I didn't sleep great\"--did take trazodone and Atarax, yet was still not able to sleep well  Appetite remains \"good\"  \"maybe some\" thoughts to harm self without plan/intent  Denies homicidal ideations    Heather Cho continues with paranoid, suspicious behaviours. He is pleasant and cooperative with assessment. Has not been attending groups or interacting with staff and peers. Often observed walking through halls, appearing to keep an eye on everything that is going on around him. Heather Cho has been taking his medications and denies side effects    Suicidal ideations: denies    Compliance with medications: good   Medication side effects: absent  ROS: Patient has new complaints:  no  Sleep quality: 5 broken per nursing report  Attending groups: yes      OBJECTIVE      Medications  Current Facility-Administered Medications: lurasidone (LATUDA) tablet 20 mg, 20 mg, Oral, Nightly  acetaminophen (TYLENOL) tablet 650 mg, 650 mg, Oral, Q4H PRN  magnesium hydroxide (MILK OF MAGNESIA) 400 MG/5ML suspension 30 mL, 30 mL, Oral, Daily PRN  aluminum & magnesium hydroxide-simethicone (MAALOX) 200-200-20 MG/5ML suspension 30 mL, 30 mL, Oral, Q6H PRN  hydrOXYzine (ATARAX) tablet 50 mg, 50 mg, Oral, TID PRN  traZODone (DESYREL) tablet 50 mg, 50 mg, Oral, Nightly PRN     Physical     height is 5' 11\" (1.803 m) and weight is 169 lb (76.7 kg). His oral temperature is 96.4 °F (35.8 °C). His blood pressure is 102/56 (abnormal) and his pulse is 67. His respiration is 16 and oxygen saturation is 96%.    Lab Results   Component Value Date WBC 5.4 12/21/2020    HGB 13.5 (L) 12/21/2020    HCT 39.5 (L) 12/21/2020     12/21/2020    CHOL 125 01/03/2020    TRIG 76 01/03/2020    HDL 38 (L) 01/03/2020    ALT 10 (L) 12/21/2020    AST 16 12/21/2020     12/21/2020    K 3.7 12/21/2020     12/21/2020    CREATININE 1.0 12/21/2020    BUN 21 12/21/2020    CO2 25 12/21/2020    TSH 1.120 12/21/2020    LABA1C 4.9 01/03/2020          Mental Status Exam:   Level of consciousness:  Mild dysattention (reduced clarity of awareness with impaired ability to focus, sustain, or shift attention)--appears unable to keep himself from monitoring the activity around him  Appearance:  well-appearing, hospital attire and in chair  Behavior/Motor:   no abnormalities noted  Attitude toward examiner:  cooperative, attentive and poor eye contact  Speech:  spontaneous, normal rate and normal volume  Mood:  \"all right\"  Affect:  blunted, flat and mood incongruent  Thought processes:  linear, goal directed and coherent  Thought content:  Denies homicidal ideation  Suicidal Ideation:  passive, without plan and without intent  Delusions:  Perhaps paranoid  Perceptual Disturbance:  denies any perceptual disturbance  Cognition: Patient is oriented to person, place, time and situation  Concentration: clinically adequate  Memory: intact  Insight & Judgement: fair       ASSESSMENT     Severe bipolar II disorder, most recent episode major depressive without psychotic features ([facility] [address]     PLAN    Patient's symptoms do not seem to have changed much from yesterday's assessment  Medication adjustments: continue medications as prescribed  Attempt to develop insight, psycho-education and supportive therapy conducted. Probable discharge: TBD  Follow-up: TBD    Electronically signed by Kika PAINTER on 12/23/2020 at 7:30 PM Reviewed patient's current plan of care and vital signs with nursing staff.                                    Psychiatry Attending Attestation     I assessed this patient and reviewed the case and plan of care with Kika Aguilar CNP. I have reviewed the above documentation and I agree with the findings and treatment plan with the following updates. Patient feels better than before. Mood and affect are better. Patient reports fleeting suicidal thoughts with no intent or plan. Patient notes that these thoughts are occurring less frequently. Denies any homicidal thoughts, that was explored with the patient. Oriented to time place and person. Recent and remote memory is intact. Patient feels hopeful. Sleep and appetite is good. No side effect from medication reported. Side-effect of medication were discussed with the patient . Patient is responding to current treatment. Discharge soon, if patient continues to show improvement. Case discussed with the staff. More than 16 mins of the session was spent doing Supportive psychotherapy and coordinating patient's care. Session started at 11am and ended at 11:30am.     Electronically signed by Neymar Metcalf MD on 12/23/20 at 7:58 PM EST    **This report has been created using voice recognition software. It may contain minor errors which are inherent in voice recognition technology. **

## 2020-12-24 NOTE — PROGRESS NOTES
This RN has reviewed and agrees with Coburnhenok Renteria LPN's data collection and has collaborated with this LPN regarding the patient's care plan.

## 2020-12-24 NOTE — PROGRESS NOTES
Group Therapy Note    Date: 12/23/2020  Start Time: 2000  End Time:  2020      Type of Group: Relaxation      Status After Intervention:  Unchanged    Participation Level: Minimal    Participation Quality: Attentive      Speech:  normal      Affective Functioning: Flat      Mood: depressed      Level of consciousness:  Alert      Response to Learning: Progressing to goal      Endings: None Reported    Modes of Intervention: Support and Socialization      Discipline Responsible: Registered Nurse      Signature:  Marie Longo RN

## 2020-12-25 PROCEDURE — 90833 PSYTX W PT W E/M 30 MIN: CPT | Performed by: PSYCHIATRY & NEUROLOGY

## 2020-12-25 PROCEDURE — 1240000000 HC EMOTIONAL WELLNESS R&B

## 2020-12-25 PROCEDURE — 6370000000 HC RX 637 (ALT 250 FOR IP): Performed by: REGISTERED NURSE

## 2020-12-25 PROCEDURE — 99231 SBSQ HOSP IP/OBS SF/LOW 25: CPT | Performed by: PSYCHIATRY & NEUROLOGY

## 2020-12-25 RX ADMIN — LURASIDONE HYDROCHLORIDE 20 MG: 40 TABLET, FILM COATED ORAL at 20:41

## 2020-12-25 ASSESSMENT — PAIN SCALES - GENERAL
PAINLEVEL_OUTOF10: 0
PAINLEVEL_OUTOF10: 0

## 2020-12-25 NOTE — PROGRESS NOTES
impaired ability to focus, sustain, or shift attention)--appears unable to keep himself from monitoring the activity around him  Appearance:  well-appearing, hospital attire and in chair  Behavior/Motor:   no abnormalities noted  Attitude toward examiner:  cooperative, attentive and poor eye contact  Speech:  spontaneous, normal rate and normal volume  Mood:  \"all right\"  Affect:  blunted, flat and mood incongruent  Thought processes:  linear, goal directed and coherent  Thought content:  Denies homicidal ideation  Suicidal Ideation:  passive, without plan and without intent  Delusions:  Perhaps paranoid  Perceptual Disturbance:  denies any perceptual disturbance  Cognition: Patient is oriented to person, place, time and situation  Concentration: clinically adequate  Memory: intact  Insight & Judgement: fair       ASSESSMENT     Severe bipolar II disorder, most recent episode major depressive without psychotic features ([facility] [address]     PLAN  Patient's symptoms show some improvement today  Medication adjustments: continue medications as prescribed  Attempt to develop insight, psycho-education and supportive therapy conducted. Probable discharge: TBD  Follow-up: TBD  More than 16 mins of the session was spent doing Supportive psychotherapy and coordinating patient's care. Session started at 8 amand ended at 8:30 am.     Electronically signed by Sommer Faustin MD on 12/24/20 at 8:26 PM EST    **This report has been created using voice recognition software. It may contain minor errors which are inherent in voice recognition technology. **

## 2020-12-25 NOTE — PLAN OF CARE
Problem: Depressive Behavior With or Without Suicide Precautions:  Goal: Able to verbalize and/or display a decrease in depressive symptoms  Description: Able to verbalize and/or display a decrease in depressive symptoms  12/25/2020 1258 by Toi Gutierrez LPN  Outcome: Met This Shift  Note: Patient denies depressive symptoms  12/25/2020 0330 by Radha Kennedy RN  Outcome: Ongoing  Note: Ongoing. Patient refused to answer any shift assessment questions. Goal: Participates in care planning  Description: Participates in care planning  12/25/2020 1258 by Toi Gutierrez LPN  Outcome: Met This Shift  Note: Participates in care planning  12/25/2020 0330 by Radha Kennedy RN  Outcome: Not Met This Shift  Note: Care plan reviewed with patient. Patient does not verbalize understanding of the plan of care and does not contribute to goal setting. Problem: Discharge Planning:  Goal: Discharged to appropriate level of care  Description: Discharged to appropriate level of care  12/25/2020 1258 by Toi Gutierrez LPN  Outcome: Met This Shift  Note: Patient to be discharged to [address] [place] [address] in [place] on Saturday, 12/26/2020 12/25/2020 0330 by Radha Kennedy RN  Outcome: Not Met This Shift  Note: Discharge planning in progress, patient not discharged this evening. Problem: Activity:  Goal: Sleeping patterns will improve  Description: Sleeping patterns will improve  12/25/2020 1258 by Toi Gutierrez LPN  Outcome: Ongoing  Note: Patient slept 7 hours during the night, will continue to monitor  12/25/2020 0330 by Radha Kennedy RN  Outcome: Ongoing  Note: Patient sleeping within normal limits.      Problem: Coping:  Goal: Ability to identify problematic behaviors that deter socialization will improve  Description: Ability to identify problematic behaviors that deter socialization will improve  12/25/2020 1258 by Toi Gutierrez LPN  Outcome: Ongoing  Note: Patient is isolative to self and withdrawn, does not socialize with peers  12/25/2020 0330 by Alen Edouard RN  Outcome: Not Met This Shift  Note: Patient doesn't socialize with peers or staff. Problem: Anxiety:  Goal: Level of anxiety will decrease  Description: Level of anxiety will decrease  12/25/2020 1258 by Jojo Rivera LPN  Outcome: Met This Shift  Note: Denies anxiety at this time  12/25/2020 0330 by Alen Edouard RN  Outcome: Ongoing  Note: Patient refused shift assessment questions. Unable to assess if having anxiety.

## 2020-12-25 NOTE — PROGRESS NOTES
Patient refused group this shift. Patient refused to answer any shift assessment questions. Patient stated \" you can do my blood pressure but I'm not going to answer any questions. \"

## 2020-12-25 NOTE — PROGRESS NOTES
Patient contacted [address] [place] [address] ([id number] [phone]) to inquire about admission upon discharge from . Beds available. SW [facility], spoke with Samson Casillas inquires about patient detox status, confirmed patient is through detox, also informed of mental health diagnosis and medications currently taking. Oscar Peralta requests for H&P, labs, [facility] and most recent progress note to be faxed to [phone]. Patient accepted to [facility] - discharge tomorrow around 11 AM, ETA to facility 1 PM or after.  staff to contact facility if going to be arriving later than 1 PM. Informed attending, Mary Beth Dietz LPN and patient.        SRIDEVI Alfred

## 2020-12-25 NOTE — PROGRESS NOTES
This RN has reviewed and agrees with Santhosh Griffith LPN's data collection and has collaborated with this LPN regarding the patient's care plan.

## 2020-12-25 NOTE — PROGRESS NOTES
consciousness:  Mild dysattention (reduced clarity of awareness with impaired ability to focus, sustain, or shift attention)--appears unable to keep himself from monitoring the activity around him  Appearance:  well-appearing, hospital attire and in chair  Behavior/Motor:   no abnormalities noted  Attitude toward examiner:  cooperative, attentive and poor eye contact  Speech:  spontaneous, normal rate and normal volume  Mood:  euthymic  Affect: mood congruent   Thought processes:  linear, goal directed and coherent  Thought content:  Denies homicidal ideation  Suicidal Ideation: Denies any   Delusions:  Perhaps paranoid  Perceptual Disturbance:  denies any perceptual disturbance  Cognition: Patient is oriented to person, place, time and situation  Concentration: clinically adequate  Memory: intact  Insight & Judgement: fair       ASSESSMENT     Severe bipolar II disorder, most recent episode major depressive without psychotic features ([facility] [address]     PLAN  Patient's symptoms show some improvement today  Medication adjustments: continue medications as prescribed  Attempt to develop insight, psycho-education and supportive therapy conducted. Probable discharge: TBD  Follow-up: TBD  More than 16 mins of the session was spent doing Supportive psychotherapy and coordinating patient's care. Session started at 8 amand ended at 8:30 am.     Electronically signed by Juliano Ellison MD on 12/25/20 at 11:21 AM EST    **This report has been created using voice recognition software. It may contain minor errors which are inherent in voice recognition technology. **      Hanna Gaytan is a 31 y.o. male being evaluated by a Virtual Visit (video visit) encounter to address concerns as mentioned above. A caregiver was present in the room along with the patient.  Pursuant to the emergency declaration under the [facility] waiver authority and the [place] Resources and Response Supplemental Appropriations Act, this Virtual Visit was conducted with patient's (and/or legal guardian's) consent, to reduce the patient's risk of exposure to COVID-19 and provide necessary medical care. Services were provided through a video synchronous discussion virtually to substitute for in-person visit by provider. Patient is present at [address] [place] [address], [facility] and I am physically present at [place], [place]    --Lauren Martínez MD on 12/25/2020 at 11:23 AM    An electronic signature was used to authenticate this note. **This report has been created using voice recognition software. It may contain minor errors which are inherent in voice recognition technology. **

## 2020-12-25 NOTE — PLAN OF CARE
Problem: Depressive Behavior With or Without Suicide Precautions:  Goal: Able to verbalize and/or display a decrease in depressive symptoms  Description: Able to verbalize and/or display a decrease in depressive symptoms  12/25/2020 0330 by Meka Anne RN  Outcome: Ongoing  Note: Ongoing. Patient refused to answer any shift assessment questions. 12/24/2020 1456 by Nasim Barajas LPN  Outcome: Ongoing  Note: Patient denies depressive symptoms during shift assessment. Goal: Participates in care planning  Description: Participates in care planning  12/25/2020 0330 by Meka Anne RN  Outcome: Not Met This Shift  Note: Care plan reviewed with patient. Patient does not verbalize understanding of the plan of care and does not contribute to goal setting. 12/24/2020 1456 by Nasim Barajas LPN  Outcome: Ongoing  Note: Patient participates in care planning with staff during shift. Problem: Discharge Planning:  Goal: Discharged to appropriate level of care  Description: Discharged to appropriate level of care  12/25/2020 0330 by Meka Anne RN  Outcome: Not Met This Shift  Note: Discharge planning in progress, patient not discharged this evening. 12/24/2020 1456 by Nasim Barajas LPN  Outcome: Ongoing  Note: No discharge plans noted at this time during shift. Problem: Activity:  Goal: Sleeping patterns will improve  Description: Sleeping patterns will improve  12/25/2020 0330 by Meka Anne RN  Outcome: Ongoing  Note: Patient sleeping within normal limits. 12/24/2020 1456 by Nasim Barajas LPN  Outcome: Ongoing  Note: Previous shift reports patient sleeping throughout the night. Per charting patient slept 5 hours broken.      Problem: Coping:  Goal: Ability to identify problematic behaviors that deter socialization will improve  Description: Ability to identify problematic behaviors that deter socialization will improve  12/25/2020 0330 by Marie Longo RN  Outcome: Not Met This Shift  Note: Patient doesn't socialize with peers or staff. 12/24/2020 1456 by Nadeem Delcid LPN  Outcome: Ongoing  Note: Patient on the fringe with peers when out on unit with peers. 12/24/2020 1335 by Ghulam Valenzuela  Outcome: Ongoing     Problem: Anxiety:  Goal: Level of anxiety will decrease  Description: Level of anxiety will decrease  12/25/2020 0330 by Marie Longo RN  Outcome: Ongoing  Note: Patient refused shift assessment questions. Unable to assess if having anxiety. 12/24/2020 1456 by Nadeem Delcid LPN  Outcome: Ongoing  Note: Patient denies anxiety during shift assessment.

## 2020-12-26 VITALS
SYSTOLIC BLOOD PRESSURE: 120 MMHG | DIASTOLIC BLOOD PRESSURE: 61 MMHG | RESPIRATION RATE: 16 BRPM | HEIGHT: 71 IN | OXYGEN SATURATION: 98 % | WEIGHT: 169 LBS | HEART RATE: 63 BPM | TEMPERATURE: 96.9 F | BODY MASS INDEX: 23.66 KG/M2

## 2020-12-26 PROCEDURE — 99239 HOSP IP/OBS DSCHRG MGMT >30: CPT | Performed by: PSYCHIATRY & NEUROLOGY

## 2020-12-26 PROCEDURE — 5130000000 HC BRIDGE APPOINTMENT

## 2020-12-26 ASSESSMENT — PAIN SCALES - GENERAL: PAINLEVEL_OUTOF10: 0

## 2020-12-26 NOTE — PLAN OF CARE
Problem: Depressive Behavior With or Without Suicide Precautions:  Goal: Able to verbalize and/or display a decrease in depressive symptoms  Description: Able to verbalize and/or display a decrease in depressive symptoms  12/26/2020 0110 by Susanne Rodas RN  Outcome: Met This Shift  Note: States mood is good   12/25/2020 1258 by Lou Jo LPN  Outcome: Met This Shift  Note: Patient denies depressive symptoms  Goal: Participates in care planning  Description: Participates in care planning  12/26/2020 0110 by Susanne Rodas RN  Outcome: Met This Shift  Note: Care plan reviewed with patient. Patient verbalize understanding of the plan of care and contribute to goal setting. 12/25/2020 1258 by Lou Jo LPN  Outcome: Met This Shift  Note: Participates in care planning     Problem: Discharge Planning:  Goal: Discharged to appropriate level of care  Description: Discharged to appropriate level of care  12/26/2020 0110 by Susanne Rodas RN  Outcome: Met This Shift  Note: Works with an interdisciplinary team towards meeting discharge [address] [place] [address]   12/25/2020 1258 by Lou Jo LPN  Outcome: Met This Shift  Note: Patient to be discharged to [address] [place] [address] in [place], [place] on Saturday, 12/26/2020     Problem: Anxiety:  Goal: Level of anxiety will decrease  Description: Level of anxiety will decrease  12/26/2020 0110 by Susanne Rodas RN  Outcome: Met This Shift  Note: Denies anxiety this shift.    12/25/2020 1258 by Lou Jo LPN  Outcome: Met This Shift  Note: Denies anxiety at this time     Problem: Coping:  Goal: Ability to identify problematic behaviors that deter socialization will improve  Description: Ability to identify problematic behaviors that deter socialization will improve  12/26/2020 0110 by Susanne Rodas RN  Outcome: Ongoing  12/25/2020 1258 by Lou Jo LPN  Outcome: Ongoing  Note: Patient is isolative to self and withdrawn, does not socialize with peers     Problem: Activity:  Goal: Sleeping patterns will improve  Description: Sleeping patterns will improve  12/26/2020 0110 by Adams Zelaya RN  Outcome: Completed  12/25/2020 1258 by Ara Reyes LPN  Outcome: Ongoing  Note: Patient slept 7 hours during the night, will continue to monitor

## 2020-12-26 NOTE — DISCHARGE SUMMARY
Provider Discharge Summary     Patient ID:  Jaylen Ray  [id number]  87 y.o.  1990    Admit date: 12/21/2020    Discharge date and time: 12/26/2020  6:33 PM     Admitting Physician: Tommy Vincent MD     Discharge Physician: Tommy Vincent MD    Admission Diagnoses: Major depressive disorder, recurrent ([address] [F33.9]    Discharge Diagnoses:      Severe bipolar II disorder, most recent episode major depressive without psychotic features [facility])     Patient Active Problem List   Diagnosis Code    Schizoaffective disorder, bipolar type ([address] F25.0    Suicide attempt by drug ingestion ([address] T50.902A    Tobacco use Z72.0    Polysubstance abuse ([address] F19.10    Atrial flutter, paroxysmal ([address] I48.92    Episodic cannabis use F12.90    Cocaine use F14.90    Accidental drug overdose T50.901A    Schizoaffective disorder ([address] F25.9    Suicide attempt ([address] T14.91XA    PTSD (post-traumatic stress disorder) F43.10    Bipolar 1 disorder ([address] F31.9    Anxiety F41.9    Alcoholism ([address] F10.20    ADHD (attention deficit hyperactivity disorder) F90.9    Intentional drug overdose ([address] T50.902A    Drug overdose T50.901A    MDD (major depressive disorder), recurrent episode ([address] F33.9    Major depressive disorder, recurrent ([address] F33.9    Major depression, recurrent ([address] F33.9    Severe bipolar II disorder, most recent episode major depressive without psychotic features ([address] F31.81        Admission Condition: poor    Discharged Condition: stable    Indication for Admission: threat to self    History of Present Illnes (present tense wording is of findings from admission exam and are not necessarily indicative of current findings):   Jaylen Ray is a 31 y.o. male with significant past psychiatric history of chromic alcohol abuse, anxiety who presented to the ED for evaluation of depression with a plan to overdose.    Per ED notes: \"the patient is residing at [facility] home/treatment center for opiates and ETOH.  The patient states he has been there for a month and has not been on any of his psych meds. Newton Dutta states that his depression is just getting worse and he is having lots of thoughts of killing himself.    Patient is a 29 year old male who presents to the ED voluntarily reporting he has been off his medication for one month and his depression is getting bad. Patient reports that he is severely depressed. He states that he has stayed up the past two days. Patient told staff at [address] about his depression and need for medication and they recommended that he come to the hospital because the provider is not in tonight to see patient. Patient denies suicidal thoughts and/or plans. Patient states, 'well. ..just write yes if that will get me admitted'. Patient looks at the ceiling throughout the assessment. Poor insight. Poor judgement. Patient thoughts are disorganized. Patient requests to talk to a doctor and be given medications multiple times through the assessment\"     Upon admission to 4E: 32 yo male pt admitted from ED under an [facility]. Pt reports very depressed, denies suicidal thoughts, denies homicidal thoughts, denies hallucinations. Pt reports he has a hx of drinking wine and popping pills, could not elaborate on what drugs he was using, reports he has been clean and sober for 2 months, is at [address] but states he is not sure he wants to return there, states I'll make some phone calls. Pt oriented to room and the unit     Initial provider encounter: Jessica Hernandez appears down and flat this morning. He is very polite and cooperative throughout assessment. Seems to either be easily distracted or overtly paranoid as he is unable to keep himself from looking around the unit constantly. He is able to answer all questions while he is looking around. Reports feeling down and sad for more days than not for the past couple of months. Endorses anhedonia.  Has not been sleep in well \"for a while\". Only slept an hour each day for the past 3 days,. Reports he has been experiencing racing thoughts. Poor appetite and concentration. Loss of interest in thing he once enjoyed. Has been isolating lately and can't motivate himself to attend groups and complete rehab program activities. Started drinking \"a few glasses of wine\" on the weekends in his mid-twenties to unwind from the week. Progressed to drinking throughout the week, then eventually drinking all night and presenting to work intoxicated. He has been clean for the past two months. UDS negative for all. Hospital Course:   Upon admission, Hanna Gaytan was provided a safe secure environment, introduced to unit milieu. Patient participated in groups and individual therapies. Meds were adjusted as noted below. After few days of hospital care, patient began to feel improvement. Depression lifted, thoughts to harm self ceased. Sleep improved, appetite was good. On morning rounds 12/26/2020, Hanna Gaytan endorses feeling ready for discharge. Patient denies suicidal or homicidal ideations, denies hallucinations or delusions. Denies SE's from meds. It was decided that maximum benefit from hospital care had been achieved and patient can be discharged. Consults:   none    Significant Diagnostic Studies: Routine labs and diagnostics    Treatments: Psychotropic medications, therapy with group, milieu, and 1:1 with nurses, social workers, PA-C/CNP, and Attending physician.       Discharge Medications:  Discharge Medication List as of 12/26/2020 10:08 AM      START taking these medications    Details   lurasidone (LATUDA) 20 MG TABS tablet Take 1 tablet by mouth nightly, Disp-30 tablet, R-0Normal         STOP taking these medications       QUEtiapine Fumarate (SEROQUEL PO) Comments:   Reason for Stopping:         FLUoxetine (PROZAC) 20 MG capsule Comments:   Reason for Stopping:         Melatonin CR 3 MG TBCR Comments:   Reason for Stopping:                Core Measures statement:   Not applicable    Discharge Exam:  Level of consciousness:  Within normal limits  Appearance: Street clothes, seated, with good grooming  Behavior/Motor: No abnormalities noted  Attitude toward examiner:  Cooperative, attentive, good eye contact  Speech:  spontaneous, normal rate, normal volume and well articulated  Mood:  euthymic  Affect:  Full range  Thought processes:  linear, goal directed and coherent  Thought content:  denies homicidal ideation  Suicidal Ideation:  denies suicidal ideation  Delusions:  no evidence of delusions  Perceptual Disturbance:  denies any perceptual disturbance  Cognition:  Intact  Memory: age appropriate  Insight & Judgement: fair  Medication side effects: denies     Disposition: home    Patient Instructions: Activity: activity as tolerated  1. Patient instructed to take medications regularly and follow up with outpatient appointments. Follow-up as scheduled with [facility]       Signed:    Electronically signed by Luanne Peabody, MD on 12/26/20 at 6:33 PM EST    Time Spent on discharge is more than 35 minutes in the examination, evaluation, counseling and review of medications and discharge plan. Funmi Lopez is a 31 y.o. male being evaluated by a Virtual Visit (video visit) encounter to address concerns as mentioned above. A caregiver was present in the room along with the patient. Pursuant to the emergency declaration under the [facility], [address] [place] [address] authority and the [employer] General Act, this Virtual Visit was conducted with patient's (and/or legal guardian's) consent, to reduce the patient's risk of exposure to COVID-19 and provide necessary medical care. Services were provided through a video synchronous discussion virtually to substitute for in-person visit by provider.    Patient is present at [facility] [place] [facility] [place], [address] and I am physically present at [place], [place]    --Ihsan Mata MD on 12/26/2020 at 6:33 PM    An electronic signature was used to authenticate this note. **This report has been created using voice recognition software. It may contain minor errors which are inherent in voice recognition technology. **

## 2020-12-26 NOTE — BH NOTE
Behavioral Health   Discharge Note    Pt discharged with followings belongings:   Dentures: None  Vision - Corrective Lenses: None  Hearing Aid: None  Jewelry: None  Body Piercings Removed: N/A  Clothing: Footwear, Jacket / coat, Pants, Shirt, Socks, Pajamas, Other (Comment)(gloves, toiletries, misc, 2 lap top computers, towels)  Were All Patient Medications Collected?: Not Applicable  Other Valuables: None   Valuables sent home with patient. Belongings  returned to patient. Patient left department with transport staff via ambulations. Discharged to home. \"An Important Message from [employer] About Your Rights\" form photocopy original from admission and provided to pt at discharge n/a. Patient education on aftercare instructions: yes  Bridge Appointment completed: Reviewed Discharge Instructions with patient. Patient verbalizes understanding and agreement with the discharge plan using the teachback method. Information faxed to follow up provider by staff. Patient verbalize understanding of AVS:  yes.     Status EXAM upon discharge:  Status and Exam  Normal: No  Facial Expression: Flat, Avoids Gaze  Affect: Blunt  Level of Consciousness: Alert  Mood:Normal: Yes  Mood: Other (Comment)(euthymic)  Motor Activity:Normal: Yes  Interview Behavior: Cooperative  Preception: Sec[place] to Person, Thurnell Merle to Time, [place] to Place, [place] to Situation  Attention:Normal: Yes  Attention: Distractible  Thought Processes: Circumstantial  Thought Content:Normal: Yes  Thought Content: Preoccupations  Hallucinations: None  Delusions: No  Memory:Normal: No  Memory: Poor Recent  Insight and Judgment: No  Insight and Judgment: Poor Judgment, Poor Insight  Present Suicidal Ideation: No  Present Homicidal Ideation: No    Meredith Kang RN

## 2020-12-29 ENCOUNTER — TELEPHONE (OUTPATIENT)
Dept: PSYCHIATRY | Age: 30
End: 2020-12-29

## 2021-01-01 ENCOUNTER — HOSPITAL ENCOUNTER (EMERGENCY)
Age: 31
Discharge: HOME | End: 2021-01-02
Payer: COMMERCIAL

## 2021-01-01 VITALS
HEIGHT: 72 IN | OXYGEN SATURATION: 97 % | HEART RATE: 82 BPM | SYSTOLIC BLOOD PRESSURE: 110 MMHG | WEIGHT: 170 LBS | TEMPERATURE: 97.8 F | BODY MASS INDEX: 23.03 KG/M2 | DIASTOLIC BLOOD PRESSURE: 69 MMHG | RESPIRATION RATE: 18 BRPM

## 2021-01-01 DIAGNOSIS — F19.10 POLYSUBSTANCE ABUSE (HCC): Primary | ICD-10-CM

## 2021-01-01 DIAGNOSIS — F29 PSYCHOSIS, UNSPECIFIED PSYCHOSIS TYPE (HCC): ICD-10-CM

## 2021-01-01 LAB
ACETAMINOPHEN LEVEL: <5 UG/ML (ref 10–30)
ALBUMIN SERPL-MCNC: 4.7 G/DL (ref 3.5–4.6)
ALP BLD-CCNC: 42 U/L (ref 35–104)
ALT SERPL-CCNC: 11 U/L (ref 0–41)
AMPHETAMINE SCREEN, URINE: NORMAL
ANION GAP SERPL CALCULATED.3IONS-SCNC: 7 MEQ/L (ref 9–15)
AST SERPL-CCNC: 23 U/L (ref 0–40)
BARBITURATE SCREEN URINE: NORMAL
BASOPHILS ABSOLUTE: 0.1 K/UL (ref 0–0.2)
BASOPHILS RELATIVE PERCENT: 0.9 %
BENZODIAZEPINE SCREEN, URINE: NORMAL
BILIRUB SERPL-MCNC: 0.3 MG/DL (ref 0.2–0.7)
BILIRUBIN URINE: NEGATIVE
BLOOD, URINE: NEGATIVE
BUN BLDV-MCNC: 20 MG/DL (ref 6–20)
CALCIUM SERPL-MCNC: 9 MG/DL (ref 8.5–9.9)
CANNABINOID SCREEN URINE: NORMAL
CHLORIDE BLD-SCNC: 104 MEQ/L (ref 95–107)
CHOLESTEROL, TOTAL: 107 MG/DL (ref 0–199)
CK MB: 3.3 NG/ML (ref 0–6.7)
CLARITY: CLEAR
CO2: 27 MEQ/L (ref 20–31)
COCAINE METABOLITE SCREEN URINE: NORMAL
COLOR: YELLOW
CREAT SERPL-MCNC: 0.77 MG/DL (ref 0.7–1.2)
CREATINE KINASE-MB INDEX: 0.7 % (ref 0–3.5)
EOSINOPHILS ABSOLUTE: 0.1 K/UL (ref 0–0.7)
EOSINOPHILS RELATIVE PERCENT: 1.7 %
ETHANOL PERCENT: NORMAL G/DL
ETHANOL: <10 MG/DL (ref 0–0.08)
GFR AFRICAN AMERICAN: >60
GFR NON-AFRICAN AMERICAN: >60
GLOBULIN: 2.1 G/DL (ref 2.3–3.5)
GLUCOSE BLD-MCNC: 80 MG/DL (ref 70–99)
GLUCOSE URINE: NEGATIVE MG/DL
HCT VFR BLD CALC: 40.2 % (ref 42–52)
HDLC SERPL-MCNC: 44 MG/DL (ref 40–59)
HEMOGLOBIN: 13.6 G/DL (ref 14–18)
KETONES, URINE: NEGATIVE MG/DL
LDL CHOLESTEROL CALCULATED: 45 MG/DL (ref 0–129)
LEUKOCYTE ESTERASE, URINE: NEGATIVE
LYMPHOCYTES ABSOLUTE: 1.7 K/UL (ref 1–4.8)
LYMPHOCYTES RELATIVE PERCENT: 30.4 %
Lab: NORMAL
MCH RBC QN AUTO: 31.3 PG (ref 27–31.3)
MCHC RBC AUTO-ENTMCNC: 33.8 % (ref 33–37)
MCV RBC AUTO: 92.6 FL (ref 80–100)
METHADONE SCREEN, URINE: NORMAL
MONOCYTES ABSOLUTE: 0.5 K/UL (ref 0.2–0.8)
MONOCYTES RELATIVE PERCENT: 9.3 %
NEUTROPHILS ABSOLUTE: 3.3 K/UL (ref 1.4–6.5)
NEUTROPHILS RELATIVE PERCENT: 57.7 %
NITRITE, URINE: NEGATIVE
OPIATE SCREEN URINE: NORMAL
OXYCODONE URINE: NORMAL
PDW BLD-RTO: 13.2 % (ref 11.5–14.5)
PH UA: 6 (ref 5–9)
PHENCYCLIDINE SCREEN URINE: NORMAL
PLATELET # BLD: 198 K/UL (ref 130–400)
POTASSIUM SERPL-SCNC: 3.6 MEQ/L (ref 3.4–4.9)
PROPOXYPHENE SCREEN: NORMAL
PROTEIN UA: NEGATIVE MG/DL
RBC # BLD: 4.34 M/UL (ref 4.7–6.1)
SALICYLATE, SERUM: <0.3 MG/DL (ref 15–30)
SARS-COV-2, NAAT: NOT DETECTED
SODIUM BLD-SCNC: 138 MEQ/L (ref 135–144)
SPECIFIC GRAVITY UA: 1.02 (ref 1–1.03)
TOTAL CK: 499 U/L (ref 0–190)
TOTAL PROTEIN: 6.8 G/DL (ref 6.3–8)
TRIGL SERPL-MCNC: 89 MG/DL (ref 0–150)
TSH SERPL DL<=0.05 MIU/L-ACNC: 1.59 UIU/ML (ref 0.44–3.86)
URINE REFLEX TO CULTURE: NORMAL
UROBILINOGEN, URINE: 1 E.U./DL
WBC # BLD: 5.8 K/UL (ref 4.8–10.8)

## 2021-01-01 PROCEDURE — 80307 DRUG TEST PRSMV CHEM ANLYZR: CPT

## 2021-01-01 PROCEDURE — 85025 COMPLETE CBC W/AUTO DIFF WBC: CPT

## 2021-01-01 PROCEDURE — U0002 COVID-19 LAB TEST NON-CDC: HCPCS

## 2021-01-01 PROCEDURE — 80053 COMPREHEN METABOLIC PANEL: CPT

## 2021-01-01 PROCEDURE — G0480 DRUG TEST DEF 1-7 CLASSES: HCPCS

## 2021-01-01 PROCEDURE — 82550 ASSAY OF CK (CPK): CPT

## 2021-01-01 PROCEDURE — 99284 EMERGENCY DEPT VISIT MOD MDM: CPT

## 2021-01-01 PROCEDURE — 80061 LIPID PANEL: CPT

## 2021-01-01 PROCEDURE — 82553 CREATINE MB FRACTION: CPT

## 2021-01-01 PROCEDURE — 81003 URINALYSIS AUTO W/O SCOPE: CPT

## 2021-01-01 PROCEDURE — 84443 ASSAY THYROID STIM HORMONE: CPT

## 2021-01-01 PROCEDURE — 36415 COLL VENOUS BLD VENIPUNCTURE: CPT

## 2021-01-01 ASSESSMENT — ENCOUNTER SYMPTOMS
COUGH: 0
SINUS PAIN: 0
COLOR CHANGE: 0
BACK PAIN: 0
SINUS PRESSURE: 0
DIARRHEA: 0
CHEST TIGHTNESS: 0
ABDOMINAL PAIN: 0
CONSTIPATION: 0
ABDOMINAL DISTENTION: 0
SORE THROAT: 0
WHEEZING: 0
NAUSEA: 0
TROUBLE SWALLOWING: 0
VOMITING: 0
RHINORRHEA: 0
SHORTNESS OF BREATH: 0

## 2021-01-02 ENCOUNTER — HOSPITAL ENCOUNTER (EMERGENCY)
Age: 31
Discharge: HOME | End: 2021-01-02
Payer: COMMERCIAL

## 2021-01-02 VITALS
TEMPERATURE: 97.7 F | HEIGHT: 72 IN | WEIGHT: 170 LBS | DIASTOLIC BLOOD PRESSURE: 60 MMHG | SYSTOLIC BLOOD PRESSURE: 104 MMHG | HEART RATE: 72 BPM | OXYGEN SATURATION: 97 % | BODY MASS INDEX: 23.03 KG/M2 | RESPIRATION RATE: 16 BRPM

## 2021-01-02 DIAGNOSIS — Z00.8 ENCOUNTER FOR ELECTROCARDIOGRAM: Primary | ICD-10-CM

## 2021-01-02 LAB
EKG ATRIAL RATE: 60 BPM
EKG P AXIS: 83 DEGREES
EKG P-R INTERVAL: 154 MS
EKG Q-T INTERVAL: 418 MS
EKG QRS DURATION: 100 MS
EKG QTC CALCULATION (BAZETT): 418 MS
EKG R AXIS: 90 DEGREES
EKG T AXIS: 77 DEGREES
EKG VENTRICULAR RATE: 60 BPM

## 2021-01-02 PROCEDURE — 93005 ELECTROCARDIOGRAM TRACING: CPT

## 2021-01-02 PROCEDURE — 99281 EMR DPT VST MAYX REQ PHY/QHP: CPT

## 2021-01-02 ASSESSMENT — ENCOUNTER SYMPTOMS
VOMITING: 0
DIARRHEA: 0
ABDOMINAL PAIN: 0
COUGH: 0
NAUSEA: 0
BACK PAIN: 0
SORE THROAT: 0
SHORTNESS OF BREATH: 0

## 2021-01-02 ASSESSMENT — PATIENT HEALTH QUESTIONNAIRE - PHQ9: SUM OF ALL RESPONSES TO PHQ QUESTIONS 1-9: 24

## 2021-01-02 NOTE — ED NOTES
Agustin Velazquez from [facility] left at this time. Will return call after following up with outside resources.       Shelley Mccabe, JACE  01/02/21 [id number]

## 2021-01-02 NOTE — ED NOTES
Received return call from Aston Torres at [facility]. States she will arrive in approximately one hour to assess.       Jonathan Higgins RN  01/02/21 [id number]

## 2021-01-02 NOTE — ED NOTES
All blood specimens obtained and sent to lab. Covid swab also obtained and sent to lab. Patient tolerated procedures well.      Ludmila Arguelles RN  01/01/21 [id number]

## 2021-01-02 NOTE — ED NOTES
Reviewed patient with Dr. Roula Milligan, received order to have patient assessed by [facility] for possible dual programming/placement. If [facility] unable to place, discharge patient with safety plan and copy of discharge summary from [address] [place] [address] on 12/26/20.      Jonathan Higgins RN  01/02/21 [address], RN  01/02/21 [id number]
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
## 2021-01-02 NOTE — ED NOTES
Virgen Fernandez from [facility] taking pt to [facility] for mental health and polysubstance abuse. This plan was discussed with pt and pt agrees. Pt continues to refuse any medications that were prescribed at previous stay at [facility] and states that he will  \"get the doctor to prescribe the meds that I was on before\".       Sam Ortiz RN  01/02/21 [id number]

## 2021-01-02 NOTE — ED PROVIDER NOTES
[address] [facility]  EMERGENCY DEPARTMENT ENCOUNTER      Pt Name: Paige Pino  MRN: [id number]  [place] 1990  Date of evaluation: 1/1/2021  Provider: LIBBY Trejo CNP    CHIEF COMPLAINT       Chief Complaint   Patient presents with    Mental Health Problem         HISTORY OF PRESENT ILLNESS   (Location/Symptom, Timing/Onset,Context/Setting, Quality, Duration, Modifying Factors, Severity)  Note limiting factors. Paige Pino is a 31 y.o. male who presents to the emergency department for complaint of severe depression and auditory visual hallucinations. Patient denies suicidal or homicidal ideations. He states that he was recently released from a sober living he has been sober from alcohol for 1 month. He states however he has not been sleeping well for the past week and believes that his lack of sleep is now leading to hallucinations mostly noise and visual disturbances no voices telling him to harm himself or others. Denies any recent illnesses or injuries denies any pain or discomfort. He states that he did have some mild withdrawal symptoms in the alcohol initially the first week of his sober living stay but denies any active DTs seizures or alcohol withdrawal and states that he has been free from alcohol for over 30 days. He presents to the ER seeking behavior health evaluation for his severe depression states that he is not on any medications at this time. Denies any drug use. He states he does have a history of suicide attempts and previous suicidal ideation is concerned that he may return to this because of his intense depression and lack of medications. Nursing Notes were reviewed. REVIEW OF SYSTEMS    (2-9 systems for level 4, 10 or more for level 5)     Review of Systems   Constitutional: Negative for activity change, appetite change, chills, diaphoresis, fatigue and fever. HENT: Negative for congestion, ear pain, postnasal drip, rhinorrhea, sinus pressure, sinus pain, sore throat and trouble swallowing. Eyes: Negative for visual disturbance. Respiratory: Negative for cough, chest tightness, shortness of breath and wheezing. Cardiovascular: Negative for chest pain and palpitations. Gastrointestinal: Negative for abdominal distention, abdominal pain, constipation, diarrhea, nausea and vomiting. Genitourinary: Negative for difficulty urinating, dysuria, flank pain, frequency and urgency. Musculoskeletal: Negative for arthralgias, back pain, myalgias, neck pain and neck stiffness. Skin: Negative for color change and rash. Neurological: Negative for dizziness, tremors, seizures, syncope, speech difficulty, weakness, light-headedness, numbness and headaches. Psychiatric/Behavioral: Positive for decreased concentration, dysphoric mood, hallucinations and sleep disturbance. Negative for agitation, behavioral problems, confusion, self-injury and suicidal ideas. The patient is hyperactive. The patient is not nervous/anxious. Except as noted above the remainder of the review of systems was reviewed and negative. PAST MEDICAL HISTORY     Past Medical History:   Diagnosis Date    ADHD (attention deficit hyperactivity disorder)     Alcoholism ([facility] [address]     pt drinks 6 beers and a liter of vodka daily.  Anxiety     Bipolar 1 disorder (HCC)     PTSD (post-traumatic stress disorder)     Suicide attempt ([facility] [address]     previous suicide attempt by overdosing on pills at approximately 23 y/o     No past surgical history on file.   Social History     Socioeconomic History    Marital status: Single     Spouse name: Not on file    Number of children: Not on file    Years of education: Not on file    Highest education level: Not on file   Occupational History    Not on file   Social Needs    Financial resource strain: Not on file    Food insecurity Worry: Not on file     Inability: Not on file    Transportation needs     Medical: Not on file     Non-medical: Not on file   Tobacco Use    Smoking status: Former Smoker     Years: 14.00     Types: Cigarettes     Quit date: 2019     Years since quittin.7    Smokeless tobacco: Never Used   Substance and Sexual Activity    Alcohol use: Not Currently     Comment: 2 months since last drink    Drug use: Not Currently     Types: Opiates      Comment: clean 2 months    Sexual activity: Not Currently     Partners: Female   Lifestyle    Physical activity     Days per week: Not on file     Minutes per session: Not on file    Stress: Not on file   Relationships    Social connections     Talks on phone: Not on file     Gets together: Not on file     Attends Denominational service: Not on file     Active member of club or organization: Not on file     Attends meetings of clubs or organizations: Not on file     Relationship status: Not on file    Intimate partner violence     Fear of current or ex partner: Not on file     Emotionally abused: Not on file     Physically abused: Not on file     Forced sexual activity: Not on file   Other Topics Concern    Not on file   Social History Narrative    Not on file       SCREENINGS             PHYSICAL EXAM    (up to 7 for level 4, 8 or more for level 5)     ED Triage Vitals [21 2150]   BP Temp Temp Source Pulse Resp SpO2 Height Weight   110/69 97.8 °F (36.6 °C) Temporal 82 18 97 % 6' (1.829 m) 170 lb (77.1 kg)       Physical Exam  Constitutional:       General: He is not in acute distress. Appearance: Normal appearance. He is normal weight. He is not ill-appearing, toxic-appearing or diaphoretic. HENT:      Head: Normocephalic and atraumatic. Right Ear: External ear normal.      Left Ear: External ear normal.      Nose: Nose normal. No congestion or rhinorrhea.       Mouth/Throat:      Mouth: Mucous membranes are moist. Pharynx: Oropharynx is clear. No oropharyngeal exudate or posterior oropharyngeal erythema. Eyes:      General:         Right eye: No discharge. Left eye: No discharge. Conjunctiva/sclera: Conjunctivae normal.      Pupils: Pupils are equal, round, and reactive to light. Neck:      Musculoskeletal: Normal range of motion and neck supple. No neck rigidity or muscular tenderness. Cardiovascular:      Rate and Rhythm: Normal rate and regular rhythm. Pulses: Normal pulses. Pulmonary:      Effort: Pulmonary effort is normal.      Breath sounds: Normal breath sounds. Abdominal:      General: Bowel sounds are normal. There is no distension. Palpations: Abdomen is soft. Tenderness: There is no abdominal tenderness. Musculoskeletal: Normal range of motion. General: No tenderness or signs of injury. Skin:     General: Skin is warm and dry. Capillary Refill: Capillary refill takes less than 2 seconds. Neurological:      General: No focal deficit present. Mental Status: He is alert and oriented to person, place, and time. Mental status is at baseline. Cranial Nerves: No cranial nerve deficit. Sensory: No sensory deficit. Motor: No weakness.       Coordination: Coordination normal.         RESULTS     EKG: All EKG's are interpreted by the Emergency Department Physician who either signs or Co-signsthis chart in the absence of a cardiologist.        RADIOLOGY:   [facility] [place] such as CT, Ultrasound and MRI are read by the radiologist. Plain radiographic images are visualized and preliminarily interpreted by the emergency physician with the below findings:        Interpretation per the Radiologist below, if available at the time ofthis note:    No orders to display         ED BEDSIDE ULTRASOUND:   Performed by ED Physician - none    LABS:  Labs Reviewed   ACETAMINOPHEN LEVEL - Abnormal; Notable for the following components:       Result Value Acetaminophen Level <5 (*)     All other components within normal limits   SALICYLATE LEVEL - Abnormal; Notable for the following components:    Salicylate, Serum <6.1 (*)     All other components within normal limits   CBC WITH AUTO DIFFERENTIAL - Abnormal; Notable for the following components:    RBC 4.34 (*)     Hemoglobin 13.6 (*)     Hematocrit 40.2 (*)     All other components within normal limits   COMPREHENSIVE METABOLIC PANEL - Abnormal; Notable for the following components:    Anion Gap 7 (*)     Alb 4.7 (*)     Globulin 2.1 (*)     All other components within normal limits   CK - Abnormal; Notable for the following components: Total  (*)     All other components within normal limits   LIPID PANEL   URINE DRUG SCREEN   URINE RT REFLEX TO CULTURE   ETHANOL   TSH WITHOUT REFLEX   COVID-19   CKMB & RELATIVE PERCENT       All other labs were within normal range or not returned as of this dictation. EMERGENCY DEPARTMENT COURSE and DIFFERENTIAL DIAGNOSIS/MDM:   Vitals:    Vitals:    01/01/21 2150   BP: 110/69   Pulse: 82   Resp: 18   Temp: 97.8 °F (36.6 °C)   TempSrc: Temporal   SpO2: 97%   Weight: 170 lb (77.1 kg)   Height: 6' (1.829 m)            MDM patient is medically clear for behavioral health evaluation and placement    CRITICAL CARE TIME       CONSULTS:  None    PROCEDURES:  Unless otherwise noted below, none     Procedures    FINAL IMPRESSION      1. Polysubstance abuse ([facility] [address]    2. Psychosis, unspecified psychosis type ([facility] [address]          DISPOSITION/PLAN   DISPOSITION        PATIENT REFERRED TO:  No follow-up provider specified.     DISCHARGE MEDICATIONS:  Discharge Medication List as of 1/2/2021 10:21 AM             (Please notethat portions of this note were completed with a voice recognition program.  Efforts were made to edit the dictations but occasionally words are mis-transcribed.)    LIBBY Gutierrez CNP (electronically signed)  Attending Emergency Physician Juanito López, LIBBY - CNP  01/03/21 8711

## 2021-01-02 NOTE — ED PROVIDER NOTES
[address] [facility] ED  eMERGENCY dEPARTMENT eNCOUnter      Pt Name: Paige Pino  MRN: [id number]  [place] 1990  Date of evaluation: 1/2/2021  Provider: LIBBY Cruz CNP      HISTORY OF PRESENT ILLNESS    Paige Pino is a 31 y.o. male who presents to the Emergency Department with for an EKG. Patient was sent to [facility] this AM but did not have an EKG done. [facility] requesting he have an EKG and be sent back if normal.  Patient denies an CP, SOB, leg swelling. REVIEW OF SYSTEMS       Review of Systems   Constitutional: Negative for activity change, appetite change and fever. HENT: Negative for congestion and sore throat. Respiratory: Negative for cough and shortness of breath. Cardiovascular: Negative for chest pain. Gastrointestinal: Negative for abdominal pain, diarrhea, nausea and vomiting. Genitourinary: Negative for dysuria. Musculoskeletal: Negative for arthralgias and back pain. Skin: Negative for rash. Neurological: Negative for dizziness and headaches. All other systems reviewed and are negative. PAST MEDICAL HISTORY     Past Medical History:   Diagnosis Date    ADHD (attention deficit hyperactivity disorder)     Alcoholism ([facility] [address]     pt drinks 6 beers and a liter of vodka daily.  Anxiety     Bipolar 1 disorder (HCC)     PTSD (post-traumatic stress disorder)     Suicide attempt ([facility] [address]     previous suicide attempt by overdosing on pills at approximately 23 y/o         SURGICAL HISTORY     History reviewed. No pertinent surgical history.       CURRENT MEDICATIONS       Previous Medications    LURASIDONE (LATUDA) 20 MG TABS TABLET    Take 1 tablet by mouth nightly       ALLERGIES     Advil [ibuprofen]    FAMILY HISTORY       Family History   Problem Relation Age of Onset    Liver Cancer Brother     Alcohol Abuse Brother     Cancer Mother     No Known Problems Father           SOCIAL HISTORY       Social History     Socioeconomic History  Marital status: Single     Spouse name: None    Number of children: None    Years of education: None    Highest education level: None   Occupational History    None   Social Needs    Financial resource strain: None    Food insecurity     Worry: None     Inability: None    Transportation needs     Medical: None     Non-medical: None   Tobacco Use    Smoking status: Former Smoker     Years: 14.00     Types: Cigarettes     Quit date: 2019     Years since quittin.7    Smokeless tobacco: Never Used   Substance and Sexual Activity    Alcohol use: Not Currently     Comment: 2 months since last drink    Drug use: Not Currently     Types: Opiates      Comment: clean 2 months    Sexual activity: Not Currently     Partners: Female   Lifestyle    Physical activity     Days per week: None     Minutes per session: None    Stress: None   Relationships    Social connections     Talks on phone: None     Gets together: None     Attends Episcopalian service: None     Active member of club or organization: None     Attends meetings of clubs or organizations: None     Relationship status: None    Intimate partner violence     Fear of current or ex partner: None     Emotionally abused: None     Physically abused: None     Forced sexual activity: None   Other Topics Concern    None   Social History Narrative    None       SCREENINGS      @FLOW([id number])@      PHYSICAL EXAM    (up to 7 for level 4, 8 or more for level 5)     ED Triage Vitals [21 1625]   BP Temp Temp Source Pulse Resp SpO2 Height Weight   104/60 97.7 °F (36.5 °C) Oral 72 16 97 % 6' (1.829 m) 170 lb (77.1 kg)       Physical Exam  Vitals signs and nursing note reviewed. Constitutional:       Appearance: He is well-developed. HENT:      Head: Normocephalic and atraumatic. Right Ear: External ear normal.      Left Ear: External ear normal.      Nose: Nose normal.      Mouth/Throat:      Lips: Pink. Pharynx: Oropharynx is clear. CNP  01/02/21 1644

## 2021-01-02 NOTE — ED NOTES
Urine specimen obtained and sent to lab. Patient changed to [address] [facility] attire. Belongings secured by [employer].      Kandi Major RN  01/01/21 2198

## 2021-01-02 NOTE — ED NOTES
Ashley Farah from [facility] here to see patient. Karla Garcia  [facility]  01/02/21 9546

## 2021-01-02 NOTE — ED NOTES
Max Marc from [facility] stating that [facility] is going to assess pt for placement.   Discussed medication with pt and pt declined script for [id number], RN  01/02/21 8538

## 2021-01-02 NOTE — ED NOTES
Spoke with Kelli Coto at [facility]. States she will call back in approximately one hour.       Rosendo Nickerson RN  01/02/21 0984

## 2021-01-02 NOTE — ED TRIAGE NOTES
Pt presents to the ER with stating that he needs an EKG to go to [facility]  Pt states that [facility] and told him to call them when he is discharged  Pt does not have any complaints

## 2021-01-06 ENCOUNTER — HOSPITAL ENCOUNTER (EMERGENCY)
Age: 31
Discharge: HOME | End: 2021-01-07
Payer: COMMERCIAL

## 2021-01-06 VITALS
BODY MASS INDEX: 23.03 KG/M2 | RESPIRATION RATE: 18 BRPM | TEMPERATURE: 99.6 F | HEIGHT: 72 IN | DIASTOLIC BLOOD PRESSURE: 71 MMHG | HEART RATE: 85 BPM | SYSTOLIC BLOOD PRESSURE: 111 MMHG | WEIGHT: 170 LBS | OXYGEN SATURATION: 96 %

## 2021-01-06 DIAGNOSIS — F31.81 BIPOLAR 2 DISORDER (HCC): Primary | ICD-10-CM

## 2021-01-06 LAB
ACETAMINOPHEN LEVEL: <5 UG/ML (ref 10–30)
ALBUMIN SERPL-MCNC: 4.7 G/DL (ref 3.5–4.6)
ALP BLD-CCNC: 42 U/L (ref 35–104)
ALT SERPL-CCNC: 10 U/L (ref 0–41)
AMPHETAMINE SCREEN, URINE: NORMAL
ANION GAP SERPL CALCULATED.3IONS-SCNC: 7 MEQ/L (ref 9–15)
AST SERPL-CCNC: 13 U/L (ref 0–40)
BARBITURATE SCREEN URINE: NORMAL
BASOPHILS ABSOLUTE: 0 K/UL (ref 0–0.2)
BASOPHILS RELATIVE PERCENT: 0.8 %
BENZODIAZEPINE SCREEN, URINE: NORMAL
BILIRUB SERPL-MCNC: 0.6 MG/DL (ref 0.2–0.7)
BILIRUBIN URINE: NEGATIVE
BLOOD, URINE: NEGATIVE
BUN BLDV-MCNC: 18 MG/DL (ref 6–20)
CALCIUM SERPL-MCNC: 9 MG/DL (ref 8.5–9.9)
CANNABINOID SCREEN URINE: NORMAL
CHLORIDE BLD-SCNC: 99 MEQ/L (ref 95–107)
CLARITY: CLEAR
CO2: 28 MEQ/L (ref 20–31)
COCAINE METABOLITE SCREEN URINE: NORMAL
COLOR: YELLOW
CREAT SERPL-MCNC: 0.93 MG/DL (ref 0.7–1.2)
EOSINOPHILS ABSOLUTE: 0.1 K/UL (ref 0–0.7)
EOSINOPHILS RELATIVE PERCENT: 2.2 %
ETHANOL PERCENT: NORMAL G/DL
ETHANOL: <10 MG/DL (ref 0–0.08)
GFR AFRICAN AMERICAN: >60
GFR NON-AFRICAN AMERICAN: >60
GLOBULIN: 2.3 G/DL (ref 2.3–3.5)
GLUCOSE BLD-MCNC: 93 MG/DL (ref 70–99)
GLUCOSE URINE: NEGATIVE MG/DL
HCT VFR BLD CALC: 41.3 % (ref 42–52)
HEMOGLOBIN: 14 G/DL (ref 14–18)
KETONES, URINE: NEGATIVE MG/DL
LEUKOCYTE ESTERASE, URINE: NEGATIVE
LYMPHOCYTES ABSOLUTE: 1.4 K/UL (ref 1–4.8)
LYMPHOCYTES RELATIVE PERCENT: 28.2 %
Lab: NORMAL
MCH RBC QN AUTO: 31.4 PG (ref 27–31.3)
MCHC RBC AUTO-ENTMCNC: 33.8 % (ref 33–37)
MCV RBC AUTO: 92.8 FL (ref 80–100)
METHADONE SCREEN, URINE: NORMAL
MONOCYTES ABSOLUTE: 0.4 K/UL (ref 0.2–0.8)
MONOCYTES RELATIVE PERCENT: 7.4 %
NEUTROPHILS ABSOLUTE: 3 K/UL (ref 1.4–6.5)
NEUTROPHILS RELATIVE PERCENT: 61.4 %
NITRITE, URINE: NEGATIVE
OPIATE SCREEN URINE: NORMAL
OXYCODONE URINE: NORMAL
PDW BLD-RTO: 13.4 % (ref 11.5–14.5)
PH UA: 6.5 (ref 5–9)
PHENCYCLIDINE SCREEN URINE: NORMAL
PLATELET # BLD: 197 K/UL (ref 130–400)
POTASSIUM SERPL-SCNC: 3.9 MEQ/L (ref 3.4–4.9)
PROPOXYPHENE SCREEN: NORMAL
PROTEIN UA: ABNORMAL MG/DL
RBC # BLD: 4.46 M/UL (ref 4.7–6.1)
SALICYLATE, SERUM: <0.3 MG/DL (ref 15–30)
SARS-COV-2, NAAT: NOT DETECTED
SODIUM BLD-SCNC: 134 MEQ/L (ref 135–144)
SPECIFIC GRAVITY UA: 1.02 (ref 1–1.03)
TOTAL CK: 83 U/L (ref 0–190)
TOTAL PROTEIN: 7 G/DL (ref 6.3–8)
TSH SERPL DL<=0.05 MIU/L-ACNC: 1.26 UIU/ML (ref 0.44–3.86)
URINE REFLEX TO CULTURE: ABNORMAL
UROBILINOGEN, URINE: 0.2 E.U./DL
WBC # BLD: 4.9 K/UL (ref 4.8–10.8)

## 2021-01-06 PROCEDURE — 36415 COLL VENOUS BLD VENIPUNCTURE: CPT

## 2021-01-06 PROCEDURE — 6360000002 HC RX W HCPCS

## 2021-01-06 PROCEDURE — 96372 THER/PROPH/DIAG INJ SC/IM: CPT

## 2021-01-06 PROCEDURE — 99284 EMERGENCY DEPT VISIT MOD MDM: CPT

## 2021-01-06 PROCEDURE — 85025 COMPLETE CBC W/AUTO DIFF WBC: CPT

## 2021-01-06 PROCEDURE — 80307 DRUG TEST PRSMV CHEM ANLYZR: CPT

## 2021-01-06 PROCEDURE — 81003 URINALYSIS AUTO W/O SCOPE: CPT

## 2021-01-06 PROCEDURE — G0480 DRUG TEST DEF 1-7 CLASSES: HCPCS

## 2021-01-06 PROCEDURE — U0002 COVID-19 LAB TEST NON-CDC: HCPCS

## 2021-01-06 PROCEDURE — 80053 COMPREHEN METABOLIC PANEL: CPT

## 2021-01-06 PROCEDURE — 84443 ASSAY THYROID STIM HORMONE: CPT

## 2021-01-06 PROCEDURE — 2580000003 HC RX 258

## 2021-01-06 PROCEDURE — 82550 ASSAY OF CK (CPK): CPT

## 2021-01-06 RX ORDER — LORAZEPAM 2 MG/ML
2 INJECTION INTRAMUSCULAR ONCE
Status: COMPLETED | OUTPATIENT
Start: 2021-01-06 | End: 2021-01-06

## 2021-01-06 RX ORDER — LORAZEPAM 2 MG/ML
INJECTION INTRAMUSCULAR
Status: COMPLETED
Start: 2021-01-06 | End: 2021-01-06

## 2021-01-06 RX ORDER — ZIPRASIDONE MESYLATE 20 MG/ML
20 INJECTION, POWDER, LYOPHILIZED, FOR SOLUTION INTRAMUSCULAR ONCE
Status: COMPLETED | OUTPATIENT
Start: 2021-01-06 | End: 2021-01-06

## 2021-01-06 RX ORDER — ZIPRASIDONE MESYLATE 20 MG/ML
INJECTION, POWDER, LYOPHILIZED, FOR SOLUTION INTRAMUSCULAR
Status: COMPLETED
Start: 2021-01-06 | End: 2021-01-06

## 2021-01-06 RX ADMIN — LORAZEPAM 2 MG: 2 INJECTION INTRAMUSCULAR at 21:24

## 2021-01-06 RX ADMIN — WATER 10 ML: 1 INJECTION INTRAMUSCULAR; INTRAVENOUS; SUBCUTANEOUS at 21:26

## 2021-01-06 RX ADMIN — ZIPRASIDONE MESYLATE 20 MG: 20 INJECTION, POWDER, LYOPHILIZED, FOR SOLUTION INTRAMUSCULAR at 21:23

## 2021-01-06 RX ADMIN — LORAZEPAM 2 MG: 2 INJECTION INTRAMUSCULAR; INTRAVENOUS at 21:24

## 2021-01-06 ASSESSMENT — ENCOUNTER SYMPTOMS
ANAL BLEEDING: 0
ABDOMINAL DISTENTION: 0
SHORTNESS OF BREATH: 0
APNEA: 0
VOMITING: 0
COUGH: 0
NAUSEA: 0
PHOTOPHOBIA: 0
VOICE CHANGE: 0
EYE DISCHARGE: 0

## 2021-01-06 NOTE — ED PROVIDER NOTES
[address] [facility] ED  eMERGENCY dEPARTMENT eNCOUnter      Pt Name: Nahid Sorensen  MRN: [id number]  [place] 1990  Date of evaluation: 1/6/2021  Provider: Fatemeh Yee PA-C    CHIEF COMPLAINT       Chief Complaint   Patient presents with    Psychiatric Evaluation         HISTORY OF PRESENT ILLNESS   (Location/Symptom, Timing/Onset,Context/Setting, Quality, Duration, Modifying Factors, Severity)  Note limiting factors. Nahid Sorensen is a 31 y.o. male who presents to the emergency department patient presents with behavioral health evaluation from [facility] patient was agitated at [facility]. He is cooperative today patient has no complaints at this time denies fever chills nausea vomit denies chest pain shortness of breath denies injury denies cuts bruises. She has mild to moderate severity nothing proves worsen symptoms. HPI    NursingNotes were reviewed. REVIEW OF SYSTEMS    (2-9 systems for level 4, 10 or more for level 5)     Review of Systems   Constitutional: Negative for activity change, appetite change, chills, fever and unexpected weight change. HENT: Negative for ear discharge, nosebleeds and voice change. Eyes: Negative for photophobia and discharge. Respiratory: Negative for apnea, cough and shortness of breath. Cardiovascular: Negative for chest pain. Gastrointestinal: Negative for abdominal distention, anal bleeding, nausea and vomiting. Endocrine: Negative for cold intolerance, heat intolerance and polyphagia. Genitourinary: Negative for genital sores. Musculoskeletal: Negative for joint swelling. Skin: Negative for pallor. Allergic/Immunologic: Negative for immunocompromised state. Neurological: Negative for seizures and facial asymmetry. Hematological: Does not bruise/bleed easily. Psychiatric/Behavioral: Positive for agitation. Negative for behavioral problems, self-injury and sleep disturbance.    All other systems reviewed and are negative. Except as noted above the remainder of the review of systems was reviewed and negative. PAST MEDICAL HISTORY     Past Medical History:   Diagnosis Date    ADHD (attention deficit hyperactivity disorder)     Alcoholism ([facility] [address]     pt drinks 6 beers and a liter of vodka daily.  Anxiety     Bipolar 1 disorder (HCC)     PTSD (post-traumatic stress disorder)     Suicide attempt ([facility] [address]     previous suicide attempt by overdosing on pills at approximately 25 y/o         SURGICALHISTORY     History reviewed. No pertinent surgical history.       CURRENT MEDICATIONS       Discharge Medication List as of 2021  2:27 AM      CONTINUE these medications which have NOT CHANGED    Details   lurasidone (LATUDA) 20 MG TABS tablet Take 1 tablet by mouth nightly, Disp-30 tablet, R-0Normal             ALLERGIES     Advil [ibuprofen]    FAMILY HISTORY       Family History   Problem Relation Age of Onset    Liver Cancer Brother     Alcohol Abuse Brother     Cancer Mother     No Known Problems Father           SOCIAL HISTORY       Social History     Socioeconomic History    Marital status: Single     Spouse name: None    Number of children: None    Years of education: None    Highest education level: None   Occupational History    None   Social Needs    Financial resource strain: None    Food insecurity     Worry: None     Inability: None    Transportation needs     Medical: None     Non-medical: None   Tobacco Use    Smoking status: Former Smoker     Years: 14.00     Types: Cigarettes     Quit date: 2019     Years since quittin.7    Smokeless tobacco: Former User     Types: Chew   Substance and Sexual Activity    Alcohol use: Not Currently     Comment: over a month ago     Drug use: Not Currently     Types: Opiates      Comment: clean 2 months    Sexual activity: Not Currently     Partners: Female   Lifestyle    Physical activity     Days per week: None     Minutes per session: None    Stress: None   Relationships    Social connections     Talks on phone: None     Gets together: None     Attends Scientology service: None     Active member of club or organization: None     Attends meetings of clubs or organizations: None     Relationship status: None    Intimate partner violence     Fear of current or ex partner: None     Emotionally abused: None     Physically abused: None     Forced sexual activity: None   Other Topics Concern    None   Social History Narrative    None       SCREENINGS      @FLOW([id number])@      PHYSICAL EXAM    (up to 7 for level 4, 8 or more for level 5)     ED Triage Vitals [01/06/21 1428]   BP Temp Temp Source Pulse Resp SpO2 Height Weight   111/71 99.6 °F (37.6 °C) Temporal 85 18 96 % 6' (1.829 m) 170 lb (77.1 kg)       Physical Exam  Vitals signs and nursing note reviewed. Constitutional:       General: He is not in acute distress. Appearance: He is well-developed. HENT:      Head: Normocephalic and atraumatic. Right Ear: External ear normal.      Left Ear: External ear normal.      Nose: Nose normal.      Mouth/Throat:      Mouth: Mucous membranes are moist.   Eyes:      General:         Right eye: No discharge. Left eye: No discharge. Extraocular Movements: Extraocular movements intact. Pupils: Pupils are equal, round, and reactive to light. Neck:      Musculoskeletal: Normal range of motion and neck supple. Cardiovascular:      Rate and Rhythm: Normal rate and regular rhythm. Pulses: Normal pulses. Heart sounds: Normal heart sounds. Pulmonary:      Effort: Pulmonary effort is normal. No respiratory distress. Breath sounds: Normal breath sounds. No stridor. Abdominal:      General: Bowel sounds are normal. There is no distension. Palpations: Abdomen is soft. Tenderness: There is no abdominal tenderness. Musculoskeletal: Normal range of motion. Skin:     General: Skin is warm. Findings: No erythema. Neurological:      Mental Status: He is alert and oriented to person, place, and time. Psychiatric:         Mood and Affect: Mood normal.         DIAGNOSTIC RESULTS     EKG: All EKG's are interpreted by the Emergency Department Physician who either signs or Co-signsthis chart in the absence of a cardiologist.         RADIOLOGY:   [place] such as CT, Ultrasound and MRI are read by the radiologist. Plain radiographic images are visualized and preliminarily interpreted by the emergency physician with the below findings:         Interpretation per the Radiologist below, if available at the time ofthis note:    No orders to display         ED BEDSIDE ULTRASOUND:   Performed by ED Physician - none    LABS:  Labs Reviewed   ACETAMINOPHEN LEVEL - Abnormal; Notable for the following components:       Result Value    Acetaminophen Level <5 (*)     All other components within normal limits   CBC WITH AUTO DIFFERENTIAL - Abnormal; Notable for the following components:    RBC 4.46 (*)     Hematocrit 41.3 (*)     MCH 31.4 (*)     All other components within normal limits   COMPREHENSIVE METABOLIC PANEL - Abnormal; Notable for the following components:    Sodium 134 (*)     Anion Gap 7 (*)     Alb 4.7 (*)     All other components within normal limits   SALICYLATE LEVEL - Abnormal; Notable for the following components:    Salicylate, Serum <0.1 (*)     All other components within normal limits   URINE RT REFLEX TO CULTURE - Abnormal; Notable for the following components:    Protein, UA TRACE (*)     All other components within normal limits   CK   ETHANOL   TSH WITHOUT REFLEX   URINE DRUG SCREEN   COVID-19       All other labs were within normal range or not returned as of this dictation.     EMERGENCY DEPARTMENT COURSE and DIFFERENTIAL DIAGNOSIS/MDM:   Vitals:    Vitals:    01/06/21 1428   BP: 111/71   Pulse: 85   Resp: 18   Temp: 99.6 °F (37.6 °C)   TempSrc: Temporal   SpO2: 96%   Weight: 170 lb (77.1 kg) Height: 6' (1.829 m)            MDM  Number of Diagnoses or Management Options  Bipolar 2 disorder ([facility] [address]  Diagnosis management comments: Medically stable. Amount and/or Complexity of Data Reviewed  Clinical lab tests: reviewed and ordered        CRITICAL CARE TIME         CONSULTS:  None    PROCEDURES:  Unless otherwise noted below, none     Procedures    FINAL IMPRESSION      1.  Bipolar 2 disorder [facility])          DISPOSITION/PLAN   DISPOSITION        PATIENT REFERRED TO:  [facility]) ED  [facility] [address]  [address] [address]  [phone]  Go to   As needed, If symptoms worsen      DISCHARGE MEDICATIONS:  Discharge Medication List as of 1/7/2021  2:27 AM             (Please note that portions of this note were completed with a voice recognition program.  Efforts were made to edit the dictations but occasionally words are mis-transcribed.)    Fernando Issa PA-C (electronically signed)  Attending Emergency Physician       Fernando Issa PA-C  01/07/21 4050 Martha Nicolas PA-C  01/12/21 0222
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
## 2021-02-21 NOTE — FLOWSHEET NOTE
Patient is in observation, overdose. Patient was sleeping. No family, present.  shared in silent prayer.    leaves prayer card for possible follow up.     06/02/19 1312   Encounter Summary   Services provided to: Patient   [facility]   Support System Unknown   Continue Visiting   (6-2-19 PT: sleeping / observation)   Complexity of Encounter Low   Length of Encounter 15 minutes   Spiritual Assessment Completed Yes   Routine   Type Initial   Assessment Sleeping   Intervention Prayer 90.7

## 2022-08-13 NOTE — PROGRESS NOTES
Pharmacy Medication History Note      List of current medications patient is taking is complete. Source of information: [facility], [facility] WHIAN WHIAN)    Changes made to medication list:  Medications removed (include reason, ex. therapy complete or physician discontinued, noncompliance):  Quetiapine    Medications added/doses adjusted: Added Zyprexa 10 mg daily  Added Trazodone 100 mg nightly    Other notes (ex. Recent course of antibiotics, Coumadin dosing):  Naltrexone tablet was last filled 12/28/19 but has not yet been picked up by patient. Prior to this it was last filled in October per OARRS. Please let me know if you have any questions about this encounter. Thank you!     Electronically signed by Anuel Moon, [address] [facility] on 1/2/2020 at 11:02 AM declines

## 2023-01-17 NOTE — PLAN OF CARE
Problem: Altered Mood, Depressive Behavior:  Goal: Able to verbalize acceptance of life and situations over which he or she has no control  Able to verbalize acceptance of life and situations over which he or she has no control   Outcome: Ongoing  Pt did not attend Community Meeting at 0900 d/t resting in room despite staff invitation to attend.
Problem: Altered Mood, Depressive Behavior:  Goal: Able to verbalize and/or display a decrease in depressive symptoms  Able to verbalize and/or display a decrease in depressive symptoms   Patient did not attend skills group which focused on nutrition education and portion control from 6220-9046 despite staff encouragement and prompts.
Problem: Altered Mood, Depressive Behavior:  Intervention: Group therapy to identify positive coping skills  Pt declined to attend psychotherapy at 1000 am despite encouragement. Pt offered 1:1 and refused.
Problem: Altered Mood, Depressive Behavior:  Intervention: Group therapy to identify positive coping skills  Pt declined to attend psychotherapy at 1000 am despite encouragement. Pt offered 1:1 and refused.
clears

## 2023-04-06 NOTE — ED PROVIDER NOTES
[facility] ED  Emergency Department  Emergency Medicine Resident Sign-out     Care of Willian Langford was assumed from Dr. Sara Alas and is being seen for Suicidal  .  The patient's initial evaluation and plan have been discussed with the prior provider who initially evaluated the patient. EMERGENCY DEPARTMENT COURSE / MEDICAL DECISION MAKING:       MEDICATIONS GIVEN:  Orders Placed This Encounter   Medications    amoxicillin-clavulanate (AUGMENTIN) 875-125 MG per tablet 1 tablet    sertraline (ZOLOFT) tablet 50 mg    naltrexone (DEPADE) tablet 50 mg       LABS / RADIOLOGY:     Labs Reviewed   BASIC METABOLIC PANEL - Abnormal; Notable for the following components:       Result Value    Glucose 107 (*)     All other components within normal limits   TOX SCR, BLD, ED - Abnormal; Notable for the following components:    Salicylate Lvl <1 (*)     Acetaminophen Level <5 (*)     All other components within normal limits   URINE DRUG SCREEN - Abnormal; Notable for the following components:    Cocaine Metabolite, Urine POSITIVE (*)     All other components within normal limits   CBC WITH AUTO DIFFERENTIAL   LIPASE   HEPATIC FUNCTION PANEL   TROPONIN       Xr Chest Portable    Result Date: 4/2/2019  EXAMINATION: SINGLE XRAY VIEW OF THE CHEST 4/2/2019 8:37 pm COMPARISON: July 4, 2018 HISTORY: ORDERING SYSTEM PROVIDED HISTORY: medical clearance for psych TECHNOLOGIST PROVIDED HISTORY: medical clearance for psych FINDINGS: The lungs are without acute focal process. There is no effusion or pneumothorax. The cardiomediastinal silhouette is without acute process. The osseous structures are without acute process. No acute process. RECENT VITALS:     Temp: 98.1 °F (36.7 °C),  Pulse: 69, Resp: 16, BP: 102/66, SpO2: 100 %    This patient is a 33 y.o. Male with Suicidality. Awaiting placement      OUTSTANDING TASKS / RECOMMENDATIONS:    1. Awaiting placement     FINAL IMPRESSION:     1.  Suicide Abdomen soft, non-tender, no guarding.

## 2023-10-11 ENCOUNTER — HOSPITAL ENCOUNTER (EMERGENCY)
Age: 33
Discharge: HOME | End: 2023-10-11
Attending: EMERGENCY MEDICINE
Payer: COMMERCIAL

## 2023-10-11 ENCOUNTER — APPOINTMENT (OUTPATIENT)
Dept: GENERAL RADIOLOGY | Age: 33
End: 2023-10-11
Payer: COMMERCIAL

## 2023-10-11 VITALS
OXYGEN SATURATION: 99 % | TEMPERATURE: 98.1 F | BODY MASS INDEX: 26.06 KG/M2 | HEART RATE: 77 BPM | WEIGHT: 192.4 LBS | SYSTOLIC BLOOD PRESSURE: 141 MMHG | HEIGHT: 72 IN | DIASTOLIC BLOOD PRESSURE: 87 MMHG | RESPIRATION RATE: 18 BRPM

## 2023-10-11 DIAGNOSIS — R06.02 SHORTNESS OF BREATH: Primary | ICD-10-CM

## 2023-10-11 LAB
EKG ATRIAL RATE: 90 BPM
EKG DIAGNOSIS: NORMAL
EKG P AXIS: 78 DEGREES
EKG P-R INTERVAL: 154 MS
EKG Q-T INTERVAL: 372 MS
EKG QRS DURATION: 92 MS
EKG QTC CALCULATION (BAZETT): 455 MS
EKG R AXIS: 77 DEGREES
EKG T AXIS: 44 DEGREES
EKG VENTRICULAR RATE: 90 BPM

## 2023-10-11 PROCEDURE — 99284 EMERGENCY DEPT VISIT MOD MDM: CPT

## 2023-10-11 PROCEDURE — 93005 ELECTROCARDIOGRAM TRACING: CPT | Performed by: EMERGENCY MEDICINE

## 2023-10-11 PROCEDURE — 71045 X-RAY EXAM CHEST 1 VIEW: CPT

## 2023-10-11 ASSESSMENT — ENCOUNTER SYMPTOMS
GASTROINTESTINAL NEGATIVE: 1
COUGH: 0
SHORTNESS OF BREATH: 1
EYES NEGATIVE: 1

## 2023-10-11 ASSESSMENT — PAIN - FUNCTIONAL ASSESSMENT: PAIN_FUNCTIONAL_ASSESSMENT: NONE - DENIES PAIN

## 2024-12-13 NOTE — PLAN OF CARE
Patient updated that urine is positive for infection and to start Amoxicillin 500mg qid for 5 days. Patient states that she has an infection in her leg and was started on Bactrim DS BID x 10 days on Monday 12/9/24. Explained that the sensitivities don't show that the bacteria in her urine is sensitive to Bactrim. Will cross check with Dr. Paul and call her back.   Problem: Altered Mood, Depressive Behavior:  Goal: Ability to disclose and discuss suicidal ideas will improve  Description  Ability to disclose and discuss suicidal ideas will improve  Outcome: Ongoing  Goal: Absence of self-harm  Description  Absence of self-harm  Outcome: Ongoing     Pt remains free from self harm this shift. Pt denies wanting to cause harm to self or others at this time. Pt encouraged to seek nursing staff at anytime if he felt at danger to themselves or others. Pt states understanding. Safety checks maintained [id number]     Pt denies suicidal ideations at this time. Pt agreed to seek staff at anytime he felt like any urges to harm self would arise. Safety checks maintained [id number].

## 2025-08-01 ENCOUNTER — HOSPITAL ENCOUNTER (EMERGENCY)
Age: 35
Discharge: ANOTHER HOSPITAL | End: 2025-08-02
Attending: STUDENT IN AN ORGANIZED HEALTH CARE EDUCATION/TRAINING PROGRAM
Payer: COMMERCIAL

## 2025-08-01 DIAGNOSIS — R45.851 SUICIDAL IDEATION: Primary | ICD-10-CM

## 2025-08-01 LAB
BASOPHILS # BLD: 0.07 K/UL (ref 0–0.2)
BASOPHILS NFR BLD: 1 % (ref 0–2)
EOSINOPHIL # BLD: 0.16 K/UL (ref 0.05–0.5)
EOSINOPHILS RELATIVE PERCENT: 2 % (ref 0–6)
ERYTHROCYTE [DISTWIDTH] IN BLOOD BY AUTOMATED COUNT: 12.5 % (ref 11.5–15)
HCT VFR BLD AUTO: 43 % (ref 37–54)
HGB BLD-MCNC: 14.8 G/DL (ref 12.5–16.5)
IMM GRANULOCYTES # BLD AUTO: 0.06 K/UL (ref 0–0.58)
IMM GRANULOCYTES NFR BLD: 1 % (ref 0–5)
LYMPHOCYTES NFR BLD: 2.43 K/UL (ref 1.5–4)
LYMPHOCYTES RELATIVE PERCENT: 33 % (ref 20–42)
MCH RBC QN AUTO: 30.7 PG (ref 26–35)
MCHC RBC AUTO-ENTMCNC: 34.4 G/DL (ref 32–34.5)
MCV RBC AUTO: 89.2 FL (ref 80–99.9)
MONOCYTES NFR BLD: 0.65 K/UL (ref 0.1–0.95)
MONOCYTES NFR BLD: 9 % (ref 2–12)
NEUTROPHILS NFR BLD: 54 % (ref 43–80)
NEUTS SEG NFR BLD: 3.94 K/UL (ref 1.8–7.3)
PLATELET # BLD AUTO: 181 K/UL (ref 130–450)
PMV BLD AUTO: 11 FL (ref 7–12)
RBC # BLD AUTO: 4.82 M/UL (ref 3.8–5.8)
WBC OTHER # BLD: 7.3 K/UL (ref 4.5–11.5)

## 2025-08-01 PROCEDURE — 99285 EMERGENCY DEPT VISIT HI MDM: CPT

## 2025-08-01 PROCEDURE — 80053 COMPREHEN METABOLIC PANEL: CPT

## 2025-08-01 PROCEDURE — 80143 DRUG ASSAY ACETAMINOPHEN: CPT

## 2025-08-01 PROCEDURE — 80307 DRUG TEST PRSMV CHEM ANLYZR: CPT

## 2025-08-01 PROCEDURE — G0480 DRUG TEST DEF 1-7 CLASSES: HCPCS

## 2025-08-01 PROCEDURE — 93005 ELECTROCARDIOGRAM TRACING: CPT | Performed by: STUDENT IN AN ORGANIZED HEALTH CARE EDUCATION/TRAINING PROGRAM

## 2025-08-01 PROCEDURE — 80179 DRUG ASSAY SALICYLATE: CPT

## 2025-08-01 PROCEDURE — 85025 COMPLETE CBC W/AUTO DIFF WBC: CPT

## 2025-08-01 RX ORDER — GABAPENTIN 100 MG/1
100 CAPSULE ORAL 3 TIMES DAILY
Status: ON HOLD | COMMUNITY

## 2025-08-01 ASSESSMENT — LIFESTYLE VARIABLES
HOW OFTEN DO YOU HAVE A DRINK CONTAINING ALCOHOL: NEVER
HOW MANY STANDARD DRINKS CONTAINING ALCOHOL DO YOU HAVE ON A TYPICAL DAY: PATIENT DOES NOT DRINK

## 2025-08-02 ENCOUNTER — HOSPITAL ENCOUNTER (INPATIENT)
Age: 35
LOS: 9 days | Discharge: NURSING, REHAB OR LONG-TERM CARE | DRG: 753 | End: 2025-08-11
Attending: PSYCHIATRY & NEUROLOGY | Admitting: PSYCHIATRY & NEUROLOGY
Payer: COMMERCIAL

## 2025-08-02 VITALS
SYSTOLIC BLOOD PRESSURE: 117 MMHG | DIASTOLIC BLOOD PRESSURE: 64 MMHG | TEMPERATURE: 98.9 F | OXYGEN SATURATION: 96 % | RESPIRATION RATE: 16 BRPM | HEART RATE: 64 BPM

## 2025-08-02 PROBLEM — F39 MOOD DISORDER: Status: ACTIVE | Noted: 2025-08-02

## 2025-08-02 LAB
ALBUMIN SERPL-MCNC: 4.4 G/DL (ref 3.5–5.2)
ALP SERPL-CCNC: 43 U/L (ref 40–129)
ALT SERPL-CCNC: 37 U/L (ref 0–50)
AMPHET UR QL SCN: NEGATIVE
ANION GAP SERPL CALCULATED.3IONS-SCNC: 13 MMOL/L (ref 7–16)
APAP SERPL-MCNC: <5 UG/ML (ref 10–30)
AST SERPL-CCNC: 32 U/L (ref 0–50)
BARBITURATES UR QL SCN: NEGATIVE
BENZODIAZ UR QL: NEGATIVE
BILIRUB SERPL-MCNC: 0.3 MG/DL (ref 0–1.2)
BUN SERPL-MCNC: 17 MG/DL (ref 6–20)
BUPRENORPHINE UR QL: NEGATIVE
CALCIUM SERPL-MCNC: 9.2 MG/DL (ref 8.6–10)
CANNABINOIDS UR QL SCN: NEGATIVE
CHLORIDE SERPL-SCNC: 99 MMOL/L (ref 98–107)
CO2 SERPL-SCNC: 26 MMOL/L (ref 22–29)
COCAINE UR QL SCN: NEGATIVE
CREAT SERPL-MCNC: 1 MG/DL (ref 0.7–1.2)
EKG ATRIAL RATE: 71 BPM
EKG P AXIS: 65 DEGREES
EKG P-R INTERVAL: 154 MS
EKG Q-T INTERVAL: 382 MS
EKG QRS DURATION: 86 MS
EKG QTC CALCULATION (BAZETT): 415 MS
EKG R AXIS: 64 DEGREES
EKG T AXIS: 39 DEGREES
EKG VENTRICULAR RATE: 71 BPM
ETHANOLAMINE SERPL-MCNC: <10 MG/DL (ref 0–0.08)
FENTANYL UR QL: NEGATIVE
GFR, ESTIMATED: >90 ML/MIN/1.73M2
GLUCOSE SERPL-MCNC: 103 MG/DL (ref 74–99)
METHADONE UR QL: NEGATIVE
OPIATES UR QL SCN: NEGATIVE
OXYCODONE UR QL SCN: NEGATIVE
PCP UR QL SCN: NEGATIVE
POTASSIUM SERPL-SCNC: 3.8 MMOL/L (ref 3.5–5.1)
PROT SERPL-MCNC: 7.5 G/DL (ref 6.4–8.3)
SALICYLATES SERPL-MCNC: <0.5 MG/DL (ref 0–30)
SODIUM SERPL-SCNC: 138 MMOL/L (ref 136–145)
TEST INFORMATION: NORMAL
TOXIC TRICYCLIC SC,BLOOD: NEGATIVE

## 2025-08-02 PROCEDURE — 93010 ELECTROCARDIOGRAM REPORT: CPT | Performed by: INTERNAL MEDICINE

## 2025-08-02 PROCEDURE — 1240000000 HC EMOTIONAL WELLNESS R&B

## 2025-08-02 RX ORDER — ACETAMINOPHEN 325 MG/1
650 TABLET ORAL EVERY 6 HOURS PRN
Status: DISCONTINUED | OUTPATIENT
Start: 2025-08-02 | End: 2025-08-11 | Stop reason: HOSPADM

## 2025-08-02 RX ORDER — HYDROXYZINE HYDROCHLORIDE 50 MG/1
50 TABLET, FILM COATED ORAL 3 TIMES DAILY PRN
Status: DISCONTINUED | OUTPATIENT
Start: 2025-08-02 | End: 2025-08-11 | Stop reason: HOSPADM

## 2025-08-02 RX ORDER — HALOPERIDOL 5 MG/ML
5 INJECTION INTRAMUSCULAR EVERY 6 HOURS PRN
Status: DISCONTINUED | OUTPATIENT
Start: 2025-08-02 | End: 2025-08-11 | Stop reason: HOSPADM

## 2025-08-02 RX ORDER — NICOTINE 21 MG/24HR
1 PATCH, TRANSDERMAL 24 HOURS TRANSDERMAL DAILY
Status: DISCONTINUED | OUTPATIENT
Start: 2025-08-02 | End: 2025-08-09

## 2025-08-02 RX ORDER — MAGNESIUM HYDROXIDE/ALUMINUM HYDROXICE/SIMETHICONE 120; 1200; 1200 MG/30ML; MG/30ML; MG/30ML
30 SUSPENSION ORAL PRN
Status: DISCONTINUED | OUTPATIENT
Start: 2025-08-02 | End: 2025-08-11 | Stop reason: HOSPADM

## 2025-08-02 RX ORDER — HALOPERIDOL 5 MG/1
5 TABLET ORAL EVERY 6 HOURS PRN
Status: DISCONTINUED | OUTPATIENT
Start: 2025-08-02 | End: 2025-08-11 | Stop reason: HOSPADM

## 2025-08-02 ASSESSMENT — PATIENT HEALTH QUESTIONNAIRE - PHQ9
SUM OF ALL RESPONSES TO PHQ QUESTIONS 1-9: 2
SUM OF ALL RESPONSES TO PHQ QUESTIONS 1-9: 2
2. FEELING DOWN, DEPRESSED OR HOPELESS: SEVERAL DAYS
SUM OF ALL RESPONSES TO PHQ QUESTIONS 1-9: 2
1. LITTLE INTEREST OR PLEASURE IN DOING THINGS: SEVERAL DAYS
SUM OF ALL RESPONSES TO PHQ QUESTIONS 1-9: 2

## 2025-08-02 ASSESSMENT — SLEEP AND FATIGUE QUESTIONNAIRES
DO YOU HAVE DIFFICULTY SLEEPING: NO
SLEEP PATTERN: NORMAL
AVERAGE NUMBER OF SLEEP HOURS: 7
DO YOU USE A SLEEP AID: NO

## 2025-08-02 ASSESSMENT — ENCOUNTER SYMPTOMS
NAUSEA: 0
VOMITING: 0
ABDOMINAL PAIN: 0

## 2025-08-02 NOTE — PROGRESS NOTES
08/02/25  Ivánraymundo STEPHENS Walter  [id number]  1990    Social Work Behavioral Health Crisis Assessment    Chief Complaint:     Mental Status Exam:  Level of consciousness:  awake   Appearance:  lying in bed.  Does appear stated age. No acute distress.  Behavior/Motor:  no abnormalities noted  Attitude toward examiner:  cooperative  SI/HI:plan to overdose on pills  Speech:  slow and whispered , Tone: normal tone  Mood: anxious and depressed  Affect: flat  Thought Processes:  slow.   Thought Content: Suicidal Ideation:  with plan to overdose on pills  Hallucinations:  Hallucinations: Denies AVOT-H  Cognition:  oriented to person, place, and time   Concentration: intact  Memory: intact, though not formally tested.  Insight: poor   Judgement: poor   Fund of Knowledge: adequate    Legal Status:  [] Voluntary:  [x] Involuntary, Issued by:ED Doctor DIOGO Braswell  [] Probate    Brief Clinical Summary: Patient is a 35 y.o. White (non-) male who presents for SI with plan to overdose on pills. Patient presented to the ED via EMS on 08/02/25 from [facility].    Collateral Information:    Risk Factors: Not active with a mental health provider  History of suicide attempts  Lack of self care  Poor Sleep  Financial problems  Family history of mental health  Increased hopelessness    Protective Factors: Help Seeking  No access to firearms    Legal Issues:  []  Yes (Specify)  [x]  No    Access to Weapons:  []  Yes (Specify)  [x]  No    Violence Risk Screening:        Have you ever thought about hurting someone?   [x]  No  []  Yes (Ask the questions listed below)   When?    Did you follow through with the thoughts?      [x] No     [] Yes- When and what happened?  2.  Have you ever threatened anyone?  [x]  No  []  Yes (Ask the questions listed below)   When and what happened?    Have you ever threatened someone with a gun, knife or other weapon?   [x]  No  []  Yes - When and what happened?  2. Have you ever had an order of

## 2025-08-02 NOTE — ED PROVIDER NOTES
[facility] EMERGENCY DEPARTMENT  EMERGENCY DEPARTMENT ENCOUNTER        Pt Name: Girish Watson  MRN: [id number]  Birthdate 1990  Date of evaluation: 8/1/2025  Provider: Geena Braswell DO  PCP: Laisha, Pcp  Note Started: 1:37 AM EDT 8/2/25    CHIEF COMPLAINT       Chief Complaint   Patient presents with    Suicidal     Patient states he is from the residential side of [facility] - states he has had suicidal thoughts with a plan to overdose on pills. Patient states he has access to a bunch of medications. States he has also had increased panic attacks.        HISTORY OF PRESENT ILLNESS: 1 or more Elements     Limitations to history : None    Girish Watson is a 35 y.o. male with past medical history of schizoaffective disorder, bipolar disorder, polysubstance abuse.  He presents to the emergency department for evaluation of suicidal ideation.  Patient states that he is from the residential side of [facility].  States his last drink of alcohol was about 1 month ago.  He states that he has been feeling very depressed and suicidal and his plan would be to overdose on \"pills.\"  He is unsure specifically what kinds.  He denies any overdose attempt today.  Denies any incidents of self-harm today.  He denies any other symptoms or complaints.  No fevers or chills or recent illnesses.  Denies any HI, denies AH/VH.        Nursing Notes were all reviewed and agreed with or any disagreements were addressed in the HPI.      REVIEW OF EXTERNAL NOTE :       Reviewed documentation from encounter at [facility]/2/2025 when the patient was seen for intentional overdose and suicide attempt.    Chart Review/External Note Review    Last Echo reviewed by Me:  No results found for: \"LVEF\", \"LVEFMODE\"        Controlled Substance Monitoring:    Acute and Chronic Pain Monitoring:        No data to display                      REVIEW OF SYSTEMS :      Review of Systems   Constitutional:  Negative

## 2025-08-02 NOTE — ED NOTES
Pt in bed; resting comfortably. Sitter at bedside. Patient states that he doesn't need anything at this time

## 2025-08-02 NOTE — PROGRESS NOTES
11:58: cl accepted to [facility] under JEAN Hoover Room number #7310  Nurse to nurse # [phone]  [place] slipped faxed to [phone]

## 2025-08-02 NOTE — ED NOTES
Report given to nurse at [facility] at [facility].

## 2025-08-03 PROBLEM — F39 MOOD DISORDER: Status: RESOLVED | Noted: 2025-08-02 | Resolved: 2025-08-03

## 2025-08-03 PROCEDURE — 1240000000 HC EMOTIONAL WELLNESS R&B

## 2025-08-03 PROCEDURE — 6370000000 HC RX 637 (ALT 250 FOR IP): Performed by: NURSE PRACTITIONER

## 2025-08-03 PROCEDURE — 90792 PSYCH DIAG EVAL W/MED SRVCS: CPT | Performed by: NURSE PRACTITIONER

## 2025-08-03 RX ORDER — GABAPENTIN 100 MG/1
100 CAPSULE ORAL 3 TIMES DAILY
Status: DISCONTINUED | OUTPATIENT
Start: 2025-08-03 | End: 2025-08-11 | Stop reason: HOSPADM

## 2025-08-03 RX ORDER — MULTIVITAMIN WITH IRON
1 TABLET ORAL DAILY
Status: DISCONTINUED | OUTPATIENT
Start: 2025-08-03 | End: 2025-08-11 | Stop reason: HOSPADM

## 2025-08-03 RX ORDER — QUETIAPINE FUMARATE 300 MG/1
300 TABLET, FILM COATED ORAL NIGHTLY
Status: ON HOLD | COMMUNITY
End: 2025-08-09 | Stop reason: HOSPADM

## 2025-08-03 RX ORDER — FOLIC ACID 1 MG/1
1 TABLET ORAL DAILY
Status: DISCONTINUED | OUTPATIENT
Start: 2025-08-03 | End: 2025-08-11 | Stop reason: HOSPADM

## 2025-08-03 RX ORDER — THIAMINE HYDROCHLORIDE 100 MG/ML
100 INJECTION, SOLUTION INTRAMUSCULAR; INTRAVENOUS 3 TIMES DAILY
COMMUNITY

## 2025-08-03 RX ORDER — MUPIROCIN 2 %
OINTMENT (GRAM) TOPICAL DAILY
Status: DISCONTINUED | OUTPATIENT
Start: 2025-08-03 | End: 2025-08-03

## 2025-08-03 RX ORDER — OLANZAPINE 5 MG/1
5 TABLET, FILM COATED ORAL NIGHTLY
Status: DISCONTINUED | OUTPATIENT
Start: 2025-08-03 | End: 2025-08-06

## 2025-08-03 RX ORDER — LANOLIN ALCOHOL/MO/W.PET/CERES
100 CREAM (GRAM) TOPICAL DAILY
Status: DISCONTINUED | OUTPATIENT
Start: 2025-08-03 | End: 2025-08-11 | Stop reason: HOSPADM

## 2025-08-03 RX ADMIN — Medication 100 MG: at 13:53

## 2025-08-03 RX ADMIN — GABAPENTIN 100 MG: 100 CAPSULE ORAL at 13:53

## 2025-08-03 RX ADMIN — GABAPENTIN 100 MG: 100 CAPSULE ORAL at 22:03

## 2025-08-03 RX ADMIN — FOLIC ACID 1 MG: 1 TABLET ORAL at 13:53

## 2025-08-03 RX ADMIN — MULTIVITAMIN TABLET 1 TABLET: TABLET at 13:53

## 2025-08-03 RX ADMIN — OLANZAPINE 5 MG: 5 TABLET, FILM COATED ORAL at 22:03

## 2025-08-03 ASSESSMENT — SLEEP AND FATIGUE QUESTIONNAIRES
AVERAGE NUMBER OF SLEEP HOURS: 7
DO YOU USE A SLEEP AID: NO
SLEEP PATTERN: NORMAL
DO YOU HAVE DIFFICULTY SLEEPING: NO

## 2025-08-03 ASSESSMENT — LIFESTYLE VARIABLES
HOW MANY STANDARD DRINKS CONTAINING ALCOHOL DO YOU HAVE ON A TYPICAL DAY: PATIENT DOES NOT DRINK
HOW OFTEN DO YOU HAVE A DRINK CONTAINING ALCOHOL: NEVER

## 2025-08-03 ASSESSMENT — PAIN SCALES - GENERAL: PAINLEVEL_OUTOF10: 0

## 2025-08-04 PROCEDURE — 6370000000 HC RX 637 (ALT 250 FOR IP): Performed by: NURSE PRACTITIONER

## 2025-08-04 PROCEDURE — 99232 SBSQ HOSP IP/OBS MODERATE 35: CPT

## 2025-08-04 PROCEDURE — 1240000000 HC EMOTIONAL WELLNESS R&B

## 2025-08-04 RX ADMIN — OLANZAPINE 5 MG: 5 TABLET, FILM COATED ORAL at 21:30

## 2025-08-04 RX ADMIN — Medication 100 MG: at 09:17

## 2025-08-04 RX ADMIN — GABAPENTIN 100 MG: 100 CAPSULE ORAL at 21:30

## 2025-08-04 RX ADMIN — GABAPENTIN 100 MG: 100 CAPSULE ORAL at 14:02

## 2025-08-04 RX ADMIN — MULTIVITAMIN TABLET 1 TABLET: TABLET at 09:17

## 2025-08-04 RX ADMIN — FOLIC ACID 1 MG: 1 TABLET ORAL at 09:17

## 2025-08-04 RX ADMIN — GABAPENTIN 100 MG: 100 CAPSULE ORAL at 09:17

## 2025-08-04 ASSESSMENT — PAIN SCALES - GENERAL
PAINLEVEL_OUTOF10: 0
PAINLEVEL_OUTOF10: 0

## 2025-08-05 PROCEDURE — 1240000000 HC EMOTIONAL WELLNESS R&B

## 2025-08-05 PROCEDURE — 99232 SBSQ HOSP IP/OBS MODERATE 35: CPT

## 2025-08-05 PROCEDURE — 6370000000 HC RX 637 (ALT 250 FOR IP): Performed by: NURSE PRACTITIONER

## 2025-08-05 RX ADMIN — FOLIC ACID 1 MG: 1 TABLET ORAL at 09:34

## 2025-08-05 RX ADMIN — OLANZAPINE 5 MG: 5 TABLET, FILM COATED ORAL at 21:41

## 2025-08-05 RX ADMIN — GABAPENTIN 100 MG: 100 CAPSULE ORAL at 09:34

## 2025-08-05 RX ADMIN — GABAPENTIN 100 MG: 100 CAPSULE ORAL at 13:42

## 2025-08-05 RX ADMIN — MULTIVITAMIN TABLET 1 TABLET: TABLET at 09:34

## 2025-08-05 RX ADMIN — Medication 100 MG: at 09:35

## 2025-08-05 RX ADMIN — GABAPENTIN 100 MG: 100 CAPSULE ORAL at 21:41

## 2025-08-05 ASSESSMENT — PAIN SCALES - GENERAL: PAINLEVEL_OUTOF10: 0

## 2025-08-06 PROCEDURE — 6370000000 HC RX 637 (ALT 250 FOR IP)

## 2025-08-06 PROCEDURE — 1240000000 HC EMOTIONAL WELLNESS R&B

## 2025-08-06 PROCEDURE — 99232 SBSQ HOSP IP/OBS MODERATE 35: CPT

## 2025-08-06 PROCEDURE — 6370000000 HC RX 637 (ALT 250 FOR IP): Performed by: NURSE PRACTITIONER

## 2025-08-06 RX ORDER — OLANZAPINE 10 MG/1
10 TABLET, FILM COATED ORAL NIGHTLY
Status: DISCONTINUED | OUTPATIENT
Start: 2025-08-06 | End: 2025-08-07

## 2025-08-06 RX ADMIN — GABAPENTIN 100 MG: 100 CAPSULE ORAL at 13:21

## 2025-08-06 RX ADMIN — FOLIC ACID 1 MG: 1 TABLET ORAL at 08:59

## 2025-08-06 RX ADMIN — Medication 100 MG: at 08:59

## 2025-08-06 RX ADMIN — GABAPENTIN 100 MG: 100 CAPSULE ORAL at 22:10

## 2025-08-06 RX ADMIN — GABAPENTIN 100 MG: 100 CAPSULE ORAL at 08:59

## 2025-08-06 RX ADMIN — MULTIVITAMIN TABLET 1 TABLET: TABLET at 08:59

## 2025-08-06 RX ADMIN — OLANZAPINE 10 MG: 10 TABLET, FILM COATED ORAL at 22:10

## 2025-08-06 ASSESSMENT — PAIN SCALES - GENERAL: PAINLEVEL_OUTOF10: 0

## 2025-08-07 PROCEDURE — 6370000000 HC RX 637 (ALT 250 FOR IP)

## 2025-08-07 PROCEDURE — 6370000000 HC RX 637 (ALT 250 FOR IP): Performed by: NURSE PRACTITIONER

## 2025-08-07 PROCEDURE — 1240000000 HC EMOTIONAL WELLNESS R&B

## 2025-08-07 PROCEDURE — 99232 SBSQ HOSP IP/OBS MODERATE 35: CPT

## 2025-08-07 RX ADMIN — GABAPENTIN 100 MG: 100 CAPSULE ORAL at 14:25

## 2025-08-07 RX ADMIN — FOLIC ACID 1 MG: 1 TABLET ORAL at 09:53

## 2025-08-07 RX ADMIN — GABAPENTIN 100 MG: 100 CAPSULE ORAL at 23:01

## 2025-08-07 RX ADMIN — MULTIVITAMIN TABLET 1 TABLET: TABLET at 09:53

## 2025-08-07 RX ADMIN — OLANZAPINE 15 MG: 5 TABLET, FILM COATED ORAL at 23:01

## 2025-08-07 RX ADMIN — Medication 100 MG: at 09:53

## 2025-08-07 RX ADMIN — GABAPENTIN 100 MG: 100 CAPSULE ORAL at 09:53

## 2025-08-07 ASSESSMENT — PAIN SCALES - GENERAL: PAINLEVEL_OUTOF10: 0

## 2025-08-08 PROCEDURE — 99232 SBSQ HOSP IP/OBS MODERATE 35: CPT

## 2025-08-08 PROCEDURE — 6370000000 HC RX 637 (ALT 250 FOR IP)

## 2025-08-08 PROCEDURE — 6370000000 HC RX 637 (ALT 250 FOR IP): Performed by: NURSE PRACTITIONER

## 2025-08-08 PROCEDURE — 1240000000 HC EMOTIONAL WELLNESS R&B

## 2025-08-08 RX ADMIN — GABAPENTIN 100 MG: 100 CAPSULE ORAL at 09:36

## 2025-08-08 RX ADMIN — FOLIC ACID 1 MG: 1 TABLET ORAL at 09:36

## 2025-08-08 RX ADMIN — GABAPENTIN 100 MG: 100 CAPSULE ORAL at 14:34

## 2025-08-08 RX ADMIN — GABAPENTIN 100 MG: 100 CAPSULE ORAL at 21:54

## 2025-08-08 RX ADMIN — MULTIVITAMIN TABLET 1 TABLET: TABLET at 09:36

## 2025-08-08 RX ADMIN — OLANZAPINE 15 MG: 5 TABLET, FILM COATED ORAL at 21:54

## 2025-08-08 RX ADMIN — Medication 100 MG: at 09:36

## 2025-08-08 ASSESSMENT — PAIN SCALES - GENERAL: PAINLEVEL_OUTOF10: 0

## 2025-08-09 PROCEDURE — 1240000000 HC EMOTIONAL WELLNESS R&B

## 2025-08-09 PROCEDURE — 6370000000 HC RX 637 (ALT 250 FOR IP): Performed by: NURSE PRACTITIONER

## 2025-08-09 PROCEDURE — 6370000000 HC RX 637 (ALT 250 FOR IP)

## 2025-08-09 PROCEDURE — 99232 SBSQ HOSP IP/OBS MODERATE 35: CPT

## 2025-08-09 RX ORDER — OLANZAPINE 15 MG/1
15 TABLET, FILM COATED ORAL NIGHTLY
Qty: 30 TABLET | Refills: 0 | Status: SHIPPED | OUTPATIENT
Start: 2025-08-09 | End: 2025-09-08

## 2025-08-09 RX ORDER — GABAPENTIN 100 MG/1
100 CAPSULE ORAL 3 TIMES DAILY
Qty: 90 CAPSULE | Refills: 0 | Status: SHIPPED | OUTPATIENT
Start: 2025-08-09 | End: 2025-09-08

## 2025-08-09 RX ADMIN — FOLIC ACID 1 MG: 1 TABLET ORAL at 10:08

## 2025-08-09 RX ADMIN — GABAPENTIN 100 MG: 100 CAPSULE ORAL at 14:56

## 2025-08-09 RX ADMIN — MULTIVITAMIN TABLET 1 TABLET: TABLET at 10:08

## 2025-08-09 RX ADMIN — GABAPENTIN 100 MG: 100 CAPSULE ORAL at 21:13

## 2025-08-09 RX ADMIN — GABAPENTIN 100 MG: 100 CAPSULE ORAL at 10:08

## 2025-08-09 RX ADMIN — Medication 100 MG: at 10:08

## 2025-08-09 RX ADMIN — OLANZAPINE 15 MG: 5 TABLET, FILM COATED ORAL at 21:13

## 2025-08-09 ASSESSMENT — PAIN SCALES - GENERAL
PAINLEVEL_OUTOF10: 0
PAINLEVEL_OUTOF10: 0

## 2025-08-10 PROCEDURE — 6370000000 HC RX 637 (ALT 250 FOR IP)

## 2025-08-10 PROCEDURE — 6370000000 HC RX 637 (ALT 250 FOR IP): Performed by: NURSE PRACTITIONER

## 2025-08-10 PROCEDURE — 1240000000 HC EMOTIONAL WELLNESS R&B

## 2025-08-10 PROCEDURE — 99232 SBSQ HOSP IP/OBS MODERATE 35: CPT

## 2025-08-10 RX ADMIN — OLANZAPINE 15 MG: 5 TABLET, FILM COATED ORAL at 22:18

## 2025-08-10 RX ADMIN — GABAPENTIN 100 MG: 100 CAPSULE ORAL at 13:46

## 2025-08-10 RX ADMIN — MULTIVITAMIN TABLET 1 TABLET: TABLET at 09:49

## 2025-08-10 RX ADMIN — GABAPENTIN 100 MG: 100 CAPSULE ORAL at 22:18

## 2025-08-10 RX ADMIN — Medication 100 MG: at 09:49

## 2025-08-10 RX ADMIN — GABAPENTIN 100 MG: 100 CAPSULE ORAL at 09:49

## 2025-08-10 RX ADMIN — FOLIC ACID 1 MG: 1 TABLET ORAL at 09:49

## 2025-08-10 ASSESSMENT — PAIN SCALES - GENERAL: PAINLEVEL_OUTOF10: 0

## 2025-08-11 VITALS
OXYGEN SATURATION: 97 % | HEART RATE: 79 BPM | SYSTOLIC BLOOD PRESSURE: 101 MMHG | RESPIRATION RATE: 16 BRPM | TEMPERATURE: 99.1 F | HEIGHT: 72 IN | DIASTOLIC BLOOD PRESSURE: 57 MMHG | WEIGHT: 196.2 LBS | BODY MASS INDEX: 26.57 KG/M2

## 2025-08-11 PROCEDURE — 6370000000 HC RX 637 (ALT 250 FOR IP): Performed by: NURSE PRACTITIONER

## 2025-08-11 PROCEDURE — 99239 HOSP IP/OBS DSCHRG MGMT >30: CPT

## 2025-08-11 RX ADMIN — MULTIVITAMIN TABLET 1 TABLET: TABLET at 09:18

## 2025-08-11 RX ADMIN — GABAPENTIN 100 MG: 100 CAPSULE ORAL at 09:18

## 2025-08-11 RX ADMIN — FOLIC ACID 1 MG: 1 TABLET ORAL at 09:18

## 2025-08-11 RX ADMIN — Medication 100 MG: at 09:18

## 2025-08-11 ASSESSMENT — PAIN SCALES - GENERAL: PAINLEVEL_OUTOF10: 0
